# Patient Record
Sex: MALE | Race: WHITE | NOT HISPANIC OR LATINO | Employment: OTHER | URBAN - NONMETROPOLITAN AREA
[De-identification: names, ages, dates, MRNs, and addresses within clinical notes are randomized per-mention and may not be internally consistent; named-entity substitution may affect disease eponyms.]

---

## 2017-01-13 ENCOUNTER — HOSPITAL ENCOUNTER (EMERGENCY)
Facility: HOSPITAL | Age: 22
End: 2017-01-13
Attending: EMERGENCY MEDICINE | Admitting: EMERGENCY MEDICINE
Payer: MEDICARE

## 2017-01-13 VITALS
DIASTOLIC BLOOD PRESSURE: 84 MMHG | BODY MASS INDEX: 41.78 KG/M2 | HEART RATE: 120 BPM | OXYGEN SATURATION: 100 % | WEIGHT: 266.76 LBS | RESPIRATION RATE: 18 BRPM | TEMPERATURE: 99.1 F | SYSTOLIC BLOOD PRESSURE: 138 MMHG

## 2017-01-13 DIAGNOSIS — F29 PSYCHOSIS (HCC): Primary | ICD-10-CM

## 2017-01-13 LAB
AMPHETAMINES SERPL QL SCN: NEGATIVE
ANION GAP SERPL CALCULATED.3IONS-SCNC: 11 MMOL/L (ref 4–13)
BARBITURATES UR QL: NEGATIVE
BASOPHILS # BLD AUTO: 0.02 THOUSANDS/ΜL (ref 0–0.1)
BASOPHILS NFR BLD AUTO: 0 % (ref 0–1)
BENZODIAZ UR QL: NEGATIVE
BUN SERPL-MCNC: 12 MG/DL (ref 5–25)
CALCIUM SERPL-MCNC: 9 MG/DL (ref 8.3–10.1)
CHLORIDE SERPL-SCNC: 104 MMOL/L (ref 100–108)
CO2 SERPL-SCNC: 27 MMOL/L (ref 21–32)
COCAINE UR QL: NEGATIVE
CREAT SERPL-MCNC: 0.64 MG/DL (ref 0.6–1.3)
EOSINOPHIL # BLD AUTO: 0.11 THOUSAND/ΜL (ref 0–0.61)
EOSINOPHIL NFR BLD AUTO: 1 % (ref 0–6)
ERYTHROCYTE [DISTWIDTH] IN BLOOD BY AUTOMATED COUNT: 12.7 % (ref 11.6–15.1)
ETHANOL SERPL-MCNC: <3 MG/DL (ref 0–3)
GFR SERPL CREATININE-BSD FRML MDRD: >60 ML/MIN/1.73SQ M
GLUCOSE SERPL-MCNC: 80 MG/DL (ref 65–140)
HCT VFR BLD AUTO: 46.9 % (ref 36.5–49.3)
HGB BLD-MCNC: 15.9 G/DL (ref 12–17)
HOLD SPECIMEN: NORMAL
HOLD SPECIMEN: YES
LYMPHOCYTES # BLD AUTO: 2.04 THOUSANDS/ΜL (ref 0.6–4.47)
LYMPHOCYTES NFR BLD AUTO: 26 % (ref 14–44)
MCH RBC QN AUTO: 29.3 PG (ref 26.8–34.3)
MCHC RBC AUTO-ENTMCNC: 33.9 G/DL (ref 31.4–37.4)
MCV RBC AUTO: 87 FL (ref 82–98)
METHADONE UR QL: NEGATIVE
MONOCYTES # BLD AUTO: 0.78 THOUSAND/ΜL (ref 0.17–1.22)
MONOCYTES NFR BLD AUTO: 10 % (ref 4–12)
NEUTROPHILS # BLD AUTO: 4.96 THOUSANDS/ΜL (ref 1.85–7.62)
NEUTS SEG NFR BLD AUTO: 63 % (ref 43–75)
OPIATES UR QL SCN: NEGATIVE
PCP UR QL: NEGATIVE
PLATELET # BLD AUTO: 249 THOUSANDS/UL (ref 149–390)
PMV BLD AUTO: 9.7 FL (ref 8.9–12.7)
POTASSIUM SERPL-SCNC: 3.8 MMOL/L (ref 3.5–5.3)
RBC # BLD AUTO: 5.42 MILLION/UL (ref 3.88–5.62)
SODIUM SERPL-SCNC: 142 MMOL/L (ref 136–145)
THC UR QL: NEGATIVE
WBC # BLD AUTO: 7.91 THOUSAND/UL (ref 4.31–10.16)

## 2017-01-13 PROCEDURE — 85025 COMPLETE CBC W/AUTO DIFF WBC: CPT | Performed by: EMERGENCY MEDICINE

## 2017-01-13 PROCEDURE — 99285 EMERGENCY DEPT VISIT HI MDM: CPT

## 2017-01-13 PROCEDURE — 36415 COLL VENOUS BLD VENIPUNCTURE: CPT | Performed by: EMERGENCY MEDICINE

## 2017-01-13 PROCEDURE — 80320 DRUG SCREEN QUANTALCOHOLS: CPT | Performed by: EMERGENCY MEDICINE

## 2017-01-13 PROCEDURE — 80048 BASIC METABOLIC PNL TOTAL CA: CPT | Performed by: EMERGENCY MEDICINE

## 2017-01-13 PROCEDURE — 80307 DRUG TEST PRSMV CHEM ANLYZR: CPT | Performed by: EMERGENCY MEDICINE

## 2017-01-13 RX ORDER — LORAZEPAM 1 MG/1
1 TABLET ORAL EVERY 6 HOURS PRN
Status: CANCELLED | OUTPATIENT
Start: 2017-01-13

## 2017-01-13 RX ORDER — ZOLPIDEM TARTRATE 5 MG/1
5 TABLET ORAL
Status: CANCELLED | OUTPATIENT
Start: 2017-01-13

## 2017-01-13 RX ORDER — BENZTROPINE MESYLATE 1 MG/1
1 TABLET ORAL EVERY 6 HOURS PRN
Status: CANCELLED | OUTPATIENT
Start: 2017-01-13

## 2017-01-13 RX ORDER — BENZTROPINE MESYLATE 1 MG/ML
1 INJECTION INTRAMUSCULAR; INTRAVENOUS EVERY 6 HOURS PRN
Status: CANCELLED | OUTPATIENT
Start: 2017-01-13

## 2017-01-13 RX ORDER — MAGNESIUM HYDROXIDE/ALUMINUM HYDROXICE/SIMETHICONE 120; 1200; 1200 MG/30ML; MG/30ML; MG/30ML
30 SUSPENSION ORAL EVERY 4 HOURS PRN
Status: CANCELLED | OUTPATIENT
Start: 2017-01-13

## 2017-01-13 RX ORDER — LORAZEPAM 2 MG/ML
2 INJECTION INTRAMUSCULAR 4 TIMES DAILY PRN
Status: CANCELLED | OUTPATIENT
Start: 2017-01-13

## 2017-01-13 RX ORDER — RISPERIDONE 1 MG/1
1 TABLET, FILM COATED ORAL ONCE
Status: COMPLETED | OUTPATIENT
Start: 2017-01-13 | End: 2017-01-13

## 2017-01-13 RX ORDER — OLANZAPINE 10 MG/1
10 INJECTION, POWDER, LYOPHILIZED, FOR SOLUTION INTRAMUSCULAR
Status: CANCELLED | OUTPATIENT
Start: 2017-01-13

## 2017-01-13 RX ORDER — ACETAMINOPHEN 325 MG/1
650 TABLET ORAL EVERY 6 HOURS PRN
Status: CANCELLED | OUTPATIENT
Start: 2017-01-13

## 2017-01-13 RX ORDER — HALOPERIDOL 5 MG
5 TABLET ORAL EVERY 6 HOURS PRN
Status: CANCELLED | OUTPATIENT
Start: 2017-01-13

## 2017-01-13 RX ADMIN — RISPERIDONE 1 MG: 1 TABLET ORAL at 19:02

## 2017-01-14 ENCOUNTER — HOSPITAL ENCOUNTER (INPATIENT)
Facility: HOSPITAL | Age: 22
LOS: 6 days | Discharge: HOME | DRG: 885 | End: 2017-01-20
Attending: PSYCHIATRY & NEUROLOGY | Admitting: PSYCHIATRY & NEUROLOGY
Payer: MEDICARE

## 2017-01-14 DIAGNOSIS — I10 HYPERTENSION: Primary | ICD-10-CM

## 2017-01-14 DIAGNOSIS — F29 PSYCHOTIC DISORDER (HCC): Chronic | ICD-10-CM

## 2017-01-14 LAB
AMPHETAMINES SERPL QL SCN: NEGATIVE
BARBITURATES UR QL: NEGATIVE
BENZODIAZ UR QL: NEGATIVE
BILIRUB UR QL STRIP: NEGATIVE
CLARITY UR: NORMAL
COCAINE UR QL: NEGATIVE
COLOR UR: YELLOW
GLUCOSE UR STRIP-MCNC: NEGATIVE MG/DL
HGB UR QL STRIP.AUTO: NEGATIVE
KETONES UR STRIP-MCNC: NEGATIVE MG/DL
LEUKOCYTE ESTERASE UR QL STRIP: NEGATIVE
METHADONE UR QL: NEGATIVE
NITRITE UR QL STRIP: NEGATIVE
OPIATES UR QL SCN: NEGATIVE
PCP UR QL: NEGATIVE
PH UR STRIP.AUTO: 7 [PH] (ref 4.5–8)
PROT UR STRIP-MCNC: NEGATIVE MG/DL
SP GR UR STRIP.AUTO: 1.02 (ref 1–1.03)
THC UR QL: NEGATIVE
UROBILINOGEN UR QL STRIP.AUTO: 0.2 E.U./DL

## 2017-01-14 PROCEDURE — 81003 URINALYSIS AUTO W/O SCOPE: CPT | Performed by: PSYCHIATRY & NEUROLOGY

## 2017-01-14 PROCEDURE — 80307 DRUG TEST PRSMV CHEM ANLYZR: CPT | Performed by: PSYCHIATRY & NEUROLOGY

## 2017-01-14 RX ORDER — RISPERIDONE 2 MG/1
4 TABLET, FILM COATED ORAL
Status: DISCONTINUED | OUTPATIENT
Start: 2017-01-14 | End: 2017-01-20 | Stop reason: HOSPADM

## 2017-01-14 RX ORDER — HALOPERIDOL 5 MG
5 TABLET ORAL EVERY 6 HOURS PRN
Status: DISCONTINUED | OUTPATIENT
Start: 2017-01-14 | End: 2017-01-20 | Stop reason: HOSPADM

## 2017-01-14 RX ORDER — DIVALPROEX SODIUM 500 MG/1
500 TABLET, EXTENDED RELEASE ORAL 2 TIMES DAILY
Status: DISCONTINUED | OUTPATIENT
Start: 2017-01-14 | End: 2017-01-20 | Stop reason: HOSPADM

## 2017-01-14 RX ORDER — LORAZEPAM 2 MG/ML
2 INJECTION INTRAMUSCULAR 4 TIMES DAILY PRN
Status: DISCONTINUED | OUTPATIENT
Start: 2017-01-14 | End: 2017-01-20 | Stop reason: HOSPADM

## 2017-01-14 RX ORDER — BENZTROPINE MESYLATE 1 MG/1
1 TABLET ORAL EVERY 6 HOURS PRN
Status: DISCONTINUED | OUTPATIENT
Start: 2017-01-14 | End: 2017-01-20 | Stop reason: HOSPADM

## 2017-01-14 RX ORDER — AMLODIPINE BESYLATE 5 MG/1
10 TABLET ORAL DAILY
Status: DISCONTINUED | OUTPATIENT
Start: 2017-01-14 | End: 2017-01-20 | Stop reason: HOSPADM

## 2017-01-14 RX ORDER — BENZTROPINE MESYLATE 1 MG/ML
1 INJECTION INTRAMUSCULAR; INTRAVENOUS EVERY 6 HOURS PRN
Status: DISCONTINUED | OUTPATIENT
Start: 2017-01-14 | End: 2017-01-20 | Stop reason: HOSPADM

## 2017-01-14 RX ORDER — DIVALPROEX SODIUM 500 MG/1
500 TABLET, EXTENDED RELEASE ORAL SEE ADMIN INSTRUCTIONS
Status: DISCONTINUED | OUTPATIENT
Start: 2017-01-14 | End: 2017-01-14

## 2017-01-14 RX ORDER — ZOLPIDEM TARTRATE 5 MG/1
5 TABLET ORAL
Status: DISCONTINUED | OUTPATIENT
Start: 2017-01-14 | End: 2017-01-20 | Stop reason: HOSPADM

## 2017-01-14 RX ORDER — MAGNESIUM HYDROXIDE/ALUMINUM HYDROXICE/SIMETHICONE 120; 1200; 1200 MG/30ML; MG/30ML; MG/30ML
30 SUSPENSION ORAL EVERY 4 HOURS PRN
Status: DISCONTINUED | OUTPATIENT
Start: 2017-01-14 | End: 2017-01-20 | Stop reason: HOSPADM

## 2017-01-14 RX ORDER — OLANZAPINE 10 MG/1
10 INJECTION, POWDER, LYOPHILIZED, FOR SOLUTION INTRAMUSCULAR
Status: DISCONTINUED | OUTPATIENT
Start: 2017-01-14 | End: 2017-01-20 | Stop reason: HOSPADM

## 2017-01-14 RX ORDER — LORAZEPAM 1 MG/1
1 TABLET ORAL EVERY 6 HOURS PRN
Status: DISCONTINUED | OUTPATIENT
Start: 2017-01-14 | End: 2017-01-20 | Stop reason: HOSPADM

## 2017-01-14 RX ORDER — ACETAMINOPHEN 325 MG/1
650 TABLET ORAL EVERY 6 HOURS PRN
Status: DISCONTINUED | OUTPATIENT
Start: 2017-01-14 | End: 2017-01-20 | Stop reason: HOSPADM

## 2017-01-14 RX ADMIN — DIVALPROEX SODIUM 500 MG: 500 TABLET, FILM COATED, EXTENDED RELEASE ORAL at 17:37

## 2017-01-14 RX ADMIN — AMLODIPINE BESYLATE 10 MG: 5 TABLET ORAL at 10:34

## 2017-01-14 RX ADMIN — DIVALPROEX SODIUM 500 MG: 500 TABLET, FILM COATED, EXTENDED RELEASE ORAL at 12:52

## 2017-01-14 RX ADMIN — RISPERIDONE 4 MG: 2 TABLET ORAL at 22:30

## 2017-01-14 RX ADMIN — ACETAMINOPHEN 650 MG: 325 TABLET, FILM COATED ORAL at 19:58

## 2017-01-15 LAB
ALBUMIN SERPL BCP-MCNC: 3.3 G/DL (ref 3.5–5)
ALP SERPL-CCNC: 75 U/L (ref 46–116)
ALT SERPL W P-5'-P-CCNC: 31 U/L (ref 12–78)
ANION GAP SERPL CALCULATED.3IONS-SCNC: 8 MMOL/L (ref 4–13)
AST SERPL W P-5'-P-CCNC: 14 U/L (ref 5–45)
BILIRUB SERPL-MCNC: 0.3 MG/DL (ref 0.2–1)
BUN SERPL-MCNC: 12 MG/DL (ref 5–25)
CALCIUM SERPL-MCNC: 8.5 MG/DL (ref 8.3–10.1)
CHLORIDE SERPL-SCNC: 105 MMOL/L (ref 100–108)
CHOLEST SERPL-MCNC: 171 MG/DL (ref 50–200)
CO2 SERPL-SCNC: 28 MMOL/L (ref 21–32)
CREAT SERPL-MCNC: 0.69 MG/DL (ref 0.6–1.3)
GFR SERPL CREATININE-BSD FRML MDRD: >60 ML/MIN/1.73SQ M
GLUCOSE SERPL-MCNC: 89 MG/DL (ref 65–140)
HDLC SERPL-MCNC: 35 MG/DL (ref 40–60)
LDLC SERPL CALC-MCNC: 116 MG/DL (ref 0–100)
POTASSIUM SERPL-SCNC: 3.9 MMOL/L (ref 3.5–5.3)
PROT SERPL-MCNC: 6.6 G/DL (ref 6.4–8.2)
SODIUM SERPL-SCNC: 141 MMOL/L (ref 136–145)
TRIGL SERPL-MCNC: 100 MG/DL
TSH SERPL DL<=0.05 MIU/L-ACNC: 3.65 UIU/ML (ref 0.36–3.74)
VALPROATE SERPL-MCNC: 56 UG/ML (ref 50–100)

## 2017-01-15 PROCEDURE — 80164 ASSAY DIPROPYLACETIC ACD TOT: CPT | Performed by: PSYCHIATRY & NEUROLOGY

## 2017-01-15 PROCEDURE — 84443 ASSAY THYROID STIM HORMONE: CPT | Performed by: PSYCHIATRY & NEUROLOGY

## 2017-01-15 PROCEDURE — 86592 SYPHILIS TEST NON-TREP QUAL: CPT | Performed by: PSYCHIATRY & NEUROLOGY

## 2017-01-15 PROCEDURE — 80053 COMPREHEN METABOLIC PANEL: CPT | Performed by: PSYCHIATRY & NEUROLOGY

## 2017-01-15 PROCEDURE — 80061 LIPID PANEL: CPT | Performed by: PSYCHIATRY & NEUROLOGY

## 2017-01-15 RX ADMIN — AMLODIPINE BESYLATE 10 MG: 5 TABLET ORAL at 08:46

## 2017-01-15 RX ADMIN — ACETAMINOPHEN 650 MG: 325 TABLET, FILM COATED ORAL at 15:53

## 2017-01-15 RX ADMIN — DIVALPROEX SODIUM 500 MG: 500 TABLET, FILM COATED, EXTENDED RELEASE ORAL at 17:30

## 2017-01-15 RX ADMIN — RISPERIDONE 4 MG: 2 TABLET ORAL at 22:19

## 2017-01-15 RX ADMIN — DIVALPROEX SODIUM 500 MG: 500 TABLET, FILM COATED, EXTENDED RELEASE ORAL at 08:46

## 2017-01-16 LAB — RPR SER QL: NORMAL

## 2017-01-16 RX ADMIN — RISPERIDONE 4 MG: 2 TABLET ORAL at 22:00

## 2017-01-16 RX ADMIN — DIVALPROEX SODIUM 500 MG: 500 TABLET, FILM COATED, EXTENDED RELEASE ORAL at 17:36

## 2017-01-16 RX ADMIN — AMLODIPINE BESYLATE 10 MG: 5 TABLET ORAL at 09:07

## 2017-01-16 RX ADMIN — DIVALPROEX SODIUM 500 MG: 500 TABLET, FILM COATED, EXTENDED RELEASE ORAL at 09:08

## 2017-01-17 RX ORDER — DIVALPROEX SODIUM 250 MG/1
250 TABLET, EXTENDED RELEASE ORAL
Status: DISCONTINUED | OUTPATIENT
Start: 2017-01-17 | End: 2017-01-20 | Stop reason: HOSPADM

## 2017-01-17 RX ADMIN — DIVALPROEX SODIUM 500 MG: 500 TABLET, FILM COATED, EXTENDED RELEASE ORAL at 17:27

## 2017-01-17 RX ADMIN — RISPERIDONE 4 MG: 2 TABLET ORAL at 21:45

## 2017-01-17 RX ADMIN — DIVALPROEX SODIUM 250 MG: 250 TABLET, FILM COATED, EXTENDED RELEASE ORAL at 17:27

## 2017-01-17 RX ADMIN — AMLODIPINE BESYLATE 10 MG: 5 TABLET ORAL at 09:21

## 2017-01-17 RX ADMIN — DIVALPROEX SODIUM 500 MG: 500 TABLET, FILM COATED, EXTENDED RELEASE ORAL at 09:42

## 2017-01-18 RX ADMIN — DIVALPROEX SODIUM 250 MG: 250 TABLET, FILM COATED, EXTENDED RELEASE ORAL at 18:02

## 2017-01-18 RX ADMIN — AMLODIPINE BESYLATE 10 MG: 5 TABLET ORAL at 08:43

## 2017-01-18 RX ADMIN — DIVALPROEX SODIUM 500 MG: 500 TABLET, FILM COATED, EXTENDED RELEASE ORAL at 08:43

## 2017-01-18 RX ADMIN — DIVALPROEX SODIUM 500 MG: 500 TABLET, FILM COATED, EXTENDED RELEASE ORAL at 18:01

## 2017-01-18 RX ADMIN — RISPERIDONE 4 MG: 2 TABLET ORAL at 21:54

## 2017-01-19 RX ADMIN — AMLODIPINE BESYLATE 10 MG: 5 TABLET ORAL at 08:45

## 2017-01-19 RX ADMIN — DIVALPROEX SODIUM 250 MG: 250 TABLET, FILM COATED, EXTENDED RELEASE ORAL at 18:16

## 2017-01-19 RX ADMIN — RISPERIDONE 4 MG: 2 TABLET ORAL at 21:32

## 2017-01-19 RX ADMIN — DIVALPROEX SODIUM 500 MG: 500 TABLET, FILM COATED, EXTENDED RELEASE ORAL at 18:16

## 2017-01-19 RX ADMIN — DIVALPROEX SODIUM 500 MG: 500 TABLET, FILM COATED, EXTENDED RELEASE ORAL at 08:45

## 2017-01-20 VITALS
SYSTOLIC BLOOD PRESSURE: 128 MMHG | HEART RATE: 97 BPM | HEIGHT: 70 IN | DIASTOLIC BLOOD PRESSURE: 64 MMHG | RESPIRATION RATE: 18 BRPM | WEIGHT: 266.76 LBS | BODY MASS INDEX: 38.19 KG/M2 | TEMPERATURE: 98.3 F | OXYGEN SATURATION: 96 %

## 2017-01-20 LAB — VALPROATE SERPL-MCNC: 69 UG/ML (ref 50–100)

## 2017-01-20 PROCEDURE — 80164 ASSAY DIPROPYLACETIC ACD TOT: CPT | Performed by: PHYSICIAN ASSISTANT

## 2017-01-20 RX ORDER — RISPERIDONE 4 MG/1
4 TABLET, FILM COATED ORAL
Qty: 30 TABLET | Refills: 0 | Status: SHIPPED | OUTPATIENT
Start: 2017-01-20 | End: 2017-03-20

## 2017-01-20 RX ORDER — AMLODIPINE BESYLATE 10 MG/1
10 TABLET ORAL DAILY
Qty: 30 TABLET | Refills: 0 | Status: SHIPPED | OUTPATIENT
Start: 2017-01-20 | End: 2017-03-20

## 2017-01-20 RX ORDER — DIVALPROEX SODIUM 500 MG/1
500 TABLET, EXTENDED RELEASE ORAL 2 TIMES DAILY
Qty: 60 TABLET | Refills: 0 | Status: SHIPPED | OUTPATIENT
Start: 2017-01-20 | End: 2017-03-20

## 2017-01-20 RX ORDER — DIVALPROEX SODIUM 250 MG/1
250 TABLET, EXTENDED RELEASE ORAL
Qty: 30 TABLET | Refills: 0 | Status: SHIPPED | OUTPATIENT
Start: 2017-01-20 | End: 2017-03-20

## 2017-01-20 RX ADMIN — AMLODIPINE BESYLATE 10 MG: 5 TABLET ORAL at 09:10

## 2017-01-20 RX ADMIN — DIVALPROEX SODIUM 500 MG: 500 TABLET, FILM COATED, EXTENDED RELEASE ORAL at 09:10

## 2017-03-20 ENCOUNTER — HOSPITAL ENCOUNTER (EMERGENCY)
Facility: HOSPITAL | Age: 22
End: 2017-03-20
Attending: EMERGENCY MEDICINE | Admitting: EMERGENCY MEDICINE
Payer: MEDICARE

## 2017-03-20 VITALS
OXYGEN SATURATION: 98 % | HEART RATE: 93 BPM | SYSTOLIC BLOOD PRESSURE: 145 MMHG | TEMPERATURE: 98.2 F | DIASTOLIC BLOOD PRESSURE: 88 MMHG | RESPIRATION RATE: 18 BRPM

## 2017-03-20 DIAGNOSIS — R45.851 SUICIDAL IDEATION: Primary | ICD-10-CM

## 2017-03-20 DIAGNOSIS — R44.0 AUDITORY HALLUCINATION: ICD-10-CM

## 2017-03-20 LAB
AMPHETAMINES SERPL QL SCN: NEGATIVE
BARBITURATES UR QL: NEGATIVE
BENZODIAZ UR QL: NEGATIVE
COCAINE UR QL: NEGATIVE
ETHANOL EXG-MCNC: 0 MG/DL
METHADONE UR QL: NEGATIVE
OPIATES UR QL SCN: NEGATIVE
PCP UR QL: NEGATIVE
THC UR QL: NEGATIVE

## 2017-03-20 PROCEDURE — 80307 DRUG TEST PRSMV CHEM ANLYZR: CPT | Performed by: EMERGENCY MEDICINE

## 2017-03-20 PROCEDURE — 82075 ASSAY OF BREATH ETHANOL: CPT

## 2017-03-20 PROCEDURE — 99285 EMERGENCY DEPT VISIT HI MDM: CPT

## 2017-03-20 RX ORDER — MAGNESIUM HYDROXIDE/ALUMINUM HYDROXICE/SIMETHICONE 120; 1200; 1200 MG/30ML; MG/30ML; MG/30ML
30 SUSPENSION ORAL EVERY 4 HOURS PRN
Status: CANCELLED | OUTPATIENT
Start: 2017-03-20

## 2017-03-20 RX ORDER — HALOPERIDOL 5 MG/ML
5 INJECTION INTRAMUSCULAR EVERY 6 HOURS PRN
Status: CANCELLED | OUTPATIENT
Start: 2017-03-20

## 2017-03-20 RX ORDER — HALOPERIDOL 5 MG
5 TABLET ORAL EVERY 6 HOURS PRN
Status: CANCELLED | OUTPATIENT
Start: 2017-03-20

## 2017-03-20 RX ORDER — BENZTROPINE MESYLATE 1 MG/1
1 TABLET ORAL EVERY 6 HOURS PRN
Status: CANCELLED | OUTPATIENT
Start: 2017-03-20

## 2017-03-20 RX ORDER — IBUPROFEN 400 MG/1
800 TABLET ORAL EVERY 8 HOURS PRN
Status: CANCELLED | OUTPATIENT
Start: 2017-03-20

## 2017-03-20 RX ORDER — TRAZODONE HYDROCHLORIDE 50 MG/1
50 TABLET ORAL
Status: CANCELLED | OUTPATIENT
Start: 2017-03-20

## 2017-03-20 RX ORDER — HYDROXYZINE HYDROCHLORIDE 25 MG/1
25 TABLET, FILM COATED ORAL EVERY 6 HOURS PRN
Status: CANCELLED | OUTPATIENT
Start: 2017-03-20

## 2017-03-20 RX ORDER — ACETAMINOPHEN 325 MG/1
650 TABLET ORAL EVERY 6 HOURS PRN
Status: CANCELLED | OUTPATIENT
Start: 2017-03-20

## 2017-03-20 RX ORDER — DIVALPROEX SODIUM 250 MG/1
250 TABLET, EXTENDED RELEASE ORAL DAILY
COMMUNITY
End: 2017-03-27 | Stop reason: HOSPADM

## 2017-03-20 RX ORDER — IBUPROFEN 600 MG/1
600 TABLET ORAL EVERY 8 HOURS PRN
Status: CANCELLED | OUTPATIENT
Start: 2017-03-20

## 2017-03-20 RX ORDER — RISPERIDONE 1 MG/1
1 TABLET, ORALLY DISINTEGRATING ORAL EVERY 4 HOURS PRN
Status: CANCELLED | OUTPATIENT
Start: 2017-03-20

## 2017-03-21 ENCOUNTER — HOSPITAL ENCOUNTER (INPATIENT)
Facility: HOSPITAL | Age: 22
LOS: 6 days | Discharge: HOME | DRG: 885 | End: 2017-03-27
Attending: PSYCHIATRY & NEUROLOGY | Admitting: PSYCHIATRY & NEUROLOGY
Payer: MEDICARE

## 2017-03-21 DIAGNOSIS — F25.9 SCHIZOAFFECTIVE DISORDER (HCC): Primary | ICD-10-CM

## 2017-03-21 LAB
ALBUMIN SERPL BCP-MCNC: 3.6 G/DL (ref 3.5–5)
ALP SERPL-CCNC: 82 U/L (ref 46–116)
ALT SERPL W P-5'-P-CCNC: 75 U/L (ref 12–78)
ANION GAP SERPL CALCULATED.3IONS-SCNC: 11 MMOL/L (ref 4–13)
AST SERPL W P-5'-P-CCNC: 31 U/L (ref 5–45)
BACTERIA UR QL AUTO: ABNORMAL /HPF
BASOPHILS # BLD AUTO: 0.03 THOUSANDS/ΜL (ref 0–0.1)
BASOPHILS NFR BLD AUTO: 1 % (ref 0–1)
BILIRUB SERPL-MCNC: 0.6 MG/DL (ref 0.2–1)
BILIRUB UR QL STRIP: NEGATIVE
BUN SERPL-MCNC: 12 MG/DL (ref 5–25)
CALCIUM SERPL-MCNC: 8.6 MG/DL (ref 8.3–10.1)
CHLORIDE SERPL-SCNC: 106 MMOL/L (ref 100–108)
CHOLEST SERPL-MCNC: 187 MG/DL (ref 50–200)
CLARITY UR: CLEAR
CO2 SERPL-SCNC: 26 MMOL/L (ref 21–32)
COLOR UR: YELLOW
CREAT SERPL-MCNC: 0.71 MG/DL (ref 0.6–1.3)
EOSINOPHIL # BLD AUTO: 0.2 THOUSAND/ΜL (ref 0–0.61)
EOSINOPHIL NFR BLD AUTO: 3 % (ref 0–6)
ERYTHROCYTE [DISTWIDTH] IN BLOOD BY AUTOMATED COUNT: 13.1 % (ref 11.6–15.1)
GFR SERPL CREATININE-BSD FRML MDRD: >60 ML/MIN/1.73SQ M
GLUCOSE SERPL-MCNC: 88 MG/DL (ref 65–140)
GLUCOSE UR STRIP-MCNC: NEGATIVE MG/DL
HCT VFR BLD AUTO: 40.4 % (ref 36.5–49.3)
HDLC SERPL-MCNC: 38 MG/DL (ref 40–60)
HGB BLD-MCNC: 14.5 G/DL (ref 12–17)
HGB UR QL STRIP.AUTO: NEGATIVE
KETONES UR STRIP-MCNC: NEGATIVE MG/DL
LDLC SERPL CALC-MCNC: 126 MG/DL (ref 0–100)
LEUKOCYTE ESTERASE UR QL STRIP: NEGATIVE
LYMPHOCYTES # BLD AUTO: 2.31 THOUSANDS/ΜL (ref 0.6–4.47)
LYMPHOCYTES NFR BLD AUTO: 38 % (ref 14–44)
MCH RBC QN AUTO: 30.6 PG (ref 26.8–34.3)
MCHC RBC AUTO-ENTMCNC: 35.9 G/DL (ref 31.4–37.4)
MCV RBC AUTO: 85 FL (ref 82–98)
MONOCYTES # BLD AUTO: 0.82 THOUSAND/ΜL (ref 0.17–1.22)
MONOCYTES NFR BLD AUTO: 14 % (ref 4–12)
NEUTROPHILS # BLD AUTO: 2.66 THOUSANDS/ΜL (ref 1.85–7.62)
NEUTS SEG NFR BLD AUTO: 44 % (ref 43–75)
NITRITE UR QL STRIP: NEGATIVE
NON-SQ EPI CELLS URNS QL MICRO: ABNORMAL /HPF
PH UR STRIP.AUTO: 6 [PH] (ref 4.5–8)
PLATELET # BLD AUTO: 224 THOUSANDS/UL (ref 149–390)
PMV BLD AUTO: 8.9 FL (ref 8.9–12.7)
POTASSIUM SERPL-SCNC: 3.9 MMOL/L (ref 3.5–5.3)
PROT SERPL-MCNC: 7.2 G/DL (ref 6.4–8.2)
PROT UR STRIP-MCNC: NEGATIVE MG/DL
RBC # BLD AUTO: 4.74 MILLION/UL (ref 3.88–5.62)
RBC #/AREA URNS AUTO: ABNORMAL /HPF
SODIUM SERPL-SCNC: 143 MMOL/L (ref 136–145)
SP GR UR STRIP.AUTO: 1.02 (ref 1–1.03)
TRIGL SERPL-MCNC: 115 MG/DL
TSH SERPL DL<=0.05 MIU/L-ACNC: 4.62 UIU/ML (ref 0.36–3.74)
UROBILINOGEN UR QL STRIP.AUTO: 0.2 E.U./DL
VALPROATE SERPL-MCNC: 37 UG/ML (ref 50–100)
WBC # BLD AUTO: 6.02 THOUSAND/UL (ref 4.31–10.16)
WBC #/AREA URNS AUTO: ABNORMAL /HPF

## 2017-03-21 PROCEDURE — 80164 ASSAY DIPROPYLACETIC ACD TOT: CPT | Performed by: PSYCHIATRY & NEUROLOGY

## 2017-03-21 PROCEDURE — 84443 ASSAY THYROID STIM HORMONE: CPT | Performed by: PSYCHIATRY & NEUROLOGY

## 2017-03-21 PROCEDURE — 80061 LIPID PANEL: CPT | Performed by: PSYCHIATRY & NEUROLOGY

## 2017-03-21 PROCEDURE — 80053 COMPREHEN METABOLIC PANEL: CPT | Performed by: PSYCHIATRY & NEUROLOGY

## 2017-03-21 PROCEDURE — 86592 SYPHILIS TEST NON-TREP QUAL: CPT | Performed by: PSYCHIATRY & NEUROLOGY

## 2017-03-21 PROCEDURE — 85025 COMPLETE CBC W/AUTO DIFF WBC: CPT | Performed by: PSYCHIATRY & NEUROLOGY

## 2017-03-21 PROCEDURE — 81001 URINALYSIS AUTO W/SCOPE: CPT | Performed by: PSYCHIATRY & NEUROLOGY

## 2017-03-21 RX ORDER — TRAZODONE HYDROCHLORIDE 50 MG/1
50 TABLET ORAL
Status: DISCONTINUED | OUTPATIENT
Start: 2017-03-21 | End: 2017-03-27 | Stop reason: HOSPADM

## 2017-03-21 RX ORDER — HALOPERIDOL 5 MG/ML
5 INJECTION INTRAMUSCULAR EVERY 6 HOURS PRN
Status: DISCONTINUED | OUTPATIENT
Start: 2017-03-21 | End: 2017-03-27 | Stop reason: HOSPADM

## 2017-03-21 RX ORDER — HYDROXYZINE HYDROCHLORIDE 25 MG/1
25 TABLET, FILM COATED ORAL EVERY 6 HOURS PRN
Status: DISCONTINUED | OUTPATIENT
Start: 2017-03-21 | End: 2017-03-27 | Stop reason: HOSPADM

## 2017-03-21 RX ORDER — RISPERIDONE 1 MG/1
1 TABLET, ORALLY DISINTEGRATING ORAL EVERY 4 HOURS PRN
Status: DISCONTINUED | OUTPATIENT
Start: 2017-03-21 | End: 2017-03-27 | Stop reason: HOSPADM

## 2017-03-21 RX ORDER — MAGNESIUM HYDROXIDE/ALUMINUM HYDROXICE/SIMETHICONE 120; 1200; 1200 MG/30ML; MG/30ML; MG/30ML
30 SUSPENSION ORAL EVERY 4 HOURS PRN
Status: DISCONTINUED | OUTPATIENT
Start: 2017-03-21 | End: 2017-03-27 | Stop reason: HOSPADM

## 2017-03-21 RX ORDER — HALOPERIDOL 5 MG
5 TABLET ORAL EVERY 6 HOURS PRN
Status: DISCONTINUED | OUTPATIENT
Start: 2017-03-21 | End: 2017-03-27 | Stop reason: HOSPADM

## 2017-03-21 RX ORDER — ACETAMINOPHEN 325 MG/1
650 TABLET ORAL EVERY 6 HOURS PRN
Status: DISCONTINUED | OUTPATIENT
Start: 2017-03-21 | End: 2017-03-27 | Stop reason: HOSPADM

## 2017-03-21 RX ORDER — BENZTROPINE MESYLATE 1 MG/1
1 TABLET ORAL EVERY 6 HOURS PRN
Status: DISCONTINUED | OUTPATIENT
Start: 2017-03-21 | End: 2017-03-27 | Stop reason: HOSPADM

## 2017-03-21 RX ORDER — IBUPROFEN 600 MG/1
600 TABLET ORAL EVERY 8 HOURS PRN
Status: DISCONTINUED | OUTPATIENT
Start: 2017-03-21 | End: 2017-03-27 | Stop reason: HOSPADM

## 2017-03-21 RX ORDER — DIVALPROEX SODIUM 500 MG/1
500 TABLET, EXTENDED RELEASE ORAL DAILY
Status: DISCONTINUED | OUTPATIENT
Start: 2017-03-21 | End: 2017-03-27 | Stop reason: HOSPADM

## 2017-03-21 RX ORDER — IBUPROFEN 800 MG/1
800 TABLET ORAL EVERY 8 HOURS PRN
Status: DISCONTINUED | OUTPATIENT
Start: 2017-03-21 | End: 2017-03-23

## 2017-03-21 RX ADMIN — RISPERIDONE 3 MG: 2 TABLET ORAL at 21:11

## 2017-03-21 RX ADMIN — DIVALPROEX SODIUM 500 MG: 500 TABLET, EXTENDED RELEASE ORAL at 12:21

## 2017-03-21 RX ADMIN — DIVALPROEX SODIUM 750 MG: 250 TABLET, FILM COATED, EXTENDED RELEASE ORAL at 18:04

## 2017-03-22 LAB — RPR SER QL: NORMAL

## 2017-03-22 RX ADMIN — DIVALPROEX SODIUM 750 MG: 250 TABLET, FILM COATED, EXTENDED RELEASE ORAL at 17:25

## 2017-03-22 RX ADMIN — RISPERIDONE 3 MG: 2 TABLET ORAL at 21:31

## 2017-03-22 RX ADMIN — DIVALPROEX SODIUM 500 MG: 500 TABLET, EXTENDED RELEASE ORAL at 09:02

## 2017-03-23 RX ORDER — LORAZEPAM 2 MG/ML
2 INJECTION INTRAMUSCULAR EVERY 6 HOURS PRN
Status: DISCONTINUED | OUTPATIENT
Start: 2017-03-23 | End: 2017-03-27 | Stop reason: HOSPADM

## 2017-03-23 RX ORDER — OLANZAPINE 10 MG/1
10 INJECTION, POWDER, LYOPHILIZED, FOR SOLUTION INTRAMUSCULAR
Status: DISCONTINUED | OUTPATIENT
Start: 2017-03-23 | End: 2017-03-27 | Stop reason: HOSPADM

## 2017-03-23 RX ORDER — LORAZEPAM 1 MG/1
1 TABLET ORAL EVERY 6 HOURS PRN
Status: DISCONTINUED | OUTPATIENT
Start: 2017-03-23 | End: 2017-03-27 | Stop reason: HOSPADM

## 2017-03-23 RX ORDER — OLANZAPINE 10 MG/1
10 TABLET ORAL
Status: DISCONTINUED | OUTPATIENT
Start: 2017-03-23 | End: 2017-03-27 | Stop reason: HOSPADM

## 2017-03-23 RX ADMIN — DIVALPROEX SODIUM 500 MG: 500 TABLET, EXTENDED RELEASE ORAL at 09:58

## 2017-03-23 RX ADMIN — RISPERIDONE 3 MG: 2 TABLET ORAL at 21:04

## 2017-03-23 RX ADMIN — DIVALPROEX SODIUM 750 MG: 250 TABLET, FILM COATED, EXTENDED RELEASE ORAL at 17:16

## 2017-03-24 LAB
ALBUMIN SERPL BCP-MCNC: 3.6 G/DL (ref 3.5–5)
ALP SERPL-CCNC: 87 U/L (ref 46–116)
ALT SERPL W P-5'-P-CCNC: 60 U/L (ref 12–78)
ANION GAP SERPL CALCULATED.3IONS-SCNC: 10 MMOL/L (ref 4–13)
AST SERPL W P-5'-P-CCNC: 22 U/L (ref 5–45)
BILIRUB SERPL-MCNC: 0.4 MG/DL (ref 0.2–1)
BUN SERPL-MCNC: 11 MG/DL (ref 5–25)
CALCIUM SERPL-MCNC: 8.8 MG/DL (ref 8.3–10.1)
CHLORIDE SERPL-SCNC: 103 MMOL/L (ref 100–108)
CO2 SERPL-SCNC: 27 MMOL/L (ref 21–32)
CREAT SERPL-MCNC: 0.75 MG/DL (ref 0.6–1.3)
GFR SERPL CREATININE-BSD FRML MDRD: >60 ML/MIN/1.73SQ M
GLUCOSE P FAST SERPL-MCNC: 87 MG/DL (ref 65–99)
GLUCOSE SERPL-MCNC: 87 MG/DL (ref 65–140)
POTASSIUM SERPL-SCNC: 4 MMOL/L (ref 3.5–5.3)
PROT SERPL-MCNC: 7.2 G/DL (ref 6.4–8.2)
SODIUM SERPL-SCNC: 140 MMOL/L (ref 136–145)
T3FREE SERPL-MCNC: 3.03 PG/ML (ref 2.3–4.2)
T4 FREE SERPL-MCNC: 1.1 NG/DL (ref 0.76–1.46)
VALPROATE SERPL-MCNC: 66 UG/ML (ref 50–100)

## 2017-03-24 PROCEDURE — 84481 FREE ASSAY (FT-3): CPT | Performed by: PHYSICIAN ASSISTANT

## 2017-03-24 PROCEDURE — 80053 COMPREHEN METABOLIC PANEL: CPT | Performed by: PSYCHIATRY & NEUROLOGY

## 2017-03-24 PROCEDURE — 84439 ASSAY OF FREE THYROXINE: CPT | Performed by: PHYSICIAN ASSISTANT

## 2017-03-24 PROCEDURE — 80164 ASSAY DIPROPYLACETIC ACD TOT: CPT | Performed by: PSYCHIATRY & NEUROLOGY

## 2017-03-24 RX ADMIN — RISPERIDONE 3 MG: 2 TABLET ORAL at 21:09

## 2017-03-24 RX ADMIN — DIVALPROEX SODIUM 500 MG: 500 TABLET, EXTENDED RELEASE ORAL at 08:57

## 2017-03-24 RX ADMIN — DIVALPROEX SODIUM 750 MG: 250 TABLET, FILM COATED, EXTENDED RELEASE ORAL at 17:36

## 2017-03-25 RX ADMIN — RISPERIDONE 3 MG: 2 TABLET ORAL at 21:28

## 2017-03-25 RX ADMIN — DIVALPROEX SODIUM 500 MG: 500 TABLET, EXTENDED RELEASE ORAL at 09:38

## 2017-03-25 RX ADMIN — DIVALPROEX SODIUM 750 MG: 250 TABLET, FILM COATED, EXTENDED RELEASE ORAL at 18:42

## 2017-03-26 RX ADMIN — DIVALPROEX SODIUM 500 MG: 500 TABLET, EXTENDED RELEASE ORAL at 08:29

## 2017-03-26 RX ADMIN — RISPERIDONE 3 MG: 2 TABLET ORAL at 21:12

## 2017-03-26 RX ADMIN — DIVALPROEX SODIUM 750 MG: 250 TABLET, FILM COATED, EXTENDED RELEASE ORAL at 17:45

## 2017-03-27 VITALS
OXYGEN SATURATION: 98 % | TEMPERATURE: 97.7 F | HEART RATE: 92 BPM | HEIGHT: 68 IN | BODY MASS INDEX: 44.13 KG/M2 | SYSTOLIC BLOOD PRESSURE: 105 MMHG | RESPIRATION RATE: 17 BRPM | DIASTOLIC BLOOD PRESSURE: 59 MMHG | WEIGHT: 291.2 LBS

## 2017-03-27 RX ORDER — DIVALPROEX SODIUM 250 MG/1
750 TABLET, EXTENDED RELEASE ORAL
Qty: 42 TABLET | Refills: 0 | Status: SHIPPED | OUTPATIENT
Start: 2017-03-27 | End: 2017-04-19

## 2017-03-27 RX ORDER — RISPERIDONE 3 MG/1
3 TABLET, FILM COATED ORAL
Qty: 12 TABLET | Refills: 0 | Status: ON HOLD | OUTPATIENT
Start: 2017-03-27 | End: 2017-06-16 | Stop reason: CLARIF

## 2017-03-27 RX ORDER — DIVALPROEX SODIUM 500 MG/1
500 TABLET, EXTENDED RELEASE ORAL DAILY
Qty: 14 TABLET | Refills: 0 | Status: SHIPPED | OUTPATIENT
Start: 2017-03-27 | End: 2017-04-19

## 2017-03-27 RX ADMIN — DIVALPROEX SODIUM 500 MG: 500 TABLET, EXTENDED RELEASE ORAL at 08:08

## 2017-04-19 ENCOUNTER — HOSPITAL ENCOUNTER (EMERGENCY)
Facility: HOSPITAL | Age: 22
Discharge: HOME | End: 2017-04-19
Attending: EMERGENCY MEDICINE | Admitting: EMERGENCY MEDICINE
Payer: MEDICARE

## 2017-04-19 VITALS
HEART RATE: 93 BPM | DIASTOLIC BLOOD PRESSURE: 74 MMHG | SYSTOLIC BLOOD PRESSURE: 144 MMHG | OXYGEN SATURATION: 97 % | WEIGHT: 292.6 LBS | BODY MASS INDEX: 44.49 KG/M2 | TEMPERATURE: 97.4 F | RESPIRATION RATE: 16 BRPM

## 2017-04-19 DIAGNOSIS — F25.9 SCHIZOAFFECTIVE DISORDER (HCC): ICD-10-CM

## 2017-04-19 DIAGNOSIS — Z76.0 MEDICATION REFILL: Primary | ICD-10-CM

## 2017-04-19 PROCEDURE — 99281 EMR DPT VST MAYX REQ PHY/QHP: CPT

## 2017-04-19 RX ORDER — RISPERIDONE 1 MG/1
1 TABLET, FILM COATED ORAL
Qty: 14 TABLET | Refills: 0 | Status: ON HOLD | OUTPATIENT
Start: 2017-04-19 | End: 2017-06-16 | Stop reason: CLARIF

## 2017-04-19 RX ORDER — RISPERIDONE 2 MG/1
2 TABLET, FILM COATED ORAL EVERY EVENING
Qty: 14 TABLET | Refills: 0 | Status: ON HOLD | OUTPATIENT
Start: 2017-04-19 | End: 2017-06-16 | Stop reason: SDDI

## 2017-04-19 RX ORDER — DIVALPROEX SODIUM 250 MG/1
750 TABLET, EXTENDED RELEASE ORAL
Qty: 42 TABLET | Refills: 0 | Status: ON HOLD | OUTPATIENT
Start: 2017-04-19 | End: 2017-06-16 | Stop reason: SDUPTHER

## 2017-04-19 RX ORDER — DIVALPROEX SODIUM 500 MG/1
500 TABLET, EXTENDED RELEASE ORAL DAILY
Qty: 14 TABLET | Refills: 0 | Status: ON HOLD | OUTPATIENT
Start: 2017-04-19 | End: 2017-06-16 | Stop reason: CLARIF

## 2017-06-16 ENCOUNTER — HOSPITAL ENCOUNTER (INPATIENT)
Facility: HOSPITAL | Age: 22
LOS: 5 days | Discharge: HOME | DRG: 885 | End: 2017-06-21
Attending: PSYCHIATRY & NEUROLOGY | Admitting: PSYCHIATRY & NEUROLOGY
Payer: MEDICARE

## 2017-06-16 ENCOUNTER — HOSPITAL ENCOUNTER (EMERGENCY)
Facility: HOSPITAL | Age: 22
End: 2017-06-16
Attending: EMERGENCY MEDICINE | Admitting: EMERGENCY MEDICINE
Payer: MEDICARE

## 2017-06-16 VITALS
HEIGHT: 68 IN | SYSTOLIC BLOOD PRESSURE: 138 MMHG | TEMPERATURE: 98.2 F | HEART RATE: 78 BPM | DIASTOLIC BLOOD PRESSURE: 66 MMHG | RESPIRATION RATE: 18 BRPM | OXYGEN SATURATION: 98 %

## 2017-06-16 DIAGNOSIS — F63.9 IMPULSE CONTROL DISORDER: ICD-10-CM

## 2017-06-16 DIAGNOSIS — I10 HYPERTENSION: Primary | ICD-10-CM

## 2017-06-16 DIAGNOSIS — F29 PSYCHOTIC DISORDER (HCC): Chronic | ICD-10-CM

## 2017-06-16 DIAGNOSIS — F25.9 SCHIZOAFFECTIVE DISORDER (HCC): ICD-10-CM

## 2017-06-16 DIAGNOSIS — F25.9 SCHIZOAFFECTIVE DISORDER (HCC): Primary | ICD-10-CM

## 2017-06-16 DIAGNOSIS — R44.0 AUDITORY HALLUCINATIONS: ICD-10-CM

## 2017-06-16 DIAGNOSIS — F79 INTELLECTUAL DISABILITY: ICD-10-CM

## 2017-06-16 LAB
AMPHETAMINES SERPL QL SCN: NEGATIVE
BARBITURATES UR QL: NEGATIVE
BENZODIAZ UR QL: NEGATIVE
BILIRUB UR QL STRIP: NEGATIVE
CLARITY UR: CLEAR
COCAINE UR QL: NEGATIVE
COLOR UR: YELLOW
ETHANOL EXG-MCNC: 0 MG/DL
GLUCOSE UR STRIP-MCNC: NEGATIVE MG/DL
HGB UR QL STRIP.AUTO: NEGATIVE
KETONES UR STRIP-MCNC: NEGATIVE MG/DL
LEUKOCYTE ESTERASE UR QL STRIP: NEGATIVE
METHADONE UR QL: NEGATIVE
NITRITE UR QL STRIP: NEGATIVE
OPIATES UR QL SCN: NEGATIVE
PCP UR QL: NEGATIVE
PH UR STRIP.AUTO: 6 [PH] (ref 4.5–8)
PROT UR STRIP-MCNC: NEGATIVE MG/DL
SP GR UR STRIP.AUTO: >=1.03 (ref 1–1.03)
THC UR QL: NEGATIVE
UROBILINOGEN UR QL STRIP.AUTO: 0.2 E.U./DL

## 2017-06-16 PROCEDURE — 80307 DRUG TEST PRSMV CHEM ANLYZR: CPT | Performed by: EMERGENCY MEDICINE

## 2017-06-16 PROCEDURE — 82075 ASSAY OF BREATH ETHANOL: CPT | Performed by: EMERGENCY MEDICINE

## 2017-06-16 PROCEDURE — 81003 URINALYSIS AUTO W/O SCOPE: CPT | Performed by: PHYSICIAN ASSISTANT

## 2017-06-16 PROCEDURE — 99285 EMERGENCY DEPT VISIT HI MDM: CPT

## 2017-06-16 RX ORDER — DIVALPROEX SODIUM 500 MG/1
500 TABLET, EXTENDED RELEASE ORAL DAILY
Status: DISCONTINUED | OUTPATIENT
Start: 2017-06-17 | End: 2017-06-21 | Stop reason: HOSPADM

## 2017-06-16 RX ORDER — TRAZODONE HYDROCHLORIDE 50 MG/1
50 TABLET ORAL
Status: DISCONTINUED | OUTPATIENT
Start: 2017-06-16 | End: 2017-06-21 | Stop reason: HOSPADM

## 2017-06-16 RX ORDER — RISPERIDONE 1 MG/1
2 TABLET, FILM COATED ORAL
Status: CANCELLED | OUTPATIENT
Start: 2017-06-16

## 2017-06-16 RX ORDER — LORAZEPAM 2 MG/ML
2 INJECTION INTRAMUSCULAR EVERY 6 HOURS PRN
Status: CANCELLED | OUTPATIENT
Start: 2017-06-16

## 2017-06-16 RX ORDER — BENZTROPINE MESYLATE 1 MG/1
1 TABLET ORAL EVERY 6 HOURS PRN
Status: DISCONTINUED | OUTPATIENT
Start: 2017-06-16 | End: 2017-06-21 | Stop reason: HOSPADM

## 2017-06-16 RX ORDER — ACETAMINOPHEN 325 MG/1
650 TABLET ORAL EVERY 6 HOURS PRN
Status: CANCELLED | OUTPATIENT
Start: 2017-06-16

## 2017-06-16 RX ORDER — TRAZODONE HYDROCHLORIDE 50 MG/1
50 TABLET ORAL
Status: CANCELLED | OUTPATIENT
Start: 2017-06-16

## 2017-06-16 RX ORDER — RISPERIDONE 1 MG/1
1 TABLET, ORALLY DISINTEGRATING ORAL
Status: CANCELLED | OUTPATIENT
Start: 2017-06-16

## 2017-06-16 RX ORDER — MAGNESIUM HYDROXIDE/ALUMINUM HYDROXICE/SIMETHICONE 120; 1200; 1200 MG/30ML; MG/30ML; MG/30ML
30 SUSPENSION ORAL EVERY 4 HOURS PRN
Status: CANCELLED | OUTPATIENT
Start: 2017-06-16

## 2017-06-16 RX ORDER — OLANZAPINE 10 MG/1
10 INJECTION, POWDER, LYOPHILIZED, FOR SOLUTION INTRAMUSCULAR
Status: CANCELLED | OUTPATIENT
Start: 2017-06-16

## 2017-06-16 RX ORDER — OLANZAPINE 10 MG/1
10 TABLET ORAL
Status: CANCELLED | OUTPATIENT
Start: 2017-06-16

## 2017-06-16 RX ORDER — DIVALPROEX SODIUM 500 MG/1
500 TABLET, EXTENDED RELEASE ORAL DAILY
Status: CANCELLED | OUTPATIENT
Start: 2017-06-17

## 2017-06-16 RX ORDER — MAGNESIUM HYDROXIDE/ALUMINUM HYDROXICE/SIMETHICONE 120; 1200; 1200 MG/30ML; MG/30ML; MG/30ML
30 SUSPENSION ORAL EVERY 4 HOURS PRN
Status: DISCONTINUED | OUTPATIENT
Start: 2017-06-16 | End: 2017-06-21 | Stop reason: HOSPADM

## 2017-06-16 RX ORDER — LORAZEPAM 2 MG/ML
2 INJECTION INTRAMUSCULAR EVERY 6 HOURS PRN
Status: DISCONTINUED | OUTPATIENT
Start: 2017-06-16 | End: 2017-06-21 | Stop reason: HOSPADM

## 2017-06-16 RX ORDER — HALOPERIDOL 5 MG/ML
5 INJECTION INTRAMUSCULAR EVERY 6 HOURS PRN
Status: DISCONTINUED | OUTPATIENT
Start: 2017-06-16 | End: 2017-06-21 | Stop reason: HOSPADM

## 2017-06-16 RX ORDER — HALOPERIDOL 5 MG
5 TABLET ORAL EVERY 6 HOURS PRN
Status: DISCONTINUED | OUTPATIENT
Start: 2017-06-16 | End: 2017-06-21 | Stop reason: HOSPADM

## 2017-06-16 RX ORDER — RISPERIDONE 1 MG/1
1 TABLET, FILM COATED ORAL DAILY
Status: CANCELLED | OUTPATIENT
Start: 2017-06-17

## 2017-06-16 RX ORDER — LORAZEPAM 1 MG/1
1 TABLET ORAL EVERY 6 HOURS PRN
Status: DISCONTINUED | OUTPATIENT
Start: 2017-06-16 | End: 2017-06-21 | Stop reason: HOSPADM

## 2017-06-16 RX ORDER — BENZTROPINE MESYLATE 1 MG/ML
1 INJECTION INTRAMUSCULAR; INTRAVENOUS EVERY 6 HOURS PRN
Status: DISCONTINUED | OUTPATIENT
Start: 2017-06-16 | End: 2017-06-21 | Stop reason: HOSPADM

## 2017-06-16 RX ORDER — ACETAMINOPHEN 325 MG/1
650 TABLET ORAL EVERY 6 HOURS PRN
Status: DISCONTINUED | OUTPATIENT
Start: 2017-06-16 | End: 2017-06-21 | Stop reason: HOSPADM

## 2017-06-16 RX ORDER — BENZTROPINE MESYLATE 1 MG/1
1 TABLET ORAL EVERY 6 HOURS PRN
Status: CANCELLED | OUTPATIENT
Start: 2017-06-16

## 2017-06-16 RX ORDER — RISPERIDONE 1 MG/1
1 TABLET, FILM COATED ORAL DAILY
Status: DISCONTINUED | OUTPATIENT
Start: 2017-06-17 | End: 2017-06-21 | Stop reason: HOSPADM

## 2017-06-16 RX ORDER — OLANZAPINE 10 MG/1
10 INJECTION, POWDER, LYOPHILIZED, FOR SOLUTION INTRAMUSCULAR
Status: DISCONTINUED | OUTPATIENT
Start: 2017-06-16 | End: 2017-06-21 | Stop reason: HOSPADM

## 2017-06-16 RX ORDER — HALOPERIDOL 5 MG/ML
5 INJECTION INTRAMUSCULAR EVERY 6 HOURS PRN
Status: CANCELLED | OUTPATIENT
Start: 2017-06-16

## 2017-06-16 RX ORDER — DIVALPROEX SODIUM 500 MG/1
500 TABLET, EXTENDED RELEASE ORAL DAILY
COMMUNITY
End: 2017-06-21 | Stop reason: HOSPADM

## 2017-06-16 RX ORDER — RISPERIDONE 2 MG/1
2 TABLET, FILM COATED ORAL
Status: DISCONTINUED | OUTPATIENT
Start: 2017-06-16 | End: 2017-06-21 | Stop reason: HOSPADM

## 2017-06-16 RX ORDER — LORAZEPAM 0.5 MG/1
1 TABLET ORAL EVERY 6 HOURS PRN
Status: CANCELLED | OUTPATIENT
Start: 2017-06-16

## 2017-06-16 RX ORDER — OLANZAPINE 10 MG/1
10 TABLET ORAL
Status: DISCONTINUED | OUTPATIENT
Start: 2017-06-16 | End: 2017-06-21 | Stop reason: HOSPADM

## 2017-06-16 RX ORDER — BENZTROPINE MESYLATE 1 MG/ML
1 INJECTION INTRAMUSCULAR; INTRAVENOUS EVERY 6 HOURS PRN
Status: CANCELLED | OUTPATIENT
Start: 2017-06-16

## 2017-06-16 RX ORDER — RISPERIDONE 1 MG/1
1 TABLET, ORALLY DISINTEGRATING ORAL
Status: DISCONTINUED | OUTPATIENT
Start: 2017-06-16 | End: 2017-06-21 | Stop reason: HOSPADM

## 2017-06-16 RX ORDER — HALOPERIDOL 5 MG
5 TABLET ORAL EVERY 6 HOURS PRN
Status: CANCELLED | OUTPATIENT
Start: 2017-06-16

## 2017-06-16 RX ORDER — RISPERIDONE 2 MG/1
2 TABLET, FILM COATED ORAL
COMMUNITY
End: 2017-06-21 | Stop reason: HOSPADM

## 2017-06-16 RX ORDER — DIVALPROEX SODIUM 250 MG/1
750 TABLET, EXTENDED RELEASE ORAL
COMMUNITY
End: 2017-06-21 | Stop reason: HOSPADM

## 2017-06-16 RX ADMIN — DIVALPROEX SODIUM 750 MG: 250 TABLET, FILM COATED, EXTENDED RELEASE ORAL at 21:38

## 2017-06-16 RX ADMIN — RISPERIDONE 2 MG: 2 TABLET ORAL at 21:38

## 2017-06-17 LAB
ALBUMIN SERPL BCP-MCNC: 3.2 G/DL (ref 3.5–5)
ALP SERPL-CCNC: 82 U/L (ref 46–116)
ALT SERPL W P-5'-P-CCNC: 52 U/L (ref 12–78)
ANION GAP SERPL CALCULATED.3IONS-SCNC: 9 MMOL/L (ref 4–13)
AST SERPL W P-5'-P-CCNC: 28 U/L (ref 5–45)
BASOPHILS # BLD AUTO: 0.03 THOUSANDS/ΜL (ref 0–0.1)
BASOPHILS NFR BLD AUTO: 1 % (ref 0–1)
BILIRUB SERPL-MCNC: 0.4 MG/DL (ref 0.2–1)
BUN SERPL-MCNC: 9 MG/DL (ref 5–25)
CALCIUM SERPL-MCNC: 9 MG/DL (ref 8.3–10.1)
CHLORIDE SERPL-SCNC: 106 MMOL/L (ref 100–108)
CHOLEST SERPL-MCNC: 174 MG/DL (ref 50–200)
CO2 SERPL-SCNC: 27 MMOL/L (ref 21–32)
CREAT SERPL-MCNC: 0.72 MG/DL (ref 0.6–1.3)
EOSINOPHIL # BLD AUTO: 0.32 THOUSAND/ΜL (ref 0–0.61)
EOSINOPHIL NFR BLD AUTO: 5 % (ref 0–6)
ERYTHROCYTE [DISTWIDTH] IN BLOOD BY AUTOMATED COUNT: 12.9 % (ref 11.6–15.1)
EST. AVERAGE GLUCOSE BLD GHB EST-MCNC: 100 MG/DL
GFR SERPL CREATININE-BSD FRML MDRD: >60 ML/MIN/1.73SQ M
GLUCOSE P FAST SERPL-MCNC: 86 MG/DL (ref 65–99)
GLUCOSE SERPL-MCNC: 86 MG/DL (ref 65–140)
HBA1C MFR BLD: 5.1 % (ref 4.2–6.3)
HCT VFR BLD AUTO: 40.6 % (ref 36.5–49.3)
HDLC SERPL-MCNC: 25 MG/DL (ref 40–60)
HGB BLD-MCNC: 14.2 G/DL (ref 12–17)
LDLC SERPL CALC-MCNC: 114 MG/DL (ref 0–100)
LYMPHOCYTES # BLD AUTO: 2.57 THOUSANDS/ΜL (ref 0.6–4.47)
LYMPHOCYTES NFR BLD AUTO: 40 % (ref 14–44)
MCH RBC QN AUTO: 29.8 PG (ref 26.8–34.3)
MCHC RBC AUTO-ENTMCNC: 35 G/DL (ref 31.4–37.4)
MCV RBC AUTO: 85 FL (ref 82–98)
MONOCYTES # BLD AUTO: 0.76 THOUSAND/ΜL (ref 0.17–1.22)
MONOCYTES NFR BLD AUTO: 12 % (ref 4–12)
NEUTROPHILS # BLD AUTO: 2.8 THOUSANDS/ΜL (ref 1.85–7.62)
NEUTS SEG NFR BLD AUTO: 42 % (ref 43–75)
PLATELET # BLD AUTO: 187 THOUSANDS/UL (ref 149–390)
PMV BLD AUTO: 9 FL (ref 8.9–12.7)
POTASSIUM SERPL-SCNC: 3.9 MMOL/L (ref 3.5–5.3)
PROT SERPL-MCNC: 6.9 G/DL (ref 6.4–8.2)
RBC # BLD AUTO: 4.76 MILLION/UL (ref 3.88–5.62)
SODIUM SERPL-SCNC: 142 MMOL/L (ref 136–145)
TRIGL SERPL-MCNC: 177 MG/DL
TSH SERPL DL<=0.05 MIU/L-ACNC: 4.22 UIU/ML (ref 0.36–3.74)
VALPROATE SERPL-MCNC: 73 UG/ML (ref 50–100)
WBC # BLD AUTO: 6.48 THOUSAND/UL (ref 4.31–10.16)

## 2017-06-17 PROCEDURE — 83036 HEMOGLOBIN GLYCOSYLATED A1C: CPT | Performed by: PHYSICIAN ASSISTANT

## 2017-06-17 PROCEDURE — 80164 ASSAY DIPROPYLACETIC ACD TOT: CPT | Performed by: PHYSICIAN ASSISTANT

## 2017-06-17 PROCEDURE — 85025 COMPLETE CBC W/AUTO DIFF WBC: CPT | Performed by: PHYSICIAN ASSISTANT

## 2017-06-17 PROCEDURE — 84443 ASSAY THYROID STIM HORMONE: CPT | Performed by: PHYSICIAN ASSISTANT

## 2017-06-17 PROCEDURE — 80061 LIPID PANEL: CPT | Performed by: PHYSICIAN ASSISTANT

## 2017-06-17 PROCEDURE — 80053 COMPREHEN METABOLIC PANEL: CPT | Performed by: PHYSICIAN ASSISTANT

## 2017-06-17 RX ORDER — BACITRACIN, NEOMYCIN, POLYMYXIN B 400; 3.5; 5 [USP'U]/G; MG/G; [USP'U]/G
1 OINTMENT TOPICAL 2 TIMES DAILY
Status: DISCONTINUED | OUTPATIENT
Start: 2017-06-17 | End: 2017-06-21 | Stop reason: HOSPADM

## 2017-06-17 RX ADMIN — RISPERIDONE 1 MG: 1 TABLET ORAL at 08:44

## 2017-06-17 RX ADMIN — DIVALPROEX SODIUM 500 MG: 500 TABLET, EXTENDED RELEASE ORAL at 08:44

## 2017-06-17 RX ADMIN — DIVALPROEX SODIUM 750 MG: 250 TABLET, FILM COATED, EXTENDED RELEASE ORAL at 17:38

## 2017-06-17 RX ADMIN — RISPERIDONE 2 MG: 2 TABLET ORAL at 21:37

## 2017-06-18 RX ADMIN — DIVALPROEX SODIUM 500 MG: 500 TABLET, EXTENDED RELEASE ORAL at 09:20

## 2017-06-18 RX ADMIN — BACITRACIN, NEOMYCIN, POLYMYXIN B 1 SMALL APPLICATION: 400; 3.5; 5 OINTMENT TOPICAL at 17:00

## 2017-06-18 RX ADMIN — DIVALPROEX SODIUM 750 MG: 250 TABLET, FILM COATED, EXTENDED RELEASE ORAL at 17:00

## 2017-06-18 RX ADMIN — RISPERIDONE 2 MG: 2 TABLET ORAL at 22:03

## 2017-06-18 RX ADMIN — RISPERIDONE 1 MG: 1 TABLET ORAL at 09:21

## 2017-06-19 LAB — VALPROATE SERPL-MCNC: 86 UG/ML (ref 50–100)

## 2017-06-19 PROCEDURE — 80164 ASSAY DIPROPYLACETIC ACD TOT: CPT | Performed by: PSYCHIATRY & NEUROLOGY

## 2017-06-19 RX ORDER — AMLODIPINE BESYLATE 5 MG/1
5 TABLET ORAL DAILY
Status: DISCONTINUED | OUTPATIENT
Start: 2017-06-19 | End: 2017-06-21 | Stop reason: HOSPADM

## 2017-06-19 RX ADMIN — DIVALPROEX SODIUM 750 MG: 250 TABLET, FILM COATED, EXTENDED RELEASE ORAL at 17:53

## 2017-06-19 RX ADMIN — RISPERIDONE 2 MG: 2 TABLET ORAL at 21:11

## 2017-06-19 RX ADMIN — BACITRACIN, NEOMYCIN, POLYMYXIN B 1 SMALL APPLICATION: 400; 3.5; 5 OINTMENT TOPICAL at 08:49

## 2017-06-19 RX ADMIN — AMLODIPINE BESYLATE 5 MG: 5 TABLET ORAL at 12:21

## 2017-06-19 RX ADMIN — DIVALPROEX SODIUM 500 MG: 500 TABLET, EXTENDED RELEASE ORAL at 08:48

## 2017-06-19 RX ADMIN — RISPERIDONE 1 MG: 1 TABLET ORAL at 08:48

## 2017-06-20 RX ADMIN — RISPERIDONE 2 MG: 2 TABLET ORAL at 21:15

## 2017-06-20 RX ADMIN — AMLODIPINE BESYLATE 5 MG: 5 TABLET ORAL at 08:28

## 2017-06-20 RX ADMIN — DIVALPROEX SODIUM 500 MG: 500 TABLET, EXTENDED RELEASE ORAL at 08:28

## 2017-06-20 RX ADMIN — BACITRACIN, NEOMYCIN, POLYMYXIN B 1 SMALL APPLICATION: 400; 3.5; 5 OINTMENT TOPICAL at 17:36

## 2017-06-20 RX ADMIN — RISPERIDONE 1 MG: 1 TABLET ORAL at 08:28

## 2017-06-20 RX ADMIN — DIVALPROEX SODIUM 750 MG: 250 TABLET, FILM COATED, EXTENDED RELEASE ORAL at 17:35

## 2017-06-21 VITALS
TEMPERATURE: 96 F | OXYGEN SATURATION: 98 % | DIASTOLIC BLOOD PRESSURE: 86 MMHG | WEIGHT: 291.67 LBS | SYSTOLIC BLOOD PRESSURE: 139 MMHG | RESPIRATION RATE: 18 BRPM | BODY MASS INDEX: 46.88 KG/M2 | HEIGHT: 66 IN | HEART RATE: 130 BPM

## 2017-06-21 RX ORDER — DIVALPROEX SODIUM 250 MG/1
750 TABLET, EXTENDED RELEASE ORAL
Qty: 90 TABLET | Refills: 0 | Status: SHIPPED | OUTPATIENT
Start: 2017-06-21 | End: 2017-10-12 | Stop reason: HOSPADM

## 2017-06-21 RX ORDER — AMLODIPINE BESYLATE 5 MG/1
5 TABLET ORAL DAILY
Qty: 30 TABLET | Refills: 1 | Status: SHIPPED | OUTPATIENT
Start: 2017-06-21 | End: 2017-10-12 | Stop reason: HOSPADM

## 2017-06-21 RX ORDER — RISPERIDONE 1 MG/1
1 TABLET, FILM COATED ORAL DAILY
Qty: 30 TABLET | Refills: 0 | Status: SHIPPED | OUTPATIENT
Start: 2017-06-21 | End: 2017-10-12 | Stop reason: HOSPADM

## 2017-06-21 RX ORDER — DIVALPROEX SODIUM 500 MG/1
500 TABLET, EXTENDED RELEASE ORAL DAILY
Qty: 30 TABLET | Refills: 0 | Status: SHIPPED | OUTPATIENT
Start: 2017-06-21 | End: 2017-10-12 | Stop reason: HOSPADM

## 2017-06-21 RX ORDER — RISPERIDONE 2 MG/1
2 TABLET, FILM COATED ORAL
Qty: 30 TABLET | Refills: 0 | Status: SHIPPED | OUTPATIENT
Start: 2017-06-21 | End: 2017-10-12 | Stop reason: HOSPADM

## 2017-06-21 RX ADMIN — RISPERIDONE 1 MG: 1 TABLET ORAL at 08:16

## 2017-06-21 RX ADMIN — AMLODIPINE BESYLATE 5 MG: 5 TABLET ORAL at 08:16

## 2017-06-21 RX ADMIN — BACITRACIN, NEOMYCIN, POLYMYXIN B 1 SMALL APPLICATION: 400; 3.5; 5 OINTMENT TOPICAL at 08:17

## 2017-06-21 RX ADMIN — LORAZEPAM 1 MG: 1 TABLET ORAL at 09:54

## 2017-06-21 RX ADMIN — DIVALPROEX SODIUM 500 MG: 500 TABLET, EXTENDED RELEASE ORAL at 08:16

## 2017-06-23 ENCOUNTER — HOSPITAL ENCOUNTER (EMERGENCY)
Facility: HOSPITAL | Age: 22
Discharge: HOME | End: 2017-06-23
Payer: MEDICARE

## 2017-06-23 VITALS
DIASTOLIC BLOOD PRESSURE: 92 MMHG | RESPIRATION RATE: 16 BRPM | BODY MASS INDEX: 47.29 KG/M2 | OXYGEN SATURATION: 98 % | HEART RATE: 95 BPM | WEIGHT: 293 LBS | TEMPERATURE: 97.9 F | SYSTOLIC BLOOD PRESSURE: 142 MMHG

## 2017-06-23 DIAGNOSIS — S80.211A ABRASION, RIGHT KNEE, INITIAL ENCOUNTER: Primary | ICD-10-CM

## 2017-06-23 PROCEDURE — 99283 EMERGENCY DEPT VISIT LOW MDM: CPT

## 2017-06-23 RX ORDER — BACITRACIN, NEOMYCIN, POLYMYXIN B 400; 3.5; 5 [USP'U]/G; MG/G; [USP'U]/G
1 OINTMENT TOPICAL ONCE
Status: COMPLETED | OUTPATIENT
Start: 2017-06-23 | End: 2017-06-23

## 2017-06-23 RX ADMIN — BACITRACIN, NEOMYCIN, POLYMYXIN B 1 SMALL APPLICATION: 400; 3.5; 5 OINTMENT TOPICAL at 10:01

## 2017-07-20 ENCOUNTER — HOSPITAL ENCOUNTER (EMERGENCY)
Facility: HOSPITAL | Age: 22
Discharge: HOME | End: 2017-07-20
Attending: EMERGENCY MEDICINE | Admitting: EMERGENCY MEDICINE
Payer: MEDICARE

## 2017-07-20 VITALS
TEMPERATURE: 98.9 F | DIASTOLIC BLOOD PRESSURE: 65 MMHG | SYSTOLIC BLOOD PRESSURE: 142 MMHG | RESPIRATION RATE: 20 BRPM | HEART RATE: 98 BPM | OXYGEN SATURATION: 97 %

## 2017-07-20 DIAGNOSIS — S80.211A ABRASION OF RIGHT KNEE: ICD-10-CM

## 2017-07-20 DIAGNOSIS — R45.4 OUTBURSTS OF ANGER: Primary | ICD-10-CM

## 2017-07-20 LAB — ETHANOL EXG-MCNC: 0 MG/DL

## 2017-07-20 PROCEDURE — 82075 ASSAY OF BREATH ETHANOL: CPT | Performed by: EMERGENCY MEDICINE

## 2017-07-20 PROCEDURE — 99284 EMERGENCY DEPT VISIT MOD MDM: CPT

## 2017-08-19 ENCOUNTER — HOSPITAL ENCOUNTER (EMERGENCY)
Facility: HOSPITAL | Age: 22
Discharge: NURSING, REHAB OR LONG-TERM CARE | End: 2017-08-20
Attending: EMERGENCY MEDICINE | Admitting: EMERGENCY MEDICINE
Payer: MEDICARE

## 2017-08-19 DIAGNOSIS — R45.851 SUICIDAL IDEATION: Primary | ICD-10-CM

## 2017-08-19 PROCEDURE — 82075 ASSAY OF BREATH ETHANOL: CPT | Performed by: EMERGENCY MEDICINE

## 2017-08-19 PROCEDURE — 80307 DRUG TEST PRSMV CHEM ANLYZR: CPT | Performed by: EMERGENCY MEDICINE

## 2017-08-19 RX ORDER — BACITRACIN, NEOMYCIN, POLYMYXIN B 400; 3.5; 5 [USP'U]/G; MG/G; [USP'U]/G
1 OINTMENT TOPICAL ONCE
Status: COMPLETED | OUTPATIENT
Start: 2017-08-19 | End: 2017-08-19

## 2017-08-19 RX ADMIN — BACITRACIN, NEOMYCIN, POLYMYXIN B 1 SMALL APPLICATION: 400; 3.5; 5 OINTMENT TOPICAL at 14:45

## 2017-08-20 VITALS
RESPIRATION RATE: 18 BRPM | WEIGHT: 250 LBS | TEMPERATURE: 98.1 F | SYSTOLIC BLOOD PRESSURE: 125 MMHG | BODY MASS INDEX: 40.35 KG/M2 | DIASTOLIC BLOOD PRESSURE: 74 MMHG | HEART RATE: 92 BPM | OXYGEN SATURATION: 97 %

## 2017-08-20 PROCEDURE — 99285 EMERGENCY DEPT VISIT HI MDM: CPT

## 2017-10-02 ENCOUNTER — HOSPITAL ENCOUNTER (EMERGENCY)
Facility: HOSPITAL | Age: 22
Discharge: HOME | End: 2017-10-02
Attending: EMERGENCY MEDICINE | Admitting: EMERGENCY MEDICINE
Payer: MEDICARE

## 2017-10-02 VITALS
BODY MASS INDEX: 35.36 KG/M2 | TEMPERATURE: 98.3 F | HEART RATE: 106 BPM | SYSTOLIC BLOOD PRESSURE: 173 MMHG | RESPIRATION RATE: 20 BRPM | OXYGEN SATURATION: 97 % | WEIGHT: 220 LBS | HEIGHT: 66 IN | DIASTOLIC BLOOD PRESSURE: 93 MMHG

## 2017-10-02 DIAGNOSIS — B86 SCABIES: Primary | ICD-10-CM

## 2017-10-02 PROCEDURE — 99282 EMERGENCY DEPT VISIT SF MDM: CPT

## 2017-10-02 RX ORDER — PERMETHRIN 50 MG/G
CREAM TOPICAL
Qty: 60 G | Refills: 0 | Status: SHIPPED | OUTPATIENT
Start: 2017-10-02 | End: 2017-10-12 | Stop reason: HOSPADM

## 2017-10-02 NOTE — DISCHARGE INSTRUCTIONS
Please apply the cream all over the body and wash all clothing linens and the washer with warm to hot water  All family members should also take precaution  If any rash appears in the family member apply the cream as well  Scabies   WHAT YOU NEED TO KNOW:   Scabies is a skin condition that is caused by scabies mites  Scabies mites are tiny bugs that burrow, lay eggs, and live underneath the skin  Scabies is spread through close contact with a person who has scabies  This includes having sex, sleeping in the same bed, or sharing towels or clothing  Scabies can spread quickly and must be treated as soon as it is found  DISCHARGE INSTRUCTIONS:   Seek care immediately if:   · You develop a fever and red, swollen, painful areas on your skin  Contact your healthcare provider if:   · The bites become crusty or filled with pus  · You have worsening itching after scabies treatment  · You have new bite or burrow marks after treatment  · You have questions or concerns about your condition or care  Medicines:   · Prescription creams  are used to treat scabies  You will need to apply them over all of your body from the neck down  Do not swallow this medicine  An oral medication may be ordered if scabies is severe  · Take your medicine as directed  Contact your healthcare provider if you think your medicine is not helping or if you have side effects  Tell him or her if you are allergic to any medicine  Keep a list of the medicines, vitamins, and herbs you take  Include the amounts, and when and why you take them  Bring the list or the pill bottles to follow-up visits  Carry your medicine list with you in case of an emergency  Follow up with your healthcare provider as directed:  Write down your questions so you remember to ask them during your visits  Prevent the spread of scabies:   · Have all family members use scabies medicine   Tell all sex partners and anyone who has shared your clothing or bed for the past month about the scabies  Tell them to ask their healthcare provider for scabies medicine even if they have no itching, rash, or burrow marks  · Wash all items that you have used since 3 days before you learned about your scabies  Use hot water to wash all clothing, bedding, and towels  Dry them for at least 20 minutes on the hot cycle of a dryer  Take items to be dry cleaned that cannot be washed in a washing machine  Place any clothing or bedding that cannot be washed or dry cleaned in a closed plastic bag for 1 week  · Do not have close body contact with anyone until the scabies mites are gone  Talk to your healthcare provider about how long you need to wait  Also ask about public places to avoid, such as the gym  Help relieve itching: Your skin may continue to itch for 2 or 3 weeks, even after the scabies mites are gone  Over-the-counter antihistamines or cortisone cream may help relieve itching  Trim your fingernails so you do not spread any mites that are still alive after treatment  Do not scratch your skin  Scratches may cause a skin infection  A cool bath may also help relieve the itching  Return to school or work: You may return to school or work 24 hours after using scabies medicine  © 2017 [address] [employer] Information is for End User's use only and may not be sold, redistributed or otherwise used for commercial purposes  All illustrations and images included in CareNotes® are the copyrighted property of A [employer]  or [employer]i Norman  The above information is an  only  It is not intended as medical advice for individual conditions or treatments  Talk to your doctor, nurse or pharmacist before following any medical regimen to see if it is safe and effective for you  Scabies   WHAT YOU NEED TO KNOW:   Scabies is a skin condition that is caused by scabies mites   Scabies mites are tiny bugs that burrow, lay eggs, and live underneath the skin  Scabies is spread through close contact with a person who has scabies  This includes having sex, sleeping in the same bed, or sharing towels or clothing  Scabies can spread quickly and must be treated as soon as it is found  DISCHARGE INSTRUCTIONS:   Return to the emergency department if:   · You develop a fever and red, swollen, painful areas on your skin  Contact your healthcare provider if:   · The bites become crusty or filled with pus  · You have worsening itching after scabies treatment  · You have new bite or burrow marks after treatment  · You have questions or concerns about your condition or care  Medicines:   · Prescription creams  are used to treat scabies  You will need to apply them over all of your body from the neck down  Do not swallow this medicine  An oral medication may be ordered if scabies is severe  · Take your medicine as directed  Contact your healthcare provider if you think your medicine is not helping or if you have side effects  Tell him or her if you are allergic to any medicine  Keep a list of the medicines, vitamins, and herbs you take  Include the amounts, and when and why you take them  Bring the list or the pill bottles to follow-up visits  Carry your medicine list with you in case of an emergency  Follow up with your healthcare provider as directed:  Write down your questions so you remember to ask them during your visits  Prevent the spread of scabies:   · Have all family members use scabies medicine  Tell all sex partners and anyone who has shared your clothing or bed for the past month about the scabies  Tell them to ask their healthcare provider for scabies medicine even if they have no itching, rash, or burrow marks  · Wash all items that you have used since 3 days before you learned about your scabies  Use hot water to wash all clothing, bedding, and towels  Dry them for at least 20 minutes on the hot cycle of a dryer   Take items to be dry cleaned that cannot be washed in a washing machine  Place any clothing or bedding that cannot be washed or dry cleaned in a closed plastic bag for 1 week  · Do not have close body contact with anyone until the scabies mites are gone  Talk to your healthcare provider about how long you need to wait  Also ask about public places to avoid, such as the gym  Help relieve itching: Your skin may continue to itch for 2 or 3 weeks, even after the scabies mites are gone  Over-the-counter antihistamines or cortisone cream may help relieve itching  Trim your fingernails so you do not spread any mites that are still alive after treatment  Do not scratch your skin  Scratches may cause a skin infection  A cool bath may also help relieve the itching  Return to school or work: You may return to school or work 24 hours after using scabies medicine  © 2017 [address] [employer] Information is for End User's use only and may not be sold, redistributed or otherwise used for commercial purposes  All illustrations and images included in CareNotes® are the copyrighted property of [employer] Elicia  or Remi Norman  The above information is an  only  It is not intended as medical advice for individual conditions or treatments  Talk to your doctor, nurse or pharmacist before following any medical regimen to see if it is safe and effective for you

## 2017-10-03 NOTE — ED PROVIDER NOTES
History  Chief Complaint   Patient presents with    Rash     Pt c/o rash on the left ankle that has been present for one week and wont go away  HPI  This is a 50-year-old male that presents today with left ankle rash  Patient has history of MR  Patient is handed me a letter stating his mother told him to get his ankle evaluated  He states there has been bugs on his ankle  He states he has been scratching it for the past few days  States to spread up to his leg  Denies being outside in the woods  Denies any fevers or chills  No myalgias  No weakness numbness or tingling  No chest pain shortness of breath  He states he never had this kind of rash before  He is unsure if other family members have a similar rash  50-year-old male with a rash to his left ankle crease concern for scabies will give appropriate treatment and right on discharge papers from mother to follow up with hygiene along with treatment for the whole family  Prior to Admission Medications   Prescriptions Last Dose Informant Patient Reported? Taking?    amLODIPine (NORVASC) 5 mg tablet   No Yes   Sig: Take 1 tablet by mouth daily At 9AM   divalproex sodium (DEPAKOTE ER) 250 mg 24 hr tablet   No Yes   Sig: Take 3 tablets by mouth daily after dinner   divalproex sodium (DEPAKOTE ER) 500 mg 24 hr tablet   No Yes   Sig: Take 1 tablet by mouth daily At 9AM   risperiDONE (RisperDAL) 1 mg tablet   No Yes   Sig: Take 1 tablet by mouth daily At 9AM   risperiDONE (RisperDAL) 2 mg tablet   No Yes   Sig: Take 1 tablet by mouth daily at bedtime   risperiDONE microspheres (RisperDAL CONSTA) 37 5 MG injection   No Yes   Sig: Inject 2 mL into the shoulder, thigh, or buttocks every 14 (fourteen) days Next injection due 6/27/2017      Facility-Administered Medications: None       Past Medical History:   Diagnosis Date    Anxiety     Asthma     Bipolar 1 disorder     Depression     Hypertension     Mental retardation     MR (mental retardation)     Psychiatric disorder     Psychiatric illness     Psychosis     Psychotic disorder 5/25/2016    Schizoaffective disorder     Schizophrenia        Past Surgical History:   Procedure Laterality Date    HAND SURGERY      right hand       Family History   Problem Relation Age of Onset    No Known Problems Mother     No Known Problems Father     No Known Problems Sister     No Known Problems Brother     No Known Problems Maternal Aunt     No Known Problems Paternal Aunt     No Known Problems Maternal Uncle     No Known Problems Paternal Uncle     No Known Problems Maternal Grandfather     No Known Problems Maternal Grandmother     No Known Problems Paternal Grandfather     No Known Problems Paternal Grandmother     No Known Problems Cousin     ADD / ADHD Neg Hx     Alcohol abuse Neg Hx     Anxiety disorder Neg Hx     Bipolar disorder Neg Hx     Dementia Neg Hx     Depression Neg Hx     Drug abuse Neg Hx     OCD Neg Hx     Paranoid behavior Neg Hx     Schizophrenia Neg Hx     Seizures Neg Hx     Self-Injurious Behavior  Neg Hx     Suicide Attempts Neg Hx      I have reviewed and agree with the history as documented      Social History   Substance Use Topics    Smoking status: Never Smoker    Smokeless tobacco: Never Used      Comment: non-smoker    Alcohol use No        Review of Systems  REVIEW OF SYSTEMS  Constitutional:  Denies fever or chills   Eyes:  Denies change in visual acuity   HENT:  Denies nasal congestion or sore throat   Respiratory:  Denies cough or shortness of breath   Cardiovascular:  Denies chest pain or edema   GI:  Denies abdominal pain, nausea, vomiting, bloody stools or diarrhea   :  Denies dysuria   Musculoskeletal:  Denies back pain or joint pain   Integument:  +rash   Neurologic:  Denies headache, focal weakness or sensory changes   Endocrine:  Denies polyuria or polydipsia   Lymphatic:  Denies swollen glands   Psychiatric:  Denies depression or anxiety Physical Exam  ED Triage Vitals   Temperature Pulse Respirations Blood Pressure SpO2   10/02/17 1825 10/02/17 1825 10/02/17 1825 10/02/17 1827 10/02/17 1825   98 3 °F (36 8 °C) (!) 106 20 (!) 173/93 97 %      Temp Source Heart Rate Source Patient Position - Orthostatic VS BP Location FiO2 (%)   10/02/17 1825 10/02/17 1825 10/02/17 1825 10/02/17 1825 --   Oral Monitor Sitting Left arm       Pain Score       10/02/17 1825       Worst Possible Pain           Physical Exam   Constitutional: He is oriented to person, place, and time  He appears well-developed and well-nourished  No distress  HENT:   Head: Normocephalic  Nose: Nose normal    Mouth/Throat: Oropharynx is clear and moist    Eyes: Conjunctivae and EOM are normal  Pupils are equal, round, and reactive to light  Neck: Normal range of motion  Neck supple  Cardiovascular: Normal rate, regular rhythm and normal heart sounds  No murmur heard  Pulmonary/Chest: Effort normal and breath sounds normal  No respiratory distress  He has no wheezes  Abdominal: Soft  Bowel sounds are normal  He exhibits no distension  There is no tenderness  Musculoskeletal:        Legs:  Erythematous patch with some papules along with excoriations and tracking with some hemorrhagic cyst   Also appears to be on the tib-fib area  None appreciated and hand webs   Neurological: He is alert and oriented to person, place, and time  Skin: Skin is warm  Capillary refill takes less than 2 seconds  He is not diaphoretic  Psychiatric: He has a normal mood and affect  Vitals reviewed        ED Medications  Medications - No data to display    Diagnostic Studies  Labs Reviewed - No data to display    No orders to display       Procedures  Procedures      Phone Consults  ED Phone Contact    ED Course  ED Course                                MDM  CritCare Time    Disposition  Final diagnoses:   Scabies     ED Disposition     ED Disposition Condition Comment    Discharge Zayra Cerna discharge to home/self care  Condition at discharge: Good        Follow-up Information     Follow up With Specialties Details Why Contact Damien Mijares DO Dermatology Schedule an appointment as soon as possible for a visit As needed, If symptoms worsen [place] [id number]  [phone]          Discharge Medication List as of 10/2/2017  7:39 PM      START taking these medications    Details   permethrin (ELIMITE) 5 % cream Apply to affected area once, Print         CONTINUE these medications which have NOT CHANGED    Details   amLODIPine (NORVASC) 5 mg tablet Take 1 tablet by mouth daily At 9AM, Starting Wed 6/21/2017, Print      !! divalproex sodium (DEPAKOTE ER) 250 mg 24 hr tablet Take 3 tablets by mouth daily after dinner, Starting Wed 6/21/2017, Print      !! divalproex sodium (DEPAKOTE ER) 500 mg 24 hr tablet Take 1 tablet by mouth daily At 9AM, Starting Wed 6/21/2017, Print      !! risperiDONE (RisperDAL) 1 mg tablet Take 1 tablet by mouth daily At 9AM, Starting Wed 6/21/2017, Print      !! risperiDONE (RisperDAL) 2 mg tablet Take 1 tablet by mouth daily at bedtime, Starting Wed 6/21/2017, Print      risperiDONE microspheres (RisperDAL CONSTA) 37 5 MG injection Inject 2 mL into the shoulder, thigh, or buttocks every 14 (fourteen) days Next injection due 6/27/2017, Starting Wed 6/21/2017, No Print       !! - Potential duplicate medications found  Please discuss with provider  No discharge procedures on file  ED Provider  Attending physically available and evaluated Zayra Cerna I managed the patient along with the ED Attending      Electronically Signed by       Lupillo Gomez  Resident  10/02/17 [id number]

## 2017-10-03 NOTE — ED ATTENDING ATTESTATION
Virgin Rubinstein Nappe, DO, saw and evaluated the patient  I have discussed the patient with the resident/non-physician practitioner and agree with the resident's/non-physician practitioner's findings, Plan of Care, and MDM as documented in the resident's/non-physician practitioner's note, except where noted  All available labs and Radiology studies were reviewed  At this point I agree with the current assessment done in the Emergency Department  I have conducted an independent evaluation of this patient a history and physical is as follows:    81-year-old male with a history of MR presents for evaluation of a rash on his ankles  Rash is primarily present on his left ankle  He states he has been bitten by bugs  He denies any fevers or chills  The appearance of the rash is consistent with scabies as there is tracking and evidence exam bed in his skin  He has been itching excessively and there is excoriation  He is prescribed permethrin cream for presumed scabies      Diagnosis  Left ankle rash  Scabies    Discharged    Critical Care Time  CritCare Time

## 2017-10-06 ENCOUNTER — HOSPITAL ENCOUNTER (EMERGENCY)
Facility: HOSPITAL | Age: 22
End: 2017-10-06
Attending: EMERGENCY MEDICINE | Admitting: EMERGENCY MEDICINE
Payer: MEDICARE

## 2017-10-06 ENCOUNTER — HOSPITAL ENCOUNTER (INPATIENT)
Facility: HOSPITAL | Age: 22
LOS: 6 days | Discharge: HOME | DRG: 885 | End: 2017-10-12
Attending: PSYCHIATRY & NEUROLOGY | Admitting: PSYCHIATRY & NEUROLOGY
Payer: MEDICARE

## 2017-10-06 VITALS
OXYGEN SATURATION: 98 % | DIASTOLIC BLOOD PRESSURE: 81 MMHG | HEART RATE: 96 BPM | TEMPERATURE: 97.1 F | RESPIRATION RATE: 18 BRPM | HEIGHT: 66 IN | SYSTOLIC BLOOD PRESSURE: 148 MMHG

## 2017-10-06 DIAGNOSIS — R45.850 HOMICIDAL IDEATION: Primary | ICD-10-CM

## 2017-10-06 DIAGNOSIS — F25.0 SCHIZOAFFECTIVE DISORDER, BIPOLAR TYPE (HCC): Chronic | ICD-10-CM

## 2017-10-06 DIAGNOSIS — I10 HYPERTENSION: ICD-10-CM

## 2017-10-06 DIAGNOSIS — R45.851 SUICIDAL IDEATION: ICD-10-CM

## 2017-10-06 DIAGNOSIS — F25.9 SCHIZOAFFECTIVE DISORDER (HCC): Primary | ICD-10-CM

## 2017-10-06 DIAGNOSIS — R44.0 AUDITORY HALLUCINATION: ICD-10-CM

## 2017-10-06 LAB
ALBUMIN SERPL BCP-MCNC: 3.8 G/DL (ref 3.5–5)
ALP SERPL-CCNC: 100 U/L (ref 46–116)
ALT SERPL W P-5'-P-CCNC: 75 U/L (ref 12–78)
AMPHETAMINES SERPL QL SCN: NEGATIVE
ANION GAP SERPL CALCULATED.3IONS-SCNC: 8 MMOL/L (ref 4–13)
AST SERPL W P-5'-P-CCNC: 32 U/L (ref 5–45)
ATRIAL RATE: 99 BPM
BARBITURATES UR QL: NEGATIVE
BASOPHILS # BLD AUTO: 0.04 THOUSANDS/ΜL (ref 0–0.1)
BASOPHILS NFR BLD AUTO: 0 % (ref 0–1)
BENZODIAZ UR QL: NEGATIVE
BILIRUB SERPL-MCNC: 0.55 MG/DL (ref 0.2–1)
BILIRUB UR QL STRIP: NEGATIVE
BUN SERPL-MCNC: 7 MG/DL (ref 5–25)
CALCIUM SERPL-MCNC: 8.9 MG/DL (ref 8.3–10.1)
CHLORIDE SERPL-SCNC: 106 MMOL/L (ref 100–108)
CLARITY UR: CLEAR
CO2 SERPL-SCNC: 24 MMOL/L (ref 21–32)
COCAINE UR QL: NEGATIVE
COLOR UR: YELLOW
COLOR, POC: YELLOW
CREAT SERPL-MCNC: 0.69 MG/DL (ref 0.6–1.3)
EOSINOPHIL # BLD AUTO: 0.21 THOUSAND/ΜL (ref 0–0.61)
EOSINOPHIL NFR BLD AUTO: 2 % (ref 0–6)
ERYTHROCYTE [DISTWIDTH] IN BLOOD BY AUTOMATED COUNT: 12.4 % (ref 11.6–15.1)
ETHANOL EXG-MCNC: 0 MG/DL
GFR SERPL CREATININE-BSD FRML MDRD: 136 ML/MIN/1.73SQ M
GLUCOSE SERPL-MCNC: 116 MG/DL (ref 65–140)
GLUCOSE UR STRIP-MCNC: NEGATIVE MG/DL
HCT VFR BLD AUTO: 41.3 % (ref 36.5–49.3)
HGB BLD-MCNC: 14.9 G/DL (ref 12–17)
HGB UR QL STRIP.AUTO: NEGATIVE
KETONES UR STRIP-MCNC: NEGATIVE MG/DL
LEUKOCYTE ESTERASE UR QL STRIP: NEGATIVE
LYMPHOCYTES # BLD AUTO: 2.13 THOUSANDS/ΜL (ref 0.6–4.47)
LYMPHOCYTES NFR BLD AUTO: 22 % (ref 14–44)
MCH RBC QN AUTO: 31 PG (ref 26.8–34.3)
MCHC RBC AUTO-ENTMCNC: 36.1 G/DL (ref 31.4–37.4)
MCV RBC AUTO: 86 FL (ref 82–98)
METHADONE UR QL: NEGATIVE
MONOCYTES # BLD AUTO: 0.85 THOUSAND/ΜL (ref 0.17–1.22)
MONOCYTES NFR BLD AUTO: 9 % (ref 4–12)
NEUTROPHILS # BLD AUTO: 6.3 THOUSANDS/ΜL (ref 1.85–7.62)
NEUTS SEG NFR BLD AUTO: 67 % (ref 43–75)
NITRITE UR QL STRIP: NEGATIVE
NRBC BLD AUTO-RTO: 0 /100 WBCS
OPIATES UR QL SCN: NEGATIVE
P AXIS: 49 DEGREES
PCP UR QL: NEGATIVE
PH UR STRIP.AUTO: 6.5 [PH] (ref 4.5–8)
PLATELET # BLD AUTO: 210 THOUSANDS/UL (ref 149–390)
PMV BLD AUTO: 8.6 FL (ref 8.9–12.7)
POTASSIUM SERPL-SCNC: 3.7 MMOL/L (ref 3.5–5.3)
PR INTERVAL: 152 MS
PROT SERPL-MCNC: 8 G/DL (ref 6.4–8.2)
PROT UR STRIP-MCNC: NEGATIVE MG/DL
QRS AXIS: 45 DEGREES
QRSD INTERVAL: 86 MS
QT INTERVAL: 356 MS
QTC INTERVAL: 456 MS
RBC # BLD AUTO: 4.8 MILLION/UL (ref 3.88–5.62)
SODIUM SERPL-SCNC: 138 MMOL/L (ref 136–145)
SP GR UR STRIP.AUTO: 1.02 (ref 1–1.03)
T WAVE AXIS: 9 DEGREES
THC UR QL: NEGATIVE
TSH SERPL DL<=0.05 MIU/L-ACNC: 2.88 UIU/ML (ref 0.36–3.74)
UROBILINOGEN UR QL STRIP.AUTO: 0.2 E.U./DL
VENTRICULAR RATE: 99 BPM
WBC # BLD AUTO: 9.59 THOUSAND/UL (ref 4.31–10.16)

## 2017-10-06 PROCEDURE — 81002 URINALYSIS NONAUTO W/O SCOPE: CPT | Performed by: EMERGENCY MEDICINE

## 2017-10-06 PROCEDURE — 93005 ELECTROCARDIOGRAM TRACING: CPT | Performed by: EMERGENCY MEDICINE

## 2017-10-06 PROCEDURE — 99285 EMERGENCY DEPT VISIT HI MDM: CPT

## 2017-10-06 PROCEDURE — 80053 COMPREHEN METABOLIC PANEL: CPT | Performed by: EMERGENCY MEDICINE

## 2017-10-06 PROCEDURE — 82075 ASSAY OF BREATH ETHANOL: CPT | Performed by: EMERGENCY MEDICINE

## 2017-10-06 PROCEDURE — 84443 ASSAY THYROID STIM HORMONE: CPT | Performed by: EMERGENCY MEDICINE

## 2017-10-06 PROCEDURE — 85025 COMPLETE CBC W/AUTO DIFF WBC: CPT | Performed by: EMERGENCY MEDICINE

## 2017-10-06 PROCEDURE — 80307 DRUG TEST PRSMV CHEM ANLYZR: CPT | Performed by: EMERGENCY MEDICINE

## 2017-10-06 PROCEDURE — 36415 COLL VENOUS BLD VENIPUNCTURE: CPT | Performed by: EMERGENCY MEDICINE

## 2017-10-06 PROCEDURE — 81003 URINALYSIS AUTO W/O SCOPE: CPT

## 2017-10-06 RX ORDER — BENZTROPINE MESYLATE 1 MG/1
1 TABLET ORAL EVERY 6 HOURS PRN
Status: DISCONTINUED | OUTPATIENT
Start: 2017-10-06 | End: 2017-10-12 | Stop reason: HOSPADM

## 2017-10-06 RX ORDER — BENZTROPINE MESYLATE 1 MG/ML
1 INJECTION INTRAMUSCULAR; INTRAVENOUS EVERY 6 HOURS PRN
Status: CANCELLED | OUTPATIENT
Start: 2017-10-06

## 2017-10-06 RX ORDER — BENZTROPINE MESYLATE 1 MG/ML
1 INJECTION INTRAMUSCULAR; INTRAVENOUS EVERY 6 HOURS PRN
Status: DISCONTINUED | OUTPATIENT
Start: 2017-10-06 | End: 2017-10-06

## 2017-10-06 RX ORDER — ACETAMINOPHEN 325 MG/1
650 TABLET ORAL EVERY 4 HOURS PRN
Status: DISCONTINUED | OUTPATIENT
Start: 2017-10-06 | End: 2017-10-12 | Stop reason: HOSPADM

## 2017-10-06 RX ORDER — LORAZEPAM 2 MG/ML
2 INJECTION INTRAMUSCULAR EVERY 6 HOURS PRN
Status: DISCONTINUED | OUTPATIENT
Start: 2017-10-06 | End: 2017-10-12 | Stop reason: HOSPADM

## 2017-10-06 RX ORDER — HALOPERIDOL 5 MG
5 TABLET ORAL EVERY 6 HOURS PRN
Status: DISCONTINUED | OUTPATIENT
Start: 2017-10-06 | End: 2017-10-12 | Stop reason: HOSPADM

## 2017-10-06 RX ORDER — BENZTROPINE MESYLATE 1 MG/1
1 TABLET ORAL EVERY 6 HOURS PRN
Status: DISCONTINUED | OUTPATIENT
Start: 2017-10-06 | End: 2017-10-06

## 2017-10-06 RX ORDER — OLANZAPINE 10 MG/1
10 INJECTION, POWDER, LYOPHILIZED, FOR SOLUTION INTRAMUSCULAR
Status: DISCONTINUED | OUTPATIENT
Start: 2017-10-06 | End: 2017-10-12 | Stop reason: HOSPADM

## 2017-10-06 RX ORDER — DIVALPROEX SODIUM 500 MG/1
500 TABLET, EXTENDED RELEASE ORAL DAILY
Status: DISCONTINUED | OUTPATIENT
Start: 2017-10-07 | End: 2017-10-12 | Stop reason: HOSPADM

## 2017-10-06 RX ORDER — ACETAMINOPHEN 325 MG/1
650 TABLET ORAL EVERY 6 HOURS PRN
Status: CANCELLED | OUTPATIENT
Start: 2017-10-06

## 2017-10-06 RX ORDER — TRAZODONE HYDROCHLORIDE 50 MG/1
50 TABLET ORAL
Status: CANCELLED | OUTPATIENT
Start: 2017-10-06

## 2017-10-06 RX ORDER — RISPERIDONE 1 MG/1
1 TABLET, FILM COATED ORAL DAILY
Status: CANCELLED | OUTPATIENT
Start: 2017-10-07

## 2017-10-06 RX ORDER — TRAZODONE HYDROCHLORIDE 50 MG/1
50 TABLET ORAL
Status: DISCONTINUED | OUTPATIENT
Start: 2017-10-06 | End: 2017-10-12 | Stop reason: HOSPADM

## 2017-10-06 RX ORDER — ACETAMINOPHEN 325 MG/1
650 TABLET ORAL EVERY 6 HOURS PRN
Status: DISCONTINUED | OUTPATIENT
Start: 2017-10-06 | End: 2017-10-12 | Stop reason: HOSPADM

## 2017-10-06 RX ORDER — HYDROXYZINE 50 MG/1
50 TABLET, FILM COATED ORAL EVERY 6 HOURS PRN
Status: DISCONTINUED | OUTPATIENT
Start: 2017-10-06 | End: 2017-10-12 | Stop reason: HOSPADM

## 2017-10-06 RX ORDER — RISPERIDONE 2 MG/1
2 TABLET, FILM COATED ORAL
Status: DISCONTINUED | OUTPATIENT
Start: 2017-10-06 | End: 2017-10-12 | Stop reason: HOSPADM

## 2017-10-06 RX ORDER — BENZTROPINE MESYLATE 1 MG/1
1 TABLET ORAL EVERY 6 HOURS PRN
Status: CANCELLED | OUTPATIENT
Start: 2017-10-06

## 2017-10-06 RX ORDER — PERMETHRIN 50 MG/G
CREAM TOPICAL 2 TIMES DAILY
Status: DISCONTINUED | OUTPATIENT
Start: 2017-10-06 | End: 2017-10-12 | Stop reason: HOSPADM

## 2017-10-06 RX ORDER — LORAZEPAM 0.5 MG/1
1 TABLET ORAL EVERY 6 HOURS PRN
Status: CANCELLED | OUTPATIENT
Start: 2017-10-06

## 2017-10-06 RX ORDER — HALOPERIDOL 5 MG/ML
5 INJECTION INTRAMUSCULAR EVERY 6 HOURS PRN
Status: CANCELLED | OUTPATIENT
Start: 2017-10-06

## 2017-10-06 RX ORDER — LORAZEPAM 1 MG/1
1 TABLET ORAL EVERY 6 HOURS PRN
Status: DISCONTINUED | OUTPATIENT
Start: 2017-10-06 | End: 2017-10-12 | Stop reason: HOSPADM

## 2017-10-06 RX ORDER — MAGNESIUM HYDROXIDE/ALUMINUM HYDROXICE/SIMETHICONE 120; 1200; 1200 MG/30ML; MG/30ML; MG/30ML
30 SUSPENSION ORAL EVERY 4 HOURS PRN
Status: CANCELLED | OUTPATIENT
Start: 2017-10-06

## 2017-10-06 RX ORDER — RISPERIDONE 1 MG/1
1 TABLET, ORALLY DISINTEGRATING ORAL
Status: DISCONTINUED | OUTPATIENT
Start: 2017-10-06 | End: 2017-10-12 | Stop reason: HOSPADM

## 2017-10-06 RX ORDER — HALOPERIDOL 5 MG
5 TABLET ORAL EVERY 6 HOURS PRN
Status: DISCONTINUED | OUTPATIENT
Start: 2017-10-06 | End: 2017-10-06

## 2017-10-06 RX ORDER — DIVALPROEX SODIUM 500 MG/1
500 TABLET, EXTENDED RELEASE ORAL 2 TIMES DAILY
Status: CANCELLED | OUTPATIENT
Start: 2017-10-06

## 2017-10-06 RX ORDER — HALOPERIDOL 5 MG
5 TABLET ORAL EVERY 6 HOURS PRN
Status: CANCELLED | OUTPATIENT
Start: 2017-10-06

## 2017-10-06 RX ORDER — LORAZEPAM 2 MG/ML
2 INJECTION INTRAMUSCULAR EVERY 6 HOURS PRN
Status: CANCELLED | OUTPATIENT
Start: 2017-10-06

## 2017-10-06 RX ORDER — BENZTROPINE MESYLATE 1 MG/ML
1 INJECTION INTRAMUSCULAR; INTRAVENOUS EVERY 6 HOURS PRN
Status: DISCONTINUED | OUTPATIENT
Start: 2017-10-06 | End: 2017-10-12 | Stop reason: HOSPADM

## 2017-10-06 RX ORDER — OLANZAPINE 10 MG/1
10 TABLET ORAL
Status: DISCONTINUED | OUTPATIENT
Start: 2017-10-06 | End: 2017-10-12 | Stop reason: HOSPADM

## 2017-10-06 RX ORDER — MAGNESIUM HYDROXIDE/ALUMINUM HYDROXICE/SIMETHICONE 120; 1200; 1200 MG/30ML; MG/30ML; MG/30ML
30 SUSPENSION ORAL EVERY 4 HOURS PRN
Status: DISCONTINUED | OUTPATIENT
Start: 2017-10-06 | End: 2017-10-12 | Stop reason: HOSPADM

## 2017-10-06 RX ORDER — RISPERIDONE 1 MG/1
1 TABLET, FILM COATED ORAL DAILY
Status: DISCONTINUED | OUTPATIENT
Start: 2017-10-07 | End: 2017-10-07

## 2017-10-06 RX ORDER — OLANZAPINE 10 MG/1
10 INJECTION, POWDER, LYOPHILIZED, FOR SOLUTION INTRAMUSCULAR
Status: DISCONTINUED | OUTPATIENT
Start: 2017-10-06 | End: 2017-10-06

## 2017-10-06 RX ORDER — RISPERIDONE 1 MG/1
2 TABLET, FILM COATED ORAL
Status: CANCELLED | OUTPATIENT
Start: 2017-10-06

## 2017-10-06 RX ORDER — LORAZEPAM 2 MG/ML
2 INJECTION INTRAMUSCULAR EVERY 6 HOURS PRN
Status: DISCONTINUED | OUTPATIENT
Start: 2017-10-06 | End: 2017-10-06

## 2017-10-06 RX ORDER — HALOPERIDOL 5 MG/ML
5 INJECTION INTRAMUSCULAR EVERY 6 HOURS PRN
Status: DISCONTINUED | OUTPATIENT
Start: 2017-10-06 | End: 2017-10-06

## 2017-10-06 RX ORDER — HALOPERIDOL 5 MG/ML
5 INJECTION INTRAMUSCULAR EVERY 6 HOURS PRN
Status: DISCONTINUED | OUTPATIENT
Start: 2017-10-06 | End: 2017-10-12 | Stop reason: HOSPADM

## 2017-10-06 RX ORDER — LORAZEPAM 1 MG/1
1 TABLET ORAL EVERY 6 HOURS PRN
Status: DISCONTINUED | OUTPATIENT
Start: 2017-10-06 | End: 2017-10-06

## 2017-10-06 RX ORDER — RISPERIDONE 1 MG/1
1 TABLET, ORALLY DISINTEGRATING ORAL
Status: CANCELLED | OUTPATIENT
Start: 2017-10-06

## 2017-10-06 RX ORDER — IBUPROFEN 800 MG/1
800 TABLET ORAL EVERY 6 HOURS PRN
Status: DISCONTINUED | OUTPATIENT
Start: 2017-10-06 | End: 2017-10-12 | Stop reason: HOSPADM

## 2017-10-06 RX ORDER — DIVALPROEX SODIUM 500 MG/1
500 TABLET, EXTENDED RELEASE ORAL 2 TIMES DAILY
Status: DISCONTINUED | OUTPATIENT
Start: 2017-10-06 | End: 2017-10-06

## 2017-10-06 RX ORDER — OLANZAPINE 10 MG/1
10 TABLET ORAL
Status: CANCELLED | OUTPATIENT
Start: 2017-10-06

## 2017-10-06 RX ORDER — OLANZAPINE 10 MG/1
10 INJECTION, POWDER, LYOPHILIZED, FOR SOLUTION INTRAMUSCULAR
Status: CANCELLED | OUTPATIENT
Start: 2017-10-06

## 2017-10-06 RX ADMIN — RISPERIDONE 2 MG: 2 TABLET ORAL at 17:51

## 2017-10-06 RX ADMIN — DIVALPROEX SODIUM 500 MG: 500 TABLET, EXTENDED RELEASE ORAL at 17:51
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
## 2017-10-06 NOTE — EMTALA/ACUTE CARE TRANSFER
[facility]  Dept: Rol[place]    NAME Jyothi Yu                                         1995                              MRN [id number]    I have been informed of my rights regarding examination, treatment, and transfer   by Dr Christian Wilcox MD    Benefits: Specialized equipment and/or services available at the receiving facility (Include comment)________________________ (inpatient behavioral health bed not available at this facility)    Risks: Potential for delay in receiving treatment, Potential deterioration of medical condition, Loss of IV, Increased discomfort during transfer, Possible worsening of condition or death during transfer      Consent for Transfer:  I acknowledge that my medical condition has been evaluated and explained to me by the emergency department physician or other qualified medical person and/or my attending physician, who has recommended that I be transferred to the service of  Accepting Physician: Yajaira Aj at [address], [address] : [employer] [address]w  The above potential benefits of such transfer, the potential risks associated with such transfer, and the probable risks of not being transferred have been explained to me, and I fully understand them  The doctor has explained that, in my case, the benefits of transfer outweigh the risks  I agree to be transferred  I authorize the performance of emergency medical procedures and treatments upon me in both transit and upon arrival at the receiving facility  Additionally, I authorize the release of any and all medical records to the receiving facility and request they be transported with me, if possible  I understand that the safest mode of transportation during a medical emergency is an ambulance and that the Hospital advocates the use of this mode of transport   Risks of traveling to the receiving facility by car, including absence of medical control, life sustaining equipment, such as oxygen, and medical personnel has been explained to me and I fully understand them  (WIL CORRECT BOX BELOW)  [X]  I consent to the stated transfer and to be transported by ambulance/helicopter  [  ]  I consent to the stated transfer, but refuse transportation by ambulance and accept full responsibility for my transportation by car  I understand the risks of non-ambulance transfers and I exonerate the Hospital and its staff from any deterioration in my condition that results from this refusal     X___________________________________________    DATE  10/06/17  TIME________  Signature of patient or legally responsible individual signing on patient behalf           RELATIONSHIP TO PATIENT_________________________          Provider Certification    NAME Clarence Winston                                         1995                              MRN [id number]    A medical screening exam was performed on the above named patient  Based on the examination:    Condition Necessitating Transfer The primary encounter diagnosis was Homicidal ideation  Diagnoses of Suicidal ideation and Auditory hallucination were also pertinent to this visit      Patient Condition: The patient has been stabilized such that within reasonable medical probability, no material deterioration of the patient condition or the condition of the unborn child(gianna) is likely to result from the transfer    Reason for Transfer: Level of Care needed not available at this facility (inpatient behavioral health bed not available at this facility)    Transfer Requirements: [facility] [address] 2w   · Space available and qualified personnel available for treatment as acknowledged by    · Agreed to accept transfer and to provide appropriate medical treatment as acknowledged by       José Miguel Dickson  · Appropriate medical records of the examination and treatment of the patient are provided at the time of transfer   STAFF INITIAL WHEN COMPLETED _______  · Transfer will be performed by qualified personnel from [facility]  and appropriate transfer equipment as required, including the use of necessary and appropriate life support measures  Provider Certification: I have examined the patient and explained the following risks and benefits of being transferred/refusing transfer to the patient/family:  General risk, such as traffic hazards, adverse weather conditions, rough terrain or turbulence, possible failure of equipment (including vehicle or aircraft), or consequences of actions of persons outside the control of the transport personnel, Unanticipated needs of medical equipment and personnel during transport, Risk of worsening condition, The possibility of a transport vehicle being unavailable, The patient is stable for psychiatric transfer because they are medically stable, and is protected from haming him/herself or others during transport      Based on these reasonable risks and benefits to the patient and/or the unborn child(gianna), and based upon the information available at the time of the patients examination, I certify that the medical benefits reasonably to be expected from the provision of appropriate medical treatments at another medical facility outweigh the increasing risks, if any, to the individuals medical condition, and in the case of labor to the unborn child, from effecting the transfer      X____________________________________________ DATE 10/06/17        TIME_______      ORIGINAL - SEND TO MEDICAL RECORDS   COPY - SEND WITH PATIENT DURING TRANSFER
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
## 2017-10-06 NOTE — TREATMENT PLAN
RN will meet with patient at least twice per day to assess for any concerns, teach about prescribed medications and diagnosis  Patient will be encouraged and taught to utilize healthy coping skills

## 2017-10-06 NOTE — CONSULTS
Inpatient Medical Consultation - [facility] Internal Medicine    Patient Information: Foster Norman 25 y o  male MRN: [id number]  Unit/Bed#: Viviana Huang 511-61 Encounter: [id number]  PCP: No primary care provider on file  Date of Admission:  10/6/2017  Date of Consultation: 10/06/17  Requesting Physician: Louis Yu    Reason For Consultation:   Rash    Assessment/Plan:  Dry skin with small areas of scabbing on both feet:  -anterior aspect of left ankle with a 3 cm in diameter area of dry flaking skin and occasional 3 mm in diameter scabs  -right inferior anterior shin with two 3 mm scabs  -no sign of infectious etiology  -permethrin cream is acceptable and I have ordered this    VTE Prophylaxis: Reason for no pharmacologic prophylaxis Ambulatory on the psych unit  / reason for no mechanical VTE prophylaxis Ambulatory on the psych unit     Recommendations for Discharge:  · Continue permethrin cream and follow up with Dermatology if problem persists after discharge from the inpatient psychiatric unit    Counseling / Coordination of Care Time: 20 minutes  Greater than 50% of total time spent on patient counseling and coordination of care  Collaboration of Care: Were Recommendations Directly Discussed with Primary Treatment Team? - Yes     History of Present Illness:    Foster Norman is a 25 y o  male who is originally admitted to the psychiatry service on 10/6/2017 due to suicidal and homicidal ideation  We are consulted for rash on ankles    Patient has no other complaints at this time  Review of Systems:    Review of Systems   All other systems reviewed and are negative        Past Medical and Surgical History:     Past Medical History:   Diagnosis Date    Anxiety     Asthma     Bipolar 1 disorder ([facility] [address]     Depression     Hypertension     Mental retardation     MR (mental retardation)     Psychiatric disorder     Psychiatric illness     Psychosis     Psychotic disorder 5/25/2016    [facility]oa[facility] [address] [facility]     [facility]        Past Surgical History:   Procedure Laterality Date    HAND SURGERY      right hand       Meds/Allergies:    all medications and allergies reviewed    Allergies: Allergies   Allergen Reactions    Bee Venom Anaphylaxis    Penicillins        Social History:     Marital Status: Single    Substance Use History:   History   Alcohol Use No     History   Smoking Status    Never Smoker   Smokeless Tobacco    Never Used     Comment: non-smoker     History   Drug Use No       Family History:    non-contributory    Physical Exam:     Vitals:   Blood Pressure: 133/83 (10/06/17 1450)  Pulse: (!) 114 (10/06/17 1450)  Temperature: 98 8 °F (37 1 °C) (10/06/17 1450)  Temp Source: Tympanic (10/06/17 1450)  Respirations: 20 (10/06/17 1450)  Height: 5' 7 2" (170 7 cm) (10/06/17 1450)  Weight - Scale: 134 kg (295 lb 13 7 oz) (10/06/17 1450)  SpO2: 98 % (10/06/17 1450)    Physical Exam    Skin:  -anterior aspect of left ankle with a 3 cm in diameter area of dry flaking skin and occasional 3 mm in diameter scabs  -right inferior anterior shin with two 3 mm scabs    Additional Data:     Lab Results: I have personally reviewed pertinent reports  Results from last 7 days  Lab Units 10/06/17  1009   WBC Thousand/uL 9 59   HEMOGLOBIN g/dL 14 9   HEMATOCRIT % 41 3   PLATELETS Thousands/uL 210   NEUTROS PCT % 67   LYMPHS PCT % 22   MONOS PCT % 9   EOS PCT % 2       Results from last 7 days  Lab Units 10/06/17  1009   SODIUM mmol/L 138   POTASSIUM mmol/L 3 7   CHLORIDE mmol/L 106   CO2 mmol/L 24   BUN mg/dL 7   CREATININE mg/dL 0 69   CALCIUM mg/dL 8 9   TOTAL PROTEIN g/dL 8 0   BILIRUBIN TOTAL mg/dL 0 55   ALK PHOS U/L 100   ALT U/L 75   AST U/L 32   GLUCOSE RANDOM mg/dL 116           Imaging: I have personally reviewed pertinent reports  No results found  ** Please Note: This note has been constructed using a voice recognition system   **

## 2017-10-06 NOTE — H&P
Psychiatric Evaluation - Behavioral Health     Identification Data:Todd Rader [facility] 25 y o  male MRN: [id number]  Unit/Bed#: Che[facility] Primrose 420-70 Encounter: [id number]    Chief Complaint: suicide attempt, homicidal ideation and psychotic symptoms    History of Present Illness     Messi De is a 25 y o  male with a history of schizoaffective disorder, Intellectual disability and Impulse control disorder who was admitted to the inpatient psychiatric unit on a voluntary 61 51 81 commitment basis due to depression, auditory hallucinations, suicidal ideation with plan to stab self with knife and homicidal ideation towards mother and father  Symptoms prior to admission included worsening depression, suicidal ideation, homicidal ideation, hopelessness, helplessness, poor concentration, mood swings, auditory hallucinations with commands to kill self and with commands to harm mother, visual hallucinations and paranoid ideation  Onset of symptoms was gradual starting 1 week ago with progressively worsening course since that time  Stressors preceding admission included everyday stressors  Og Gaines was feeling more depressed recently and had increased auditory hallucinations with commands to harm self, his parents and family dog  He pulled a knife on the morning of admission and felt like killing his mother and then himself  He decided instead to come to ED to get help  He reported being compliant with his medications prior to admission  On initial evaluation after admission to the inpatient psychiatric unit Og Gaines seemed depressed and anxious  He still had auditory and visual hallucinations; also felt paranoid and said he was afraid of his mother  He had somewhat inappropriate smile; was rocking in his chair during evaluation and seeped distracted and preoccupied      Psychiatric Review Of Systems:    sleep changes: no  appetite changes: no  weight changes: no  energy/anergy: yes, decreased  interest/pleasure/anhedonia: yes, decreased  somatic symptoms: no  anxiety/panic: yes  eddie: yes, past manic episodes  guilty/hopeless: no  self injurious behavior/risky behavior: no  Suicidal ideation: yes, plan to stab self with knife  Homicidal ideation: yes, towards family  Auditory hallucinations: yes, auditory hallucinations with commands to harm self and with commands to harm others  Visual hallucinations: yes, vague visual hallucinations  Other hallucinations: no  Delusional thinking: yes, paranoid delusions, states he is afraid of his mother  Eating disorder history: no  Obsessive/compulsive symptoms: no    Historical Information     Past Psychiatric History:     Past Inpatient Psychiatric Treatment:   Multiple past inpatient psychiatric admissions at [facility]  Past Outpatient Psychiatric Treatment:    Was in outpatient psychiatric treatment in the past with a psychiatrist at [address]   Past Suicide Attempts: yes, history of self abusive behavior by cutting self  Past Violent Behavior: yes  Past Psychiatric Medication Trials: multiple psychiatric medication trials, Depakote, Risperdal, Abilify, Seroquel and Risperdal Consta     Substance Abuse History:    Social History     Tobacco History     Smoking Status  Never Smoker    Smokeless Tobacco Use  Never Used    Tobacco Comment  non-smoker          Alcohol History     Alcohol Use Status  No          Drug Use     Drug Use Status  No          Sexual Activity     Sexually Active  Not Asked          Activities of Daily Living    Not Asked               Additional Substance Use Detail     Questions Responses    Substance Use Assessment Denies substance use within the past 12 months    Alcohol Use Frequency Denies use in past 12 months    Cannabis frequency Denies use in past 12 months    Heroin Frequency Denies use in past 12 months    Cocaine frequency Denies past use in past 12 months    Crack Cocaine Frequency Denies use in past 12 months Methamphetamine Frequency Denies use in past 12 months    Narcotic Frequency Denies use in past 12 months    Benzodiazepine Frequency Denies use in past 12 months    Amphetamine frequency Denies use in past 12 months    Barbiturate Frequency Denies use in past 12 months    Inhalant frequency Denies use in past 12 months    Hallucinogen frequency Denies use in past 12 months    Ecstasy frequency Denies use past 12 months    Other drug frequency Denies use in past 12 months    Opiate frequency Denies use in past 12 months    Last reviewed by Ankit Valiente RN on 10/6/2017        I have assessed this patient for substance use within the past 12 months    Alcohol use: denies use  Recreational drug use:   Cocaine:  denies use  Heroin:  denies use  Marijuana:  denies use  Other drugs: denies use   Longest clean time: not applicable  History of Inpatient/Outpatient rehabilitation program: no  Smoking history: denies use  Use of caffeine: denies use    Family Psychiatric History:     Psychiatric Illness:  no family history of psychiatric illness  Substance Abuse:  no family history of substance abuse  Suicide Attempts:  no family history of suicide attempts    Social History:    Education: 12th grade  Learning Disabilities: special education and Intellectual Disability  Marital History: single  Children: none  Living Arrangement: The patient lives in home with father and mother  Occupational History: on permanent disability, never worked  Functioning Relationships: good support system  Legal History: none   History: None    Traumatic History:     Abuse: none  Other Traumatic Events: none     Past Medical History:    History of Seizures: no  History of Head injury with loss of consciousness: no    Past Medical History:   Diagnosis Date    Anxiety     Asthma     Hypertension     Mental retardation     Schizoaffective disorder ([facility] [address] )      Past Surgical History:   Procedure Laterality Date    HAND SURGERY right hand       Medical Review Of Systems:    A comprehensive review of systems was negative except for: Integument/breast: positive for rash  Behavioral/Psych: positive for agitation, auditory hallucinations and homicidal ideation    Allergies: Allergies   Allergen Reactions    Bee Venom Anaphylaxis    Penicillins      Medications: All current active medications have been reviewed  Medications prior to admission:    Prior to Admission Medications   Prescriptions Last Dose Informant Patient Reported? Taking?    amLODIPine (NORVASC) 5 mg tablet Unknown at Unknown time  No No   Sig: Take 1 tablet by mouth daily At 9AM   divalproex sodium (DEPAKOTE ER) 250 mg 24 hr tablet Unknown at Unknown time  No No   Sig: Take 3 tablets by mouth daily after dinner   divalproex sodium (DEPAKOTE ER) 500 mg 24 hr tablet Unknown at Unknown time  No No   Sig: Take 1 tablet by mouth daily At 9AM   permethrin (ELIMITE) 5 % cream Unknown at Unknown time  No No   Sig: Apply to affected area once   risperiDONE (RisperDAL) 1 mg tablet Unknown at Unknown time  No No   Sig: Take 1 tablet by mouth daily At 9AM   risperiDONE (RisperDAL) 2 mg tablet Unknown at Unknown time  No No   Sig: Take 1 tablet by mouth daily at bedtime   risperiDONE microspheres (RisperDAL CONSTA) 37 5 MG injection Unknown at Unknown time  No No   Sig: Inject 2 mL into the shoulder, thigh, or buttocks every 14 (fourteen) days Next injection due 6/27/2017      Facility-Administered Medications: None     Objective     Vital signs in last 24 hours:    Temp:  [98 6 °F (37 °C)-98 8 °F (37 1 °C)] 98 6 °F (37 °C)  HR:  [] 86  Resp:  [17-20] 17  BP: (105-148)/(59-83) 133/60    No intake or output data in the 24 hours ending 10/07/17 0948     Mental Status Evaluation:    Appearance:  disheveled, wearing hospital clothes   Behavior:  cooperative, rocking in a chair   Speech:  soft, tangential   Mood:  depressed, anxious   Affect:  blunted, inappropriate smile at times   Language: naming objects and repeating phrases   Thought Process:  tangential, concrete   Associations: concrete associations   Thought Content:  paranoid delusions   Perceptual Disturbances: auditory hallucinations with commands to kill self and with commands to harm others, vague visual hallucinations   Risk Potential: Suicidal ideation - Yes, with plan to stab self with knife  Homicidal ideation - Yes, towards family  Potential for aggression - Yes, due to poor impulse control   Sensorium:  oriented to person, place and time/date   Memory:  recent and remote memory grossly intact   Consciousness:  alert and awake   Attention: poor concentration and poor attention span   Intellect: below average   Fund of Knowledge: awareness of current events: yes  past history: yes  vocabulary: limited   Insight:  limited   Judgment: limited   Muscle Strength Muscle Tone: normal  normal   Gait/Station: normal gait/station and normal balance   Motor Activity: no abnormal movements       Laboratory Results: I have personally reviewed all pertinent laboratory/tests results      Most Recent Labs:   Lab Results   Component Value Date    WBC 9 59 10/06/2017    RBC 4 80 10/06/2017    HGB 14 9 10/06/2017    HCT 41 3 10/06/2017     10/06/2017    RDW 12 4 10/06/2017    NEUTROABS 6 30 10/06/2017     10/06/2017    K 3 7 10/06/2017     10/06/2017    CO2 24 10/06/2017    BUN 7 10/06/2017    CREATININE 0 69 10/06/2017    GLUCOSE 116 10/06/2017    CALCIUM 8 9 10/06/2017    AST 32 10/06/2017    ALT 75 10/06/2017    ALKPHOS 100 10/06/2017    PROT 8 0 10/06/2017    ALBUMIN 3 8 10/06/2017    BILITOT 0 55 10/06/2017    CHOL 166 10/07/2017    HDL 29 (L) 10/07/2017    TRIG 120 10/07/2017    LDLCALC 113 (H) 10/07/2017    VALPROICTOT 51 10/07/2017    BAK2WWHEPELR 2 880 10/06/2017   Drug Screen:   Lab Results   Component Value Date    BARBTUR Negative 10/06/2017    BDZUR Negative 10/06/2017    THCUR Negative 10/06/2017 COCAINEUR Negative 10/06/2017    METHADONEUR Negative 10/06/2017    OPIATEUR Negative 10/06/2017    PCPUR Negative 10/06/2017       Imaging Studies: No results found  Code Status: Level 1 - Full Code  Advance Directive and Living Will: <no information>    Assessment/Plan   Principal Problem:    Schizoaffective disorder, bipolar type ([facility] [address] )  Active Problems:    Intellectual disability    Impulse control disorder    Patient Strengths: compliant with medication, patient is on a voluntary commitment, supportive family/friends     Patient Barriers: difficulty adapting, immature, poor insight    Treatment Plan:     Planned Treatment and Medication Changes: All current active medications have been reviewed  Encourage group therapy, milieu therapy and occupational therapy  Behavioral Health checks every 15 minutes  Increase Risperdal to 2 mg bid  Increase Depakote to 500 mg daily and 1000 mg in the evening (Depakote level is only 51)  Recheck Depakote level in 2 days     Current medications:      amLODIPine 5 mg Oral Daily   divalproex sodium 1,000 mg Oral QPM   divalproex sodium 500 mg Oral Daily   permethrin  Topical BID   risperiDONE 2 mg Oral After Dinner   [START ON 10/8/2017] risperiDONE 2 mg Oral Daily       Risks / Benefits of Treatment:    Risks, benefits, and possible side effects of medications explained to patient  Patient has limited understanding of risks and benefits of treatment at this time, but agrees to take medications as prescribed  Counseling / Coordination of Care:    Patient's presentation on admission and proposed treatment plan discussed with staff  Events leading to admission reviewed with patient  Inpatient Psychiatric Certification:    Estimated length of stay: 7 midnights    Estimated length of stay: More than 2 midnights    I certify that inpatient services are medically necessary for this patient for a duration of greater that 2 midnights for the treatment of Schizoaffective disorder, bipolar type ([facility] )  See H&P and MD Progress Notes for additional information about the patient's course of treatment

## 2017-10-06 NOTE — PLAN OF CARE
Problem: Ineffective Coping  Goal: Participates in unit activities  Interventions:  - Provide therapeutic environment   - Provide required programming   - Redirect inappropriate behaviors    Outcome: Completed Date Met: 10/06/17

## 2017-10-06 NOTE — ED NOTES
Pt has Medicare primary-COB completed with Sara from [facility] [phone] follow Medicare days

## 2017-10-07 LAB
CHOLEST SERPL-MCNC: 166 MG/DL (ref 50–200)
HDLC SERPL-MCNC: 29 MG/DL (ref 40–60)
LDLC SERPL CALC-MCNC: 113 MG/DL (ref 0–100)
TRIGL SERPL-MCNC: 120 MG/DL
VALPROATE SERPL-MCNC: 51 UG/ML (ref 50–100)

## 2017-10-07 PROCEDURE — 80164 ASSAY DIPROPYLACETIC ACD TOT: CPT | Performed by: PHYSICIAN ASSISTANT

## 2017-10-07 PROCEDURE — 86592 SYPHILIS TEST NON-TREP QUAL: CPT | Performed by: PSYCHIATRY & NEUROLOGY

## 2017-10-07 PROCEDURE — 80061 LIPID PANEL: CPT | Performed by: PSYCHIATRY & NEUROLOGY

## 2017-10-07 RX ORDER — AMLODIPINE BESYLATE 5 MG/1
5 TABLET ORAL DAILY
Status: DISCONTINUED | OUTPATIENT
Start: 2017-10-07 | End: 2017-10-12 | Stop reason: HOSPADM

## 2017-10-07 RX ORDER — DIVALPROEX SODIUM 500 MG/1
1000 TABLET, EXTENDED RELEASE ORAL EVERY EVENING
Status: DISCONTINUED | OUTPATIENT
Start: 2017-10-07 | End: 2017-10-12 | Stop reason: HOSPADM

## 2017-10-07 RX ORDER — RISPERIDONE 1 MG/1
1 TABLET, FILM COATED ORAL ONCE
Status: COMPLETED | OUTPATIENT
Start: 2017-10-07 | End: 2017-10-07

## 2017-10-07 RX ORDER — RISPERIDONE 2 MG/1
2 TABLET, FILM COATED ORAL DAILY
Status: DISCONTINUED | OUTPATIENT
Start: 2017-10-08 | End: 2017-10-12 | Stop reason: HOSPADM

## 2017-10-07 RX ADMIN — PERMETHRIN: 50 CREAM TOPICAL at 08:28

## 2017-10-07 RX ADMIN — DIVALPROEX SODIUM 1000 MG: 500 TABLET, EXTENDED RELEASE ORAL at 17:14

## 2017-10-07 RX ADMIN — RISPERIDONE 1 MG: 1 TABLET ORAL at 08:29

## 2017-10-07 RX ADMIN — DIVALPROEX SODIUM 500 MG: 500 TABLET, EXTENDED RELEASE ORAL at 08:29

## 2017-10-07 RX ADMIN — RISPERIDONE 1 MG: 1 TABLET ORAL at 11:49

## 2017-10-07 RX ADMIN — RISPERIDONE 2 MG: 2 TABLET ORAL at 17:13

## 2017-10-07 RX ADMIN — TRAZODONE HYDROCHLORIDE 50 MG: 50 TABLET ORAL at 21:08

## 2017-10-07 RX ADMIN — AMLODIPINE BESYLATE 5 MG: 5 TABLET ORAL at 08:29

## 2017-10-07 NOTE — PROGRESS NOTES
Pt is pleasant and cooperative  Denies S/H/I or AVH  Rates anxiety as a "5," and depression as a "0 " Willing to contract for safety  Compliant with medications, attends groups  Will continue to monitor, provide support and encouragement

## 2017-10-07 NOTE — H&P
Chief Complaint:  As per psychiatry  Pt admitted for auditory hallucinations with voices telling him to kill his parents/family dog as well as commit suicide  No medical complaints at this time  Noted past medical history  History of Present Illness:  As per psychiatry      Past Medical History:   Past Medical History:   Diagnosis Date    Anxiety     Asthma     Hypertension     Mental retardation     Sch[facility]oaffective disorder ([facility] [address] )          Past Surgical History:    Past Surgical History:   Procedure Laterality Date    HAND SURGERY      right hand         Allergies:     Allergies   Allergen Reactions    Bee Venom Anaphylaxis    Penicillins          Medications:    Current Facility-Administered Medications:     acetaminophen (TYLENOL) tablet 650 mg, 650 mg, Oral, Q6H PRN, Viona Prom, PA-C    acetaminophen (TYLENOL) tablet 650 mg, 650 mg, Oral, Q4H PRN, Wyatt Guevara MD    aluminum-magnesium hydroxide-simethicone (MYLANTA) 200-200-20 mg/5 mL oral suspension 30 mL, 30 mL, Oral, Q4H PRN, Viona Prom, PA-C    amLODIPine (NORVASC) tablet 5 mg, 5 mg, Oral, Daily, Wyatt Guevara MD, 5 mg at 10/07/17 0829    benztropine (COGENTIN) injection 1 mg, 1 mg, Intramuscular, Q6H PRN, Wyatt Guevara MD    benztropine (COGENTIN) tablet 1 mg, 1 mg, Oral, Q6H PRN, Wyatt Guevara MD    divalproex sodium (DEPAKOTE ER) 24 hr tablet 1,000 mg, 1,000 mg, Oral, QPM, Wyatt Guevara MD    divalproex sodium (DEPAKOTE ER) 24 hr tablet 500 mg, 500 mg, Oral, Daily, Wyatt Guevara MD, 500 mg at 10/07/17 0829    haloperidol (HALDOL) tablet 5 mg, 5 mg, Oral, Q6H PRN, Wyatt Guevara MD    haloperidol lactate (HALDOL) injection 5 mg, 5 mg, Intramuscular, Q6H PRN, Wyatt Guevara MD    hydrOXYzine HCL (ATARAX) tablet 50 mg, 50 mg, Oral, Q6H PRN, Wyatt Guevara MD    ibuprofen (MOTRIN) tablet 800 mg, 800 mg, Oral, Q6H PRN, Wyatt Guevara MD    LORazepam (ATIVAN) 2 mg/mL injection 2 mg, 2 mg, Intramuscular, Q6H PRN, Danae Landers MD    LORazepam (ATIVAN) tablet 1 mg, 1 mg, Oral, Q6H PRN, Danae Landers MD    magnesium hydroxide (MILK OF MAGNESIA) 400 mg/5 mL oral suspension 30 mL, 30 mL, Oral, Daily PRN, Gelacio Ruby PA-C    OLANZapine (ZyPREXA) IM injection 10 mg, 10 mg, Intramuscular, Q3H PRN, Danae Landers MD    OLANZapine (ZyPREXA) tablet 10 mg, 10 mg, Oral, Q3H PRN, Gelacio Ruby PA-C    permethrin (ELIMITE) 5 % cream, , Topical, BID, Josue Elise MD    risperiDONE (RisperDAL M-TABS) dispersible tablet 1 mg, 1 mg, Oral, Q3H PRN, Gelacio Ruby PA-C    risperiDONE (RisperDAL) tablet 2 mg, 2 mg, Oral, After Dinner, Gelacio Ruby PA-C, 2 mg at 10/06/17 1751    [START ON 10/8/2017] risperiDONE (RisperDAL) tablet 2 mg, 2 mg, Oral, Daily, Danae Landers MD    traZODone (DESYREL) tablet 50 mg, 50 mg, Oral, HS PRN, Gelacio Ruby PA-C      Social History:  Social History     History   Alcohol Use No     History   Drug Use No     History   Smoking Status    Never Smoker   Smokeless Tobacco    Never Used     Comment: non-smoker         Family History:    Family History   Problem Relation Age of Onset    No Known Problems Mother     No Known Problems Father     No Known Problems Sister     No Known Problems Brother     No Known Problems Maternal Aunt     No Known Problems Paternal Aunt     No Known Problems Maternal Uncle     No Known Problems Paternal Uncle     No Known Problems Maternal Grandfather     No Known Problems Maternal Grandmother     No Known Problems Paternal Grandfather     No Known Problems Paternal Grandmother     No Known Problems Cousin     ADD / ADHD Neg Hx     Alcohol abuse Neg Hx     Anxiety disorder Neg Hx     Bipolar disorder Neg Hx     Dementia Neg Hx     Depression Neg Hx     Drug abuse Neg Hx     OCD Neg Hx     Paranoid behavior Neg Hx     Schizophrenia Neg Hx     Seizures Neg Hx     Self-Injurious Behavior  Neg Hx     Suicide Attempts Neg Hx          Review of Systems:    negative    Vitals:  Vitals:    10/07/17 1529   BP: 125/71   Pulse: 104   Resp: 18   Temp: (!) 97 4 °F (36 3 °C)   SpO2:        Physical Exam:  HEENT: Normocephalic, atraumatic, PER EOMI, nonicteric, TM normal B/L, oropharynx normal   CARDIAC: regular rate & rhythm, S1 & S2 normal   No heaves, thrills, gallops or murmurs  LUNGS: Clear to auscultation, no wheezes, ronchi, or rales  ABDOMEN: Soft, non-tender, non-distended, normal bowel sounds  EXTREMITIES: Upper and lower extremity strength intact, Full ROM, no pedal edema  SKIN:  Warm and dry, no rashes  BACK:  No CVA tenderness, no vertebral tenderness  NEURO:  CN 2-12 grossly intact, gait normal, romberg negative, sensation intact  Lab Results: I have personally reviewed pertinent reports  See below  Imaging: I have personally reviewed pertinent reports  EKG, Pathology, and Other Studies: I have personally reviewed pertinent reports  Admission on 10/06/2017   Component Date Value    Valproic Acid, Total 10/07/2017 51     Cholesterol 10/07/2017 166     Triglycerides 10/07/2017 120     HDL, Direct 10/07/2017 29*    LDL Calculated 10/07/2017 113*         Impression:  Asthma  HTN  Psychiatric disturbance    Plan:  As per psychiatry  Asthma & HTN - continue with daily medications           Abiodun Jose PA-C

## 2017-10-07 NOTE — PROGRESS NOTES
Pt is pleasant and cooperative  Denies S/H/I or AVH  Smiling inappropriately  Compliant with medications and attends groups  Will continue to monitor

## 2017-10-07 NOTE — PLAN OF CARE
Problem: SELF HARM/SUICIDALITY  Goal: Will have no self-injury during hospital stay  INTERVENTIONS:  - Q 15 MINUTES: Routine safety checks  - Q WAKING SHIFT & PRN: Assess risk to determine if routine checks are adequate to maintain patient safety  - Encourage patient to participate actively in care by formulating a plan to combat response to suicidal ideation, identify supports and resources   Outcome: Progressing      Problem: PSYCHOSIS  Goal: Will report no hallucinations or delusions  Interventions:  - Administer medication as  ordered  - Every waking shifts and PRN assess for the presence of hallucinations and or delusions  - Assist with reality testing to support increasing orientation  - Assess if patient's hallucinations or delusions are encouraging self-harm or harm to others and intervene as appropriate   Outcome: Progressing      Problem: SELF CARE DEFICIT  Goal: Return ADL status to a safe level of function  INTERVENTIONS:  - Administer medication as ordered  - Assess ADL deficits and provide assistive devices as needed  - Obtain PT/OT consults as needed  - Assist and instruct patient to increase activity and self care as tolerated   Outcome: Progressing

## 2017-10-08 RX ADMIN — RISPERIDONE 2 MG: 2 TABLET ORAL at 16:54

## 2017-10-08 RX ADMIN — DIVALPROEX SODIUM 1000 MG: 500 TABLET, EXTENDED RELEASE ORAL at 16:54

## 2017-10-08 RX ADMIN — RISPERIDONE 2 MG: 2 TABLET ORAL at 09:09

## 2017-10-08 RX ADMIN — DIVALPROEX SODIUM 500 MG: 500 TABLET, EXTENDED RELEASE ORAL at 09:10

## 2017-10-08 RX ADMIN — TRAZODONE HYDROCHLORIDE 50 MG: 50 TABLET ORAL at 21:13

## 2017-10-08 RX ADMIN — AMLODIPINE BESYLATE 5 MG: 5 TABLET ORAL at 09:09

## 2017-10-08 RX ADMIN — PERMETHRIN: 50 CREAM TOPICAL at 09:13

## 2017-10-08 NOTE — PROGRESS NOTES
Pt remains   cheer full and cooperative  Last risperdal consta  given 10/3/17   Next   Dose due  10/17/17

## 2017-10-08 NOTE — PLAN OF CARE
Problem: SELF HARM/SUICIDALITY  Goal: Will have no self-injury during hospital stay  INTERVENTIONS:  - Q 15 MINUTES: Routine safety checks  - Q WAKING SHIFT & PRN: Assess risk to determine if routine checks are adequate to maintain patient safety  - Encourage patient to participate actively in care by formulating a plan to combat response to suicidal ideation, identify supports and resources   Outcome: Progressing      Problem: PSYCHOSIS  Goal: Will report no hallucinations or delusions  Interventions:  - Administer medication as  ordered  - Every waking shifts and PRN assess for the presence of hallucinations and or delusions  - Assist with reality testing to support increasing orientation  - Assess if patient's hallucinations or delusions are encouraging self-harm or harm to others and intervene as appropriate   Outcome: Progressing  Denies hallucinations today    Problem: SELF CARE DEFICIT  Goal: Return ADL status to a safe level of function  INTERVENTIONS:  - Administer medication as ordered  - Assess ADL deficits and provide assistive devices as needed  - Obtain PT/OT consults as needed  - Assist and instruct patient to increase activity and self care as tolerated   Outcome: Progressing      Problem: Risk for Violence/Aggression Toward Others  Goal: Refrain from harming others  Outcome: Progressing    Goal: Refrain from destructive acts on the environment or property  Outcome: Progressing    Goal: Control angry outbursts  Interventions:  - Monitor patient closely, per order  - Ensure early verbal de-escalation  - Monitor prn medication needs  - Set reasonable/therapeutic limits, outline behavioral expectations, and consequences   - Provide a non-threatening milieu, utilizing the least restrictive interventions    Outcome: Progressing

## 2017-10-08 NOTE — TREATMENT PLAN
TREATMENT PLAN REVIEW - [address] 22 y o  1995 male MRN: [id number]    [facility] [address] Encounter: [id number]          Admit Date/Time:  10/6/2017  2:44 PM    Treatment Team: Attending Provider: Zeyad Anderson; Consulting Physician: Raman Negron MD; Patient Care Assistant: Audrey Galeana;  Patient Care Technician: Mitch Keenna; Patient Care Technician: Greg Mcnamara; Registered Nurse: Bertha Aj RN; Patient Care Assistant: Helder Ureña    Diagnosis: Principal Problem:    Schizoaffective disorder, bipolar type [facility]  Active Problems:    Intellectual disability    Impulse control disorder    Patient Strengths/Assets: compliant with medication, patient is on a voluntary commitment, supportive family/friends     Patient Barriers/Limitations: difficulty adapting, immature, poor insight    Short Term Goals: decrease in depressive symptoms, decrease in psychotic symptoms, decrease in suicidal thoughts, decrease in homicidal thoughts    Long Term Goals: resolution of depressive symptoms, stabilization of mood, free of suicidal thoughts, free of homicidal thoughts, improved impulse control    Progress Towards Goals: starting psychiatric medications as prescribed    Recommended Treatment: medication management, patient medication education, group therapy, milieu therapy, continued Behavioral Health psychiatric evaluation/assessment process     Treatment Frequency: daily medication monitoring, group and milieu therapy daily, monitoring through interdisciplinary rounds, monitoring through weekly patient care conferences    Expected Discharge Date:  7 days    Discharge Plan: referral for outpatient medication management with a psychiatrist, referral for outpatient psychotherapy, return to previous living arrangement    Treatment Plan Created/Updated By: Elma Kothari MD

## 2017-10-08 NOTE — PROGRESS NOTES
Patient is cheerful upon approach  Spends time in the day room, currently playing dominos with other patients  Reports he is "doing good" today  Denies hallucinations  Denies Si

## 2017-10-08 NOTE — PROGRESS NOTES
Progress Note - [address] [facility] 25 y o  male MRN: [id number]   Unit/Bed#: [facility] 673-51 Encounter: [id number]    Behavior over the last 24 hours: minimal improvement  Daniel Beach states he feels slightly less depressed today, but still reports auditory hallucinations with commands to "get a knife", homicidal thoughts towards family and family dog and suicidal thoughts with plan to stab self with knife  Limited participation in milieu  Compliant with medications  Sleep: normal  Appetite: normal  Medication side effects: No   ROS: no complaints, denies any headache, shortness of breath or chest pain    Mental Status Evaluation:    Appearance:  wearing hospital clothes   Behavior:  cooperative, rocking in his chair   Speech:  soft, tangential   Mood:  anxious, slightly less depressed   Affect:  blunted, inappropriate smile   Thought Process:  concrete   Associations: concrete associations   Thought Content:  paranoid ideation   Perceptual Disturbances: auditory hallucinations with commands to "get a knife", no visual hallucinations   Risk Potential: Suicidal ideation - Yes, with plan to stab self with knife  Homicidal ideation - Yes, towards mother, father and family dog  Potential for aggression - Yes, due to poor impulse control   Sensorium:  oriented to person, place and time/date   Memory:  recent and remote memory grossly intact   Consciousness:  alert and awake   Attention: poor concentration and poor attention span   Insight:  limited   Judgment: limited   Gait/Station: normal gait/station and normal balance   Motor Activity: no abnormal movements     Vital signs in last 24 hours:    Temp:  [97 4 °F (36 3 °C)-99 1 °F (37 3 °C)] 98 3 °F (36 8 °C)  HR:  [] 82  Resp:  [17-18] 17  BP: (110-130)/(59-73) 130/59    Laboratory results:  I have personally reviewed all pertinent laboratory/tests results      Progress Toward Goals: minimal improvement, slightly less depressed, still suicidal, still has psychotic symptoms    Assessment/Plan   Principal Problem:    Schizoaffective disorder, bipolar type ([facility] [address] )  Active Problems:    Intellectual disability    Impulse control disorder    Recommended Treatment:     Planned medication and treatment changes: All current active medications have been reviewed  Encourage group therapy, milieu therapy and occupational therapy  Behavioral Health checks every 15 minutes  Restart Risperdal Consta 37 5 mg every 2 weeks, next dose is due 10/17/2017 (last injection was on 10/3/2017 as per family)  Recheck Depakote level in the morning     Continue all other medications:      amLODIPine 5 mg Oral Daily   divalproex sodium 1,000 mg Oral QPM   divalproex sodium 500 mg Oral Daily   permethrin  Topical BID   risperiDONE 2 mg Oral After Dinner   risperiDONE 2 mg Oral Daily   [START ON 10/17/2017] risperiDONE microspheres 37 5 mg Intramuscular Q14 Days       Risks / Benefits of Treatment:    Risks, benefits, and possible side effects of medications explained to patient  Patient has limited understanding of risks and benefits of treatment at this time, but agrees to take medications as prescribed  Counseling / Coordination of Care: Total floor / unit time spent today 35 minutes  Greater than 50% of total time was spent with the patient and / or family counseling and / or coordination of care  A description of counseling / coordination of care:    Patient's progress reviewed with nursing staff  Medications, treatment progress and treatment plan reviewed with patient  Medication changes discussed with patient  Supportive therapy provided to patient  Reoriented to reality and reassured

## 2017-10-09 LAB
RPR SER QL: NORMAL
VALPROATE SERPL-MCNC: 62 UG/ML (ref 50–100)

## 2017-10-09 PROCEDURE — 80164 ASSAY DIPROPYLACETIC ACD TOT: CPT | Performed by: PSYCHIATRY & NEUROLOGY

## 2017-10-09 RX ADMIN — PERMETHRIN: 50 CREAM TOPICAL at 09:28

## 2017-10-09 RX ADMIN — DIVALPROEX SODIUM 500 MG: 500 TABLET, EXTENDED RELEASE ORAL at 08:43

## 2017-10-09 RX ADMIN — PERMETHRIN 1 APPLICATION: 50 CREAM TOPICAL at 18:35

## 2017-10-09 RX ADMIN — RISPERIDONE 2 MG: 2 TABLET ORAL at 17:16

## 2017-10-09 RX ADMIN — RISPERIDONE 2 MG: 2 TABLET ORAL at 08:43

## 2017-10-09 RX ADMIN — AMLODIPINE BESYLATE 5 MG: 5 TABLET ORAL at 08:43

## 2017-10-09 RX ADMIN — DIVALPROEX SODIUM 1000 MG: 500 TABLET, EXTENDED RELEASE ORAL at 17:15

## 2017-10-09 NOTE — PLAN OF CARE
Problem: SELF HARM/SUICIDALITY  Goal: Will have no self-injury during hospital stay  INTERVENTIONS:  - Q 15 MINUTES: Routine safety checks  - Q WAKING SHIFT & PRN: Assess risk to determine if routine checks are adequate to maintain patient safety  - Encourage patient to participate actively in care by formulating a plan to combat response to suicidal ideation, identify supports and resources   Outcome: Progressing      Problem: PSYCHOSIS  Goal: Will report no hallucinations or delusions  Interventions:  - Administer medication as  ordered  - Every waking shifts and PRN assess for the presence of hallucinations and or delusions  - Assist with reality testing to support increasing orientation  - Assess if patient's hallucinations or delusions are encouraging self-harm or harm to others and intervene as appropriate   Outcome: Progressing      Problem: SELF CARE DEFICIT  Goal: Return ADL status to a safe level of function  INTERVENTIONS:  - Administer medication as ordered  - Assess ADL deficits and provide assistive devices as needed  - Obtain PT/OT consults as needed  - Assist and instruct patient to increase activity and self care as tolerated   Outcome: Progressing      Problem: Risk for Violence/Aggression Toward Others  Goal: Refrain from harming others  Outcome: Progressing    Goal: Refrain from destructive acts on the environment or property  Outcome: Progressing    Goal: Control angry outbursts  Interventions:  - Monitor patient closely, per order  - Ensure early verbal de-escalation  - Monitor prn medication needs  - Set reasonable/therapeutic limits, outline behavioral expectations, and consequences   - Provide a non-threatening milieu, utilizing the least restrictive interventions    Outcome: Progressing

## 2017-10-09 NOTE — PROGRESS NOTES
Progress Note - Behavioral Health   Yessenia Jeter 25 y o  male MRN: [id number]  Unit/Bed#: [facility] 254-02 Encounter: [id number]    Assessment/Plan   Principal Problem:    Schizoaffective disorder, bipolar type [facility])  Active Problems:    Intellectual disability    Impulse control disorder      Subjective:  His reason for admission was discussed in detail  He is well known to me from multiple past admissions  Patient reports improvement in mood and reduction in command auditory hallucination  Reduction in passive death wishes noted  Consenting for safety on the unit  Current Medications:    amLODIPine 5 mg Oral Daily   divalproex sodium 1,000 mg Oral QPM   divalproex sodium 500 mg Oral Daily   permethrin  Topical BID   risperiDONE 2 mg Oral After Dinner   risperiDONE 2 mg Oral Daily   [START ON 10/17/2017] risperiDONE microspheres 37 5 mg Intramuscular Q14 Days       Behavioral Health Medications: all current active meds have been reviewed  Vitals:  Vitals:    10/09/17 0805   BP: 137/65   Pulse: 99   Resp: 17   Temp: 97 6 °F (36 4 °C)   SpO2:        Labs: reviewed; Depakote level 62  Psychiatric Review of Systems:  Behavior over the last 24 hours:  Slow improvement  Sleep: normal  Appetite: normal  Medication side effects: No  ROS: no complaints    Mental Status Evaluation:  Appearance:  casually dressed   Behavior:  guarded   Speech:  soft   Mood:  angry, anxious and depressed   Affect:  increased in range   Language naming objects and repeating phrases   Thought Process:  circumstantial   Thought Content:  delusions  persecutory   Perceptual Disturbances:  Auditory hallucinations without commands   Risk Potential: Suicidal Ideations without plan, Homicidal Ideations none and Potential for Aggression No   Sensorium:  person, place and time/date   Cognition:  grossly intact   Consciousness:  awake    Attention: attention span appeared shorter than expected for age   Insight:  limited   Judgment: limited Intellect limited   Gait/Station: normal gait/station and normal balance   Motor Activity: no abnormal movements     Progress Toward Goals: slow progress    Recommended Treatment:   Continue with group therapy, milieu therapy and occupational therapy  Continue following current medications:   amLODIPine 5 mg Oral Daily   divalproex sodium 1,000 mg Oral QPM   divalproex sodium 500 mg Oral Daily   permethrin  Topical BID   risperiDONE 2 mg Oral After Dinner   risperiDONE 2 mg Oral Daily   [START ON 10/17/2017] risperiDONE microspheres 37 5 mg Intramuscular Q14 Days       Risks, benefits and possible side effects of Medications:   Risks, benefits, and possible side effects of medications explained to patient and patient verbalizes understanding  Portions of the record may have been created with voice recognition software  Occasional wrong word or "sound a like" substitutions may have occurred due to the inherent limitations of voice recognition software  Read the chart carefully and recognize, using context, where substitutions have occurred  There may be translation, syntax,  or grammatical errors  If you have any questions, please contact the dictating provider

## 2017-10-09 NOTE — CASE MANAGEMENT
CM and Pt reviewed and signed Tx Plan  CM and Pt reviewed and signed ROIs for Drena Sessions Hoa(mom), [facility](outpatient), [facility](ICM), [facility], and PCP  CM is familiar with Pt, due to several previous admissions, with most recent being June 16-21, 2017  Pt reported he and his mom got into an argument and she told him to go to the hospital and he wanted to go to the hospital   Pt had a phone number written down on a ripped piece of paper, which Pt said his mom told him to give to CM  CM asked what Pt and his mom argued about, and Pt said, "I dunno"  Pt reported he is living with his mom, brother, and dad  CM asked who dad was, and he reported "Tawanda"  Pt reported when he had been hearing voices as well, but reported he wasn't hearing any today  CM asked when did the voices begin, and Pt said, "a year ago"  Pt said that he still works with Toño Sicard from [employer], and [employer]  CM asked Pt about interest/hobbies but he just reported he played video games  CM asked Pt what she would help him with while he was in the hospital, and he reported, "going home"  CM contacted [facility] @ [phone] and spoke with Sheila Richter who said that Pt was a no-call, no-show twice, so he must keep his next therapy appointment  Pt has a therapy appointment scheduled for tomorrow, which CM rescheduled for 10/16 at 1:45 PM   The next available psychiatry appointment is not until November 2, 2017 at 10:30 AM   CM confirmed fax number [phone]  CM contacted Pt's ICM, Secundino Sicard, through [facility] @ [phone], and left a message, informing him of Pt's hospitalization  CM contacted Pt's mom, Carlene Storey, @ [phone] who reported that Pt has been going to [facility], but has been trying to find ways to try to stay home, the same way he would when he was in school    She said that Pt is having problems with his hygiene and doesn't want to go to the clubhouse, and then said he was hearing voices in his head, and off he went to the hospital    CM reviewed that Pt had a therapy appointment tomorrow, which CM rescheduled  CM asked why Pt missed 2 appointments, and Junior Tripathi said that she doesn't have the money for him to get there  CM reviewed with Pt's Medicare card, he could ride the bus for $1   Junior Tripathi said that sometimes they don't even have a dollar  She said that she, her other son, & Pt all received Social Security, however, once bills are paid she doesn't have much money left  CM asked about Pt's ICM assisting with this, and she said that Pt now works with Tita Ferreira from [facility]  CM asked about Pt's injection, and Junior Tripathi said that because she doesn't have transportation, [facility] sends a nurse to their house every 2 weeks to give it  She said that they also mail Pt his medications, so CM would need to send his prescriptions there  CM reviewed discharge tentative for Thursday, and agreed to keep her updated  CM contacted [facility] @ [phone] and spoke with William Goodwin, who confirmed Pt was working with Tita Ferreira at ext  [phone]  CM was transferred to [facility] and left a message notifying him of Pt's admission  CM contacted [facility] @ [phone] and spoke with Meli, who said that they do mail medications, and it usually takes about a day for Pt to get them  She said that they can mail them the same day they receive the prescription, as long as it is before 3 PM   CM agreed to send prescriptions as soon as possible

## 2017-10-09 NOTE — PROGRESS NOTES
Pt states feeling well with no questions at this time  Pleasant and cooperative   Showered this am  States willing to return home

## 2017-10-09 NOTE — PLAN OF CARE
Problem: DISCHARGE PLANNING  Goal: Discharge to home or other facility with appropriate resources  INTERVENTIONS:  - Identify barriers to discharge w/patient and caregiver  - Arrange for needed discharge resources and transportation as appropriate  - Identify discharge learning needs (meds, wound care, etc )  - Arrange for interpretive services to assist at discharge as needed  - Refer to Case Management Department for coordinating discharge planning if the patient needs post-hospital services based on physician/advanced practitioner order or complex needs related to functional status, cognitive ability, or social support system   Outcome: Progressing  Pt met with CM to review and sign Tx Plan and ROIs

## 2017-10-09 NOTE — PROGRESS NOTES
Patient is pleasant during conversation  Smiles and waves to staff  Denies signs and symptoms, says he is having a good day  Calm and cooperative   Denies si, hi

## 2017-10-10 RX ORDER — AMLODIPINE BESYLATE 5 MG/1
5 TABLET ORAL DAILY
Qty: 30 TABLET | Refills: 0 | Status: ON HOLD | OUTPATIENT
Start: 2017-10-11 | End: 2018-01-17

## 2017-10-10 RX ORDER — RISPERIDONE 2 MG/1
2 TABLET, FILM COATED ORAL 2 TIMES DAILY
Qty: 60 TABLET | Refills: 0 | Status: SHIPPED | OUTPATIENT
Start: 2017-10-10 | End: 2018-01-17 | Stop reason: HOSPADM

## 2017-10-10 RX ORDER — DIVALPROEX SODIUM 500 MG/1
TABLET, EXTENDED RELEASE ORAL
Qty: 90 TABLET | Refills: 0 | Status: SHIPPED | OUTPATIENT
Start: 2017-10-10 | End: 2018-01-17 | Stop reason: HOSPADM

## 2017-10-10 RX ADMIN — RISPERIDONE 2 MG: 2 TABLET ORAL at 17:08

## 2017-10-10 RX ADMIN — PERMETHRIN: 50 CREAM TOPICAL at 08:15

## 2017-10-10 RX ADMIN — RISPERIDONE 2 MG: 2 TABLET ORAL at 08:15

## 2017-10-10 RX ADMIN — DIVALPROEX SODIUM 500 MG: 500 TABLET, EXTENDED RELEASE ORAL at 08:15

## 2017-10-10 RX ADMIN — AMLODIPINE BESYLATE 5 MG: 5 TABLET ORAL at 08:15

## 2017-10-10 RX ADMIN — PERMETHRIN 1 APPLICATION: 50 CREAM TOPICAL at 17:31

## 2017-10-10 RX ADMIN — DIVALPROEX SODIUM 1000 MG: 500 TABLET, EXTENDED RELEASE ORAL at 17:08

## 2017-10-10 NOTE — PROGRESS NOTES
Progress Note - Behavioral Health   Yessenia Jeter 25 y o  male MRN: [id number]  Unit/Bed#: [facility] 254-02 Encounter: [id number]    Assessment/Plan   Principal Problem:    Schizoaffective disorder, bipolar type [facility])  Active Problems:    Intellectual disability    Impulse control disorder      Subjective:  Patient today reports reduction of auditory hallucinations of voices without commands  Before were command in nature  Seen smiling and can be loud at times when excited  States he is no longer suicidal or homicidal   Reduced depression and is hopeful for discharge soon  Does not show manic behaviors  Appears mildly anxious in affect and has not required PRN medications  Sleep improved and did not require PRN Trazodone last night  Depakote level stable at 62 yesterday  Medication compliant  Tolerating medications well without serious side effects  No somatic complaints      Current Medications:  Current Facility-Administered Medications   Medication Dose Route Frequency    acetaminophen (TYLENOL) tablet 650 mg  650 mg Oral Q6H PRN    acetaminophen (TYLENOL) tablet 650 mg  650 mg Oral Q4H PRN    aluminum-magnesium hydroxide-simethicone (MYLANTA) 200-200-20 mg/5 mL oral suspension 30 mL  30 mL Oral Q4H PRN    amLODIPine (NORVASC) tablet 5 mg  5 mg Oral Daily    benztropine (COGENTIN) injection 1 mg  1 mg Intramuscular Q6H PRN    benztropine (COGENTIN) tablet 1 mg  1 mg Oral Q6H PRN    divalproex sodium (DEPAKOTE ER) 24 hr tablet 1,000 mg  1,000 mg Oral QPM    divalproex sodium (DEPAKOTE ER) 24 hr tablet 500 mg  500 mg Oral Daily    haloperidol (HALDOL) tablet 5 mg  5 mg Oral Q6H PRN    haloperidol lactate (HALDOL) injection 5 mg  5 mg Intramuscular Q6H PRN    hydrOXYzine HCL (ATARAX) tablet 50 mg  50 mg Oral Q6H PRN    ibuprofen (MOTRIN) tablet 800 mg  800 mg Oral Q6H PRN    LORazepam (ATIVAN) 2 mg/mL injection 2 mg  2 mg Intramuscular Q6H PRN    LORazepam (ATIVAN) tablet 1 mg  1 mg Oral Q6H PRN  magnesium hydroxide (MILK OF MAGNESIA) 400 mg/5 mL oral suspension 30 mL  30 mL Oral Daily PRN    OLANZapine (ZyPREXA) IM injection 10 mg  10 mg Intramuscular Q3H PRN    OLANZapine (ZyPREXA) tablet 10 mg  10 mg Oral Q3H PRN    permethrin (ELIMITE) 5 % cream   Topical BID    risperiDONE (RisperDAL M-TABS) dispersible tablet 1 mg  1 mg Oral Q3H PRN    risperiDONE (RisperDAL) tablet 2 mg  2 mg Oral After Dinner    risperiDONE (RisperDAL) tablet 2 mg  2 mg Oral Daily    [START ON 10/17/2017] risperiDONE microspheres (RisperDAL CONSTA) injection 37 5 mg  37 5 mg Intramuscular Q14 Days    traZODone (DESYREL) tablet 50 mg  50 mg Oral HS PRN       Behavioral Health Medications: all current active meds have been reviewed and continue current psychiatric medications  Vitals:  Vitals:    10/10/17 0812   BP: 124/60   Pulse: 87   Resp: 17   Temp: 98 9 °F (37 2 °C)   SpO2:        Labs: reviewed    Psychiatric Review of Systems:  Behavior over the last 24 hours:  Slight improvement  Sleep: normal  Appetite: normal  Medication side effects: No  ROS: no complaints    Mental Status Evaluation:  Appearance:  casually dressed   Behavior:  guarded   Speech:  varies   Mood:  less anxious and depressed   Affect:  blunted and increased in range   Language naming objects and repeating phrases   Thought Process:  concrete   Thought Content:  delusions  persecutory - less apparent    Perceptual Disturbances: Reduction of auditory hallucinations without commands  Denied visual   Does not appear to be internally responding   Risk Potential: Denied SI/HI    Potential for aggression: No   Sensorium:  person and place   Cognition:  grossly intact   Consciousness:  alert and awake    Attention: attention span appeared shorter than expected for age   Insight:  limited   Judgment: limited   Gait/Station: normal gait/station and normal balance   Motor Activity: no abnormal movements     Progress Toward Goals: progressing slowly    Recommended Treatment: Continue with group therapy, milieu therapy and occupational therapy  1   Continue current medications  2  Disposition planning     Risks, benefits and possible side effects of Medications:   Risks, benefits, and possible side effects of medications explained to patient and patient verbalizes understanding        Leandra Torres PA-C

## 2017-10-10 NOTE — CASE MANAGEMENT
CM faxed Pt's prescriptions to [facility] @ [phone], requesting they be filled & delivered to Pt's residence

## 2017-10-10 NOTE — PLAN OF CARE

## 2017-10-10 NOTE — CASE MANAGEMENT
CM met with Pt, who denied any AH  KAPIL asked if Pt had called his mom, and he said no, that he had lost her phone number  CM went with Pt to his room and helped him find the number, and he agreed to call her  CM contacted Pt's ICM through [address], Laurie, @ [phone], and he reported he saw Pt on Friday in the [facility] ED  Laurie said that he knows that Pt goes to [employer] throughout the day & his hygiene has been an issue, and someone spoke to him about it and that was a trigger for him  As far as triggers at home, Pt's younger brother recently moved in, and they have arguments, but it's typical sibling fights  KAPIL reviewed Pt's missed appointments & the need for assistance with transportation  Laurie said that he was scheduled to Pt to his next appointment, and CM reviewed the appointment had been scheduled for today  KAPIL reported she rescheduled it for Monday, and Laurie said that he is booked that entire day & cannot take Pt  Laurie said that he would call & reschedule the appointment to ensure he can take Pt  KAPIL said that it has to be within 7 days of Thursday, to satisfy insurance requirements  Laurie said that he is going to visit Pt tomorrow, around 2:00 PM   CM will meet with him then

## 2017-10-10 NOTE — PROGRESS NOTES
Pt happy on approach  Yelling out,"Hello nurse" from dining room  Superficial in conversation and stating he is well

## 2017-10-10 NOTE — PROGRESS NOTES
Pt remains pleasant and cooperative this evening  Denies SI/HI, AH/VH  States missing Mom and hopeful for discharge soon  Pt walking halls and social with select peers  Scant in conversation  Will continue to monitor

## 2017-10-10 NOTE — DISCHARGE INSTR - APPOINTMENTS
Wednesday, October 18, 2017 @ 1:30 PM: therapy appointment with Guerda Xiong at [facility]  You cannot miss this appointment, as you missed your last two appointments  Please call them at [phone], if for any reason you cannot keep this appointment  Thursday, November 2, 2017 @ 10:30 AM: medication management appointment with Dr Orin Bazan at [facility]

## 2017-10-10 NOTE — DISCHARGE INSTR - OTHER ORDERS
The PEER LINE is a toll-free telephone number for people in [facility] who are seeking a listening ear for additional support in their recovery from mental illness  The PEER LINE is peer-run and peer-friendly  You can call the Peer Line 24 hours a day  Phone: 4-684-TQ-PEERS /([phone])     Emergency [facility] Emergency Services: [phone]  [address]  [place] [address], [address] [place] [address]     Crisis Text Line is free, 24/7 support for those in crisis  Text HOME 639491 from anywhere in the [place] to text with a trained Crisis Counselor  Warm Line: [phone], [phone], [phone]  If it is not quite a crisis, but you want to talk to someone, 24 hours/day, 7 days/week:  Someone to listen; someone who cares

## 2017-10-11 RX ADMIN — TRAZODONE HYDROCHLORIDE 50 MG: 50 TABLET ORAL at 21:10

## 2017-10-11 RX ADMIN — DIVALPROEX SODIUM 1000 MG: 500 TABLET, EXTENDED RELEASE ORAL at 17:08

## 2017-10-11 RX ADMIN — AMLODIPINE BESYLATE 5 MG: 5 TABLET ORAL at 08:50

## 2017-10-11 RX ADMIN — RISPERIDONE 2 MG: 2 TABLET ORAL at 17:09

## 2017-10-11 RX ADMIN — PERMETHRIN: 50 CREAM TOPICAL at 08:50

## 2017-10-11 RX ADMIN — RISPERIDONE 2 MG: 2 TABLET ORAL at 08:50

## 2017-10-11 RX ADMIN — DIVALPROEX SODIUM 500 MG: 500 TABLET, EXTENDED RELEASE ORAL at 08:50

## 2017-10-11 NOTE — PLAN OF CARE
Problem: DISCHARGE PLANNING  Goal: Discharge to home or other facility with appropriate resources  INTERVENTIONS:  - Identify barriers to discharge w/patient and caregiver  - Arrange for needed discharge resources and transportation as appropriate  - Identify discharge learning needs (meds, wound care, etc )  - Arrange for interpretive services to assist at discharge as needed  - Refer to Case Management Department for coordinating discharge planning if the patient needs post-hospital services based on physician/advanced practitioner order or complex needs related to functional status, cognitive ability, or social support system   Outcome: Progressing  Pt is requesting discharge, and plans in place for tomorrow

## 2017-10-11 NOTE — CASE MANAGEMENT
CM met with Pt's ICM, Jahaira Masterson, who said that Pt seemed at baseline  He was able to change Pt's therapy appointment to 10/18 @ 1:30 PM, and he will transport Pt there; he confirmed he can transport Pt to psychiatrist appointment on 11/2 as well  CM inquired if any ICMs ever followed up with IDD services for Pt, after CM got the information from the school district, but Jahaira Masterson said he didn't think so  Jahaira Masterson said he was talking with one of the nurses that come to Pt's home to give his injection, and they talked about Pt needing a group home  CM reported she thought Pt had been in one before, however, kept walking back to his mom's house, and it appears there is some dependency upon Pt's income to support the house as well  CM reviewed Pt's discharge plans for tomorrow and Jahaira Masterson agreeable

## 2017-10-11 NOTE — PROGRESS NOTES
Pt wandering the halls this morning  Pleasant and smiling  Friendly with staff and peers  At RN station frequently but is redirectable  Pt denies SI/HI  Denies all s/s  No agitation/irritability  Compliant with medicines

## 2017-10-11 NOTE — PROGRESS NOTES
Progress Note - Behavioral Health   Jyothi Yu 25 y o  male MRN: [id number]  Unit/Bed#: RUST 254-02 Encounter: [id number]    Assessment/Plan   Principal Problem:    Schizoaffective disorder, bipolar type ([address] )  Active Problems:    Intellectual disability    Impulse control disorder      Subjective:  Patient states he is feeling better  Has reduction of auditory hallucinations without commands  Denied other forms of psychosis  Can get loud and has trouble controlling emotions at times  No signs of agitation or aggression  Mostly answers in yes or no  Does show mild anxiety over when he is going home  Denied depression and SI/HI  Does not endorse criteria for eddie  Prior Depakote level was taken at admission  Will follow up with repeat lab for tomorrow  Is medication compliant  Tolerating medications well without serious side effects  Manual pulse taken was 104 (reported previously as 124)  Denied adverse cardiac symptomatology        Current Medications:  Current Facility-Administered Medications   Medication Dose Route Frequency    acetaminophen (TYLENOL) tablet 650 mg  650 mg Oral Q6H PRN    acetaminophen (TYLENOL) tablet 650 mg  650 mg Oral Q4H PRN    aluminum-magnesium hydroxide-simethicone (MYLANTA) 200-200-20 mg/5 mL oral suspension 30 mL  30 mL Oral Q4H PRN    amLODIPine (NORVASC) tablet 5 mg  5 mg Oral Daily    benztropine (COGENTIN) injection 1 mg  1 mg Intramuscular Q6H PRN    benztropine (COGENTIN) tablet 1 mg  1 mg Oral Q6H PRN    divalproex sodium (DEPAKOTE ER) 24 hr tablet 1,000 mg  1,000 mg Oral QPM    divalproex sodium (DEPAKOTE ER) 24 hr tablet 500 mg  500 mg Oral Daily    haloperidol (HALDOL) tablet 5 mg  5 mg Oral Q6H PRN    haloperidol lactate (HALDOL) injection 5 mg  5 mg Intramuscular Q6H PRN    hydrOXYzine HCL (ATARAX) tablet 50 mg  50 mg Oral Q6H PRN    ibuprofen (MOTRIN) tablet 800 mg  800 mg Oral Q6H PRN    LORazepam (ATIVAN) 2 mg/mL injection 2 mg  2 mg Intramuscular Q6H PRN    LORazepam (ATIVAN) tablet 1 mg  1 mg Oral Q6H PRN    magnesium hydroxide (MILK OF MAGNESIA) 400 mg/5 mL oral suspension 30 mL  30 mL Oral Daily PRN    OLANZapine (ZyPREXA) IM injection 10 mg  10 mg Intramuscular Q3H PRN    OLANZapine (ZyPREXA) tablet 10 mg  10 mg Oral Q3H PRN    permethrin (ELIMITE) 5 % cream   Topical BID    risperiDONE (RisperDAL M-TABS) dispersible tablet 1 mg  1 mg Oral Q3H PRN    risperiDONE (RisperDAL) tablet 2 mg  2 mg Oral After Dinner    risperiDONE (RisperDAL) tablet 2 mg  2 mg Oral Daily    [START ON 10/17/2017] risperiDONE microspheres (RisperDAL CONSTA) injection 37 5 mg  37 5 mg Intramuscular Q14 Days    traZODone (DESYREL) tablet 50 mg  50 mg Oral HS PRN       Behavioral Health Medications: all current active meds have been reviewed and continue current psychiatric medications  Vitals:  Vitals:    10/11/17 0725   BP: 133/89   Pulse: (!) 124   Resp: 16   Temp: (!) 97 3 °F (36 3 °C)   SpO2:        Labs: reviewed    Psychiatric Review of Systems:  Behavior over the last 24 hours:  unchanged  Sleep: normal  Appetite: normal  Medication side effects: No  ROS: no complaints    Mental Status Evaluation:  Appearance:  casually dressed   Behavior:  guarded   Speech:  varies   Mood:  "good"   Affect:  blunted and increased in range   Language naming objects and repeating phrases   Thought Process:  concrete   Thought Content:  delusions  persecutory - diminished   Perceptual Disturbances: Reduced auditory hallucinations of voices without commands  Denied other forms of psychosis   Risk Potential: Denied SI/HI   Potential for aggression: No   Sensorium:  person, place and time/date   Cognition:  grossly intact   Consciousness:  alert and awake    Attention: attention span appeared shorter than expected for age   Insight:  limited   Judgment: limited   Gait/Station: normal gait/station and normal balance   Motor Activity: no abnormal movements     Progress Toward Goals: progressing    Recommended Treatment: Continue with group therapy, milieu therapy and occupational therapy  1   Continue current medications  2  Repeat Depakote level tomorrow  3  Disposition planning with tentative discharge tomorrow      Risks, benefits and possible side effects of Medications:   Risks, benefits, and possible side effects of medications explained to patient and patient verbalizes understanding        Pierre Ayon PA-C

## 2017-10-11 NOTE — CASE MANAGEMENT
CM met with Pt, who said that he "misses home"  CM reviewed that Pt's ICM, Anna Hughes, was going to visit him this afternoon, and Pt smiled  CM reported that discharge is planned for tomorrow & Pt said "yay" and clapped his hands  Pt asked how he would get home, and CM reported she would have to arrange for the [place] [facility], since his mom cannot pick him up  CM asked Pt about [facility], and Pt questioned how CM knew he went there? CM talked to Pt about the importance of getting a shower, putting on deodorant, and brushing his teeth each morning before he goes  Pt said that he does those things, and CM praised him and asked he continue to do so  CM contacted [place] [address] @ [phone] and spoke with Amee Correa, who confirmed prescriptions were received and mailed out  She said they would call Pt's mother, close to 10/17, to arrange to go to the home to administer consta injection  CM contacted Pt's mom, Janna Alexandra, @ [phone], to make her aware of planned discharge for tomorrow; she was agreeable  CM reviewed that Pt's prescriptions were faxed to [place] [address] and would be delivered via mail today or tomorrow  CM informed her that Pt's ICM, Anna Hughes, was to visit this afternoon, and CM would review with him Pt's follow-up appointments, to ensure he could support Pt with getting there  Janna Alexandra had no questions or concerns regarding Pt's discharge  CM faxed transportation request to [address] [place] [address]

## 2017-10-12 VITALS
RESPIRATION RATE: 18 BRPM | TEMPERATURE: 97.8 F | HEIGHT: 67 IN | SYSTOLIC BLOOD PRESSURE: 138 MMHG | HEART RATE: 90 BPM | DIASTOLIC BLOOD PRESSURE: 74 MMHG | OXYGEN SATURATION: 98 % | BODY MASS INDEX: 46.44 KG/M2 | WEIGHT: 295.86 LBS

## 2017-10-12 LAB — VALPROATE SERPL-MCNC: 87 UG/ML (ref 50–100)

## 2017-10-12 PROCEDURE — 80164 ASSAY DIPROPYLACETIC ACD TOT: CPT | Performed by: PHYSICIAN ASSISTANT

## 2017-10-12 RX ADMIN — DIVALPROEX SODIUM 500 MG: 500 TABLET, EXTENDED RELEASE ORAL at 08:09

## 2017-10-12 RX ADMIN — AMLODIPINE BESYLATE 5 MG: 5 TABLET ORAL at 08:09

## 2017-10-12 RX ADMIN — RISPERIDONE 2 MG: 2 TABLET ORAL at 08:09

## 2017-10-12 NOTE — DISCHARGE INSTRUCTIONS
Schizoaffective Disorder   WHAT YOU NEED TO KNOW:   Schizoaffective disorder is a long-term mental illness that may change how you think, feel, and act around others  You may not know what is real and what is not real    DISCHARGE INSTRUCTIONS:   Medicines:   · Antipsychotics: These medicines help decrease psychotic symptoms or severe agitation  You may need antiparkinson medicine to control muscle stiffness, twitches, and restlessness caused by antipsychotic medicines  · Antianxiety medicine: This medicine may be given to decrease anxiety and help you feel calm and relaxed  · Antidepressants: These medicines are given to decrease or stop the symptoms of depression, anxiety, and behavior problems  · Mood stabilizers: These medicines help control mood swings  · Anticonvulsants: This medicine is given to control seizures  It may also be used to decrease violent behavior and control your mood swings  · Blood pressure medicines: These may be used to help decrease motor tics (uncontrolled movements)  They may also help you feel calmer, more focused, and less irritable  · Anticholinergics: This medicine decreases the side effects of other medicines  · Take your medicine as directed  Contact your healthcare provider if you think your medicine is not helping or if you have side effects  Tell him or her if you are allergic to any medicine  Keep a list of the medicines, vitamins, and herbs you take  Include the amounts, and when and why you take them  Bring the list or the pill bottles to follow-up visits  Carry your medicine list with you in case of an emergency  Follow up with your healthcare provider or psychiatrist as directed: You may need to return to have your blood pressure and other symptoms checked  You may need blood tests to check the level of medicine in your blood  Write down your questions so you remember to ask them during your visits  Manage your symptoms:   The following may help you feel better or prevent symptoms of schizoaffective disorder from coming back:  · Find support for yourself and your family:  Talk with others to help you cope with your illness better  This may also help to improve how you relate to others  · Keep all medical appointments: This will help manage your disease and the side-effects from medicines you may be taking  · Use your medicines as directed:  Put your medicines in a pillbox placed in an area you can easily see  Use a watch with an alarm to help you remember when it is time to take your medicine  Tell your healthcare provider if you know or think you might be pregnant  Do not stop taking your medicines without your healthcare provider's okay  A sudden stop can cause serious medical problems  · Watch for early signs of a relapse and seek help immediately:      ¨ How you think, feel, and see things has changed  ¨ You behave differently than usual     ¨ You become more nervous and upset, but do not know why  ¨ You eat less and have trouble sleeping  ¨ You have little or no interest in friends or activities  For support and more information:   · American [employer] Association  [facility], [facility] [address] , [address]  Phone: [phone]  Phone: [phone]  Web Address: [employer] com br  org  · [address] [place] [address], Public Information & Communication Branch  [address] [place] [address], [address] [facility], [address]  [place]ela [place] [address]   Phone: [phone]  Phone: [phone]  Web Address: Savanah mancuso  Contact your healthcare provider or psychiatrist if:   · You think you are having a relapse  · You are having side effects from your medicine, or they are not helping  · You are not sleeping well or are sleeping more than usual     · You cannot eat or are eating more than usual     · You have muscle spasms, stiffness, or trouble walking      · Your sad feelings or thoughts change the way you function during the day  · You have questions or concerns about your condition or care  Seek care immediately or call 911 if:   · You feel like hurting or killing yourself or others  · You feel that your condition is getting worse  · You feel very upset, threaten someone, or you feel violent  · You suddenly have changes in your vision  · You suddenly have chest pain, trouble breathing, or a fever  © 2017 [address]  Information is for End User's use only and may not be sold, redistributed or otherwise used for commercial purposes  All illustrations and images included in CareNotes® are the copyrighted property of A D A M , Inc  or Remi Norman  The above information is an  only  It is not intended as medical advice for individual conditions or treatments  Talk to your doctor, nurse or pharmacist before following any medical regimen to see if it is safe and effective for you

## 2017-10-12 NOTE — CASE MANAGEMENT
CM met with Pt, who verbalized readiness for discharge; he was waiting for CM outside of the treatment team meeting room  Pt is excited to go home, and said that he misses his mom  CM reviewed with him that Ondina Ramirez would be coming to his home on Tuesday to give him his injection and on Wednesday, Ria Peng would take him to his therapy appointment; Pt verbalized agreement  Pt had no questions and was just anxious to get leaving  CM contacted Pt's ICM, Ria Peng, @ [phone] to confirm Pt's discharge for today  CM inquired if Ria Peng was aware of who Pt's PCP was, as he was the one that took Pt to his appointments? Ria Peng said that he didn't think Pt currently had one, and he would work with Pt next week to find a provider & schedule an appointment

## 2017-10-12 NOTE — DISCHARGE SUMMARY
seen in [facility] interacting appropriately with peers  He did not demonstrate dangerous behavior to self or others during his inpatient stay  On day of discharge patient denied depression, denied anxiety, denied psychosis, did not show signs of eddie, and denied suicidal/homicidal ideations  Mental Status at time of Discharge:     Appearance:  casually dressed   Behavior:  cooperative   Speech:  varies   Mood:  euthymic   Affect:  mood-congruent   Thought Process:  concrete   Thought Content:  normal  Denied delusions/obsessions   Perceptual Disturbances: None   Risk Potential: Denied SI/HI  Potential for aggression: NO   Sensorium:  person, place and time/date   Cognition:  grossly intact   Consciousness:  alert and awake    Attention: attention span appeared shorter than expected for age   Insight:  limited   Judgment: partial   Gait/Station: normal gait/station and normal balance   Motor Activity: no abnormal movements     Discharge Diagnosis:   Schizoaffective disorder, bipolar type  Impulse control disorder  Intellectual disability    Discharge Medications:  See after visit summary for reconciled discharge medications provided to patient and family  Discharge instructions/Information to patient and family:   See after visit summary for information provided to patient and family  Provisions for Follow-Up Care:  See after visit summary for information related to follow-up care and any pertinent home health orders  Discharge Statement   I spent 32 minutes discharging the patient  This time was spent on the day of discharge  I had direct contact with the patient on the day of discharge  On day of discharge patient had mental status exam performed, discharge instructions and medications reviewed, and outpatient planning discussed  Patient had 1 month of scripts faxed to pharmacy  He did not require tobacco cessation therapy      Leola Lopez PA-C

## 2017-10-12 NOTE — PROGRESS NOTES
Awake and frequently at nurses station stating he is leaving today  Excited for discharge, rapidly clapping his hands and smiling  States he will not fight with mom upon discharge  Discussed compliance with medications and OP appointments

## 2017-10-19 ENCOUNTER — HOSPITAL ENCOUNTER (EMERGENCY)
Facility: HOSPITAL | Age: 22
Discharge: HOME | End: 2017-10-19
Attending: EMERGENCY MEDICINE | Admitting: EMERGENCY MEDICINE
Payer: MEDICARE

## 2017-10-19 VITALS
DIASTOLIC BLOOD PRESSURE: 86 MMHG | TEMPERATURE: 97.9 F | HEIGHT: 67 IN | HEART RATE: 122 BPM | WEIGHT: 180 LBS | BODY MASS INDEX: 28.25 KG/M2 | RESPIRATION RATE: 18 BRPM | OXYGEN SATURATION: 98 % | SYSTOLIC BLOOD PRESSURE: 150 MMHG

## 2017-10-19 DIAGNOSIS — R45.850 HOMICIDAL IDEATIONS: ICD-10-CM

## 2017-10-19 DIAGNOSIS — R44.0 AUDITORY HALLUCINATIONS: Primary | ICD-10-CM

## 2017-10-19 PROCEDURE — 99284 EMERGENCY DEPT VISIT MOD MDM: CPT

## 2017-10-19 PROCEDURE — 82075 ASSAY OF BREATH ETHANOL: CPT

## 2017-10-19 PROCEDURE — 80307 DRUG TEST PRSMV CHEM ANLYZR: CPT

## 2017-10-19 NOTE — ED NOTES
As per Harman Betancourt at [facility], pt was accepted by Dr Lai Freeman   Pts [facility] Allegra Kay [phone] can transport pt at  but is trying to find a coworker to pick him up sooner

## 2017-10-19 NOTE — ED PROVIDER NOTES
History  Chief Complaint   Patient presents with    Psychiatric Evaluation     Pt states he is hearing voices to hurt his mother and himself       21-year-old male presents emergency department with a chief complaint of voices telling him that he should kill his mother  He sees has a history of psychiatric illness and has been recently discharged from an inpatient hospital stay  Than his auditory hallucinations  He denies any suicidal ideation  Prior to Admission Medications   Prescriptions Last Dose Informant Patient Reported? Taking?    amLODIPine (NORVASC) 5 mg tablet   No Yes   Sig: Take 1 tablet by mouth daily At 9AM   divalproex sodium (DEPAKOTE ER) 500 mg 24 hr tablet   No Yes   Sig: Take 1 tab (500mg) in morning (9AM), then take 2 tabs (1000mg) in evening (6PM) by mouth daily   risperiDONE (RisperDAL) 2 mg tablet   No Yes   Sig: Take 1 tablet by mouth 2 (two) times a day At 9AM and 6PM   risperiDONE microspheres (RisperDAL CONSTA) 37 5 MG injection   No Yes   Sig: Inject 2 mL into the shoulder, thigh, or buttocks every 14 (fourteen) days Next injection due 10/17/17      Facility-Administered Medications: None       Past Medical History:   Diagnosis Date    Anxiety     Asthma     Hypertension     Mental retardation     Schizoaffective disorder ([facility] [address] )        Past Surgical History:   Procedure Laterality Date    HAND SURGERY      right hand       Family History   Problem Relation Age of Onset    No Known Problems Mother     No Known Problems Father     No Known Problems Sister     No Known Problems Brother     No Known Problems Maternal Aunt     No Known Problems Paternal Aunt     No Known Problems Maternal Uncle     No Known Problems Paternal Uncle     No Known Problems Maternal Grandfather     No Known Problems Maternal Grandmother     No Known Problems Paternal Grandfather     No Known Problems Paternal Grandmother     No Known Problems Cousin     ADD / ADHD Neg Hx     Alcohol abuse Neg Hx     Anxiety disorder Neg Hx     Bipolar disorder Neg Hx     Dementia Neg Hx     Depression Neg Hx     Drug abuse Neg Hx     OCD Neg Hx     Paranoid behavior Neg Hx     Schizophrenia Neg Hx     Seizures Neg Hx     Self-Injurious Behavior  Neg Hx     Suicide Attempts Neg Hx      I have reviewed and agree with the history as documented  Social History   Substance Use Topics    Smoking status: Never Smoker    Smokeless tobacco: Never Used      Comment: non-smoker    Alcohol use No        Review of Systems   Constitutional: Negative  HENT: Negative  Eyes: Negative  Respiratory: Negative  Cardiovascular: Negative  Gastrointestinal: Negative  Genitourinary: Negative  Musculoskeletal: Negative  Skin: Negative  Neurological: Negative  Hematological: Negative  Psychiatric/Behavioral: Positive for hallucinations (Auditory hallucinations, hearing voices telling him to kill is mother)  All other systems reviewed and are negative  Physical Exam  ED Triage Vitals [10/19/17 0910]   Temperature Pulse Respirations Blood Pressure SpO2   97 9 °F (36 6 °C) (!) 122 18 150/86 98 %      Temp Source Heart Rate Source Patient Position - Orthostatic VS BP Location FiO2 (%)   Oral Monitor Sitting Left arm --      Pain Score       No Pain           Physical Exam   Constitutional: He is oriented to person, place, and time  He appears well-developed and well-nourished  HENT:   Head: Normocephalic and atraumatic  Nose: Nose normal    Eyes: Conjunctivae and EOM are normal  Pupils are equal, round, and reactive to light  Neck: Normal range of motion  Neck supple  Cardiovascular: Tachycardia present  Pulmonary/Chest: Effort normal    Abdominal: Soft  There is no tenderness  Musculoskeletal: Normal range of motion  Neurological: He is alert and oriented to person, place, and time  Skin: Skin is warm and dry  Nursing note and vitals reviewed        ED Medications  Medications - No data to display    Diagnostic Studies  Labs Reviewed   RAPID DRUG SCREEN, URINE - Normal       Result Value Ref Range Status    Amph/Meth UR Negative  Negative Final    Barbiturate Ur Negative  Negative Final    Benzodiazepine Urine Negative  Negative Final    Cocaine Urine Negative  Negative Final    Methadone Urine Negative  Negative Final    Opiate Urine Negative  Negative Final    PCP Ur Negative  Negative Final    THC Urine Negative  Negative Final    Narrative:     FOR MEDICAL PURPOSES ONLY  IF CONFIRMATION NEEDED PLEASE CONTACT THE LAB WITHIN 5 DAYS  Drug Screen Cutoff Levels:  AMPHETAMINE/METHAMPHETAMINES  1000 ng/mL  BARBITURATES     200 ng/mL  BENZODIAZEPINES     200 ng/mL  COCAINE      300 ng/mL  METHADONE      300 ng/mL  OPIATES      300 ng/mL  PHENCYCLIDINE     25 ng/mL  THC       50 ng/mL   POCT ALCOHOL BREATH TEST - Normal    EXTBreath Alcohol 0 00   Final       No orders to display       Procedures  Procedures      Phone Contacts  ED Phone Contact    ED Course  ED Course as of Oct 19 1551   u Oct 19, 2017   1101   Patient being assessed by crisis  Placement  pending          Patient remained stable throughout his course of stay  He was ultimately placed in a residential treatment facility  He had been in a inpatient psychiatric facility 8 times in the past several months and residential will possibly provide him with longer term treatment  MDM  Number of Diagnoses or Management Options  Auditory hallucinations:   Homicidal ideations:   Diagnosis management comments: Will discuss with crisis team regarding possibility for replacement back in to facility  1:32 PM  Patient accepted by Dr Tyson Dior into residential treatment facility [facility]       Amount and/or Complexity of Data Reviewed  Clinical lab tests: ordered and reviewed      CritCare Time    Disposition  Final diagnoses:    Auditory hallucinations   Homicidal ideations ED Disposition     ED Disposition Condition Comment    Discharge  Anthony Acevedo discharge to [facility] facility   Condition at discharge: Stable        MD Documentation    Lilliana Maikel Most Recent Value   Accepting Physician  Dr Mercedes Sierra NameTana   Sending MD Vazquez      RN Documentation    [address] Name, Tana Fitch      Follow-up Information    None       Patient's Medications   Discharge Prescriptions    No medications on file     No discharge procedures on file      ED Provider  Electronically Signed by       Yesenia Ayala, DO  10/19/17 [address],   10/19/17 1229

## 2017-10-19 NOTE — ED NOTES
Pt has medicare A&B as primary  [employer] [facility] is secondary  However, Nathanel Cost will be primary for for Crisis Res funding   Spoke with Delgado Garcia at [facility] who gave a 5 day auth for Crisis Residential  Accepting facility will need to call for auth number upon admission

## 2017-10-19 NOTE — DISCHARGE INSTRUCTIONS
Hallucinations   WHAT YOU NEED TO KNOW:   Hallucinations are things you see, hear, feel, taste, or smell that seem real but are not  Some hallucinations are temporary  Hallucinations that continue, interfere with daily activities, or worsen may be a sign of a serious medical or mental condition that needs treatment  DISCHARGE INSTRUCTIONS:   Call 911 if you or someone else notices any of the following:   · You want to harm yourself or someone else  · You hear voices telling you to harm yourself or someone else  · You have a seizure  · You are confused, do not know where you are, or are not making sense when you speak  Return to the emergency department if:   · Your hallucinations get worse  · You vomit several times in a row  · Your heartbeat or breathing is faster or slower than usual      · You have trouble breathing or shortness of breath  Contact your healthcare provider if:   · You have new hallucinations  · You have questions or concerns about your condition or care  Medicines:   · Medicines  may be given to stop the hallucinations, reduce anxiety, or relax your muscles  · Take your medicine as directed  Contact your healthcare provider if you think your medicine is not helping or if you have side effects  Tell him or her if you are allergic to any medicine  Keep a list of the medicines, vitamins, and herbs you take  Include the amounts, and when and why you take them  Bring the list or the pill bottles to follow-up visits  Carry your medicine list with you in case of an emergency  Follow up with your healthcare provider as directed:  Write down your questions so you remember to ask them during your visits  © 2017 [address] Information is for End User's use only and may not be sold, redistributed or otherwise used for commercial purposes   All illustrations and images included in CareNotes® are the copyrighted property of A D A M , Inc  or [employer]  The above information is an  only  It is not intended as medical advice for individual conditions or treatments  Talk to your doctor, nurse or pharmacist before following any medical regimen to see if it is safe and effective for you  Psychiatric Hallucinations   WHAT YOU NEED TO KNOW:   What are psychiatric hallucinations? Hallucinations are things a person sees, hears, feels, tastes, or smells that seem real but are not  Psychiatric hallucinations are caused by a mental condition such as schizophrenia  As hallucinations worsen, the person may have mood swings or depression  He may develop rapid speech or trouble speaking clearly  His thoughts may ramble, or he may be restless  He may not know where he is or people who should be familiar to him  What are the types of hallucinations? · Auditory  means hearing things, such as music, buzzing, or ringing  The person may hear voices even though no one else is in the room  The voices may say negative things about the person or tell him to harm himself or others  He may try to respond to the voices, or he may feel he has to follow their commands  · Visual  means seeing things, such as a person or object that is not real  The person may also see an object that is real but that looks different to him than it does to others  · Tactile  means feeling things, such as an object that is not real  The person may feel like something is touching him or is crawling on or in his skin  He may also feel that his body is being cut or torn  He may feel like something is in a body part, such as his stomach, even though tests show nothing is there  · Olfactory  means smelling something that is not real  The smell may make the person gag or choke if it is not pleasant  He may smell something good, such as food or flowers  · Gustatory  means tasting things that are not real  The person may taste something even when his mouth is empty   His food may taste rotten or sour to him even though others eating the same food think it tastes fine  How is the cause of psychiatric hallucinations diagnosed? The person's healthcare provider will look for signs of psychosis (false beliefs)  Psychosis means the person may not be able to know what is real  The person may need blood or urine tests, x-rays, or other tests to check for medical problems that can cause hallucinations  How are psychiatric hallucinations treated? · Medicines  may be given to stop the hallucinations, reduce anxiety, or relax the person's muscles  · Cognitive behavior therapy (CBT)  is used to help the person manage hallucinations  He may be taught to ignore voices  CBT will not make the hallucinations stop, but it can help the person manage the hallucinations  Call 911 for any of the following:   · The person says he wants to harm himself or someone else  · The person seems to hear or says he hears voices telling to harm himself or someone else  · The person has a seizure  When should I seek immediate care? · The person is confused, does not know where he is, or is not making sense when he speaks  · The person's hallucinations worsen or return after treatment  · The person vomits several times in a row  · The person's heartbeat or breathing is faster or slower than usual      · The person has trouble breathing or shortness of breath  When should I contact the person's healthcare provider? · The person has new hallucinations  · You have questions or concerns about the person's condition or care  CARE AGREEMENT:   You have the right to help plan your care  Learn about your health condition and how it may be treated  Discuss treatment options with your caregivers to decide what care you want to receive  You always have the right to refuse treatment  The above information is an  only   It is not intended as medical advice for individual conditions or treatments  Talk to your doctor, nurse or pharmacist before following any medical regimen to see if it is safe and effective for you  © 2017 [address] [employer] Information is for End User's use only and may not be sold, redistributed or otherwise used for commercial purposes  All illustrations and images included in CareNotes® are the copyrighted property of [employer]  or [address]

## 2017-10-19 NOTE — ED NOTES
Pt states he hears voices to kill his mother, which is his baseline for ed visits  Pt denies si or hi towards anyone else  Was recently d/c from [facility]  Pt has hx of schizoaffective d/o and mild MR  Cooperative and pleasant  Pt is willing to try crisis res  Spoke with Danni Fink and there is a potential bed available   Will fax clinicals

## 2017-11-30 ENCOUNTER — HOSPITAL ENCOUNTER (EMERGENCY)
Facility: HOSPITAL | Age: 22
Discharge: HOME | End: 2017-11-30
Attending: EMERGENCY MEDICINE | Admitting: EMERGENCY MEDICINE
Payer: MEDICARE

## 2017-11-30 VITALS
SYSTOLIC BLOOD PRESSURE: 138 MMHG | WEIGHT: 180 LBS | OXYGEN SATURATION: 97 % | DIASTOLIC BLOOD PRESSURE: 60 MMHG | RESPIRATION RATE: 18 BRPM | BODY MASS INDEX: 28.25 KG/M2 | TEMPERATURE: 97.8 F | HEART RATE: 94 BPM | HEIGHT: 67 IN

## 2017-11-30 DIAGNOSIS — R44.0 AUDITORY HALLUCINATION: Primary | ICD-10-CM

## 2017-11-30 LAB
AMPHETAMINES SERPL QL SCN: NEGATIVE
BARBITURATES UR QL: NEGATIVE
BENZODIAZ UR QL: NEGATIVE
COCAINE UR QL: NEGATIVE
ETHANOL EXG-MCNC: 0 MG/DL
ETHANOL EXG-MCNC: 0 MG/DL
METHADONE UR QL: NEGATIVE
OPIATES UR QL SCN: NEGATIVE
PCP UR QL: NEGATIVE
THC UR QL: NEGATIVE

## 2017-11-30 PROCEDURE — 80307 DRUG TEST PRSMV CHEM ANLYZR: CPT | Performed by: EMERGENCY MEDICINE

## 2017-11-30 PROCEDURE — 82075 ASSAY OF BREATH ETHANOL: CPT | Performed by: EMERGENCY MEDICINE

## 2017-11-30 PROCEDURE — 99284 EMERGENCY DEPT VISIT MOD MDM: CPT

## 2017-12-01 NOTE — ED NOTES
Called pts mother to arrange transport for pt to go home   Mother said that she doesn't drive and no one in the family can come get him because they dont drive     Jennifer Montoya RN  11/30/17 1348

## 2017-12-01 NOTE — ED NOTES
Pt stating that he is having command auditory hallucinations telling him to stab his mother  Pt requests to be put in a group home  Pt cooperative at this time   Pt denies [id number] [facility] [address] Gus Camacho RN  11/30/17 4048

## 2017-12-01 NOTE — ED ATTENDING ATTESTATION
Lane Paige DO, saw and evaluated the patient  I have discussed the patient with the resident/non-physician practitioner and agree with the resident's/non-physician practitioner's findings, Plan of Care, and MDM as documented in the resident's/non-physician practitioner's note, except where noted  All available labs and Radiology studies were reviewed  At this point I agree with the current assessment done in the Emergency Department  I have conducted an independent evaluation of this patient a history and physical is as follows:      Patient returns to the emergency department with a continued auditory hallucinations telling him to kill his mother  He has had multiple recent hospitalizations for similar auditory hallucinations  He has a h/o bipolar disorder and intellectual disability but is deemed competent to decide on his care  He denies SI       ROS: Denies f/c, HA, CP, SOB, abdominal pain, n/v/d  12 system ROS o/w negative  PE: NAD, appears comfortable, alert, cooperative; PERRL, EOMI; MMM, no posterior oropharyngeal exudate, edema or erythema; HRR, no murmur; lungs CTA w/o w/r/r, POx 97% on RA (nl); abdomen s/nt/nd, nl BS in all 4 quadrant; (-) LE edema or calf TTP, FROM extremities x4; skin p/w/d; CN II-XII GI/NF, oriented; Psych flat affect, good eye contact, poor insight  DDx: Command hallucinations with HI, no SI  A/P: Will check BAT, UDS, consult crisis for 201 admission      Critical Care Time  CritCare Time
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
## 2017-12-01 NOTE — DISCHARGE INSTRUCTIONS
You were seen in the emergency department for hallucinations  You were evaluated by our crisis worker, who agreed that you are safe to go home  Follow up with your psychiatrist and primary care doctor for continued management of your symptoms  Please return to the emergency department if he feels these symptoms worsen or if he developed thoughts of hurting herself

## 2017-12-02 ENCOUNTER — HOSPITAL ENCOUNTER (EMERGENCY)
Facility: HOSPITAL | Age: 22
Discharge: HOME | End: 2017-12-02
Attending: EMERGENCY MEDICINE
Payer: MEDICARE

## 2017-12-02 VITALS
WEIGHT: 200 LBS | OXYGEN SATURATION: 99 % | SYSTOLIC BLOOD PRESSURE: 146 MMHG | DIASTOLIC BLOOD PRESSURE: 90 MMHG | HEART RATE: 110 BPM | TEMPERATURE: 98.2 F | BODY MASS INDEX: 31.32 KG/M2 | RESPIRATION RATE: 18 BRPM

## 2017-12-02 DIAGNOSIS — F25.9 SCHIZOAFFECTIVE DISORDER (HCC): Primary | ICD-10-CM

## 2017-12-02 DIAGNOSIS — R44.0 AUDITORY HALLUCINATIONS: ICD-10-CM

## 2017-12-02 PROCEDURE — 99284 EMERGENCY DEPT VISIT MOD MDM: CPT

## 2017-12-02 RX ORDER — DIVALPROEX SODIUM 250 MG/1
250 TABLET, DELAYED RELEASE ORAL
COMMUNITY
End: 2018-01-17 | Stop reason: HOSPADM
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
## 2017-12-03 NOTE — ED NOTES
Urine sample provided  Belongings in [address] [facility] room   Shorts, shirt, socks, sneakers, and bag of meds     Lauren Payor  12/02/17 2086

## 2017-12-03 NOTE — DISCHARGE INSTRUCTIONS
You were seen in the ER for hallucinations and thoughts of killing yourself and your mother  We think it is safe for you to go home  Please see your psychiatrist as soon as possible  Please return to the ER if your symptoms worsen  Schizoaffective Disorder   WHAT YOU NEED TO KNOW:   Schizoaffective disorder is a long-term mental illness that may change how you think, feel, and act around others  You may not know what is real and what is not real    DISCHARGE INSTRUCTIONS:   Medicines:   · Antipsychotics: These medicines help decrease psychotic symptoms or severe agitation  You may need antiparkinson medicine to control muscle stiffness, twitches, and restlessness caused by antipsychotic medicines  · Antianxiety medicine: This medicine may be given to decrease anxiety and help you feel calm and relaxed  · Antidepressants: These medicines are given to decrease or stop the symptoms of depression, anxiety, and behavior problems  · Mood stabilizers: These medicines help control mood swings  · Anticonvulsants: This medicine is given to control seizures  It may also be used to decrease violent behavior and control your mood swings  · Blood pressure medicines: These may be used to help decrease motor tics (uncontrolled movements)  They may also help you feel calmer, more focused, and less irritable  · Anticholinergics: This medicine may be given to decrease the side effects of other needed medicines  · Take your medicine as directed  Contact your healthcare provider if you think your medicine is not helping or if you have side effects  Tell him of her if you are allergic to any medicine  Keep a list of the medicines, vitamins, and herbs you take  Include the amounts, and when and why you take them  Bring the list or the pill bottles to follow-up visits  Carry your medicine list with you in case of an emergency  Manage your symptoms:   The following may help you feel better or prevent symptoms of schizoaffective disorder from coming back:  · Find support for yourself and your family:  Talk with others to help you cope with your illness better  This may also help to improve how you relate to others  · Keep all medical appointments: This will help manage your disease and the side-effects from medicines you may be taking  · Use your medicines as directed:  Put your medicines in a pillbox placed in an area you can easily see  Use a watch with an alarm to help you remember when it is time to take your medicine  Tell your healthcare provider if you know or think you might be pregnant  Do not stop taking your medicines without your healthcare provider's okay  A sudden stop can cause serious medical problems  · Watch for early signs of a relapse and seek help immediately:      ¨ How you think, feel, and see things has changed  ¨ You behave differently than usual     ¨ You become more nervous and upset, but do not know why  ¨ You eat less and have trouble sleeping  ¨ You have little or no interest in friends or activities  Contact your healthcare provider or psychiatrist if:   · You think you are having a relapse  · You are having side effects from your medicine, or they are not helping  · You are not sleeping well or are sleeping more than usual     · You cannot eat or are eating more than usual     · You have muscle spasms, stiffness, or trouble walking  · Your sad feelings or thoughts change the way you function during the day  · You have questions or concerns about your condition or care  Return to the emergency department if:   · You feel like hurting or killing yourself or others  · You feel that your condition is getting worse  · You feel very upset, threaten someone, or you feel violent  · You suddenly have changes in your vision  · You suddenly have chest pain, trouble breathing, or a fever    © 2017 [address] Information is for End User's use only and may not be sold, redistributed or otherwise used for commercial purposes  All illustrations and images included in CareNotes® are the copyrighted property of A D [employer] M [employer] Inc  or [employer]  The above information is an  only  It is not intended as medical advice for individual conditions or treatments  Talk to your doctor, nurse or pharmacist before following any medical regimen to see if it is safe and effective for you

## 2017-12-03 NOTE — ED NOTES
Pt given paper scrubs and urine cup  Pt understood to change and use the cup without issues        Анна Sessions  12/02/17 5981
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
## 2018-01-12 ENCOUNTER — HOSPITAL ENCOUNTER (INPATIENT)
Facility: HOSPITAL | Age: 23
LOS: 5 days | Discharge: HOME | DRG: 885 | End: 2018-01-17
Attending: EMERGENCY MEDICINE | Admitting: PSYCHIATRY & NEUROLOGY
Payer: MEDICARE

## 2018-01-12 ENCOUNTER — APPOINTMENT (INPATIENT)
Dept: RADIOLOGY | Facility: HOSPITAL | Age: 23
DRG: 885 | End: 2018-01-12
Payer: MEDICARE

## 2018-01-12 DIAGNOSIS — J45.909 ASTHMA: ICD-10-CM

## 2018-01-12 DIAGNOSIS — R45.851 SUICIDAL IDEATION: ICD-10-CM

## 2018-01-12 DIAGNOSIS — J45.909 UNCOMPLICATED ASTHMA, UNSPECIFIED ASTHMA SEVERITY, UNSPECIFIED WHETHER PERSISTENT: ICD-10-CM

## 2018-01-12 DIAGNOSIS — I10 HTN (HYPERTENSION): ICD-10-CM

## 2018-01-12 DIAGNOSIS — T78.40XS ALLERGIC STATE, SEQUELA: ICD-10-CM

## 2018-01-12 DIAGNOSIS — F25.0 SCHIZOAFFECTIVE DISORDER, BIPOLAR TYPE (HCC): Primary | Chronic | ICD-10-CM

## 2018-01-12 DIAGNOSIS — R00.0 TACHYCARDIA WITH HEART RATE 121-140 BEATS PER MINUTE: ICD-10-CM

## 2018-01-12 DIAGNOSIS — R44.0 AUDITORY HALLUCINATIONS: ICD-10-CM

## 2018-01-12 DIAGNOSIS — I10 HYPERTENSION: ICD-10-CM

## 2018-01-12 PROBLEM — R50.9 FEVER: Status: ACTIVE | Noted: 2018-01-12

## 2018-01-12 PROBLEM — E87.6 HYPOKALEMIA: Status: ACTIVE | Noted: 2018-01-12

## 2018-01-12 LAB
ALBUMIN SERPL BCP-MCNC: 3.5 G/DL (ref 3.5–5)
ALP SERPL-CCNC: 67 U/L (ref 46–116)
ALT SERPL W P-5'-P-CCNC: 53 U/L (ref 12–78)
AMPHETAMINES SERPL QL SCN: NEGATIVE
ANION GAP SERPL CALCULATED.3IONS-SCNC: 9 MMOL/L (ref 4–13)
AST SERPL W P-5'-P-CCNC: 24 U/L (ref 5–45)
ATRIAL RATE: 109 BPM
BACTERIA UR QL AUTO: ABNORMAL /HPF
BARBITURATES UR QL: NEGATIVE
BASOPHILS # BLD AUTO: 0.01 THOUSANDS/ΜL (ref 0–0.1)
BASOPHILS NFR BLD AUTO: 0 % (ref 0–1)
BENZODIAZ UR QL: NEGATIVE
BILIRUB SERPL-MCNC: 0.58 MG/DL (ref 0.2–1)
BILIRUB UR QL STRIP: ABNORMAL
BUN SERPL-MCNC: 7 MG/DL (ref 5–25)
CALCIUM SERPL-MCNC: 8.6 MG/DL (ref 8.3–10.1)
CHLORIDE SERPL-SCNC: 105 MMOL/L (ref 100–108)
CLARITY UR: ABNORMAL
CO2 SERPL-SCNC: 24 MMOL/L (ref 21–32)
COCAINE UR QL: NEGATIVE
COLOR UR: ABNORMAL
CREAT SERPL-MCNC: 0.63 MG/DL (ref 0.6–1.3)
EOSINOPHIL # BLD AUTO: 0.12 THOUSAND/ΜL (ref 0–0.61)
EOSINOPHIL NFR BLD AUTO: 1 % (ref 0–6)
ERYTHROCYTE [DISTWIDTH] IN BLOOD BY AUTOMATED COUNT: 12.4 % (ref 11.6–15.1)
ETHANOL EXG-MCNC: 0 MG/DL
GFR SERPL CREATININE-BSD FRML MDRD: 140 ML/MIN/1.73SQ M
GLUCOSE SERPL-MCNC: 105 MG/DL (ref 65–140)
GLUCOSE UR STRIP-MCNC: NEGATIVE MG/DL
HCT VFR BLD AUTO: 38 % (ref 36.5–49.3)
HGB BLD-MCNC: 13.5 G/DL (ref 12–17)
HGB UR QL STRIP.AUTO: NEGATIVE
HYALINE CASTS #/AREA URNS LPF: ABNORMAL /LPF
KETONES UR STRIP-MCNC: ABNORMAL MG/DL
LEUKOCYTE ESTERASE UR QL STRIP: NEGATIVE
LYMPHOCYTES # BLD AUTO: 2.06 THOUSANDS/ΜL (ref 0.6–4.47)
LYMPHOCYTES NFR BLD AUTO: 21 % (ref 14–44)
MCH RBC QN AUTO: 30.4 PG (ref 26.8–34.3)
MCHC RBC AUTO-ENTMCNC: 35.5 G/DL (ref 31.4–37.4)
MCV RBC AUTO: 86 FL (ref 82–98)
METHADONE UR QL: NEGATIVE
MONOCYTES # BLD AUTO: 1.47 THOUSAND/ΜL (ref 0.17–1.22)
MONOCYTES NFR BLD AUTO: 15 % (ref 4–12)
NEUTROPHILS # BLD AUTO: 6.13 THOUSANDS/ΜL (ref 1.85–7.62)
NEUTS SEG NFR BLD AUTO: 63 % (ref 43–75)
NITRITE UR QL STRIP: NEGATIVE
NON-SQ EPI CELLS URNS QL MICRO: ABNORMAL /HPF
NRBC BLD AUTO-RTO: 0 /100 WBCS
OPIATES UR QL SCN: NEGATIVE
P AXIS: 13 DEGREES
PCP UR QL: NEGATIVE
PH UR STRIP.AUTO: 6 [PH] (ref 4.5–8)
PLATELET # BLD AUTO: 185 THOUSANDS/UL (ref 149–390)
PMV BLD AUTO: 8.6 FL (ref 8.9–12.7)
POTASSIUM SERPL-SCNC: 3.4 MMOL/L (ref 3.5–5.3)
PR INTERVAL: 148 MS
PROT SERPL-MCNC: 7.8 G/DL (ref 6.4–8.2)
PROT UR STRIP-MCNC: ABNORMAL MG/DL
QRS AXIS: 17 DEGREES
QRSD INTERVAL: 92 MS
QT INTERVAL: 350 MS
QTC INTERVAL: 471 MS
RBC # BLD AUTO: 4.44 MILLION/UL (ref 3.88–5.62)
RBC #/AREA URNS AUTO: ABNORMAL /HPF
SODIUM SERPL-SCNC: 138 MMOL/L (ref 136–145)
SP GR UR STRIP.AUTO: 1.02 (ref 1–1.03)
T WAVE AXIS: 4 DEGREES
THC UR QL: NEGATIVE
UROBILINOGEN UR QL STRIP.AUTO: 1 E.U./DL
VENTRICULAR RATE: 109 BPM
WBC # BLD AUTO: 9.85 THOUSAND/UL (ref 4.31–10.16)
WBC #/AREA URNS AUTO: ABNORMAL /HPF

## 2018-01-12 PROCEDURE — 93005 ELECTROCARDIOGRAM TRACING: CPT | Performed by: EMERGENCY MEDICINE

## 2018-01-12 PROCEDURE — 87798 DETECT AGENT NOS DNA AMP: CPT | Performed by: NURSE PRACTITIONER

## 2018-01-12 PROCEDURE — 84443 ASSAY THYROID STIM HORMONE: CPT | Performed by: PSYCHIATRY & NEUROLOGY

## 2018-01-12 PROCEDURE — 80307 DRUG TEST PRSMV CHEM ANLYZR: CPT | Performed by: EMERGENCY MEDICINE

## 2018-01-12 PROCEDURE — 96361 HYDRATE IV INFUSION ADD-ON: CPT

## 2018-01-12 PROCEDURE — 83735 ASSAY OF MAGNESIUM: CPT | Performed by: PSYCHIATRY & NEUROLOGY

## 2018-01-12 PROCEDURE — 82075 ASSAY OF BREATH ETHANOL: CPT | Performed by: EMERGENCY MEDICINE

## 2018-01-12 PROCEDURE — 93005 ELECTROCARDIOGRAM TRACING: CPT

## 2018-01-12 PROCEDURE — 96360 HYDRATION IV INFUSION INIT: CPT

## 2018-01-12 PROCEDURE — 99285 EMERGENCY DEPT VISIT HI MDM: CPT

## 2018-01-12 PROCEDURE — 71046 X-RAY EXAM CHEST 2 VIEWS: CPT

## 2018-01-12 PROCEDURE — 36415 COLL VENOUS BLD VENIPUNCTURE: CPT | Performed by: EMERGENCY MEDICINE

## 2018-01-12 PROCEDURE — 81001 URINALYSIS AUTO W/SCOPE: CPT | Performed by: PSYCHIATRY & NEUROLOGY

## 2018-01-12 PROCEDURE — 80053 COMPREHEN METABOLIC PANEL: CPT | Performed by: EMERGENCY MEDICINE

## 2018-01-12 PROCEDURE — 85025 COMPLETE CBC W/AUTO DIFF WBC: CPT | Performed by: EMERGENCY MEDICINE

## 2018-01-12 RX ORDER — DIVALPROEX SODIUM 500 MG/1
500 TABLET, DELAYED RELEASE ORAL DAILY
Status: DISCONTINUED | OUTPATIENT
Start: 2018-01-12 | End: 2018-01-14

## 2018-01-12 RX ORDER — IBUPROFEN 600 MG/1
600 TABLET ORAL EVERY 8 HOURS PRN
Status: DISCONTINUED | OUTPATIENT
Start: 2018-01-12 | End: 2018-01-17 | Stop reason: HOSPADM

## 2018-01-12 RX ORDER — BENZTROPINE MESYLATE 1 MG/1
1 TABLET ORAL EVERY 6 HOURS PRN
Status: DISCONTINUED | OUTPATIENT
Start: 2018-01-12 | End: 2018-01-17 | Stop reason: HOSPADM

## 2018-01-12 RX ORDER — OLANZAPINE 10 MG/1
10 INJECTION, POWDER, LYOPHILIZED, FOR SOLUTION INTRAMUSCULAR
Status: DISCONTINUED | OUTPATIENT
Start: 2018-01-12 | End: 2018-01-17 | Stop reason: HOSPADM

## 2018-01-12 RX ORDER — RISPERIDONE 3 MG/1
3 TABLET, FILM COATED ORAL 2 TIMES DAILY
Status: DISCONTINUED | OUTPATIENT
Start: 2018-01-13 | End: 2018-01-17 | Stop reason: HOSPADM

## 2018-01-12 RX ORDER — AMLODIPINE BESYLATE 5 MG/1
5 TABLET ORAL DAILY
Status: DISCONTINUED | OUTPATIENT
Start: 2018-01-12 | End: 2018-01-17 | Stop reason: HOSPADM

## 2018-01-12 RX ORDER — TRAZODONE HYDROCHLORIDE 50 MG/1
50 TABLET ORAL
Status: DISCONTINUED | OUTPATIENT
Start: 2018-01-12 | End: 2018-01-17 | Stop reason: HOSPADM

## 2018-01-12 RX ORDER — RISPERIDONE 1 MG/1
2 TABLET, FILM COATED ORAL 2 TIMES DAILY
Status: DISCONTINUED | OUTPATIENT
Start: 2018-01-12 | End: 2018-01-12

## 2018-01-12 RX ORDER — OLANZAPINE 5 MG/1
5 TABLET ORAL
Status: DISCONTINUED | OUTPATIENT
Start: 2018-01-12 | End: 2018-01-17 | Stop reason: HOSPADM

## 2018-01-12 RX ORDER — RISPERIDONE 1 MG/1
1 TABLET, ORALLY DISINTEGRATING ORAL
Status: DISCONTINUED | OUTPATIENT
Start: 2018-01-12 | End: 2018-01-17 | Stop reason: HOSPADM

## 2018-01-12 RX ORDER — LORAZEPAM 2 MG/ML
1 INJECTION INTRAMUSCULAR EVERY 6 HOURS PRN
Status: DISCONTINUED | OUTPATIENT
Start: 2018-01-12 | End: 2018-01-17 | Stop reason: HOSPADM

## 2018-01-12 RX ORDER — ALBUTEROL SULFATE 90 UG/1
2 AEROSOL, METERED RESPIRATORY (INHALATION) EVERY 4 HOURS PRN
Status: DISCONTINUED | OUTPATIENT
Start: 2018-01-12 | End: 2018-01-17 | Stop reason: HOSPADM

## 2018-01-12 RX ORDER — DIVALPROEX SODIUM 500 MG/1
500 TABLET, EXTENDED RELEASE ORAL DAILY
Status: DISCONTINUED | OUTPATIENT
Start: 2018-01-12 | End: 2018-01-12

## 2018-01-12 RX ORDER — BENZTROPINE MESYLATE 1 MG/ML
1 INJECTION INTRAMUSCULAR; INTRAVENOUS EVERY 6 HOURS PRN
Status: DISCONTINUED | OUTPATIENT
Start: 2018-01-12 | End: 2018-01-17 | Stop reason: HOSPADM

## 2018-01-12 RX ORDER — POTASSIUM CHLORIDE 20 MEQ/1
20 TABLET, EXTENDED RELEASE ORAL ONCE
Status: COMPLETED | OUTPATIENT
Start: 2018-01-12 | End: 2018-01-12

## 2018-01-12 RX ORDER — MAGNESIUM HYDROXIDE/ALUMINUM HYDROXICE/SIMETHICONE 120; 1200; 1200 MG/30ML; MG/30ML; MG/30ML
30 SUSPENSION ORAL EVERY 4 HOURS PRN
Status: DISCONTINUED | OUTPATIENT
Start: 2018-01-12 | End: 2018-01-17 | Stop reason: HOSPADM

## 2018-01-12 RX ORDER — HALOPERIDOL 5 MG
5 TABLET ORAL EVERY 8 HOURS PRN
Status: DISCONTINUED | OUTPATIENT
Start: 2018-01-12 | End: 2018-01-17 | Stop reason: HOSPADM

## 2018-01-12 RX ORDER — ACETAMINOPHEN 325 MG/1
650 TABLET ORAL EVERY 6 HOURS PRN
Status: DISCONTINUED | OUTPATIENT
Start: 2018-01-12 | End: 2018-01-17 | Stop reason: HOSPADM

## 2018-01-12 RX ORDER — LORAZEPAM 1 MG/1
1 TABLET ORAL EVERY 8 HOURS PRN
Status: DISCONTINUED | OUTPATIENT
Start: 2018-01-12 | End: 2018-01-17 | Stop reason: HOSPADM

## 2018-01-12 RX ORDER — HALOPERIDOL 5 MG/ML
5 INJECTION INTRAMUSCULAR EVERY 6 HOURS PRN
Status: DISCONTINUED | OUTPATIENT
Start: 2018-01-12 | End: 2018-01-17 | Stop reason: HOSPADM

## 2018-01-12 RX ORDER — HYDROXYZINE HYDROCHLORIDE 25 MG/1
25 TABLET, FILM COATED ORAL EVERY 6 HOURS PRN
Status: DISCONTINUED | OUTPATIENT
Start: 2018-01-12 | End: 2018-01-17 | Stop reason: HOSPADM

## 2018-01-12 RX ORDER — DIVALPROEX SODIUM 500 MG/1
1000 TABLET, DELAYED RELEASE ORAL
Status: DISCONTINUED | OUTPATIENT
Start: 2018-01-12 | End: 2018-01-13

## 2018-01-12 RX ORDER — ACETAMINOPHEN 325 MG/1
975 TABLET ORAL EVERY 6 HOURS PRN
Status: DISCONTINUED | OUTPATIENT
Start: 2018-01-12 | End: 2018-01-17 | Stop reason: HOSPADM

## 2018-01-12 RX ADMIN — RISPERIDONE 2 MG: 1 TABLET ORAL at 18:41

## 2018-01-12 RX ADMIN — DIVALPROEX SODIUM 500 MG: 500 TABLET, DELAYED RELEASE ORAL at 14:17

## 2018-01-12 RX ADMIN — POTASSIUM CHLORIDE 20 MEQ: 1500 TABLET, EXTENDED RELEASE ORAL at 14:17

## 2018-01-12 RX ADMIN — ACETAMINOPHEN 650 MG: 325 TABLET, FILM COATED ORAL at 16:11

## 2018-01-12 RX ADMIN — AMLODIPINE BESYLATE 5 MG: 5 TABLET ORAL at 14:17

## 2018-01-12 RX ADMIN — DIVALPROEX SODIUM 1000 MG: 500 TABLET, DELAYED RELEASE ORAL at 21:28

## 2018-01-12 RX ADMIN — POTASSIUM CHLORIDE 20 MEQ: 1500 TABLET, EXTENDED RELEASE ORAL at 18:00

## 2018-01-12 RX ADMIN — SODIUM CHLORIDE 1000 ML: 0.9 INJECTION, SOLUTION INTRAVENOUS at 06:08

## 2018-01-12 NOTE — ED ATTENDING ATTESTATION
I, DO Francisco, saw and evaluated the patient  I have discussed the patient with the resident/non-physician practitioner and agree with the resident's/non-physician practitioner's findings, Plan of Care, and MDM as documented in the resident's/non-physician practitioner's note, except where noted  All available labs and Radiology studies were reviewed  At this point I agree with the current assessment done in the Emergency Department  I have conducted an independent evaluation of this patient a history and physical is as follows:    25-year-old male presents with hallucinations including voices that are telling him to kill his mom, dad and self  Patient denies all physical complaints  Patient lives with his mom  On exam-no acute distress, heart tachycardic, lungs clear, abdomen soft    Plan-check basic labs due to tachycardia, EKG, crisis consult once medically clear    Critical Care Time  CritCare Time    Procedures

## 2018-01-12 NOTE — ED NOTES
Pt has Medicare primary  No precert required  Secondary is [employer]   Completed COB with Marie Andrade

## 2018-01-12 NOTE — CASE MANAGEMENT
CM met with patient  Patient was admitted due to increased depression, anxiety, and psychotic symptoms including but not limited to auditory hallucinations with plan to kill himself  Nurse noted that there were also AH with plan to kill parents, denied these to this CM  Denies drug and alcohol use/abuse and admits to previous mental health admissions for inpatient treatment and previous outpatient providers  Patient was able to maintain eye contact and stated he felt safer here in the hospital now  Poor historian, as he could not provide name of OP provider except for location in [facility]  Patient signed treatment plan and understands the goals of inpatient treatment  Signed ROIs for mother, PCP, OP mental health team and requested to take a break  Patient appeared drowsy and wanted to take a nap  CM will attempt to contact family member and continue to monitor

## 2018-01-12 NOTE — CASE MANAGEMENT
This CM spoke with mother, Dionisio Watkins  She reports that patient had been physically sick for the past week and she feels that has made him more depressed  She denies hearing him state any threats of harm to himself or others, feels safe for him to return to her home upon discharge  Provided the name of the outpatient provider [facility] and ICM is through [place]  CM will follow up

## 2018-01-12 NOTE — ASSESSMENT & PLAN NOTE
R/o flu, however per mothers report in notes pt has been "sick/not well" for the last week ?  Flu  Pt states he was running a fever when asked how he knew he said because he was sick  Place pt on droplet precautions, difficult in psych unit pt being moved to single room   Obtain flu pcr, discussed with nursing  Per records pt has been hallucinating ? noncomliance with meds vs viral illness such as flu  Sp 1 liter ivf in er  Pt is with low grade temps now  Encourage po oral intake Srini Cassette

## 2018-01-12 NOTE — ED NOTES
CW received call from patient's ICM, informed of admission  ICM reports patient missed his last appointment with his therapist and psychiatrist and reports he often declines when he has too much interaction with his family / ICM can be contacted at [phone]   Patient signed 12

## 2018-01-12 NOTE — DISCHARGE INSTR - OTHER ORDERS
[facility]  Eleanor Ferreira [address] [place] [address], [address] [place] [address]   [phone]    [facility]  Vonnie [address]   GerrydarlingDheeraj darnell 1460 [phone]    Please go to your follow up appointment on Wednesday, January 24, 2018 at 11am with your therapist  Your follow up appointment with your psychiatrist is the following day, 1/25/18  [facility]  [address] [place] [address]  Tel: [phone]    Please call Briseyda Handley, your Intensive  (ICM), to set up a time and date for him to come to your home for your next appointment

## 2018-01-12 NOTE — ED NOTES
Pt states he has a plan to kill himself w a knife  He states he has never tried to act on this plan but "thats just how id do it" he stated       Charmayne Earing, JAYDEN  01/12/18 9649

## 2018-01-12 NOTE — ED PROVIDER NOTES
History  Chief Complaint   Patient presents with    Hallucinations     hearing voices to kill his mom, walked to the hospital     HPI  25 yo male pmh schizoaffective disorder, intellectual disability presents w/ command halluicinations  pt  says the commands are telling him to kill himself, his mother, his father with a knife  pt  walked several miles to the ED for an evaluation  pt  has hx of multiple inpatient psych admissions  he denies any etoh, drug use  he is on psych meds  pt  found to be tachycardia and has mildly elevated temp on assessment  he denies fevers, chills, cough, n/v/d  12 systems reviewed otherwise negative except as stated in hpi       imp/plan: 25 yo male presents w/ suicidal/homicidial ideation and command hallucinations  he is tachycardia, but otherwise in nad  he did walk several miles to the ED for evaluation  will give IV fluid bolus, check ECG, Bat, UDS, get crisis eval  Prior to Admission Medications   Prescriptions Last Dose Informant Patient Reported? Taking?    amLODIPine (NORVASC) 5 mg tablet   No No   Sig: Take 1 tablet by mouth daily At 9AM   divalproex sodium (DEPAKOTE ER) 500 mg 24 hr tablet   No No   Sig: Take 1 tab (500mg) in morning (9AM), then take 2 tabs (1000mg) in evening (6PM) by mouth daily   divalproex sodium (DEPAKOTE) 250 mg EC tablet   Yes No   Sig: Take 250 mg by mouth daily at bedtime Take 3 tablets at bedtime   risperiDONE (RisperDAL) 2 mg tablet   No No   Sig: Take 1 tablet by mouth 2 (two) times a day At 9AM and 6PM   Patient taking differently: Take 1 mg by mouth daily At 9AM and 6PM    risperiDONE microspheres (RisperDAL CONSTA) 37 5 MG injection   No No   Sig: Inject 2 mL into the shoulder, thigh, or buttocks every 14 (fourteen) days Next injection due 10/17/17      Facility-Administered Medications: None       Past Medical History:   Diagnosis Date    Anxiety     Asthma     Hypertension     Mental retardation     Schizoaffective disorder ([facility] [address] ) Past Surgical History:   Procedure Laterality Date    HAND SURGERY      right hand       Family History   Problem Relation Age of Onset    No Known Problems Mother     No Known Problems Father     No Known Problems Sister     No Known Problems Brother     No Known Problems Maternal Aunt     No Known Problems Paternal Aunt     No Known Problems Maternal Uncle     No Known Problems Paternal Uncle     No Known Problems Maternal Grandfather     No Known Problems Maternal Grandmother     No Known Problems Paternal Grandfather     No Known Problems Paternal Grandmother     No Known Problems Cousin     ADD / ADHD Neg Hx     Alcohol abuse Neg Hx     Anxiety disorder Neg Hx     Bipolar disorder Neg Hx     Dementia Neg Hx     Depression Neg Hx     Drug abuse Neg Hx     OCD Neg Hx     Paranoid behavior Neg Hx     Schizophrenia Neg Hx     Seizures Neg Hx     Self-Injury Neg Hx     Suicide Attempts Neg Hx      I have reviewed and agree with the history as documented  Social History   Substance Use Topics    Smoking status: Never Smoker    Smokeless tobacco: Never Used      Comment: non-smoker    Alcohol use No        Review of Systems   Constitutional: Negative for activity change, appetite change, chills and fever  HENT: Negative for trouble swallowing and voice change  Eyes: Negative for photophobia  Respiratory: Negative for shortness of breath  Cardiovascular: Negative for chest pain, palpitations and leg swelling  Gastrointestinal: Negative for abdominal pain, diarrhea, nausea and vomiting  Endocrine: Negative for polyuria  Genitourinary: Negative for dysuria  Musculoskeletal: Negative for back pain  Skin: Negative for rash  Allergic/Immunologic: Negative  Neurological: Negative for dizziness, syncope, weakness, numbness and headaches  Psychiatric/Behavioral: Positive for behavioral problems, hallucinations and suicidal ideas   Negative for agitation, confusion, decreased concentration, dysphoric mood, self-injury and sleep disturbance  The patient is not nervous/anxious and is not hyperactive  Physical Exam  ED Triage Vitals   Temperature Pulse Respirations Blood Pressure SpO2   01/12/18 0449 01/12/18 0447 01/12/18 0830 01/12/18 0447 01/12/18 0447   100 3 °F (37 9 °C) (!) 130 18 159/78 99 %      Temp Source Heart Rate Source Patient Position - Orthostatic VS BP Location FiO2 (%)   01/12/18 0449 01/12/18 0544 01/12/18 1520 01/12/18 1324 --   Tympanic Monitor Standing Left arm       Pain Score       01/12/18 0447       No Pain           Orthostatic Vital Signs  Vitals:    01/15/18 1100 01/15/18 1537 01/16/18 0743 01/16/18 1120   BP: 144/80 136/75 (!) 174/81 147/88   Pulse: (!) 113 101 (!) 120 (!) 114   Patient Position - Orthostatic VS: Sitting Lying Standing Sitting       Physical Exam   Constitutional: He is oriented to person, place, and time  He appears well-developed and well-nourished  No distress  Unkempt  Obese  Poor hygeine   HENT:   Head: Normocephalic and atraumatic  Right Ear: No hemotympanum  Left Ear: No hemotympanum  Nose: No nasal septal hematoma  Mouth/Throat: Uvula is midline and oropharynx is clear and moist  No oropharyngeal exudate  Eyes: EOM are normal  Pupils are equal, round, and reactive to light  Right eye exhibits no discharge  Left eye exhibits no discharge  Neck: Normal range of motion  Neck supple  No JVD present  No tracheal deviation present  Cardiovascular: Normal rate, regular rhythm, normal heart sounds and intact distal pulses  Exam reveals no gallop and no friction rub  No murmur heard  Pulmonary/Chest: Effort normal and breath sounds normal  No stridor  No respiratory distress  He has no wheezes  He has no rales  He exhibits no tenderness  Abdominal: Soft  Bowel sounds are normal  He exhibits no distension and no mass  There is no tenderness  There is no rebound and no guarding  No hernia  Musculoskeletal: Normal range of motion  He exhibits no edema  No clonus or rigidity  2+ reflexes   Lymphadenopathy:     He has no cervical adenopathy  Neurological: He is alert and oriented to person, place, and time  He has normal strength and normal reflexes  He is not disoriented  No cranial nerve deficit or sensory deficit  GCS eye subscore is 4  GCS verbal subscore is 5  GCS motor subscore is 6  Reflex Scores:       Patellar reflexes are 2+ on the right side and 2+ on the left side  Achilles reflexes are 2+ on the right side and 2+ on the left side  Cn 2-12 grossly intact  No pronator drift  Normal gait  Normal strength/sensation   Skin: Skin is warm and dry  He is not diaphoretic  No erythema  Psychiatric: His speech is normal  Judgment normal  His affect is blunt  He is withdrawn  Thought content is paranoid and delusional  Cognition and memory are normal  He expresses homicidal and suicidal ideation  He expresses suicidal plans and homicidal plans  Nursing note and vitals reviewed        ED Medications  Medications   hydrOXYzine HCL (ATARAX) tablet 25 mg (not administered)   LORazepam (ATIVAN) tablet 1 mg (1 mg Oral Given 1/15/18 1601)   LORazepam (ATIVAN) 2 mg/mL injection 1 mg (not administered)   haloperidol (HALDOL) tablet 5 mg (not administered)   haloperidol lactate (HALDOL) injection 5 mg (not administered)   magnesium hydroxide (MILK OF MAGNESIA) 400 mg/5 mL oral suspension 30 mL (not administered)   aluminum-magnesium hydroxide-simethicone (MYLANTA) 200-200-20 mg/5 mL oral suspension 30 mL (not administered)   benztropine (COGENTIN) tablet 1 mg (not administered)   benztropine (COGENTIN) injection 1 mg (not administered)   acetaminophen (TYLENOL) tablet 650 mg (650 mg Oral Given 1/14/18 0840)   ibuprofen (MOTRIN) tablet 600 mg (not administered)   traZODone (DESYREL) tablet 50 mg (not administered)   risperiDONE (RisperDAL M-TABS) dispersible tablet 1 mg (1 mg Oral Not Given 1/12/18 1810)   OLANZapine (ZyPREXA) tablet 5 mg (not administered)   OLANZapine (ZyPREXA) IM injection 10 mg (not administered)   acetaminophen (TYLENOL) tablet 975 mg (975 mg Oral Given 1/13/18 0945)   amLODIPine (NORVASC) tablet 5 mg (5 mg Oral Given 1/16/18 0808)   albuterol (PROVENTIL HFA,VENTOLIN HFA) inhaler 2 puff (not administered)   risperiDONE (RisperDAL) tablet 3 mg (3 mg Oral Given 1/16/18 0808)   divalproex sodium (DEPAKOTE) EC tablet 750 mg (750 mg Oral Given 1/15/18 2100)   sodium chloride (OCEAN) 0 65 % nasal spray 1 spray (1 spray Each Nare Not Given 1/16/18 0831)   loratadine (CLARITIN) tablet 10 mg (10 mg Oral Given 1/16/18 0809)   divalproex sodium (DEPAKOTE) EC tablet 500 mg (500 mg Oral Given 1/16/18 0809)   sodium chloride 0 9 % bolus 1,000 mL (0 mL Intravenous Stopped 1/12/18 0746)   potassium chloride (K-DUR,KLOR-CON) CR tablet 20 mEq (20 mEq Oral Given 1/12/18 1417)   potassium chloride (K-DUR,KLOR-CON) CR tablet 20 mEq (20 mEq Oral Given 1/12/18 1800)   potassium chloride (K-DUR,KLOR-CON) CR tablet 20 mEq (20 mEq Oral Given 1/13/18 1752)       Diagnostic Studies  Results Reviewed     Procedure Component Value Units Date/Time    TSH, 3rd generation [[id number]]  (Normal) Collected:  01/12/18 0608    Lab Status:  Final result Specimen:  Blood from Arm, Right Updated:  01/13/18 0431     TSH 3RD GENERATON 2 330 uIU/mL     Narrative:         Patients undergoing fluorescein dye angiography may retain small amounts of fluorescein in the body for 48-72 hours post procedure  Samples containing fluorescein can produce falsely depressed TSH values  If the patient had this procedure,a specimen should be resubmitted post fluorescein clearance      Magnesium [[id number]]  (Normal) Collected:  01/12/18 0608    Lab Status:  Final result Specimen:  Blood from Arm, Right Updated:  01/13/18 0431     Magnesium 2 0 mg/dL     Rapid drug screen, urine [[id number]]  (Normal) Collected:  01/12/18 0629    Lab Status: Final result Specimen:  Urine from Urine, Clean Catch Updated:  01/12/18 0752     Amph/Meth UR Negative     Barbiturate Ur Negative     Benzodiazepine Urine Negative     Cocaine Urine Negative     Methadone Urine Negative     Opiate Urine Negative     PCP Ur Negative     THC Urine Negative    Narrative:         FOR MEDICAL PURPOSES ONLY  IF CONFIRMATION NEEDED PLEASE CONTACT THE LAB WITHIN 5 DAYS  Drug Screen Cutoff Levels:  AMPHETAMINE/METHAMPHETAMINES  1000 ng/mL  BARBITURATES     200 ng/mL  BENZODIAZEPINES     200 ng/mL  COCAINE      300 ng/mL  METHADONE      300 ng/mL  OPIATES      300 ng/mL  PHENCYCLIDINE     25 ng/mL  THC       50 ng/mL    Comprehensive metabolic panel [[id number]]  (Abnormal) Collected:  01/12/18 0608    Lab Status:  Final result Specimen:  Blood from Arm, Right Updated:  01/12/18 0636     Sodium 138 mmol/L      Potassium 3 4 (L) mmol/L      Chloride 105 mmol/L      CO2 24 mmol/L      Anion Gap 9 mmol/L      BUN 7 mg/dL      Creatinine 0 63 mg/dL      Glucose 105 mg/dL      Calcium 8 6 mg/dL      AST 24 U/L      ALT 53 U/L      Alkaline Phosphatase 67 U/L      Total Protein 7 8 g/dL      Albumin 3 5 g/dL      Total Bilirubin 0 58 mg/dL      eGFR 140 ml/min/1 73sq m     Narrative:         National Kidney Disease Education Program recommendations are as follows:  GFR calculation is accurate only with a steady state creatinine  Chronic Kidney disease less than 60 ml/min/1 73 sq  meters  Kidney failure less than 15 ml/min/1 73 sq  meters      CBC and differential [[id number]]  (Abnormal) Collected:  01/12/18 0608    Lab Status:  Final result Specimen:  Blood from Arm, Right Updated:  01/12/18 0621     WBC 9 85 Thousand/uL      RBC 4 44 Million/uL      Hemoglobin 13 5 g/dL      Hematocrit 38 0 %      MCV 86 fL      MCH 30 4 pg      MCHC 35 5 g/dL      RDW 12 4 %      MPV 8 6 (L) fL      Platelets 691 Thousands/uL      nRBC 0 /100 WBCs      Neutrophils Relative 63 %      Lymphocytes Relative 21 % Monocytes Relative 15 (H) %      Eosinophils Relative 1 %      Basophils Relative 0 %      Neutrophils Absolute 6 13 Thousands/µL      Lymphocytes Absolute 2 06 Thousands/µL      Monocytes Absolute 1 47 (H) Thousand/µL      Eosinophils Absolute 0 12 Thousand/µL      Basophils Absolute 0 01 Thousands/µL     POCT alcohol breath test [[id number]]  (Normal) Resulted:  01/12/18 0547    Lab Status:  Final result Updated:  01/12/18 0547     EXTBreath Alcohol 0 000                 XR chest pa & lateral   Final Result by Pallavi Bishop MD (01/13 3904)      No active pulmonary disease  Workstation performed: [id number]               Procedures  ECG 12 Lead Documentation  Date/Time: 1/12/2018 6:26 AM  Performed by: Dale Reed by: Izzy Boyle     ECG reviewed by me, the ED Provider: yes    Patient location:  ED  Previous ECG:     Previous ECG:  Unavailable  Interpretation:     Interpretation: normal    Rate:     ECG rate:  109    ECG rate assessment: tachycardic    Rhythm:     Rhythm: sinus tachycardia    Ectopy:     Ectopy: none    QRS:     QRS axis:  Normal  Conduction:     Conduction: normal    ST segments:     ST segments:  Normal  T waves:     T waves: normal              Phone Consults  ED Phone Contact    ED Course  ED Course as of Jan 16 1347 Fri Jan 12, 2018   0719 Pt signed [address] Time    Disposition  Final diagnoses:    Auditory hallucinations   Suicidal ideation     Time reflects when diagnosis was documented in both MDM as applicable and the Disposition within this note     Time User Action Codes Description Comment    1/12/2018 11:06 AM Colen Palin Add [F25 0] [facility], [facility]     1/12/2018 11:06 AM Colen Palin Modify [F25 0] [facility]ffective [facility], [facility]     1/12/2018 11:06 AM Colen Palin Modify [F25 0] [facility]ffective disorder, [facility] )     1/12/2018 11:06 AM Stefania Evens Modify [F25 0] Schizoaffective disorder, bipolar type ([facility] [address] [facility] )     1/12/2018 11:45 [facility] [R44 0] Auditory hallucinations     1/12/2018 11:45 AM [facility] Add [R45 851] Suicidal ideation     1/12/2018  4:09 PM Jane Sor Add [H30 976] Uncomplicated asthma, unspecified asthma severity, unspecified whether persistent     1/12/2018  4:09 PM Evens Aguiar Add [I10] HTN (hypertension)     1/12/2018  4:10 PM Elba Aguiar Add [R00 0] Tachycardia with heart rate 121-140 beats per minute       ED Disposition     ED Disposition Condition Comment    Transfer to Another [facility] should be transferred out to [facility]  MD Documentation    Adriannaie Carlos A Most Recent Value   Accepting Physician  Dr Maureen Monreal Name, Belen Caban nw7   Sending MD  [facility]       RN Documentation    [address] Landa Name, Belen Caban nw7      Follow-up Information     Follow up With Specialties Details Why Contact Mariah Watts MD Family Medicine Follow up Please call if any medical concerns or conditions arise  [facility] on 1/24/2018 Please go to your follow up appointment on Wednesday, January 24, 2018 at 11am with your therapist  Your follow up appointment with your psychiatrist is the following day, 1/25/18  Vonnie MARTINEZ  [address]  [address]  [phone]    [facility] [place]  Call Please call Carly Duron, your ICM, to set up a time and date for him to come to your home for your next appointment   [address] [place] [address] [address]  [phone]        Current Discharge Medication List      CONTINUE these medications which have NOT CHANGED    Details   amLODIPine (NORVASC) 5 mg tablet Take 1 tablet by mouth daily At 9AM  Qty: 30 tablet, Refills: 0    Associated Diagnoses: Hypertension      divalproex sodium (DEPAKOTE ER) 500 mg 24 hr tablet Take 1 tab (500mg) in morning (9AM), then take 2 tabs (1000mg) in evening (6PM) by mouth daily  Qty: 90 tablet, Refills: 0    Associated Diagnoses: Schizoaffective disorder, bipolar type (HCC)      divalproex sodium (DEPAKOTE) 250 mg EC tablet Take 250 mg by mouth daily at bedtime Take 3 tablets at bedtime      risperiDONE (RisperDAL) 2 mg tablet Take 1 tablet by mouth 2 (two) times a day At 9AM and 6PM  Qty: 60 tablet, Refills: 0    Associated Diagnoses: Schizoaffective disorder, bipolar type (HCC)      risperiDONE microspheres (RisperDAL CONSTA) 37 5 MG injection Inject 2 mL into the shoulder, thigh, or buttocks every 14 (fourteen) days Next injection due 10/17/17  Qty: 2 mL, Refills: 0    Associated Diagnoses: Schizoaffective disorder, bipolar type ([facility] )           No discharge procedures on file  ED Provider  Attending physically available and evaluated Juan F Burgess I managed the patient along with the ED Attending      Electronically Signed by         Amor Haile MD  01/16/18 8566

## 2018-01-12 NOTE — PROGRESS NOTES
Patient noted to have temperature of 100 2  He has a cough and at times has been tachycardic  Dr GRULLON made aware  Tylenol 650mgs po prn  Encouraged po fluids  Pulse ox = 98  Medical consult called in TO SLIM

## 2018-01-12 NOTE — PROGRESS NOTES
Patient is a 26year old male, admitted as a 13 from [facility] for auditory hallucinations telling him to kill himself and his parents  The patient stated to me that it was by "stabbing with a knife " Patient said that he has been hearing the voices for a week now  Patient was able to contract for safety on the unit at this time  Patient denies any cigarette use, drug use and denies any alcohol use, with urine tox screen negative  Patient lives with his mother and father and it was reported to me that the ER nurse Viktoria Mace tried calling his parents with no answer  I called the pharmacy for the medications and they said that the patient has not filled the medications there since May of 2017  I called our pharmacy and went over what was present in his medications from home  Patient could not tell me his medications or the last time he was hospitalized, but he does have previous admissions  Patient has a history of Mental retardation, hypertension and asthma  Patient was very limited with information and it was reported to me that patient came into ER malodorous  The ER had him shower and fresh paper scrubs  It was also reported to me by Viktoria Mace, JAYDEN that patient missed his last appointment with his therapist and that his [facility] is aware of this current hospitalization  I did note that his Potassium level was 3 4 and I see that potassium was ordered by physician  Both patient and RN from ER said they have not given patient any medications today  Patient also stated during admission, "Sometimes my legs give out and I fall " Patient often times needed to repeat what he said because mumbling his words  Patient has steady gait and no further explanation given about the above comment

## 2018-01-12 NOTE — ED NOTES
Return call from patient's mother and informed of admission to inpatient psychiatric unit  Mother would like to be contacted with disposition

## 2018-01-12 NOTE — ED NOTES
Patient presents from home with command auditory hallucinations to kill his mother and his father  He reports he has thoughts of killing himself by stabbing  Patient reports increased psychotic symptoms despite being medication compliant  He has had previous admissions with the same presntation  Patient does not report disturbance with sleep or appetite  He is not manic  He reports stressors being an argument with his mother regarding food and a fight with his father regarding television  Patient has decreased ADL's and is disheveled  Patient is a poor historian  Patient has had multiple inpatient psychiatric treatment and has outpatient psychiatric treatment  Patient's mother and ICM worker contacted but no response  Patient is in need of inpatient psychiatric treatment at this time

## 2018-01-12 NOTE — PLAN OF CARE
Problem: Alteration in Thoughts and Perception  Goal: Treatment Goal: Gain control of psychotic behaviors/thinking, reduce/eliminate presenting symptoms and demonstrate improved reality functioning upon discharge  Outcome: Progressing    Goal: Verbalize thoughts and feelings  Interventions:  - Promote a nonjudgmental and trusting relationship with the patient through active listening and therapeutic communication  - Assess patient's level of functioning, behavior and potential for risk  - Engage patient in 1 on 1 interactions for a minimum of 15 minutes each session  - Encourage patient to express fears, feelings, frustrations, and discuss symptoms    - [place] patient to reality, help patient recognize reality-based thinking   - Administer medications as ordered and assess for potential side effects  - Provide the patient education related to the signs and symptoms of the illness and desired effects of prescribed medications  Outcome: Progressing    Goal: Refrain from acting on delusional thinking/internal stimuli  Interventions:  - Monitor patient closely, per order   - Utilize least restrictive measures   - Set reasonable limits, give positive feedback for acceptable   - Administer medications as ordered and monitor of potential side effects  Outcome: Progressing    Goal: Agree to be compliant with medication regime, as prescribed and report medication side effects  Interventions:  - Offer appropriate PRN medication and supervise ingestion; conduct aims, as needed   Outcome: Progressing    Goal: Attend and participate in unit activities, including therapeutic, recreational, and educational groups  Interventions:  - Provide therapeutic and educational activities daily, encourage attendance and participation, and document same in the medical record   Outcome: Progressing    Goal: Recognize dysfunctional thoughts, communicate reality-based thoughts at the time of discharge  Interventions:  - Provide medication and psycho-education to assist patient in compliance and developing insight into his/her illness   Outcome: Progressing    Goal: Complete daily ADLs, including personal hygiene independently, as able  Interventions:  - Observe, teach, and assist patient with ADLS  - Monitor and promote a balance of rest/activity, with adequate nutrition and elimination   Outcome: Progressing      Problem: Depression  Goal: Treatment Goal: Demonstrate behavioral control of depressive symptoms, verbalize feelings of improved mood/affect, and adopt new coping skills prior to discharge  Outcome: Progressing    Goal: Verbalize thoughts and feelings  Interventions:  - Assess and re-assess patient's level of risk   - Engage patient in 1:1 interactions, daily, for a minimum of 15 minutes   - Encourage patient to express feelings, fears, frustrations, hopes   Outcome: Progressing    Goal: Refrain from harming self  Interventions:  - Monitor patient closely, per order   - Supervise medication ingestion, monitor effects and side effects   Outcome: Progressing    Goal: Refrain from isolation  Interventions:  - Develop a trusting relationship   - Encourage socialization   Outcome: Progressing    Goal: Refrain from self-neglect  Outcome: Progressing    Goal: Attend and participate in unit activities, including therapeutic, recreational, and educational groups  Interventions:  - Provide therapeutic and educational activities daily, encourage attendance and participation, and document same in the medical record   Outcome: Progressing    Goal: Complete daily ADLs, including personal hygiene independently, as able  Interventions:  - Observe, teach, and assist patient with ADLS  -  Monitor and promote a balance of rest/activity, with adequate nutrition and elimination   Outcome: Progressing      Problem: Anxiety  Goal: Anxiety is at manageable level  Interventions:  - Assess and monitor patient's anxiety level     - Monitor for signs and symptoms of anxiety both physical and emotional (heart palpitations, chest pain, shortness of breath, headaches, nausea, feeling jumpy, restlessness, irritable, apprehensive)  - Collaborate with interdisciplinary team and initiate plan and interventions as ordered    - [place] patient to unit/surroundings  - Explain treatment plan  - Encourage participation in care  - Encourage verbalization of concerns/fears  - Identify coping mechanisms  - Assist in developing anxiety-reducing skills  - Administer/offer alternative therapies  - Limit or eliminate stimulants  Outcome: Progressing      Problem: Risk for Violence/Aggression Toward Others  Goal: Treatment Goal: Refrain from acts of violence/aggression during length of stay, and demonstrate improved impulse control at the time of discharge  Outcome: Progressing    Goal: Verbalize thoughts and feelings  Interventions:  - Assess and re-assess patient's level of risk, every waking shift  - Engage patient in 1:1 interactions, daily, for a minimum of 15 minutes   - Allow patient to express feelings and frustrations in a safe and non-threatening manner   - Establish rapport/trust with patient   Outcome: Progressing    Goal: Refrain from harming others  Outcome: Progressing    Goal: Refrain from destructive acts on the environment or property  Outcome: Progressing    Goal: Control angry outbursts  Interventions:  - Monitor patient closely, per order  - Ensure early verbal de-escalation  - Monitor prn medication needs  - Set reasonable/therapeutic limits, outline behavioral expectations, and consequences   - Provide a non-threatening milieu, utilizing the least restrictive interventions   Outcome: Progressing    Goal: Attend and participate in unit activities, including therapeutic, recreational, and educational groups  Interventions:  - Provide therapeutic and educational activities daily, encourage attendance and participation, and document same in the medical record   Outcome: Progressing    Goal: Identify appropriate positive anger management techniques  Interventions:  - Offer anger management and coping skills groups   - Staff will provide positive feedback for appropriate anger control  Outcome: Progressing

## 2018-01-13 LAB
ALBUMIN SERPL BCP-MCNC: 3.3 G/DL (ref 3.5–5)
ALP SERPL-CCNC: 63 U/L (ref 46–116)
ALT SERPL W P-5'-P-CCNC: 39 U/L (ref 12–78)
AMMONIA PLAS-SCNC: 27 UMOL/L (ref 11–35)
ANION GAP SERPL CALCULATED.3IONS-SCNC: 8 MMOL/L (ref 4–13)
AST SERPL W P-5'-P-CCNC: 13 U/L (ref 5–45)
BASOPHILS # BLD AUTO: 0.02 THOUSANDS/ΜL (ref 0–0.1)
BASOPHILS NFR BLD AUTO: 0 % (ref 0–1)
BILIRUB SERPL-MCNC: 0.65 MG/DL (ref 0.2–1)
BUN SERPL-MCNC: 6 MG/DL (ref 5–25)
CALCIUM SERPL-MCNC: 8.7 MG/DL (ref 8.3–10.1)
CHLORIDE SERPL-SCNC: 105 MMOL/L (ref 100–108)
CHOLEST SERPL-MCNC: 142 MG/DL (ref 50–200)
CO2 SERPL-SCNC: 25 MMOL/L (ref 21–32)
CREAT SERPL-MCNC: 0.54 MG/DL (ref 0.6–1.3)
EOSINOPHIL # BLD AUTO: 0.11 THOUSAND/ΜL (ref 0–0.61)
EOSINOPHIL NFR BLD AUTO: 1 % (ref 0–6)
ERYTHROCYTE [DISTWIDTH] IN BLOOD BY AUTOMATED COUNT: 12.6 % (ref 11.6–15.1)
FLUAV AG SPEC QL: NORMAL
FLUBV AG SPEC QL: NORMAL
GFR SERPL CREATININE-BSD FRML MDRD: 149 ML/MIN/1.73SQ M
GLUCOSE P FAST SERPL-MCNC: 99 MG/DL (ref 65–99)
GLUCOSE SERPL-MCNC: 99 MG/DL (ref 65–140)
HCT VFR BLD AUTO: 39.4 % (ref 36.5–49.3)
HDLC SERPL-MCNC: 29 MG/DL (ref 40–60)
HGB BLD-MCNC: 13.8 G/DL (ref 12–17)
LDLC SERPL CALC-MCNC: 96 MG/DL (ref 0–100)
LYMPHOCYTES # BLD AUTO: 2.2 THOUSANDS/ΜL (ref 0.6–4.47)
LYMPHOCYTES NFR BLD AUTO: 19 % (ref 14–44)
MAGNESIUM SERPL-MCNC: 2 MG/DL (ref 1.6–2.6)
MCH RBC QN AUTO: 30.4 PG (ref 26.8–34.3)
MCHC RBC AUTO-ENTMCNC: 35 G/DL (ref 31.4–37.4)
MCV RBC AUTO: 87 FL (ref 82–98)
MONOCYTES # BLD AUTO: 1.52 THOUSAND/ΜL (ref 0.17–1.22)
MONOCYTES NFR BLD AUTO: 13 % (ref 4–12)
NEUTROPHILS # BLD AUTO: 7.71 THOUSANDS/ΜL (ref 1.85–7.62)
NEUTS SEG NFR BLD AUTO: 67 % (ref 43–75)
NRBC BLD AUTO-RTO: 0 /100 WBCS
PLATELET # BLD AUTO: 189 THOUSANDS/UL (ref 149–390)
PMV BLD AUTO: 8.9 FL (ref 8.9–12.7)
POTASSIUM SERPL-SCNC: 3.6 MMOL/L (ref 3.5–5.3)
PROT SERPL-MCNC: 8.2 G/DL (ref 6.4–8.2)
RBC # BLD AUTO: 4.54 MILLION/UL (ref 3.88–5.62)
RSV B RNA SPEC QL NAA+PROBE: NORMAL
SODIUM SERPL-SCNC: 138 MMOL/L (ref 136–145)
TRIGL SERPL-MCNC: 83 MG/DL
TSH SERPL DL<=0.05 MIU/L-ACNC: 2.33 UIU/ML (ref 0.36–3.74)
VALPROATE SERPL-MCNC: 110 UG/ML (ref 50–100)
WBC # BLD AUTO: 11.68 THOUSAND/UL (ref 4.31–10.16)

## 2018-01-13 PROCEDURE — 86592 SYPHILIS TEST NON-TREP QUAL: CPT | Performed by: PSYCHIATRY & NEUROLOGY

## 2018-01-13 PROCEDURE — 82140 ASSAY OF AMMONIA: CPT | Performed by: NURSE PRACTITIONER

## 2018-01-13 PROCEDURE — 80053 COMPREHEN METABOLIC PANEL: CPT | Performed by: PSYCHIATRY & NEUROLOGY

## 2018-01-13 PROCEDURE — 85025 COMPLETE CBC W/AUTO DIFF WBC: CPT | Performed by: PSYCHIATRY & NEUROLOGY

## 2018-01-13 PROCEDURE — 80164 ASSAY DIPROPYLACETIC ACD TOT: CPT | Performed by: PSYCHIATRY & NEUROLOGY

## 2018-01-13 PROCEDURE — 80061 LIPID PANEL: CPT | Performed by: PSYCHIATRY & NEUROLOGY

## 2018-01-13 RX ORDER — POTASSIUM CHLORIDE 20 MEQ/1
20 TABLET, EXTENDED RELEASE ORAL ONCE
Status: COMPLETED | OUTPATIENT
Start: 2018-01-13 | End: 2018-01-13

## 2018-01-13 RX ORDER — ECHINACEA PURPUREA EXTRACT 125 MG
1 TABLET ORAL 3 TIMES DAILY
Status: DISCONTINUED | OUTPATIENT
Start: 2018-01-13 | End: 2018-01-17 | Stop reason: HOSPADM

## 2018-01-13 RX ORDER — LORATADINE 10 MG/1
10 TABLET ORAL DAILY
Status: DISCONTINUED | OUTPATIENT
Start: 2018-01-14 | End: 2018-01-17 | Stop reason: HOSPADM

## 2018-01-13 RX ADMIN — ACETAMINOPHEN 975 MG: 325 TABLET, FILM COATED ORAL at 09:45

## 2018-01-13 RX ADMIN — RISPERIDONE 3 MG: 3 TABLET, FILM COATED ORAL at 17:52

## 2018-01-13 RX ADMIN — RISPERIDONE 3 MG: 3 TABLET, FILM COATED ORAL at 09:45

## 2018-01-13 RX ADMIN — POTASSIUM CHLORIDE 20 MEQ: 1500 TABLET, EXTENDED RELEASE ORAL at 17:52

## 2018-01-13 RX ADMIN — DIVALPROEX SODIUM 500 MG: 500 TABLET, DELAYED RELEASE ORAL at 09:46

## 2018-01-13 RX ADMIN — DIVALPROEX SODIUM 750 MG: 500 TABLET, DELAYED RELEASE ORAL at 21:50

## 2018-01-13 RX ADMIN — AMLODIPINE BESYLATE 5 MG: 5 TABLET ORAL at 09:45

## 2018-01-13 NOTE — PROGRESS NOTES
Pt records reviewed, now with no fever since yesterday occ tachycardic  I have spoken with the lab as flu pcr not yet resulted  Lab at this time report to me that  The result is indeterminate and they have to rerun will take about another two to three hours they will call me  bp stable  Chest xray with no active pulmonary disease  Pt on his regular regimen of meds will continue to monitor

## 2018-01-13 NOTE — H&P
H&P- Ladonna Seymour 1995, 26 y o  male MRN: [id number]    Unit/Bed#: FB6 769-01 Encounter: [id number]    Primary Care Provider: Arlene Montoya MD   Date and time admitted to hospital: 1/12/2018  4:42 AM        Tachycardia   Assessment & Plan    Pt has some anxiety but feel tachycardia secondary to elevated temp   Needs to take in oral fluid/water  Will monitor   chk chest xray and tsh   Labs outstanding         Fever   Assessment & Plan    R/o flu, however per mothers report in notes pt has been "sick/not well" for the last week ? Flu  Pt states he was running a fever when asked how he knew he said because he was sick  Place pt on droplet precautions, difficult in psych unit pt being moved to single room   Obtain flu pcr, discussed with nursing  Per records pt has been hallucinating ? noncomliance with meds vs viral illness such as flu  Sp 1 liter ivf in er  Pt is with low grade temps now  Encourage po oral intake /fluids        Hypertension   Assessment & Plan    sbp acceptable   No pain   However with fever likely contributing to tachycardia         Hypokalemia   Assessment & Plan    Pt received 20meq will add additional 20 meq now   Pt has good appetiti            VTE Prophylaxis: Reason for no pharmacologic prophylaxis ambulatory   / reason for no mechanical VTE prophylaxis ambulatory in psych unit      Recommendations for Discharge:  · Follow up with pcp in one week   · Medication compliance       Counseling / Coordination of Care Time: 45 minutes  Greater than 50% of total time spent on patient counseling and coordination of care  Collaboration of Care: Were Recommendations Directly Discussed with Primary Treatment Team? - No     History of Present Illness:    Ladonna Seymour is a 26 y o  male who is originally admitted to the [facility] on 1/12/2018 due to suicidal ideation   We are consulted for tachycardia  pt is very poor historian   He points to his temporal area states that this is the reason he is here  Reportedly pt states that he was having auditory hallucinations telling himself to kill himself and his parents  Pt reports hearing voices for the last week now  Pt denies any etoh, drug or smoking history  He reports to me that he no longer feels that he wants to harm his mother  Per nursing documentation patient had not filled his scripts since may of 2017  Pt does have history significant for mental retardation, htn and ashma  We are consulted for tachycardia  On review of records pt is noted to be tachycardic with some low grade fevers  Pt is very poor historian and reports that he has been "sick" for the last week where he says he had a fever  Pt appears to have not been compliant with his medications as well as sick this last week at home  Review of Systems:    Review of Systems   Constitutional: Positive for fever (pt reports having fever but unable to say why he feels he was running fever )  Negative for activity change, appetite change, chills, diaphoresis and unexpected weight change  HENT: Positive for postnasal drip and rhinorrhea  Negative for congestion, drooling, ear discharge, ear pain, facial swelling, sinus pain, sneezing and sore throat  Eyes: Negative for photophobia, pain, discharge, redness, itching and visual disturbance  Respiratory: Positive for cough (occ cough non productive) and shortness of breath  Negative for choking, chest tightness, wheezing and stridor  Cardiovascular: Negative for chest pain, palpitations and leg swelling  Gastrointestinal: Negative for abdominal distention, abdominal pain, constipation, diarrhea, nausea and vomiting  Genitourinary: Negative for decreased urine volume, difficulty urinating, dysuria, enuresis, flank pain, frequency and urgency  Skin: Negative for pallor, rash and wound  Neurological: Negative for dizziness, tremors, facial asymmetry, weakness, light-headedness, numbness and headaches     Psychiatric/Behavioral: Positive for behavioral problems  Negative for agitation, decreased concentration, dysphoric mood, hallucinations, self-injury, sleep disturbance and suicidal ideas (denies self harm but wanted to "kill his mother")  Past Medical and Surgical History:     Past Medical History:   Diagnosis Date    Anxiety     Asthma     Hypertension     Mental retardation     Schizoaffective disorder ([facility] [address] )        Past Surgical History:   Procedure Laterality Date    HAND SURGERY      right hand       Meds/Allergies:    PTA meds:   Prior to Admission Medications   Prescriptions Last Dose Informant Patient Reported? Taking? amLODIPine (NORVASC) 5 mg tablet   No No   Sig: Take 1 tablet by mouth daily At 9AM   divalproex sodium (DEPAKOTE ER) 500 mg 24 hr tablet   No No   Sig: Take 1 tab (500mg) in morning (9AM), then take 2 tabs (1000mg) in evening (6PM) by mouth daily   divalproex sodium (DEPAKOTE) 250 mg EC tablet   Yes No   Sig: Take 250 mg by mouth daily at bedtime Take 3 tablets at bedtime   risperiDONE (RisperDAL) 2 mg tablet   No No   Sig: Take 1 tablet by mouth 2 (two) times a day At 9AM and 6PM   Patient taking differently: Take 1 mg by mouth daily At 9AM and 6PM    risperiDONE microspheres (RisperDAL CONSTA) 37 5 MG injection   No No   Sig: Inject 2 mL into the shoulder, thigh, or buttocks every 14 (fourteen) days Next injection due 10/17/17      Facility-Administered Medications: None       Allergies:    Allergies   Allergen Reactions    Bee Venom Anaphylaxis    Penicillins        Social History:     Marital Status: Single    Substance Use History:   History   Alcohol Use No     History   Smoking Status    Never Smoker   Smokeless Tobacco    Never Used     Comment: non-smoker     History   Drug Use No       Family History:    Family History   Problem Relation Age of Onset    No Known Problems Mother     No Known Problems Father     No Known Problems Sister     No Known Problems Brother     No Known Problems Maternal Aunt     No Known Problems Paternal Aunt     No Known Problems Maternal Uncle     No Known Problems Paternal Uncle     No Known Problems Maternal Grandfather     No Known Problems Maternal Grandmother     No Known Problems Paternal Grandfather     No Known Problems Paternal Grandmother     No Known Problems Cousin     ADD / ADHD Neg Hx     Alcohol abuse Neg Hx     Anxiety disorder Neg Hx     Bipolar disorder Neg Hx     Dementia Neg Hx     Depression Neg Hx     Drug abuse Neg Hx     OCD Neg Hx     Paranoid behavior Neg Hx     Schizophrenia Neg Hx     Seizures Neg Hx     Self-Injury Neg Hx     Suicide Attempts Neg Hx        Physical Exam:     Vitals:   Blood Pressure: 142/90 (01/12/18 1549)  Pulse: 90 (01/12/18 1549)  Temperature: 100 1 °F (37 8 °C) (01/12/18 1549)  Temp Source: Oral (01/12/18 1549)  Respirations: 16 (01/12/18 1549)  Height: 5' 7" (170 2 cm) (01/12/18 1324)  Weight - Scale: 136 kg (299 lb) (01/12/18 1324)  SpO2: 98 % (01/12/18 1635)    Physical Exam   Constitutional: No distress  Morbidly obese    HENT:   Head: Normocephalic and atraumatic  Mouth/Throat: No oropharyngeal exudate  Eyes: Conjunctivae and EOM are normal  Pupils are equal, round, and reactive to light  Right eye exhibits no discharge  Left eye exhibits no discharge  No scleral icterus  Neck: No JVD present  No tracheal deviation present  No thyromegaly present  Cardiovascular: Regular rhythm  Exam reveals no gallop and no friction rub  No murmur heard  Tachycardia    Pulmonary/Chest: No stridor  No respiratory distress  He has no wheezes  He has rales (faint crackles in left lobe)  He exhibits no tenderness  Abdominal: Bowel sounds are normal  He exhibits no distension and no mass  There is no tenderness  There is no rebound and no guarding  Musculoskeletal: He exhibits no edema, tenderness or deformity  Lymphadenopathy:     He has no cervical adenopathy     Neurological: He is alert    Skin: No rash noted  He is not diaphoretic  No erythema  No pallor  Psychiatric:   Pt seems a little anxious            Additional Data:     Lab Results: I have personally reviewed pertinent reports  Results from last 7 days  Lab Units 01/12/18  0608   WBC Thousand/uL 9 85   HEMOGLOBIN g/dL 13 5   HEMATOCRIT % 38 0   PLATELETS Thousands/uL 185   NEUTROS PCT % 63   LYMPHS PCT % 21   MONOS PCT % 15*   EOS PCT % 1       Results from last 7 days  Lab Units 01/12/18  0608   SODIUM mmol/L 138   POTASSIUM mmol/L 3 4*   CHLORIDE mmol/L 105   CO2 mmol/L 24   BUN mg/dL 7   CREATININE mg/dL 0 63   CALCIUM mg/dL 8 6   TOTAL PROTEIN g/dL 7 8   BILIRUBIN TOTAL mg/dL 0 58   ALK PHOS U/L 67   ALT U/L 53   AST U/L 24   GLUCOSE RANDOM mg/dL 105             Lab Results   Component Value Date/Time    HGBA1C 5 1 06/17/2017 06:19 AM    HGBA1C 5 2 07/12/2016 06:49 AM       Imaging: I have personally reviewed pertinent reports  XR chest pa & lateral    (Results Pending)       EKG, Pathology, and Other Studies Reviewed on Admission:   · EKG: Sinus tachycardia  T wave abnormality,     ** Please Note: This note has been constructed using a voice recognition system   **

## 2018-01-13 NOTE — PROGRESS NOTES
Since Depakote level was not drawn last evening before HS Depakote dose given, it has been reordered for this a m

## 2018-01-13 NOTE — TREATMENT PLAN
TREATMENT PLAN REVIEW - [address] 26 y o  1995 male MRN: [id number]    [address] [place] [address] Room / Bed: [place] [address]/[facility] [id number] Encounter: [id number]          Admit Date/Time:  1/12/2018  4:42 AM    Treatment Team: Attending Provider: Luca Max MD; Consulting Physician: Stephanie Jon DO; : Yennifer Walker; Consulting Physician: Eugune Libman, MD; Nurse Practitioner: ZBINGIEW Gant; Patient Care Technician: Anibal Maldonado;  Patient Care Technician: Re Lopez; Patient Care Technician: David Lebron; Patient Care Technician: Eric Patino; Patient Care Technician: Ragini Dumont; Licensed Practical Nurse: Martha Collins    Diagnosis: Active Problems:    Intellectual disability    Schizoaffective disorder, bipolar type ([facility] [address] )    Fever    Tachycardia    Hypokalemia    Hypertension      Patient Strengths/Assets: communication skills    Patient Barriers/Limitations: difficulty adapting, lack of stable employment, limited motivation, low self esteem, poor insight    Short Term Goals: decrease in paranoid thoughts, decrease in psychotic symptoms, decrease in suicidal thoughts, decrease in homicidal thoughts, improvement in insight, improvement in reality testing, improvement in reasoning ability, mood stabilization    Long Term Goals: stabilization of mood, free of suicidal thoughts, free of homicidal thoughts, improved insight    Progress Towards Goals: continue psychiatric medications as prescribed, insight is improving, mood is stabilizing    Recommended Treatment: medication management, patient medication education, group therapy, milieu therapy, continued Behavioral Health psychiatric evaluation/assessment process, family therapy    Treatment Frequency: daily medication monitoring, group and milieu therapy daily, monitoring through interdisciplinary rounds, monitoring through weekly patient care conferences    Expected Discharge Date:  1/30/2018    Discharge Plan: discharge to a homeless shelter, discharge to family care, discharge to home, participate in family therapy, referral for outpatient medication management with a psychiatrist    Treatment Plan Created/Updated By: Augustin Hatch MD

## 2018-01-13 NOTE — PLAN OF CARE
Problem: Alteration in Thoughts and Perception  Goal: Treatment Goal: Gain control of psychotic behaviors/thinking, reduce/eliminate presenting symptoms and demonstrate improved reality functioning upon discharge  Outcome: Progressing    Goal: Verbalize thoughts and feelings  Interventions:  - Promote a nonjudgmental and trusting relationship with the patient through active listening and therapeutic communication  - Assess patient's level of functioning, behavior and potential for risk  - Engage patient in 1 on 1 interactions for a minimum of 15 minutes each session  - Encourage patient to express fears, feelings, frustrations, and discuss symptoms    - [place] patient to reality, help patient recognize reality-based thinking   - Administer medications as ordered and assess for potential side effects  - Provide the patient education related to the signs and symptoms of the illness and desired effects of prescribed medications   Outcome: Progressing    Goal: Refrain from acting on delusional thinking/internal stimuli  Interventions:  - Monitor patient closely, per order   - Utilize least restrictive measures   - Set reasonable limits, give positive feedback for acceptable   - Administer medications as ordered and monitor of potential side effects   Outcome: Progressing    Goal: Agree to be compliant with medication regime, as prescribed and report medication side effects  Interventions:  - Offer appropriate PRN medication and supervise ingestion; conduct aims, as needed    Outcome: Progressing    Goal: Attend and participate in unit activities, including therapeutic, recreational, and educational groups  Interventions:  - Provide therapeutic and educational activities daily, encourage attendance and participation, and document same in the medical record    Outcome: Progressing    Goal: Recognize dysfunctional thoughts, communicate reality-based thoughts at the time of discharge  Interventions:  - Provide medication and psycho-education to assist patient in compliance and developing insight into his/her illness    Outcome: Progressing    Goal: Complete daily ADLs, including personal hygiene independently, as able  Interventions:  - Observe, teach, and assist patient with ADLS  - Monitor and promote a balance of rest/activity, with adequate nutrition and elimination    Outcome: Progressing      Problem: Depression  Goal: Treatment Goal: Demonstrate behavioral control of depressive symptoms, verbalize feelings of improved mood/affect, and adopt new coping skills prior to discharge  Outcome: Progressing    Goal: Verbalize thoughts and feelings  Interventions:  - Assess and re-assess patient's level of risk   - Engage patient in 1:1 interactions, daily, for a minimum of 15 minutes   - Encourage patient to express feelings, fears, frustrations, hopes    Outcome: Progressing    Goal: Refrain from harming self  Interventions:  - Monitor patient closely, per order   - Supervise medication ingestion, monitor effects and side effects    Outcome: Progressing    Goal: Refrain from isolation  Interventions:  - Develop a trusting relationship   - Encourage socialization    Outcome: Progressing    Goal: Refrain from self-neglect  Outcome: Progressing    Goal: Attend and participate in unit activities, including therapeutic, recreational, and educational groups  Interventions:  - Provide therapeutic and educational activities daily, encourage attendance and participation, and document same in the medical record    Outcome: Progressing    Goal: Complete daily ADLs, including personal hygiene independently, as able  Interventions:  - Observe, teach, and assist patient with ADLS  -  Monitor and promote a balance of rest/activity, with adequate nutrition and elimination    Outcome: Progressing      Problem: Anxiety  Goal: Anxiety is at manageable level  Interventions:  - Assess and monitor patient's anxiety level     - Monitor for signs and symptoms of anxiety both physical and emotional (heart palpitations, chest pain, shortness of breath, headaches, nausea, feeling jumpy, restlessness, irritable, apprehensive)  - Collaborate with interdisciplinary team and initiate plan and interventions as ordered    - [place] patient to unit/surroundings  - Explain treatment plan  - Encourage participation in care  - Encourage verbalization of concerns/fears  - Identify coping mechanisms  - Assist in developing anxiety-reducing skills  - Administer/offer alternative therapies  - Limit or eliminate stimulants   Outcome: Progressing

## 2018-01-13 NOTE — H&P
Psychiatric Evaluation - Behavioral Health     Chief Complaint: "I had a fight with my mom and dad "    History of Present Illness this is a 59-year-old white man, admitted from the emergency room, he is on a voluntary admission this is his 9th psychiatric hospitalization, has a history of schizoaffective disorder mental retardation hypertension and asthma, he reports he has a fight with his mom and dad over the TV, he wanted to change his TV channel, father just a great, led to a big argument, he got upset, made suicidal statement to kill himself, kill his mother, kill his father, that resulted him to be brought to the emergency room, he also had a mild symptoms of flu with some chills and aches, for which he was checked at the emergency room, he has been on the risperidone 3 mg twice a day, and he also takes Depakote and his Depakote level was reported at 110 and the dose of Depakote was adjusted to 500 mg in the morning 750 at night with follow up Depakote level on Sunday morning, patient called to report that he has a long history of mood swings psychosis with 8 hospitalization, patient was not in the mood to talk too much she was not talkative however looking and reviewing the notes they also have a history of some mental retardation hypertension and asthma  Patient at that time denied any homicidal thoughts to his family or anyone else as well as suicidal thoughts  He did report that he is not sleeping well does not feel rested and does have aches and chills and feverish    He is on a droplet precaution        Psychiatric Review Of Systems:  History of schizoaffective disorder with psychosis and mood swings and mental retardation and multiple psychiatric hospitalization  Historical Information    Past Psychiatric History:  As noted in the present illness    Substance Abuse History:  Denied      Family Psychiatric History:  He resides with mom and dad and denied any psychiatric history in the family    Social History:  Education:  12 grade did not graduate from high school  Learning Disabilities:  Denied  Marital history:  Is single never  no children  Living arrangement, social support:  His family is supportive mom and dad any results with them  Occupational History:  States he has never worked  Functioning Relationships:  Has a difficulty to express himself clearly  Other Pertinent History:  Chronic anemia has never worked    Traumatic History:   Abuse:  Denied  Other Traumatic Events:  None reported      Medical Review Of Systems:   Moderate obesity hypertension asthma and is on medication for these  issues        all current active meds have been reviewed and current meds:   Current Facility-Administered Medications   Medication Dose Route Frequency    acetaminophen (TYLENOL) tablet 650 mg  650 mg Oral Q6H PRN    acetaminophen (TYLENOL) tablet 975 mg  975 mg Oral Q6H PRN    albuterol (PROVENTIL HFA,VENTOLIN HFA) inhaler 2 puff  2 puff Inhalation Q4H PRN    aluminum-magnesium hydroxide-simethicone (MYLANTA) 200-200-20 mg/5 mL oral suspension 30 mL  30 mL Oral Q4H PRN    amLODIPine (NORVASC) tablet 5 mg  5 mg Oral Daily    benztropine (COGENTIN) injection 1 mg  1 mg Intramuscular Q6H PRN    benztropine (COGENTIN) tablet 1 mg  1 mg Oral Q6H PRN    divalproex sodium (DEPAKOTE) EC tablet 500 mg  500 mg Oral Daily    divalproex sodium (DEPAKOTE) EC tablet 750 mg  750 mg Oral HS    haloperidol (HALDOL) tablet 5 mg  5 mg Oral Q8H PRN    haloperidol lactate (HALDOL) injection 5 mg  5 mg Intramuscular Q6H PRN    hydrOXYzine HCL (ATARAX) tablet 25 mg  25 mg Oral Q6H PRN    ibuprofen (MOTRIN) tablet 600 mg  600 mg Oral Q8H PRN    LORazepam (ATIVAN) 2 mg/mL injection 1 mg  1 mg Intramuscular Q6H PRN    LORazepam (ATIVAN) tablet 1 mg  1 mg Oral Q8H PRN    magnesium hydroxide (MILK OF MAGNESIA) 400 mg/5 mL oral suspension 30 mL  30 mL Oral Daily PRN    OLANZapine (ZyPREXA) IM injection 10 mg  10 mg Intramuscular Q3H PRN    OLANZapine (ZyPREXA) tablet 5 mg  5 mg Oral Q3H PRN    risperiDONE (RisperDAL M-TABS) dispersible tablet 1 mg  1 mg Oral Q3H PRN    risperiDONE (RisperDAL) tablet 3 mg  3 mg Oral BID    traZODone (DESYREL) tablet 50 mg  50 mg Oral HS PRN        Allergies   Allergen Reactions    Latex Swelling     facial    Onion Hives    Penicillins Rash    Sulfa Antibiotics Rash and Other (See Comments)     Abdominal pain       Objective  Vital signs in last 24 hours:  Pulse:  [108] 108  Respirations:  [16] 16  Blood Pressure: (115)/(65) 115/65    No intake or output data in the 24 hours ending 01/09/16 9597    Mental Status Evaluation:  Appearance: Moderately reduced, seem like he is physically sick, coughs at time   Behavior:  In different, not very talkative   Speech:  Is somewhat difficult to understand the way he speaks   Mood:  Anxious   Affect:  Blunted   Language: english   Thought Process:  Goal directed   Thought Content: Within normal range   Perceptual Disturbances: Denied auditory visual hallucination at this time   Risk Potential: Denied being suicidal or homicidal   Sensorium:  Intact   Cognition:  Fair   Consciousness:  Large oriented x3   Attention: Fair   Intellect: Limited   Fund of Knowledge: Limited   Insight:  poor   Judgment: Limited   Muscle Strength and Tone: Normal   Gait/Station: Within normal limits   Motor Activity: Within normal limit     Lab Results:  VPA level 110  Imaging Studies: wnl  EKG, Pathology, and Other Studies: wnl    Assessment and plan Schizoaffective bipolar disorder                                       MR                                       HTN, Asthma, Flu  Counseling / Coordination of Care  Total floor / unit time spent today 50  minutes  Greater than 50% of total time was spent with the patient and / or family counseling and / or coordination of care   A description of the counseling / coordination of care: Ct current meds, and follow up with depakote level on Sunday with adjusted dose of less 250 mg day of total dose at which VAP level was 110        Antonina Leahy MD

## 2018-01-13 NOTE — ASSESSMENT & PLAN NOTE
Pt has some anxiety but feel tachycardia secondary to elevated temp   Needs to take in oral fluid/water  Will monitor   chk chest xray and tsh   Labs outstanding

## 2018-01-13 NOTE — PROGRESS NOTES
Nasal swab for Flu has been obtained and sent to lab  It will be resulted within 24hours  In the meantime he is in isolation in room 769 and is on Droplet Precautions  CXR ordered and it has just been completed  Pending results

## 2018-01-13 NOTE — CONSULTS
Consultation - Ladonna Seymour 26 y o  male MRN: [id number]    Unit/Bed#: [facility] 769-01 Encounter: [id number]      Assessment/Plan     Assessment:  Schizoaffective disorder  Mental retardation  Hypertension  Asthma        Plan:  Admitted to the behavioral health unit for further psychiatric evaluation and treatment  Consult Internal Medicine for evaluation of tachycardia and fever to rule out influenza  Continue home medications as prescribed    History of Present Illness     HPI: Ladonna Seymour is a 26y o  year old male who presented to the emergency room with complaints of command auditory hallucinations  Patient admits to hearing voices telling him to kill himself as well as his mother and father  Patient has had prior psychiatric admissions  At the time of my interview patient was not very talkative  He only admitted to thoughts of killing his mother and denied thoughts of killing himself  He denied visual hallucinations  Patient also was found to be tachycardic with fevers  He states he has had a sore throat and a cough  He is admitted this time for further psychiatric evaluation as well as medical evaluation to rule out influenza  Consults    Review of Systems   Constitutional: Positive for chills and fever  Negative for appetite change, diaphoresis, fatigue and unexpected weight change  HENT: Positive for congestion and sore throat  Negative for ear pain, hearing loss, nosebleeds, trouble swallowing and voice change  Eyes: Negative for pain and visual disturbance  Respiratory: Positive for cough  Negative for chest tightness, shortness of breath and wheezing  Cardiovascular: Negative for chest pain, palpitations and leg swelling  Gastrointestinal: Negative for abdominal pain, blood in stool, constipation, diarrhea, nausea and vomiting  Endocrine: Negative for cold intolerance, heat intolerance and polydipsia     Genitourinary: Negative for difficulty urinating, dysuria, frequency, hematuria and urgency  Musculoskeletal: Negative for arthralgias, back pain, gait problem, joint swelling, myalgias, neck pain and neck stiffness  Skin: Negative for color change, pallor, rash and wound  Allergic/Immunologic: Negative for environmental allergies, food allergies and immunocompromised state  Neurological: Negative for dizziness, tremors, seizures, syncope, speech difficulty, weakness, light-headedness, numbness and headaches  Hematological: Negative for adenopathy  Does not bruise/bleed easily  Psychiatric/Behavioral: Positive for dysphoric mood, hallucinations, self-injury and suicidal ideas  Negative for confusion  The patient is not nervous/anxious and is not hyperactive  Historical Information   Past Medical History:   Diagnosis Date    Anxiety     Asthma     Hypertension     Mental retardation     Schizoaffective disorder ([facility] [address] )      Past Surgical History:   Procedure Laterality Date    HAND SURGERY      right hand     Social History   History   Alcohol Use No     History   Drug Use No     History   Smoking Status    Never Smoker   Smokeless Tobacco    Never Used     Comment: non-smoker     Family History: non-contributory    Meds/Allergies   all current active meds have been reviewed  Allergies   Allergen Reactions    Bee Venom Anaphylaxis    Penicillins        Objective   Vitals: Blood pressure 142/90, pulse 90, temperature 100 1 °F (37 8 °C), temperature source Oral, resp  rate 16, height 5' 7" (1 702 m), weight 136 kg (299 lb), SpO2 98 %  Intake/Output Summary (Last 24 hours) at 01/12/18 2036  Last data filed at 01/12/18 0746   Gross per 24 hour   Intake             1000 ml   Output                0 ml   Net             1000 ml     Invasive Devices          No matching active lines, drains, or airways          Physical Exam   Constitutional: He is oriented to person, place, and time  He appears well-developed and well-nourished  No distress     HENT:   Head: Normocephalic and atraumatic  Eyes: Conjunctivae and EOM are normal  Pupils are equal, round, and reactive to light  Right eye exhibits no discharge  Left eye exhibits no discharge  No scleral icterus  Neck: Normal range of motion  Neck supple  No tracheal deviation present  No thyromegaly present  Cardiovascular: Regular rhythm, S1 normal, S2 normal, normal heart sounds and intact distal pulses  Tachycardia present  No murmur heard  Pulmonary/Chest: Effort normal and breath sounds normal  No respiratory distress  He has no wheezes  He has no rales  He exhibits no tenderness  Abdominal: Soft  Bowel sounds are normal  He exhibits no distension  There is no tenderness  There is no rebound and no guarding  Obese   Musculoskeletal: Normal range of motion  He exhibits no edema, tenderness or deformity  Lymphadenopathy:     He has no cervical adenopathy  Neurological: He is alert and oriented to person, place, and time  No cranial nerve deficit  Coordination normal    Skin: Skin is warm  No rash noted  He is not diaphoretic  No erythema  No pallor  Psychiatric: His speech is delayed  He is slowed and withdrawn  He expresses impulsivity  He exhibits a depressed mood  He is communicative       Recent Results (from the past 36 hour(s))   POCT alcohol breath test    Collection Time: 01/12/18  5:47 AM   Result Value Ref Range    EXTBreath Alcohol 0 000    ECG 12 lead    Collection Time: 01/12/18  5:57 AM   Result Value Ref Range    Ventricular Rate 109 BPM    Atrial Rate 109 BPM    IN Interval 148 ms    QRSD Interval 92 ms    QT Interval 350 ms    QTC Interval 471 ms    P [place] 13 degrees    QRS Axis 17 degrees    T Wave Axis 4 degrees   CBC and differential    Collection Time: 01/12/18  6:08 AM   Result Value Ref Range    WBC 9 85 4 31 - 10 16 Thousand/uL    RBC 4 44 3 88 - 5 62 Million/uL    Hemoglobin 13 5 12 0 - 17 0 g/dL    Hematocrit 38 0 36 5 - 49 3 %    MCV 86 82 - 98 fL    MCH 30 4 26 8 - 34 3 pg MCHC 35 5 31 4 - 37 4 g/dL    RDW 12 4 11 6 - 15 1 %    MPV 8 6 (L) 8 9 - 12 7 fL    Platelets 382 445 - 014 Thousands/uL    nRBC 0 /100 WBCs    Neutrophils Relative 63 43 - 75 %    Lymphocytes Relative 21 14 - 44 %    Monocytes Relative 15 (H) 4 - 12 %    Eosinophils Relative 1 0 - 6 %    Basophils Relative 0 0 - 1 %    Neutrophils Absolute 6 13 1 85 - 7 62 Thousands/µL    Lymphocytes Absolute 2 06 0 60 - 4 47 Thousands/µL    Monocytes Absolute 1 47 (H) 0 17 - 1 22 Thousand/µL    Eosinophils Absolute 0 12 0 00 - 0 61 Thousand/µL    Basophils Absolute 0 01 0 00 - 0 10 Thousands/µL   Comprehensive metabolic panel    Collection Time: 01/12/18  6:08 AM   Result Value Ref Range    Sodium 138 136 - 145 mmol/L    Potassium 3 4 (L) 3 5 - 5 3 mmol/L    Chloride 105 100 - 108 mmol/L    CO2 24 21 - 32 mmol/L    Anion Gap 9 4 - 13 mmol/L    BUN 7 5 - 25 mg/dL    Creatinine 0 63 0 60 - 1 30 mg/dL    Glucose 105 65 - 140 mg/dL    Calcium 8 6 8 3 - 10 1 mg/dL    AST 24 5 - 45 U/L    ALT 53 12 - 78 U/L    Alkaline Phosphatase 67 46 - 116 U/L    Total Protein 7 8 6 4 - 8 2 g/dL    Albumin 3 5 3 5 - 5 0 g/dL    Total Bilirubin 0 58 0 20 - 1 00 mg/dL    eGFR 140 ml/min/1 73sq m   Rapid drug screen, urine    Collection Time: 01/12/18  6:29 AM   Result Value Ref Range    Amph/Meth UR Negative Negative    Barbiturate Ur Negative Negative    Benzodiazepine Urine Negative Negative    Cocaine Urine Negative Negative    Methadone Urine Negative Negative    Opiate Urine Negative Negative    PCP Ur Negative Negative    THC Urine Negative Negative   Urinalysis with reflex to microscopic    Collection Time: 01/12/18  8:20 PM   Result Value Ref Range    Color, UA Dk Yellow     Clarity, UA Turbid     Specific [place], UA 1 023 1 003 - 1 030    pH, UA 6 0 4 5 - 8 0    Leukocytes, UA Negative Negative    Nitrite, UA Negative Negative    Protein, UA 30 (1+) (A) Negative mg/dl    Glucose, UA Negative Negative mg/dl    Ketones, UA Trace (A) Negative mg/dl    Urobilinogen, UA 1 0 0 2, 1 0 E U /dl E U /dl    Bilirubin, UA Interference- unable to analyze (A) Negative    Blood, UA Negative Negative   Urine Microscopic    Collection Time: 01/12/18  8:22 PM   Result Value Ref Range    RBC, UA None Seen None Seen, 0-5 /hpf    WBC, UA 2-4 (A) None Seen, 0-5, 5-55, 5-65 /hpf    Epithelial Cells None Seen None Seen, Occasional /hpf    Bacteria, UA None Seen None Seen, Occasional /hpf    Hyaline Casts, UA 5-10 (A) None Seen /lpf     Lab Results: I have personally reviewed pertinent reports  Imaging Studies: I have personally reviewed pertinent reports  EKG, Pathology, and Other Studies: I have personally reviewed pertinent reports  Counseling / Coordination of Care  Total floor / unit time spent today 30 minutes  Greater than 50% of total time was spent with the patient and / or family counseling and / or coordination of care  This text is generated with voice recognition software  There may be translation, syntax,  or grammatical errors  If you have any questions, please contact the dictating provider      Fanny Steele PA-C

## 2018-01-13 NOTE — PROGRESS NOTES
Patient remains on Droplet Precautions in Room 69 a single room due to his respiratory flu like symptoms  He does have head congestion  Nasal swab done awaiting results  Was seen by medical , they are awaiting results also   Patient does have a history of asthma and HTN  He is currently denying any si or hi   Depakote level was done this am and it was 110 and Depakote was adjusted to 500 in the am and 750 at hs  Patient has been secluded to his room today and he is aware of that  He is cooperative and resting   Given extra fluids and observed

## 2018-01-14 RX ORDER — DIVALPROEX SODIUM 500 MG/1
500 TABLET, DELAYED RELEASE ORAL DAILY
Status: DISCONTINUED | OUTPATIENT
Start: 2018-01-15 | End: 2018-01-17 | Stop reason: HOSPADM

## 2018-01-14 RX ADMIN — RISPERIDONE 3 MG: 3 TABLET, FILM COATED ORAL at 17:26

## 2018-01-14 RX ADMIN — LORATADINE 10 MG: 10 TABLET ORAL at 08:40

## 2018-01-14 RX ADMIN — RISPERIDONE 3 MG: 3 TABLET, FILM COATED ORAL at 08:38

## 2018-01-14 RX ADMIN — Medication 1 SPRAY: at 17:30

## 2018-01-14 RX ADMIN — DIVALPROEX SODIUM 750 MG: 500 TABLET, DELAYED RELEASE ORAL at 21:32

## 2018-01-14 RX ADMIN — ACETAMINOPHEN 650 MG: 325 TABLET, FILM COATED ORAL at 08:40

## 2018-01-14 RX ADMIN — AMLODIPINE BESYLATE 5 MG: 5 TABLET ORAL at 08:38

## 2018-01-14 RX ADMIN — Medication 1 SPRAY: at 21:33

## 2018-01-14 RX ADMIN — Medication 1 SPRAY: at 08:44

## 2018-01-14 NOTE — PROGRESS NOTES
Patient is off of Droplet Precautions, his nasal swab came back negative for Flu  He remains congested but is out and about  Given a shower and gown to change into  Smiling and cooperative in dayroom   Taking all medications as ordered

## 2018-01-14 NOTE — ASSESSMENT & PLAN NOTE
R/o flu, however per mothers report in notes pt has been "sick/not well" for the last week ?  Flu  Pt states he was running a fever when asked how he knew he said because he was sick  Place pt on droplet precautions, difficult in psych unit pt was moved to single room   Per records pt has been hallucinating ? noncomliance with meds vs viral illness such as flu  Sp 1 liter ivf in er  Pt is with low grade temps now  Encourage po oral intake Cruz Roth

## 2018-01-14 NOTE — PLAN OF CARE
Problem: Alteration in Thoughts and Perception  Goal: Treatment Goal: Gain control of psychotic behaviors/thinking, reduce/eliminate presenting symptoms and demonstrate improved reality functioning upon discharge  Outcome: Progressing    Goal: Verbalize thoughts and feelings  Interventions:  - Promote a nonjudgmental and trusting relationship with the patient through active listening and therapeutic communication  - Assess patient's level of functioning, behavior and potential for risk  - Engage patient in 1 on 1 interactions for a minimum of 15 minutes each session  - Encourage patient to express fears, feelings, frustrations, and discuss symptoms    - [place] patient to reality, help patient recognize reality-based thinking   - Administer medications as ordered and assess for potential side effects  - Provide the patient education related to the signs and symptoms of the illness and desired effects of prescribed medications   Outcome: Progressing    Goal: Refrain from acting on delusional thinking/internal stimuli  Interventions:  - Monitor patient closely, per order   - Utilize least restrictive measures   - Set reasonable limits, give positive feedback for acceptable   - Administer medications as ordered and monitor of potential side effects   Outcome: Progressing    Goal: Agree to be compliant with medication regime, as prescribed and report medication side effects  Interventions:  - Offer appropriate PRN medication and supervise ingestion; conduct aims, as needed    Outcome: Progressing    Goal: Attend and participate in unit activities, including therapeutic, recreational, and educational groups  Interventions:  - Provide therapeutic and educational activities daily, encourage attendance and participation, and document same in the medical record    Outcome: Progressing    Goal: Recognize dysfunctional thoughts, communicate reality-based thoughts at the time of discharge  Interventions:  - Provide medication and psycho-education to assist patient in compliance and developing insight into his/her illness    Outcome: Progressing    Goal: Complete daily ADLs, including personal hygiene independently, as able  Interventions:  - Observe, teach, and assist patient with ADLS  - Monitor and promote a balance of rest/activity, with adequate nutrition and elimination    Outcome: Progressing      Problem: Depression  Goal: Treatment Goal: Demonstrate behavioral control of depressive symptoms, verbalize feelings of improved mood/affect, and adopt new coping skills prior to discharge  Outcome: Progressing    Goal: Verbalize thoughts and feelings  Interventions:  - Assess and re-assess patient's level of risk   - Engage patient in 1:1 interactions, daily, for a minimum of 15 minutes   - Encourage patient to express feelings, fears, frustrations, hopes    Outcome: Progressing    Goal: Refrain from harming self  Interventions:  - Monitor patient closely, per order   - Supervise medication ingestion, monitor effects and side effects    Outcome: Progressing    Goal: Refrain from isolation  Interventions:  - Develop a trusting relationship   - Encourage socialization    Outcome: Progressing    Goal: Refrain from self-neglect  Outcome: Progressing    Goal: Complete daily ADLs, including personal hygiene independently, as able  Interventions:  - Observe, teach, and assist patient with ADLS  -  Monitor and promote a balance of rest/activity, with adequate nutrition and elimination    Outcome: Progressing      Problem: Anxiety  Goal: Anxiety is at manageable level  Interventions:  - Assess and monitor patient's anxiety level  - Monitor for signs and symptoms of anxiety both physical and emotional (heart palpitations, chest pain, shortness of breath, headaches, nausea, feeling jumpy, restlessness, irritable, apprehensive)  - Collaborate with interdisciplinary team and initiate plan and interventions as ordered    - [place] patient to unit/surroundings  - Explain treatment plan  - Encourage participation in care  - Encourage verbalization of concerns/fears  - Identify coping mechanisms  - Assist in developing anxiety-reducing skills  - Administer/offer alternative therapies  - Limit or eliminate stimulants   Outcome: Progressing      Problem: Risk for Violence/Aggression Toward Others  Goal: Treatment Goal: Refrain from acts of violence/aggression during length of stay, and demonstrate improved impulse control at the time of discharge  Outcome: Progressing    Goal: Verbalize thoughts and feelings  Interventions:  - Assess and re-assess patient's level of risk, every waking shift  - Engage patient in 1:1 interactions, daily, for a minimum of 15 minutes   - Allow patient to express feelings and frustrations in a safe and non-threatening manner   - Establish rapport/trust with patient    Outcome: Progressing    Goal: Refrain from harming others  Outcome: Not Progressing    Goal: Refrain from destructive acts on the environment or property  Outcome: Progressing    Goal: Control angry outbursts  Interventions:  - Monitor patient closely, per order  - Ensure early verbal de-escalation  - Monitor prn medication needs  - Set reasonable/therapeutic limits, outline behavioral expectations, and consequences   - Provide a non-threatening milieu, utilizing the least restrictive interventions    Outcome: Progressing    Goal: Attend and participate in unit activities, including therapeutic, recreational, and educational groups  Interventions:  - Provide therapeutic and educational activities daily, encourage attendance and participation, and document same in the medical record    Outcome: Progressing    Goal: Identify appropriate positive anger management techniques  Interventions:  - Offer anger management and coping skills groups   - Staff will provide positive feedback for appropriate anger control   Outcome: Progressing      Problem: DISCHARGE PLANNING  Goal: Discharge to home or other facility with appropriate resources  INTERVENTIONS:  - Identify barriers to discharge w/patient and caregiver  - Arrange for needed discharge resources and transportation as appropriate  - Identify discharge learning needs (meds, wound care, etc )  - Arrange for interpretive services to assist at discharge as needed  - Refer to Case Management Department for coordinating discharge planning if the patient needs post-hospital services based on physician/advanced practitioner order or complex needs related to functional status, cognitive ability, or social support system   Outcome: Progressing

## 2018-01-14 NOTE — PROGRESS NOTES
Very social and out watching tv with others  Told me he was anxious to go home because he knows his mother misses him

## 2018-01-14 NOTE — PROGRESS NOTES
C/O" on feeling much better,"  Report from staff regarding this patient received and record reviewed  prior to seeing this patient   Behavior over the last 24 hours:    Sleep:Good  Appetite:ok  Medication side effects:none  ROS:  Patient is also offered the flu came back negative is no longer on a droplet precautions  Mental Status Evaluation:  Appearance:  Dressed appropraitely   Behavior:  cooperative   Speech:  normal   Mood:  euthymic   Affect:  appropriate     Thought Process:  Goal directed   Thought Content:  normal   Perceptual Disturbances: Denied AV hallucination   Risk Potential: NO NICOLE    Sensorium:  normal   Cognition:  intact   Consciousness:  Alert, OX3   Attention: Fair   Insight:  fair   Judgment: fair   Gait/Station: With in normal range   Motor Activity: With in normal range     Progress Toward Goals: working on current treatment goals, no changes  Made in treatment plan   Recommended Treatment: Continue with group therapy, milieu therapy and occupational therapy  Risks, benefits and possible side effects of Medications:   Risks, benefits, and possible side effects of medications explained to patient and patient verbalizes understanding        Medications:   current meds:   Current Facility-Administered Medications   Medication Dose Route Frequency    acetaminophen (TYLENOL) tablet 650 mg  650 mg Oral Q6H PRN    acetaminophen (TYLENOL) tablet 975 mg  975 mg Oral Q6H PRN    albuterol (PROVENTIL HFA,VENTOLIN HFA) inhaler 2 puff  2 puff Inhalation Q4H PRN    aluminum-magnesium hydroxide-simethicone (MYLANTA) 200-200-20 mg/5 mL oral suspension 30 mL  30 mL Oral Q4H PRN    amLODIPine (NORVASC) tablet 5 mg  5 mg Oral Daily    benztropine (COGENTIN) injection 1 mg  1 mg Intramuscular Q6H PRN    benztropine (COGENTIN) tablet 1 mg  1 mg Oral Q6H PRN    [START ON 1/15/2018] divalproex sodium (DEPAKOTE) EC tablet 500 mg  500 mg Oral Daily    divalproex sodium (DEPAKOTE) EC tablet 750 mg 750 mg Oral HS    haloperidol (HALDOL) tablet 5 mg  5 mg Oral Q8H PRN    haloperidol lactate (HALDOL) injection 5 mg  5 mg Intramuscular Q6H PRN    hydrOXYzine HCL (ATARAX) tablet 25 mg  25 mg Oral Q6H PRN    ibuprofen (MOTRIN) tablet 600 mg  600 mg Oral Q8H PRN    loratadine (CLARITIN) tablet 10 mg  10 mg Oral Daily    LORazepam (ATIVAN) 2 mg/mL injection 1 mg  1 mg Intramuscular Q6H PRN    LORazepam (ATIVAN) tablet 1 mg  1 mg Oral Q8H PRN    magnesium hydroxide (MILK OF MAGNESIA) 400 mg/5 mL oral suspension 30 mL  30 mL Oral Daily PRN    OLANZapine (ZyPREXA) IM injection 10 mg  10 mg Intramuscular Q3H PRN    OLANZapine (ZyPREXA) tablet 5 mg  5 mg Oral Q3H PRN    risperiDONE (RisperDAL M-TABS) dispersible tablet 1 mg  1 mg Oral Q3H PRN    risperiDONE (RisperDAL) tablet 3 mg  3 mg Oral BID    sodium chloride (OCEAN) 0 65 % nasal spray 1 spray  1 spray Each Nare TID    traZODone (DESYREL) tablet 50 mg  50 mg Oral HS PRN     Labs: NA    Assessment, Diagnosis  and Plan: continue with current meds and goals, F/U tomorrow    Counseling / Coordination of Care  Total floor / unit time spent today20 minutes  minutes  Greater than 50% of total time was spent with the patient and / or family counseling and / or coordination of care  A description of the counseling / coordination of care:      Popeye Hawk MD

## 2018-01-14 NOTE — PROGRESS NOTES
Depakote level was not drawn today  Dr Lucille Alonzo made aware  Dr Hiral Abrams stated to put back in depakote level  tomorrow and ok to give Depakote 750 mg HS

## 2018-01-14 NOTE — PROGRESS NOTES
PT denies all s/s  Pt is cooperative  No behavioral issues  PT c/o of nasal congestion but was given scheduled sodium chloride spray

## 2018-01-14 NOTE — PROGRESS NOTES
Called by lab who state pt is negative for flu they had rerun  Pt now taken of droplet precautions and still with URI congestion   Chest xray negative  Ordered Claritin and saline nasal spray for now most likely fevers viral in nature

## 2018-01-15 LAB
ANION GAP SERPL CALCULATED.3IONS-SCNC: 7 MMOL/L (ref 4–13)
ATRIAL RATE: 106 BPM
BASOPHILS # BLD MANUAL: 0 THOUSAND/UL (ref 0–0.1)
BASOPHILS NFR MAR MANUAL: 0 % (ref 0–1)
BUN SERPL-MCNC: 7 MG/DL (ref 5–25)
CALCIUM SERPL-MCNC: 9.3 MG/DL (ref 8.3–10.1)
CHLORIDE SERPL-SCNC: 103 MMOL/L (ref 100–108)
CO2 SERPL-SCNC: 26 MMOL/L (ref 21–32)
CREAT SERPL-MCNC: 0.62 MG/DL (ref 0.6–1.3)
EOSINOPHIL # BLD MANUAL: 0.21 THOUSAND/UL (ref 0–0.4)
EOSINOPHIL NFR BLD MANUAL: 2 % (ref 0–6)
ERYTHROCYTE [DISTWIDTH] IN BLOOD BY AUTOMATED COUNT: 12.2 % (ref 11.6–15.1)
GFR SERPL CREATININE-BSD FRML MDRD: 141 ML/MIN/1.73SQ M
GLUCOSE P FAST SERPL-MCNC: 95 MG/DL (ref 65–99)
GLUCOSE SERPL-MCNC: 95 MG/DL (ref 65–140)
HCT VFR BLD AUTO: 39.8 % (ref 36.5–49.3)
HGB BLD-MCNC: 14.2 G/DL (ref 12–17)
LYMPHOCYTES # BLD AUTO: 1.27 THOUSAND/UL (ref 0.6–4.47)
LYMPHOCYTES # BLD AUTO: 12 % (ref 14–44)
MCH RBC QN AUTO: 31 PG (ref 26.8–34.3)
MCHC RBC AUTO-ENTMCNC: 35.7 G/DL (ref 31.4–37.4)
MCV RBC AUTO: 87 FL (ref 82–98)
MONOCYTES # BLD AUTO: 0.53 THOUSAND/UL (ref 0–1.22)
MONOCYTES NFR BLD: 5 % (ref 4–12)
MYELOCYTES NFR BLD MANUAL: 1 % (ref 0–1)
NEUTROPHILS # BLD MANUAL: 8.16 THOUSAND/UL (ref 1.85–7.62)
NEUTS SEG NFR BLD AUTO: 77 % (ref 43–75)
NRBC BLD AUTO-RTO: 0 /100 WBCS
P AXIS: 45 DEGREES
PLATELET # BLD AUTO: 224 THOUSANDS/UL (ref 149–390)
PLATELET BLD QL SMEAR: ADEQUATE
PMV BLD AUTO: 8.6 FL (ref 8.9–12.7)
POTASSIUM SERPL-SCNC: 4 MMOL/L (ref 3.5–5.3)
PR INTERVAL: 156 MS
QRS AXIS: 19 DEGREES
QRSD INTERVAL: 90 MS
QT INTERVAL: 348 MS
QTC INTERVAL: 463 MS
RBC # BLD AUTO: 4.58 MILLION/UL (ref 3.88–5.62)
RBC MORPH BLD: NORMAL
RPR SER QL: NORMAL
SODIUM SERPL-SCNC: 136 MMOL/L (ref 136–145)
T WAVE AXIS: 25 DEGREES
VALPROATE SERPL-MCNC: 79 UG/ML (ref 50–100)
VARIANT LYMPHS # BLD AUTO: 3 %
VENTRICULAR RATE: 106 BPM
WBC # BLD AUTO: 10.6 THOUSAND/UL (ref 4.31–10.16)

## 2018-01-15 PROCEDURE — 93005 ELECTROCARDIOGRAM TRACING: CPT | Performed by: NURSE PRACTITIONER

## 2018-01-15 PROCEDURE — 80164 ASSAY DIPROPYLACETIC ACD TOT: CPT | Performed by: PSYCHIATRY & NEUROLOGY

## 2018-01-15 PROCEDURE — 85027 COMPLETE CBC AUTOMATED: CPT | Performed by: PSYCHIATRY & NEUROLOGY

## 2018-01-15 PROCEDURE — 85007 BL SMEAR W/DIFF WBC COUNT: CPT | Performed by: PSYCHIATRY & NEUROLOGY

## 2018-01-15 PROCEDURE — 80048 BASIC METABOLIC PNL TOTAL CA: CPT | Performed by: PSYCHIATRY & NEUROLOGY

## 2018-01-15 RX ADMIN — RISPERIDONE 3 MG: 3 TABLET, FILM COATED ORAL at 08:49

## 2018-01-15 RX ADMIN — Medication 1 SPRAY: at 15:48

## 2018-01-15 RX ADMIN — AMLODIPINE BESYLATE 5 MG: 5 TABLET ORAL at 08:49

## 2018-01-15 RX ADMIN — DIVALPROEX SODIUM 500 MG: 500 TABLET, DELAYED RELEASE ORAL at 09:31

## 2018-01-15 RX ADMIN — LORAZEPAM 1 MG: 1 TABLET ORAL at 16:01

## 2018-01-15 RX ADMIN — RISPERIDONE 3 MG: 3 TABLET, FILM COATED ORAL at 17:21

## 2018-01-15 RX ADMIN — LORATADINE 10 MG: 10 TABLET ORAL at 08:49

## 2018-01-15 RX ADMIN — DIVALPROEX SODIUM 750 MG: 500 TABLET, DELAYED RELEASE ORAL at 21:00

## 2018-01-15 NOTE — PLAN OF CARE
Problem: Alteration in Thoughts and Perception  Goal: Treatment Goal: Gain control of psychotic behaviors/thinking, reduce/eliminate presenting symptoms and demonstrate improved reality functioning upon discharge  Outcome: Progressing    Goal: Verbalize thoughts and feelings  Interventions:  - Promote a nonjudgmental and trusting relationship with the patient through active listening and therapeutic communication  - Assess patient's level of functioning, behavior and potential for risk  - Engage patient in 1 on 1 interactions for a minimum of 15 minutes each session  - Encourage patient to express fears, feelings, frustrations, and discuss symptoms    - [place] patient to reality, help patient recognize reality-based thinking   - Administer medications as ordered and assess for potential side effects  - Provide the patient education related to the signs and symptoms of the illness and desired effects of prescribed medications   Outcome: Progressing    Goal: Refrain from acting on delusional thinking/internal stimuli  Interventions:  - Monitor patient closely, per order   - Utilize least restrictive measures   - Set reasonable limits, give positive feedback for acceptable   - Administer medications as ordered and monitor of potential side effects   Outcome: Progressing    Goal: Agree to be compliant with medication regime, as prescribed and report medication side effects  Interventions:  - Offer appropriate PRN medication and supervise ingestion; conduct aims, as needed    Outcome: Progressing    Goal: Attend and participate in unit activities, including therapeutic, recreational, and educational groups  Interventions:  - Provide therapeutic and educational activities daily, encourage attendance and participation, and document same in the medical record    Outcome: Progressing    Goal: Recognize dysfunctional thoughts, communicate reality-based thoughts at the time of discharge  Interventions:  - Provide medication and psycho-education to assist patient in compliance and developing insight into his/her illness    Outcome: Progressing    Goal: Complete daily ADLs, including personal hygiene independently, as able  Interventions:  - Observe, teach, and assist patient with ADLS  - Monitor and promote a balance of rest/activity, with adequate nutrition and elimination    Outcome: Progressing      Problem: Depression  Goal: Treatment Goal: Demonstrate behavioral control of depressive symptoms, verbalize feelings of improved mood/affect, and adopt new coping skills prior to discharge  Outcome: Progressing    Goal: Verbalize thoughts and feelings  Interventions:  - Assess and re-assess patient's level of risk   - Engage patient in 1:1 interactions, daily, for a minimum of 15 minutes   - Encourage patient to express feelings, fears, frustrations, hopes    Outcome: Progressing    Goal: Refrain from harming self  Interventions:  - Monitor patient closely, per order   - Supervise medication ingestion, monitor effects and side effects    Outcome: Progressing    Goal: Refrain from isolation  Interventions:  - Develop a trusting relationship   - Encourage socialization    Outcome: Progressing    Goal: Refrain from self-neglect  Outcome: Progressing    Goal: Attend and participate in unit activities, including therapeutic, recreational, and educational groups  Interventions:  - Provide therapeutic and educational activities daily, encourage attendance and participation, and document same in the medical record    Outcome: Progressing    Goal: Complete daily ADLs, including personal hygiene independently, as able  Interventions:  - Observe, teach, and assist patient with ADLS  -  Monitor and promote a balance of rest/activity, with adequate nutrition and elimination    Outcome: Progressing      Problem: Anxiety  Goal: Anxiety is at manageable level  Interventions:  - Assess and monitor patient's anxiety level     - Monitor for signs and symptoms of anxiety both physical and emotional (heart palpitations, chest pain, shortness of breath, headaches, nausea, feeling jumpy, restlessness, irritable, apprehensive)  - Collaborate with interdisciplinary team and initiate plan and interventions as ordered    - [place] patient to unit/surroundings  - Explain treatment plan  - Encourage participation in care  - Encourage verbalization of concerns/fears  - Identify coping mechanisms  - Assist in developing anxiety-reducing skills  - Administer/offer alternative therapies  - Limit or eliminate stimulants   Outcome: Progressing      Problem: Risk for Violence/Aggression Toward Others  Goal: Treatment Goal: Refrain from acts of violence/aggression during length of stay, and demonstrate improved impulse control at the time of discharge  Outcome: Progressing    Goal: Verbalize thoughts and feelings  Interventions:  - Assess and re-assess patient's level of risk, every waking shift  - Engage patient in 1:1 interactions, daily, for a minimum of 15 minutes   - Allow patient to express feelings and frustrations in a safe and non-threatening manner   - Establish rapport/trust with patient    Outcome: Progressing    Goal: Refrain from harming others  Outcome: Progressing    Goal: Refrain from destructive acts on the environment or property  Outcome: Progressing    Goal: Control angry outbursts  Interventions:  - Monitor patient closely, per order  - Ensure early verbal de-escalation  - Monitor prn medication needs  - Set reasonable/therapeutic limits, outline behavioral expectations, and consequences   - Provide a non-threatening milieu, utilizing the least restrictive interventions    Outcome: Progressing    Goal: Attend and participate in unit activities, including therapeutic, recreational, and educational groups  Interventions:  - Provide therapeutic and educational activities daily, encourage attendance and participation, and document same in the medical record Outcome: Progressing    Goal: Identify appropriate positive anger management techniques  Interventions:  - Offer anger management and coping skills groups   - Staff will provide positive feedback for appropriate anger control   Outcome: Progressing      Problem: DISCHARGE PLANNING  Goal: Discharge to home or other facility with appropriate resources  INTERVENTIONS:  - Identify barriers to discharge w/patient and caregiver  - Arrange for needed discharge resources and transportation as appropriate  - Identify discharge learning needs (meds, wound care, etc )  - Arrange for interpretive services to assist at discharge as needed  - Refer to Case Management Department for coordinating discharge planning if the patient needs post-hospital services based on physician/advanced practitioner order or complex needs related to functional status, cognitive ability, or social support system   Outcome: Progressing      Problem: Ineffective Coping  Goal: Participates in unit activities  Interventions:  - Provide therapeutic environment   - Provide required programming   - Redirect inappropriate behaviors    Outcome: Progressing

## 2018-01-15 NOTE — PROGRESS NOTES
Pt was irritable at beginning of this shift and was demanding " to get the F out of this place "  Pt was told he was not getting d/c today and just walked away stomping  PRN Ativan adm which pt took without any issues  Pt denies SI, denies AVH, and denies HI presently  Will continue to monitor

## 2018-01-15 NOTE — CASE MANAGEMENT
CM met with patient  Patient remained calm during session, seems to be completing daily living skills, clean  Was able to maintain eye contact during session  Admits to high levels of depression and anxiety, denies SI/HI, believes medications are helping him feel more stable  Reviewed goals of inpatient treatment, patient signed [facility] for [facility] services through [facility]  This CM will receive follow up information and continue to monitor

## 2018-01-15 NOTE — PROGRESS NOTES
Pt denies SI or HI  He denies any auditory hallucinations  Pt has been cooperative and social in the milieu

## 2018-01-15 NOTE — PROGRESS NOTES
RN was made aware that patient was urinating in day room sink  RN spoke with patient who did not know this was wrong  Will monitor

## 2018-01-15 NOTE — PROGRESS NOTES
Progress Note - Behavioral Health   Scotty Tristan 26 y o  male MRN: @MRN   Unit/Bed#: UO0 457-28 Encounter: [id number]        The patient was seen for continuing care and reviewed with staff  Report from staff regarding this patient received and discussed, and records reviewed prior to seeing this patient   The patient was common polite communicated well despite all beers limitations in communication style and presenting here in his thought and a are linear manner  He denies suicidal homicidal thoughts and wanted to go home  He was common polite during the interview and the in the milieu  Tolerated risperidone quite well without any side effects  Patient remains anxious, better, distracted, distressed and doing better today  Medication side effects: No   ROS: no complaints    Mental Status Evaluation:    Appearance:  dressed appropriately, casually dressed   Behavior:  cooperative, calm   Mood:  depressed, anxious   Affect: less constricted, constricted    Speech:  decreased rate   Language: appropriate   Thought Process:  concrete   Associations: concrete associations   Thought Content:  negative thinking, poverty of thought   Perceptual Disturbances: no auditory hallucinations, no visual hallucinations   Risk Potential: Suicidal ideation - None  Homicidal ideation - None  Potential for aggression - No   Sensorium:  oriented to person, place and time   Memory:  recent and remote memory grossly intact   Consciousness:  alert and awake   Attention: decreased concentration   Intellect: not examined   Fund of Knowledge: awareness of current events appropriate   Insight:  limited   Judgment: limited   Muscle Tone: normal   Gait/Station: normal gait/station and normal balance   Motor Activity: no abnormal movements         Laboratory results:  I have personally reviewed all pertinent laboratory results      Recent Labs      01/13/18   0639  01/15/18   0847   NA  138  136   K  3 6  4 0   CL  105  103   CO2 25  26   GLUCOSE  99  95   CREATININE  0 54*  0 62   BUN  6  7     Recent Labs      01/13/18   0639  01/15/18   0847   WBC  11 68*  10 60*   RBC  4 54  4 58   HGB  13 8  14 2   HCT  39 4  39 8   MCV  87  87   MCH  30 4  31 0   RDW  12 6  12 2   PLT  189  224     Recent Labs      01/13/18   0639  01/15/18   0847   CREATININE  0 54*  0 62   BUN  6  7   NA  138  136   K  3 6  4 0   CL  105  103   CO2  25  26   GLUCOSE  99  95   PROT  8 2   --    ALT  39   --    AST  13   --      Recent Labs      01/13/18   0639   ALKPHOS  63   AST  13   ALT  39   BILITOT  0 65   ALBUMIN  3 3*     No results for input(s): TROPONINI, CKMB, CKTOTAL in the last 72 hours  Invalid input(s): PBNP  Recent Labs      01/13/18   0639   CHOL  142   HDL  29*   TRIG  83     No results for input(s): CRP, SEDRATE, BALWINDER, HAV, HEPAIGM, HEPBIGM, HEPBCAB, HEPCAB in the last 72 hours  Invalid input(s): HEAG    CBC:   Recent Labs      01/13/18   0639  01/15/18   0847   WBC  11 68*  10 60*   RBC  4 54  4 58   HGB  13 8  14 2   HCT  39 4  39 8   PLT  189  224     BMP:   Recent Labs      01/13/18   0639  01/15/18   0847   NA  138  136   K  3 6  4 0   CL  105  103   CO2  25  26   BUN  6  7           Progress Toward Goals: improving slowly    Assessment/Plan   Principal Problem:    Schizoaffective disorder, bipolar type (HCC)  Active Problems:    Intellectual disability    Fever    Tachycardia    Hypokalemia    Hypertension      Recommended Treatment:  Will continue the patient's medications since his condition is improving and he already received therapeutic dose of risperidone  Continue Depakote as mood stabilizer  Patient condition is slightly improving, his affect still constricted and insight is rather poor    His sleep was improving appetite is normal      Current Facility-Administered Medications:     acetaminophen (TYLENOL) tablet 650 mg, 650 mg, Oral, Q6H PRN, Binta Simon MD, 650 mg at 01/14/18 0840    acetaminophen (TYLENOL) tablet 975 mg, 975 mg, Oral, Q6H PRN, Galindo Majano MD, 975 mg at 01/13/18 0945    albuterol (PROVENTIL HFA,VENTOLIN HFA) inhaler 2 puff, 2 puff, Inhalation, Q4H PRN, Galindo Majano MD    aluminum-magnesium hydroxide-simethicone (MYLANTA) 200-200-20 mg/5 mL oral suspension 30 mL, 30 mL, Oral, Q4H PRN, Galindo Majano MD    amLODIPine (NORVASC) tablet 5 mg, 5 mg, Oral, Daily, Galindo Majano MD, 5 mg at 01/15/18 0849    benztropine (COGENTIN) injection 1 mg, 1 mg, Intramuscular, Q6H PRN, Galindo Majano MD    benztropine (COGENTIN) tablet 1 mg, 1 mg, Oral, Q6H PRN, Galindo Majano MD    divalproex sodium (DEPAKOTE) EC tablet 500 mg, 500 mg, Oral, Daily, ZBIGNIEW Corral, 500 mg at 01/15/18 0931    divalproex sodium (DEPAKOTE) EC tablet 750 mg, 750 mg, Oral, HS, Sharyle Denise, MD, 750 mg at 01/15/18 2100    haloperidol (HALDOL) tablet 5 mg, 5 mg, Oral, Q8H PRN, Galindo Majano MD    haloperidol lactate (HALDOL) injection 5 mg, 5 mg, Intramuscular, Q6H PRN, Galindo Majano MD    hydrOXYzine HCL (ATARAX) tablet 25 mg, 25 mg, Oral, Q6H PRN, Galindo Majano MD    ibuprofen (MOTRIN) tablet 600 mg, 600 mg, Oral, Q8H PRN, Galindo Majano MD    loratadine (CLARITIN) tablet 10 mg, 10 mg, Oral, Daily, ZBIGNIEW Corral, 10 mg at 01/15/18 0849    LORazepam (ATIVAN) 2 mg/mL injection 1 mg, 1 mg, Intramuscular, Q6H PRN, Galindo Majano MD    LORazepam (ATIVAN) tablet 1 mg, 1 mg, Oral, Q8H PRN, Galindo Majano MD, 1 mg at 01/15/18 1601    magnesium hydroxide (MILK OF MAGNESIA) 400 mg/5 mL oral suspension 30 mL, 30 mL, Oral, Daily PRN, Galindo Majano MD    OLANZapine (ZyPREXA) IM injection 10 mg, 10 mg, Intramuscular, Q3H PRN, Galindo Majano MD    OLANZapine (ZyPREXA) tablet 5 mg, 5 mg, Oral, Q3H PRN, Galindo Majano MD    risperiDONE (RisperDAL M-TABS) dispersible tablet 1 mg, 1 mg, Oral, Q3H PRN, Galindo Majano MD    risperiDONE (RisperDAL) tablet 3 mg, 3 mg, Oral, BID, Galindo Majano MD, 3 mg at 01/15/18 1721    sodium chloride (OCEAN) 0 65 % nasal spray 1 spray, 1 spray, Each Nare, TID, ZBIGNIEW Corral, 1 spray at 01/15/18 1548    traZODone (DESYREL) tablet 50 mg, 50 mg, Oral, HS PRN, Galindo Majano MD    Planned medication and treatment changes: All current active medications have been reviewed  Continue treatment with group therapy, milieu therapy, occupational therapy and medication management  Risks / Benefits of Treatment:    Risks, benefits, and possible side effects of medications explained to patient and patient verbalizes understanding and agreement for treatment  Counseling / Coordination of Care:    Patient's progress discussed with staff in treatment team meeting  Medication changes reviewed with staff in treatment team meeting  ** Please Note: This note has been constructed using a voice recognition system   **

## 2018-01-16 LAB
ALBUMIN SERPL BCP-MCNC: 3 G/DL (ref 3.5–5)
ALP SERPL-CCNC: 59 U/L (ref 46–116)
ALT SERPL W P-5'-P-CCNC: 32 U/L (ref 12–78)
ANION GAP SERPL CALCULATED.3IONS-SCNC: 8 MMOL/L (ref 4–13)
AST SERPL W P-5'-P-CCNC: 17 U/L (ref 5–45)
BASOPHILS # BLD MANUAL: 0 THOUSAND/UL (ref 0–0.1)
BASOPHILS NFR MAR MANUAL: 0 % (ref 0–1)
BILIRUB SERPL-MCNC: 0.42 MG/DL (ref 0.2–1)
BUN SERPL-MCNC: 5 MG/DL (ref 5–25)
CALCIUM SERPL-MCNC: 9 MG/DL (ref 8.3–10.1)
CHLORIDE SERPL-SCNC: 102 MMOL/L (ref 100–108)
CO2 SERPL-SCNC: 26 MMOL/L (ref 21–32)
CREAT SERPL-MCNC: 0.54 MG/DL (ref 0.6–1.3)
EOSINOPHIL # BLD MANUAL: 0.41 THOUSAND/UL (ref 0–0.4)
EOSINOPHIL NFR BLD MANUAL: 4 % (ref 0–6)
ERYTHROCYTE [DISTWIDTH] IN BLOOD BY AUTOMATED COUNT: 12.1 % (ref 11.6–15.1)
GFR SERPL CREATININE-BSD FRML MDRD: 149 ML/MIN/1.73SQ M
GLUCOSE P FAST SERPL-MCNC: 92 MG/DL (ref 65–99)
GLUCOSE SERPL-MCNC: 92 MG/DL (ref 65–140)
HCT VFR BLD AUTO: 37.6 % (ref 36.5–49.3)
HGB BLD-MCNC: 13.2 G/DL (ref 12–17)
LYMPHOCYTES # BLD AUTO: 1.43 THOUSAND/UL (ref 0.6–4.47)
LYMPHOCYTES # BLD AUTO: 14 % (ref 14–44)
MAGNESIUM SERPL-MCNC: 2.3 MG/DL (ref 1.6–2.6)
MCH RBC QN AUTO: 30.3 PG (ref 26.8–34.3)
MCHC RBC AUTO-ENTMCNC: 35.1 G/DL (ref 31.4–37.4)
MCV RBC AUTO: 86 FL (ref 82–98)
METAMYELOCYTES NFR BLD MANUAL: 1 % (ref 0–1)
MONOCYTES # BLD AUTO: 0.92 THOUSAND/UL (ref 0–1.22)
MONOCYTES NFR BLD: 9 % (ref 4–12)
MYELOCYTES NFR BLD MANUAL: 3 % (ref 0–1)
NEUTROPHILS # BLD MANUAL: 7.06 THOUSAND/UL (ref 1.85–7.62)
NEUTS BAND NFR BLD MANUAL: 1 % (ref 0–8)
NEUTS SEG NFR BLD AUTO: 68 % (ref 43–75)
NRBC BLD AUTO-RTO: 0 /100 WBCS
PLATELET # BLD AUTO: 264 THOUSANDS/UL (ref 149–390)
PLATELET BLD QL SMEAR: ADEQUATE
PMV BLD AUTO: 8.6 FL (ref 8.9–12.7)
POTASSIUM SERPL-SCNC: 3.6 MMOL/L (ref 3.5–5.3)
PROT SERPL-MCNC: 8.7 G/DL (ref 6.4–8.2)
RBC # BLD AUTO: 4.36 MILLION/UL (ref 3.88–5.62)
RBC MORPH BLD: NORMAL
SODIUM SERPL-SCNC: 136 MMOL/L (ref 136–145)
WBC # BLD AUTO: 10.23 THOUSAND/UL (ref 4.31–10.16)

## 2018-01-16 PROCEDURE — 85027 COMPLETE CBC AUTOMATED: CPT | Performed by: NURSE PRACTITIONER

## 2018-01-16 PROCEDURE — 83735 ASSAY OF MAGNESIUM: CPT | Performed by: NURSE PRACTITIONER

## 2018-01-16 PROCEDURE — 80053 COMPREHEN METABOLIC PANEL: CPT | Performed by: NURSE PRACTITIONER

## 2018-01-16 PROCEDURE — 85007 BL SMEAR W/DIFF WBC COUNT: CPT | Performed by: NURSE PRACTITIONER

## 2018-01-16 RX ADMIN — LORATADINE 10 MG: 10 TABLET ORAL at 08:09

## 2018-01-16 RX ADMIN — RISPERIDONE 3 MG: 3 TABLET, FILM COATED ORAL at 08:08

## 2018-01-16 RX ADMIN — RISPERIDONE 3 MG: 3 TABLET, FILM COATED ORAL at 17:28

## 2018-01-16 RX ADMIN — DIVALPROEX SODIUM 500 MG: 500 TABLET, DELAYED RELEASE ORAL at 08:09

## 2018-01-16 RX ADMIN — AMLODIPINE BESYLATE 5 MG: 5 TABLET ORAL at 08:08

## 2018-01-16 RX ADMIN — DIVALPROEX SODIUM 750 MG: 500 TABLET, DELAYED RELEASE ORAL at 21:46

## 2018-01-16 NOTE — PROGRESS NOTES
Pt now without low grade temp   Negative for flu  Subtle upper resp infection  Claritin and nasal spray added Sunday  Pt still with some mild ongoing tachycardia   ekg today with tachycardia improved t wave abn from prior  Pt on norvasc, is anxious when talking , fidgety and restless when talking to [address]     Will chk electrolytes   ?hydration as pt has been "sick" this last week

## 2018-01-16 NOTE — PROGRESS NOTES
Progress Note - Behavioral Health   Sergio Mariee 26 y o  male MRN: @MRN   Unit/Bed#: YL0 115-82 Encounter: [id number]        The patient was seen for continuing care and reviewed with staff  Report from staff regarding this patient received and discussed, and records reviewed prior to seeing this patient   Pt is improving, he started to deny any suicidal or homicidal ideations  Still has cognitive limitations which are stable  Patient wants to go home he wants to continue his daily routines, and he denied any thoughts of harming his parents or himself, not angry and agitated  Patient remains anxious today  Medication side effects: No   ROS: no complaints    Mental Status Evaluation:    Appearance:  dressed appropriately, casually dressed, overweight   Behavior:  cooperative, calm   Mood:  improved, anxious   Affect: less constricted    Speech:  coherent   Language: appropriate   Thought Process:  concrete   Associations: concrete associations   Thought Content:  poverty of thought   Perceptual Disturbances: no auditory hallucinations, no visual hallucinations   Risk Potential: Suicidal ideation - None  Homicidal ideation - None  Potential for aggression - No   Sensorium:  oriented to person, place and time   Memory:  recent and remote memory grossly intact   Consciousness:  alert and awake   Attention: attention span and concentration are normal   Intellect: below average   Fund of Knowledge: awareness of current events appropriate   Insight:  improving   Judgment: improved   Muscle Tone: normal   Gait/Station: normal gait/station and normal balance   Motor Activity: no abnormal movements         Laboratory results:  I have personally reviewed all pertinent laboratory results      Recent Labs      01/15/18   0847  01/16/18   0636   NA  136  136   K  4 0  3 6   CL  103  102   CO2  26  26   GLUCOSE  95  92   CREATININE  0 62  0 54*   BUN  7  5   MG   --   2 3     Recent Labs      01/15/18   0847 01/16/18   0636   WBC  10 60*  10 23*   RBC  4 58  4 36   HGB  14 2  13 2   HCT  39 8  37 6   MCV  87  86   MCH  31 0  30 3   RDW  12 2  12 1   PLT  224  264     Recent Labs      01/15/18   0847  01/16/18   0636   CREATININE  0 62  0 54*   BUN  7  5   NA  136  136   K  4 0  3 6   CL  103  102   CO2  26  26   GLUCOSE  95  92   PROT   --   8 7*   ALT   --   32   AST   --   17     Recent Labs      01/16/18   0636   ALKPHOS  59   AST  17   ALT  32   BILITOT  0 42   ALBUMIN  3 0*     No results for input(s): TROPONINI, CKMB, CKTOTAL in the last 72 hours  Invalid input(s): PBNP  No results for input(s): CHOL, LDLDIRECT, HDL, TRIG in the last 72 hours  No results for input(s): CRP, SEDRATE, BALWINDER, HAV, HEPAIGM, HEPBIGM, HEPBCAB, HEPCAB in the last 72 hours  Invalid input(s): HEAG    CBC:   Recent Labs      01/15/18   0847  01/16/18   0636   WBC  10 60*  10 23*   RBC  4 58  4 36   HGB  14 2  13 2   HCT  39 8  37 6   PLT  224  264     BMP:   Recent Labs      01/15/18   0847  01/16/18   0636   NA  136  136   K  4 0  3 6   CL  103  102   CO2  26  26   BUN  7  5           Progress Toward Goals: improving progressively    Assessment/Plan   Principal Problem:    Schizoaffective disorder, bipolar type (HCC)  Active Problems:    Intellectual disability    Fever    Tachycardia    Hypokalemia    Hypertension      Recommended Treatment:  Will continue the present medications because the patient condition started to improve  He is no longer agitated or angry more adherent to medication management  Communicate with selected peers  Wants to go home and denied any thoughts of harming anyone or himself  Tolerated increase of risperidone quite well without any side effects  Planned medication and treatment changes: All current active medications have been reviewed  Continue treatment with group therapy, milieu therapy, occupational therapy and medication management        Risks / Benefits of Treatment:    Risks, benefits, and possible side effects of medications explained to patient and patient verbalizes understanding and agreement for treatment  Counseling / Coordination of Care:    Patient's progress discussed with staff in treatment team meeting  Medication changes reviewed with staff in treatment team meeting  ** Please Note: This note has been constructed using a voice recognition system   **

## 2018-01-16 NOTE — PLAN OF CARE
Problem: Alteration in Thoughts and Perception  Goal: Treatment Goal: Gain control of psychotic behaviors/thinking, reduce/eliminate presenting symptoms and demonstrate improved reality functioning upon discharge  Outcome: Progressing    Goal: Verbalize thoughts and feelings  Interventions:  - Promote a nonjudgmental and trusting relationship with the patient through active listening and therapeutic communication  - Assess patient's level of functioning, behavior and potential for risk  - Engage patient in 1 on 1 interactions for a minimum of 15 minutes each session  - Encourage patient to express fears, feelings, frustrations, and discuss symptoms    - [place] patient to reality, help patient recognize reality-based thinking   - Administer medications as ordered and assess for potential side effects  - Provide the patient education related to the signs and symptoms of the illness and desired effects of prescribed medications   Outcome: Progressing    Goal: Refrain from acting on delusional thinking/internal stimuli  Interventions:  - Monitor patient closely, per order   - Utilize least restrictive measures   - Set reasonable limits, give positive feedback for acceptable   - Administer medications as ordered and monitor of potential side effects   Outcome: Progressing    Goal: Agree to be compliant with medication regime, as prescribed and report medication side effects  Interventions:  - Offer appropriate PRN medication and supervise ingestion; conduct aims, as needed    Outcome: Progressing    Goal: Attend and participate in unit activities, including therapeutic, recreational, and educational groups  Interventions:  - Provide therapeutic and educational activities daily, encourage attendance and participation, and document same in the medical record    Outcome: Progressing    Goal: Recognize dysfunctional thoughts, communicate reality-based thoughts at the time of discharge  Interventions:  - Provide medication and psycho-education to assist patient in compliance and developing insight into his/her illness    Outcome: Progressing    Goal: Complete daily ADLs, including personal hygiene independently, as able  Interventions:  - Observe, teach, and assist patient with ADLS  - Monitor and promote a balance of rest/activity, with adequate nutrition and elimination    Outcome: Progressing      Problem: Depression  Goal: Treatment Goal: Demonstrate behavioral control of depressive symptoms, verbalize feelings of improved mood/affect, and adopt new coping skills prior to discharge  Outcome: Progressing    Goal: Verbalize thoughts and feelings  Interventions:  - Assess and re-assess patient's level of risk   - Engage patient in 1:1 interactions, daily, for a minimum of 15 minutes   - Encourage patient to express feelings, fears, frustrations, hopes    Outcome: Progressing    Goal: Refrain from harming self  Interventions:  - Monitor patient closely, per order   - Supervise medication ingestion, monitor effects and side effects    Outcome: Progressing    Goal: Refrain from isolation  Interventions:  - Develop a trusting relationship   - Encourage socialization    Outcome: Progressing    Goal: Refrain from self-neglect  Outcome: Progressing    Goal: Attend and participate in unit activities, including therapeutic, recreational, and educational groups  Interventions:  - Provide therapeutic and educational activities daily, encourage attendance and participation, and document same in the medical record    Outcome: Progressing    Goal: Complete daily ADLs, including personal hygiene independently, as able  Interventions:  - Observe, teach, and assist patient with ADLS  -  Monitor and promote a balance of rest/activity, with adequate nutrition and elimination    Outcome: Progressing      Problem: Anxiety  Goal: Anxiety is at manageable level  Interventions:  - Assess and monitor patient's anxiety level     - Monitor for signs and symptoms of anxiety both physical and emotional (heart palpitations, chest pain, shortness of breath, headaches, nausea, feeling jumpy, restlessness, irritable, apprehensive)  - Collaborate with interdisciplinary team and initiate plan and interventions as ordered    - [place] patient to unit/surroundings  - Explain treatment plan  - Encourage participation in care  - Encourage verbalization of concerns/fears  - Identify coping mechanisms  - Assist in developing anxiety-reducing skills  - Administer/offer alternative therapies  - Limit or eliminate stimulants   Outcome: Progressing      Problem: Risk for Violence/Aggression Toward Others  Goal: Treatment Goal: Refrain from acts of violence/aggression during length of stay, and demonstrate improved impulse control at the time of discharge  Outcome: Progressing    Goal: Verbalize thoughts and feelings  Interventions:  - Assess and re-assess patient's level of risk, every waking shift  - Engage patient in 1:1 interactions, daily, for a minimum of 15 minutes   - Allow patient to express feelings and frustrations in a safe and non-threatening manner   - Establish rapport/trust with patient    Outcome: Progressing    Goal: Refrain from harming others  Outcome: Progressing    Goal: Refrain from destructive acts on the environment or property  Outcome: Progressing    Goal: Control angry outbursts  Interventions:  - Monitor patient closely, per order  - Ensure early verbal de-escalation  - Monitor prn medication needs  - Set reasonable/therapeutic limits, outline behavioral expectations, and consequences   - Provide a non-threatening milieu, utilizing the least restrictive interventions    Outcome: Progressing    Goal: Attend and participate in unit activities, including therapeutic, recreational, and educational groups  Interventions:  - Provide therapeutic and educational activities daily, encourage attendance and participation, and document same in the medical record Outcome: Progressing    Goal: Identify appropriate positive anger management techniques  Interventions:  - Offer anger management and coping skills groups   - Staff will provide positive feedback for appropriate anger control   Outcome: Progressing      Problem: DISCHARGE PLANNING  Goal: Discharge to home or other facility with appropriate resources  INTERVENTIONS:  - Identify barriers to discharge w/patient and caregiver  - Arrange for needed discharge resources and transportation as appropriate  - Identify discharge learning needs (meds, wound care, etc )  - Arrange for interpretive services to assist at discharge as needed  - Refer to Case Management Department for coordinating discharge planning if the patient needs post-hospital services based on physician/advanced practitioner order or complex needs related to functional status, cognitive ability, or social support system   Outcome: Progressing      Problem: Ineffective Coping  Goal: Participates in unit activities  Interventions:  - Provide therapeutic environment   - Provide required programming   - Redirect inappropriate behaviors    Outcome: Progressing

## 2018-01-16 NOTE — PROGRESS NOTES
Pt denies all symptoms  He has been cooperative with medication and has been in behavioral control  He is hopeful for d/c soon

## 2018-01-16 NOTE — CASE MANAGEMENT
CM met with patient  Patient was calm, cooperative, states he feels safe to return to home environment  Denies symptoms of suicidal ideations, homicidal ideations, and psychosis  Denies any problems with sleep or appetite  This CM spoke with mother, she is in agreement with DC to home environment tomorrow  Feels after speaking with patient, that he is safe to return  Is not able to pick patient up, will complete request for [facility] transport at this time  CM will continue to monitor and plan for DC tomorrow

## 2018-01-16 NOTE — PROGRESS NOTES
Pt not seen today  Chart reviewed  Initially consulted for fever and tachycardia  Pt has had negative flu PCR and no fevers  Very mild leukocytosis, overall improved  Appears he is still tachycardic but no other etiology identified other than his anxiety which likely is etiology based on review of psych notes  EKG yesterday reviewed  1  Sinus tachycardia: likely psych related  Asymptomatic  EKG is sinus  TSH WNL, Electrolytes stable  Would recommend encouraging PO intake and treating anxiety per psych    Will sign off for now  Please call us with questions      Follow up with PCP in 1 week for repeat BMP/CBC    Cathy Silva PA-C

## 2018-01-17 VITALS
OXYGEN SATURATION: 98 % | DIASTOLIC BLOOD PRESSURE: 64 MMHG | HEART RATE: 84 BPM | SYSTOLIC BLOOD PRESSURE: 115 MMHG | RESPIRATION RATE: 16 BRPM | WEIGHT: 299 LBS | BODY MASS INDEX: 46.93 KG/M2 | HEIGHT: 67 IN | TEMPERATURE: 98 F

## 2018-01-17 RX ORDER — ALBUTEROL SULFATE 90 UG/1
2 AEROSOL, METERED RESPIRATORY (INHALATION) EVERY 4 HOURS PRN
Qty: 1 INHALER | Refills: 0 | Status: SHIPPED | OUTPATIENT
Start: 2018-01-17 | End: 2018-02-16

## 2018-01-17 RX ORDER — DIVALPROEX SODIUM 500 MG/1
500 TABLET, DELAYED RELEASE ORAL DAILY
Qty: 30 TABLET | Refills: 0 | Status: SHIPPED | OUTPATIENT
Start: 2018-01-18 | End: 2018-04-24 | Stop reason: HOSPADM

## 2018-01-17 RX ORDER — AMLODIPINE BESYLATE 5 MG/1
5 TABLET ORAL DAILY
Qty: 30 TABLET | Refills: 0 | Status: ON HOLD | OUTPATIENT
Start: 2018-01-17 | End: 2018-06-20

## 2018-01-17 RX ORDER — LORATADINE 10 MG/1
10 TABLET ORAL DAILY
Qty: 30 TABLET | Refills: 0 | Status: SHIPPED | OUTPATIENT
Start: 2018-01-18 | End: 2018-05-18 | Stop reason: SDUPTHER

## 2018-01-17 RX ORDER — DIVALPROEX SODIUM 250 MG/1
750 TABLET, DELAYED RELEASE ORAL
Qty: 90 TABLET | Refills: 0 | Status: SHIPPED | OUTPATIENT
Start: 2018-01-17 | End: 2018-04-24 | Stop reason: HOSPADM

## 2018-01-17 RX ORDER — RISPERIDONE 3 MG/1
3 TABLET, FILM COATED ORAL 2 TIMES DAILY
Qty: 60 TABLET | Refills: 0 | Status: SHIPPED | OUTPATIENT
Start: 2018-01-17 | End: 2018-04-24 | Stop reason: HOSPADM

## 2018-01-17 RX ADMIN — RISPERIDONE 3 MG: 3 TABLET, FILM COATED ORAL at 08:05

## 2018-01-17 RX ADMIN — LORATADINE 10 MG: 10 TABLET ORAL at 08:05

## 2018-01-17 RX ADMIN — DIVALPROEX SODIUM 500 MG: 500 TABLET, DELAYED RELEASE ORAL at 08:06

## 2018-01-17 RX ADMIN — AMLODIPINE BESYLATE 5 MG: 5 TABLET ORAL at 08:06

## 2018-01-17 NOTE — PLAN OF CARE
Problem: Alteration in Thoughts and Perception  Goal: Treatment Goal: Gain control of psychotic behaviors/thinking, reduce/eliminate presenting symptoms and demonstrate improved reality functioning upon discharge  Outcome: Completed Date Met: 01/17/18    Goal: Verbalize thoughts and feelings  Interventions:  - Promote a nonjudgmental and trusting relationship with the patient through active listening and therapeutic communication  - Assess patient's level of functioning, behavior and potential for risk  - Engage patient in 1 on 1 interactions for a minimum of 15 minutes each session  - Encourage patient to express fears, feelings, frustrations, and discuss symptoms    - [place] patient to reality, help patient recognize reality-based thinking   - Administer medications as ordered and assess for potential side effects  - Provide the patient education related to the signs and symptoms of the illness and desired effects of prescribed medications   Outcome: Completed Date Met: 01/17/18    Goal: Refrain from acting on delusional thinking/internal stimuli  Interventions:  - Monitor patient closely, per order   - Utilize least restrictive measures   - Set reasonable limits, give positive feedback for acceptable   - Administer medications as ordered and monitor of potential side effects   Outcome: Completed Date Met: 01/17/18    Goal: Agree to be compliant with medication regime, as prescribed and report medication side effects  Interventions:  - Offer appropriate PRN medication and supervise ingestion; conduct aims, as needed    Outcome: Completed Date Met: 01/17/18    Goal: Attend and participate in unit activities, including therapeutic, recreational, and educational groups  Interventions:  - Provide therapeutic and educational activities daily, encourage attendance and participation, and document same in the medical record    Outcome: Completed Date Met: 01/17/18    Goal: Recognize dysfunctional thoughts, communicate reality-based thoughts at the time of discharge  Interventions:  - Provide medication and psycho-education to assist patient in compliance and developing insight into his/her illness    Outcome: Completed Date Met: 01/17/18    Goal: Complete daily ADLs, including personal hygiene independently, as able  Interventions:  - Observe, teach, and assist patient with ADLS  - Monitor and promote a balance of rest/activity, with adequate nutrition and elimination    Outcome: Completed Date Met: 01/17/18      Problem: Depression  Goal: Treatment Goal: Demonstrate behavioral control of depressive symptoms, verbalize feelings of improved mood/affect, and adopt new coping skills prior to discharge  Outcome: Completed Date Met: 01/17/18    Goal: Verbalize thoughts and feelings  Interventions:  - Assess and re-assess patient's level of risk   - Engage patient in 1:1 interactions, daily, for a minimum of 15 minutes   - Encourage patient to express feelings, fears, frustrations, hopes    Outcome: Completed Date Met: 01/17/18    Goal: Refrain from harming self  Interventions:  - Monitor patient closely, per order   - Supervise medication ingestion, monitor effects and side effects    Outcome: Completed Date Met: 01/17/18    Goal: Refrain from isolation  Interventions:  - Develop a trusting relationship   - Encourage socialization    Outcome: Completed Date Met: 01/17/18    Goal: Refrain from self-neglect  Outcome: Completed Date Met: 01/17/18    Goal: Attend and participate in unit activities, including therapeutic, recreational, and educational groups  Interventions:  - Provide therapeutic and educational activities daily, encourage attendance and participation, and document same in the medical record    Outcome: Completed Date Met: 01/17/18    Goal: Complete daily ADLs, including personal hygiene independently, as able  Interventions:  - Observe, teach, and assist patient with ADLS  -  Monitor and promote a balance of rest/activity, with adequate nutrition and elimination    Outcome: Completed Date Met: 01/17/18      Problem: Anxiety  Goal: Anxiety is at manageable level  Interventions:  - Assess and monitor patient's anxiety level  - Monitor for signs and symptoms of anxiety both physical and emotional (heart palpitations, chest pain, shortness of breath, headaches, nausea, feeling jumpy, restlessness, irritable, apprehensive)  - Collaborate with interdisciplinary team and initiate plan and interventions as ordered    - [place] patient to unit/surroundings  - Explain treatment plan  - Encourage participation in care  - Encourage verbalization of concerns/fears  - Identify coping mechanisms  - Assist in developing anxiety-reducing skills  - Administer/offer alternative therapies  - Limit or eliminate stimulants   Outcome: Completed Date Met: 01/17/18      Problem: Risk for Violence/Aggression Toward Others  Goal: Treatment Goal: Refrain from acts of violence/aggression during length of stay, and demonstrate improved impulse control at the time of discharge  Outcome: Completed Date Met: 01/17/18    Goal: Verbalize thoughts and feelings  Interventions:  - Assess and re-assess patient's level of risk, every waking shift  - Engage patient in 1:1 interactions, daily, for a minimum of 15 minutes   - Allow patient to express feelings and frustrations in a safe and non-threatening manner   - Establish rapport/trust with patient    Outcome: Completed Date Met: 01/17/18    Goal: Refrain from harming others  Outcome: Completed Date Met: 01/17/18    Goal: Refrain from destructive acts on the environment or property  Outcome: Completed Date Met: 01/17/18    Goal: Control angry outbursts  Interventions:  - Monitor patient closely, per order  - Ensure early verbal de-escalation  - Monitor prn medication needs  - Set reasonable/therapeutic limits, outline behavioral expectations, and consequences   - Provide a non-threatening milieu, utilizing the least restrictive interventions    Outcome: Completed Date Met: 01/17/18    Goal: Attend and participate in unit activities, including therapeutic, recreational, and educational groups  Interventions:  - Provide therapeutic and educational activities daily, encourage attendance and participation, and document same in the medical record    Outcome: Completed Date Met: 01/17/18    Goal: Identify appropriate positive anger management techniques  Interventions:  - Offer anger management and coping skills groups   - Staff will provide positive feedback for appropriate anger control   Outcome: Completed Date Met: 01/17/18      Problem: DISCHARGE PLANNING  Goal: Discharge to home or other facility with appropriate resources  INTERVENTIONS:  - Identify barriers to discharge w/patient and caregiver  - Arrange for needed discharge resources and transportation as appropriate  - Identify discharge learning needs (meds, wound care, etc )  - Arrange for interpretive services to assist at discharge as needed  - Refer to Case Management Department for coordinating discharge planning if the patient needs post-hospital services based on physician/advanced practitioner order or complex needs related to functional status, cognitive ability, or social support system   Outcome: Completed Date Met: 01/17/18      Problem: Ineffective Coping  Goal: Participates in unit activities  Interventions:  - Provide therapeutic environment   - Provide required programming   - Redirect inappropriate behaviors    Outcome: Completed Date Met: 01/17/18

## 2018-01-17 NOTE — DISCHARGE INSTR - LAB
Contact Information: If you have any questions, concerns, pended studies, tests and/or procedures, or emergencies regarding your inpatient behavioral health visit  Please contact [facility] [phone] and ask to speak to a , nurse or physician  A contact is available 24 hours/ 7 days a week at this number  Summary of Procedures Performed During your Stay:  Below is a list of major procedures performed during your hospital stay and a summary of results:  - No major procedures performed  Pending Studies     None        If studies are pending at discharge, follow up with your PCP and/or referring provider

## 2018-01-17 NOTE — NURSING NOTE
Patient was discharged home  He reviewed d/c instructions with RN and verbalized understanding  Pt had no questions

## 2018-01-24 ENCOUNTER — TRANSCRIBE ORDERS (OUTPATIENT)
Dept: LAB | Facility: HOSPITAL | Age: 23
End: 2018-01-24

## 2018-01-24 ENCOUNTER — APPOINTMENT (OUTPATIENT)
Dept: LAB | Facility: HOSPITAL | Age: 23
End: 2018-01-24
Payer: MEDICARE

## 2018-01-24 DIAGNOSIS — F31.9 DEPRESSED BIPOLAR I DISORDER (HCC): Primary | ICD-10-CM

## 2018-01-24 DIAGNOSIS — F31.9 DEPRESSED BIPOLAR I DISORDER (HCC): ICD-10-CM

## 2018-01-24 LAB
ALBUMIN SERPL BCP-MCNC: 3.6 G/DL (ref 3.5–5)
ALP SERPL-CCNC: 61 U/L (ref 46–116)
ALT SERPL W P-5'-P-CCNC: 85 U/L (ref 12–78)
ANION GAP SERPL CALCULATED.3IONS-SCNC: 9 MMOL/L (ref 4–13)
AST SERPL W P-5'-P-CCNC: 41 U/L (ref 5–45)
BASOPHILS # BLD AUTO: 0.03 THOUSANDS/ΜL (ref 0–0.1)
BASOPHILS NFR BLD AUTO: 0 % (ref 0–1)
BILIRUB SERPL-MCNC: 0.42 MG/DL (ref 0.2–1)
BUN SERPL-MCNC: 9 MG/DL (ref 5–25)
CALCIUM SERPL-MCNC: 9.3 MG/DL (ref 8.3–10.1)
CHLORIDE SERPL-SCNC: 105 MMOL/L (ref 100–108)
CHOLEST SERPL-MCNC: 144 MG/DL (ref 50–200)
CO2 SERPL-SCNC: 24 MMOL/L (ref 21–32)
CREAT SERPL-MCNC: 0.63 MG/DL (ref 0.6–1.3)
EOSINOPHIL # BLD AUTO: 0.26 THOUSAND/ΜL (ref 0–0.61)
EOSINOPHIL NFR BLD AUTO: 3 % (ref 0–6)
ERYTHROCYTE [DISTWIDTH] IN BLOOD BY AUTOMATED COUNT: 12.3 % (ref 11.6–15.1)
GFR SERPL CREATININE-BSD FRML MDRD: 140 ML/MIN/1.73SQ M
GLUCOSE P FAST SERPL-MCNC: 94 MG/DL (ref 65–99)
HCT VFR BLD AUTO: 39 % (ref 36.5–49.3)
HDLC SERPL-MCNC: 29 MG/DL (ref 40–60)
HGB BLD-MCNC: 13.6 G/DL (ref 12–17)
LDLC SERPL CALC-MCNC: 81 MG/DL (ref 0–100)
LYMPHOCYTES # BLD AUTO: 2.58 THOUSANDS/ΜL (ref 0.6–4.47)
LYMPHOCYTES NFR BLD AUTO: 29 % (ref 14–44)
MCH RBC QN AUTO: 30.4 PG (ref 26.8–34.3)
MCHC RBC AUTO-ENTMCNC: 34.9 G/DL (ref 31.4–37.4)
MCV RBC AUTO: 87 FL (ref 82–98)
MONOCYTES # BLD AUTO: 0.92 THOUSAND/ΜL (ref 0.17–1.22)
MONOCYTES NFR BLD AUTO: 10 % (ref 4–12)
NEUTROPHILS # BLD AUTO: 5.18 THOUSANDS/ΜL (ref 1.85–7.62)
NEUTS SEG NFR BLD AUTO: 58 % (ref 43–75)
NRBC BLD AUTO-RTO: 0 /100 WBCS
PLATELET # BLD AUTO: 324 THOUSANDS/UL (ref 149–390)
PMV BLD AUTO: 8.9 FL (ref 8.9–12.7)
POTASSIUM SERPL-SCNC: 3.8 MMOL/L (ref 3.5–5.3)
PROT SERPL-MCNC: 9.5 G/DL (ref 6.4–8.2)
RBC # BLD AUTO: 4.47 MILLION/UL (ref 3.88–5.62)
SODIUM SERPL-SCNC: 138 MMOL/L (ref 136–145)
TRIGL SERPL-MCNC: 168 MG/DL
TSH SERPL DL<=0.05 MIU/L-ACNC: 2.51 UIU/ML (ref 0.36–3.74)
VALPROATE SERPL-MCNC: 67 UG/ML (ref 50–100)
WBC # BLD AUTO: 9.05 THOUSAND/UL (ref 4.31–10.16)

## 2018-01-24 PROCEDURE — 80061 LIPID PANEL: CPT

## 2018-01-24 PROCEDURE — 36415 COLL VENOUS BLD VENIPUNCTURE: CPT

## 2018-01-24 PROCEDURE — 80053 COMPREHEN METABOLIC PANEL: CPT

## 2018-01-24 PROCEDURE — 85025 COMPLETE CBC W/AUTO DIFF WBC: CPT

## 2018-01-24 PROCEDURE — 84443 ASSAY THYROID STIM HORMONE: CPT

## 2018-01-24 PROCEDURE — 80164 ASSAY DIPROPYLACETIC ACD TOT: CPT

## 2018-01-30 NOTE — DISCHARGE SUMMARY
Discharge Summary - [facility] 26 y o  male MRN: [id number]  Unit/Bed#: WX1 OVR Encounter: [id number]     Admission Date: 1/12/2018         Discharge Date: 1/17/2018      Attending Psychiatrist: KATHRYN Portillo     Reason for Admission/HPI:     The patient was schizoaffective disorder, intellectual disability and multiple previous psychiatric admission to sent [facility] units because of expressing homicidal ideations was admitted was this time, angry and agitated because his father did not lead him to change his TV channel he wanted to watch  On admission he was tense, did not want to discuss his recent and current mental state but stated that he continued to take medications with his Depakote level was slightly supratherapeutic  Hospital Course:     Mr Agustina Doe was admitted and all appropriate precautions were started  Pt was oriented to the unit  His treatment, including medication management and therapy was discussed with the patient, and he agreed  During the hospitalization the patient was encouraged to attend individual therapy, group therapy, milieu therapy and occupational therapy  Patient condition significantly improved after his  medications were restarted, Depakote was decreased was her total final level within normal limit and therapeutic common risperidone was increased to 3 mg twice a day    Pt agreed to start his medication after discussion of potential benefits and side effects  Risks, benefits, and possible side effects of medications explained to patient  Patient has limited understanding of risks and benefits of treatment at this time, but agrees to take medications as prescribed    He also participated in therapy  Pt  sleep improved and appetite improved as well  During his  treatment at the Unit the patient did not have any episodes of self-harming or harming to others behaviors, was cooperative with the treatment plan   Tolerated medications without side effects  Vitals were stable  Denied any SI or HI toward his relatives or anyone else  I discussed the medication regimen and possible side effects of the medications with the patient prior to discharge  At the time of discharge Mr Richards  was tolerating psychiatric medications  Please see After Discharge Summary for a completed list of discharge medications  Safety plan was discussed and approved by the patient  He was not manic, depressed, angry, or confused or psychotic on a day of discharge and, although had some signs of intellectual disability, was accountable for his actions  I discussed outpatient follow up with the patient, which was arranged by the unit  upon discharge  Safety plan was discussed and approved by Mr Daisy Cisneros  Reviewed with the patient importance of compliance with medications and outpatient treatment after discharge  The patient was competent to understand of risks and benefits of his actions         Mental Status at time of Discharge:     Mental Status Evaluation:    Appearance:  dressed appropriately, casually dressed   Behavior:  cooperative, calm   Mood:  improved   Affect: appropriate, brighter, less constricted    Speech:  decreased rate   Language: appropriate   Thought Process:  concrete   Associations: concrete associations   Thought Content:  normal, poverty of thought   Perceptual Disturbances: no auditory hallucinations, no visual hallucinations, denies auditory hallucinations when asked, does not appear responding to internal stimuli   Risk Potential: Suicidal ideation - None  Homicidal ideation - None  Potential for aggression - No   Sensorium:  oriented to person, place and time   Memory:  recent and remote memory grossly intact   Consciousness:  alert and awake   Attention: decreased concentration   Intellect: below average   Fund of Knowledge: awareness of current events appropriate   Insight:  improved   Judgment: improved   Muscle Tone: normal Gait/Station: normal gait/station and normal balance   Motor Activity: no abnormal movements         Discharge Diagnosis:   Patient Active Problem List   Diagnosis    Intellectual disability    Impulse control disorder    Schizoaffective disorder, bipolar type ([facility] )    BMI 45 0-49 9, adult (HCC)    Fever    Tachycardia    Hypokalemia    Hypertension       Discharge Medications:  See after visit summary for reconciled discharge medications provided to patient and family  Discharge instructions/Information to patient and family:   See after visit summary for information provided to patient and family  Provisions for Follow-Up Care:  See after visit summary for information related to follow-up care and any pertinent home health orders  Discharge Statement   I spent 45 minutes discharging the patient  This time was spent on the day of discharge  I had direct contact with the patient on the day of discharge  Additional documentation is required if more than 30 minutes were spent on discharge  Portions of the record may have been created with voice recognition software

## 2018-04-19 ENCOUNTER — HOSPITAL ENCOUNTER (INPATIENT)
Facility: HOSPITAL | Age: 23
LOS: 5 days | Discharge: HOME | DRG: 885 | End: 2018-04-24
Attending: PSYCHIATRY & NEUROLOGY | Admitting: PSYCHIATRY & NEUROLOGY
Payer: MEDICARE

## 2018-04-19 ENCOUNTER — HOSPITAL ENCOUNTER (EMERGENCY)
Facility: HOSPITAL | Age: 23
End: 2018-04-19
Attending: EMERGENCY MEDICINE | Admitting: EMERGENCY MEDICINE
Payer: MEDICARE

## 2018-04-19 VITALS
SYSTOLIC BLOOD PRESSURE: 155 MMHG | TEMPERATURE: 98.4 F | OXYGEN SATURATION: 94 % | BODY MASS INDEX: 45.23 KG/M2 | DIASTOLIC BLOOD PRESSURE: 78 MMHG | WEIGHT: 288.8 LBS | HEART RATE: 101 BPM | RESPIRATION RATE: 18 BRPM

## 2018-04-19 DIAGNOSIS — L60.9 NAIL ABNORMALITIES: Primary | ICD-10-CM

## 2018-04-19 DIAGNOSIS — F25.0 SCHIZOAFFECTIVE DISORDER, BIPOLAR TYPE (HCC): Primary | ICD-10-CM

## 2018-04-19 DIAGNOSIS — R44.0 AUDITORY HALLUCINATIONS: Primary | ICD-10-CM

## 2018-04-19 DIAGNOSIS — F25.0 SCHIZOAFFECTIVE DISORDER, BIPOLAR TYPE (HCC): ICD-10-CM

## 2018-04-19 PROCEDURE — 93005 ELECTROCARDIOGRAM TRACING: CPT | Performed by: PSYCHIATRY & NEUROLOGY

## 2018-04-19 PROCEDURE — 82075 ASSAY OF BREATH ETHANOL: CPT | Performed by: EMERGENCY MEDICINE

## 2018-04-19 PROCEDURE — 99285 EMERGENCY DEPT VISIT HI MDM: CPT

## 2018-04-19 PROCEDURE — 80307 DRUG TEST PRSMV CHEM ANLYZR: CPT | Performed by: EMERGENCY MEDICINE

## 2018-04-19 RX ORDER — HYDROXYZINE 50 MG/1
50 TABLET, FILM COATED ORAL EVERY 4 HOURS PRN
Status: DISCONTINUED | OUTPATIENT
Start: 2018-04-19 | End: 2018-04-24 | Stop reason: HOSPADM

## 2018-04-19 RX ORDER — MAGNESIUM HYDROXIDE/ALUMINUM HYDROXICE/SIMETHICONE 120; 1200; 1200 MG/30ML; MG/30ML; MG/30ML
30 SUSPENSION ORAL EVERY 4 HOURS PRN
Status: DISCONTINUED | OUTPATIENT
Start: 2018-04-19 | End: 2018-04-24 | Stop reason: HOSPADM

## 2018-04-19 RX ORDER — ACETAMINOPHEN 325 MG/1
650 TABLET ORAL EVERY 6 HOURS PRN
Status: DISCONTINUED | OUTPATIENT
Start: 2018-04-19 | End: 2018-04-24 | Stop reason: HOSPADM

## 2018-04-19 RX ORDER — LORAZEPAM 1 MG/1
1 TABLET ORAL EVERY 4 HOURS PRN
Status: DISCONTINUED | OUTPATIENT
Start: 2018-04-19 | End: 2018-04-24 | Stop reason: HOSPADM

## 2018-04-19 RX ORDER — TRAZODONE HYDROCHLORIDE 50 MG/1
50 TABLET ORAL
Status: DISCONTINUED | OUTPATIENT
Start: 2018-04-19 | End: 2018-04-24 | Stop reason: HOSPADM

## 2018-04-19 RX ORDER — ACETAMINOPHEN 325 MG/1
650 TABLET ORAL EVERY 6 HOURS PRN
Status: CANCELLED | OUTPATIENT
Start: 2018-04-19

## 2018-04-19 RX ORDER — BENZTROPINE MESYLATE 1 MG/ML
1 INJECTION INTRAMUSCULAR; INTRAVENOUS
Status: DISCONTINUED | OUTPATIENT
Start: 2018-04-19 | End: 2018-04-24 | Stop reason: HOSPADM

## 2018-04-19 RX ORDER — LORAZEPAM 2 MG/ML
2 INJECTION INTRAMUSCULAR EVERY 4 HOURS PRN
Status: DISCONTINUED | OUTPATIENT
Start: 2018-04-19 | End: 2018-04-24 | Stop reason: HOSPADM

## 2018-04-19 RX ORDER — HYDROXYZINE 50 MG/1
50 TABLET, FILM COATED ORAL EVERY 4 HOURS PRN
Status: CANCELLED | OUTPATIENT
Start: 2018-04-19

## 2018-04-19 RX ORDER — TRAZODONE HYDROCHLORIDE 50 MG/1
50 TABLET ORAL
Status: CANCELLED | OUTPATIENT
Start: 2018-04-19

## 2018-04-19 RX ORDER — BENZTROPINE MESYLATE 1 MG/1
1 TABLET ORAL
Status: CANCELLED | OUTPATIENT
Start: 2018-04-19

## 2018-04-19 RX ORDER — MAGNESIUM HYDROXIDE/ALUMINUM HYDROXICE/SIMETHICONE 120; 1200; 1200 MG/30ML; MG/30ML; MG/30ML
30 SUSPENSION ORAL EVERY 4 HOURS PRN
Status: CANCELLED | OUTPATIENT
Start: 2018-04-19

## 2018-04-19 RX ORDER — BENZTROPINE MESYLATE 1 MG/ML
1 INJECTION INTRAMUSCULAR; INTRAVENOUS
Status: CANCELLED | OUTPATIENT
Start: 2018-04-19

## 2018-04-19 RX ORDER — LORAZEPAM 2 MG/ML
2 INJECTION INTRAMUSCULAR EVERY 4 HOURS PRN
Status: CANCELLED | OUTPATIENT
Start: 2018-04-19

## 2018-04-19 RX ORDER — BENZTROPINE MESYLATE 1 MG/1
1 TABLET ORAL
Status: DISCONTINUED | OUTPATIENT
Start: 2018-04-19 | End: 2018-04-24 | Stop reason: HOSPADM

## 2018-04-19 RX ORDER — LORAZEPAM 1 MG/1
1 TABLET ORAL EVERY 4 HOURS PRN
Status: CANCELLED | OUTPATIENT
Start: 2018-04-19

## 2018-04-19 NOTE — LETTER
Section I - General Information    Name of Patient: Montana Church                 : 1995    Medicare #:____________________  Transport Date: 18 (PCS is valid for round trips on this date and for all repetitive trips in the 60-day range as noted below )  Origin: [address]                                                         Destination:________________________________________________  Is the pt's stay covered under Medicare Part A (PPS/DRG)     (_) YES  (_) NO  Closest appropriate facility?  (_) YES  (_) NO  If no, why is transport to more distant facility required?________________________  If hosp-hosp transfer, describe services needed at 2nd facility not available at 1st facility? _________________________________  If hospice pt, is this transport related to pt's terminal illness? (_) YES (_) NO Describe____________________________________    Section II - Medical Necessity Questionnaire  Ambulance transportation is medically necessary only if other means of transport are contraindicated or would be potentially harmful to the patient  To meet this requirement, the patient must either be "bed confined" or suffer from a condition such that transport by means other than ambulance is contraindicated by the patient's condition   The following questions must be answered by the medical professional signing below for this form to be valid:    1)  Describe the MEDICAL CONDITION (physical and/or mental) of this patient AT [address] [place] [address] that requires the patient to be transported in an ambulance and why transport by other means is contraindicated by the patient's condition:__________________________________________________________________________________________________    2) Is the patient "bed confined" as defined below?     (_) YES  (_) NO  To be "be confined" the patient must satisfy all three of the following conditions: (1) unable to get up from bed without Assistance; AND (2) unable to ambulate; AND (3) unable to sit in a chair or wheelchair  3) Can this patient safely be transported by car or wheelchair Elizabeth President (i e , seated during transport without a medical attendant or monitoring)?   (_) YES  (_) NO    4) In addition to completing questions 1-3 above, please check any of the following conditions that apply*:  *Note: supporting documentation for any boxes checked must be maintained in the patient's medical records  (_)Contractures   (_)Non-Healed Fractures  (_)Patient is confused (_)Patient is comatose (_)Moderate/severe pain on movement (_)Danger to self/others  (_)IV meds/fluids required (_)Patient is combative(_)Need or possible need for restraints (_)DVT requires elevation of lower extremity  (_)Medical attendant required (_)Requires oxygen-unable to self administer (_)Special handling/isolation/infection control precautions required (_)Unable to tolerate seated position for time needed to transport (_)Hemodynamic monitoring required en route (_)Unable to sit in a chair or wheelchair due to decubitus ulcers or other wounds (_)Cardiac monitoring required en route (_)Morbid obesity requires additional personnel/equipment to safely handle patient (_)Orthopedic device (backboard, halo, pins, traction, brace, wedge, etc,) requiring special handling during transport (_)Other(specify)_______________________________________________    Section III - Signature of Physician or Healthcare Professional  I certify that the above information is true and correct based on my evaluation of this patient, and represent that the patient requires transport by ambulance and that other forms of transport are contraindicated   I understand that this information will be used by the Centers for Medicare and Medicaid Services (CMS) to support the determination of medical necessity for ambulance services, and I represent that I have personal knowledge of the patient's condition at time of transport  (_) If this box is checked, I also certify that the patient is physically or mentally incapable of signing the ambulance service's claim and that the institution with which I am affiliated has furnished care, services, or assistance to the patient  My signature below is made on behalf of the patient pursuant to 42 CFR §424 36(b)(4)  In accordance with 42 CFR §424 37, the specific reason(s) that the patient is physically or mentally incapable of signing the claim form is as follows: _________________________________________________________________________________________________________      Signature of Physician* or Healthcare Professional______________________________________________________________  Signature Date 04/19/18 (For scheduled repetitive transports, this form is not valid for transports performed more than 60 days after this date)    Printed Name & Credentials of Physician or Healthcare Professional (MD, DO, RN, etc )________________________________  *Form must be signed by patient's attending physician for scheduled, repetitive transports   For non-repetitive, unscheduled ambulance transports, if unable to obtain the signature of the attending physician, any of the following may sign (choose appropriate option below)  (_) Physician Assistant (_)  Clinical Nurse Specialist (_)  Registered Nurse  (_)  Nurse Practitioner  (_) Discharge Planner

## 2018-04-19 NOTE — ED PROVIDER NOTES
History  Chief Complaint   Patient presents with    Psychiatric Evaluation     pt brought by EMS from home, states he has auditory hallucinations to hurt his mom and SI to hurt himself with a knife       25-year-old male with history of intellectual disability, schizoaffective disorder on risperidone presents to the emergency department with 1 week of hearing voices telling him to kill his mom  Patient states he does not actually want to kill his mom but he has been hearing these voices for the past week he denies any suicidal ideations with suicidal attempts he denies any visual hallucinations  Patient has no complaints at this time except for the thoughts of wanting to harm his mother  Per EMS/triage note, patient lives at home with his mother and was brought to the emergency department by EMS  Patient's ROS negative        History provided by:  Patient   used: No    Psychiatric Evaluation   Presenting symptoms: hallucinations and homicidal ideas    Presenting symptoms: no agitation, no self-mutilation and no suicidal thoughts    Degree of incapacity (severity):  Severe  Onset quality:  Gradual  Timing:  Constant  Progression:  Unchanged  Chronicity:  New  Context: not noncompliant    Treatment compliance: All of the time  Relieved by: Antipsychotics  Worsened by:  Nothing  Ineffective treatments:  None tried  Associated symptoms: no abdominal pain, no chest pain and no headaches        Prior to Admission Medications   Prescriptions Last Dose Informant Patient Reported? Taking?    amLODIPine (NORVASC) 5 mg tablet   No No   Sig: Take 1 tablet by mouth daily for 30 days At 9AM   loratadine (CLARITIN) 10 mg tablet   No No   Sig: Take 1 tablet by mouth daily for 30 days      Facility-Administered Medications: None       Past Medical History:   Diagnosis Date    Anxiety     Asthma     Hypertension     Mental retardation     Schizoaffective disorder ([facility] [address] )        Past Surgical History: Procedure Laterality Date    HAND SURGERY      right hand       Family History   Problem Relation Age of Onset    No Known Problems Mother     No Known Problems Father     No Known Problems Sister     No Known Problems Brother     No Known Problems Maternal Aunt     No Known Problems Paternal Aunt     No Known Problems Maternal Uncle     No Known Problems Paternal Uncle     No Known Problems Maternal Grandfather     No Known Problems Maternal Grandmother     No Known Problems Paternal Grandfather     No Known Problems Paternal Grandmother     No Known Problems Cousin     ADD / ADHD Neg Hx     Alcohol abuse Neg Hx     Anxiety disorder Neg Hx     Bipolar disorder Neg Hx     Dementia Neg Hx     Depression Neg Hx     Drug abuse Neg Hx     OCD Neg Hx     Paranoid behavior Neg Hx     Schizophrenia Neg Hx     Seizures Neg Hx     Self-Injury Neg Hx     Suicide Attempts Neg Hx      I have reviewed and agree with the history as documented  Social History   Substance Use Topics    Smoking status: Never Smoker    Smokeless tobacco: Never Used      Comment: non-smoker    Alcohol use No        Review of Systems   Constitutional: Negative for chills and fever  HENT: Negative for sore throat  Eyes: Negative for visual disturbance  Respiratory: Negative for shortness of breath  Cardiovascular: Negative for chest pain  Gastrointestinal: Negative for abdominal pain, constipation, diarrhea, nausea and vomiting  Genitourinary: Negative for difficulty urinating, dysuria and hematuria  Musculoskeletal: Negative for arthralgias  Skin: Negative for rash  Neurological: Negative for syncope, weakness and headaches  Hematological: Negative for adenopathy  Psychiatric/Behavioral: Positive for hallucinations and homicidal ideas  Negative for agitation, behavioral problems, self-injury and suicidal ideas  All other systems reviewed and are negative        Physical Exam  ED Triage Vitals [04/19/18 1839]   Temperature Pulse Respirations Blood Pressure SpO2   98 4 °F (36 9 °C) 101 18 155/78 94 %      Temp Source Heart Rate Source Patient Position - Orthostatic VS BP Location FiO2 (%)   Oral -- -- -- --      Pain Score       No Pain           Orthostatic Vital Signs  Vitals:    04/19/18 1839   BP: 155/78   Pulse: 101       Physical Exam   Constitutional: He is oriented to person, place, and time  He appears well-developed and well-nourished  HENT:   Head: Normocephalic and atraumatic  Eyes: Conjunctivae and EOM are normal  No scleral icterus  Neck: Normal range of motion  Neck supple  Cardiovascular: Normal rate and regular rhythm  No murmur heard  Pulmonary/Chest: Effort normal and breath sounds normal    Abdominal: Soft  Bowel sounds are normal  There is no tenderness  Musculoskeletal: Normal range of motion  Neurological: He is alert and oriented to person, place, and time  Skin: Skin is warm and dry  Psychiatric: He has a normal mood and affect  His behavior is normal  He is actively hallucinating  Nursing note and vitals reviewed  ED Medications  Medications - No data to display    Diagnostic Studies  Results Reviewed     Procedure Component Value Units Date/Time    Rapid drug screen, urine [[id number]]  (Normal) Collected:  04/19/18 1850    Lab Status:  Final result Specimen:  Urine from Urine, Clean Catch Updated:  04/19/18 1916     Amph/Meth UR Negative     Barbiturate Ur Negative     Benzodiazepine Urine Negative     Cocaine Urine Negative     Methadone Urine Negative     Opiate Urine Negative     PCP Ur Negative     THC Urine Negative    Narrative:         FOR MEDICAL PURPOSES ONLY  IF CONFIRMATION NEEDED PLEASE CONTACT THE LAB WITHIN 5 DAYS      Drug Screen Cutoff Levels:  AMPHETAMINE/METHAMPHETAMINES  1000 ng/mL  BARBITURATES     200 ng/mL  BENZODIAZEPINES     200 ng/mL  COCAINE      300 ng/mL  METHADONE      300 ng/mL  OPIATES      300 ng/mL  PHENCYCLIDINE     25 ng/mL  THC       50 ng/mL    POCT alcohol breath test [[id number]]  (Normal) Resulted:  04/19/18 1853    Lab Status:  Final result Updated:  04/19/18 1854     EXTBreath Alcohol 0 000                 No orders to display         Procedures  Procedures      Phone Consults  ED Phone Contact    ED Course                               MDM  Number of Diagnoses or Management Options  Auditory hallucinations: new and requires workup  Diagnosis management comments: 78-year-old male presenting with auditory hallucinations telling him to kill his mother will obtain bat and UDS and have patient seen by crisis for inpatient evaluation  Patient has bed reserved in [facility], awaiting  after crisis eval        Amount and/or Complexity of Data Reviewed  Clinical lab tests: ordered and reviewed  Tests in the medicine section of CPT®: ordered and reviewed  Review and summarize past medical records: yes  Discuss the patient with other providers: yes      CritCare Time    Disposition  Final diagnoses: Auditory hallucinations     Time reflects when diagnosis was documented in both MDM as applicable and the Disposition within this note     Time User Action Codes Description Comment    4/19/2018  8:54 PM Alice Cheek Add [R44 0] Auditory hallucinations       ED Disposition     ED Disposition Condition Comment    Transfer to Another [facility] should be transferred out to [facility]        MD Documentation      Most Recent Value   Patient Condition  The patient has been stabilized such that within reasonable medical probability, no material deterioration of the patient condition or the condition of the unborn child(gianna) is likely to result from the transfer   Reason for Transfer  Level of Care needed not available at this facility   Benefits of Transfer  Specialized equipment and/or services available at the receiving facility (Include comment)________________________   Risks of Transfer  Increased discomfort during transfer, Potential deterioration of medical condition, Potential for delay in receiving treatment   Accepting Physician  Dr Danielle Johnson Name, Remy Bolivian    (Name & Tel number)  Yohannes Mercer by (Company and Unit #)  [facility]   Sending MD Christ Benavides    Provider Certification  The patient is stable for psychiatric transfer because they are medically stable, and is protected from harming him/herself or others during transport      RN Documentation      [facility] Name, Remy Bolivian    (Name & Tel number)  Yasmin   Transport Mode  Ambulance   Transported by [employer] and Unit #)  [facility]   Level of Care  Basic life support   Transfer Date  04/19/18   Transfer Time  2210      Follow-up Information    None       Discharge Medication List as of 4/19/2018 10:40 PM      CONTINUE these medications which have NOT CHANGED    Details   amLODIPine (NORVASC) 5 mg tablet Take 1 tablet by mouth daily for 30 days At 9AM, Starting Wed 1/17/2018, Until Fri 2/16/2018, Print      loratadine (CLARITIN) 10 mg tablet Take 1 tablet by mouth daily for 30 days, Starting Thu 1/18/2018, Until Sat 2/17/2018, Print      divalproex sodium (DEPAKOTE) 250 mg EC tablet Take 3 tablets by mouth daily at bedtime for 30 days, Starting Wed 1/17/2018, Until Thu 4/19/2018, Print      risperiDONE (RisperDAL) 3 mg tablet Take 1 tablet by mouth 2 (two) times a day for 30 days, Starting Wed 1/17/2018, Until Thu 4/19/2018, Print           No discharge procedures on file  ED Provider  Attending physically available and evaluated Zayra Cerna I managed the patient along with the ED Attending      Electronically Signed by         Ina Larson MD  04/29/18 2051       Ina Larson MD  04/29/18 2051

## 2018-04-19 NOTE — LETTER
[address]  [address] [facility]  Dept: Milrichi Mckinney                                         1995                              MRN [id number]    I have been informed of my rights regarding examination, treatment, and transfer   by Dr Tamy Frederick DO    Benefits: Specialized equipment and/or services available at the receiving facility (Include comment)________________________    Risks: Potential for delay in receiving treatment      Consent for Transfer:  I acknowledge that my medical condition has been evaluated and explained to me by the emergency department physician or other qualified medical person and/or my attending physician, who has recommended that I be transferred to the service of  Accepting Physician: Dr Ana Rosa Chong at [address] [place] [address] Name, [id number] : [id number]  The above potential benefits of such transfer, the potential risks associated with such transfer, and the probable risks of not being transferred have been explained to me, and I fully understand them  The doctor has explained that, in my case, the benefits of transfer outweigh the risks  I agree to be transferred  I authorize the performance of emergency medical procedures and treatments upon me in both transit and upon arrival at the receiving facility  Additionally, I authorize the release of any and all medical records to the receiving facility and request they be transported with me, if possible  I understand that the safest mode of transportation during a medical emergency is an ambulance and that the Hospital advocates the use of this mode of transport  Risks of traveling to the receiving facility by car, including absence of medical control, life sustaining equipment, such as oxygen, and medical personnel has been explained to me and I fully understand them      (WIL CORRECT BOX BELOW)  [  ]  I consent to the stated transfer and to be transported by ambulance/helicopter  [  ]  I consent to the stated transfer, but refuse transportation by ambulance and accept full responsibility for my transportation by car  I understand the risks of non-ambulance transfers and I exonerate the Hospital and its staff from any deterioration in my condition that results from this refusal     X___________________________________________    DATE  18  TIME________  Signature of patient or legally responsible individual signing on patient behalf           RELATIONSHIP TO PATIENT_________________________          Provider Certification    NAME Royce Chavez                                         1995                              MRN [id number]    A medical screening exam was performed on the above named patient  Based on the examination:    Condition Necessitating Transfer The encounter diagnosis was Auditory hallucinations  Patient Condition: The patient has been stabilized such that within reasonable medical probability, no material deterioration of the patient condition or the condition of the unborn child(gianna) is likely to result from the transfer    Reason for Transfer: Level of Care needed not available at this facility    Transfer Requirements: Facility [facility]   · Space available and qualified personnel available for treatment as acknowledged by Bina Ireland  · Agreed to accept transfer and to provide appropriate medical treatment as acknowledged by       Dr Clarence Lanza  · Appropriate medical records of the examination and treatment of the patient are provided at the time of transfer   [address], [address] _______  · Transfer will be performed by qualified personnel from [facility]  and appropriate transfer equipment as required, including the use of necessary and appropriate life support measures      Provider Certification: I have examined the patient and explained the following risks and benefits of being transferred/refusing transfer to the patient/family:  General risk, such as traffic hazards, adverse weather conditions, rough terrain or turbulence, possible failure of equipment (including vehicle or aircraft), or consequences of actions of persons outside the control of the transport personnel      Based on these reasonable risks and benefits to the patient and/or the unborn child(gianna), and based upon the information available at the time of the patients examination, I certify that the medical benefits reasonably to be expected from the provision of appropriate medical treatments at another medical facility outweigh the increasing risks, if any, to the individuals medical condition, and in the case of labor to the unborn child, from effecting the transfer      X____________________________________________ DATE 04/19/18        TIME_______      ORIGINAL - SEND TO MEDICAL RECORDS   COPY - SEND WITH PATIENT DURING TRANSFER

## 2018-04-19 NOTE — ED ATTENDING ATTESTATION
I, Violet Lancaster DO, saw and evaluated the patient  I have discussed the patient with the resident/non-physician practitioner and agree with the resident's/non-physician practitioner's findings, Plan of Care, and MDM as documented in the resident's/non-physician practitioner's note, except where noted  All available labs and Radiology studies were reviewed  At this point I agree with the current assessment done in the Emergency Department  I have conducted an independent evaluation of this patient a history and physical is as follows:      Critical Care Time  CritCare Time    Procedures     26 yr old male to the ED with HI towards mother for the last week  Hearing voices telling him to do it  Taking his meds  Exm: Slow thought  Lungs: cta  ENT: unrmk  No rash     Pln: UDS and crisis

## 2018-04-20 LAB
ATRIAL RATE: 74 BPM
P AXIS: 4 DEGREES
PR INTERVAL: 132 MS
QRS AXIS: 27 DEGREES
QRSD INTERVAL: 98 MS
QT INTERVAL: 382 MS
QTC INTERVAL: 424 MS
RPR SER QL: NORMAL
T WAVE AXIS: 13 DEGREES
VALPROATE SERPL-MCNC: 38 UG/ML (ref 50–100)
VENTRICULAR RATE: 74 BPM

## 2018-04-20 PROCEDURE — 99223 1ST HOSP IP/OBS HIGH 75: CPT | Performed by: PSYCHIATRY & NEUROLOGY

## 2018-04-20 PROCEDURE — 80164 ASSAY DIPROPYLACETIC ACD TOT: CPT | Performed by: PSYCHIATRY & NEUROLOGY

## 2018-04-20 PROCEDURE — 93010 ELECTROCARDIOGRAM REPORT: CPT | Performed by: INTERNAL MEDICINE

## 2018-04-20 PROCEDURE — 86592 SYPHILIS TEST NON-TREP QUAL: CPT | Performed by: PSYCHIATRY & NEUROLOGY

## 2018-04-20 PROCEDURE — 99252 IP/OBS CONSLTJ NEW/EST SF 35: CPT | Performed by: PHYSICIAN ASSISTANT

## 2018-04-20 RX ORDER — DIVALPROEX SODIUM 500 MG/1
1 TABLET, EXTENDED RELEASE ORAL
Status: ON HOLD | COMMUNITY
Start: 2018-04-12 | End: 2018-04-20 | Stop reason: CLARIF

## 2018-04-20 RX ORDER — LORATADINE 10 MG/1
10 TABLET ORAL DAILY
Status: DISCONTINUED | OUTPATIENT
Start: 2018-04-20 | End: 2018-04-24 | Stop reason: HOSPADM

## 2018-04-20 RX ORDER — DIVALPROEX SODIUM 500 MG/1
500 TABLET, DELAYED RELEASE ORAL DAILY
Status: DISCONTINUED | OUTPATIENT
Start: 2018-04-20 | End: 2018-04-24 | Stop reason: HOSPADM

## 2018-04-20 RX ORDER — AMLODIPINE BESYLATE 5 MG/1
5 TABLET ORAL DAILY
Status: DISCONTINUED | OUTPATIENT
Start: 2018-04-20 | End: 2018-04-24 | Stop reason: HOSPADM

## 2018-04-20 RX ORDER — RISPERIDONE 2 MG/1
1 TABLET, FILM COATED ORAL
Status: ON HOLD | COMMUNITY
Start: 2018-04-14 | End: 2018-04-20 | Stop reason: CLARIF

## 2018-04-20 RX ORDER — RISPERIDONE 1 MG/1
1 TABLET, FILM COATED ORAL EVERY MORNING
Status: ON HOLD | COMMUNITY
Start: 2018-04-14 | End: 2018-04-20 | Stop reason: CLARIF

## 2018-04-20 RX ADMIN — DIVALPROEX SODIUM 500 MG: 500 TABLET, DELAYED RELEASE ORAL at 12:49

## 2018-04-20 RX ADMIN — RISPERIDONE 3 MG: 2 TABLET ORAL at 12:49

## 2018-04-20 RX ADMIN — RISPERIDONE 1 MG: 1 TABLET, ORALLY DISINTEGRATING ORAL at 09:11

## 2018-04-20 RX ADMIN — DIVALPROEX SODIUM 750 MG: 500 TABLET, DELAYED RELEASE ORAL at 21:40

## 2018-04-20 RX ADMIN — AMLODIPINE BESYLATE 5 MG: 5 TABLET ORAL at 12:49

## 2018-04-20 RX ADMIN — RISPERIDONE 3 MG: 2 TABLET ORAL at 17:23

## 2018-04-20 RX ADMIN — LORATADINE 10 MG: 10 TABLET ORAL at 12:49

## 2018-04-20 NOTE — PLAN OF CARE
RN will meet with pt twice a day to assess for any concerns, teach about prescribed medications and diagnoses  Patient will be taught and encouraged to utilize healthy coping skills

## 2018-04-20 NOTE — ED NOTES
Pt primary insurance is medicare part A and B no pre-cert is required     Insurance Authorization: [employer]   Phone call placed to [facility] this was verify by  General Almanzar number:  [phone]  Spoke to Sravani Harman     Will follow primary insurance  The request a call once pt is admitted and fax once pt is DC

## 2018-04-20 NOTE — CMS CERTIFICATION NOTE
Certification: Estimated length of stay: More than 2 midnights  I certify that inpatient services are medically necessary for this patient for a duration of greater that 2 midnights  See H&P and MD Progress Notes for additional information about the patient's course of treatment

## 2018-04-20 NOTE — EMTALA/ACUTE CARE TRANSFER
[address]  [address] [place] [address]  Dept: Rolando    NAME Mathew Levy                                         1995                              MRN [id number]    I have been informed of my rights regarding examination, treatment, and transfer   by Dr Manohar Torres DO    Benefits: Specialized equipment and/or services available at the receiving facility (Include comment)________________________    Risks: Increased discomfort during transfer, Potential deterioration of medical condition, Potential for delay in receiving treatment      Transfer Request   I acknowledge that my medical condition has been evaluated and explained to me by the emergency department physician or other qualified medical person and/or my attending physician who has recommended and offered to me further medical examination and treatment  I understand the Hospital's obligation with respect to the treatment and stabilization of my emergency medical condition  I nevertheless request to be transferred  I release the Hospital, the doctor, and any other persons caring for me from all responsibility or liability for any injury or ill effects that may result from my transfer and agree to accept all responsibility for the consequences of my choice to transfer, rather than receive stabilizing treatment at the Hospital  I understand that because the transfer is my request, my insurance may not provide reimbursement for the services  The Hospital will assist and direct me and my family in how to make arrangements for transfer, but the hospital is not liable for any fees charged by the transport service  In spite of this understanding, I refuse to consent to further medical examination and treatment which has been offered to me, and request transfer to [address], [address] : [facility]   I authorize the performance of emergency medical procedures and treatments upon me in both transit and upon arrival at the receiving facility  Additionally, I authorize the release of any and all medical records to the receiving facility and request they be transported with me, if possible  I authorize the performance of emergency medical procedures and treatments upon me in both transit and upon arrival at the receiving facility  Additionally, I authorize the release of any and all medical records to the receiving facility and request they be transported with me, if possible  I understand that the safest mode of transportation during a medical emergency is an ambulance and that the Hospital advocates the use of this mode of transport  Risks of traveling to the receiving facility by car, including absence of medical control, life sustaining equipment, such as oxygen, and medical personnel has been explained to me and I fully understand them  (WIL CORRECT BOX BELOW)  [  ]  I consent to the stated transfer and to be transported by ambulance/helicopter  [  ]  I consent to the stated transfer, but refuse transportation by ambulance and accept full responsibility for my transportation by car  I understand the risks of non-ambulance transfers and I exonerate the Hospital and its staff from any deterioration in my condition that results from this refusal     X___________________________________________    DATE  18  TIME________  Signature of patient or legally responsible individual signing on patient behalf           RELATIONSHIP TO PATIENT_________________________          Provider Certification    NAME Ren Zepeda                                        [facility] 1995                              MRN [id number]    A medical screening exam was performed on the above named patient  Based on the examination:    Condition Necessitating Transfer The encounter diagnosis was Auditory hallucinations      Patient Condition: The patient has been stabilized such that within reasonable medical probability, no material deterioration of the patient condition or the condition of the unborn child(gianna) is likely to result from the transfer    Reason for Transfer: Level of Care needed not available at this facility    Transfer Requirements: [address]   · Space available and qualified personnel available for treatment as acknowledged by Yves  · Agreed to accept transfer and to provide appropriate medical treatment as acknowledged by       Dr Olga Lidia Francois  · Appropriate medical records of the examination and treatment of the patient are provided at the time of transfer   [address], [address] _______  · Transfer will be performed by qualified personnel from [facility]  and appropriate transfer equipment as required, including the use of necessary and appropriate life support measures  Provider Certification: I have examined the patient and explained the following risks and benefits of being transferred/refusing transfer to the patient/family:  The patient is stable for psychiatric transfer because they are medically stable, and is protected from harming him/herself or others during transport      Based on these reasonable risks and benefits to the patient and/or the unborn child(gianna), and based upon the information available at the time of the patients examination, I certify that the medical benefits reasonably to be expected from the provision of appropriate medical treatments at another medical facility outweigh the increasing risks, if any, to the individuals medical condition, and in the case of labor to the unborn child, from effecting the transfer      X____________________________________________ DATE 04/19/18        TIME_______      ORIGINAL - SEND TO MEDICAL RECORDS   COPY - SEND WITH PATIENT DURING TRANSFER
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
## 2018-04-20 NOTE — TREATMENT PLAN
TREATMENT PLAN REVIEW - [address] 26 y o  1995 male MRN: [id number]    [address] [place] [address] Room / Bed: [place] [address]/[facility] [address] Encounter: [id number]          Admit Date/Time:  4/19/2018 10:59 PM    Treatment Team: Attending Provider: Consuelo Scanlon; Charge Nurse: Angela Schilder, RN; : Dionisio Santana; Patient Care Technician: Luiz Prader; Registered Nurse: Mykel Prieto RN; Medications RN: Juan David Tenorio RN; Patient Care Technician: Renato Muñoz; Occupational Therapy Assistant: SERA Dominguez    Diagnosis: Principal Problem:    Schizoaffective disorder, bipolar type ([facility] )      Patient Strengths/Assets: cooperative, patient is on a voluntary commitment    Patient Barriers/Limitations: difficulty adapting, poor insight, poor interpersonal skills    Short Term Goals: decrease in depressive symptoms, decrease in psychotic symptoms, decrease in suicidal thoughts, decrease in homicidal thoughts, ability to stay safe on the unit, improvement in insight    Long Term Goals: improvement in depression, free of suicidal thoughts, free of homicidal thoughts, improved insight    Progress Towards Goals: starting psychiatric medications as prescribed    Recommended Treatment: medication management, patient medication education, group therapy, milieu therapy, continued Behavioral Health psychiatric evaluation/assessment process    Treatment Frequency: daily medication monitoring, group and milieu therapy daily, monitoring through interdisciplinary rounds, monitoring through weekly patient care conferences    Expected Discharge Date:   In 5 days    Discharge Plan: referral for outpatient medication management with a psychiatrist, referrals as indicated    Treatment Plan Created/Updated By: Michael Macias MD

## 2018-04-20 NOTE — ED NOTES
Patient is accepted at [facility]  Patient is accepted by Dr Juliano Cardenas per charge nurse Emily [phone]  Transportation is arranged with [employer] is scheduled for 6260          Nurse report is to be called to [facility] at [phone] prior to patient transfer
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
## 2018-04-20 NOTE — PROGRESS NOTES
Friendly and smiling throughout the morning  Cooperative during interaction  Reports AH to kill mother but does not appear to be responding to [facility] or appear to be in distress  Attending groups

## 2018-04-20 NOTE — PLAN OF CARE
Problem: Ineffective Coping  Goal: Participates in unit activities  Interventions:  - Provide therapeutic environment   - Provide required programming   - Redirect inappropriate behaviors    Outcome: Progressing  Pt attended all groups  He presented as pleasant and social with peers
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
## 2018-04-21 LAB
ALBUMIN SERPL BCP-MCNC: 3.4 G/DL (ref 3.5–5)
ALP SERPL-CCNC: 76 U/L (ref 46–116)
ALT SERPL W P-5'-P-CCNC: 68 U/L (ref 12–78)
ANION GAP SERPL CALCULATED.3IONS-SCNC: 10 MMOL/L (ref 4–13)
AST SERPL W P-5'-P-CCNC: 30 U/L (ref 5–45)
BASOPHILS # BLD AUTO: 0.04 THOUSANDS/ΜL (ref 0–0.1)
BASOPHILS NFR BLD AUTO: 1 % (ref 0–1)
BILIRUB SERPL-MCNC: 0.3 MG/DL (ref 0.2–1)
BUN SERPL-MCNC: 11 MG/DL (ref 5–25)
CALCIUM SERPL-MCNC: 8.9 MG/DL (ref 8.3–10.1)
CHLORIDE SERPL-SCNC: 104 MMOL/L (ref 100–108)
CHOLEST SERPL-MCNC: 176 MG/DL (ref 50–200)
CO2 SERPL-SCNC: 26 MMOL/L (ref 21–32)
CREAT SERPL-MCNC: 0.73 MG/DL (ref 0.6–1.3)
EOSINOPHIL # BLD AUTO: 0.32 THOUSAND/ΜL (ref 0–0.61)
EOSINOPHIL NFR BLD AUTO: 4 % (ref 0–6)
ERYTHROCYTE [DISTWIDTH] IN BLOOD BY AUTOMATED COUNT: 13 % (ref 11.6–15.1)
GFR SERPL CREATININE-BSD FRML MDRD: 132 ML/MIN/1.73SQ M
GLUCOSE P FAST SERPL-MCNC: 102 MG/DL (ref 65–99)
GLUCOSE SERPL-MCNC: 102 MG/DL (ref 65–140)
HCT VFR BLD AUTO: 42.8 % (ref 36.5–49.3)
HDLC SERPL-MCNC: 27 MG/DL (ref 40–60)
HGB BLD-MCNC: 14.5 G/DL (ref 12–17)
LDLC SERPL CALC-MCNC: 102 MG/DL (ref 0–100)
LYMPHOCYTES # BLD AUTO: 3.43 THOUSANDS/ΜL (ref 0.6–4.47)
LYMPHOCYTES NFR BLD AUTO: 43 % (ref 14–44)
MCH RBC QN AUTO: 30.2 PG (ref 26.8–34.3)
MCHC RBC AUTO-ENTMCNC: 33.9 G/DL (ref 31.4–37.4)
MCV RBC AUTO: 89 FL (ref 82–98)
MONOCYTES # BLD AUTO: 0.98 THOUSAND/ΜL (ref 0.17–1.22)
MONOCYTES NFR BLD AUTO: 12 % (ref 4–12)
NEUTROPHILS # BLD AUTO: 3.17 THOUSANDS/ΜL (ref 1.85–7.62)
NEUTS SEG NFR BLD AUTO: 40 % (ref 43–75)
NONHDLC SERPL-MCNC: 149 MG/DL
PLATELET # BLD AUTO: 209 THOUSANDS/UL (ref 149–390)
PMV BLD AUTO: 8.9 FL (ref 8.9–12.7)
POTASSIUM SERPL-SCNC: 4.1 MMOL/L (ref 3.5–5.3)
PROT SERPL-MCNC: 7.7 G/DL (ref 6.4–8.2)
RBC # BLD AUTO: 4.8 MILLION/UL (ref 3.88–5.62)
SODIUM SERPL-SCNC: 140 MMOL/L (ref 136–145)
T3 SERPL-MCNC: 1.1 NG/ML (ref 0.6–1.8)
T4 SERPL-MCNC: 7 UG/DL (ref 4.7–13.3)
TRIGL SERPL-MCNC: 237 MG/DL
TSH SERPL DL<=0.05 MIU/L-ACNC: 5.02 UIU/ML (ref 0.36–3.74)
WBC # BLD AUTO: 7.94 THOUSAND/UL (ref 4.31–10.16)

## 2018-04-21 PROCEDURE — 84480 ASSAY TRIIODOTHYRONINE (T3): CPT | Performed by: PSYCHIATRY & NEUROLOGY

## 2018-04-21 PROCEDURE — 86592 SYPHILIS TEST NON-TREP QUAL: CPT | Performed by: PSYCHIATRY & NEUROLOGY

## 2018-04-21 PROCEDURE — 80053 COMPREHEN METABOLIC PANEL: CPT | Performed by: PSYCHIATRY & NEUROLOGY

## 2018-04-21 PROCEDURE — 85025 COMPLETE CBC W/AUTO DIFF WBC: CPT | Performed by: PSYCHIATRY & NEUROLOGY

## 2018-04-21 PROCEDURE — 80061 LIPID PANEL: CPT | Performed by: PSYCHIATRY & NEUROLOGY

## 2018-04-21 PROCEDURE — 84436 ASSAY OF TOTAL THYROXINE: CPT | Performed by: PSYCHIATRY & NEUROLOGY

## 2018-04-21 PROCEDURE — 84443 ASSAY THYROID STIM HORMONE: CPT | Performed by: PSYCHIATRY & NEUROLOGY

## 2018-04-21 PROCEDURE — 99232 SBSQ HOSP IP/OBS MODERATE 35: CPT | Performed by: PSYCHIATRY & NEUROLOGY

## 2018-04-21 RX ADMIN — LORATADINE 10 MG: 10 TABLET ORAL at 08:33

## 2018-04-21 RX ADMIN — AMLODIPINE BESYLATE 5 MG: 5 TABLET ORAL at 08:34

## 2018-04-21 RX ADMIN — RISPERIDONE 3 MG: 2 TABLET ORAL at 08:33

## 2018-04-21 RX ADMIN — ACETAMINOPHEN 650 MG: 325 TABLET ORAL at 14:46

## 2018-04-21 RX ADMIN — ACETAMINOPHEN 650 MG: 325 TABLET, FILM COATED ORAL at 14:47

## 2018-04-21 RX ADMIN — DIVALPROEX SODIUM 750 MG: 500 TABLET, DELAYED RELEASE ORAL at 21:13

## 2018-04-21 RX ADMIN — DIVALPROEX SODIUM 500 MG: 500 TABLET, DELAYED RELEASE ORAL at 08:33

## 2018-04-21 RX ADMIN — RISPERIDONE 3 MG: 2 TABLET ORAL at 17:19
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
## 2018-04-21 NOTE — PROGRESS NOTES
Progress Note - Behavioral Health   Stephanie Chaidez 26 y o  male MRN: [id number]  Unit/Bed#: [facility] 263-01 Encounter: [id number]    Assessment/Plan   Principal Problem:    Schizoaffective disorder, bipolar type ([address] )      Behavior over the last 24 hours:  unchanged  Sleep: normal  Appetite: normal  Medication side effects: No  ROS: no complaints    Mental Status Evaluation:  Appearance:  age appropriate   Behavior:  normal   Speech:  articulation error   Mood:  normal   Affect:  normal   Thought Process:  concrete   Thought Content:  normal   Perceptual Disturbances: None   Risk Potential: contracts for safety in milieu   Sensorium:  person, place and time/date   Cognition:  grossly intact   Consciousness:  alert    Attention: attention span and concentration were age appropriate   Insight:  age appropriate   Judgment: age appropriate   Gait/Station: normal gait/station   Motor Activity: no abnormal movements     Progress Toward Goals: patient participating in milieu and compliant with meds    Recommended Treatment: Continue with group therapy, milieu therapy and occupational therapy  Risks, benefits and possible side effects of Medications:   Risks, benefits, and possible side effects of medications explained to patient and patient verbalizes understanding        Medications:   all current active meds have been reviewed and current meds:   Current Facility-Administered Medications   Medication Dose Route Frequency    acetaminophen (TYLENOL) tablet 650 mg  650 mg Oral Q6H PRN    acetaminophen (TYLENOL) tablet 650 mg  650 mg Oral Q6H PRN    aluminum-magnesium hydroxide-simethicone (MYLANTA) 200-200-20 mg/5 mL oral suspension 30 mL  30 mL Oral Q4H PRN    amLODIPine (NORVASC) tablet 5 mg  5 mg Oral Daily    benztropine (COGENTIN) injection 1 mg  1 mg Intramuscular Q1H PRN    benztropine (COGENTIN) tablet 1 mg  1 mg Oral Q1H PRN    divalproex sodium (DEPAKOTE) EC tablet 500 mg  500 mg Oral Daily    divalproex sodium (DEPAKOTE) EC tablet 750 mg  750 mg Oral HS    hydrOXYzine HCL (ATARAX) tablet 50 mg  50 mg Oral Q4H PRN    loratadine (CLARITIN) tablet 10 mg  10 mg Oral Daily    LORazepam (ATIVAN) 2 mg/mL injection 2 mg  2 mg Intramuscular Q4H PRN    LORazepam (ATIVAN) tablet 1 mg  1 mg Oral Q4H PRN    magnesium hydroxide (MILK OF MAGNESIA) 400 mg/5 mL oral suspension 30 mL  30 mL Oral Daily PRN    risperiDONE (RisperDAL M-TABS) dispersible tablet 3 mg  3 mg Oral Q3H PRN    risperiDONE (RisperDAL) tablet 3 mg  3 mg Oral BID    [START ON 4/24/2018] risperiDONE microspheres (RisperDAL CONSTA) IM injection 50 mg  50 mg Intramuscular Q30 Days    traZODone (DESYREL) tablet 50 mg  50 mg Oral HS PRN     Labs: Results for Loli Rushing (MRN [id number]) as of 4/21/2018 11:54   Ref   Range 4/21/2018 05:14   eGFR Latest Units: ml/min/1 73sq m 132   Sodium Latest Ref Range: 136 - 145 mmol/L 140   Potassium Latest Ref Range: 3 5 - 5 3 mmol/L 4 1   Chloride Latest Ref Range: 100 - 108 mmol/L 104   CO2 Latest Ref Range: 21 - 32 mmol/L 26   Anion Gap Latest Ref Range: 4 - 13 mmol/L 10   BUN Latest Ref Range: 5 - 25 mg/dL 11   Creatinine Latest Ref Range: 0 60 - 1 30 mg/dL 0 73   Glucose Latest Ref Range: 65 - 140 mg/dL 102   GLUCOSE FASTING Latest Ref Range: 65 - 99 mg/dL 102 (H)   Calcium Latest Ref Range: 8 3 - 10 1 mg/dL 8 9   AST Latest Ref Range: 5 - 45 U/L 30   ALT Latest Ref Range: 12 - 78 U/L 68   Alkaline Phosphatase Latest Ref Range: 46 - 116 U/L 76   Total Protein Latest Ref Range: 6 4 - 8 2 g/dL 7 7   Albumin Latest Ref Range: 3 5 - 5 0 g/dL 3 4 (L)   Total Bilirubin Latest Ref Range: 0 20 - 1 00 mg/dL 0 30   Cholesterol Latest Ref Range: 50 - 200 mg/dL 176   Triglycerides Latest Ref Range: <=150 mg/dL 237 (H)   HDL Latest Ref Range: 40 - 60 mg/dL 27 (L)   NON-HDL-CHOL (CHOL-HDL) Latest Units: mg/dl 149   LDL Calculated Latest Ref Range: 0 - 100 mg/dL 102 (H)   WBC Latest Ref Range: 4 31 - 10 16 Thousand/uL 7 94 RBC Latest Ref Range: 3 88 - 5 62 Million/uL 4 80   Hemoglobin Latest Ref Range: 12 0 - 17 0 g/dL 14 5   Hematocrit Latest Ref Range: 36 5 - 49 3 % 42 8   MCV Latest Ref Range: 82 - 98 fL 89   MCH Latest Ref Range: 26 8 - 34 3 pg 30 2   MCHC Latest Ref Range: 31 4 - 37 4 g/dL 33 9   RDW Latest Ref Range: 11 6 - 15 1 % 13 0   Platelets Latest Ref Range: 149 - 390 Thousands/uL 209   MPV Latest Ref Range: 8 9 - 12 7 fL 8 9   Neutrophils Relative Latest Ref Range: 43 - 75 % 40 (L)   Lymphocytes Relative Latest Ref Range: 14 - 44 % 43   Monocytes Relative Latest Ref Range: 4 - 12 % 12   Eosinophils Relative Latest Ref Range: 0 - 6 % 4   Basophils Relative Latest Ref Range: 0 - 1 % 1   Neutrophils Absolute Latest Ref Range: 1 85 - 7 62 Thousands/µL 3 17   Lymphocytes Absolute Latest Ref Range: 0 60 - 4 47 Thousands/µL 3 43   Monocytes Absolute Latest Ref Range: 0 17 - 1 22 Thousand/µL 0 98   Eosinophils Absolute Latest Ref Range: 0 00 - 0 61 Thousand/µL 0 32   Basophils Absolute Latest Ref Range: 0 00 - 0 10 Thousands/µL 0 04   TSH 3RD GENERATON Latest Ref Range: 0 358 - 3 740 uIU/mL 5 018 (H)   T3, Total Latest Ref Range: 0 60 - 1 80 ng/mL 1 10   T4 TOTAL Latest Ref Range: 4 7 - 13 3 ug/dL 7 0

## 2018-04-22 PROCEDURE — 99232 SBSQ HOSP IP/OBS MODERATE 35: CPT | Performed by: PSYCHIATRY & NEUROLOGY

## 2018-04-22 RX ADMIN — DIVALPROEX SODIUM 750 MG: 500 TABLET, DELAYED RELEASE ORAL at 21:16

## 2018-04-22 RX ADMIN — RISPERIDONE 3 MG: 2 TABLET ORAL at 17:23

## 2018-04-22 RX ADMIN — DIVALPROEX SODIUM 500 MG: 500 TABLET, DELAYED RELEASE ORAL at 08:59

## 2018-04-22 RX ADMIN — LORATADINE 10 MG: 10 TABLET ORAL at 08:58

## 2018-04-22 RX ADMIN — RISPERIDONE 3 MG: 2 TABLET ORAL at 08:57

## 2018-04-22 RX ADMIN — AMLODIPINE BESYLATE 5 MG: 5 TABLET ORAL at 08:58

## 2018-04-22 NOTE — PROGRESS NOTES
Pt has been pleasant this afternoon  Denies AH and HI towards mother  Compliant with medications  No inappropriate behavior noted this evening  No questions or concerns  Will continue to monitor

## 2018-04-22 NOTE — PROGRESS NOTES
Progress Note - Behavioral Health   Clarence Winston 26 y o  male MRN: [id number]  Unit/Bed#: [facility] 263-01 Encounter: [id number]    Assessment/Plan   Principal Problem:    Schizoaffective disorder, bipolar type ([address] )      Behavior over the last 24 hours:  improved  Sleep: normal  Appetite: normal  Medication side effects: No  ROS: no complaints    Mental Status Evaluation:  Appearance:  age appropriate   Behavior:  normal   Speech:  normal pitch and normal volume   Mood:  normal   Affect:  normal   Thought Process:  normal   Thought Content:  normal   Perceptual Disturbances: None   Risk Potential: contracts for safety in milieu   Sensorium:  person, place and time/date   Cognition:  grossly intact   Consciousness:  alert    Attention: attention span and concentration were age appropriate   Insight:  age appropriate   Judgment: age appropriate   Gait/Station: normal gait/station   Motor Activity: no abnormal movements     Progress Toward Goals: keeps to self but says he is feeling better  Recommended Treatment: Continue with group therapy, milieu therapy and occupational therapy  Risks, benefits and possible side effects of Medications:   Risks, benefits, and possible side effects of medications explained to patient and patient verbalizes understanding        Medications:   all current active meds have been reviewed and current meds:   Current Facility-Administered Medications   Medication Dose Route Frequency    acetaminophen (TYLENOL) tablet 650 mg  650 mg Oral Q6H PRN    acetaminophen (TYLENOL) tablet 650 mg  650 mg Oral Q6H PRN    aluminum-magnesium hydroxide-simethicone (MYLANTA) 200-200-20 mg/5 mL oral suspension 30 mL  30 mL Oral Q4H PRN    amLODIPine (NORVASC) tablet 5 mg  5 mg Oral Daily    benztropine (COGENTIN) injection 1 mg  1 mg Intramuscular Q1H PRN    benztropine (COGENTIN) tablet 1 mg  1 mg Oral Q1H PRN    divalproex sodium (DEPAKOTE) EC tablet 500 mg  500 mg Oral Daily    divalproex sodium (DEPAKOTE) EC tablet 750 mg  750 mg Oral HS    hydrOXYzine HCL (ATARAX) tablet 50 mg  50 mg Oral Q4H PRN    loratadine (CLARITIN) tablet 10 mg  10 mg Oral Daily    LORazepam (ATIVAN) 2 mg/mL injection 2 mg  2 mg Intramuscular Q4H PRN    LORazepam (ATIVAN) tablet 1 mg  1 mg Oral Q4H PRN    magnesium hydroxide (MILK OF MAGNESIA) 400 mg/5 mL oral suspension 30 mL  30 mL Oral Daily PRN    risperiDONE (RisperDAL M-TABS) dispersible tablet 3 mg  3 mg Oral Q3H PRN    risperiDONE (RisperDAL) tablet 3 mg  3 mg Oral BID    [START ON 4/24/2018] risperiDONE microspheres (RisperDAL CONSTA) IM injection 50 mg  50 mg Intramuscular Q30 Days    traZODone (DESYREL) tablet 50 mg  50 mg Oral HS PRN

## 2018-04-23 LAB — RPR SER QL: NORMAL

## 2018-04-23 PROCEDURE — 99232 SBSQ HOSP IP/OBS MODERATE 35: CPT | Performed by: PSYCHIATRY & NEUROLOGY

## 2018-04-23 PROCEDURE — 99231 SBSQ HOSP IP/OBS SF/LOW 25: CPT | Performed by: PSYCHIATRY & NEUROLOGY

## 2018-04-23 RX ORDER — DIVALPROEX SODIUM 250 MG/1
750 TABLET, DELAYED RELEASE ORAL
Qty: 90 TABLET | Refills: 0 | Status: ON HOLD | OUTPATIENT
Start: 2018-04-23 | End: 2018-06-20

## 2018-04-23 RX ORDER — RISPERIDONE 3 MG/1
3 TABLET, FILM COATED ORAL 2 TIMES DAILY
Qty: 30 TABLET | Refills: 0 | Status: SHIPPED | OUTPATIENT
Start: 2018-04-23 | End: 2018-04-24

## 2018-04-23 RX ORDER — DIVALPROEX SODIUM 500 MG/1
500 TABLET, DELAYED RELEASE ORAL DAILY
Qty: 30 TABLET | Refills: 0 | Status: SHIPPED | OUTPATIENT
Start: 2018-04-24 | End: 2018-04-24

## 2018-04-23 RX ADMIN — ACETAMINOPHEN 650 MG: 325 TABLET, FILM COATED ORAL at 15:03

## 2018-04-23 RX ADMIN — RISPERIDONE 3 MG: 2 TABLET ORAL at 17:13

## 2018-04-23 RX ADMIN — DIVALPROEX SODIUM 750 MG: 500 TABLET, DELAYED RELEASE ORAL at 21:00

## 2018-04-23 RX ADMIN — AMLODIPINE BESYLATE 5 MG: 5 TABLET ORAL at 08:52

## 2018-04-23 RX ADMIN — LORATADINE 10 MG: 10 TABLET ORAL at 08:52

## 2018-04-23 RX ADMIN — RISPERIDONE 3 MG: 2 TABLET ORAL at 08:52

## 2018-04-23 RX ADMIN — DIVALPROEX SODIUM 500 MG: 500 TABLET, DELAYED RELEASE ORAL at 08:52

## 2018-04-23 NOTE — CASE MANAGEMENT
[phone], however, reached voicemail there as well  CW received a returned call from Pt's PA [facility], Zia Camarena, who said that Pt is new to [facility], and his first appointment is 5/11 at 11:00 AM   He will see a primary doctor first, who will then make the referral for him to see the psychiatrist & therapist   Jonnie Amezcua reviewed d/c planned for tomorrow & Zia Camarena plans to visit with Pt at his home at 3:30 PM tomorrow  CW met with Pt to review and sign IMM

## 2018-04-23 NOTE — CASE MANAGEMENT
CW met with Pt, who reported he was having a good day, and was ready to go home  CW reviewed d/c is tentative for tomorrow & Pt agreeable  CW contacted Jordan Ward of the [facility] @ [phone] & spoke with the , who said that [address] would need to speak with the mental health department directly as they do their own schedules  CW was transferred & had to leave a message  CW requested a call back as soon as possible, as Pt is scheduled for d/c tomorrow  CW attempted to contact Pt's ICM through [address] Laurie Townsend, @ [phone], but reached voicemail  CW left a message regarding tentative d/c for tomorrow, & CW leaving 2 messages for Jordan Ward, and seeking an alternative contact for them  CW completed & faxed [employer] request for tomorrow  CW met with Frankey Mao from [address] [facility], who had come to the unit to visit another patient, but agreed to check-in with Pt  CW provided information regarding tentative d/c for tomorrow  CW contacted Ondina Ramirez @ [phone] & spoke with Jonn Sibley regarding Pt's Risperdal Consta injection & tentative d/c for tomorrow  Chin asked that a prescription be sent with refills, as Pt as no more refills  He will contact Pt's mom, regarding scheduling the injection for Weds  CW notified attending physician of need for prescription & refills

## 2018-04-23 NOTE — DISCHARGE INSTR - APPOINTMENTS
You received your last risperdal consta injection on 4/10  [address] will be contacting your mother to schedule your next injection       Amie Palma will meet with you today, at home at 3:30 PM

## 2018-04-23 NOTE — DISCHARGE INSTR - OTHER ORDERS
The PEER LINE is a toll-free telephone number for people in [facility] who are seeking a listening ear for additional support in their recovery from mental illness  The PEER LINE is peer-run and peer-friendly  You can call the Peer Line 24 hours a day  Phone: 1-392-OU-PEERS /(2-121.702.6407)     Emergency [address] [facility] [address] [facility] Services: [phone]  [address]  [facility], [address] [place] [address]     Crisis Text Line is free, 24/7 support for those in crisis  Text HOME 278990 from anywhere in the [place] to text with a trained Crisis Counselor  Warm Line: [phone] [phone], ([phone], [phone]  If it is not quite a crisis, but you want to talk to someone, 24 hours/day, 7 days/week:  Someone to listen; someone who cares  Dial 2-1-1 to get connected/get help  Free, confidential information & referral available 24/7: Aging Services, Child & Youth Services, Counseling, Education/Training, Food/Shelter/Clothing, Health Services, Parenting, Substance Abuse, Support Groups, Volunteer Opportunities, & much more    Phone: 2-1-1 or [phone], Web: TERRI [email or web address] EDK, Email: [email or web address]

## 2018-04-23 NOTE — PLAN OF CARE
Problem: Ineffective Coping  Goal: Participates in unit activities  Interventions:  - Provide therapeutic environment   - Provide required programming   - Redirect inappropriate behaviors    Outcome: Progressing  Pt attended all therapeutic groups today  Pt presented as bright and cooperative, contributing to group discussion with prompting

## 2018-04-23 NOTE — PROGRESS NOTES
[address] @ [phone]:  1 month supply with no refills called in for following medications:  - Amlodipine 5 mg daily  - Depakote  mg AM- 750 mg HS   - Loratadine 10 mg daily  - Risperidone 3 mg BID   - Risperidone consta 50 mg IM Q14 days- due on 4/25/18 at pharmacy

## 2018-04-23 NOTE — PROGRESS NOTES
Friendly, smiling, laughing  Excited for discharge  Compliant with medications, denies side effects  No questions or concerns

## 2018-04-23 NOTE — PLAN OF CARE
Problem: DISCHARGE PLANNING  Goal: Discharge to home or other facility with appropriate resources  INTERVENTIONS:  - Identify barriers to discharge w/patient and caregiver  - Arrange for needed discharge resources and transportation as appropriate  - Identify discharge learning needs (meds, wound care, etc )  - Arrange for interpretive services to assist at discharge as needed  - Refer to Case Management Department for coordinating discharge planning if the patient needs post-hospital services based on physician/advanced practitioner order or complex needs related to functional status, cognitive ability, or social support system   Outcome: Progressing  Pt is ready for d/c tomorrow  Pt will continue to work with [address] [place] [address] Street is awaiting a call back to schedule therapist & psychiatrist appointments

## 2018-04-23 NOTE — PROGRESS NOTES
Progress Note - [address] 26 y o  male MRN: [id number]  Unit/Bed#: [facility] 263-01 Encounter: [id number]    Pt seen and reviewed with staff  Pt has been visible in milieu and cooperative with medications and treatment plan per staff  No aggression or agitation noted  Pt states he is feeling "good"  Denies recent depression/ anxiety  Denies thoughts of harming himself or anyone else  He is sleeping well and good appetite  No side effects with meds        Behavior over the last 24 hours:  unchanged  Sleep: normal  Appetite: normal  Medication side effects: No  ROS: no complaints  /63 (BP Location: Right arm)   Pulse 85   Temp 97 7 °F (36 5 °C) (Tympanic)   Resp 20   Ht 5' 6" (1 676 m)   Wt 132 kg (292 lb 1 6 oz)   BMI 47 15 kg/m²     Medications:   Current Facility-Administered Medications   Medication Dose Route Frequency    acetaminophen (TYLENOL) tablet 650 mg  650 mg Oral Q6H PRN    acetaminophen (TYLENOL) tablet 650 mg  650 mg Oral Q6H PRN    aluminum-magnesium hydroxide-simethicone (MYLANTA) 200-200-20 mg/5 mL oral suspension 30 mL  30 mL Oral Q4H PRN    amLODIPine (NORVASC) tablet 5 mg  5 mg Oral Daily    benztropine (COGENTIN) injection 1 mg  1 mg Intramuscular Q1H PRN    benztropine (COGENTIN) tablet 1 mg  1 mg Oral Q1H PRN    divalproex sodium (DEPAKOTE) EC tablet 500 mg  500 mg Oral Daily    divalproex sodium (DEPAKOTE) EC tablet 750 mg  750 mg Oral HS    hydrOXYzine HCL (ATARAX) tablet 50 mg  50 mg Oral Q4H PRN    loratadine (CLARITIN) tablet 10 mg  10 mg Oral Daily    LORazepam (ATIVAN) 2 mg/mL injection 2 mg  2 mg Intramuscular Q4H PRN    LORazepam (ATIVAN) tablet 1 mg  1 mg Oral Q4H PRN    magnesium hydroxide (MILK OF MAGNESIA) 400 mg/5 mL oral suspension 30 mL  30 mL Oral Daily PRN    risperiDONE (RisperDAL M-TABS) dispersible tablet 3 mg  3 mg Oral Q3H PRN    risperiDONE (RisperDAL) tablet 3 mg  3 mg Oral BID    [START ON 4/24/2018] risperiDONE microspheres (RisperDAL CONSTA) IM injection 50 mg  50 mg Intramuscular Q30 Days    traZODone (DESYREL) tablet 50 mg  50 mg Oral HS PRN       Labs:   Admission on 04/19/2018   Component Date Value Ref Range Status    Ventricular Rate 04/20/2018 74  BPM Final    Atrial Rate 04/20/2018 74  BPM Final    AZ Interval 04/20/2018 132  ms Final    QRSD Interval 04/20/2018 98  ms Final    QT Interval 04/20/2018 382  ms Final    QTC Interval 04/20/2018 424  ms Final    P Axis 04/20/2018 4  degrees Final    QRS Axis 04/20/2018 27  degrees Final    T Wave Axis 04/20/2018 13  degrees Final    RPR 04/20/2018 Non-Reactive  Non-Reactive Final    Valproic Acid, Total 04/20/2018 38* 50 - 100 ug/mL Final    TSH 3RD GENERATON 04/21/2018 5 018* 0 358 - 3 740 uIU/mL Final    T3, Total 04/21/2018 1 10  0 60 - 1 80 ng/mL Final    T4 TOTAL 04/21/2018 7 0  4 7 - 13 3 ug/dL Final    RPR 04/21/2018 Non-Reactive  Non-Reactive Final    Sodium 04/21/2018 140  136 - 145 mmol/L Final    Potassium 04/21/2018 4 1  3 5 - 5 3 mmol/L Final    Chloride 04/21/2018 104  100 - 108 mmol/L Final    CO2 04/21/2018 26  21 - 32 mmol/L Final    Anion Gap 04/21/2018 10  4 - 13 mmol/L Final    BUN 04/21/2018 11  5 - 25 mg/dL Final    Creatinine 04/21/2018 0 73  0 60 - 1 30 mg/dL Final    Glucose 04/21/2018 102  65 - 140 mg/dL Final    Glucose, Fasting 04/21/2018 102* 65 - 99 mg/dL Final    Calcium 04/21/2018 8 9  8 3 - 10 1 mg/dL Final    AST 04/21/2018 30  5 - 45 U/L Final    ALT 04/21/2018 68  12 - 78 U/L Final    Alkaline Phosphatase 04/21/2018 76  46 - 116 U/L Final    Total Protein 04/21/2018 7 7  6 4 - 8 2 g/dL Final    Albumin 04/21/2018 3 4* 3 5 - 5 0 g/dL Final    Total Bilirubin 04/21/2018 0 30  0 20 - 1 00 mg/dL Final    eGFR 04/21/2018 132  ml/min/1 73sq m Final    WBC 04/21/2018 7 94  4 31 - 10 16 Thousand/uL Final    RBC 04/21/2018 4 80  3 88 - 5 62 Million/uL Final    Hemoglobin 04/21/2018 14 5  12 0 - 17 0 g/dL Final    Hematocrit 04/21/2018 42 8  36 5 - 49 3 % Final    MCV 04/21/2018 89  82 - 98 fL Final    MCH 04/21/2018 30 2  26 8 - 34 3 pg Final    MCHC 04/21/2018 33 9  31 4 - 37 4 g/dL Final    RDW 04/21/2018 13 0  11 6 - 15 1 % Final    MPV 04/21/2018 8 9  8 9 - 12 7 fL Final    Platelets 66/24/6190 209  149 - 390 Thousands/uL Final    Neutrophils Relative 04/21/2018 40* 43 - 75 % Final    Lymphocytes Relative 04/21/2018 43  14 - 44 % Final    Monocytes Relative 04/21/2018 12  4 - 12 % Final    Eosinophils Relative 04/21/2018 4  0 - 6 % Final    Basophils Relative 04/21/2018 1  0 - 1 % Final    Neutrophils Absolute 04/21/2018 3 17  1 85 - 7 62 Thousands/µL Final    Lymphocytes Absolute 04/21/2018 3 43  0 60 - 4 47 Thousands/µL Final    Monocytes Absolute 04/21/2018 0 98  0 17 - 1 22 Thousand/µL Final    Eosinophils Absolute 04/21/2018 0 32  0 00 - 0 61 Thousand/µL Final    Basophils Absolute 04/21/2018 0 04  0 00 - 0 10 Thousands/µL Final    Cholesterol 04/21/2018 176  50 - 200 mg/dL Final    Triglycerides 04/21/2018 237* <=150 mg/dL Final    HDL, Direct 04/21/2018 27* 40 - 60 mg/dL Final    LDL Calculated 04/21/2018 102* 0 - 100 mg/dL Final    Non-HDL-Chol (CHOL-HDL) 04/21/2018 149  mg/dl Final       Mental Status Evaluation:  Appearance:  age appropriate and disheveled   Behavior:  calm, cooperative   Speech:  scant   Mood:  euthymic   Affect:  mood-congruent   Thought Process:  concrete   Thought Content:  normal   Perceptual Disturbances: denies AH/ VH   Risk Potential: Suicidal Ideations none, Homicidal Ideations none and Potential for Aggression No   Sensorium:  person and place   Cognition:  impaired due to intellectual disability   Consciousness:  alert and awake    Attention: attention span appeared shorter than expected for age   Insight:  limited   Judgment: limited   Gait/Station: normal gait/station   Motor Activity: no abnormal movements     Progress Toward Goals: approaching baseline    Assessment/Plan   Principal Problem:    Schizoaffective disorder, bipolar type (HCC)      Recommended Treatment:  Continue Depakote 500 qam and 750 qhs  Risperdal consta 50 mg q 14 days and 3 mg po qd  Plan to d/c in am if stable and receive risperdal consta on 4/25     Continue with group therapy, milieu therapy and occupational therapy  Risks, benefits and possible side effects of Medications:   Risks, benefits, and possible side effects of medications explained to patient and patient verbalizes understanding  Counseling / Coordination of Care  Total floor / unit time spent today 25 minutes  Greater than 50% of total time was spent with the patient and / or family counseling and / or coordination of care   A description of the counseling / coordination of care: med management, pt interview, chart review

## 2018-04-24 VITALS
SYSTOLIC BLOOD PRESSURE: 118 MMHG | RESPIRATION RATE: 18 BRPM | BODY MASS INDEX: 46.94 KG/M2 | TEMPERATURE: 96.8 F | DIASTOLIC BLOOD PRESSURE: 79 MMHG | WEIGHT: 292.1 LBS | HEIGHT: 66 IN | HEART RATE: 112 BPM

## 2018-04-24 PROCEDURE — 99239 HOSP IP/OBS DSCHRG MGMT >30: CPT | Performed by: PSYCHIATRY & NEUROLOGY

## 2018-04-24 RX ORDER — DIVALPROEX SODIUM 500 MG/1
500 TABLET, DELAYED RELEASE ORAL DAILY
Qty: 30 TABLET | Refills: 0 | Status: ON HOLD
Start: 2018-04-24 | End: 2018-06-20

## 2018-04-24 RX ORDER — RISPERIDONE 3 MG/1
3 TABLET, FILM COATED ORAL 2 TIMES DAILY
Qty: 30 TABLET | Refills: 0
Start: 2018-04-24 | End: 2018-06-21 | Stop reason: HOSPADM

## 2018-04-24 RX ADMIN — AMLODIPINE BESYLATE 5 MG: 5 TABLET ORAL at 08:28

## 2018-04-24 RX ADMIN — RISPERIDONE 3 MG: 2 TABLET ORAL at 08:28

## 2018-04-24 RX ADMIN — LORATADINE 10 MG: 10 TABLET ORAL at 08:28

## 2018-04-24 RX ADMIN — DIVALPROEX SODIUM 500 MG: 500 TABLET, DELAYED RELEASE ORAL at 08:28

## 2018-04-24 NOTE — DISCHARGE INSTR - LAB
Contact Information: If you have any questions, concerns, pended studies, tests and/or procedures, or emergencies regarding your inpatient behavioral health visit  Please contact [address] [place] [address] behavioral health unit [phone] and ask to speak to a , nurse or physician  A contact is available 24 hours/ 7 days a week at this number  Summary of Procedures Performed During your Stay:  Below is a list of major procedures performed during your hospital stay and a summary of results:  - No major procedures performed  Pending Studies     None        If studies are pending at discharge, follow up with your PCP and/or referring provider
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
## 2018-04-24 NOTE — CASE MANAGEMENT
CM outreached to [address] located @ [facility], [address] [facility] [phone] (f) [phone] and spoke w/ Candi who confirmed PT's follow up appointment on Friday 05/11/18 @ 11:00am Phoebe Moffett PA-C   At this appointment PT will meet with your primary care doctor and then be referred to see a mental health therapist and psychiatrist

## 2018-04-24 NOTE — PROGRESS NOTES
Pt pleasant and smiling  Announced "I'm going home today" while receiving AM meds  Pt denies SI/HI  Reports feeling ready for discharge  Scant during interaction  Denies having any questions/concerns

## 2018-04-24 NOTE — DISCHARGE INSTRUCTIONS
Schizoaffective Disorder   WHAT YOU NEED TO KNOW:   Schizoaffective disorder is a long-term mental illness that may change how you think, feel, and act around others  You may not know what is real and what is not real    DISCHARGE INSTRUCTIONS:   Medicines:   · Antipsychotics: These medicines help decrease psychotic symptoms or severe agitation  You may need antiparkinson medicine to control muscle stiffness, twitches, and restlessness caused by antipsychotic medicines  · Antianxiety medicine: This medicine may be given to decrease anxiety and help you feel calm and relaxed  · Antidepressants: These medicines are given to decrease or stop the symptoms of depression, anxiety, and behavior problems  · Mood stabilizers: These medicines help control mood swings  · Anticonvulsants: This medicine is given to control seizures  It may also be used to decrease violent behavior and control your mood swings  · Blood pressure medicines: These may be used to help decrease motor tics (uncontrolled movements)  They may also help you feel calmer, more focused, and less irritable  · Anticholinergics: This medicine decreases the side effects of other medicines  · Take your medicine as directed  Contact your healthcare provider if you think your medicine is not helping or if you have side effects  Tell him or her if you are allergic to any medicine  Keep a list of the medicines, vitamins, and herbs you take  Include the amounts, and when and why you take them  Bring the list or the pill bottles to follow-up visits  Carry your medicine list with you in case of an emergency  Follow up with your healthcare provider or psychiatrist as directed: You may need to return to have your blood pressure and other symptoms checked  You may need blood tests to check the level of medicine in your blood  Write down your questions so you remember to ask them during your visits  Manage your symptoms:   The following may help you feel better or prevent symptoms of schizoaffective disorder from coming back:  · Find support for yourself and your family:  Talk with others to help you cope with your illness better  This may also help to improve how you relate to others  · Keep all medical appointments: This will help manage your disease and the side-effects from medicines you may be taking  · Use your medicines as directed:  Put your medicines in a pillbox placed in an area you can easily see  Use a watch with an alarm to help you remember when it is time to take your medicine  Tell your healthcare provider if you know or think you might be pregnant  Do not stop taking your medicines without your healthcare provider's okay  A sudden stop can cause serious medical problems  · Watch for early signs of a relapse and seek help immediately:      ¨ How you think, feel, and see things has changed  ¨ You behave differently than usual     ¨ You become more nervous and upset, but do not know why  ¨ You eat less and have trouble sleeping  ¨ You have little or no interest in friends or activities  For support and more information:   · American [employer] Association  [address] [place] [address], [facility] Keira Fowler [address] , Ysabel Carbajal [address]  Phone: [phone]  Phone: [phone]  Web Address: [employer] com br  org  · [address] [place] [address], Public Information & Communication Branch  [address] [place] [address], [address] [facility], [address]  Ingrid [place] [address]   Phone: [phone]  Phone: [phone]  Web Address: \[facility] [place] [facility]\"" tn  Contact your healthcare provider or psychiatrist if:   · You think you are having a relapse  · You are having side effects from your medicine, or they are not helping  · You are not sleeping well or are sleeping more than usual     · You cannot eat or are eating more than usual     · You have muscle spasms, stiffness, or trouble walking      · Your sad feelings or thoughts change the way you function during the day  · You have questions or concerns about your condition or care  Seek care immediately or call 911 if:   · You feel like hurting or killing yourself or others  · You feel that your condition is getting worse  · You feel very upset, threaten someone, or you feel violent  · You suddenly have changes in your vision  · You suddenly have chest pain, trouble breathing, or a fever  © 2017 [address]  Information is for End User's use only and may not be sold, redistributed or otherwise used for commercial purposes  All illustrations and images included in CareNotes® are the copyrighted property of A D A M , Inc  or Remi Norman  The above information is an  only  It is not intended as medical advice for individual conditions or treatments  Talk to your doctor, nurse or pharmacist before following any medical regimen to see if it is safe and effective for you

## 2018-05-18 DIAGNOSIS — T78.40XS ALLERGIC STATE, SEQUELA: ICD-10-CM

## 2018-05-21 RX ORDER — LORATADINE 10 MG/1
TABLET ORAL
Qty: 30 TABLET | Refills: 0 | Status: SHIPPED | OUTPATIENT
Start: 2018-05-21 | End: 2018-06-21 | Stop reason: HOSPADM

## 2018-06-01 DIAGNOSIS — F25.0 SCHIZOAFFECTIVE DISORDER, BIPOLAR TYPE (HCC): ICD-10-CM

## 2018-06-01 RX ORDER — DIVALPROEX SODIUM 500 MG/1
500 TABLET, EXTENDED RELEASE ORAL DAILY
Qty: 30 TABLET | Refills: 0 | OUTPATIENT
Start: 2018-06-01

## 2018-06-15 ENCOUNTER — HOSPITAL ENCOUNTER (EMERGENCY)
Facility: HOSPITAL | Age: 23
End: 2018-06-16
Attending: EMERGENCY MEDICINE
Payer: MEDICARE

## 2018-06-15 DIAGNOSIS — L03.032 ACUTE PARONYCHIA OF TOE OF LEFT FOOT: ICD-10-CM

## 2018-06-15 DIAGNOSIS — R45.850 HOMICIDAL IDEATION: ICD-10-CM

## 2018-06-15 DIAGNOSIS — R45.851 SUICIDAL IDEATION: ICD-10-CM

## 2018-06-15 DIAGNOSIS — R44.0 AUDITORY HALLUCINATIONS: Primary | ICD-10-CM

## 2018-06-15 NOTE — LETTER
Section I - General Information    Name of Patient: Dio Gibson                 : 1995    Medicare #:____________________  Transport Date: 18 (PCS is valid for round trips on this date and for all repetitive trips in the 60-day range as noted below )  Origin: [address]                                                         Destination:________________________________________________  Is the pt's stay covered under Medicare Part A (PPS/DRG)     (_) YES  (_) NO  Closest appropriate facility?  (_) YES  (_) NO  If no, why is transport to more distant facility required?________________________  If hosp-hosp transfer, describe services needed at 2nd facility not available at 1st facility? _________________________________  If hospice pt, is this transport related to pt's terminal illness? (_) YES (_) NO Describe____________________________________    Section II - Medical Necessity Questionnaire  Ambulance transportation is medically necessary only if other means of transport are contraindicated or would be potentially harmful to the patient  To meet this requirement, the patient must either be "bed confined" or suffer from a condition such that transport by means other than ambulance is contraindicated by the patient's condition   The following questions must be answered by the medical professional signing below for this form to be valid:    1)  Describe the MEDICAL CONDITION (physical and/or mental) of this patient AT [address] [place] [address] that requires the patient to be transported in an ambulance and why transport by other means is contraindicated by the patient's condition:__________________________________________________________________________________________________    2) Is the patient "bed confined" as defined below?     (_) YES  (_) NO  To be "be confined" the patient must satisfy all three of the following conditions: (1) unable to get up from bed without Assistance; AND (2) unable to ambulate; AND (3) unable to sit in a chair or wheelchair  3) Can this patient safely be transported by car or wheelchair [facility] (i e , seated during transport without a medical attendant or monitoring)?   (_) YES  (_) NO    4) In addition to completing questions 1-3 above, please check any of the following conditions that apply*:  *Note: supporting documentation for any boxes checked must be maintained in the patient's medical records  (_)Contractures   (_)Non-Healed Fractures  (_)Patient is confused (_)Patient is comatose (_)Moderate/severe pain on movement (_)Danger to self/others  (_)IV meds/fluids required (_)Patient is combative(_)Need or possible need for restraints (_)DVT requires elevation of lower extremity  (_)Medical attendant required (_)Requires oxygen-unable to self administer (_)Special handling/isolation/infection control precautions required (_)Unable to tolerate seated position for time needed to transport (_)Hemodynamic monitoring required en route (_)Unable to sit in a chair or wheelchair due to decubitus ulcers or other wounds (_)Cardiac monitoring required en route (_)Morbid obesity requires additional personnel/equipment to safely handle patient (_)Orthopedic device (backboard, halo, pins, traction, brace, wedge, etc,) requiring special handling during transport (_)Other(specify)_______________________________________________    Section III - Signature of Physician or Healthcare Professional  I certify that the above information is true and correct based on my evaluation of this patient, and represent that the patient requires transport by ambulance and that other forms of transport are contraindicated   I understand that this information will be used by the Centers for Medicare and Medicaid Services (CMS) to support the determination of medical necessity for ambulance services, and I represent that I have personal knowledge of the patient's condition at time of transport  (_) If this box is checked, I also certify that the patient is physically or mentally incapable of signing the ambulance service's claim and that the institution with which I am affiliated has furnished care, services, or assistance to the patient  My signature below is made on behalf of the patient pursuant to 42 CFR §424 36(b)(4)  In accordance with 42 CFR §424 37, the specific reason(s) that the patient is physically or mentally incapable of signing the claim form is as follows: _________________________________________________________________________________________________________      Signature of Physician* or Healthcare Professional______________________________________________________________  Signature Date 06/16/18 (For scheduled repetitive transports, this form is not valid for transports performed more than 60 days after this date)    Printed Name & Credentials of Physician or Healthcare Professional (MD, DO, RN, etc )________________________________  *Form must be signed by patient's attending physician for scheduled, repetitive transports   For non-repetitive, unscheduled ambulance transports, if unable to obtain the signature of the attending physician, any of the following may sign (choose appropriate option below)  (_) Physician Assistant (_)  Clinical Nurse Specialist (_)  Registered Nurse  (_)  Nurse Practitioner  (_) Discharge Planner

## 2018-06-16 ENCOUNTER — HOSPITAL ENCOUNTER (INPATIENT)
Facility: HOSPITAL | Age: 23
LOS: 5 days | Discharge: HOME | DRG: 885 | End: 2018-06-21
Attending: PSYCHIATRY & NEUROLOGY | Admitting: PSYCHIATRY & NEUROLOGY
Payer: MEDICARE

## 2018-06-16 VITALS
OXYGEN SATURATION: 97 % | SYSTOLIC BLOOD PRESSURE: 129 MMHG | RESPIRATION RATE: 20 BRPM | DIASTOLIC BLOOD PRESSURE: 71 MMHG | HEART RATE: 80 BPM | TEMPERATURE: 98.4 F

## 2018-06-16 DIAGNOSIS — F25.0 SCHIZOAFFECTIVE DISORDER, BIPOLAR TYPE (HCC): ICD-10-CM

## 2018-06-16 DIAGNOSIS — I10 HYPERTENSION: ICD-10-CM

## 2018-06-16 PROCEDURE — 99285 EMERGENCY DEPT VISIT HI MDM: CPT

## 2018-06-16 PROCEDURE — 99222 1ST HOSP IP/OBS MODERATE 55: CPT | Performed by: PSYCHIATRY & NEUROLOGY

## 2018-06-16 PROCEDURE — 82075 ASSAY OF BREATH ETHANOL: CPT | Performed by: EMERGENCY MEDICINE

## 2018-06-16 PROCEDURE — 80307 DRUG TEST PRSMV CHEM ANLYZR: CPT | Performed by: EMERGENCY MEDICINE

## 2018-06-16 RX ORDER — LEVOCETIRIZINE DIHYDROCHLORIDE 5 MG/1
5 TABLET, FILM COATED ORAL EVERY EVENING
Status: DISCONTINUED | OUTPATIENT
Start: 2018-06-16 | End: 2018-06-18

## 2018-06-16 RX ORDER — ZOLPIDEM TARTRATE 5 MG/1
5 TABLET ORAL
Status: CANCELLED | OUTPATIENT
Start: 2018-06-16

## 2018-06-16 RX ORDER — LEVOCETIRIZINE DIHYDROCHLORIDE 5 MG/1
5 TABLET, FILM COATED ORAL EVERY EVENING
COMMUNITY
End: 2018-10-12 | Stop reason: HOSPADM

## 2018-06-16 RX ORDER — IBUPROFEN 600 MG/1
600 TABLET ORAL EVERY 8 HOURS PRN
Status: CANCELLED | OUTPATIENT
Start: 2018-06-16

## 2018-06-16 RX ORDER — RISPERIDONE 1 MG/1
1 TABLET, ORALLY DISINTEGRATING ORAL
Status: DISCONTINUED | OUTPATIENT
Start: 2018-06-16 | End: 2018-06-21 | Stop reason: HOSPADM

## 2018-06-16 RX ORDER — DOXYCYCLINE HYCLATE 100 MG/1
100 CAPSULE ORAL 2 TIMES DAILY
Qty: 10 CAPSULE | Refills: 0 | Status: SHIPPED | OUTPATIENT
Start: 2018-06-16 | End: 2018-06-21 | Stop reason: HOSPADM

## 2018-06-16 RX ORDER — IBUPROFEN 400 MG/1
800 TABLET ORAL EVERY 8 HOURS PRN
Status: CANCELLED | OUTPATIENT
Start: 2018-06-16

## 2018-06-16 RX ORDER — AMLODIPINE BESYLATE 5 MG/1
5 TABLET ORAL DAILY
Status: DISCONTINUED | OUTPATIENT
Start: 2018-06-16 | End: 2018-06-21 | Stop reason: HOSPADM

## 2018-06-16 RX ORDER — HALOPERIDOL 5 MG
5 TABLET ORAL EVERY 6 HOURS PRN
Status: DISCONTINUED | OUTPATIENT
Start: 2018-06-16 | End: 2018-06-21 | Stop reason: HOSPADM

## 2018-06-16 RX ORDER — LORAZEPAM 0.5 MG/1
1 TABLET ORAL EVERY 4 HOURS PRN
Status: CANCELLED | OUTPATIENT
Start: 2018-06-16

## 2018-06-16 RX ORDER — LORAZEPAM 2 MG/ML
1 INJECTION INTRAMUSCULAR EVERY 4 HOURS PRN
Status: DISCONTINUED | OUTPATIENT
Start: 2018-06-16 | End: 2018-06-21 | Stop reason: HOSPADM

## 2018-06-16 RX ORDER — BENZTROPINE MESYLATE 1 MG/1
1 TABLET ORAL
Status: DISCONTINUED | OUTPATIENT
Start: 2018-06-16 | End: 2018-06-21 | Stop reason: HOSPADM

## 2018-06-16 RX ORDER — LORAZEPAM 2 MG/ML
1 INJECTION INTRAMUSCULAR EVERY 4 HOURS PRN
Status: CANCELLED | OUTPATIENT
Start: 2018-06-16

## 2018-06-16 RX ORDER — LORAZEPAM 1 MG/1
1 TABLET ORAL EVERY 4 HOURS PRN
Status: DISCONTINUED | OUTPATIENT
Start: 2018-06-16 | End: 2018-06-21 | Stop reason: HOSPADM

## 2018-06-16 RX ORDER — DOXYCYCLINE HYCLATE 100 MG/1
100 CAPSULE ORAL ONCE
Status: COMPLETED | OUTPATIENT
Start: 2018-06-16 | End: 2018-06-16

## 2018-06-16 RX ORDER — ACETAMINOPHEN 325 MG/1
650 TABLET ORAL EVERY 6 HOURS PRN
Status: DISCONTINUED | OUTPATIENT
Start: 2018-06-16 | End: 2018-06-21 | Stop reason: HOSPADM

## 2018-06-16 RX ORDER — IBUPROFEN 800 MG/1
800 TABLET ORAL EVERY 8 HOURS PRN
Status: DISCONTINUED | OUTPATIENT
Start: 2018-06-16 | End: 2018-06-21 | Stop reason: HOSPADM

## 2018-06-16 RX ORDER — IBUPROFEN 600 MG/1
600 TABLET ORAL EVERY 8 HOURS PRN
Status: DISCONTINUED | OUTPATIENT
Start: 2018-06-16 | End: 2018-06-21 | Stop reason: HOSPADM

## 2018-06-16 RX ORDER — ACETAMINOPHEN 325 MG/1
650 TABLET ORAL EVERY 6 HOURS PRN
Status: CANCELLED | OUTPATIENT
Start: 2018-06-16

## 2018-06-16 RX ORDER — DOXYCYCLINE HYCLATE 100 MG/1
100 CAPSULE ORAL 2 TIMES DAILY
Status: DISPENSED | OUTPATIENT
Start: 2018-06-16 | End: 2018-06-21

## 2018-06-16 RX ORDER — MAGNESIUM HYDROXIDE/ALUMINUM HYDROXICE/SIMETHICONE 120; 1200; 1200 MG/30ML; MG/30ML; MG/30ML
30 SUSPENSION ORAL EVERY 4 HOURS PRN
Status: CANCELLED | OUTPATIENT
Start: 2018-06-16

## 2018-06-16 RX ORDER — MAGNESIUM HYDROXIDE/ALUMINUM HYDROXICE/SIMETHICONE 120; 1200; 1200 MG/30ML; MG/30ML; MG/30ML
30 SUSPENSION ORAL EVERY 4 HOURS PRN
Status: DISCONTINUED | OUTPATIENT
Start: 2018-06-16 | End: 2018-06-21 | Stop reason: HOSPADM

## 2018-06-16 RX ORDER — HALOPERIDOL 5 MG
5 TABLET ORAL EVERY 6 HOURS PRN
Status: CANCELLED | OUTPATIENT
Start: 2018-06-16

## 2018-06-16 RX ORDER — ZOLPIDEM TARTRATE 5 MG/1
5 TABLET ORAL
Status: DISCONTINUED | OUTPATIENT
Start: 2018-06-16 | End: 2018-06-21 | Stop reason: HOSPADM

## 2018-06-16 RX ORDER — DIVALPROEX SODIUM 500 MG/1
500 TABLET, DELAYED RELEASE ORAL EVERY MORNING
Status: DISCONTINUED | OUTPATIENT
Start: 2018-06-16 | End: 2018-06-21 | Stop reason: HOSPADM

## 2018-06-16 RX ORDER — BENZTROPINE MESYLATE 1 MG/1
1 TABLET ORAL
Status: CANCELLED | OUTPATIENT
Start: 2018-06-16

## 2018-06-16 RX ORDER — OLANZAPINE 10 MG/1
5 INJECTION, POWDER, LYOPHILIZED, FOR SOLUTION INTRAMUSCULAR
Status: CANCELLED | OUTPATIENT
Start: 2018-06-16

## 2018-06-16 RX ORDER — LORATADINE 10 MG/1
10 TABLET ORAL DAILY
Status: DISCONTINUED | OUTPATIENT
Start: 2018-06-16 | End: 2018-06-21 | Stop reason: HOSPADM

## 2018-06-16 RX ORDER — RISPERIDONE 1 MG/1
1 TABLET, ORALLY DISINTEGRATING ORAL
Status: CANCELLED | OUTPATIENT
Start: 2018-06-16

## 2018-06-16 RX ORDER — OLANZAPINE 10 MG/1
5 INJECTION, POWDER, LYOPHILIZED, FOR SOLUTION INTRAMUSCULAR
Status: DISCONTINUED | OUTPATIENT
Start: 2018-06-16 | End: 2018-06-21 | Stop reason: HOSPADM

## 2018-06-16 RX ADMIN — RISPERIDONE 3 MG: 2 TABLET ORAL at 10:00

## 2018-06-16 RX ADMIN — LORATADINE 10 MG: 10 TABLET ORAL at 10:01

## 2018-06-16 RX ADMIN — DIVALPROEX SODIUM 500 MG: 500 TABLET, DELAYED RELEASE ORAL at 10:00

## 2018-06-16 RX ADMIN — ACETAMINOPHEN 650 MG: 325 TABLET, FILM COATED ORAL at 14:09

## 2018-06-16 RX ADMIN — DIVALPROEX SODIUM 750 MG: 500 TABLET, DELAYED RELEASE ORAL at 21:38

## 2018-06-16 RX ADMIN — DOXYCYCLINE HYCLATE 100 MG: 100 CAPSULE ORAL at 17:11

## 2018-06-16 RX ADMIN — DOXYCYCLINE 100 MG: 100 CAPSULE ORAL at 02:12

## 2018-06-16 RX ADMIN — AMLODIPINE BESYLATE 5 MG: 5 TABLET ORAL at 10:00

## 2018-06-16 RX ADMIN — RISPERIDONE 3 MG: 2 TABLET ORAL at 17:13

## 2018-06-16 RX ADMIN — DOXYCYCLINE HYCLATE 100 MG: 100 CAPSULE ORAL at 14:08

## 2018-06-16 NOTE — ED NOTES
Pt belongings in locker E in med room zone 5  Shoes, shirt, shorts, underwear        Lincoln Belt  06/15/18 4872

## 2018-06-16 NOTE — H&P
Psychiatric Evaluation - 801 [facility] 26 y o  male MRN: [id number]  Unit/Bed#: [facility] 251-01 Encounter: [id number]    Assessment/Plan   Principal Problem:    Schizoaffective disorder, bipolar type ([facility] [address] )    Plan:   Risks, benefits and possible side effects of Medications:   Risks, benefits, and possible side effects of medications explained to patient and patient verbalizes understanding  Continu with all medications as prescribed  Patient to be assessed in the hospital milieu  e    Chief Complaint: I got into an argument    History of Present Illness     Patient is a 26 y o  male presents for his 3rd hospitalization in calendar year  2018  [facility]  family argument signed voluntary hospitalization 06:00 16 June 2018  Unclear at this time how much issues are over and beyond the usual family issues that occur  Please refer to the two admissions fro this calendar year  For historical information  Psychiatric Review Of Systems:  sleep: yes  appetite changes: yes  weight changes: no  energy/anergy: baseline  interest/pleasure/anhedonia: no  somatic symptoms: no  anxiety/panic: no  eddie: no  guilty/hopeless: no  self injurious behavior/risky behavior: no    Historical Information     Past Psychiatric History:   He is currently receiving his outpatient medications at a treatment facility    He has had multiple hospitalizations please refer to previous records for more information      Substance Abuse History:  Social History     Tobacco History     Smoking Status  Never Smoker    Smokeless Tobacco Use  Never Used    Tobacco Comment  non-smoker          Alcohol History     Alcohol Use Status  No          Drug Use     Drug Use Status  No          Sexual Activity     Sexually Active  Not Asked          Activities of Daily Living    Not Asked               Additional Substance Use Detail     Questions Responses    Substance Use Assessment Denies substance use within the past 12 months    Alcohol Use Frequency Denies use in past 12 months    Cannabis frequency Denies use in past 12 months    Heroin Frequency Denies use in past 12 months    Cocaine frequency Denies past use in past 12 months    Crack Cocaine Frequency Denies use in past 12 months    Methamphetamine Frequency Denies use in past 12 months    Narcotic Frequency Denies use in past 12 months    Benzodiazepine Frequency Denies use in past 12 months    Amphetamine frequency Denies use in past 12 months    Barbituate Frequency Denies use use in past 12 months    Inhalant frequency Denies use in past 12 months    Hallucinogen frequency Denies use in past 12 months    Ecstasy frequency Denies use past 12 months    Other drug frequency Denies use in past 12 months    Opiate frequency Denies use in past 12 months    Not reviewed  I have assessed this patient for substance use within the past 12 months    Family Psychiatric History:   Please refer to previous    Social History:  Please refer to previous    Traumatic History:   Deferred at this juncture      Past Medical History:   Diagnosis Date    Anxiety     Asthma     Hypertension     Mental retardation     Schizoaffective disorder ([facility] [address] )        Medical Review Of Systems:  Pertinent items are noted in HPI      Meds/Allergies   all current active meds have been reviewed  Allergies   Allergen Reactions    Bee Venom Anaphylaxis    Penicillins        Objective   Vital signs in last 24 hours:  Temp:  [97 3 °F (36 3 °C)-98 4 °F (36 9 °C)] 97 3 °F (36 3 °C)  HR:  [80-99] 99  Resp:  [18-20] 20  BP: (129-138)/(63-94) 134/94    No intake or output data in the 24 hours ending 06/16/18 0933    Mental Status Evaluation:  Appearance:  overweight   Behavior:  restless and fidgety   Speech:  pressured   Mood:  normal   Affect:  labile and redirectable   Language: within normal limits for his abilities   Thought Process:  disorganized   Thought Content:  normal   Perceptual Disturbances: None   Risk Potential: Hua for safety   Sensorium:  person   Cognition:  impaired due to intellectual disability   Consciousness:  alert    Attention: attention span and concentration were age appropriate   Intellect: within normal limits for him   Fund of Knowledge: Within normal limits for him   Insight:  limited   Judgment: limited   Muscle Strength and Tone: no issues   Gait/Station: normal gait/station   Motor Activity: no abnormal movements     Lab Results: I have personally reviewed pertinent lab results        Code Status: Level 1 - Full Code      Patient Strengths/Assets: patient is on a voluntary commitment    Patient Barriers/Limitations: limited education    Current Facility-Administered Medications   Medication Dose Route Frequency Provider Last Rate Last Dose    acetaminophen (TYLENOL) tablet 650 mg  650 mg Oral Q6H PRN Julia Pick, DO        acetaminophen (TYLENOL) tablet 650 mg  650 mg Oral Q6H PRN [place] Pick, DO        aluminum-magnesium hydroxide-simethicone (MYLANTA) 200-200-20 mg/5 mL oral suspension 30 mL  30 mL Oral Q4H PRN Julia Pick, DO        amLODIPine (NORVASC) tablet 5 mg  5 mg Oral Daily [place] Pick, DO        benztropine (COGENTIN) tablet 1 mg  1 mg Oral Q1H PRN Julia Pick, DO        divalproex sodium (DEPAKOTE) EC tablet 500 mg  500 mg Oral Daily [place] Pick, DO        divalproex sodium (DEPAKOTE) EC tablet 750 mg  750 mg Oral HS [place] Pick, DO        doxycycline hyclate (VIBRAMYCIN) capsule 100 mg  100 mg Oral BID [place] Pick, DO        haloperidol (HALDOL) tablet 5 mg  5 mg Oral Q6H PRN Julia Pick, DO        ibuprofen (MOTRIN) tablet 600 mg  600 mg Oral Q8H PRN [place] Pick, DO        ibuprofen (MOTRIN) tablet 800 mg  800 mg Oral Q8H PRN Julia Pick, DO        levocetirizine (XYZAL) tablet 5 mg  5 mg Oral QPM Julia Pick, DO        loratadine (CLARITIN) tablet 10 mg  10 mg Oral Daily Julia Pick, DO        LORazepam (ATIVAN) 2 mg/mL injection 1 mg  1 mg Intramuscular Q4H PRN Darrol Sites, DO        LORazepam (ATIVAN) tablet 1 mg  1 mg Oral Q4H PRN Darrol Sites, DO        magnesium hydroxide (MILK OF MAGNESIA) 400 mg/5 mL oral suspension 30 mL  30 mL Oral Daily PRN Darrol Sites, DO        OLANZapine (ZyPREXA) IM injection 5 mg  5 mg Intramuscular Q3H PRN Darrol Sites, DO        risperiDONE (RisperDAL M-TABS) dispersible tablet 1 mg  1 mg Oral Q3H PRN Darrol Sites, DO        risperiDONE (RisperDAL) tablet 3 mg  3 mg Oral BID Darrol Sites, DO        zolpidem (AMBIEN) tablet 5 mg  5 mg Oral HS PRN Darrol Sites, DO
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
## 2018-06-16 NOTE — DISCHARGE INSTRUCTIONS
Paronychia   WHAT YOU NEED TO KNOW:   Paronychia is an infection of your nail fold caused by bacteria or a fungus  The nail fold is the skin around your nail  Paronychia may happen suddenly and last for 6 weeks or longer  You may have paronychia on more than 1 finger or toe  DISCHARGE INSTRUCTIONS:   Medicines:   · Td vaccine  is a booster shot used to help prevent tetanus and diphtheria  The Td booster may be given to adolescents and adults every 10 years or for certain wounds and injuries  · Antibiotics: This medicine will help fight or prevent an infection  It may be given as a pill, cream, or ointment  · Steroids: This medicine will help decrease inflammation  It may be given as a pill, cream, or ointment  · Antifungal medicine: This medicine helps kill fungus that may be causing your infection  It may be given as a cream or ointment  · NSAIDs:  These medicines decrease pain and swelling  NSAIDs are available without a doctor's order  Ask your healthcare provider which medicine is right for you  Ask how much to take and when to take it  Take as directed  NSAIDs can cause stomach bleeding and kidney problems if not taken correctly  · Take your medicine as directed  Contact your healthcare provider if you think your medicine is not helping or if you have side effects  Tell him of her if you are allergic to any medicine  Keep a list of the medicines, vitamins, and herbs you take  Include the amounts, and when and why you take them  Bring the list or the pill bottles to follow-up visits  Carry your medicine list with you in case of an emergency  Follow up with your healthcare provider as directed:  Write down your questions so you remember to ask them during your visits  Self-care:   · Soak your nail:  Soak your nail in a mixture of equal parts vinegar and water 3 or 4 times each day  This will help decrease inflammation      · Apply a warm compress:  Soak a washcloth in warm water and place it on your nail  This will help decrease inflammation  · Elevate:  Raise your nail above the level of your heart as often as you can  This will help decrease swelling and pain  Prop your nail on pillows or blankets to keep it elevated comfortably  · Use lotion:  Apply lotion after you wash your hands  This will prevent your skin from becoming too dry  Prevent paronychia:   · Avoid chemicals and allergens that may harm your skin and nails  This includes soaps, laundry detergents, and nail products  · Keep your nails clean and dry  Avoid soaking your nails in water  Use cotton-lined rubber gloves or wear 2 rubber gloves if you work with food or water  The gloves will help protect your nail folds  · Keep your nails short  Do not bite your nails, pick at your hangnails, suck your fingers, or wear fake nails  Bring your own nail tools when you go to the nail salon  Contact your healthcare provider if:   · Your nail becomes loose, deformed, or falls off  · You have a large abscess on your nail  · You have questions or concerns about your condition or care  Return to the emergency department if:   · You have severe nail pain  · The inflammation spreads to your hand or arm  © 2017 [facility] Information is for End User's use only and may not be sold, redistributed or otherwise used for commercial purposes  All illustrations and images included in CareNotes® are the copyrighted property of [employer]  or [address]  The above information is an  only  It is not intended as medical advice for individual conditions or treatments  Talk to your doctor, nurse or pharmacist before following any medical regimen to see if it is safe and effective for you

## 2018-06-16 NOTE — ED NOTES
Patient is accepted at [facility]  Patient is accepted by Dr Kike Fagan per [address] is arranged with [employer] is scheduled for 0730       Nurse report is to be called to [phone] prior to patient transfer

## 2018-06-16 NOTE — ED PROVIDER NOTES
History  Chief Complaint   Patient presents with    Psychiatric Evaluation     hearing voices, saying they want to kill people, infection on toe of left foot      Patient is a 26-year-old male with a past medical history significant for intellectual disability,  psychiatric disorder  Who presents with command hallucinations and suicidal / homicidal ideations  Patient reports that for the last 1 week he has had voices that are telling him to kill himself and his mother with a knife  Reports that he has been taking his medications as prescribed  Also complains of left great toe pain for 3 days and some drainage from the medial aspect of the great toe  Denies fevers, chills, nausea, vomiting, diarrhea, chest pain, shortness of breath, redness surrounding the toe  Assessment and Plan: #1 Command hallucinations  Suicidal and homicidal ideations  With plan to kill mother and himself with a knife  2  Left great toe paronychia  Already draining, will treat with doxycycline and will need future podiatry consult  Prior to Admission Medications   Prescriptions Last Dose Informant Patient Reported? Taking?    amLODIPine (NORVASC) 5 mg tablet   No No   Sig: Take 1 tablet by mouth daily for 30 days At 9AM   divalproex sodium (DEPAKOTE) 250 mg EC tablet Unknown at Unknown time  No No   Sig: Take 3 tablets (750 mg total) by mouth daily at bedtime   divalproex sodium (DEPAKOTE) 500 mg EC tablet Unknown at Unknown time  No No   Sig: Take 1 tablet (500 mg total) by mouth daily At 9AM   loratadine (CLARITIN) 10 mg tablet Unknown at Unknown time  No No   Sig: TAKE ONE TABLET BY MOUTH EVERY DAY   risperiDONE (RisperDAL) 3 mg tablet Unknown at Unknown time  No No   Sig: Take 1 tablet (3 mg total) by mouth 2 (two) times a day At 9AM and 6PM   risperiDONE microspheres (RISPERDAL CONSTA) 50 mg IM injection Unknown at Unknown time  No No   Sig: Inject 2 mL (50 mg total) into the shoulder, thigh, or buttocks every 14 (fourteen) days      Facility-Administered Medications: None       Past Medical History:   Diagnosis Date    Anxiety     Asthma     Hypertension     Mental retardation     Psychiatric illness     Schizoaffective disorder ([facility] [address] )        Past Surgical History:   Procedure Laterality Date    HAND SURGERY      right hand       Family History   Problem Relation Age of Onset    No Known Problems Mother     No Known Problems Father     No Known Problems Sister     No Known Problems Brother     No Known Problems Maternal Aunt     No Known Problems Paternal Aunt     No Known Problems Maternal Uncle     No Known Problems Paternal Uncle     No Known Problems Maternal Grandfather     No Known Problems Maternal Grandmother     No Known Problems Paternal Grandfather     No Known Problems Paternal Grandmother     No Known Problems Cousin     ADD / ADHD Neg Hx     Alcohol abuse Neg Hx     Anxiety disorder Neg Hx     Bipolar disorder Neg Hx     Dementia Neg Hx     Depression Neg Hx     Drug abuse Neg Hx     OCD Neg Hx     Paranoid behavior Neg Hx     Schizophrenia Neg Hx     Seizures Neg Hx     Self-Injury Neg Hx     Suicide Attempts Neg Hx      I have reviewed and agree with the history as documented  Social History   Substance Use Topics    Smoking status: Never Smoker    Smokeless tobacco: Never Used    Alcohol use No        Review of Systems   Constitutional: Negative for chills and fever  HENT: Negative for congestion and rhinorrhea  Eyes: Negative for photophobia and visual disturbance  Respiratory: Negative for chest tightness and shortness of breath  Cardiovascular: Negative for chest pain and palpitations  Gastrointestinal: Negative for abdominal pain, constipation, diarrhea, nausea and vomiting  Genitourinary: Negative for dysuria and hematuria  Musculoskeletal: Negative for back pain and neck pain  Skin: Positive for wound  Negative for pallor     Neurological: Negative for dizziness, light-headedness and headaches  Psychiatric/Behavioral: Positive for hallucinations and suicidal ideas  Physical Exam  ED Triage Vitals   Temperature Pulse Respirations Blood Pressure SpO2   06/15/18 2200 06/15/18 2200 06/15/18 2200 06/15/18 2200 06/16/18 0122   98 4 °F (36 9 °C) 90 18 138/68 96 %      Temp Source Heart Rate Source Patient Position - Orthostatic VS BP Location FiO2 (%)   06/15/18 2200 06/15/18 2200 06/15/18 2200 06/15/18 2200 --   Oral Monitor Lying Right arm       Pain Score       06/16/18 0340       No Pain           Orthostatic Vital Signs  Vitals:    06/15/18 2200 06/16/18 0122 06/16/18 0626   BP: 138/68 137/63 129/71   Pulse: 90 93 80   Patient Position - Orthostatic VS: Lying Lying Lying       Physical Exam   Constitutional: He is oriented to person, place, and time  He appears well-developed and well-nourished  No distress  HENT:   Head: Normocephalic and atraumatic  Right Ear: External ear normal    Left Ear: External ear normal    Nose: Nose normal    Mouth/Throat: Oropharynx is clear and moist  No oropharyngeal exudate  Eyes: Conjunctivae and EOM are normal  Pupils are equal, round, and reactive to light  Neck: Normal range of motion  Neck supple  Cardiovascular: Normal rate, regular rhythm, normal heart sounds and intact distal pulses  Exam reveals no gallop and no friction rub  No murmur heard  Pulmonary/Chest: Effort normal and breath sounds normal  No respiratory distress  He has no wheezes  He has no rales  He exhibits no tenderness  Abdominal: Soft  Bowel sounds are normal  He exhibits no distension  There is no tenderness  Musculoskeletal: Normal range of motion  He exhibits no edema or tenderness  Feet:    Neurological: He is alert and oriented to person, place, and time  Moves all 4 extremities    Skin: Skin is warm and dry  Capillary refill takes less than 2 seconds  He is not diaphoretic  No erythema     Psychiatric: His affect is blunt  His speech is delayed  He is withdrawn and actively hallucinating  Thought content is not paranoid and not delusional  He exhibits a depressed mood  He expresses homicidal and suicidal ideation  He expresses suicidal plans and homicidal plans  Nursing note and vitals reviewed  ED Medications  Medications   doxycycline hyclate (VIBRAMYCIN) capsule 100 mg (100 mg Oral Given 6/16/18 0212)       Diagnostic Studies  Results Reviewed     Procedure Component Value Units Date/Time    Rapid drug screen, urine [[id number]]  (Normal) Collected:  06/16/18 0025    Lab Status:  Final result Specimen:  Urine from Urine, Clean Catch Updated:  06/16/18 0059     Amph/Meth UR Negative     Barbiturate Ur Negative     Benzodiazepine Urine Negative     Cocaine Urine Negative     Methadone Urine Negative     Opiate Urine Negative     PCP Ur Negative     THC Urine Negative    Narrative:         FOR MEDICAL PURPOSES ONLY  IF CONFIRMATION NEEDED PLEASE CONTACT THE LAB WITHIN 5 DAYS  Drug Screen Cutoff Levels:  AMPHETAMINE/METHAMPHETAMINES  1000 ng/mL  BARBITURATES     200 ng/mL  BENZODIAZEPINES     200 ng/mL  COCAINE      300 ng/mL  METHADONE      300 ng/mL  OPIATES      300 ng/mL  PHENCYCLIDINE     25 ng/mL  THC       50 ng/mL    POCT alcohol breath test [[id number]]  (Normal) Resulted:  06/16/18 0017    Lab Status:  Final result Updated:  06/16/18 0018     EXTBreath Alcohol 0 000                 No orders to display         Procedures  Procedures      Phone Consults  ED Phone Contact    ED Course  ED Course as of Jun 16 2002   Sat Jun 16, 2018   0117 201 signed  [id number] Patient care signed out to Dr Graeme Pulido  [facility]  CritCare Time    Disposition  Final diagnoses:    Auditory hallucinations   Suicidal ideation   Homicidal ideation   Acute paronychia of toe of left foot     Time reflects when diagnosis was documented in both MDM as applicable and the Disposition within this note Time User Action Codes Description Comment    6/16/2018  1:09 AM Philippe Sacramento Add [Z86 59] History of command hallucinations     6/16/2018  1:09 AM Philippe Sacramento Remove [Z86 59] History of command hallucinations     6/16/2018  1:09 AM Philippe Sacramento Add [R44 0] Auditory hallucinations     6/16/2018  1:10 AM Philippe Sacramento Add [R45 851] Suicidal ideation     6/16/2018  1:10 AM Philippe Sacramento Add [R45 850] Homicidal ideation     6/16/2018  1:10 AM Philippe Sacramento Add [U43 190] Acute paronychia of toe of left foot     6/16/2018  8:41 AM Curlie Timmy Add [F25 0] Schizoaffective disorder, bipolar type ([facility] [address] )     6/16/2018  8:41 AM Curlie Timmy Modify [F25 0] Schizoaffective disorder, bipolar type ([facility] [address] )     6/16/2018  8:41 AM Curlie Timmy Modify [F25 0] Schizoaffective disorder, bipolar type ([facility] [address] )     6/16/2018  8:42 AM Curlie Timmy Remove [F25 0] Schizoaffective disorder, bipolar type ([facility] [address] )     6/16/2018  8:42 AM Curlie Timmy Add [Z68 42] BMI 45 0-49 9, adult ([facility] [address] )     6/16/2018  8:42 AM Curlie Timmy Modify [Z68 42] BMI 45 0-49 9, adult [facility])       ED Disposition     ED Disposition Condition Comment    Transfer to Another [place] [id number] should be transferred out to Deepak Gutiérrez MD Documentation      Most Recent Value   Patient Condition  The patient has been stabilized such that within reasonable medical probability, no material deterioration of the patient condition or the condition of the unborn child(gianna) is likely to result from the transfer   Reason for Transfer  Level of Care needed not available at this facility   Benefits of Transfer  Specialized equipment and/or services available at the receiving facility (Include comment)________________________   Risks of Transfer  Potential for delay in receiving treatment   Accepting Physician  Dr Britney Maza Name, [id number]   Provider Certification General risk, such as traffic hazards, adverse weather conditions, rough terrain or turbulence, possible failure of equipment (including vehicle or aircraft), or consequences of actions of persons outside the control of the transport personnel      RN Documentation      Most [address] Name, [phone]       Follow-up Information     Follow up With Specialties Details Why Contact Damien Veras DPM Podiatry Schedule an appointment as soon as possible for a visit in 3 days for re-evaluation [address] [facility]  [phone]            Discharge Medication List as of 6/16/2018  8:35 AM      START taking these medications    Details   doxycycline hyclate (VIBRAMYCIN) 100 mg capsule Take 1 capsule (100 mg total) by mouth 2 (two) times a day for 5 days, Starting Sat 6/16/2018, Until Thu 6/21/2018, Print         CONTINUE these medications which have NOT CHANGED    Details   amLODIPine (NORVASC) 5 mg tablet Take 1 tablet by mouth daily for 30 days At 9AM, Starting Wed 1/17/2018, Until Fri 4/20/2018, Print      !! divalproex sodium (DEPAKOTE) 250 mg EC tablet Take 3 tablets (750 mg total) by mouth daily at bedtime, Starting Mon 4/23/2018, Normal      !! divalproex sodium (DEPAKOTE) 500 mg EC tablet Take 1 tablet (500 mg total) by mouth daily At 9AM, Starting Tue 4/24/2018, No Print      loratadine (CLARITIN) 10 mg tablet TAKE ONE TABLET BY MOUTH EVERY DAY, Normal      risperiDONE (RisperDAL) 3 mg tablet Take 1 tablet (3 mg total) by mouth 2 (two) times a day At [facility] and 6PM, Starting Tue 4/24/2018, No Print      risperiDONE microspheres (RISPERDAL CONSTA) 50 mg IM injection Inject 2 mL (50 mg total) into the shoulder, thigh, or buttocks every 14 (fourteen) days, Starting Wed 4/25/2018, Normal       !! - Potential duplicate medications found  Please discuss with provider  No discharge procedures on file      ED Provider  Attending physically available and evaluated Yolanda Olivarez I managed the patient along with the ED Attending      Electronically Signed by         DO Jeny  06/16/18 2003

## 2018-06-16 NOTE — ED NOTES
Pt came to the ED with his brother tonight because he got into an arguement with his mother about the TV  Pt states most of the time they argue about the TV  He has CAH telling him to harm his mother with a knife and SI with a plan to harm himself with the knife as well  Pt denies VH and states he does not want to harm his mother but the voices tell him to  Pt also admits that he hears voices all the time  He is a poor historian and it is unclear if he is taking his medications  He currently has an ICM with PA mentor   Pt signed 12

## 2018-06-16 NOTE — CONSULTS
Consultation - Zayra Cerna 26 y o  male MRN: [id number]    Unit/Bed#: [facility] 251-01 Encounter: [id number]      Assessment/Plan     Assessment:  Medical clearance for [facility]  Plan:  Patient medically cleared for treatment in [facility]  History of Present Illness     HPI: Zayra Cerna is a 26y o  year old male who presents for treatment in the [facility]  Patient has no other complaints  Inpatient consult for Medical Clearance for [facility] patient  Consult performed by: Wolf Arauz ordered by: Dick Awan          Review of Systems   Constitutional: Negative for chills and fever  HENT: Negative  Eyes: Negative  Respiratory: Negative for cough and shortness of breath  Cardiovascular: Negative for chest pain and leg swelling  Gastrointestinal: Negative for abdominal pain, diarrhea, nausea and vomiting  Musculoskeletal: Negative for back pain and neck pain  Skin: Negative for color change and rash  Neurological: Negative for dizziness, weakness and numbness  Psychiatric/Behavioral: Negative for confusion  All other systems reviewed and are negative        Historical Information   Past Medical History:   Diagnosis Date    Anxiety     Asthma     Hypertension     Mental retardation     Psychiatric illness     Schizoaffective disorder ([facility] [address] )      Past Surgical History:   Procedure Laterality Date    HAND SURGERY      right hand     Social History   History   Alcohol Use No     History   Drug Use No     History   Smoking Status    Never Smoker   Smokeless Tobacco    Never Used     Family History:   Family History   Problem Relation Age of Onset    No Known Problems Mother     No Known Problems Father     No Known Problems Sister     No Known Problems Brother     No Known Problems Maternal Aunt     No Known Problems Paternal Aunt     No Known Problems Maternal Uncle     No Known Problems Paternal Uncle     No Known Problems Maternal Grandfather     No Known Problems Maternal Grandmother     No Known Problems Paternal Grandfather     No Known Problems Paternal Grandmother     No Known Problems Cousin     ADD / ADHD Neg Hx     Alcohol abuse Neg Hx     Anxiety disorder Neg Hx     Bipolar disorder Neg Hx     Dementia Neg Hx     Depression Neg Hx     Drug abuse Neg Hx     OCD Neg Hx     Paranoid behavior Neg Hx     Schizophrenia Neg Hx     Seizures Neg Hx     Self-Injury Neg Hx     Suicide Attempts Neg Hx        Meds/Allergies   current meds:   Current Facility-Administered Medications   Medication Dose Route Frequency    acetaminophen (TYLENOL) tablet 650 mg  650 mg Oral Q6H PRN    acetaminophen (TYLENOL) tablet 650 mg  650 mg Oral Q6H PRN    aluminum-magnesium hydroxide-simethicone (MYLANTA) 200-200-20 mg/5 mL oral suspension 30 mL  30 mL Oral Q4H PRN    amLODIPine (NORVASC) tablet 5 mg  5 mg Oral Daily    benztropine (COGENTIN) tablet 1 mg  1 mg Oral Q1H PRN    divalproex sodium (DEPAKOTE) EC tablet 500 mg  500 mg Oral QAM    divalproex sodium (DEPAKOTE) EC tablet 750 mg  750 mg Oral HS    doxycycline hyclate (VIBRAMYCIN) capsule 100 mg  100 mg Oral BID    haloperidol (HALDOL) tablet 5 mg  5 mg Oral Q6H PRN    ibuprofen (MOTRIN) tablet 600 mg  600 mg Oral Q8H PRN    ibuprofen (MOTRIN) tablet 800 mg  800 mg Oral Q8H PRN    levocetirizine (XYZAL) tablet 5 mg  5 mg Oral QPM    loratadine (CLARITIN) tablet 10 mg  10 mg Oral Daily    LORazepam (ATIVAN) 2 mg/mL injection 1 mg  1 mg Intramuscular Q4H PRN    LORazepam (ATIVAN) tablet 1 mg  1 mg Oral Q4H PRN    magnesium hydroxide (MILK OF MAGNESIA) 400 mg/5 mL oral suspension 30 mL  30 mL Oral Daily PRN    OLANZapine (ZyPREXA) IM injection 5 mg  5 mg Intramuscular Q3H PRN    risperiDONE (RisperDAL M-TABS) dispersible tablet 1 mg  1 mg Oral Q3H PRN    risperiDONE (RisperDAL) tablet 3 mg  3 mg Oral BID    zolpidem (AMBIEN) tablet 5 mg  5 mg Oral HS PRN     Allergies   Allergen Reactions    Bee Venom Anaphylaxis    Penicillins        Objective   Vitals: Blood pressure 134/94, pulse 99, temperature (!) 97 3 °F (36 3 °C), temperature source Tympanic, resp  rate 20, height 5' 4" (1 626 m), weight 135 kg (298 lb 11 2 oz)  No intake or output data in the 24 hours ending 06/16/18 1035  Invasive Devices          No matching active lines, drains, or airways          Physical Exam   Constitutional: He is oriented to person, place, and time  He appears well-developed and well-nourished  He is active and cooperative  He does not appear ill  No distress  HENT:   Head: Normocephalic and atraumatic  Right Ear: Hearing and tympanic membrane normal    Left Ear: Hearing and tympanic membrane normal    Nose: Nose normal    Mouth/Throat: Uvula is midline and oropharynx is clear and moist    Eyes: Conjunctivae and EOM are normal  Pupils are equal, round, and reactive to light  Neck: Normal range of motion  No spinous process tenderness and no muscular tenderness present  Cardiovascular: Normal rate, regular rhythm and normal heart sounds  No murmur heard  Pulmonary/Chest: Effort normal and breath sounds normal  He has no wheezes  He has no rhonchi  He has no rales  Abdominal: Normal appearance and bowel sounds are normal  There is no tenderness  Musculoskeletal: Normal range of motion  He exhibits no edema or deformity  Neurological: He is alert and oriented to person, place, and time  He has normal strength  No sensory deficit  Gait normal    Skin: Skin is warm and dry  No rash noted  He is not diaphoretic  No pallor  Psychiatric: His speech is normal  Cognition and memory are normal  He exhibits a depressed mood  Nursing note and vitals reviewed  Lab Results: I have personally reviewed pertinent reports  Code Status: Level 1 - Full Code  Advance Directive and Living Will:      Power of :    POLST:      Counseling / Coordination of Care  Total floor / unit time spent today 10 minutes

## 2018-06-16 NOTE — TREATMENT PLAN
RN will assess patient at least twice daily for symptoms of admission, educate patient on diagnosis and medications, and assist pt in developing and utilizing healthy coping skills

## 2018-06-16 NOTE — EMTALA/ACUTE CARE TRANSFER
[address]  [facility]  Dept: [place] AZEVEDO Chely Doss                                         1995                              MRN [id number]    I have been informed of my rights regarding examination, treatment, and transfer   by Dr Tiffanie Mcnair DO    Benefits: Specialized equipment and/or services available at the receiving facility (Include comment)________________________    Risks: Potential for delay in receiving treatment      Consent for Transfer:  I acknowledge that my medical condition has been evaluated and explained to me by the emergency department physician or other qualified medical person and/or my attending physician, who has recommended that I be transferred to the service of  Accepting Physician: Dr Jaz Turner  at [address] [place] [address] Name, [address] : Vic Carey   The above potential benefits of such transfer, the potential risks associated with such transfer, and the probable risks of not being transferred have been explained to me, and I fully understand them  The doctor has explained that, in my case, the benefits of transfer outweigh the risks  I agree to be transferred  I authorize the performance of emergency medical procedures and treatments upon me in both transit and upon arrival at the receiving facility  Additionally, I authorize the release of any and all medical records to the receiving facility and request they be transported with me, if possible  I understand that the safest mode of transportation during a medical emergency is an ambulance and that the Hospital advocates the use of this mode of transport  Risks of traveling to the receiving facility by car, including absence of medical control, life sustaining equipment, such as oxygen, and medical personnel has been explained to me and I fully understand them      ([address])  [  ]  I consent to the stated transfer and to be transported by ambulance/helicopter  [  ]  I consent to the stated transfer, but refuse transportation by ambulance and accept full responsibility for my transportation by car  I understand the risks of non-ambulance transfers and I exonerate the Hospital and its staff from any deterioration in my condition that results from this refusal     X___________________________________________    DATE  18  TIME________  Signature of patient or legally responsible individual signing on patient behalf           RELATIONSHIP TO PATIENT_________________________          Provider Certification    NAME Mathew Levy                                        [facility] 1995                              MRN [id number]    A medical screening exam was performed on the above named patient  Based on the examination:    Condition Necessitating Transfer The primary encounter diagnosis was Auditory hallucinations  Diagnoses of Suicidal ideation, Homicidal ideation, and Acute paronychia of toe of left foot were also pertinent to this visit      Patient Condition: The patient has been stabilized such that within reasonable medical probability, no material deterioration of the patient condition or the condition of the unborn child(gianna) is likely to result from the transfer    Reason for Transfer: Level of Care needed not available at this facility    Transfer Requirements: [address]    · Space available and qualified personnel available for treatment as acknowledged by    · Agreed to accept transfer and to provide appropriate medical treatment as acknowledged by       Dr Emmett Tran   · Appropriate medical records of the examination and treatment of the patient are provided at the time of transfer   [facility], [address] _______  · Transfer will be performed by qualified personnel from    and appropriate transfer equipment as required, including the use of necessary and appropriate life support measures  Provider Certification: I have examined the patient and explained the following risks and benefits of being transferred/refusing transfer to the patient/family:  General risk, such as traffic hazards, adverse weather conditions, rough terrain or turbulence, possible failure of equipment (including vehicle or aircraft), or consequences of actions of persons outside the control of the transport personnel      Based on these reasonable risks and benefits to the patient and/or the unborn child(gianna), and based upon the information available at the time of the patients examination, I certify that the medical benefits reasonably to be expected from the provision of appropriate medical treatments at another medical facility outweigh the increasing risks, if any, to the individuals medical condition, and in the case of labor to the unborn child, from effecting the transfer      X____________________________________________ DATE 06/16/18        TIME_______      ORIGINAL - SEND TO MEDICAL RECORDS   COPY - SEND WITH PATIENT DURING TRANSFER

## 2018-06-16 NOTE — ED NOTES
Pt states parents have a new number [phone]  Attempt made to contact family about pt however they did not answer   Message was left for them to call back

## 2018-06-16 NOTE — ED NOTES
Insurance Authorization:   Phone call placed to [facility] [place]  Phone number:    [phone]  Spoke to Alma Liao completed    Pt is medicare primary

## 2018-06-16 NOTE — ED NOTES
Report called to "Ngozi Moralez" FaridehW JAYDEN  Notified of transport p/u time       Karsten Capps RN  06/16/18 [id number]

## 2018-06-16 NOTE — H&P
Treatment Plan [address] 26 y o  male MRN: [id number]    [facility] 509-09 Encounter: [id number]          Admit Date/Time:  6/16/2018  8:36 AM    Treatment Team: Attending Provider: Guadalupe Gould; Registered Nurse: Ayan Arndt RN    Diagnosis: Principal Problem:    Schizoaffective disorder, bipolar type [facility])      Mental Status Evaluation:  COPED FROM [facility] JUST COMPLETED ON H7P  Appearance:  overweight   Behavior:  restless and fidgety   Speech:  pressured   Mood:  normal   Affect:  labile and redirectable   Language: within normal limits for his abilities   Thought Process:  disorganized   Thought Content:  normal   Perceptual Disturbances: None   Risk Potential: Hua for safety   Sensorium:  person   Cognition:  impaired due to intellectual disability   Consciousness:  alert    Attention: attention span and concentration were age appropriate   Intellect: within normal limits for him   Fund of Knowledge: Within normal limits for him   Insight:  limited   Judgment: limited   Muscle Strength and Tone: no issues   Gait/Station: normal gait/station   Motor Activity: no abnormal movements            Patient Strengths/Assets: patient is on a voluntary commitment     Patient Barriers/Limitations: limited education        Progress Toward Goals: to be determined    Recommended Treatment: to be determined     Treatment Frequency: to be determined     Expected Discharge Date: 6/16/2018    Discharge Plan: to be determined     Treatment Plan Created/Updated By: Juanjo Walker DO

## 2018-06-17 LAB
ALBUMIN SERPL BCP-MCNC: 3.2 G/DL (ref 3.5–5)
ALP SERPL-CCNC: 73 U/L (ref 46–116)
ALT SERPL W P-5'-P-CCNC: 110 U/L (ref 12–78)
ANION GAP SERPL CALCULATED.3IONS-SCNC: 8 MMOL/L (ref 4–13)
AST SERPL W P-5'-P-CCNC: 46 U/L (ref 5–45)
BILIRUB SERPL-MCNC: 0.4 MG/DL (ref 0.2–1)
BUN SERPL-MCNC: 10 MG/DL (ref 5–25)
CALCIUM SERPL-MCNC: 8.7 MG/DL (ref 8.3–10.1)
CHLORIDE SERPL-SCNC: 105 MMOL/L (ref 100–108)
CO2 SERPL-SCNC: 23 MMOL/L (ref 21–32)
CREAT SERPL-MCNC: 0.71 MG/DL (ref 0.6–1.3)
GFR SERPL CREATININE-BSD FRML MDRD: 133 ML/MIN/1.73SQ M
GLUCOSE SERPL-MCNC: 98 MG/DL (ref 65–140)
POTASSIUM SERPL-SCNC: 4.2 MMOL/L (ref 3.5–5.3)
PROT SERPL-MCNC: 7.3 G/DL (ref 6.4–8.2)
SODIUM SERPL-SCNC: 136 MMOL/L (ref 136–145)
TSH SERPL DL<=0.05 MIU/L-ACNC: 2.66 UIU/ML (ref 0.36–3.74)

## 2018-06-17 PROCEDURE — 80053 COMPREHEN METABOLIC PANEL: CPT | Performed by: PSYCHIATRY & NEUROLOGY

## 2018-06-17 PROCEDURE — 84443 ASSAY THYROID STIM HORMONE: CPT | Performed by: PSYCHIATRY & NEUROLOGY

## 2018-06-17 PROCEDURE — 99231 SBSQ HOSP IP/OBS SF/LOW 25: CPT | Performed by: PSYCHIATRY & NEUROLOGY

## 2018-06-17 RX ADMIN — RISPERIDONE 3 MG: 2 TABLET ORAL at 17:54

## 2018-06-17 RX ADMIN — AMLODIPINE BESYLATE 5 MG: 5 TABLET ORAL at 08:51

## 2018-06-17 RX ADMIN — DIVALPROEX SODIUM 750 MG: 500 TABLET, DELAYED RELEASE ORAL at 21:52

## 2018-06-17 RX ADMIN — DIVALPROEX SODIUM 500 MG: 500 TABLET, DELAYED RELEASE ORAL at 08:49

## 2018-06-17 RX ADMIN — DOXYCYCLINE HYCLATE 100 MG: 100 CAPSULE ORAL at 17:54

## 2018-06-17 RX ADMIN — RISPERIDONE 3 MG: 2 TABLET ORAL at 08:49

## 2018-06-17 RX ADMIN — DOXYCYCLINE HYCLATE 100 MG: 100 CAPSULE ORAL at 08:50

## 2018-06-17 RX ADMIN — LORATADINE 10 MG: 10 TABLET ORAL at 08:49

## 2018-06-17 NOTE — PROGRESS NOTES
Pt walking hallways and sitting in day room watching tv  Scant conversation, one word answers with incongruent laughing and smiling  Denies having any concerns, positive mood and sleep  Very limited insight, poor verbal skills

## 2018-06-17 NOTE — PROGRESS NOTES
Progress Note - Behavioral Health   Terrence Cross 26 y o  male MRN: [id number]  Unit/Bed#: [facility] 251-01 Encounter: [id number]    Assessment/Plan   Principal Problem:    Schizoaffective disorder, bipolar type ([address] )      Behavior over the last 24 hours:  improved  Sleep: normal  Appetite: normal  Medication side effects: No  ROS: no complaints    Mental Status Evaluation:  Appearance:  age appropriate   Behavior:  normal   Speech:  normal pitch and normal volume   Mood:  normal   Affect:  normal   Thought Process:  normal   Thought Content:  normal   Perceptual Disturbances: None   Risk Potential: divya for safety in milieu   Sensorium:  person, place and time/date   Cognition:  grossly intact   Consciousness:  alert    Attention: attention span and concentration were age appropriate   Insight:  age appropriate   Judgment: age appropriate   Gait/Station: normal gait/station   Motor Activity: no abnormal movements     Progress Toward Goals:  Patient is compliant with his medication and his treatment  He has been acclimated to the milieu  Hopefully this will be a brief admission for him      Recommended Treatment: Continue with group therapy, milieu therapy and occupational therapy  Risks, benefits and possible side effects of Medications:   Risks, benefits, and possible side effects of medications explained to patient and patient verbalizes understanding        Medications:   all current active meds have been reviewed and current meds:   Current Facility-Administered Medications   Medication Dose Route Frequency    acetaminophen (TYLENOL) tablet 650 mg  650 mg Oral Q6H PRN    acetaminophen (TYLENOL) tablet 650 mg  650 mg Oral Q6H PRN    aluminum-magnesium hydroxide-simethicone (MYLANTA) 200-200-20 mg/5 mL oral suspension 30 mL  30 mL Oral Q4H PRN    amLODIPine (NORVASC) tablet 5 mg  5 mg Oral Daily    benztropine (COGENTIN) tablet 1 mg  1 mg Oral Q1H PRN    divalproex sodium (DEPAKOTE) EC tablet 500 mg  500 mg Oral QAM    divalproex sodium (DEPAKOTE) EC tablet 750 mg  750 mg Oral HS    doxycycline hyclate (VIBRAMYCIN) capsule 100 mg  100 mg Oral BID    haloperidol (HALDOL) tablet 5 mg  5 mg Oral Q6H PRN    ibuprofen (MOTRIN) tablet 600 mg  600 mg Oral Q8H PRN    ibuprofen (MOTRIN) tablet 800 mg  800 mg Oral Q8H PRN    levocetirizine (XYZAL) tablet 5 mg  5 mg Oral QPM    loratadine (CLARITIN) tablet 10 mg  10 mg Oral Daily    LORazepam (ATIVAN) 2 mg/mL injection 1 mg  1 mg Intramuscular Q4H PRN    LORazepam (ATIVAN) tablet 1 mg  1 mg Oral Q4H PRN    magnesium hydroxide (MILK OF MAGNESIA) 400 mg/5 mL oral suspension 30 mL  30 mL Oral Daily PRN    OLANZapine (ZyPREXA) IM injection 5 mg  5 mg Intramuscular Q3H PRN    risperiDONE (RisperDAL M-TABS) dispersible tablet 1 mg  1 mg Oral Q3H PRN    risperiDONE (RisperDAL) tablet 3 mg  3 mg Oral BID    zolpidem (AMBIEN) tablet 5 mg  5 mg Oral HS PRN     Labs: Results for Mauro Grajeda (MRN [id number]) as of 6/17/2018 09:42   Ref   Range 6/17/2018 05:03 6/17/2018 08:00   eGFR Latest Units: ml/min/1 73sq m 133    Sodium Latest Ref Range: 136 - 145 mmol/L 136    Potassium Latest Ref Range: 3 5 - 5 3 mmol/L 4 2    Chloride Latest Ref Range: 100 - 108 mmol/L 105    CO2 Latest Ref Range: 21 - 32 mmol/L 23    Anion Gap Latest Ref Range: 4 - 13 mmol/L 8    BUN Latest Ref Range: 5 - 25 mg/dL 10    Creatinine Latest Ref Range: 0 60 - 1 30 mg/dL 0 71    Glucose Latest Ref Range: 65 - 140 mg/dL 98    Calcium Latest Ref Range: 8 3 - 10 1 mg/dL 8 7    AST Latest Ref Range: 5 - 45 U/L 46 (H)    ALT Latest Ref Range: 12 - 78 U/L 110 (H)    Alkaline Phosphatase Latest Ref Range: 46 - 116 U/L 73    Total Protein Latest Ref Range: 6 4 - 8 2 g/dL 7 3    Albumin Latest Ref Range: 3 5 - 5 0 g/dL 3 2 (L)    Total Bilirubin Latest Ref Range: 0 20 - 1 00 mg/dL 0 40    TSH 3RD GENERATON Latest Ref Range: 0 358 - 3 740 uIU/mL  2 664

## 2018-06-18 PROCEDURE — 99232 SBSQ HOSP IP/OBS MODERATE 35: CPT | Performed by: PHYSICIAN ASSISTANT

## 2018-06-18 RX ADMIN — AMLODIPINE BESYLATE 5 MG: 5 TABLET ORAL at 12:42

## 2018-06-18 RX ADMIN — DOXYCYCLINE HYCLATE 100 MG: 100 CAPSULE ORAL at 17:19

## 2018-06-18 RX ADMIN — DIVALPROEX SODIUM 500 MG: 500 TABLET, DELAYED RELEASE ORAL at 08:37

## 2018-06-18 RX ADMIN — RISPERIDONE 3 MG: 2 TABLET ORAL at 08:36

## 2018-06-18 RX ADMIN — LORATADINE 10 MG: 10 TABLET ORAL at 09:00

## 2018-06-18 RX ADMIN — DIVALPROEX SODIUM 750 MG: 500 TABLET, DELAYED RELEASE ORAL at 22:13

## 2018-06-18 RX ADMIN — DOXYCYCLINE HYCLATE 100 MG: 100 CAPSULE ORAL at 08:36

## 2018-06-18 RX ADMIN — RISPERIDONE 3 MG: 2 TABLET ORAL at 17:19

## 2018-06-18 NOTE — PROGRESS NOTES
Awake and wandering the halls  Friendly and pleasant  Denies SI/HI, AH/VH  When asked reason for admit "I forgot" stated with a smile, then stated "I heard voices"  Compliant with medications and meals  No questions or concerns

## 2018-06-18 NOTE — CASE MANAGEMENT
CM received a telephone call from PT's PA [facility] located @ [place] [address] [place] [address] (Y) [phone] (P) [phone]- [facility] [place] (C) [phone]  CM provided information regarding the events that led to PT's I/P [facility] admission and projected D/C for Thursday  Per Merlyn Angel plans to follow up w/ PT at his house on Friday 06/22/18 @ 1:00pm for ICM services in the community

## 2018-06-18 NOTE — CASE MANAGEMENT
CM outreached to [facility] located @ [address] [place] [address]  KIKA) [phone] and spoke w/ Sandra  CM provided admission notification and inquired about PT's risperdal injection  Per Sandra, PT's last injection was on Tuesday 06/11/18 and PT receives this every two weeks  PT is scheduled for his next Risperdal Consta 50mg IM injection on Tuesday 06/26/18  Sandra confirmed that [place] PT's injection and they go to PT's home to do this  KAPIL notified MD of this

## 2018-06-18 NOTE — PROGRESS NOTES
Pt sleeping in bed, follows prompts and wanders unit  Scant conversation, limited insight  States he "needs to behave" while he is here  Able to contract for safety  Distracted, watching tv  Comfortable on unit

## 2018-06-18 NOTE — CASE MANAGEMENT
CM outreached to [facility] for the [facility] located @ [address] [facility],  [address], [address] [facility] [address]) [phone] (x) [phone] and spoke w/ Candi  CM provided admission and projected D/C notification  PT is scheduled for the following aftercare services:     Thursday 07/12/18 @ 2:00pm for individual therapy with Ronn Garcia  Friday 07/06/18 @ 2:00pm with ZBIGNIEW Romo for a follow up medical appointment  Earliest appointment available Thursday 09/06/18 @ 11:30am with Dr Robyn Bearden for psychiatric medication management  It was confirmed that they can continue to write PT's scripts ordered from [address] until PT is able to be seen in September if CM faxes over PT's D/c paperwork

## 2018-06-18 NOTE — PROGRESS NOTES
Progress Note - Behavioral Health   Montana Church 26 y o  male MRN: [id number]  Unit/Bed#: [facility] 251-01 Encounter: [id number]    Assessment/Plan   Principal Problem:    Schizoaffective disorder, bipolar type ([address] )      Subjective:  Patient today reports decreased auditory hallucinations of voices  Reports he takes his medications as prescribed and could not identify reason why he had increase in frequency of them  Denied other forms of psychosis  Somewhat preoccupied to self with inappropriate smile and laughter  Through CM patient had Risperdal Consta 1 week ago and is due soon  Overall denied most psychiatric symptoms and reports he is doing well  Denied SI/HI  LFTs were mildly elevated and will repeat tomorrow along with Depakote level  Denied somatic complaints  No other side effects mentioned  Medication compliant on unit        Current Medications:  Current Facility-Administered Medications   Medication Dose Route Frequency    acetaminophen (TYLENOL) tablet 650 mg  650 mg Oral Q6H PRN    acetaminophen (TYLENOL) tablet 650 mg  650 mg Oral Q6H PRN    aluminum-magnesium hydroxide-simethicone (MYLANTA) 200-200-20 mg/5 mL oral suspension 30 mL  30 mL Oral Q4H PRN    amLODIPine (NORVASC) tablet 5 mg  5 mg Oral Daily    benztropine (COGENTIN) tablet 1 mg  1 mg Oral Q1H PRN    divalproex sodium (DEPAKOTE) EC tablet 500 mg  500 mg Oral QAM    divalproex sodium (DEPAKOTE) EC tablet 750 mg  750 mg Oral HS    doxycycline hyclate (VIBRAMYCIN) capsule 100 mg  100 mg Oral BID    haloperidol (HALDOL) tablet 5 mg  5 mg Oral Q6H PRN    ibuprofen (MOTRIN) tablet 600 mg  600 mg Oral Q8H PRN    ibuprofen (MOTRIN) tablet 800 mg  800 mg Oral Q8H PRN    loratadine (CLARITIN) tablet 10 mg  10 mg Oral Daily    LORazepam (ATIVAN) 2 mg/mL injection 1 mg  1 mg Intramuscular Q4H PRN    LORazepam (ATIVAN) tablet 1 mg  1 mg Oral Q4H PRN    magnesium hydroxide (MILK OF MAGNESIA) 400 mg/5 mL oral suspension 30 mL  30 mL Oral Daily PRN    OLANZapine (ZyPREXA) IM injection 5 mg  5 mg Intramuscular Q3H PRN    risperiDONE (RisperDAL M-TABS) dispersible tablet 1 mg  1 mg Oral Q3H PRN    risperiDONE (RisperDAL) tablet 3 mg  3 mg Oral BID    zolpidem (AMBIEN) tablet 5 mg  5 mg Oral HS PRN       Behavioral Health Medications: all current active meds have been reviewed and continue current psychiatric medications  Vitals:  Vitals:    06/18/18 1100   BP: 146/95   Pulse: (!) 107   Resp:    Temp:        Laboratory results:    I have personally reviewed all pertinent laboratory/tests results  Most Recent Labs:   Lab Results   Component Value Date    WBC 7 94 04/21/2018    RBC 4 80 04/21/2018    HGB 14 5 04/21/2018    HCT 42 8 04/21/2018     04/21/2018    RDW 13 0 04/21/2018    NEUTROABS 3 17 04/21/2018     06/17/2018    K 4 2 06/17/2018     06/17/2018    CO2 23 06/17/2018    BUN 10 06/17/2018    CREATININE 0 71 06/17/2018    GLUCOSE 98 06/17/2018    CALCIUM 8 7 06/17/2018    AST 46 (H) 06/17/2018     (H) 06/17/2018    ALKPHOS 73 06/17/2018    PROT 7 3 06/17/2018    BILITOT 0 40 06/17/2018    CHOL 176 04/21/2018    HDL 27 (L) 04/21/2018    TRIG 237 (H) 04/21/2018    LDLCALC 102 (H) 04/21/2018    VALPROICTOT 38 (L) 04/20/2018    AMMONIA 27 01/13/2018    DWK0FRORIJNR 2 664 06/17/2018    FREET4 1 10 03/24/2017    T3FREE 3 03 03/24/2017    RPR Non-Reactive 04/21/2018       Psychiatric Review of Systems:  Behavior over the last 24 hours:  unchanged  Sleep: normal  Appetite: normal  Medication side effects: No  ROS: no complaints    Mental Status Evaluation:  Appearance:  casually dressed   Behavior:  guarded   Speech:  loud   Mood:  euthymic   Affect:  Increased in range   Language naming objects and repeating phrases   Thought Process:  concrete   Thought Content:  obsessions   Perceptual Disturbances: Reduced auditory hallucinations without commands  Denied V/O/T hallucinations     Somewhat preoccupied to self Risk Potential: Denied SI/HI  Potential for aggression: No   Sensorium:  person and place   Cognition:  grossly intact   Consciousness:  awake    Recent and Remote Memory intact   Attention: attention span appeared shorter than expected for age   Insight:  limited   Judgment: limited   Gait/Station: normal gait/station and normal balance   Motor Activity: no abnormal movements     Progress Toward Goals: progressing slowly    Recommended Treatment: Continue with group therapy, milieu therapy and occupational therapy  1   Continue current medications  2  Next Risperdal Consta injection is 6/26/18  3  Repeat Depakote level and LFT tomorrow  If LFTs remain elevated may consider switch to Lithium  4  Disposition planning     Risks, benefits and possible side effects of Medications:   Risks, benefits, and possible side effects of medications explained to patient and patient verbalizes understanding        Anel Lacey PA-C

## 2018-06-18 NOTE — DISCHARGE INSTR - OTHER ORDERS
You were provided with the following information, services, and supports throughout [facility]: The PEER LINE is a toll-free telephone number for people in [facility] who are seeking a listening ear for additional support in their recovery from mental illness  The PEER LINE is peer-run and peer-friendly  You can call the Peer Line 24 hours a day  (T)  0-066-OS-PEERS (T) K0774416  Emergency Services: [facility] located @ [address] [place] [address], [address] [place] [address] (V) [phone]    Crisis Text Line is free, 24/7 support for those in crisis  Text HOME 337366 from anywhere in the [place] to text with a trained Crisis Counselor  Warm Line: (T) [phone], (T) 789- [phone], (T) [phone]  If it is not quite a crisis, but you want to talk to someone, 24 hours/day, 7 days/week: Someone to listen; someone who cares

## 2018-06-18 NOTE — PLAN OF CARE
Problem: Ineffective Coping  Goal: Participates in unit activities  Interventions:  - Provide therapeutic environment   - Provide required programming   - Redirect inappropriate behaviors    Outcome: Progressing  Pt attended all therapeutic groups today  He participated minimally if prompted  Pt presented as childlike making noises or tapping pencils against the table at times, however was easily redirected with no agitation observed

## 2018-06-18 NOTE — CASE MANAGEMENT
PT was referred to [address] on a 201 from [facility]ED after PT presented to the ED reporting CAH to kill himself and his mother w/ a knife or for PT to OD on his prescribed medications  PT reported SI w/ a plan to OD on medications and reports HI towards PT's mother  PT reported stressors related to arguing w/ his parents over the TV  CM met w/ PT today  PT reported that he is here, "Becuase of the voices " PT proceeded to explain that he has CAH to kill himself and his mother  PT denies that he would act upon this and reported that when he makes these statements, PT's mother takes him to the ED  PT denies contacting his O/P providers or ICM prior to coming to the hospital  While reviewing the events that led to PT's I/P [facility] admission, PT was inappropriately smiling and presented w/ a slow latency of response  CM reviewed PT's [place] plan  PT signed [id number] [id number] plan  Copy of [address] [address] plan placed to be filed in medical records  PT confirms that PT resides at [address] [place] [address] w/ PT's parents Angel Marie and Lucia Ramos  PT confirms that upon D/C from [facility]/P [facility], PT is able to return to live there  PT denies having an active telephone number at this time and reports that the best way to get in contact w/ PT is through PT's mother  Records indicate that PT's mother Angel Marie again got a new telephone number and can be reached at (c) [phone]  PT denies having an active 's licenses and reports that in the community PT utilizes public transportation as a means of transportation  PT reports that his parents do not have a car and they use public transportation as well  PT reports that upon D/C from I/P [facility], PT will need assistance w/ transportation services home  PT confirms that he still receives PCP, O/P therapy, and O/P psychiatric medication management through the [address] [facility] the [facility]   PT confirms having a [place] ICM named Barry Camara (C) [phone]  PT denies having any other forms of providers at this time  PT is currently insured through primary insurance of [facility] and secondary insurance of [facility]  PT confirms having active RX coverage and reports that he still utilizes [facility] as a local pharmacy  PT denies having any medical issues  PT denies having a HX of seizures  PT reports HX of multiple I/P  admissions including [facility], [facility], [place], and H[facility] of [facility]  PT denies having any past or current legal issues  PT denies currently being under the supervision of probation or parole  PT reports that he is currently unemployed and reports that he receives SSDI; however, PT reports being unsure of the amount  PT confirms that PT's mother Marlee Lira is his rep-payee  PT reports that he is legally single  PT denies having any children  PT denies having a family HX of mental illness or drug and alcohol addiction  PT denies any issues related to drugs and alcohol  UDS upon admission was (-)  BAT was 0 00  PT denies smoking cigarettes or using any other forms of tobacco      PT denies identifying w/ any particular Sabianist or spiritual beliefs  PT reports that his highest level of education is 12th grade  PT reports that he dropped out of high school his senior year  PT denies having any access to weapons or firearms  PT denies having a legal POA, legal guardian, and/ or mental health advanced directive  CM agreed to outreach to PT's natural supports and providers and follow up w/ PT accordingly regarding progression in stabilization and D/C planning       PT signed ANGELITA's for the following:     [address] for the [facility] located @ [address] [facility] [address], [address] [facility] (L) [phone] (Q) [phone]    [facility] located @ [facility] (V) [phone] (Z) [phone]- [facility] Bao T[facility] (C) [address] located @ [address] [facility], [facility] [place]  (C) [phone]    Mother- Derrek Marguerite located @ [address] [place] [address] S[place] Y) [phone]

## 2018-06-18 NOTE — CASE MANAGEMENT
CM outreached to [place] [facility] located @ [address] (E) [phone] (D) [phone]- [facility] Misa Goodman (C) [phone] and left a voice-mail providing admission notification and requested a call back regarding continuity of care

## 2018-06-19 LAB
ALBUMIN SERPL BCP-MCNC: 3.4 G/DL (ref 3.5–5)
ALP SERPL-CCNC: 75 U/L (ref 46–116)
ALT SERPL W P-5'-P-CCNC: 90 U/L (ref 12–78)
AST SERPL W P-5'-P-CCNC: 32 U/L (ref 5–45)
BILIRUB DIRECT SERPL-MCNC: 0.12 MG/DL (ref 0–0.2)
BILIRUB SERPL-MCNC: 0.6 MG/DL (ref 0.2–1)
PROT SERPL-MCNC: 7.3 G/DL (ref 6.4–8.2)
VALPROATE SERPL-MCNC: 76 UG/ML (ref 50–100)

## 2018-06-19 PROCEDURE — 80164 ASSAY DIPROPYLACETIC ACD TOT: CPT | Performed by: PHYSICIAN ASSISTANT

## 2018-06-19 PROCEDURE — 80076 HEPATIC FUNCTION PANEL: CPT | Performed by: PHYSICIAN ASSISTANT

## 2018-06-19 PROCEDURE — 99232 SBSQ HOSP IP/OBS MODERATE 35: CPT | Performed by: PHYSICIAN ASSISTANT

## 2018-06-19 RX ADMIN — DIVALPROEX SODIUM 500 MG: 500 TABLET, DELAYED RELEASE ORAL at 09:10

## 2018-06-19 RX ADMIN — AMLODIPINE BESYLATE 5 MG: 5 TABLET ORAL at 09:10

## 2018-06-19 RX ADMIN — LORATADINE 10 MG: 10 TABLET ORAL at 09:10

## 2018-06-19 RX ADMIN — RISPERIDONE 3 MG: 2 TABLET ORAL at 09:10

## 2018-06-19 RX ADMIN — RISPERIDONE 3 MG: 2 TABLET ORAL at 17:32

## 2018-06-19 RX ADMIN — DIVALPROEX SODIUM 750 MG: 500 TABLET, DELAYED RELEASE ORAL at 21:04

## 2018-06-19 RX ADMIN — DOXYCYCLINE HYCLATE 100 MG: 100 CAPSULE ORAL at 17:32

## 2018-06-19 NOTE — PROGRESS NOTES
Progress Note - [address] [facility] [address] [facility] [address] [facility] [address] 26 y o  male MRN: [id number]  Unit/Bed#: [facility] 251-01 Encounter: [id number]    Assessment/Plan   Principal Problem:    Schizoaffective disorder, bipolar type ([address] )      Subjective:  Patient's LFTs show improvement today  Additionally, Depakote level is 76 and deemed therapeutic  Patient is overall poor historian and said "No" for every psychiatric symptom asked  Does deny EPS and excess sedation with Risperdal 3mg BID + Risperdal Consta every 2 weeks  Next injection is due in 1 week from now  Report from nursing patient defecated in shower, which is a repeated behavior here on unit  Child-like and guarded in behavior  No signs of agitation  Patient is denying psychosis to self, but does appear preoccupied at times yelling out  Denied symptoms of eddie  Continue safety assessment  Is medication compliant  Tolerating medications well without serious side effects        Current Medications:  Current Facility-Administered Medications   Medication Dose Route Frequency    acetaminophen (TYLENOL) tablet 650 mg  650 mg Oral Q6H PRN    acetaminophen (TYLENOL) tablet 650 mg  650 mg Oral Q6H PRN    aluminum-magnesium hydroxide-simethicone (MYLANTA) 200-200-20 mg/5 mL oral suspension 30 mL  30 mL Oral Q4H PRN    amLODIPine (NORVASC) tablet 5 mg  5 mg Oral Daily    benztropine (COGENTIN) tablet 1 mg  1 mg Oral Q1H PRN    divalproex sodium (DEPAKOTE) EC tablet 500 mg  500 mg Oral QAM    divalproex sodium (DEPAKOTE) EC tablet 750 mg  750 mg Oral HS    doxycycline hyclate (VIBRAMYCIN) capsule 100 mg  100 mg Oral BID    haloperidol (HALDOL) tablet 5 mg  5 mg Oral Q6H PRN    ibuprofen (MOTRIN) tablet 600 mg  600 mg Oral Q8H PRN    ibuprofen (MOTRIN) tablet 800 mg  800 mg Oral Q8H PRN    loratadine (CLARITIN) tablet 10 mg  10 mg Oral Daily    LORazepam (ATIVAN) 2 mg/mL injection 1 mg  1 mg Intramuscular Q4H PRN    LORazepam (ATIVAN) tablet 1 mg  1 mg Oral Q4H PRN    magnesium hydroxide (MILK OF MAGNESIA) 400 mg/5 mL oral suspension 30 mL  30 mL Oral Daily PRN    OLANZapine (ZyPREXA) IM injection 5 mg  5 mg Intramuscular Q3H PRN    risperiDONE (RisperDAL M-TABS) dispersible tablet 1 mg  1 mg Oral Q3H PRN    risperiDONE (RisperDAL) tablet 3 mg  3 mg Oral BID    zolpidem (AMBIEN) tablet 5 mg  5 mg Oral HS PRN       Behavioral Health Medications: all current active meds have been reviewed and continue current psychiatric medications  Vitals:  Vitals:    06/19/18 0728   BP: 161/81   Pulse: 100   Resp: 20   Temp: (!) 96 9 °F (36 1 °C)       Laboratory results:    I have personally reviewed all pertinent laboratory/tests results    Most Recent Labs:   Lab Results   Component Value Date    WBC 7 94 04/21/2018    RBC 4 80 04/21/2018    HGB 14 5 04/21/2018    HCT 42 8 04/21/2018     04/21/2018    RDW 13 0 04/21/2018    NEUTROABS 3 17 04/21/2018     06/17/2018    K 4 2 06/17/2018     06/17/2018    CO2 23 06/17/2018    BUN 10 06/17/2018    CREATININE 0 71 06/17/2018    GLUCOSE 98 06/17/2018    CALCIUM 8 7 06/17/2018    AST 32 06/19/2018    ALT 90 (H) 06/19/2018    ALKPHOS 75 06/19/2018    PROT 7 3 06/19/2018    BILITOT 0 60 06/19/2018    CHOL 176 04/21/2018    HDL 27 (L) 04/21/2018    TRIG 237 (H) 04/21/2018    LDLCALC 102 (H) 04/21/2018    VALPROICTOT 76 06/19/2018    AMMONIA 27 01/13/2018    [id number] 2 664 06/17/2018    FREET4 1 10 03/24/2017    T3FREE 3 03 03/24/2017    RPR Non-Reactive 04/21/2018       Psychiatric Review of Systems:  Behavior over the last 24 hours:  Slight improvement  Sleep: normal  Appetite: normal  Medication side effects: No  ROS: no complaints    Mental Status Evaluation:  Appearance:  disheveled   Behavior:  guarded, child-like   Speech:  loud   Mood:  euthymic   Affect:  increased in range   Language naming objects and repeating phrases   Thought Process:  concrete   Thought Content:  obsessions   Perceptual Disturbances: Denied A/V/O/T hallucinations  Somewhat preoccupied to self   Risk Potential: Denied SI/HI  Potential for aggression: low   Sensorium:  person and place   Cognition:  grossly intact   Consciousness:  alert and awake    Recent and Remote Memory intact   Attention: attention span appeared shorter than expected for age   Insight:  limited   Judgment: limited   Gait/Station: normal gait/station and normal balance   Motor Activity: no abnormal movements     Progress Toward Goals: slight progression    Recommended Treatment: Continue with group therapy, milieu therapy and occupational therapy  1   Continue current medications  2  Disposition planning with tentative discharge Thursday    Risks, benefits and possible side effects of Medications:   Risks, benefits, and possible side effects of medications explained to patient and patient verbalizes understanding        Shira Rodriguez PA-C

## 2018-06-20 PROCEDURE — 99232 SBSQ HOSP IP/OBS MODERATE 35: CPT | Performed by: PHYSICIAN ASSISTANT

## 2018-06-20 RX ORDER — AMLODIPINE BESYLATE 5 MG/1
5 TABLET ORAL DAILY
Qty: 30 TABLET | Refills: 0 | Status: ON HOLD | OUTPATIENT
Start: 2018-06-20 | End: 2018-10-12

## 2018-06-20 RX ORDER — DIVALPROEX SODIUM 250 MG/1
750 TABLET, DELAYED RELEASE ORAL
Qty: 90 TABLET | Refills: 0 | Status: ON HOLD | OUTPATIENT
Start: 2018-06-20 | End: 2018-10-12

## 2018-06-20 RX ORDER — DIVALPROEX SODIUM 500 MG/1
500 TABLET, DELAYED RELEASE ORAL DAILY
Qty: 30 TABLET | Refills: 0 | Status: ON HOLD | OUTPATIENT
Start: 2018-06-20 | End: 2018-10-12

## 2018-06-20 RX ORDER — RISPERIDONE 3 MG/1
3 TABLET, FILM COATED ORAL 2 TIMES DAILY
Qty: 60 TABLET | Refills: 0 | Status: SHIPPED | OUTPATIENT
Start: 2018-06-20 | End: 2018-10-12 | Stop reason: HOSPADM

## 2018-06-20 RX ADMIN — AMLODIPINE BESYLATE 5 MG: 5 TABLET ORAL at 08:11

## 2018-06-20 RX ADMIN — DIVALPROEX SODIUM 500 MG: 500 TABLET, DELAYED RELEASE ORAL at 08:12

## 2018-06-20 RX ADMIN — DOXYCYCLINE HYCLATE 100 MG: 100 CAPSULE ORAL at 08:11

## 2018-06-20 RX ADMIN — RISPERIDONE 3 MG: 2 TABLET ORAL at 17:05

## 2018-06-20 RX ADMIN — DOXYCYCLINE HYCLATE 100 MG: 100 CAPSULE ORAL at 17:05

## 2018-06-20 RX ADMIN — DIVALPROEX SODIUM 750 MG: 500 TABLET, DELAYED RELEASE ORAL at 21:19

## 2018-06-20 RX ADMIN — RISPERIDONE 3 MG: 2 TABLET ORAL at 08:11

## 2018-06-20 RX ADMIN — LORATADINE 10 MG: 10 TABLET ORAL at 08:12

## 2018-06-20 NOTE — DISCHARGE INSTRUCTIONS
Schizoaffective Disorder   WHAT YOU NEED TO KNOW:   Schizoaffective disorder is a long-term mental illness that may change how you think, feel, and act around others  You may not know what is real and what is not real    DISCHARGE INSTRUCTIONS:   Medicines:   · Antipsychotics: These medicines help decrease psychotic symptoms or severe agitation  You may need antiparkinson medicine to control muscle stiffness, twitches, and restlessness caused by antipsychotic medicines  · Antianxiety medicine: This medicine may be given to decrease anxiety and help you feel calm and relaxed  · Antidepressants: These medicines are given to decrease or stop the symptoms of depression, anxiety, and behavior problems  · Mood stabilizers: These medicines help control mood swings  · Anticonvulsants: This medicine is given to control seizures  It may also be used to decrease violent behavior and control your mood swings  · Blood pressure medicines: These may be used to help decrease motor tics (uncontrolled movements)  They may also help you feel calmer, more focused, and less irritable  · Anticholinergics: This medicine decreases the side effects of other medicines  · Take your medicine as directed  Contact your healthcare provider if you think your medicine is not helping or if you have side effects  Tell him or her if you are allergic to any medicine  Keep a list of the medicines, vitamins, and herbs you take  Include the amounts, and when and why you take them  Bring the list or the pill bottles to follow-up visits  Carry your medicine list with you in case of an emergency  Follow up with your healthcare provider or psychiatrist as directed: You may need to return to have your blood pressure and other symptoms checked  You may need blood tests to check the level of medicine in your blood  Write down your questions so you remember to ask them during your visits  Manage your symptoms:   The following may help you feel better or prevent symptoms of schizoaffective disorder from coming back:  · Find support for yourself and your family:  Talk with others to help you cope with your illness better  This may also help to improve how you relate to others  · Keep all medical appointments: This will help manage your disease and the side-effects from medicines you may be taking  · Use your medicines as directed:  Put your medicines in a pillbox placed in an area you can easily see  Use a watch with an alarm to help you remember when it is time to take your medicine  Tell your healthcare provider if you know or think you might be pregnant  Do not stop taking your medicines without your healthcare provider's okay  A sudden stop can cause serious medical problems  · Watch for early signs of a relapse and seek help immediately:      ¨ How you think, feel, and see things has changed  ¨ You behave differently than usual     ¨ You become more nervous and upset, but do not know why  ¨ You eat less and have trouble sleeping  ¨ You have little or no interest in friends or activities  For support and more information:   · American [employer] Association  [facility], [facility] [address] , [address]  Phone: [phone]  Phone: [phone]  Web Address: [employer] com br  [employer]  · [address] [place] [address], Public Information & Communication Branch  [address] [place] [address], [address] [facility], [address]  [place] [address]   Phone: [phone]  Phone: [phone]  Web Address: Savanah mancuso  Contact your healthcare provider or psychiatrist if:   · You think you are having a relapse  · You are having side effects from your medicine, or they are not helping  · You are not sleeping well or are sleeping more than usual     · You cannot eat or are eating more than usual     · You have muscle spasms, stiffness, or trouble walking      · Your sad feelings or thoughts change the way you function during the day  · You have questions or concerns about your condition or care  Seek care immediately or call 911 if:   · You feel like hurting or killing yourself or others  · You feel that your condition is getting worse  · You feel very upset, threaten someone, or you feel violent  · You suddenly have changes in your vision  · You suddenly have chest pain, trouble breathing, or a fever  © 2017 [address]  Information is for End User's use only and may not be sold, redistributed or otherwise used for commercial purposes  All illustrations and images included in CareNotes® are the copyrighted property of A D A M , Inc  or Remi Norman  The above information is an  only  It is not intended as medical advice for individual conditions or treatments  Talk to your doctor, nurse or pharmacist before following any medical regimen to see if it is safe and effective for you

## 2018-06-20 NOTE — CASE MANAGEMENT
CM met w/ PT today  CM reviewed PT's D/C plan and aftercare services  PT expresses readiness for D/C Thursday  PT communicated that has not been in contact w/ his mother and communicated that he preferred to not let her know when he is coming home  PT stated, "I'll surprise her " CM communicated that CM plans to be in contact w/ PT's mother  PT verbalized being in agreement w/ this planning  Agreed to be in contact regarding progression in stabilization and D/C planning

## 2018-06-20 NOTE — CASE MANAGEMENT
CM faxed PT's Beth Lucan transportation request to Rui Lynn @ [facility] (S) [phone] (Q) [phone] for tomorrow Thursday 06/21/18 @ 11:00am

## 2018-06-20 NOTE — CASE MANAGEMENT
CM met w/ PT today  CM reviewed PT's medicare letter  PT signed medicare letter  Copy of medicare letter placed to be filed in medical records  CM reviewed PT's D/C plan for tomorrow, Cristofer Song transportation, communication w/ PT's mother, and aftercare services  PT is expressing readiness for D/C tomorrow and verbalized being in support of D/C plan and aftercare services  Agreed to be in contact regarding progression in stabilization and D/C planning

## 2018-06-20 NOTE — PROGRESS NOTES
Pt pleasant and smiling upon awakening  Pt denies SI/HI  Denies hallucinations  Calm and appropriate on unit  Showered when prompted to  Pt said he is staying in the hospital for another week  States he is getting along with his mother now  No questions/concerns at this time

## 2018-06-20 NOTE — CASE MANAGEMENT
CM outreached to PT's Mother- Mic Dominguez located @ [address] [facility] [address] (t) [phone] and provided an update on PT's diagnosis, medications, and aftercare services  CM reviewed PT's D/C for tomorrow  Candis confirmed being in support of this  CM agreed to arrange for [facility] transport home and confirmed the address on file w/ Candis

## 2018-06-20 NOTE — PROGRESS NOTES
Progress Note - Behavioral Health   Ernestine Nguyễn 26 y o  male MRN: [id number]  Unit/Bed#: [facility] 251-01 Encounter: [id number]    Assessment/Plan   Principal Problem:    Schizoaffective disorder, bipolar type ([address] )        Subjective:  Patient seen out on unit  At times can be heard making loud unprovoked noises  Possibly responding to internal stimuli  Has bright affect and denies most psychiatric symptoms  Will answer in yes or no  Will express any questions in regards to basic needs  Denied SI/HI, psychosis, and does not appear to endorse criteria for eddie  Does have euphoric affect at times and is loud, most likely secondary to intellectual disability  Is overall medication compliant and appears to be tolerating medications well without serious side effects  Tentative discharge tomorrow        Current Medications:  Current Facility-Administered Medications   Medication Dose Route Frequency    acetaminophen (TYLENOL) tablet 650 mg  650 mg Oral Q6H PRN    acetaminophen (TYLENOL) tablet 650 mg  650 mg Oral Q6H PRN    aluminum-magnesium hydroxide-simethicone (MYLANTA) 200-200-20 mg/5 mL oral suspension 30 mL  30 mL Oral Q4H PRN    amLODIPine (NORVASC) tablet 5 mg  5 mg Oral Daily    benztropine (COGENTIN) tablet 1 mg  1 mg Oral Q1H PRN    divalproex sodium (DEPAKOTE) EC tablet 500 mg  500 mg Oral QAM    divalproex sodium (DEPAKOTE) EC tablet 750 mg  750 mg Oral HS    doxycycline hyclate (VIBRAMYCIN) capsule 100 mg  100 mg Oral BID    haloperidol (HALDOL) tablet 5 mg  5 mg Oral Q6H PRN    ibuprofen (MOTRIN) tablet 600 mg  600 mg Oral Q8H PRN    ibuprofen (MOTRIN) tablet 800 mg  800 mg Oral Q8H PRN    loratadine (CLARITIN) tablet 10 mg  10 mg Oral Daily    LORazepam (ATIVAN) 2 mg/mL injection 1 mg  1 mg Intramuscular Q4H PRN    LORazepam (ATIVAN) tablet 1 mg  1 mg Oral Q4H PRN    magnesium hydroxide (MILK OF MAGNESIA) 400 mg/5 mL oral suspension 30 mL  30 mL Oral Daily PRN    OLANZapine (ZyPREXA) IM injection 5 mg  5 mg Intramuscular Q3H PRN    risperiDONE (RisperDAL M-TABS) dispersible tablet 1 mg  1 mg Oral Q3H PRN    risperiDONE (RisperDAL) tablet 3 mg  3 mg Oral BID    zolpidem (AMBIEN) tablet 5 mg  5 mg Oral HS PRN       Behavioral Health Medications: all current active meds have been reviewed and continue current psychiatric medications  Vitals:  Vitals:    06/20/18 0736   BP: 136/63   Pulse: (!) 112   Resp: 20   Temp: 98 5 °F (36 9 °C)       Laboratory results:    I have personally reviewed all pertinent laboratory/tests results  Most Recent Labs:   Lab Results   Component Value Date    WBC 7 94 04/21/2018    RBC 4 80 04/21/2018    HGB 14 5 04/21/2018    HCT 42 8 04/21/2018     04/21/2018    RDW 13 0 04/21/2018    NEUTROABS 3 17 04/21/2018     06/17/2018    K 4 2 06/17/2018     06/17/2018    CO2 23 06/17/2018    BUN 10 06/17/2018    CREATININE 0 71 06/17/2018    GLUCOSE 98 06/17/2018    CALCIUM 8 7 06/17/2018    AST 32 06/19/2018    ALT 90 (H) 06/19/2018    ALKPHOS 75 06/19/2018    PROT 7 3 06/19/2018    BILITOT 0 60 06/19/2018    CHOL 176 04/21/2018    HDL 27 (L) 04/21/2018    TRIG 237 (H) 04/21/2018    LDLCALC 102 (H) 04/21/2018    VALPROICTOT 76 06/19/2018    AMMONIA 27 01/13/2018    [id number] 2 664 06/17/2018    FREET4 1 10 03/24/2017    T3FREE 3 03 03/24/2017    RPR Non-Reactive 04/21/2018       Psychiatric Review of Systems:  Behavior over the last 24 hours:  improved  Sleep: normal  Appetite: normal  Medication side effects: No  ROS: no complaints    Mental Status Evaluation:  Appearance:  in hospital attire   Behavior:  restless and fidgety   Speech:  loud   Mood:  euthymic   Affect:  increased in range   Language naming objects and repeating phrases   Thought Process:  concrete   Thought Content:  obsessions   Perceptual Disturbances: None  Possibly internally responding at times   Risk Potential: Denied SI/HI   Potential for aggression: No   Sensorium: person and place   Cognition:  grossly intact   Consciousness:  awake    Recent and Remote Memory intact   Attention: attention span appeared shorter than expected for age   Insight:  limited   Judgment: limited   Gait/Station: normal gait/station and normal balance   Motor Activity: no abnormal movements     Progress Toward Goals: progressing slowly    Recommended Treatment: Continue with group therapy, milieu therapy and occupational therapy  1   Continue current medications   2  Disposition planning with tentative discharge tomorrow     Risks, benefits and possible side effects of Medications:   Risks, benefits, and possible side effects of medications explained to patient and patient verbalizes understanding        Sagrario Cantu PA-C

## 2018-06-20 NOTE — PROGRESS NOTES
Pt very friendly, saying hi to staff and peers  Gives one word answer  Attends groups and follows basic prompts  Denies SI, HI, or HA

## 2018-06-20 NOTE — PROGRESS NOTES
Pt very pleasant throughout afternoon  Laughing and make noises in hallway  Is redirectable  Pt telling staff frequently that he is being discharged tomorrow and is looking forward to this  Pt continues to deny all symptoms  No questions/concerns

## 2018-06-21 VITALS
HEART RATE: 120 BPM | WEIGHT: 298.7 LBS | TEMPERATURE: 98.2 F | RESPIRATION RATE: 20 BRPM | SYSTOLIC BLOOD PRESSURE: 140 MMHG | HEIGHT: 64 IN | BODY MASS INDEX: 51 KG/M2 | DIASTOLIC BLOOD PRESSURE: 95 MMHG

## 2018-06-21 PROCEDURE — 99239 HOSP IP/OBS DSCHRG MGMT >30: CPT | Performed by: PHYSICIAN ASSISTANT

## 2018-06-21 RX ADMIN — DIVALPROEX SODIUM 500 MG: 500 TABLET, DELAYED RELEASE ORAL at 08:18

## 2018-06-21 RX ADMIN — LORATADINE 10 MG: 10 TABLET ORAL at 08:18

## 2018-06-21 RX ADMIN — RISPERIDONE 3 MG: 2 TABLET ORAL at 08:18

## 2018-06-21 RX ADMIN — AMLODIPINE BESYLATE 5 MG: 5 TABLET ORAL at 08:18

## 2018-06-21 NOTE — DISCHARGE INSTR - LAB
Contact Information: If you have any questions, concerns, pended studies, tests and/or procedures, or emergencies regarding your inpatient behavioral health visit  Please contact [facility] behavioral health unit [phone] and ask to speak to a , nurse or physician  A contact is available 24 hours/ 7 days a week at this number  Summary of Procedures Performed During your Stay:  Below is a list of major procedures performed during your hospital stay and a summary of results:  - No major procedures performed  Pending Studies     None        If studies are pending at discharge, follow up with your PCP and/or referring provider

## 2018-06-21 NOTE — DISCHARGE SUMMARY
Discharge Summary - [facility] 26 y o  male MRN: [id number]  Unit/Bed#: U 251-01 Encounter: [id number]     Admission Date:  6/16/18        Discharge Date:  6/21/18    Attending Psychiatrist: Nick Catherine MD    Reason for Admission/HPI:   History of Present Illness  Patient is a 26year old intellectually disabled male who presented to [facility] ED due to suicidal and homicidal ideation with plan to kill self and mother with a knife  He reported over the last week an increase in auditory hallucinations of voices with commands  Precipitating event was argument with mother over what was on the TV  He claims to be medication compliant and does receive Risperdal Consta IM injection every 2 weeks  Patient denied V/O/T hallucinations  He denied delusional material   He was overall scant in conversation denying depression and anxiety  He was overall deemed a poor historian  Patient does have prior inpatient psychiatric hospitalizations (at [address] [place] [address]) and was in process of finding outpatient psychiatrist   Patient is currently being seen at [facility] for the [facility] and has an ICM  Psychosocial Stressors: family      Hospital Course:   Behavioral Health Medications:   current meds:   Current Facility-Administered Medications   Medication Dose Route Frequency    acetaminophen (TYLENOL) tablet 650 mg  650 mg Oral Q6H PRN    acetaminophen (TYLENOL) tablet 650 mg  650 mg Oral Q6H PRN    aluminum-magnesium hydroxide-simethicone (MYLANTA) 200-200-20 mg/5 mL oral suspension 30 mL  30 mL Oral Q4H PRN    amLODIPine (NORVASC) tablet 5 mg  5 mg Oral Daily    benztropine (COGENTIN) tablet 1 mg  1 mg Oral Q1H PRN    divalproex sodium (DEPAKOTE) EC tablet 500 mg  500 mg Oral QAM    divalproex sodium (DEPAKOTE) EC tablet 750 mg  750 mg Oral HS    doxycycline hyclate (VIBRAMYCIN) capsule 100 mg  100 mg Oral BID    haloperidol (HALDOL) tablet 5 mg  5 mg Oral Q6H PRN    ibuprofen (MOTRIN) tablet 600 mg  600 mg Oral Q8H PRN    ibuprofen (MOTRIN) tablet 800 mg  800 mg Oral Q8H PRN    loratadine (CLARITIN) tablet 10 mg  10 mg Oral Daily    LORazepam (ATIVAN) 2 mg/mL injection 1 mg  1 mg Intramuscular Q4H PRN    LORazepam (ATIVAN) tablet 1 mg  1 mg Oral Q4H PRN    magnesium hydroxide (MILK OF MAGNESIA) 400 mg/5 mL oral suspension 30 mL  30 mL Oral Daily PRN    OLANZapine (ZyPREXA) IM injection 5 mg  5 mg Intramuscular Q3H PRN    risperiDONE (RisperDAL M-TABS) dispersible tablet 1 mg  1 mg Oral Q3H PRN    risperiDONE (RisperDAL) tablet 3 mg  3 mg Oral BID    zolpidem (AMBIEN) tablet 5 mg  5 mg Oral HS PRN     Patient was admitted to [address] [place] [address] Inpatient Psychiatric Unit on voluntary 201 commitment for safety and stabilization  On admission patient was continued on Risperdal 3mg BID for mood stabilization / psychosis and Depakote 500mg QD AM / 750mg QD PM for mood stabilization  Patient also receives Risperdal Consta 50mg IM injection every 2 weeks  Next injection is due on 6/26/18, confirmed by CM with Ondina 34  He did not require titrations or PRN psychiatric medications  He tolerated medications with no acute side effects  Depakote level on 6/19/18 was 76 and deemed therapeutic  His mood brightened over the course of his treatment, and he was seen in [facility] interacting appropriately with peers  He did not demonstrate dangerous behavior to self or others during his inpatient stay  On day of discharge patient denied depression, denied anxiety, denied psychosis, did not show signs of eddie, and denied suicidal/homicidal ideations  Mental Status at time of Discharge:     Appearance:  casually dressed   Behavior:  cooperative   Speech:  loud   Mood:  euthymic   Affect:  mood-congruent   Thought Process:  concrete   Thought Content:  obsessions   Perceptual Disturbances: None   Risk Potential: Denied SI/HI   Potential for aggression: No   Sensorium:  person, place and time/date   Cognition:  grossly intact   Consciousness:  alert and awake    Attention: attention span appeared shorter than expected for age   Insight:  partial   Judgment: partial   Gait/Station: normal gait/station and normal balance   Motor Activity: no abnormal movements     Discharge Diagnosis:   Schizoaffective disorder, bipolar type   Impulse control disorder  Intellectual disability    Discharge Medications:  See after visit summary for reconciled discharge medications provided to patient and family  Discharge instructions/Information to patient and family:   See after visit summary for information provided to patient and family  Provisions for Follow-Up Care:  See after visit summary for information related to follow-up care and any pertinent home health orders  Discharge Statement   I spent 33 minutes discharging the patient  This time was spent on the day of discharge  I had direct contact with the patient on the day of discharge  On day of discharge patient had mental status exam performed, discharge instructions/medications reviewed, and outpatient planning discussed  He was given 1 month of scripts       Lex Wolfe PA-C

## 2018-06-21 NOTE — CASE MANAGEMENT
CM reviewed PT's D/C plan  PT verbalized being in support of D/C plan and expresses readiness for D/C

## 2018-07-07 DIAGNOSIS — T78.40XS ALLERGIC STATE, SEQUELA: ICD-10-CM

## 2018-07-07 RX ORDER — LORATADINE 10 MG/1
TABLET ORAL
Qty: 30 TABLET | Refills: 0 | Status: SHIPPED | OUTPATIENT
Start: 2018-07-07 | End: 2018-08-03 | Stop reason: SDUPTHER

## 2018-07-10 ENCOUNTER — HOSPITAL ENCOUNTER (EMERGENCY)
Facility: HOSPITAL | Age: 23
Discharge: HOME | End: 2018-07-10
Attending: EMERGENCY MEDICINE
Payer: MEDICARE

## 2018-07-10 VITALS
HEART RATE: 108 BPM | OXYGEN SATURATION: 95 % | SYSTOLIC BLOOD PRESSURE: 120 MMHG | RESPIRATION RATE: 16 BRPM | HEIGHT: 64 IN | BODY MASS INDEX: 50.81 KG/M2 | WEIGHT: 297.62 LBS | DIASTOLIC BLOOD PRESSURE: 58 MMHG | TEMPERATURE: 98.2 F

## 2018-07-10 DIAGNOSIS — T63.441A ALLERGIC REACTION TO BEE STING: Primary | ICD-10-CM

## 2018-07-10 PROCEDURE — 99283 EMERGENCY DEPT VISIT LOW MDM: CPT

## 2018-07-10 PROCEDURE — 96374 THER/PROPH/DIAG INJ IV PUSH: CPT

## 2018-07-10 RX ORDER — EPINEPHRINE 0.3 MG/.3ML
0.3 INJECTION SUBCUTANEOUS ONCE
Qty: 0.3 ML | Refills: 0 | Status: SHIPPED | OUTPATIENT
Start: 2018-07-10 | End: 2018-08-17

## 2018-07-10 RX ADMIN — PREDNISONE 50 MG: 20 TABLET ORAL at 13:25

## 2018-07-10 RX ADMIN — FAMOTIDINE 20 MG: 10 INJECTION, SOLUTION INTRAVENOUS at 13:26

## 2018-07-10 NOTE — ED NOTES
Of the 4 phone numbers listed under pt's emergency contact list 3 were disconnected and one was a wrong number  Pt states he does not know anyone's phone number        Martinez Cruz RN  07/10/18 [id number]

## 2018-07-10 NOTE — ED PROVIDER NOTES
History  Chief Complaint   Patient presents with   Lesley Bernal Sting     Per EMS pt was stung by a bee on his R index finger  Pt's parents gave him a dose of epi at home  Pt received 50 of benadryl prehospital  Pt presents with L facial swelling     Patient is a 60-year-old male with history of intellectual disability who presents after EpiPen administration for bee sting  Patient brought in by EMS, who report the patient was stung by a bee, after which he was given EpiPen by parents and Benadryl en route  Patient reports being stung on the right index finger by a bee  He complains of hives on his right arm and right face  He denies current shortness of breath, tongue or throat swelling, nausea/vomiting, diarrhea, abdominal pain  Prior to Admission Medications   Prescriptions Last Dose Informant Patient Reported? Taking?    ALLERGY 10 MG tablet   No Yes   Sig: TAKE ONE TABLET BY MOUTH EVERY DAY   amLODIPine (NORVASC) 5 mg tablet   No Yes   Sig: Take 1 tablet (5 mg total) by mouth daily At 9AM   divalproex sodium (DEPAKOTE) 250 mg EC tablet   No Yes   Sig: Take 3 tablets (750 mg total) by mouth daily at bedtime   divalproex sodium (DEPAKOTE) 500 mg EC tablet   No Yes   Sig: Take 1 tablet (500 mg total) by mouth daily At 9AM   levocetirizine (XYZAL) 5 MG tablet   Yes Yes   Sig: Take 5 mg by mouth every evening   risperiDONE (RisperDAL) 3 mg tablet   No Yes   Sig: Take 1 tablet (3 mg total) by mouth 2 (two) times a day At 9AM and 6PM   risperiDONE microspheres (RISPERDAL CONSTA) 50 mg IM injection   No Yes   Sig: Inject 2 mL (50 mg total) into the shoulder, thigh, or buttocks every 14 (fourteen) days Next injection follow up with Ondina Ramirez      Facility-Administered Medications: None       Past Medical History:   Diagnosis Date    Anxiety     Asthma     Hypertension     Mental retardation     Psychiatric illness     Schizoaffective disorder ([facility] [address] )        Past Surgical History:   Procedure Laterality Date    HAND SURGERY      right hand       Family History   Problem Relation Age of Onset    No Known Problems Mother     No Known Problems Father     No Known Problems Sister     No Known Problems Brother     No Known Problems Maternal Aunt     No Known Problems Paternal Aunt     No Known Problems Maternal Uncle     No Known Problems Paternal Uncle     No Known Problems Maternal Grandfather     No Known Problems Maternal Grandmother     No Known Problems Paternal Grandfather     No Known Problems Paternal Grandmother     No Known Problems Cousin     ADD / ADHD Neg Hx     Alcohol abuse Neg Hx     Anxiety disorder Neg Hx     Bipolar disorder Neg Hx     Dementia Neg Hx     Depression Neg Hx     Drug abuse Neg Hx     OCD Neg Hx     Paranoid behavior Neg Hx     Schizophrenia Neg Hx     Seizures Neg Hx     Self-Injury Neg Hx     Suicide Attempts Neg Hx      I have reviewed and agree with the history as documented  Social History   Substance Use Topics    Smoking status: Never Smoker    Smokeless tobacco: Never Used    Alcohol use No        Review of Systems   Constitutional: Negative for chills, fatigue and fever  HENT: Positive for facial swelling  Negative for congestion and sore throat  Eyes: Negative for visual disturbance  Respiratory: Negative for cough, shortness of breath and wheezing  Cardiovascular: Negative for chest pain  Gastrointestinal: Negative for abdominal pain, diarrhea, nausea and vomiting  Endocrine: Negative for polyuria  Genitourinary: Negative for difficulty urinating and dysuria  Musculoskeletal: Negative for arthralgias  Skin: Positive for rash  Neurological: Positive for tremors  Negative for dizziness, light-headedness and headaches  All other systems reviewed and are negative        Physical Exam  ED Triage Vitals [07/10/18 1306]   Temperature Pulse Respirations Blood Pressure SpO2   98 2 °F (36 8 °C) (!) 120 18 141/77 99 % Temp src Heart Rate Source Patient Position - Orthostatic VS BP Location FiO2 (%)   -- -- -- -- --      Pain Score       No Pain           Orthostatic Vital Signs  Vitals:    07/10/18 1415 07/10/18 1445 07/10/18 1515 07/10/18 1545   BP: 117/66 138/66 136/77 120/58   Pulse: 98 102 (!) 108 (!) 108       Physical Exam   Constitutional: He is oriented to person, place, and time  He appears well-developed and well-nourished  No distress  HENT:   Head: Normocephalic and atraumatic  Right Ear: External ear normal    Left Ear: External ear normal    Mouth/Throat: No oropharyngeal exudate  Eyes: EOM are normal  Pupils are equal, round, and reactive to light  No scleral icterus  Neck: Normal range of motion  Neck supple  Cardiovascular: Normal rate, regular rhythm and normal heart sounds  Pulmonary/Chest: Effort normal and breath sounds normal  No respiratory distress  Abdominal: Soft  Bowel sounds are normal  There is no tenderness  There is no rebound and no guarding  Musculoskeletal: Normal range of motion  Neurological: He is alert and oriented to person, place, and time  Skin: Skin is warm and dry  Rash noted  Rash is urticarial         Psychiatric: He has a normal mood and affect  Nursing note and vitals reviewed  ED Medications  Medications    EMS REPLENISHMENT MED ( Does not apply Given to EMS 7/10/18 1309)   famotidine (PEPCID) injection 20 mg (20 mg Intravenous Given 7/10/18 1326)   predniSONE tablet 50 mg (50 mg Oral Given 7/10/18 1325)       Diagnostic Studies  Results Reviewed     None                 No orders to display         Procedures  Procedures      Phone Consults  ED Phone Contact    ED Course                               MDM  Number of Diagnoses or Management Options  Allergic reaction to bee sting:   Diagnosis management comments: Patient is a 67-year-old male with history of intellectual disability who presents after EpiPen administration for bee sting    Patient has hives on the face and right arm but no evidence of tongue/lip swelling, no complaints of shortness of breath/nausea/abdominal pain, and no wheezing on exam   Will observe for 2 hours and give Pepcid  Patient reports continued improvement of symptoms and 2 hours  Therefore safe for discharge with refill of EpiPen, return precautions  CritCare Time    Disposition  Final diagnoses: Allergic reaction to bee sting     Time reflects when diagnosis was documented in both MDM as applicable and the Disposition within this note     Time User Action Codes Description Comment    7/10/2018  2:45 PM Demetrio Jay Add [H53 660B] Allergic reaction to bee sting       ED Disposition     ED Disposition Condition Comment    Discharge  Yin Garcia discharge to home/self care      Condition at discharge: Good        Follow-up Information     Follow up With Specialties Details Why Contact Info Additional Information    Corin Traore MD Family Medicine  As needed [address] [place] [address] [address] [facility]       [address] [place] [address] Emergency Department Emergency Medicine  As needed, If symptoms worsen [address]  [phone]  ED, [address] [place] [address], [id number]          Discharge Medication List as of 7/10/2018  2:48 PM      START taking these medications    Details   EPINEPHrine (EPIPEN) 0 3 mg/0 3 mL SOAJ Inject 0 3 mL (0 3 mg total) into a muscle once for 1 dose, Starting Tue 7/10/2018, Normal         CONTINUE these medications which have NOT CHANGED    Details   ALLERGY 10 MG tablet TAKE ONE TABLET BY MOUTH EVERY DAY, Normal      amLODIPine (NORVASC) 5 mg tablet Take 1 tablet (5 mg total) by mouth daily At 9AM, Starting Wed 6/20/2018, Print      !! divalproex sodium (DEPAKOTE) 250 mg EC tablet Take 3 tablets (750 mg total) by mouth daily at bedtime, Starting Wed 6/20/2018, Print      !! divalproex sodium (DEPAKOTE) 500 mg EC tablet Take 1 tablet (500 mg total) by mouth daily At 9AM, Starting Wed 6/20/2018, Print      levocetirizine (XYZAL) 5 MG tablet Take 5 mg by mouth every evening, Historical Med      risperiDONE (RisperDAL) 3 mg tablet Take 1 tablet (3 mg total) by mouth 2 (two) times a day At [facility] and 6PM, Starting Wed 6/20/2018, Print      risperiDONE microspheres (RISPERDAL CONSTA) 50 mg IM injection Inject 2 mL (50 mg total) into the shoulder, thigh, or buttocks every 14 (fourteen) days Next injection follow up with Ondina Ramirez, Starting Wed 6/20/2018, No Print       !! - Potential duplicate medications found  Please discuss with provider  No discharge procedures on file  ED Provider  Attending physically available and evaluated Lucas Baptiste I managed the patient along with the ED Attending      Electronically Signed by         Nikole Miller MD  07/13/18 5760

## 2018-07-10 NOTE — DISCHARGE INSTRUCTIONS
Epinephrine (By injection)   Epinephrine (td-i-UMW-rin)  Treats severe allergic reactions (including anaphylaxis) in an emergency situation  Brand Name(s): Adrenaclick, Adrenalin, Adyphren, Adyphren Amp II Kit, Adyphren Amp Kit, Adyphren II, Auvi-Q, EPINEPHrinesnap, EpiPen, EpiPen Auto-Injector, EpiPen Jr Auto-Injector, Epinephrine Novaplus, Epipen 2-Mack Auto-Injector, Epipen Jr 2-Mack Auto-Injector   There may be other brand names for this medicine  When This Medicine Should Not Be Used:   A severe allergic reaction can be life-threatening, so there is no reason this medicine should not be used  How to Use This Medicine:   Injectable  · Your doctor should teach you how and when to inject this medicine  Each injection kit contains a single-use dose of medicine prescribed for you  · Give yourself a shot right away if you start to have a severe allergic reaction  · Inject this medicine into the muscle on the outside of your thigh only  Never inject this medicine into a vein, into your hand or foot, or into the muscles of your buttocks  · Read and follow the patient instructions that come with this medicine  Talk to your doctor or pharmacist if you have any questions  · This medicine might come with an autoinjector  so you can practice giving the medicine before you have an actual allergic reaction  The autoinjector  is gray (for EpiPen® or EpiPen Jr®) or beige (for [place]) and does not contain any medicine or needle  · Do not remove the blue safety release (EpiPen® or EpiPen Jr®) or the gray end caps (Adrenaclick®) on the autoinjector until you are ready to use it  Do not put your thumb, fingers, or hand over the orange (EpiPen® or EpiPen Jr®) or red tip (Adrenaclick®)  · You may need to use more than one injection if your allergic reaction does not get better after the first shot  Your doctor will give you additional doses if you need more than 2 injections    · You may inject the medicine through your clothing, if you need to  · Some liquid will remain in the autoinjector after the medicine has been injected  This medicine cannot be reused  Give your used autoinjector to your healthcare provider when you seek medical care  · Carry this medicine with you at all times for emergency use in case you have a severe allergic reaction  · Make sure family members or other people you are with know how to inject the medicine in case you are not able to do it yourself  · Check your injection kits regularly to make sure the liquid has not changed color  You should not use the autoinjector if the liquid has changed color, or if there are solids in the liquid  You should not use the autoinjector if the expiration date has passed  · If you are using the epinephrine injection in a child, make sure to hold his leg firmly in place and limit movement before and during an injection  · Store the injection kit at room temperature, away from heat, moisture, and direct light  Do not store the medicine in the refrigerator or freezer, or inside a car  · Keep the autoinjector in its case or carrier tube to protect it from damage  This tube is not waterproof  If you accidentally drop it, check for damage or leaks  Drugs and Foods to Avoid:   Ask your doctor or pharmacist before using any other medicine, including over-the-counter medicines, vitamins, and herbal products  · Some foods and medicines can affect how epinephrine works   Tell your doctor if you are using any of the following:  ¨ Digoxin, levothyroxine, phentolamine  ¨ Blood pressure medicine  ¨ Certain allergy medicines (including chlorpheniramine, diphenhydramine, tripelennamine)  ¨ Diuretic (water pill)  ¨ Ergot medicines  ¨ Medicine for depression (including MAO inhibitor)  ¨ Medicine for heart rhythm problem  Warnings While Using This Medicine:   · Tell your doctor if you are pregnant or breastfeeding, or if you have asthma, diabetes, heart disease, heart rhythm problems, high blood pressure, an overactive thyroid, or Parkinson disease  · A severe allergic reaction is a medical emergency  Go to an emergency room as soon as possible, even if you feel better after you use this medicine  · Do not inject this medicine into your hands or feet  Go to the emergency room right away if you accidently inject epinephrine into any part of your body other than your thigh  Epinephrine reduces blood flow, and this could damage areas that have small blood vessels, such as hands and feet  · Keep all medicine out of the reach of children  Never share your medicine with anyone  Possible Side Effects While Using This Medicine:   Call your doctor right away if you notice any of these side effects:  · Chest pain  · Fast, pounding, or uneven heartbeat  · Heavy sweating, nausea, vomiting  · Pain, redness, or warmth at the injection site  · Tremors, shakiness  · Trouble breathing  If you notice these less serious side effects, talk with your doctor:   · Feeling anxious, nervous, scared, or weak  · Headache or dizziness  · Pale skin  If you notice other side effects that you think are caused by this medicine, tell your doctor  Call your doctor for medical advice about side effects  You may report side effects to FDA at 8-313-FDA-2318  © 2017 [employer]  Information is for End User's use only and may not be sold, redistributed or otherwise used for commercial purposes  The above information is an  only  It is not intended as medical advice for individual conditions or treatments  Talk to your doctor, nurse or pharmacist before following any medical regimen to see if it is safe and effective for you  Anaphylaxis   WHAT YOU NEED TO KNOW:   Anaphylaxis is a life-threatening allergic reaction that must be treated immediately  Your risk for anaphylaxis increases if you have asthma that is severe or not controlled   Medical conditions such as heart disease can also increase your risk  It is important to be prepared if you are at risk for anaphylaxis  Your symptoms can be worse each time you are exposed to the trigger  DISCHARGE INSTRUCTIONS:   Steps to take for signs or symptoms of anaphylaxis:   · Immediately  give 1 shot of epinephrine only into the outer thigh muscle  · Leave the shot in place  as directed  Your healthcare provider may recommend you leave it in place for up to 10 seconds before you remove it  This helps make sure all of the epinephrine is delivered  · Call 911 and go to the emergency department,  even if the shot improved symptoms  Do not drive yourself  Bring the used epinephrine shot with you  Call 911 for any of the following:   · You have a skin rash, hives, swelling, or itching  · You have trouble breathing, shortness of breath, wheezing, or coughing  · Your throat tightens or your lips or tongue swell  · You have difficulty swallowing or speaking  · You are dizzy, lightheaded, confused, or feel like you are going to faint  · You have nausea, diarrhea, or abdominal cramps, or you are vomiting  Return to the emergency department if:   · Signs or symptoms of anaphylaxis return  Contact your healthcare provider if:   · You have questions or concerns about your condition or care  Medicines:   · Epinephrine  is used to treat severe allergic reactions such as anaphylaxis  · Medicines  such as antihistamines, steroids, and bronchodilators decrease inflammation, open airways, and make breathing easier  · Take your medicine as directed  Contact your healthcare provider if you think your medicine is not helping or if you have side effects  Tell him or her if you are allergic to any medicine  Keep a list of the medicines, vitamins, and herbs you take  Include the amounts, and when and why you take them  Bring the list or the pill bottles to follow-up visits   Carry your medicine list with you in case of an emergency  Follow up with your healthcare provider as directed: Allergy testing may reveal allergies that can trigger anaphylaxis  Write down your questions so you remember to ask them during your visits  Safety precautions:   · Keep 2 shots of epinephrine with you at all times  You may need a second shot, because epinephrine only works for about 20 minutes and symptoms may return  Your healthcare provider can show you and family members how to give the shot  Check the expiration date every month and replace it before it expires  · Create an action plan  Your healthcare provider can help you create a written plan that explains the allergy and an emergency plan to treat a reaction  The plan explains when to give a second epinephrine shot if symptoms return or do not improve after the first  Give copies of the action plan and emergency instructions to family members, work and school staff, and  providers  Show them how to give a shot of epinephrine  · Be careful when you exercise  If you have had exercise-induced anaphylaxis, do not exercise right after you eat  Stop exercising right away if you start to develop any signs or symptoms of anaphylaxis  You may first feel tired, warm, or have itchy skin  Hives, swelling, and severe breathing problems may develop if you continue to exercise  · Carry medical alert identification  Wear medical alert jewelry or carry a card that explains the allergy  Ask your healthcare provider where to get these items  · Identify and avoid known triggers  Read food labels for ingredients  Look for triggers in your environment  · Ask about treatments to prevent anaphylaxis  You may need allergy shots or other medicines to treat allergies  © 2017 [employer] Information is for End User's use only and may not be sold, redistributed or otherwise used for commercial purposes   All illustrations and images included in CareNotes® are the copyrighted property of [employer]  or Remi Norman  The above information is an  only  It is not intended as medical advice for individual conditions or treatments  Talk to your doctor, nurse or pharmacist before following any medical regimen to see if it is safe and effective for you

## 2018-07-10 NOTE — ED NOTES
Pt's  arrived at bedside  States waiting for pt's mother and she is on her way        Tanisha Lopez RN  07/10/18 [id number]

## 2018-07-10 NOTE — ED NOTES
Pt's mother arrived at bedside  Discharge instructions explained at this time        Derick Hunter RN  07/10/18 3578

## 2018-07-12 NOTE — ED ATTENDING ATTESTATION
Christine Mueller MD, saw and evaluated the patient  I have discussed the patient with the resident/non-physician practitioner and agree with the resident's/non-physician practitioner's findings, Plan of Care, and MDM as documented in the resident's/non-physician practitioner's note, except where noted  All available labs and Radiology studies were reviewed  At this point I agree with the current assessment done in the Emergency Department  I have conducted an independent evaluation of this patient a history and physical is as follows:   the patient presents for evaluation of hives after being stung by a bee the patient was given a epi pen injection he was given Benadryl at the present time he has a few hives however he has no respiratory symptoms or abdominal symptoms the patient has developmental delay   throat is clear lungs are clear abdomen soft and nontender few scattered hives on his upper extremity   patient observed in the emergency room he was given steroids and H2 blocker as well no further symptoms patient will be discharged after observation      Critical Care Time  CritCare Time    Procedures

## 2018-07-23 ENCOUNTER — HOSPITAL ENCOUNTER (EMERGENCY)
Facility: HOSPITAL | Age: 23
Discharge: PSYCHIATRIC OR SUBSTANCE USE FACILITY | End: 2018-07-24
Attending: EMERGENCY MEDICINE | Admitting: EMERGENCY MEDICINE
Payer: MEDICARE

## 2018-07-23 DIAGNOSIS — Z86.59 HISTORY OF COMMAND HALLUCINATIONS: ICD-10-CM

## 2018-07-23 DIAGNOSIS — R45.851 SUICIDAL IDEATION: Primary | ICD-10-CM

## 2018-07-23 DIAGNOSIS — R45.850 HOMICIDAL IDEATION: ICD-10-CM

## 2018-07-23 PROCEDURE — 82075 ASSAY OF BREATH ETHANOL: CPT | Performed by: EMERGENCY MEDICINE

## 2018-07-23 PROCEDURE — 80307 DRUG TEST PRSMV CHEM ANLYZR: CPT | Performed by: EMERGENCY MEDICINE

## 2018-07-24 VITALS
BODY MASS INDEX: 51.09 KG/M2 | HEART RATE: 90 BPM | SYSTOLIC BLOOD PRESSURE: 142 MMHG | OXYGEN SATURATION: 96 % | TEMPERATURE: 98.2 F | WEIGHT: 297.62 LBS | RESPIRATION RATE: 18 BRPM | DIASTOLIC BLOOD PRESSURE: 87 MMHG

## 2018-07-24 PROCEDURE — 99285 EMERGENCY DEPT VISIT HI MDM: CPT

## 2018-07-24 NOTE — ED ATTENDING ATTESTATION
I, Larisa Lancaster DO, saw and evaluated the patient  I have discussed the patient with the resident/non-physician practitioner and agree with the resident's/non-physician practitioner's findings, Plan of Care, and MDM as documented in the resident's/non-physician practitioner's note, except where noted  All available labs and Radiology studies were reviewed  At this point I agree with the current assessment done in the Emergency Department  I have conducted an independent evaluation of this patient a history and physical is as follows:      Critical Care Time  CritCare Time    Procedures     26 yr old male to the ED with SI  Hearing voices telling him to stab his mother with a knife and kill himself  States he is taking his meds  Exm;  Very pleasant and coop  Heart: mild tachy  Lungs;CTA  Pressure elvated  Lungs; cta   Pln: UDS and crisis   Monitor pressure

## 2018-07-24 NOTE — ED PROVIDER NOTES
History  Chief Complaint   Patient presents with    Psychiatric Evaluation     pt reports "i am hearing voiced telling me to kill my mom", SI with plan to use a knife pt does not have anything with him at this time to harm self , no ETOH, no drug use      27 yo M presents to ED for psych evaluation  Pt states 1 week hx of command auditory hallucinations with homicidal ideation with plan to kill his mother with a knife and suicidal ideation with plan to cut his wrists  Pt denies any visual hallucinations, no medical complaints  Pt has PMH of Anxiety; Asthma; HTN; Mental retardation;and Schizoaffective disorder ([facility] [address] )  PSH of Hand surgery  Pt is a nonsmoker, denies any ETOH or recreational drug use  No recent changes in medication regimen, states he takes his medications as directed  No other complaints at this time  Prior to Admission Medications   Prescriptions Last Dose Informant Patient Reported? Taking?    ALLERGY 10 MG tablet   No No   Sig: TAKE ONE TABLET BY MOUTH EVERY DAY   EPINEPHrine (EPIPEN) 0 3 mg/0 3 mL SOAJ   No No   Sig: Inject 0 3 mL (0 3 mg total) into a muscle once for 1 dose   amLODIPine (NORVASC) 5 mg tablet   No No   Sig: Take 1 tablet (5 mg total) by mouth daily At 9AM   divalproex sodium (DEPAKOTE) 250 mg EC tablet   No No   Sig: Take 3 tablets (750 mg total) by mouth daily at bedtime   divalproex sodium (DEPAKOTE) 500 mg EC tablet   No No   Sig: Take 1 tablet (500 mg total) by mouth daily At 9AM   levocetirizine (XYZAL) 5 MG tablet   Yes No   Sig: Take 5 mg by mouth every evening   risperiDONE (RisperDAL) 3 mg tablet   No No   Sig: Take 1 tablet (3 mg total) by mouth 2 (two) times a day At 9AM and 6PM   risperiDONE microspheres (RISPERDAL CONSTA) 50 mg IM injection   No No   Sig: Inject 2 mL (50 mg total) into the shoulder, thigh, or buttocks every 14 (fourteen) days Next injection follow up with Ondina Ramirez      Facility-Administered Medications: None       Past Medical History:   Diagnosis Date    Anxiety     Asthma     Hypertension     Mental retardation     Psychiatric illness     Schizoaffective disorder ([facility] [address] )        Past Surgical History:   Procedure Laterality Date    HAND SURGERY      right hand       Family History   Problem Relation Age of Onset    No Known Problems Mother     No Known Problems Father     No Known Problems Sister     No Known Problems Brother     No Known Problems Maternal Aunt     No Known Problems Paternal Aunt     No Known Problems Maternal Uncle     No Known Problems Paternal Uncle     No Known Problems Maternal Grandfather     No Known Problems Maternal Grandmother     No Known Problems Paternal Grandfather     No Known Problems Paternal Grandmother     No Known Problems Cousin     ADD / ADHD Neg Hx     Alcohol abuse Neg Hx     Anxiety disorder Neg Hx     Bipolar disorder Neg Hx     Dementia Neg Hx     Depression Neg Hx     Drug abuse Neg Hx     OCD Neg Hx     Paranoid behavior Neg Hx     Schizophrenia Neg Hx     Seizures Neg Hx     Self-Injury Neg Hx     Suicide Attempts Neg Hx      I have reviewed and agree with the history as documented  Social History   Substance Use Topics    Smoking status: Never Smoker    Smokeless tobacco: Never Used    Alcohol use No        Review of Systems   Constitutional: Negative for chills, fatigue and fever  HENT: Negative for congestion, rhinorrhea and sore throat  Eyes: Negative for pain, redness and visual disturbance  Respiratory: Negative for cough, chest tightness, shortness of breath, wheezing and stridor  Cardiovascular: Negative for chest pain, palpitations and leg swelling  Gastrointestinal: Negative for abdominal pain, blood in stool, constipation, diarrhea, nausea and vomiting  Genitourinary: Negative for dysuria, frequency, hematuria and urgency  Musculoskeletal: Negative for back pain and neck pain  Skin: Negative for pallor and rash  Neurological: Negative for dizziness, seizures, syncope, weakness, light-headedness and headaches  Psychiatric/Behavioral: Positive for hallucinations (command auditory hallucinations) and suicidal ideas  Negative for agitation and confusion  The patient is not nervous/anxious  HI       Physical Exam  ED Triage Vitals   Temperature Pulse Respirations Blood Pressure SpO2   07/23/18 1903 07/23/18 1903 07/23/18 1903 07/23/18 1903 07/23/18 1903   98 2 °F (36 8 °C) 104 20 (!) 184/109 96 %      Temp Source Heart Rate Source Patient Position - Orthostatic VS BP Location FiO2 (%)   07/23/18 1903 07/23/18 1903 07/23/18 1903 07/23/18 1903 --   Tympanic Monitor Sitting Right arm       Pain Score       07/24/18 0633       No Pain           Orthostatic Vital Signs  Vitals:    07/23/18 1903 07/23/18 2238 07/24/18 0633 07/24/18 0853   BP: (!) 184/109 123/76 131/61 142/87   Pulse: 104  86 90   Patient Position - Orthostatic VS: Sitting Sitting Lying        Physical Exam   Constitutional: He is oriented to person, place, and time  He appears well-developed and well-nourished  No distress  HENT:   Head: Normocephalic and atraumatic  Nose: Nose normal    Mouth/Throat: Oropharynx is clear and moist  No oropharyngeal exudate  Eyes: Conjunctivae and EOM are normal  Pupils are equal, round, and reactive to light  Right eye exhibits no discharge  Left eye exhibits no discharge  Neck: Normal range of motion  Neck supple  No JVD present  No tracheal deviation present  Cardiovascular: Normal rate, regular rhythm, normal heart sounds and intact distal pulses  Exam reveals no gallop and no friction rub  No murmur heard  Pulmonary/Chest: Effort normal and breath sounds normal  No stridor  No respiratory distress  He has no wheezes  He has no rales  He exhibits no tenderness  Abdominal: Soft  Bowel sounds are normal  He exhibits no distension  There is no tenderness  There is no rebound and no guarding  Musculoskeletal: Normal range of motion  He exhibits no edema, tenderness or deformity  Neurological: He is alert and oriented to person, place, and time  No cranial nerve deficit  Skin: Skin is warm and dry  No rash noted  He is not diaphoretic  Psychiatric:   Pleasant, smiling inappropriately while stating he wants to kill himself and his mother, cooperative   Nursing note and vitals reviewed  ED Medications  Medications - No data to display    Diagnostic Studies  Results Reviewed     Procedure Component Value Units Date/Time    Rapid drug screen, urine [[id number]]  (Normal) Collected:  07/23/18 1932    Lab Status:  Final result Specimen:  Urine from Urine, Clean Catch Updated:  07/23/18 2040     Amph/Meth UR Negative     Barbiturate Ur Negative     Benzodiazepine Urine Negative     Cocaine Urine Negative     Methadone Urine Negative     Opiate Urine Negative     PCP Ur Negative     THC Urine Negative    Narrative:         FOR MEDICAL PURPOSES ONLY  IF CONFIRMATION NEEDED PLEASE CONTACT THE LAB WITHIN 5 DAYS  Drug Screen Cutoff Levels:  AMPHETAMINE/METHAMPHETAMINES  1000 ng/mL  BARBITURATES     200 ng/mL  BENZODIAZEPINES     200 ng/mL  COCAINE      300 ng/mL  METHADONE      300 ng/mL  OPIATES      300 ng/mL  PHENCYCLIDINE     25 ng/mL  THC       50 ng/mL    POCT alcohol breath test [[id number]]  (Normal) Resulted:  07/23/18 1930    Lab Status:  Final result Updated:  07/23/18 1930     EXTBreath Alcohol 0 000                 No orders to display         Procedures  Procedures      Phone Consults  ED Phone Contact    ED Course            201 signed  Patient reevaluated with improvement in condition noted  Patient updated on results of tests and plan of care including transfer to [facility] for further evaluation of presenting symptoms with understanding verbalized  Transportation services arranged and necessary EMTALA forms completed with risks and benefits of transfer understood    Report to Dr Bianca Hirsch with Psychiatry for continuation of patient care  MDM  CritCare Time    Disposition  Final diagnoses:   Suicidal ideation   Homicidal ideation   History of command hallucinations     Time reflects when diagnosis was documented in both MDM as applicable and the Disposition within this note     Time User Action Codes Description Comment    7/25/2018 10:33 AM Jas Leighton Hug Add [X16 601] Suicidal ideation     7/25/2018 10:33 AM JasLeighton Hug Add [R45 850] Homicidal ideation     7/25/2018 10:33 AM JasLeighton Hug Add [Z86 59] History of command hallucinations       ED Disposition     ED Disposition Condition Comment    Transfer to [address] [place] [address] should be transferred out to [facility] - LENNIE WEAVER Documentation      Most Recent Value   Accepting Physician  [address] Name, [address] [facility]    Sending MD Bette Rice RN Documentation      Most [facility] Name, [facility]       Follow-up Information    None         Discharge Medication List as of 7/24/2018  8:57 AM      CONTINUE these medications which have NOT CHANGED    Details   ALLERGY 10 MG tablet TAKE ONE TABLET BY MOUTH EVERY DAY, Normal      amLODIPine (NORVASC) 5 mg tablet Take 1 tablet (5 mg total) by mouth daily At 9AM, Starting Wed 6/20/2018, Print      !! divalproex sodium (DEPAKOTE) 250 mg EC tablet Take 3 tablets (750 mg total) by mouth daily at bedtime, Starting Wed 6/20/2018, Print      !! divalproex sodium (DEPAKOTE) 500 mg EC tablet Take 1 tablet (500 mg total) by mouth daily At 9AM, Starting Wed 6/20/2018, Print      EPINEPHrine (EPIPEN) 0 3 mg/0 3 mL SOAJ Inject 0 3 mL (0 3 mg total) into a muscle once for 1 dose, Starting Tue 7/10/2018, Normal      levocetirizine (XYZAL) 5 MG tablet Take 5 mg by mouth every evening, Historical Med      risperiDONE (RisperDAL) 3 mg tablet Take 1 tablet (3 mg total) by mouth 2 (two) times a day At [employer] and 6PM, Starting Wed 6/20/2018, Print      risperiDONE microspheres (RISPERDAL CONSTA) 50 mg IM injection Inject 2 mL (50 mg total) into the shoulder, thigh, or buttocks every 14 (fourteen) days Next injection follow up with Ondina 34, Starting Wed 6/20/2018, No Print       !! - Potential duplicate medications found  Please discuss with provider  No discharge procedures on file  ED Provider  Attending physically available and evaluated Chely Doss I managed the patient along with the ED Attending      Electronically Signed by         Lori Martinez, DO  07/25/18 247 Yusuf Lynn, DO  07/25/18 6015

## 2018-07-24 NOTE — ED NOTES
Patient is accepted at [facility]  Patient is accepted by Dr Emir Garcia  per Hollie [address] is arranged withSLETS   Transportation is scheduled [id number]

## 2018-07-24 NOTE — ED NOTES
Call step father Angy Mistryg (mother is on [address])     Brenda El, JAYDEN  07/23/18 3499 [facility], RN  07/23/18 4038

## 2018-07-24 NOTE — ED NOTES
Patient presents for command auditory hallucinations to kill his mother  Patient reports active thoughts of suciide by stabbing self  Patient denies VH  Patient reports complaince wth medications  Patient denies history of suicide attempts  Patient has had multiple hospitalizations  Patient hs [facility] services and has an appointment on 7/24

## 2018-07-24 NOTE — ED NOTES
All belongings sent with pt, 1 bag    Pt confirmed all belongings sent with him     Gypsy Cranker, JAYDEN  94/04/01 9157

## 2018-07-24 NOTE — ED NOTES
Pt's mother called in for update, per pt OK to give information    Mother updated on signing 12 and pt will be transferred to [facility]  71/77/06 5956

## 2018-08-03 DIAGNOSIS — T78.40XS ALLERGIC STATE, SEQUELA: ICD-10-CM

## 2018-08-03 RX ORDER — LORATADINE 10 MG/1
TABLET ORAL
Qty: 30 TABLET | Refills: 0 | Status: ON HOLD | OUTPATIENT
Start: 2018-08-03 | End: 2018-10-08 | Stop reason: ALTCHOICE

## 2018-08-17 ENCOUNTER — HOSPITAL ENCOUNTER (EMERGENCY)
Facility: HOSPITAL | Age: 23
Discharge: HOME | End: 2018-08-17
Attending: EMERGENCY MEDICINE
Payer: MEDICARE

## 2018-08-17 VITALS
SYSTOLIC BLOOD PRESSURE: 173 MMHG | OXYGEN SATURATION: 97 % | BODY MASS INDEX: 49.59 KG/M2 | TEMPERATURE: 96.7 F | DIASTOLIC BLOOD PRESSURE: 84 MMHG | WEIGHT: 297.62 LBS | HEIGHT: 65 IN | HEART RATE: 94 BPM | RESPIRATION RATE: 18 BRPM

## 2018-08-17 DIAGNOSIS — T63.441A ALLERGIC REACTION TO BEE STING: ICD-10-CM

## 2018-08-17 DIAGNOSIS — T63.441A BEE STING: Primary | ICD-10-CM

## 2018-08-17 PROCEDURE — 99283 EMERGENCY DEPT VISIT LOW MDM: CPT

## 2018-08-17 RX ORDER — EPINEPHRINE 0.3 MG/.3ML
0.3 INJECTION SUBCUTANEOUS ONCE
Qty: 0.3 ML | Refills: 0 | Status: SHIPPED | OUTPATIENT
Start: 2018-08-17 | End: 2019-03-19 | Stop reason: HOSPADM

## 2018-08-17 NOTE — ED ATTENDING ATTESTATION
Rosemary Mota MD, saw and evaluated the patient  I have discussed the patient with the resident/non-physician practitioner and agree with the resident's/non-physician practitioner's findings, Plan of Care, and MDM as documented in the resident's/non-physician practitioner's note, except where noted  All available labs and Radiology studies were reviewed  At this point I agree with the current assessment done in the Emergency Department  I have conducted an independent evaluation of this patient a history and physical is as follows:    Patient states that he was stung by a bee in his foot, and gave himself an EpiPen  The patient did not see the be  He denies chest pain, shortness of breath, oral swelling, nausea, vomiting, or diarrhea  The patient has underlying psychiatric and global delay  His review of systems otherwise negative in 12 systems reviewed  On exam Vital signs were reviewed  Patient is awake, alert, interactive  The patient's pupils are equally round reactive to light  Oropharynx is clear with moist mucous membranes  Neck is supple and nontender with no adenopathy or JVD  Heart is regular with no murmurs, rubs, or gallops  Lungs are clear and equal with no wheezes, rales, or rhonchi  Abdomen is soft and nontender with no masses, rebound, or guarding  There is no CVA tenderness  The patient was completely exposed  There is no skin breakdown  There are no rashes or skin changes  Extremities are warm and well perfused with good pulses  The patient has normal strength, sensation, and cranial nerves  Impression:  Hymenoptera envenomation, status post EpiPen use  Will plan to observe for 2 hours and then discharge    Critical Care Time  CritCare Time    Procedures

## 2018-08-17 NOTE — ED NOTES
Pt remains without symptoms, oxygen saturation 96-97% on room air, no breathing complaints, no CP    Will continue to monitor     Loli Eddy RN  08/17/18 9529

## 2018-08-17 NOTE — DISCHARGE INSTRUCTIONS
Insect Bite or Sting   WHAT YOU NEED TO KNOW:   Most insect bites and stings are not dangerous and go away without treatment  Your symptoms may be mild, or you may develop anaphylaxis  Anaphylaxis is a sudden, life-threatening reaction that needs immediate treatment  Common examples of insects that bite or sting are bees, ticks, mosquitoes, spiders, and ants  Insect bites or stings can lead to diseases such as malaria, West Nile virus, Lyme disease, or [place] Spotted Fever  DISCHARGE INSTRUCTIONS:   Call 911 for signs or symptoms of anaphylaxis,  such as trouble breathing, swelling in your mouth or throat, or wheezing  You may also have itching, a rash, hives, or feel like you are going to faint  Return to the emergency department if:   · You are stung on your tongue or in your throat  · A white area forms around the bite  · You are sweating badly or have body pain  · You think you were bitten or stung by a poisonous insect  Contact your healthcare provider if:   · You have a fever  · The area becomes red, warm, tender, and swollen beyond the area of the bite or sting  · You have questions or concerns about your condition or care  Medicines:   · Antihistamines  decrease itching and rash  · Epinephrine  is used to treat severe allergic reactions such as anaphylaxis  · Take your medicine as directed  Contact your healthcare provider if you think your medicine is not helping or if you have side effects  Tell him of her if you are allergic to any medicine  Keep a list of the medicines, vitamins, and herbs you take  Include the amounts, and when and why you take them  Bring the list or the pill bottles to follow-up visits  Carry your medicine list with you in case of an emergency  Steps to take for signs or symptoms of anaphylaxis:   · Immediately  give 1 shot of epinephrine only into the outer thigh muscle  · Leave the shot in place  as directed   Your healthcare provider may recommend you leave it in place for up to 10 seconds before you remove it  This helps make sure all of the epinephrine is delivered  · Call 911 and go to the emergency department,  even if the shot improved symptoms  Do not drive yourself  Bring the used epinephrine shot with you  Safety precautions to take if you are at risk for anaphylaxis:   · Keep 2 shots of epinephrine with you at all times  You may need a second shot, because epinephrine only works for about 20 minutes and symptoms may return  Your healthcare provider can show you and family members how to give the shot  Check the expiration date every month and replace it before it expires  · Create an action plan  Your healthcare provider can help you create a written plan that explains the allergy and an emergency plan to treat a reaction  The plan explains when to give a second epinephrine shot if symptoms return or do not improve after the first  Give copies of the action plan and emergency instructions to family members, work and school staff, and  providers  Show them how to give a shot of epinephrine  · Carry medical alert identification  Wear medical alert jewelry or carry a card that says you have an insect allergy  Ask your healthcare provider where to get these items  If an insect bites or stings you:   · Remove the stinger  Scrape the stinger out with your fingernail, edge of a credit card, or a knife blade  Do not squeeze the wound  Gently wash the area with soap and water  · Remove the tick  Ticks must be removed as soon as possible so you do not get diseases passed through tick bites  Ask your healthcare provider for more information on tick bites and how to remove ticks  Care for a bite or sting wound:   · Elevate the affected area  Prop the wound above the level of your heart, if possible  Elevate the area for 10 to 20 minutes each hour or as directed by your healthcare provider  · Use compresses    Soak a clean washcloth in cold water, wring it out, and put it on the bite or sting  Use the compress for 10 to 20 minutes each hour or as directed by your healthcare provider  After 24 to 48 hours, change to warm compresses  · Apply a paste  Add water to baking soda to make a thick paste  Put the paste on the area for 5 minutes  Rinse gently to remove the paste  Prevent another insect bite or sting:   · Do not wear bright-colored or flower-print clothing when you plan to spend time outdoors  Do not use hairspray, perfumes, or aftershave  · Do not leave food out  · Empty any standing water and wash container with soap and water every 2 days  · Put screens on all open windows and doors  · Put insect repellent that contains DEET on skin that is showing when you go outside  Put insect repellent at the top of your boots, bottom of pant legs, and sleeve cuffs  Wear long sleeves, pants, and shoes  · Use citronella candles outdoors to help keep mosquitoes away  Put a tick and flea collar on pets  Follow up with your healthcare provider as directed:  Write down your questions so you remember to ask them during your visits  © 2017 [facility] Information is for End User's use only and may not be sold, redistributed or otherwise used for commercial purposes  All illustrations and images included in CareNotes® are the copyrighted property of [employer]  or [employer]  The above information is an  only  It is not intended as medical advice for individual conditions or treatments  Talk to your doctor, nurse or pharmacist before following any medical regimen to see if it is safe and effective for you

## 2018-08-18 NOTE — ED PROVIDER NOTES
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 and is prescribed an EpiPen, also has a history of intellectual delay and psychiatric problems  Patient states that he was walking around with symptoms on when he experienced what he felt was a bee sting on his left foot  Patient did not have any symptoms, but he used his EpiPen anyway  In the department patient is asymptomatic and denies any pain in his foot, patient also that denies any swelling in his face or anywhere in the rest of his body, denies any shortness of breath, chest pain, palpitations, nausea, diarrhea or abdominal pain or cramping  Prior to Admission Medications   Prescriptions Last Dose Informant Patient Reported? Taking?    ALLERGY 10 MG tablet   No No   Sig: TAKE ONE TABLET BY MOUTH EVERY DAY   EPINEPHrine (EPIPEN) 0 3 mg/0 3 mL SOAJ   No No   Sig: Inject 0 3 mL (0 3 mg total) into a muscle once for 1 dose   amLODIPine (NORVASC) 5 mg tablet   No No   Sig: Take 1 tablet (5 mg total) by mouth daily At 9AM   divalproex sodium (DEPAKOTE) 250 mg EC tablet   No No   Sig: Take 3 tablets (750 mg total) by mouth daily at bedtime   divalproex sodium (DEPAKOTE) 500 mg EC tablet   No No   Sig: Take 1 tablet (500 mg total) by mouth daily At 9AM   levocetirizine (XYZAL) 5 MG tablet   Yes No   Sig: Take 5 mg by mouth every evening   risperiDONE (RisperDAL) 3 mg tablet   No No   Sig: Take 1 tablet (3 mg total) by mouth 2 (two) times a day At 9AM and 6PM   risperiDONE microspheres (RISPERDAL CONSTA) 50 mg IM injection   No No   Sig: Inject 2 mL (50 mg total) into the shoulder, thigh, or buttocks every 14 (fourteen) days Next injection follow up with Ondina Ramirez      Facility-Administered Medications: None       Past Medical History:   Diagnosis Date    Anxiety     Asthma     Hypertension     Mental retardation     Psychiatric illness     Schizoaffective disorder ([facility] [address] )        Past Surgical History:   Procedure Laterality Date    HAND SURGERY      right hand       Family History   Problem Relation Age of Onset    No Known Problems Mother     No Known Problems Father     No Known Problems Sister     No Known Problems Brother     No Known Problems Maternal Aunt     No Known Problems Paternal Aunt     No Known Problems Maternal Uncle     No Known Problems Paternal Uncle     No Known Problems Maternal Grandfather     No Known Problems Maternal Grandmother     No Known Problems Paternal Grandfather     No Known Problems Paternal Grandmother     No Known Problems Cousin     ADD / ADHD Neg Hx     Alcohol abuse Neg Hx     Anxiety disorder Neg Hx     Bipolar disorder Neg Hx     Dementia Neg Hx     Depression Neg Hx     Drug abuse Neg Hx     OCD Neg Hx     Paranoid behavior Neg Hx     Schizophrenia Neg Hx     Seizures Neg Hx     Self-Injury Neg Hx     Suicide Attempts Neg Hx      I have reviewed and agree with the history as documented  Social History   Substance Use Topics    Smoking status: Never Smoker    Smokeless tobacco: Never Used    Alcohol use No        Review of Systems   Constitutional: Negative for appetite change, chills and fever  HENT: Negative for congestion and sore throat  Eyes: Negative for photophobia and visual disturbance  Respiratory: Negative for cough, chest tightness and shortness of breath  Cardiovascular: Negative for chest pain, palpitations and leg swelling  Gastrointestinal: Negative for abdominal pain, blood in stool, constipation, diarrhea, nausea and vomiting  Endocrine: Negative for polydipsia, polyphagia and polyuria     Genitourinary: Negative for decreased urine volume, difficulty urinating, dysuria, flank pain, frequency, hematuria and urgency  Musculoskeletal: Negative for back pain, neck pain and neck stiffness  Skin: Negative for color change and wound  Allergic/Immunologic: Positive for environmental allergies  Negative for food allergies  Neurological: Negative for dizziness, syncope, weakness, numbness and headaches  Hematological: Negative for adenopathy  Does not bruise/bleed easily  Psychiatric/Behavioral: Negative for agitation, behavioral problems and confusion  Physical Exam  ED Triage Vitals [08/17/18 1655]   Temperature Pulse Respirations Blood Pressure SpO2   (!) 96 7 °F (35 9 °C) 101 18 155/94 97 %      Temp Source Heart Rate Source Patient Position - Orthostatic VS BP Location FiO2 (%)   Tympanic Monitor Sitting Right arm --      Pain Score       No Pain           Orthostatic Vital Signs  Vitals:    08/17/18 1655 08/17/18 1913   BP: 155/94 (!) 173/84   Pulse: 101 94   Patient Position - Orthostatic VS: Sitting Standing       Physical Exam  General: VSS, NAD, awake, alert  Well-nourished, well-developed  Appears stated age  Head: Normocephalic, atraumatic, nontender  Eyes: PERRL, EOM-I  No hyphema, No subconjunctival hemorrhages  Symmetrical lids  ENT: Atraumatic external nose and ears  Moist Mucous Membranes  No malocclusion  Neck: Symmetric, trachea midline  No JVD  No drooling  Normal swallowing  CV: RRR  +H4/N6, No murmurs, clicks, rubs, gallops  +2 peripheral pulses upper and lower bilaterally  No chest wall tenderness  No peripheral edema  Lungs: bilateral breath sounds, CTAB, no wheezing, rales, or stridor  No tachypnea  No retractions, Unlabored breathing,  Speaks in full sentences, normal phonation  Abd: +BS, soft, NT/ND, no rebound or guarding  MSK: FROM, no tenderness to palpation or swelling in left lower extremity  Back: No rashes  Skin: Dry, intact  Neuro: AAOx3, GCS 15, CN II-XII intact   5/5 motor and sensory in upper and lower extremities bilaterally  Psychiatric/Behavioral: Appropriate mood and affect   Exam: deferred        ED Medications  Medications - No data to display    Diagnostic Studies  Results Reviewed     None                 No orders to display         Procedures  Procedures      Phone Consults  ED Phone Contact    ED Course                               MDM  Number of Diagnoses or Management Options  Bee sting:   Diagnosis management comments: This is a 78-year-old male with a history of anaphylaxis and intellectual delay presenting following a bee sting  From the history it seems unlikely patient was stung by a bee given he never saw a bee, does not have any tenderness or swelling in his foot, and does not have any other signs or symptoms of anaphylaxis, and all vital signs are within normal limits  Given that he did use his EpiPen however, we observed him in the department for 3 hours for any signs of a delayed reaction the could be masked by the epinephrine  Following observation he continued to remain asymptomatic, and was discharged home  Refilled patient's prescription for an EpiPen  Counseled patient to return if he has any shortness of breath, nausea, abdominal pain, or swelling  CritCare Time    Disposition  Final diagnoses:   Bee sting     Time reflects when diagnosis was documented in both MDM as applicable and the Disposition within this note     Time User Action Codes Description Comment    8/17/2018  5:41 PM Santa Santos Add [H03 267G] Bee sting     8/17/2018  7:52 PM Santa Santos Add [P37 673P] Allergic reaction to bee sting       ED Disposition     ED Disposition Condition Comment    Discharge  Yolanda Olivarez discharge to home/self care      Condition at discharge: Good        Follow-up Information     Follow up With Specialties Details Why Contact Philip Brar MD Family Medicine  As needed for epi pen prescription renewal [address] [place] [address] [place]   [phone]            Discharge Medication List as of 8/17/2018  8:16 PM      CONTINUE these medications which have CHANGED    Details   EPINEPHrine (EPIPEN) 0 3 mg/0 3 mL SOAJ Inject 0 3 mL (0 3 mg total) into a muscle once for 1 dose, Starting Fri 8/17/2018, Normal         CONTINUE these medications which have NOT CHANGED    Details   ALLERGY 10 MG tablet TAKE ONE TABLET BY MOUTH EVERY DAY, Normal      amLODIPine (NORVASC) 5 mg tablet Take 1 tablet (5 mg total) by mouth daily At 9AM, Starting Wed 6/20/2018, Print      !! divalproex sodium (DEPAKOTE) 250 mg EC tablet Take 3 tablets (750 mg total) by mouth daily at bedtime, Starting Wed 6/20/2018, Print      !! divalproex sodium (DEPAKOTE) 500 mg EC tablet Take 1 tablet (500 mg total) by mouth daily At 9AM, Starting Wed 6/20/2018, Print      levocetirizine (XYZAL) 5 MG tablet Take 5 mg by mouth every evening, Historical Med      risperiDONE (RisperDAL) 3 mg tablet Take 1 tablet (3 mg total) by mouth 2 (two) times a day At [facility] and 6PM, Starting Wed 6/20/2018, Print      risperiDONE microspheres (RISPERDAL CONSTA) 50 mg IM injection Inject 2 mL (50 mg total) into the shoulder, thigh, or buttocks every 14 (fourteen) days Next injection follow up with Ondina Ramirez, Starting Wed 6/20/2018, No Print       !! - Potential duplicate medications found  Please discuss with provider  No discharge procedures on file  ED Provider  Attending physically available and evaluated Kevongrace Fe  I managed the patient along with the ED Attending      Electronically Signed by         Donna Marinelli MD  08/17/18 6806

## 2018-10-07 ENCOUNTER — HOSPITAL ENCOUNTER (EMERGENCY)
Facility: HOSPITAL | Age: 23
End: 2018-10-08
Attending: EMERGENCY MEDICINE | Admitting: EMERGENCY MEDICINE
Payer: MEDICARE

## 2018-10-07 DIAGNOSIS — R44.0 AUDITORY HALLUCINATION: ICD-10-CM

## 2018-10-07 DIAGNOSIS — R45.850 HOMICIDAL IDEATION: ICD-10-CM

## 2018-10-07 DIAGNOSIS — R45.851 SUICIDAL IDEATION: Primary | ICD-10-CM

## 2018-10-07 LAB
AMPHETAMINES SERPL QL SCN: NEGATIVE
BARBITURATES UR QL: NEGATIVE
BENZODIAZ UR QL: NEGATIVE
COCAINE UR QL: NEGATIVE
ETHANOL EXG-MCNC: 0 MG/DL
METHADONE UR QL: NEGATIVE
OPIATES UR QL SCN: NEGATIVE
PCP UR QL: NEGATIVE
THC UR QL: NEGATIVE
VALPROATE SERPL-MCNC: 69 UG/ML (ref 50–100)

## 2018-10-07 PROCEDURE — 99285 EMERGENCY DEPT VISIT HI MDM: CPT

## 2018-10-07 PROCEDURE — 80164 ASSAY DIPROPYLACETIC ACD TOT: CPT | Performed by: EMERGENCY MEDICINE

## 2018-10-07 PROCEDURE — 80307 DRUG TEST PRSMV CHEM ANLYZR: CPT | Performed by: EMERGENCY MEDICINE

## 2018-10-07 PROCEDURE — 82075 ASSAY OF BREATH ETHANOL: CPT | Performed by: EMERGENCY MEDICINE

## 2018-10-07 PROCEDURE — 36415 COLL VENOUS BLD VENIPUNCTURE: CPT | Performed by: EMERGENCY MEDICINE

## 2018-10-07 NOTE — ED NOTES
Pt calm and cooperative, offers no complaints, provided ginger ale and awaiting dinner tray     [address], RN  10/07/18 2898

## 2018-10-07 NOTE — ED NOTES
Pt ambulatory about department and to bathroom  Will order pt food approx  1600    Pt not in any distress at present time     Benjamin Huerta RN  10/07/18 8157

## 2018-10-07 NOTE — ED NOTES
Pt instructed to shower, pt ambulatory to bathroom   Pt calm and cooperative     Flako Godfrey RN  10/07/18 1435

## 2018-10-07 NOTE — ED NOTES
201 reviewed and signed by patient  Dr Argelia Worthington in agreement  Patient aware he is unable to be treated on [facility] due to mother being a current patient

## 2018-10-07 NOTE — ED NOTES
Patient is Medicare A & B primary    Insurance Authorization: [employer]  Phone call placed to [facility] number: [phone]     Spoke to Radha Barfield  Accepted facility to call upon discharge

## 2018-10-07 NOTE — ED PROVIDER NOTES
History  Chief Complaint   Patient presents with    Psychiatric Evaluation     pt reports "voices to kill his mom " hearing them all week  +SI     27-year-old male presents with SI/HI and auditory hallucinations  He states that he hears voices in his head that tells him to kill his mother, father, and himself by using a knife  He states that he does not currently have a knife but that he lives at home and could get 1 from his house  He also states that he wants to hurt himself and when asked how he indicates that he would cut himself laterally across his wrists  Patient has been seen here before for similar psychiatric issues  He states that his medications are not working for him and that he usually goes into a facility, they just his medications, and then he feels better adjusted  When asked about visual hallucinations he states that he often blacks out and has been occasionally over the past week  Patient denies fever/chills, shortness of breath, cough, chest pain, abdominal pain, nausea/vomiting, constipation/diarrhea, or change in urinary habits  Prior to Admission Medications   Prescriptions Last Dose Informant Patient Reported? Taking?    ALLERGY 10 MG tablet   No No   Sig: TAKE ONE TABLET BY MOUTH EVERY DAY   EPINEPHrine (EPIPEN) 0 3 mg/0 3 mL SOAJ   No No   Sig: Inject 0 3 mL (0 3 mg total) into a muscle once for 1 dose   amLODIPine (NORVASC) 5 mg tablet   No No   Sig: Take 1 tablet (5 mg total) by mouth daily At 9AM   divalproex sodium (DEPAKOTE) 250 mg EC tablet   No No   Sig: Take 3 tablets (750 mg total) by mouth daily at bedtime   divalproex sodium (DEPAKOTE) 500 mg EC tablet   No No   Sig: Take 1 tablet (500 mg total) by mouth daily At 9AM   levocetirizine (XYZAL) 5 MG tablet   Yes No   Sig: Take 5 mg by mouth every evening   risperiDONE (RisperDAL) 3 mg tablet   No No   Sig: Take 1 tablet (3 mg total) by mouth 2 (two) times a day At 9AM and 6PM   risperiDONE microspheres (RISPERDAL CONSTA) 50 mg IM injection   No No   Sig: Inject 2 mL (50 mg total) into the shoulder, thigh, or buttocks every 14 (fourteen) days Next injection follow up with Ondina Ramirez      Facility-Administered Medications: None       Past Medical History:   Diagnosis Date    Anxiety     Asthma     Hypertension     Mental retardation     Psychiatric disorder     Psychiatric illness     Schizoaffective disorder ([facility] [address] )        Past Surgical History:   Procedure Laterality Date    HAND SURGERY      right hand       Family History   Problem Relation Age of Onset    No Known Problems Mother     No Known Problems Father     No Known Problems Sister     No Known Problems Brother     No Known Problems Maternal Aunt     No Known Problems Paternal Aunt     No Known Problems Maternal Uncle     No Known Problems Paternal Uncle     No Known Problems Maternal Grandfather     No Known Problems Maternal Grandmother     No Known Problems Paternal Grandfather     No Known Problems Paternal Grandmother     No Known Problems Cousin     ADD / ADHD Neg Hx     Alcohol abuse Neg Hx     Anxiety disorder Neg Hx     Bipolar disorder Neg Hx     Dementia Neg Hx     Depression Neg Hx     Drug abuse Neg Hx     OCD Neg Hx     Paranoid behavior Neg Hx     Schizophrenia Neg Hx     Seizures Neg Hx     Self-Injury Neg Hx     Suicide Attempts Neg Hx      I have reviewed and agree with the history as documented  Social History   Substance Use Topics    Smoking status: Never Smoker    Smokeless tobacco: Never Used    Alcohol use No        Review of Systems   Constitutional: Negative for activity change, chills, fatigue and fever  Respiratory: Negative for cough and shortness of breath  Cardiovascular: Negative for chest pain and palpitations  Gastrointestinal: Negative for abdominal distention, abdominal pain, constipation, diarrhea, nausea and vomiting     Genitourinary: Negative for dysuria and hematuria  Musculoskeletal: Negative for arthralgias, back pain, gait problem, myalgias and neck pain  Neurological: Negative for dizziness, syncope, light-headedness and headaches  Psychiatric/Behavioral: Positive for hallucinations ( Auditory), self-injury and suicidal ideas  All other systems reviewed and are negative  Physical Exam  ED Triage Vitals [10/07/18 1106]   Temperature Pulse Respirations Blood Pressure SpO2   97 9 °F (36 6 °C) 105 20 158/93 98 %      Temp Source Heart Rate Source Patient Position - Orthostatic VS BP Location FiO2 (%)   Oral Monitor Sitting Right arm --      Pain Score       --           Orthostatic Vital Signs  Vitals:    10/07/18 1106 10/07/18 1518   BP: 158/93 148/86   Pulse: 105 100   Patient Position - Orthostatic VS: Sitting        Physical Exam   Constitutional: He is oriented to person, place, and time  He appears well-developed and well-nourished  No distress  HENT:   Head: Normocephalic and atraumatic  Right Ear: External ear normal    Left Ear: External ear normal    Nose: Nose normal    Mouth/Throat: Oropharynx is clear and moist    Eyes: Pupils are equal, round, and reactive to light  Conjunctivae and EOM are normal  Right eye exhibits no discharge  Left eye exhibits no discharge  Neck: Normal range of motion  Neck supple  No JVD present  No tracheal deviation present  Cardiovascular: Normal rate, regular rhythm, normal heart sounds and intact distal pulses  Exam reveals no gallop and no friction rub  No murmur heard  Pulmonary/Chest: Effort normal and breath sounds normal  No respiratory distress  He has no wheezes  He has no rales  Abdominal: Soft  Bowel sounds are normal  He exhibits no distension and no mass  There is no tenderness  There is no guarding  Musculoskeletal: Normal range of motion  He exhibits no edema, tenderness or deformity  Neurological: He is alert and oriented to person, place, and time   No cranial nerve deficit or sensory deficit  He exhibits normal muscle tone  Coordination normal    Skin: He is not diaphoretic  Psychiatric: He has a normal mood and affect  His behavior is normal  Judgment and thought content normal        ED Medications  Medications - No data to display    Diagnostic Studies  Results Reviewed     Procedure Component Value Units Date/Time    Rapid drug screen, urine [[id number]]  (Normal) Collected:  10/07/18 1146    Lab Status:  Final result Specimen:  Urine from Urine, Other Updated:  10/07/18 1251     Amph/Meth UR Negative     Barbiturate Ur Negative     Benzodiazepine Urine Negative     Cocaine Urine Negative     Methadone Urine Negative     Opiate Urine Negative     PCP Ur Negative     THC Urine Negative    Narrative:         FOR MEDICAL PURPOSES ONLY  IF CONFIRMATION NEEDED PLEASE CONTACT THE LAB WITHIN 5 DAYS  Drug Screen Cutoff Levels:  AMPHETAMINE/METHAMPHETAMINES  1000 ng/mL  BARBITURATES     200 ng/mL  BENZODIAZEPINES     200 ng/mL  COCAINE      300 ng/mL  METHADONE      300 ng/mL  OPIATES      300 ng/mL  PHENCYCLIDINE     25 ng/mL  THC       50 ng/mL    Valproic acid level, total [[id number]]  (Normal) Collected:  10/07/18 1201    Lab Status:  Final result Specimen:  Blood from Arm, Right Updated:  10/07/18 1232     Valproic Acid, Total 69 ug/mL     POCT alcohol breath test [[id number]]  (Normal) Resulted:  10/07/18 1140    Lab Status:  Final result Updated:  10/07/18 1140     EXTBreath Alcohol 0 000                 No orders to display         Procedures  Procedures      Phone Consults  ED Phone Contact    ED Course                               MDM  Number of Diagnoses or Management Options  Diagnosis management comments: Patient denies any drug or alcohol use and says that he is taking his medications currently  He will be evaluated by crisis in regards to further placement      Upon talking to crisis and being told that he may need to stay in the hospital today, possibly into [facility], while they find placement for him the patient states that now he would like to go home  When I talked to him again he denies hearing any voices currently, denies suicidal ideations, homicidal ideations, or hallucinations of any kind  Depakote level showed that he is currently taking medications and blood alcohol level and UDS were negative  Crisis was able to contact the patient's father who stated that he has been acting "on edge" for approximately the last week  His actions at home have been different than they normally are when his he is well controlled with medication and he does have access to night at home however no firearms  Based on this information a discussion was had with the patient about the importance of coming in for continued treatment and he signed a 201 form  At the time of this note the patient is currently waiting for placement  CritCare Time    Disposition  Final diagnoses:   None     ED Disposition     None      MD Documentation      Most Recent Value   Sending MD Dr Thurman Handler    None         Patient's Medications   Discharge Prescriptions    No medications on file     No discharge procedures on file  ED Provider  Attending physically available and evaluated Janes Jackson I managed the patient along with the ED Attending      Electronically Signed by         Dominga Junior MD  10/07/18 0989

## 2018-10-07 NOTE — ED ATTENDING ATTESTATION
Agnieszka Becerril DO, saw and evaluated the patient  I have discussed the patient with the resident/non-physician practitioner and agree with the resident's/non-physician practitioner's findings, Plan of Care, and MDM as documented in the resident's/non-physician practitioner's note, except where noted  All available labs and Radiology studies were reviewed  At this point I agree with the current assessment done in the Emergency Department  I have conducted an independent evaluation of this patient a history and physical is as follows:    25 yo male presents for evaluation of hearing voices telling him to kill his mother and father with a knife  Also telling him to cut himself, kill himself with a knife  He states his medications aren't working for him  Symptoms have been ongoing constantly for past few days  States he is compliant with his meds  This is a recurrent issue for pt  Generalized in nature, severe intensity  No a/e factors  Pt requesting 201    Imp: psychosis with SI/HI and plan for both plan: BAT, UDS, depakote level  D/w crisis re: inpatient eval and tx          Critical Care Time  CritCare Time    Procedures

## 2018-10-07 NOTE — ED NOTES
Pt resting in position of comfort    Pt seen by crisis, awaiting  further orders     Geena Hilton RN  10/07/18 7528 [facility] Gris Desai  10/07/18 9256

## 2018-10-07 NOTE — ED NOTES
Phoned [facility] on-call , spoke with Augusto Pabon [phone], who confirmed being new to the company and not knowing any current or past history of patient  Augusto Pabon confirmed Carly Duron is current ICM [phone], but he if off on weekend  She will notify Carly Duron that patient is in ED  They asked to be made aware of disposition

## 2018-10-07 NOTE — ED NOTES
Chief Complaint   Patient presents with    Psychiatric Evaluation     pt reports "voices to kill his mom " hearing them all week  +SI     Crisis worker completed behavioral health evaluation and risk assessment for 25 y/o male whom Patient presents to ED from home due to command auditory hallucination telling him to kill his mom and dad with a knife  Patient also reports suicidal ideations but states no plan  Patient reports these symptoms have worsened within the past week  Patient denies visual hallucinations  He reports taking medication as prescribed by a psychiatrist but does not feel they are working for him; however, he was unable to state current medications or current psychiatrist   He denies sleep or appetite disturbances, or any acute stressors  Patient is a poor historian with a history of schizoaffective disorder and intellectual disability  Patient reports having an ICM through [facility]; he did not know their name or contact information  Patient reports seeing ICM but not sure when  Patient denies drug of alcohol use  Patient requesting to sign a 201

## 2018-10-07 NOTE — ED NOTES
Phone call with Step-father, who reported patient has been "edgy" the past few days  This morning the patient reports having command auditory hallucinations with plan to kill mom, step father and brother  Patient's mother was admitted yesterday to [facility]  Patient sees Dr Mary Engle at [facility] for the [facility] located Martin Alvarez is [facility]CM  Step-father does not feel patient is safe to be discharged

## 2018-10-07 NOTE — ED NOTES
Crisis worker started bed search:     No beds at [facility], [facility], [address] [facility], [address], [facility], [address] [facility], [address] [facility], [address] [facility], [address] [place], [address] [facility],  [place], and [employer]  None of these hospitals had any pending discharges scheduled for today      201 and clinical faxed to [facility]

## 2018-10-07 NOTE — LETTER
[address]  [address] [address]  Dept: Rolando    NAME Sandee Cooks                                         1995                              MRN [id number]    I have been informed of my rights regarding examination, treatment, and transfer   by Dr Lyly Whiteside DO    Benefits: Specialized equipment and/or services available at the receiving facility (Include comment)________________________    Risks: Potential for delay in receiving treatment      Transfer Request   I acknowledge that my medical condition has been evaluated and explained to me by the emergency department physician or other qualified medical person and/or my attending physician who has recommended and offered to me further medical examination and treatment  I understand the Hospital's obligation with respect to the treatment and stabilization of my emergency medical condition  I nevertheless request to be transferred  I release the Hospital, the doctor, and any other persons caring for me from all responsibility or liability for any injury or ill effects that may result from my transfer and agree to accept all responsibility for the consequences of my choice to transfer, rather than receive stabilizing treatment at the Hospital  I understand that because the transfer is my request, my insurance may not provide reimbursement for the services  The Hospital will assist and direct me and my family in how to make arrangements for transfer, but the hospital is not liable for any fees charged by the transport service  In spite of this understanding, I refuse to consent to further medical examination and treatment which has been offered to me, and request transfer to  [facility] [address], [address] : [place]  I authorize the performance of emergency medical procedures and treatments upon me in both transit and upon arrival at the receiving facility  Additionally, I authorize the release of any and all medical records to the receiving facility and request they be transported with me, if possible  I authorize the performance of emergency medical procedures and treatments upon me in both transit and upon arrival at the receiving facility  Additionally, I authorize the release of any and all medical records to the receiving facility and request they be transported with me, if possible  I understand that the safest mode of transportation during a medical emergency is an ambulance and that the Hospital advocates the use of this mode of transport  Risks of traveling to the receiving facility by car, including absence of medical control, life sustaining equipment, such as oxygen, and medical personnel has been explained to me and I fully understand them  (WIL CORRECT BOX BELOW)  [  ]  I consent to the stated transfer and to be transported by ambulance/helicopter  [  ]  I consent to the stated transfer, but refuse transportation by ambulance and accept full responsibility for my transportation by car  I understand the risks of non-ambulance transfers and I exonerate the Hospital and its staff from any deterioration in my condition that results from this refusal     X___________________________________________    DATE  10/08/18  TIME________  Signature of patient or legally responsible individual signing on patient behalf           RELATIONSHIP TO PATIENT_________________________          Provider Certification    NAME Heydi Espana                                         1995                              MRN [id number]    A medical screening exam was performed on the above named patient  Based on the examination:    Condition Necessitating Transfer There were no encounter diagnoses      Patient Condition: The patient has been stabilized such that within reasonable medical probability, no material deterioration of the patient condition or the condition of the unborn child(gianna) is likely to result from the transfer    Reason for Transfer: Level of Care needed not available at this facility    Transfer Requirements: Facility [facility]   · Space available and qualified personnel available for treatment as acknowledged by Aminata Douglas MA  · Agreed to accept transfer and to provide appropriate medical treatment as acknowledged by       Claudia Molina  · Appropriate medical records of the examination and treatment of the patient are provided at the time of transfer   [address] [facility], [address] _______  · Transfer will be performed by qualified personnel from [facility]  and appropriate transfer equipment as required, including the use of necessary and appropriate life support measures  Provider Certification: I have examined the patient and explained the following risks and benefits of being transferred/refusing transfer to the patient/family:  General risk, such as traffic hazards, adverse weather conditions, rough terrain or turbulence, possible failure of equipment (including vehicle or aircraft), or consequences of actions of persons outside the control of the transport personnel      Based on these reasonable risks and benefits to the patient and/or the unborn child(gianna), and based upon the information available at the time of the patients examination, I certify that the medical benefits reasonably to be expected from the provision of appropriate medical treatments at another medical facility outweigh the increasing risks, if any, to the individuals medical condition, and in the case of labor to the unborn child, from effecting the transfer      X____________________________________________ DATE 10/08/18        TIME_______      ORIGINAL - SEND TO MEDICAL RECORDS   COPY - SEND WITH PATIENT DURING TRANSFER

## 2018-10-08 ENCOUNTER — HOSPITAL ENCOUNTER (INPATIENT)
Facility: HOSPITAL | Age: 23
LOS: 4 days | Discharge: HOME | DRG: 885 | End: 2018-10-12
Attending: PSYCHIATRY & NEUROLOGY | Admitting: PSYCHIATRY & NEUROLOGY
Payer: MEDICARE

## 2018-10-08 VITALS
SYSTOLIC BLOOD PRESSURE: 142 MMHG | TEMPERATURE: 97.9 F | HEART RATE: 92 BPM | OXYGEN SATURATION: 97 % | DIASTOLIC BLOOD PRESSURE: 82 MMHG | RESPIRATION RATE: 18 BRPM

## 2018-10-08 DIAGNOSIS — F25.0 SCHIZOAFFECTIVE DISORDER, BIPOLAR TYPE (HCC): Primary | Chronic | ICD-10-CM

## 2018-10-08 DIAGNOSIS — L03.039: ICD-10-CM

## 2018-10-08 DIAGNOSIS — L60.9 NAIL ABNORMALITY: ICD-10-CM

## 2018-10-08 DIAGNOSIS — I10 HYPERTENSION: ICD-10-CM

## 2018-10-08 RX ORDER — BENZTROPINE MESYLATE 1 MG/ML
1 INJECTION INTRAMUSCULAR; INTRAVENOUS EVERY 6 HOURS PRN
Status: DISCONTINUED | OUTPATIENT
Start: 2018-10-08 | End: 2018-10-12 | Stop reason: HOSPADM

## 2018-10-08 RX ORDER — IBUPROFEN 600 MG/1
TABLET ORAL EVERY 6 HOURS PRN
COMMUNITY
End: 2018-10-12 | Stop reason: HOSPADM

## 2018-10-08 RX ORDER — HALOPERIDOL 5 MG
5 TABLET ORAL EVERY 6 HOURS PRN
Status: DISCONTINUED | OUTPATIENT
Start: 2018-10-08 | End: 2018-10-12 | Stop reason: HOSPADM

## 2018-10-08 RX ORDER — HALOPERIDOL 5 MG/ML
5 INJECTION INTRAMUSCULAR EVERY 6 HOURS PRN
Status: DISCONTINUED | OUTPATIENT
Start: 2018-10-08 | End: 2018-10-12 | Stop reason: HOSPADM

## 2018-10-08 RX ORDER — TRAZODONE HYDROCHLORIDE 50 MG/1
50 TABLET ORAL
Status: DISCONTINUED | OUTPATIENT
Start: 2018-10-08 | End: 2018-10-12 | Stop reason: HOSPADM

## 2018-10-08 RX ORDER — ALBUTEROL SULFATE 90 UG/1
2 AEROSOL, METERED RESPIRATORY (INHALATION) EVERY 8 HOURS PRN
Status: ON HOLD | COMMUNITY
End: 2019-03-18 | Stop reason: SDUPTHER

## 2018-10-08 RX ORDER — OLANZAPINE 10 MG/1
10 TABLET ORAL EVERY 8 HOURS PRN
Status: DISCONTINUED | OUTPATIENT
Start: 2018-10-08 | End: 2018-10-12 | Stop reason: HOSPADM

## 2018-10-08 RX ORDER — ACETAMINOPHEN 325 MG/1
650 TABLET ORAL EVERY 6 HOURS PRN
Status: DISCONTINUED | OUTPATIENT
Start: 2018-10-08 | End: 2018-10-12 | Stop reason: HOSPADM

## 2018-10-08 RX ORDER — LORAZEPAM 1 MG/1
1 TABLET ORAL EVERY 6 HOURS PRN
Status: DISCONTINUED | OUTPATIENT
Start: 2018-10-08 | End: 2018-10-12 | Stop reason: HOSPADM

## 2018-10-08 RX ORDER — OLANZAPINE 10 MG/1
10 INJECTION, POWDER, LYOPHILIZED, FOR SOLUTION INTRAMUSCULAR EVERY 8 HOURS PRN
Status: DISCONTINUED | OUTPATIENT
Start: 2018-10-08 | End: 2018-10-12 | Stop reason: HOSPADM

## 2018-10-08 RX ORDER — RISPERIDONE 1 MG/1
1 TABLET, ORALLY DISINTEGRATING ORAL
Status: DISCONTINUED | OUTPATIENT
Start: 2018-10-08 | End: 2018-10-12 | Stop reason: HOSPADM

## 2018-10-08 RX ORDER — MAGNESIUM HYDROXIDE/ALUMINUM HYDROXICE/SIMETHICONE 120; 1200; 1200 MG/30ML; MG/30ML; MG/30ML
30 SUSPENSION ORAL EVERY 4 HOURS PRN
Status: DISCONTINUED | OUTPATIENT
Start: 2018-10-08 | End: 2018-10-12 | Stop reason: HOSPADM

## 2018-10-08 RX ORDER — LORAZEPAM 2 MG/ML
2 INJECTION INTRAMUSCULAR EVERY 6 HOURS PRN
Status: DISCONTINUED | OUTPATIENT
Start: 2018-10-08 | End: 2018-10-12 | Stop reason: HOSPADM

## 2018-10-08 RX ORDER — BENZTROPINE MESYLATE 1 MG/1
1 TABLET ORAL EVERY 6 HOURS PRN
Status: DISCONTINUED | OUTPATIENT
Start: 2018-10-08 | End: 2018-10-12 | Stop reason: HOSPADM

## 2018-10-08 RX ADMIN — TRAZODONE HYDROCHLORIDE 50 MG: 50 TABLET ORAL at 21:19

## 2018-10-08 NOTE — ED NOTES
Patient is accepted at [facility]  Patient is accepted by [facility] is arranged with [employer] is scheduled for 1130  Patient may go to the floor at [phone]        Nurse report is to be called to [phone] prior to patient transfer

## 2018-10-08 NOTE — TREATMENT PLAN
Nursing team will meet with you twice a day to assess for suicidal thoughts, homicidal thoughts, auditory hallucinations and improvement in ADLs  We will educate you on your illness, medications and help you develop alternative coping skills to deal with life stressors

## 2018-10-08 NOTE — PROGRESS NOTES
Presented to B and signed 201  Reported auditory hallucinations to cut self with a knife and kill his mother  Reports cause of his recent thoughts was a fight with mom over the television and said his medications were not working  Reports compliance with medications  Denies SI/HI, AH/VH upon admit, does not appear to be responding to internal stimuli  Denies medical/surgical history, prescribe medication for HTN  [facility] and reported last risperdal consta 9/19 which is scheduled for every 14 days  Lives with mother, father and one brother age 32 23year old brother moved out of he residence  Scheduled to meet with mentor on Wednesday and mentioned a nurse comes out to the house but uncertain where they are from  [place] upon admit  Showered upon admit  Discussed rules/regulations of unit

## 2018-10-09 LAB
ALBUMIN SERPL BCP-MCNC: 3.4 G/DL (ref 3.5–5)
ALP SERPL-CCNC: 77 U/L (ref 46–116)
ALT SERPL W P-5'-P-CCNC: 67 U/L (ref 12–78)
ANION GAP SERPL CALCULATED.3IONS-SCNC: 8 MMOL/L (ref 4–13)
AST SERPL W P-5'-P-CCNC: 32 U/L (ref 5–45)
BASOPHILS # BLD AUTO: 0.05 THOUSANDS/ΜL (ref 0–0.1)
BASOPHILS NFR BLD AUTO: 1 % (ref 0–1)
BILIRUB SERPL-MCNC: 0.5 MG/DL (ref 0.2–1)
BUN SERPL-MCNC: 13 MG/DL (ref 5–25)
CALCIUM SERPL-MCNC: 8.9 MG/DL (ref 8.3–10.1)
CHLORIDE SERPL-SCNC: 104 MMOL/L (ref 100–108)
CHOLEST SERPL-MCNC: 172 MG/DL (ref 50–200)
CO2 SERPL-SCNC: 27 MMOL/L (ref 21–32)
CREAT SERPL-MCNC: 0.75 MG/DL (ref 0.6–1.3)
EOSINOPHIL # BLD AUTO: 0.31 THOUSAND/ΜL (ref 0–0.61)
EOSINOPHIL NFR BLD AUTO: 5 % (ref 0–6)
ERYTHROCYTE [DISTWIDTH] IN BLOOD BY AUTOMATED COUNT: 12.7 % (ref 11.6–15.1)
EST. AVERAGE GLUCOSE BLD GHB EST-MCNC: 94 MG/DL
GFR SERPL CREATININE-BSD FRML MDRD: 130 ML/MIN/1.73SQ M
GLUCOSE P FAST SERPL-MCNC: 110 MG/DL (ref 65–99)
GLUCOSE SERPL-MCNC: 110 MG/DL (ref 65–140)
HBA1C MFR BLD: 4.9 % (ref 4.2–6.3)
HCT VFR BLD AUTO: 40.7 % (ref 36.5–49.3)
HDLC SERPL-MCNC: 28 MG/DL (ref 40–60)
HGB BLD-MCNC: 14 G/DL (ref 12–17)
IMM GRANULOCYTES # BLD AUTO: 0.04 THOUSAND/UL (ref 0–0.2)
IMM GRANULOCYTES NFR BLD AUTO: 1 % (ref 0–2)
LDLC SERPL CALC-MCNC: 112 MG/DL (ref 0–100)
LYMPHOCYTES # BLD AUTO: 2.31 THOUSANDS/ΜL (ref 0.6–4.47)
LYMPHOCYTES NFR BLD AUTO: 35 % (ref 14–44)
MCH RBC QN AUTO: 30.2 PG (ref 26.8–34.3)
MCHC RBC AUTO-ENTMCNC: 34.4 G/DL (ref 31.4–37.4)
MCV RBC AUTO: 88 FL (ref 82–98)
MONOCYTES # BLD AUTO: 0.82 THOUSAND/ΜL (ref 0.17–1.22)
MONOCYTES NFR BLD AUTO: 12 % (ref 4–12)
NEUTROPHILS # BLD AUTO: 3.13 THOUSANDS/ΜL (ref 1.85–7.62)
NEUTS SEG NFR BLD AUTO: 46 % (ref 43–75)
NONHDLC SERPL-MCNC: 144 MG/DL
NRBC BLD AUTO-RTO: 0 /100 WBCS
PLATELET # BLD AUTO: 217 THOUSANDS/UL (ref 149–390)
PMV BLD AUTO: 8.9 FL (ref 8.9–12.7)
POTASSIUM SERPL-SCNC: 3.9 MMOL/L (ref 3.5–5.3)
PROT SERPL-MCNC: 7.2 G/DL (ref 6.4–8.2)
RBC # BLD AUTO: 4.63 MILLION/UL (ref 3.88–5.62)
SODIUM SERPL-SCNC: 139 MMOL/L (ref 136–145)
T4 FREE SERPL-MCNC: 0.95 NG/DL (ref 0.76–1.46)
TRIGL SERPL-MCNC: 158 MG/DL
TSH SERPL DL<=0.05 MIU/L-ACNC: 5.16 UIU/ML (ref 0.36–3.74)
WBC # BLD AUTO: 6.66 THOUSAND/UL (ref 4.31–10.16)

## 2018-10-09 PROCEDURE — 83036 HEMOGLOBIN GLYCOSYLATED A1C: CPT | Performed by: PHYSICIAN ASSISTANT

## 2018-10-09 PROCEDURE — 80053 COMPREHEN METABOLIC PANEL: CPT | Performed by: PHYSICIAN ASSISTANT

## 2018-10-09 PROCEDURE — 99222 1ST HOSP IP/OBS MODERATE 55: CPT | Performed by: PSYCHIATRY & NEUROLOGY

## 2018-10-09 PROCEDURE — 80061 LIPID PANEL: CPT | Performed by: PHYSICIAN ASSISTANT

## 2018-10-09 PROCEDURE — 84439 ASSAY OF FREE THYROXINE: CPT | Performed by: PHYSICIAN ASSISTANT

## 2018-10-09 PROCEDURE — 84443 ASSAY THYROID STIM HORMONE: CPT | Performed by: PHYSICIAN ASSISTANT

## 2018-10-09 PROCEDURE — 85025 COMPLETE CBC W/AUTO DIFF WBC: CPT | Performed by: PHYSICIAN ASSISTANT

## 2018-10-09 RX ORDER — RISPERIDONE 1 MG/1
1 TABLET, FILM COATED ORAL 2 TIMES DAILY
Status: DISCONTINUED | OUTPATIENT
Start: 2018-10-09 | End: 2018-10-10

## 2018-10-09 RX ORDER — DIVALPROEX SODIUM 500 MG/1
500 TABLET, DELAYED RELEASE ORAL DAILY
Status: DISCONTINUED | OUTPATIENT
Start: 2018-10-09 | End: 2018-10-12 | Stop reason: HOSPADM

## 2018-10-09 RX ORDER — ALBUTEROL SULFATE 90 UG/1
2 AEROSOL, METERED RESPIRATORY (INHALATION) EVERY 8 HOURS PRN
Status: DISCONTINUED | OUTPATIENT
Start: 2018-10-09 | End: 2018-10-12 | Stop reason: HOSPADM

## 2018-10-09 RX ORDER — AMLODIPINE BESYLATE 5 MG/1
5 TABLET ORAL DAILY
Status: DISCONTINUED | OUTPATIENT
Start: 2018-10-09 | End: 2018-10-12 | Stop reason: HOSPADM

## 2018-10-09 RX ADMIN — DIVALPROEX SODIUM 500 MG: 500 TABLET, DELAYED RELEASE ORAL at 09:30

## 2018-10-09 RX ADMIN — TRAZODONE HYDROCHLORIDE 50 MG: 50 TABLET ORAL at 21:15

## 2018-10-09 RX ADMIN — LORAZEPAM 1 MG: 1 TABLET ORAL at 09:03

## 2018-10-09 RX ADMIN — DIVALPROEX SODIUM 750 MG: 500 TABLET, DELAYED RELEASE ORAL at 21:15

## 2018-10-09 RX ADMIN — RISPERIDONE 1 MG: 1 TABLET ORAL at 13:15

## 2018-10-09 RX ADMIN — RISPERIDONE 1 MG: 1 TABLET, ORALLY DISINTEGRATING ORAL at 09:03

## 2018-10-09 RX ADMIN — RISPERIDONE 1 MG: 1 TABLET ORAL at 17:35

## 2018-10-09 RX ADMIN — AMLODIPINE BESYLATE 5 MG: 5 TABLET ORAL at 09:30

## 2018-10-09 RX ADMIN — ACETAMINOPHEN 650 MG: 325 TABLET, FILM COATED ORAL at 11:07

## 2018-10-09 NOTE — PLAN OF CARE
Problem: SELF HARM/SUICIDALITY  Goal: Will have no self-injury during hospital stay  INTERVENTIONS:  - Q 15 MINUTES: Routine safety checks  - Q WAKING SHIFT & PRN: Assess risk to determine if routine checks are adequate to maintain patient safety  - Encourage patient to participate actively in care by formulating a plan to combat response to suicidal ideation, identify supports and resources   Outcome: Progressing      Problem: DEPRESSION  Goal: Will be euthymic at discharge  INTERVENTIONS:  - Administer medication as ordered  - Provide emotional support via 1:1 interaction with staff  - Encourage involvement in milieu/groups/activities  - Monitor for social isolation   Outcome: Progressing      Problem: PSYCHOSIS  Goal: Will report no hallucinations or delusions  Interventions:  - Administer medication as  ordered  - Every waking shifts and PRN assess for the presence of hallucinations and or delusions  - Assist with reality testing to support increasing orientation  - Assess if patient's hallucinations or delusions are encouraging self-harm or harm to others and intervene as appropriate   Outcome: Progressing      Problem: SELF CARE DEFICIT  Goal: Return ADL status to a safe level of function  INTERVENTIONS:  - Administer medication as ordered  - Assess ADL deficits and provide assistive devices as needed  - Obtain PT/OT consults as needed  - Assist and instruct patient to increase activity and self care as tolerated   Outcome: Progressing      Problem: Ineffective Coping  Goal: Participates in unit activities  Interventions:  - Provide therapeutic environment   - Provide required programming   - Redirect inappropriate behaviors    Outcome: Progressing      Problem: DISCHARGE PLANNING  Goal: Discharge to home or other facility with appropriate resources  INTERVENTIONS:  - Identify barriers to discharge w/patient and caregiver  - Arrange for needed discharge resources and transportation as appropriate  - Identify discharge learning needs (meds, wound care, etc )  - Arrange for interpretive services to assist at discharge as needed  - Refer to Case Management Department for coordinating discharge planning if the patient needs post-hospital services based on physician/advanced practitioner order or complex needs related to functional status, cognitive ability, or social support system   Outcome: Progressing

## 2018-10-09 NOTE — PROGRESS NOTES
Awake and ADLS completed with prompting  Loud, social, friendly, jumping up and down, smiling brightly  Denies SI/HI, AH/VH  Discussed appropriate behavior and patient responded "I'm always good"  Compliant with meals and medications

## 2018-10-09 NOTE — PROGRESS NOTES
Pt joking and wandering hallways, responds to redirections  Displays positive mood throughout shift  no

## 2018-10-09 NOTE — CONSULTS
Consultation - Heydi Espana 26 y o  male MRN: [id number]    Unit/Bed#: [facility] 258-01 Encounter: [id number]      Assessment/Plan     Assessment/Plan:  1  Schizoaffective disorder with command auditory hallucinations  -With command auditory hallucinations to kill mom and family  -medically cleared for inpatient psychiatric evaluation and treatment    2  HTN  -On home Norvasc    3  Asthma  -Intermittent controlled with rare albuterol use    History of Present Illness   HPI: Heydi Espana is a 26y o  year old male who presents with command auditory hallucinations to kill his mom after an argument with her  He denies chronic health history initially but does admit to history of HTN and Asthma when prompted  He denies cp, sob, n/v/d, cough, fevers, chills, open wounds or pain  Inpatient consult for Medical Clearance for [facility] patient  Consult performed by: Nela Jameson ordered by: Korey Barry          Review of Systems   Constitutional: Negative for activity change, appetite change, diaphoresis, fatigue and fever  HENT: Negative  Eyes: Negative  Respiratory: Negative  Cardiovascular: Negative  Gastrointestinal: Negative  Genitourinary: Negative  Musculoskeletal: Negative  Skin: Negative  Neurological: Negative  Psychiatric/Behavioral: Positive for decreased concentration, dysphoric mood and hallucinations (command auditory)         Historical Information   Past Medical History:   Diagnosis Date    Anxiety     Asthma     Hypertension     Mental retardation     Psychiatric disorder     Psychiatric illness     Schizoaffective disorder ([address] )      Past Surgical History:   Procedure Laterality Date    HAND SURGERY      right hand     Social History   History   Alcohol Use No     History   Drug Use No     History   Smoking Status    Never Smoker   Smokeless Tobacco    Never Used     Family History: non-contributory    Meds/Allergies   PTA meds:   Prior to Admission Medications   Prescriptions Last Dose Informant Patient Reported? Taking? EPINEPHrine (EPIPEN) 0 3 mg/0 3 mL SOAJ   No No   Sig: Inject 0 3 mL (0 3 mg total) into a muscle once for 1 dose   albuterol (PROVENTIL HFA,VENTOLIN HFA) 90 mcg/act inhaler   Yes Yes   Sig: Inhale 2 puffs every 8 (eight) hours as needed for wheezing   amLODIPine (NORVASC) 5 mg tablet   No No   Sig: Take 1 tablet (5 mg total) by mouth daily At 9AM   divalproex sodium (DEPAKOTE) 250 mg EC tablet   No No   Sig: Take 3 tablets (750 mg total) by mouth daily at bedtime   divalproex sodium (DEPAKOTE) 500 mg EC tablet   No No   Sig: Take 1 tablet (500 mg total) by mouth daily At 9AM   ibuprofen (MOTRIN) 600 mg tablet   Yes Yes   Sig: Take by mouth every 6 (six) hours as needed for mild pain   levocetirizine (XYZAL) 5 MG tablet   Yes No   Sig: Take 5 mg by mouth every evening   risperiDONE (RisperDAL) 3 mg tablet   No No   Sig: Take 1 tablet (3 mg total) by mouth 2 (two) times a day At 9AM and 6PM   risperiDONE microspheres (RISPERDAL CONSTA) 50 mg IM injection   No No   Sig: Inject 2 mL (50 mg total) into the shoulder, thigh, or buttocks every 14 (fourteen) days Next injection follow up with [facility]      Facility-Administered Medications: None     Allergies   Allergen Reactions    Bee Venom Anaphylaxis    Penicillins        Objective   Vitals: Blood pressure 138/73, pulse 79, temperature (!) 97 °F (36 1 °C), temperature source Tympanic, resp  rate 20, height 5' 6" (1 676 m), weight 135 kg (297 lb), SpO2 98 %  No intake or output data in the 24 hours ending 10/09/18 0835  Invasive Devices          No matching active lines, drains, or airways          Physical Exam   Constitutional: He is oriented to person, place, and time  He appears well-developed and well-nourished  No distress  HENT:   Head: Normocephalic and atraumatic  Eyes: Pupils are equal, round, and reactive to light  EOM are normal    Neck: Normal range of motion  Cardiovascular: Normal rate and regular rhythm  Exam reveals no gallop and no friction rub  No murmur heard  Pulmonary/Chest: He has no wheezes  He has no rales  Abdominal: Soft  He exhibits no distension  There is no tenderness  Musculoskeletal: Normal range of motion  Neurological: He is alert and oriented to person, place, and time  Skin: Skin is warm and dry  Psychiatric: His speech is normal and behavior is normal  His mood appears anxious  His affect is labile  Child-like behavior and responses       Lab Results: I have personally reviewed pertinent reports

## 2018-10-09 NOTE — TREATMENT PLAN
TREATMENT PLAN REVIEW - [address] 26 y o  1995 male MRN: [id number]    [address] [place] [address] Room / Bed: [facility] 258/[facility] 622- Encounter: [id number]          Admit Date/Time:  10/8/2018 12:42 PM    Treatment Team: Attending Provider: Dany Mehta; Registered Nurse: Cayden Ewing, RN; Registered Nurse: Júnior Karimi, RN; Patient Care Technician: Micky Telles; Medications RN: Tootie Tristan RN; Registered Nurse: Kenny Diaz, JAYDEN; Occupational Therapy Assistant: Genevieve Talamantes, [address]; Patient Care Technician: Donato Morales    Diagnosis: Principal Problem:    Schizoaffective disorder, bipolar type ([facility] )  Active Problems:    Intellectual disability    Impulse control disorder    Patient Strengths/Assets: cooperative, good past treatment response, motivation for treatment/growth, patient is on a voluntary commitment    Patient Barriers/Limitations: low self esteem    Short Term Goals: decrease in depressive symptoms, decrease in anxiety symptoms, decrease in paranoid thoughts, decrease in psychotic symptoms, decrease in suicidal thoughts    Long Term Goals: improvement in depression, improvement in anxiety, stabilization of mood, free of suicidal thoughts, resolution of psychotic symptoms, acceptance of need for psychiatric medications, acceptance of need for psychiatric treatment, acceptance of need for psychiatric follow up after discharge    Progress Towards Goals: starting psychiatric medications as prescribed    Recommended Treatment: medication management, patient medication education, group therapy, milieu therapy, continued Behavioral Health psychiatric evaluation/assessment process    Treatment Frequency: daily medication monitoring, group and milieu therapy daily, monitoring through interdisciplinary rounds, monitoring through weekly patient care conferences    Expected Discharge Date: 5-7 days    Discharge Plan: referral for outpatient medication management with a psychiatrist, referral for outpatient psychotherapy    Treatment Plan Created/Updated By: Lyndsey Walls

## 2018-10-09 NOTE — PROGRESS NOTES
Medication note: pt was loud, boisterous, and somewhat euphoric this morning  Given prn risperdal at that time with good effect, pt more in control throughout morning

## 2018-10-09 NOTE — MALNUTRITION/BMI
This medical record reflects one or more clinical indicators suggestive of malnutrition and/or morbid obesity  BMI Findings:  BMI Classifications: Morbid Obesity 45-49 9     Body mass index is 47 94 kg/m²  See Nutrition note dated 10/9/2018 for additional details  Completed nutrition assessment is viewable in the nutrition documentation

## 2018-10-09 NOTE — UTILIZATION REVIEW
CW & Pt reviewed & signed Tx Plan; Pt declined a copy  CW & Pt reviewed & signed ROIs for his "dad" Mitch Mathews, [facility](outpatient/PCP), [place]([facility]), & [address]  Pt is a 27 y/o male, whom is familiar to [address] from previous admissions  Pt reported that he was hearing voices to kill himself, his mom, his brother, & his dad  Pt said that his mom was sick & in the hospital in [facility]  Pt said that he did argue some with his dad, Susie Chandra, while his mom was away  Pt focused on CW calling Susie Chandra & he had written his phone number down  Pt confirmed he still works with Adyabhilash Jameson from [facility] [employer] [facility], but was unsure if he goes to the [employer] any more, but said it was near his house  CW contacted Pt's mom's boyfriend, Susie Chandra, whom Pt calls "dad" @ [phone]  who reported that Pt's mom was at [facility], which was a stress for Pt  He came in on Sunday morning, saying he wanted to go sign himself into the hospital   They asked him why & he said he was hearing voices  They asked him what they were saying & he said, "to kill mom, to kill you(talking to his brother), & kill dad"  He said his medications weren't working     [address] contacted Nhi [facility] [place] @ [phone] who reported that Pt was doing fine  Pt had medication changes which he said were a few weeks ago, but he had not noticed any changes  CW agreed to contact him with an update once discharge date is set  CW contacted [address] @ [phone] & spoke with Jose Cruz Means, who reported Pt has an appointment with his therapist, Jose Grajeda, on 10/16 at 2:30 PM, Psychiatrist Dr Lorelei Renteria on 11/1 at 12:30 PM, & his PCP is Hadley Silva [address] [place] [address]

## 2018-10-09 NOTE — H&P
Psychiatric Evaluation - [facility] 26 y o  male MRN: [id number]  Unit/Bed#: [facility] 258-01 Encounter: [id number]    Assessment/Plan   Principal Problem:    Schizoaffective disorder, bipolar type [facility])  Active Problems:    Intellectual disability    Impulse control disorder    Plan:   1  Check admission labs  2  Collaborate with family for baseline assessment and disposition planning  3  Restart Depakote  mg a m  And 750 mg at bedtime for mood stabilization  4  Patient is not sure of is risperidone Consta  Agreed with adding risperidone 1 mg b i d  5  Confirm the last date and dose of risperidone Consta and planning accordingly  Risks, benefits and possible side effects of Medications:   Risks, benefits, and possible side effects of medications explained to patient and patient verbalizes understanding  Chief Complaint: "I don't want to go on living like this"    History of Present Illness     Patient is a 26 y o  male presents on 12 with recent worsening of depression and anxiety with passive death wishes  Patient also reports recent worsening of command auditory hallucination telling him to harm himself  Patient is well known to me from multiple prior admission under my care with similar presentation  Patient have underlying intellectual disability and is living with mother  Based on the chart review mother was recently admitted to hospital and this might have resulted in deterioration and noncompliance from patient standpoint  Patient agreed with plan of restarting Depakote and risperidone today  Patient is not taking care of himself and was malodorous during evaluation  Patient was educated on importance of self-care and patient agreed to take daily shower during this admission  He reports feeling safe on the unit and is consenting for safety      Medical Review Of Systems:  none    Psychiatric Review Of Systems:  sleep: yes  appetite changes: yes  weight changes: no  energy/anergy: yes  interest/pleasure/anhedonia: no  somatic symptoms: no  anxiety/panic: yes  eddie: no  guilty/hopeless: yes  self injurious behavior/risky behavior: no    Historical Information     Past Psychiatric History:   Multiple prior inpatient psychiatric admissions    Past Suicide attempts: denies  Past Violent behavior: no  Past Psychiatric medication trial: depakote, risperidone    Substance Abuse History:  denies    Social History     Tobacco History     Smoking Status  Never Smoker    Smokeless Tobacco Use  Never Used          Alcohol History     Alcohol Use Status  No          Drug Use     Drug Use Status  No          Sexual Activity     Sexually Active  No          Activities of Daily Living    Not Asked               Additional Substance Use Detail     Questions Responses    Substance Use Assessment Denies substance use within the past 12 months    Alcohol Use Frequency Denies use in past 12 months    Cannabis frequency Denies use in past 12 months    Heroin Frequency Denies use in past 12 months    Cocaine frequency Denies past use in past 12 months    Crack Cocaine Frequency Denies use in past 12 months    Methamphetamine Frequency Denies use in past 12 months    Narcotic Frequency Denies use in past 12 months    Benzodiazepine Frequency Denies use in past 12 months    Amphetamine frequency Denies use in past 12 months    Barbituate Frequency Denies use use in past 12 months    Inhalant frequency Denies use in past 12 months    Hallucinogen frequency Denies use in past 12 months    Ecstasy frequency Denies use past 12 months    Other drug frequency Denies use in past 12 months    Opiate frequency Denies use in past 12 months    Last reviewed by Sandy Saldivar RN on 10/8/2018        I have assessed this patient for substance use within the past 12 months    Family Psychiatric History:   Not known to patient    Social History:  Lives with mother  Unemployed  Denies legal issues    Past Medical History:   Diagnosis Date    Anxiety     Asthma     Hypertension     Mental retardation     Psychiatric disorder     Psychiatric illness     [facility]oaffective disorder [address] )            Meds/Allergies   all current active meds have been reviewed  Allergies   Allergen Reactions    Bee Venom Anaphylaxis    Penicillins        Objective   Vital signs in last 24 hours:  Temp:  [97 °F (36 1 °C)-97 8 °F (36 6 °C)] 97 6 °F (36 4 °C)  HR:  [79-97] 86  Resp:  [15-20] 16  BP: (130-141)/(67-89) 130/67    No intake or output data in the 24 hours ending 10/09/18 1506    Mental Status Evaluation:  Appearance:  disheveled   Behavior:  guarded   Speech:  loud   Mood:  anxious and depressed   Affect:  increased in range   Language: naming objects   Thought Process:  circumstantial   Thought Content:  obsessions   Perceptual Disturbances: Auditory hallucinations without commands   Risk Potential: Suicidal Ideations without plan, Homicidal Ideations none and Potential for Aggression No   Sensorium:  person and place   Cognition:  grossly intact   Consciousness:  awake    Attention: attention span appeared shorter than expected for age   Intellect: normal   Fund of Knowledge: awareness of current events: fair   Insight:  limited   Judgment: limited   Muscle Strength and Tone: arm(s): bilateral   Gait/Station: normal gait/station   Motor Activity: no abnormal movements     Memory: Short and long term memory  fair       Laboratory results:    I have personally reviewed all pertinent laboratory/tests results    Labs in last 72 hours:   Recent Labs      10/07/18   1201  10/09/18   0535   WBC   --   6 66   RBC   --   4 63   HGB   --   14 0   HCT   --   40 7   PLT   --   217   RDW   --   12 7   NEUTROABS   --   3 13   NA   --   139   K   --   3 9   CL   --   104   CO2   --   27   BUN   --   13   CREATININE   --   0 75   GLUC   --   110   GLUF   --   110*   CALCIUM   --   8 9   AST   --   32   ALT   --   67   ALKPHOS   --   77 TP   --   7 2   ALB   --   3 4*   TBILI   --   0 50   CHOLESTEROL   --   172   HDL   --   28*   TRIG   --   158*   LDLCALC   --   112*   VALPROICTOT  69   --    [id number]   --   5 155*   FREET4   --   0 95     Admission Labs:   Admission on 10/08/2018   Component Date Value    Cholesterol 10/09/2018 172     Triglycerides 10/09/2018 158*    HDL, Direct 10/09/2018 28*    LDL Calculated 10/09/2018 112*    Non-HDL-Chol (CHOL-HDL) 10/09/2018 144     Sodium 10/09/2018 139     Potassium 10/09/2018 3 9     Chloride 10/09/2018 104     CO2 10/09/2018 27     ANION GAP 10/09/2018 8     BUN 10/09/2018 13     Creatinine 10/09/2018 0 75     Glucose 10/09/2018 110     Glucose, Fasting 10/09/2018 110*    Calcium 10/09/2018 8 9     AST 10/09/2018 32     ALT 10/09/2018 67     Alkaline Phosphatase 10/09/2018 77     Total Protein 10/09/2018 7 2     Albumin 10/09/2018 3 4*    Total Bilirubin 10/09/2018 0 50     eGFR 10/09/2018 130     WBC 10/09/2018 6 66     RBC 10/09/2018 4 63     Hemoglobin 10/09/2018 14 0     Hematocrit 10/09/2018 40 7     MCV 10/09/2018 88     MCH 10/09/2018 30 2     MCHC 10/09/2018 34 4     RDW 10/09/2018 12 7     MPV 10/09/2018 8 9     Platelets 90/94/1586 217     nRBC 10/09/2018 0     Neutrophils Relative 10/09/2018 46     Immat GRANS % 10/09/2018 1     Lymphocytes Relative 10/09/2018 35     Monocytes Relative 10/09/2018 12     Eosinophils Relative 10/09/2018 5     Basophils Relative 10/09/2018 1     Neutrophils Absolute 10/09/2018 3 13     Immature Grans Absolute 10/09/2018 0 04     Lymphocytes Absolute 10/09/2018 2 31     Monocytes Absolute 10/09/2018 0 82     Eosinophils Absolute 10/09/2018 0 31     Basophils Absolute 10/09/2018 0 05     TSH 3RD GENERATON 10/09/2018 5 155*    Hemoglobin A1C 10/09/2018 4 9     EAG 10/09/2018 94     Free T4 10/09/2018 0 95          Risks / Benefits of Treatment:     Risks, benefits, and possible side effects of medications explained to patient  The patient verbalizes understanding and agreement for treatment  Counseling / Coordination of Care:     Patient's presentation on admission and proposed treatment plan discussed with treatment team   Diagnosis, medication changes and treatment plan reviewed with patient  Recent stressors discussed with patient     Events leading to admission reviewed with patient  Importance of medication and treatment compliance reviewed with patient  Inpatient Psychiatric Certification:     Certification: Based upon physical, mental and social evaluations, I certify that inpatient psychiatric services are medically necessary for this patient for a duration of 5 midnights for the treatment of Schizoaffective disorder, bipolar type [facility])    Available alternative community resources do not meet the patient's mental health care needs  I further attest that an established written individualized plan of care has been implemented and is outlined in the patient's medical records  This note has been constructed using a voice recognition system  There may be translation, syntax,  or grammatical errors  If you have any questions, please contact the dictating provider

## 2018-10-10 PROCEDURE — 99232 SBSQ HOSP IP/OBS MODERATE 35: CPT | Performed by: PSYCHIATRY & NEUROLOGY

## 2018-10-10 RX ORDER — RISPERIDONE 1 MG/1
1 TABLET, ORALLY DISINTEGRATING ORAL ONCE
Status: COMPLETED | OUTPATIENT
Start: 2018-10-10 | End: 2018-10-10

## 2018-10-10 RX ORDER — RISPERIDONE 2 MG/1
2 TABLET, FILM COATED ORAL 2 TIMES DAILY
Status: DISCONTINUED | OUTPATIENT
Start: 2018-10-10 | End: 2018-10-12 | Stop reason: HOSPADM

## 2018-10-10 RX ADMIN — ACETAMINOPHEN 650 MG: 325 TABLET, FILM COATED ORAL at 09:33

## 2018-10-10 RX ADMIN — RISPERIDONE 1 MG: 1 TABLET ORAL at 08:45

## 2018-10-10 RX ADMIN — DIVALPROEX SODIUM 750 MG: 500 TABLET, DELAYED RELEASE ORAL at 21:00

## 2018-10-10 RX ADMIN — RISPERIDONE 2 MG: 2 TABLET ORAL at 17:02

## 2018-10-10 RX ADMIN — AMLODIPINE BESYLATE 5 MG: 5 TABLET ORAL at 08:45

## 2018-10-10 RX ADMIN — DIVALPROEX SODIUM 500 MG: 500 TABLET, DELAYED RELEASE ORAL at 08:45

## 2018-10-10 RX ADMIN — TRAZODONE HYDROCHLORIDE 50 MG: 50 TABLET ORAL at 21:30

## 2018-10-10 RX ADMIN — RISPERIDONE 1 MG: 1 TABLET, ORALLY DISINTEGRATING ORAL at 11:16

## 2018-10-10 NOTE — CASE MANAGEMENT
CW approached by Pt multiple times throughout the day  In the morning Pt reporting he was hopeful to go home soon, & d/c on Friday was discussed  Later in the day, Pt approached CW to tell her that he had heard voices earlier today  Pt reporting it was a single male's voice saying to kill himself  Pt identified this is the voice he often hears & said he took a medication which made it go away  CW spoke with Pt about other things he can do to make the voice go away & he said that sometimes coloring a car helps too  Pt said he had a car picture in his room he could color  CW contacted Maury Leavitt of [place] @ [phone], however, the call did not go through after multiple attempts; CW will try again later

## 2018-10-10 NOTE — PROGRESS NOTES
Progress Note - Behavioral Health   Arpan Hendrickson 26 y o  male MRN: [id number]  Unit/Bed#: [facility] 258-01 Encounter: [id number]    Assessment/Plan   Principal Problem:    Schizoaffective disorder, bipolar type [facility])  Active Problems:    Intellectual disability    Impulse control disorder      Subjective:  Patient today loud at times  Appears to have labile and euphoric  When seeing him in hallway he told me that he had auditory hallucinations of voices commanding him to kill himself  In office he reported that he had AH of voices with commands to kill his mother  He stated he was suicidal with smile on his face  Said he had HI towards his mother, but said he would not act on that  Mood was incongruent  Did contract for safety  Is overall a poor historian and answers concretely yes or no  Did have poor sleep last night and required PRN Trazodone  Will repeat Depakote level on Friday  Medication compliant  Denied somatic complaints or side effects  Will have Podiatry see patient  Nails are growing in displaced directions, are overgrown, has fungal infection, skin around nail is erythematous, and should be evaluated by podiatry due to poor ADLs        Current Medications:  Current Facility-Administered Medications   Medication Dose Route Frequency    acetaminophen (TYLENOL) tablet 650 mg  650 mg Oral Q6H PRN    albuterol (PROVENTIL HFA,VENTOLIN HFA) inhaler 2 puff  2 puff Inhalation Q8H PRN    aluminum-magnesium hydroxide-simethicone (MYLANTA) 200-200-20 mg/5 mL oral suspension 30 mL  30 mL Oral Q4H PRN    amLODIPine (NORVASC) tablet 5 mg  5 mg Oral Daily    benztropine (COGENTIN) injection 1 mg  1 mg Intramuscular Q6H PRN    benztropine (COGENTIN) tablet 1 mg  1 mg Oral Q6H PRN    divalproex sodium (DEPAKOTE) EC tablet 500 mg  500 mg Oral Daily    divalproex sodium (DEPAKOTE) EC tablet 750 mg  750 mg Oral HS    haloperidol (HALDOL) tablet 5 mg  5 mg Oral Q6H PRN    haloperidol lactate (HALDOL) injection 5 mg  5 mg Intramuscular Q6H PRN    LORazepam (ATIVAN) 2 mg/mL injection 2 mg  2 mg Intramuscular Q6H PRN    LORazepam (ATIVAN) tablet 1 mg  1 mg Oral Q6H PRN    magnesium hydroxide (MILK OF MAGNESIA) 400 mg/5 mL oral suspension 30 mL  30 mL Oral Daily PRN    OLANZapine (ZyPREXA) IM injection 10 mg  10 mg Intramuscular Q8H PRN    OLANZapine (ZyPREXA) tablet 10 mg  10 mg Oral Q8H PRN    risperiDONE (RisperDAL M-TABS) dispersible tablet 1 mg  1 mg Oral Q3H PRN    risperiDONE (RisperDAL) tablet 2 mg  2 mg Oral BID    traZODone (DESYREL) tablet 50 mg  50 mg Oral HS PRN       Behavioral Health Medications: all current active meds have been reviewed and continue current psychiatric medications  Vitals:  Vitals:    10/10/18 0737   BP: 133/76   Pulse: 94   Resp: 15   Temp: 98 °F (36 7 °C)   SpO2:        Laboratory results:    I have personally reviewed all pertinent laboratory/tests results    Most Recent Labs:   Lab Results   Component Value Date    WBC 6 66 10/09/2018    RBC 4 63 10/09/2018    HGB 14 0 10/09/2018    HCT 40 7 10/09/2018     10/09/2018    RDW 12 7 10/09/2018    NEUTROABS 3 13 10/09/2018     10/09/2018    K 3 9 10/09/2018     10/09/2018    CO2 27 10/09/2018    BUN 13 10/09/2018    CREATININE 0 75 10/09/2018    GLUC 110 10/09/2018    GLUF 110 (H) 10/09/2018    CALCIUM 8 9 10/09/2018    AST 32 10/09/2018    ALT 67 10/09/2018    ALKPHOS 77 10/09/2018    TP 7 2 10/09/2018    ALB 3 4 (L) 10/09/2018    TBILI 0 50 10/09/2018    BILIDIR 0 12 06/19/2018    CHOLESTEROL 172 10/09/2018    HDL 28 (L) 10/09/2018    TRIG 158 (H) 10/09/2018    LDLCALC 112 (H) 10/09/2018    [place] 144 10/09/2018    VALPROICTOT 69 10/07/2018    AMMONIA 27 01/13/2018    [id number] 5 155 (H) 10/09/2018    FREET4 0 95 10/09/2018    T3FREE 3 03 03/24/2017    RPR Non-Reactive 04/21/2018       Psychiatric Review of Systems:  Behavior over the last 24 hours:  unchanged  Sleep: poor  Appetite: normal  Medication side effects: No  ROS: no complaints    Mental Status Evaluation:  Appearance:  in hospital attire   Behavior:  guarded   Speech:  loud   Mood:  depressed, euphoric and labile   Affect:  increased in range and labile   Language naming objects and repeating phrases   Thought Process:  concrete   Thought Content:  obsessions   Perceptual Disturbances: Auditory hallucinations with commands to kill his mother and himself   Risk Potential: Suicidal Ideations without plan  HI towards mother  Potential for aggression: low   Sensorium:  person and place   Cognition:  grossly intact   Consciousness:  awake    Recent and Remote Memory poor   Attention: attention span appeared shorter than expected for age   Insight:  limited   Judgment: limited   Gait/Station: normal gait/station and normal balance   Motor Activity: no abnormal movements     Progress Toward Goals: unchanged    Recommended Treatment: Continue with group therapy, milieu therapy and occupational therapy  1   Increase Risperdal to 2mg BID for psychosis/mood stabilization  2  Continue other medications  3  Repeat Depakote level on Friday  4  Will order Podiatry consult due to overgrown/abnormalities of nails, erythematous skin, and possible paronychia   5  Disposition planning     Risks, benefits and possible side effects of Medications:   Risks, benefits, and possible side effects of medications explained to patient and patient verbalizes understanding        Joel Narvaez PA-C

## 2018-10-10 NOTE — PROGRESS NOTES
Patient reports [facility] telling him to kill himself  Contracts for safety  One time Risperdal given  Patient reports decreased in voices  Has episodes of excessive energy but remains pleasant  Attends group  Denies HI,VH, and anxiety  Says he misses his dad which makes him feel depressed

## 2018-10-10 NOTE — PLAN OF CARE
Problem: Ineffective Coping  Goal: Participates in unit activities  Interventions:  - Provide therapeutic environment   - Provide required programming   - Redirect inappropriate behaviors    Outcome: Progressing  Pt attended all therapeutic groups  He presented as bright and loud throughout the day often requiring redirection for loud noises or laughing  He responds well to redirection and is able to refocus on tasks

## 2018-10-10 NOTE — PLAN OF CARE

## 2018-10-11 PROCEDURE — 99232 SBSQ HOSP IP/OBS MODERATE 35: CPT | Performed by: PSYCHIATRY & NEUROLOGY

## 2018-10-11 RX ADMIN — TRAZODONE HYDROCHLORIDE 50 MG: 50 TABLET ORAL at 21:06

## 2018-10-11 RX ADMIN — DIVALPROEX SODIUM 500 MG: 500 TABLET, DELAYED RELEASE ORAL at 08:45

## 2018-10-11 RX ADMIN — RISPERIDONE 2 MG: 2 TABLET ORAL at 08:45

## 2018-10-11 RX ADMIN — ACETAMINOPHEN 650 MG: 325 TABLET, FILM COATED ORAL at 18:56

## 2018-10-11 RX ADMIN — AMLODIPINE BESYLATE 5 MG: 5 TABLET ORAL at 08:45

## 2018-10-11 RX ADMIN — DIVALPROEX SODIUM 750 MG: 500 TABLET, DELAYED RELEASE ORAL at 21:06

## 2018-10-11 RX ADMIN — RISPERIDONE 2 MG: 2 TABLET ORAL at 17:08

## 2018-10-11 NOTE — CONSULTS
Consult - [address] 26 y o  male MRN: [id number]  Unit/Bed#: [facility] 258-01 Encounter: [id number]    Assessment/Plan     Assessment/Plan:  Paronychia left 4th toe/hammertoe deformity left 4th toe   -dry sterile dressing with mupirocin applied   -recommend follow up with podiatrist upon discharge if nail continues to give him trouble    -small amount of mupirocin with gauze and tape daily left 4th toe until redness resolves  History of Present Illness     HPI:  Karen Hobson is a 26 y o  male who presents with painful left 4th toe  Inpatient consult to Podiatry  Consult performed by: Brooks Noel  Consult ordered by: Wanda Rivera        Review of Systems   Constitutional: Negative  HENT: Negative  Eyes: Negative  Respiratory: Negative  Cardiovascular:  Negative    Gastrointestinal: Negative      Musculoskeletal:  Negative   Skin:  Negative   Neurological:  Negative        Historical Information   Past Medical History:   Diagnosis Date    Anxiety     Asthma     Hypertension     Mental retardation     Psychiatric disorder     Psychiatric illness     Schizoaffective disorder ([address] )      Past Surgical History:   Procedure Laterality Date    HAND SURGERY      right hand     Social History   History   Alcohol Use No     History   Drug Use No     History   Smoking Status    Never Smoker   Smokeless Tobacco    Never Used     Family History:   Family History   Problem Relation Age of Onset    No Known Problems Mother     No Known Problems Father     No Known Problems Sister     No Known Problems Brother     No Known Problems Maternal Aunt     No Known Problems Paternal Aunt     No Known Problems Maternal Uncle     No Known Problems Paternal Uncle     No Known Problems Maternal Grandfather     No Known Problems Maternal Grandmother     No Known Problems Paternal Grandfather     No Known Problems Paternal Grandmother     No Known Problems Cousin     ADD / ADHD Neg Hx     Alcohol abuse Neg Hx     Anxiety disorder Neg Hx     Bipolar disorder Neg Hx     Dementia Neg Hx     Depression Neg Hx     Drug abuse Neg Hx     OCD Neg Hx     Paranoid behavior Neg Hx     Schizophrenia Neg Hx     Seizures Neg Hx     Self-Injury Neg Hx     Suicide Attempts Neg Hx        Meds/Allergies   Prescriptions Prior to Admission   Medication    albuterol (PROVENTIL HFA,VENTOLIN HFA) 90 mcg/act inhaler    ibuprofen (MOTRIN) 600 mg tablet    amLODIPine (NORVASC) 5 mg tablet    divalproex sodium (DEPAKOTE) 250 mg EC tablet    divalproex sodium (DEPAKOTE) 500 mg EC tablet    EPINEPHrine (EPIPEN) 0 3 mg/0 3 mL SOAJ    levocetirizine (XYZAL) 5 MG tablet    risperiDONE (RisperDAL) 3 mg tablet    risperiDONE microspheres (RISPERDAL CONSTA) 50 mg IM injection     Allergies   Allergen Reactions    Bee Venom Anaphylaxis    Penicillins        Objective   First Vitals:   Blood Pressure: 133/90 (10/08/18 1246)  Pulse: 90 (10/08/18 1246)  Temperature: (!) 96 3 °F (35 7 °C) (10/08/18 1246)  Temp Source: Tympanic (10/08/18 1246)  Respirations: 20 (10/08/18 1246)  Height: 5' 6" (167 6 cm) (10/08/18 1246)  Weight - Scale: 135 kg (297 lb) (10/08/18 1246)  SpO2: 98 % (10/08/18 1246)    Current Vitals:   Blood Pressure: 131/73 (10/11/18 1520)  Pulse: 104 (10/11/18 1520)  Temperature: (!) 97 3 °F (36 3 °C) (10/11/18 1520)  Temp Source: Tympanic (10/11/18 1520)  Respirations: 18 (10/11/18 1520)  Height: 5' 6" (167 6 cm) (10/08/18 1246)  Weight - Scale: 135 kg (297 lb) (10/08/18 1246)  SpO2: 98 % (10/08/18 1246)        /73 (BP Location: Right arm)   Pulse 104   Temp (!) 97 3 °F (36 3 °C) (Tympanic)   Resp 18   Ht 5' 6" (1 676 m)   Wt 135 kg (297 lb)   SpO2 98%   BMI 47 94 kg/m²     General Appearance:    Alert, cooperative, no distress   Head:    Normocephalic, without obvious abnormality, atraumatic   Eyes:    PERRL, conjunctiva/corneas clear, EOM's intact            Nose:   Moist mucous membranes, no drainage or sinus tenderness   Throat:   No tenderness, no exudates   Neck:   Supple, symmetrical, trachea midline, no JVD   Back:     Symmetric, no CVA tenderness   Lungs:     Respirations unlabored   Chest wall:    No tenderness or deformity   Heart:    Regular rate and rhythm noted on last vitals  Abdomen:     Soft, non-tender, bowel sounds active all four quadrants,     no masses, no organomegaly       Lower Ext/Ortho:   Adductovarus deformity left 4th toe semi reducible in nature, this causes the 3rd toe to overlap irritating 4th toe nail bed  This was aggravated by an extremely long nail   Pulses:  Right:  Palpable, Left:  Palpable,    Capillary Filling:  3 seconds, Edema:  None   Skin: Texture, Tone and Turgor:  WNL, Digital Hair:  Present  Atrophic Changes:  None   Lesions:  None  Ulcers/Wounds:  None  Nail Pathology: Moderately elongated nails times 10, left 4th toe erythematous secondary to nail bed irritation due to overlap 3rd toe  Old dried blood present  No purulence noted or active cellulitis  Neurologic:  CNII-XII intact   Normal strength  Sensation and reflexes:  Grossly intact                Lab Results:   CBC w/diff    Results from last 7 days  Lab Units 10/09/18  0535   WBC Thousand/uL 6 66   HEMOGLOBIN g/dL 14 0   HEMATOCRIT % 40 7   PLATELETS Thousands/uL 217   NEUTROS PCT % 46   LYMPHS PCT % 35   MONOS PCT % 12   EOS PCT % 5     BMP    Results from last 7 days  Lab Units 10/09/18  0535   SODIUM mmol/L 139   POTASSIUM mmol/L 3 9   CHLORIDE mmol/L 104   CO2 mmol/L 27   BUN mg/dL 13   CREATININE mg/dL 0 75   CALCIUM mg/dL 8 9     CMP    Results from last 7 days  Lab Units 10/09/18  0535   SODIUM mmol/L 139   POTASSIUM mmol/L 3 9   CHLORIDE mmol/L 104   CO2 mmol/L 27   BUN mg/dL 13   CREATININE mg/dL 0 75   CALCIUM mg/dL 8 9   ALK PHOS U/L 77   ALT U/L 67   AST U/L 32     @Culture@  No results found for: Zoe Gtz      Imaging: I have personally reviewed pertinent films in PACS      EKG, Pathology, and Other Studies: I have personally reviewed pertinent reports          Jade Abebe DPM

## 2018-10-11 NOTE — PROGRESS NOTES
Pt visible on unit, positive interactions with peers  No issues reported  Saw podiatry, reported positive results

## 2018-10-11 NOTE — CASE MANAGEMENT
CW met with Pt who reported he was ready to go home tomorrow & said that he talked to his dad who misses him  Pt then stating he doesn't know if he will go home tomorrow, but denied any AH  Pt is scheduled to see his therapist on Tuesday & said that maybe Al Cantu will take him  Pt is looking forward to podiatry seeing him today regarding his toe/toenails  CW sent [employer] request electronically; call received confirming transportation  CW contacted Mildred Felix @ [phone] to review scheduled discharge for tomorrow  Mildred Felix confirmed he will be home to let Pt in & that Al Cantu, from [facility], would take Pt to his appointment on Tues

## 2018-10-11 NOTE — PLAN OF CARE
Problem: DISCHARGE PLANNING  Goal: Discharge to home or other facility with appropriate resources  INTERVENTIONS:  - Identify barriers to discharge w/patient and caregiver  - Arrange for needed discharge resources and transportation as appropriate  - Identify discharge learning needs (meds, wound care, etc )  - Arrange for interpretive services to assist at discharge as needed  - Refer to Case Management Department for coordinating discharge planning if the patient needs post-hospital services based on physician/advanced practitioner order or complex needs related to functional status, cognitive ability, or social support system   Outcome: Progressing  Pt verbalizing he is ready for discharge & denied CAH

## 2018-10-11 NOTE — PROGRESS NOTES
Progress Note - Behavioral Health   Janes Jackson 26 y o  male MRN: [id number]  Unit/Bed#: [facility] 258-01 Encounter: [id number]    Assessment/Plan   Principal Problem:    Schizoaffective disorder, bipolar type ([address] )  Active Problems:    Intellectual disability    Impulse control disorder    Subjective:   Patient is compliant with medications with no acute side effects  Patient reports improvement in mood and anxiety and denies endorsing suicidal or homicidal ideation  No psychotic symptoms noted and patient is not internally preoccupied  Discussed the planning of discharge tomorrow if patient continues to show signs of improvement  Patient is seen out in milieu and denies any physical complaints today  Current Medications:    Current Facility-Administered Medications:  acetaminophen 650 mg Oral Q6H PRN Maurita Merl, PA-C   albuterol 2 puff Inhalation Q8H PRN [facility]   aluminum-magnesium hydroxide-simethicone 30 mL Oral Q4H PRN Maurita Merl, PA-C   amLODIPine 5 mg Oral Daily [facility]   benztropine 1 mg Intramuscular Q6H PRN Maurita Merl, PA-C   benztropine 1 mg Oral Q6H PRN Maurita Merl, PA-C   divalproex sodium 500 mg Oral Daily [facility]   divalproex sodium 750 mg Oral HS [facility]   haloperidol 5 mg Oral Q6H PRN Maurita Merl, PA-C   haloperidol lactate 5 mg Intramuscular Q6H PRN Maurita Merl, PA-C   LORazepam 2 mg Intramuscular Q6H PRN Maurita Merl, PA-C   LORazepam 1 mg Oral Q6H PRN Maurita Merl, PA-C   magnesium hydroxide 30 mL Oral Daily PRN Maurita Merl, PA-C   OLANZapine 10 mg Intramuscular Q8H PRN Maurita Merl, PA-C   OLANZapine 10 mg Oral Q8H PRN Maurita Merl, PA-C   risperiDONE 1 mg Oral Q3H PRN Maurita Merl, PA-C   risperiDONE 2 mg Oral BID Maurita Merl, PA-C   traZODone 50 mg Oral HS PRN Maurita Merl, PA-C       Behavioral Health Medications: all current active meds have been reviewed      Vital signs in last 24 hours:  Temp:  [97 °F (36 1 °C)] 97 °F (36 1 °C)  HR:  [107] 107  Resp:  [14-18] 18  BP: (126-148)/(63-76) 126/63    Laboratory results:    I have personally reviewed all pertinent laboratory/tests results    Labs in last 72 hours:   Recent Labs      10/09/18   0535   WBC  6 66   RBC  4 63   HGB  14 0   HCT  40 7   PLT  217   RDW  12 7   NEUTROABS  3 13   NA  139   K  3 9   CL  104   CO2  27   BUN  13   CREATININE  0 75   GLUC  110   GLUF  110*   CALCIUM  8 9   AST  32   ALT  67   ALKPHOS  77   TP  7 2   ALB  3 4*   TBILI  0 50   CHOLESTEROL  172   HDL  28*   TRIG  158*   LDLCALC  112*   [id number]  5 155*   FREET4  0 95     Admission Labs:   Admission on 10/08/2018   Component Date Value    Cholesterol 10/09/2018 172     Triglycerides 10/09/2018 158*    HDL, Direct 10/09/2018 28*    LDL Calculated 10/09/2018 112*    Non-HDL-Chol (CHOL-HDL) 10/09/2018 144     Sodium 10/09/2018 139     Potassium 10/09/2018 3 9     Chloride 10/09/2018 104     CO2 10/09/2018 27     ANION GAP 10/09/2018 8     BUN 10/09/2018 13     Creatinine 10/09/2018 0 75     Glucose 10/09/2018 110     Glucose, Fasting 10/09/2018 110*    Calcium 10/09/2018 8 9     AST 10/09/2018 32     ALT 10/09/2018 67     Alkaline Phosphatase 10/09/2018 77     Total Protein 10/09/2018 7 2     Albumin 10/09/2018 3 4*    Total Bilirubin 10/09/2018 0 50     eGFR 10/09/2018 130     WBC 10/09/2018 6 66     RBC 10/09/2018 4 63     Hemoglobin 10/09/2018 14 0     Hematocrit 10/09/2018 40 7     MCV 10/09/2018 88     MCH 10/09/2018 30 2     MCHC 10/09/2018 34 4     RDW 10/09/2018 12 7     MPV 10/09/2018 8 9     Platelets 76/18/3915 217     nRBC 10/09/2018 0     Neutrophils Relative 10/09/2018 46     Immat GRANS % 10/09/2018 1     Lymphocytes Relative 10/09/2018 35     Monocytes Relative 10/09/2018 12     Eosinophils Relative 10/09/2018 5     Basophils Relative 10/09/2018 1     Neutrophils Absolute 10/09/2018 3 13     Immature Grans Absolute 10/09/2018 0 04     Lymphocytes Absolute 10/09/2018 2 31     Monocytes Absolute 10/09/2018 0 82     Eosinophils Absolute 10/09/2018 0 31     Basophils Absolute 10/09/2018 0 05     TSH 3RD GENERATON 10/09/2018 5 155*    Hemoglobin A1C 10/09/2018 4 9     EAG 10/09/2018 94     Free T4 10/09/2018 0 95        Psychiatric Review of Systems:  Behavior over the last 24 hours:  improved  Sleep: normal  Appetite: normal  Medication side effects: No  ROS: no complaints    Mental Status Evaluation:  Appearance:  casually dressed   Behavior:  guarded   Speech:  soft   Mood:  anxious   Affect:  constricted   Language naming objects   Thought Process:  circumstantial   Thought Content:  obsessions   Perceptual Disturbances: None   Risk Potential: Suicidal Ideations none, Homicidal Ideations none and Potential for Aggression No   Sensorium:  person and place   Cognition:  grossly intact   Consciousness:  awake    Attention: attention span and concentration were age appropriate   Insight:  fair   Judgment: fair   Intellect fair   Gait/Station: normal gait/station   Motor Activity: no abnormal movements     Memory: Short and long term memory  fair     Progress Toward Goals: progressing    Recommended Treatment:   Depakote level tomorrow morning  Initiate disposition planning  Continue with group therapy, milieu therapy and occupational therapy      Continue following current medications:   Current Facility-Administered Medications:  acetaminophen 650 mg Oral Q6H PRN Miley Leroy PA-C   albuterol 2 puff Inhalation Q8H PRN Sherif Vides   aluminum-magnesium hydroxide-simethicone 30 mL Oral Q4H PRN Miley Leroy PA-C   amLODIPine 5 mg Oral Daily Sherif Vides   benztropine 1 mg Intramuscular Q6H PRN Miley Leroy PA-C   benztropine 1 mg Oral Q6H PRN Miley Leroy PA-C   divalproex sodium 500 mg Oral Daily Sherif Vides   divalproex sodium 750 mg Oral HS Sherif Vides   haloperidol 5 mg Oral Q6H PRN Malachy Murrain, PA-C   haloperidol lactate 5 mg Intramuscular Q6H PRN Malachy Murrain, PA-C   LORazepam 2 mg Intramuscular Q6H PRN Malachy Murrain, PA-C   LORazepam 1 mg Oral Q6H PRN Malachy Murrain, PA-C   magnesium hydroxide 30 mL Oral Daily PRN Malachy Murrain, PA-C   OLANZapine 10 mg Intramuscular Q8H PRN Malachy Murrain, PA-C   OLANZapine 10 mg Oral Q8H PRN Malachy Murrain, PA-C   risperiDONE 1 mg Oral Q3H PRN Malachy Murrain, PA-C   risperiDONE 2 mg Oral BID Malachy Murrain, PA-C   traZODone 50 mg Oral HS PRN Malachy Murrain, PA-C       Risks, benefits and possible side effects of Medications:   Risks, benefits, and possible side effects of medications explained to patient and patient verbalizes understanding  This note has been constructed using a voice recognition system  There may be translation, syntax,  or grammatical errors  If you have any questions, please contact the dictating provider

## 2018-10-12 VITALS
TEMPERATURE: 96.4 F | HEART RATE: 102 BPM | DIASTOLIC BLOOD PRESSURE: 82 MMHG | RESPIRATION RATE: 18 BRPM | SYSTOLIC BLOOD PRESSURE: 142 MMHG | OXYGEN SATURATION: 98 % | BODY MASS INDEX: 47.73 KG/M2 | HEIGHT: 66 IN | WEIGHT: 297 LBS

## 2018-10-12 LAB — VALPROATE SERPL-MCNC: 83 UG/ML (ref 50–100)

## 2018-10-12 PROCEDURE — 99239 HOSP IP/OBS DSCHRG MGMT >30: CPT | Performed by: PSYCHIATRY & NEUROLOGY

## 2018-10-12 PROCEDURE — 80164 ASSAY DIPROPYLACETIC ACD TOT: CPT | Performed by: PHYSICIAN ASSISTANT

## 2018-10-12 RX ORDER — AMLODIPINE BESYLATE 5 MG/1
5 TABLET ORAL DAILY
Qty: 30 TABLET | Refills: 0 | Status: SHIPPED | OUTPATIENT
Start: 2018-10-12 | End: 2019-03-19 | Stop reason: HOSPADM

## 2018-10-12 RX ORDER — RISPERIDONE 2 MG/1
2 TABLET, FILM COATED ORAL 2 TIMES DAILY
Qty: 60 TABLET | Refills: 0 | Status: SHIPPED | OUTPATIENT
Start: 2018-10-12 | End: 2019-03-19 | Stop reason: HOSPADM

## 2018-10-12 RX ORDER — DIVALPROEX SODIUM 500 MG/1
500 TABLET, DELAYED RELEASE ORAL DAILY
Qty: 30 TABLET | Refills: 0 | Status: ON HOLD | OUTPATIENT
Start: 2018-10-12 | End: 2019-03-18 | Stop reason: SDUPTHER

## 2018-10-12 RX ORDER — DIVALPROEX SODIUM 250 MG/1
750 TABLET, DELAYED RELEASE ORAL
Qty: 90 TABLET | Refills: 0 | Status: ON HOLD | OUTPATIENT
Start: 2018-10-12 | End: 2019-03-18 | Stop reason: SDUPTHER

## 2018-10-12 RX ADMIN — AMLODIPINE BESYLATE 5 MG: 5 TABLET ORAL at 08:48

## 2018-10-12 RX ADMIN — RISPERIDONE 2 MG: 2 TABLET ORAL at 08:48

## 2018-10-12 RX ADMIN — DIVALPROEX SODIUM 500 MG: 500 TABLET, DELAYED RELEASE ORAL at 08:48

## 2018-10-12 NOTE — DISCHARGE INSTRUCTIONS
Schizoaffective Disorder   WHAT YOU NEED TO KNOW:   Schizoaffective disorder is a long-term mental illness that may change how you think, feel, and act around others  You may not know what is real and what is not real    DISCHARGE INSTRUCTIONS:   Medicines:   · Antipsychotics: These medicines help decrease psychotic symptoms or severe agitation  You may need antiparkinson medicine to control muscle stiffness, twitches, and restlessness caused by antipsychotic medicines  · Antianxiety medicine: This medicine may be given to decrease anxiety and help you feel calm and relaxed  · Antidepressants: These medicines are given to decrease or stop the symptoms of depression, anxiety, and behavior problems  · Mood stabilizers: These medicines help control mood swings  · Anticonvulsants: This medicine is given to control seizures  It may also be used to decrease violent behavior and control your mood swings  · Blood pressure medicines: These may be used to help decrease motor tics (uncontrolled movements)  They may also help you feel calmer, more focused, and less irritable  · Anticholinergics: This medicine decreases the side effects of other medicines  · Take your medicine as directed  Contact your healthcare provider if you think your medicine is not helping or if you have side effects  Tell him or her if you are allergic to any medicine  Keep a list of the medicines, vitamins, and herbs you take  Include the amounts, and when and why you take them  Bring the list or the pill bottles to follow-up visits  Carry your medicine list with you in case of an emergency  Follow up with your healthcare provider or psychiatrist as directed: You may need to return to have your blood pressure and other symptoms checked  You may need blood tests to check the level of medicine in your blood  Write down your questions so you remember to ask them during your visits  Manage your symptoms:   The following may help you feel better or prevent symptoms of schizoaffective disorder from coming back:  · Find support for yourself and your family:  Talk with others to help you cope with your illness better  This may also help to improve how you relate to others  · Keep all medical appointments: This will help manage your disease and the side-effects from medicines you may be taking  · Use your medicines as directed:  Put your medicines in a pillbox placed in an area you can easily see  Use a watch with an alarm to help you remember when it is time to take your medicine  Tell your healthcare provider if you know or think you might be pregnant  Do not stop taking your medicines without your healthcare provider's okay  A sudden stop can cause serious medical problems  · Watch for early signs of a relapse and seek help immediately:      ¨ How you think, feel, and see things has changed  ¨ You behave differently than usual     ¨ You become more nervous and upset, but do not know why  ¨ You eat less and have trouble sleeping  ¨ You have little or no interest in friends or activities  For support and more information:   · American Psychiatric Association  [facility] [place], [facility] [address] , [address]  Phone: 1- [phone]  Phone: 6- [phone]  Web Address: [employer] com br  org  · [address] [place] [address], Public Information & Communication Branch  [address] [place] [address], [address] [facility], [address]  Terrence [place]acon [place] [address]   Phone: [phone]  Phone: 1- [phone]  Web Address: Savanah mancuso  Contact your healthcare provider or psychiatrist if:   · You think you are having a relapse  · You are having side effects from your medicine, or they are not helping  · You are not sleeping well or are sleeping more than usual     · You cannot eat or are eating more than usual     · You have muscle spasms, stiffness, or trouble walking      · Your sad feelings or thoughts change the way you function during the day  · You have questions or concerns about your condition or care  Seek care immediately or call 911 if:   · You feel like hurting or killing yourself or others  · You feel that your condition is getting worse  · You feel very upset, threaten someone, or you feel violent  · You suddenly have changes in your vision  · You suddenly have chest pain, trouble breathing, or a fever  © 2017 [address]  Information is for End User's use only and may not be sold, redistributed or otherwise used for commercial purposes  All illustrations and images included in CareNotes® are the copyrighted property of A D A M , Inc  or Remi Norman  The above information is an  only  It is not intended as medical advice for individual conditions or treatments  Talk to your doctor, nurse or pharmacist before following any medical regimen to see if it is safe and effective for you

## 2018-10-12 NOTE — DISCHARGE SUMMARY
Discharge Summary - [facility] 26 y o  male MRN: [id number]  Unit/Bed#: U 258-01 Encounter: [id number]     Admission Date: 10/8/18        Discharge Date: 10/12/18    Attending Psychiatrist: Malathi Awan MD    Reason for Admission/HPI:   History of Present Illness   Patient is a 26year old intellectually disabled male who presented to [facility] ED due to increased auditory hallucinations of voices with commands to kill himself, mother, and father with a knife  Patient did report medication compliance  Possible precipitating events were mother recently being admitted to hospital and noncompliance with medications  On initial interview patient was malodorous and appeared to have decreased ADLs  Patient denied most depressive symptoms, but did report suicidal thoughts  He appeared loud, restless, euphoric, and somewhat labile  He did not endorse criteria for eddie, but does have history of manic behavior and impulse control issues  Patient denied V/O/T hallucinations  Patient does have numerous inpatient psychiatric hospitalizations, denied prior suicide attempts, and does have outpatient psychiatrist     Psychosocial Stressors: mother in hospital, medication noncompliance? [facility]:   Behavioral Health Medications:   current meds:   Current Facility-Administered Medications   Medication Dose Route Frequency    acetaminophen (TYLENOL) tablet 650 mg  650 mg Oral Q6H PRN    albuterol (PROVENTIL HFA,VENTOLIN HFA) inhaler 2 puff  2 puff Inhalation Q8H PRN    aluminum-magnesium hydroxide-simethicone (MYLANTA) 200-200-20 mg/5 mL oral suspension 30 mL  30 mL Oral Q4H PRN    amLODIPine (NORVASC) tablet 5 mg  5 mg Oral Daily    benztropine (COGENTIN) injection 1 mg  1 mg Intramuscular Q6H PRN    benztropine (COGENTIN) tablet 1 mg  1 mg Oral Q6H PRN    divalproex sodium (DEPAKOTE) EC tablet 500 mg  500 mg Oral Daily    divalproex sodium (DEPAKOTE) EC tablet 750 mg  750 mg Oral HS    haloperidol (HALDOL) tablet 5 mg  5 mg Oral Q6H PRN    haloperidol lactate (HALDOL) injection 5 mg  5 mg Intramuscular Q6H PRN    LORazepam (ATIVAN) 2 mg/mL injection 2 mg  2 mg Intramuscular Q6H PRN    LORazepam (ATIVAN) tablet 1 mg  1 mg Oral Q6H PRN    magnesium hydroxide (MILK OF MAGNESIA) 400 mg/5 mL oral suspension 30 mL  30 mL Oral Daily PRN    mupirocin (BACTROBAN) 2 % ointment   Topical Daily    OLANZapine (ZyPREXA) IM injection 10 mg  10 mg Intramuscular Q8H PRN    OLANZapine (ZyPREXA) tablet 10 mg  10 mg Oral Q8H PRN    risperiDONE (RisperDAL M-TABS) dispersible tablet 1 mg  1 mg Oral Q3H PRN    risperiDONE (RisperDAL) tablet 2 mg  2 mg Oral BID    traZODone (DESYREL) tablet 50 mg  50 mg Oral HS PRN       Patient was admitted to [facility] on voluntary 201 commitment for safety and stabilization  On admission patient was continued on Depakote 500mg QD AM / 750mg QD PM for mood stabilization and Risperdal 1mg BID for psychosis/mood stabilization  It was determined he was no longer taking Risperdal Consta from his pharmacy  Risperdal was titrated to 2mg BID  He tolerated medications with no acute side effects  Depakote level on 10/12/18 was 83 and deemed therapeutic  His mood brightened over the course of his treatment, and he was seen in [facility] interacting appropriately with peers  He did not demonstrate dangerous behavior to self or others during his inpatient stay  On day of discharge patient denied depression, had brighter affect, controllable anxiety, denied psychosis, had diminished manic behavior, and denied suicidal/homicidal ideations  Podiatry was consulted for paronychia of left 4th toe and hammertoe deformity left 4th toe          Mental Status at time of Discharge:     Appearance:  casually dressed   Behavior:  cooperative   Speech:  loud   Mood:  euthymic   Affect:  mood-congruent   Thought Process:  concrete Thought Content:  obsessions   Perceptual Disturbances: None   Risk Potential: Denied SI/HI  Potential for aggression: No   Sensorium:  person and place   Cognition:  grossly intact   Consciousness:  alert and awake    Attention: attention span appeared shorter than expected for age   Insight:  limited   Judgment: improved   Gait/Station: normal gait/station and normal balance   Motor Activity: no abnormal movements       Discharge Diagnosis:   Schizoaffective disorder, bipolar type  Impulse control disorder  Intellectual disability      Discharge Medications:  See after visit summary for reconciled discharge medications provided to patient and family  Discharge instructions/Information to patient and family:   See after visit summary for information provided to patient and family  Provisions for Follow-Up Care:  See after visit summary for information related to follow-up care and any pertinent home health orders  Discharge Statement   I spent 33 minutes discharging the patient  This time was spent on the day of discharge  I had direct contact with the patient on the day of discharge  On day of discharge patient had mental status exam performed, discharge instructions/medications reviewed, and outpatient planning discussed  He was given 1 month of scripts        Lane Albert PA-C

## 2018-10-12 NOTE — DISCHARGE INSTR - LAB
Contact Information: If you have any questions, concerns, pended studies, tests and/or procedures, or emergencies regarding your inpatient behavioral health visit  [facility] [phone] and ask to speak to a , nurse or physician  A contact is available 24 hours/ 7 days a week at this number  Summary of Procedures Performed During your Stay:  Below is a list of major procedures performed during your hospital stay and a summary of results:  - No major procedures performed  Pending Studies     None        If studies are pending at discharge, follow up with your PCP and/or referring provider

## 2018-10-12 NOTE — CASE MANAGEMENT
CW met with Pt who verbalized readiness for discharge  Pt was very malodorous & it was reported it was due to Pt's sneakers/socks  CW notified unit manager to see what could be done  CW contacted Danny Yeh mom's boyfriend, @ [phone] to inquire if Pt would have access to any of his SSI check to purchase new sneakers? Maria R Farias said that he is aware that Pt needs new shoes & they are working on it  CW informed Maria R Farias that the Amrita Rocha would be leaving earlier than 10:30 AM, & he said he would stay home to let Pt in  He asked about Pt's medications, & CW reviewed they were sent to [place] pharmacy to be delivered  Maria R Farias had no other questions

## 2018-10-12 NOTE — DISCHARGE INSTR - OTHER ORDERS
The PEER LINE is a toll-free telephone number for people in [facility] who are seeking a listening ear for additional support in their recovery from mental illness  The PEER LINE is peer-run and peer-friendly  You can call the Peer Line 24 hours a day  Phone: 8-699-TX-PEERS /(1-370.197.6533)     Emergency [facility] Emergency Services: [phone]  [address]  [address] [facility], [address] [place] [address]     Text CONNECT to 449062 from anywhere in the [place], anytime, about any type of crisis  A live, trained Crisis Counselor receives the text and lets you know that they are here to listen  The volunteer Crisis Counselor will help you move from a hot moment to a cool moment  Warm Line: [phone], [phone], [phone]  If it is not quite a crisis, but you want to talk to someone, 24 hours/day, 7 days/week:  Someone to listen; someone who cares  Dial 2-1-1 to get connected/get help  Free, confidential information & referral available 24/7: Aging Services, Child & Youth Services, Counseling, Education/Training, Food/Shelter/Clothing, Health Services, Parenting, Substance Abuse, Support Groups, Volunteer Opportunities, & much more    Phone: 2-1-1 or [phone], Web: [email or web address] [facility], Email: [email or web address]

## 2018-10-18 ENCOUNTER — HOSPITAL ENCOUNTER (EMERGENCY)
Facility: HOSPITAL | Age: 23
Discharge: HOME | End: 2018-10-18
Attending: EMERGENCY MEDICINE | Admitting: EMERGENCY MEDICINE
Payer: MEDICARE

## 2018-10-18 VITALS
HEART RATE: 127 BPM | SYSTOLIC BLOOD PRESSURE: 146 MMHG | OXYGEN SATURATION: 96 % | TEMPERATURE: 98.7 F | DIASTOLIC BLOOD PRESSURE: 100 MMHG | WEIGHT: 298.38 LBS | HEIGHT: 68 IN | BODY MASS INDEX: 45.22 KG/M2 | RESPIRATION RATE: 18 BRPM

## 2018-10-18 DIAGNOSIS — R45.89 DYSPHORIC MOOD: Primary | ICD-10-CM

## 2018-10-18 PROCEDURE — 99284 EMERGENCY DEPT VISIT MOD MDM: CPT

## 2018-10-18 PROCEDURE — 82075 ASSAY OF BREATH ETHANOL: CPT | Performed by: STUDENT IN AN ORGANIZED HEALTH CARE EDUCATION/TRAINING PROGRAM

## 2018-10-18 PROCEDURE — 80307 DRUG TEST PRSMV CHEM ANLYZR: CPT | Performed by: STUDENT IN AN ORGANIZED HEALTH CARE EDUCATION/TRAINING PROGRAM

## 2018-10-18 NOTE — ED NOTES
Pt was recently d/c from [facility]  Pt brother stated pt was carrying a knife last night but admits he had no idea what the pt planned to use it for  Pt denies that he was going to hurt himself or anyone else with the knife  Pt denies si or hi today  He is requesting d/c home  Contacted PA [place] SERGEY Stout   Pt agreed to return to the ed if he feels suicidal or like hurting anyone else

## 2018-10-18 NOTE — ED NOTES
Pt reports he wants to stab hismother  Pt denies SI or wanting to harm self        Tessa Ortiz RN  10/18/18 6981

## 2018-10-18 NOTE — ED PROVIDER NOTES
History  Chief Complaint   Patient presents with    Psychiatric Evaluation     Pt arrives into triage calm and cooperative  Pt has been hearing voices all week that are telling him to kill his mother with a knife to her heart, pt walked to ER today and is alone  Mother is at home and knows pt is here  Pt reports having a plan to use a knife to cut his arms  Pt denies ever harming any humans or animal in the past, Pt denies ever harming himself in the past, reports today is the first time ever  This is a 60-year-old male with a past medical history of intellectual disability, schizoaffective disorder, and hypertension who presents to the emergency department this afternoon with reported homicidal ideation  Patient states that he has been hearing voices telling him to stab his mom and cut himself with a knife but he denies attempting either  Patient is here with his brother who reports that he had to wrestle knife out of the patient's hand last night but that currently all the knives in the house are hidden  Patient currently denies any suicidal or homicidal ideation  He denies hearing any voices currently, visual hallucination, or any physical complaints  Prior to Admission Medications   Prescriptions Last Dose Informant Patient Reported? Taking?    EPINEPHrine (EPIPEN) 0 3 mg/0 3 mL SOAJ   No No   Sig: Inject 0 3 mL (0 3 mg total) into a muscle once for 1 dose   albuterol (PROVENTIL HFA,VENTOLIN HFA) 90 mcg/act inhaler   Yes Yes   Sig: Inhale 2 puffs every 8 (eight) hours as needed for wheezing   amLODIPine (NORVASC) 5 mg tablet   No Yes   Sig: Take 1 tablet (5 mg total) by mouth daily At 9AM   divalproex sodium (DEPAKOTE) 250 mg EC tablet   No Yes   Sig: Take 3 tablets (750 mg total) by mouth daily at bedtime   divalproex sodium (DEPAKOTE) 500 mg EC tablet   No Yes   Sig: Take 1 tablet (500 mg total) by mouth daily At 9AM   mupirocin (BACTROBAN) 2 % ointment   No Yes   Sig: Apply topically daily To affected toe   risperiDONE (RisperDAL) 2 mg tablet   No Yes   Sig: Take 1 tablet (2 mg total) by mouth 2 (two) times a day At 9AM and 6PM      Facility-Administered Medications: None       Past Medical History:   Diagnosis Date    Anxiety     Asthma     Hypertension     Mental retardation     Psychiatric disorder     Psychiatric illness     Schizoaffective disorder ([facility] [address] )        Past Surgical History:   Procedure Laterality Date    HAND SURGERY      right hand       Family History   Problem Relation Age of Onset    No Known Problems Mother     No Known Problems Father     No Known Problems Sister     No Known Problems Brother     No Known Problems Maternal Aunt     No Known Problems Paternal Aunt     No Known Problems Maternal Uncle     No Known Problems Paternal Uncle     No Known Problems Maternal Grandfather     No Known Problems Maternal Grandmother     No Known Problems Paternal Grandfather     No Known Problems Paternal Grandmother     No Known Problems Cousin     ADD / ADHD Neg Hx     Alcohol abuse Neg Hx     Anxiety disorder Neg Hx     Bipolar disorder Neg Hx     Dementia Neg Hx     Depression Neg Hx     Drug abuse Neg Hx     OCD Neg Hx     Paranoid behavior Neg Hx     Schizophrenia Neg Hx     Seizures Neg Hx     Self-Injury Neg Hx     Suicide Attempts Neg Hx      I have reviewed and agree with the history as documented  Social History   Substance Use Topics    Smoking status: Never Smoker    Smokeless tobacco: Never Used    Alcohol use No        Review of Systems   Constitutional: Negative for chills, diaphoresis and fever  HENT: Negative for congestion, rhinorrhea, sinus pressure and sore throat  Eyes: Negative for visual disturbance  Respiratory: Negative for cough, chest tightness and shortness of breath  Cardiovascular: Negative for chest pain  Gastrointestinal: Negative for abdominal pain, constipation, diarrhea, nausea and vomiting  Genitourinary: Negative for dysuria, frequency, hematuria and urgency  Musculoskeletal: Negative for arthralgias and myalgias  Skin: Negative for color change and rash  Neurological: Negative for dizziness, numbness and headaches  Psychiatric/Behavioral: Positive for hallucinations (Auditory)  Negative for self-injury ( nothing currently ) and suicidal ideas ( none currently)  Physical Exam  ED Triage Vitals [10/18/18 1254]   Temperature Pulse Respirations Blood Pressure SpO2   98 7 °F (37 1 °C) (!) 127 18 146/100 96 %      Temp Source Heart Rate Source Patient Position - Orthostatic VS BP Location FiO2 (%)   Tympanic Monitor Sitting Left arm --      Pain Score       --           Orthostatic Vital Signs  Vitals:    10/18/18 1254   BP: 146/100   Pulse: (!) 127   Patient Position - Orthostatic VS: Sitting       Physical Exam   Constitutional: He is oriented to person, place, and time  He appears well-developed and well-nourished  No distress  HENT:   Head: Normocephalic and atraumatic  Eyes: Pupils are equal, round, and reactive to light  Conjunctivae are normal    Neck: Normal range of motion  Neck supple  No JVD present  Cardiovascular: Normal rate, regular rhythm and normal heart sounds  Exam reveals no gallop and no friction rub  No murmur heard  Pulmonary/Chest: Effort normal and breath sounds normal  No stridor  No respiratory distress  He has no wheezes  He has no rales  Abdominal: Soft  Bowel sounds are normal  He exhibits no distension  There is no tenderness  There is no guarding  Musculoskeletal: Normal range of motion  He exhibits no edema, tenderness or deformity  Neurological: He is alert and oriented to person, place, and time  No cranial nerve deficit or sensory deficit  He exhibits normal muscle tone  Skin: Skin is warm and dry  No rash noted  He is not diaphoretic  No erythema  No pallor  Psychiatric: He has a normal mood and affect   His behavior is normal  Nursing note and vitals reviewed  ED Medications  Medications - No data to display    Diagnostic Studies  Results Reviewed     Procedure Component Value Units Date/Time    Rapid drug screen, urine [[id number]] Collected:  10/18/18 1541    Lab Status: In process Specimen:  Urine from Urine, Other Updated:  10/18/18 1546    POCT alcohol breath test [[id number]]  (Normal) Resulted:  10/18/18 1541    Lab Status:  Final result Updated:  10/18/18 1541     EXTBreath Alcohol 0 000                 No orders to display         Procedures  Procedures      Phone Consults  ED Phone Contact    ED Course                               MDM  Number of Diagnoses or Management Options  Dysphoric mood:   Diagnosis management comments: Patient seen and evaluated by the crisis worker here in the emergency department who determined that the patient is safe for discharge as he has no acute psychiatric illness requiring inpatient stay  Patient states that he would like to be discharged home  Patient was discharged home in good condition with instructions to follow up with his primary care provider or return to the emergency department sooner should he develop any new or concerning symptoms  CritCare Time    Disposition  Final diagnoses:   Dysphoric mood     Time reflects when diagnosis was documented in both MDM as applicable and the Disposition within this note     Time User Action Codes Description Comment    10/18/2018  4:16 PM Ash Mercado Add [R43 89] Dysphoric mood       ED Disposition     ED Disposition Condition Comment    Discharge  Bhavya Macias discharge to home/self care      Condition at discharge: Good        MD Documentation      Most Recent Value   Sending MD Becerril      Follow-up Information     Follow up With Specialties Details Why Contact Info Additional Information    Clay Ramirez MD Family Medicine   [address] [place] [address] Drive [phone]       [address] [place] [address] Emergency Department Emergency Medicine  If symptoms worsen [address]  [phone]  ED, [address], [place], [place], [address]          Discharge Medication List as of 10/18/2018  4:20 PM      CONTINUE these medications which have NOT CHANGED    Details   albuterol (PROVENTIL HFA,VENTOLIN HFA) 90 mcg/act inhaler Inhale 2 puffs every 8 (eight) hours as needed for wheezing, Historical Med      amLODIPine (NORVASC) 5 mg tablet Take 1 tablet (5 mg total) by mouth daily At 9AM, Starting Fri 10/12/2018, Print      !! divalproex sodium (DEPAKOTE) 250 mg EC tablet Take 3 tablets (750 mg total) by mouth daily at bedtime, Starting Fri 10/12/2018, Print      !! divalproex sodium (DEPAKOTE) 500 mg EC tablet Take 1 tablet (500 mg total) by mouth daily At 9AM, Starting Fri 10/12/2018, Print      mupirocin (BACTROBAN) 2 % ointment Apply topically daily To affected toe, Starting Fri 10/12/2018, Print      risperiDONE (RisperDAL) 2 mg tablet Take 1 tablet (2 mg total) by mouth 2 (two) times a day At [facility] and 6PM, Starting Fri 10/12/2018, Print      EPINEPHrine (EPIPEN) 0 3 mg/0 3 mL SOAJ Inject 0 3 mL (0 3 mg total) into a muscle once for 1 dose, Starting Fri 8/17/2018, Normal       !! - Potential duplicate medications found  Please discuss with provider  No discharge procedures on file  ED Provider  Attending physically available and evaluated Ellie Dilma FRANCO managed the patient along with the ED Attending      Electronically Signed by         Rosario Hatfield MD  10/18/18 1579

## 2018-10-18 NOTE — ED ATTENDING ATTESTATION
Taqueria Becerril DO, saw and evaluated the patient  I have discussed the patient with the resident/non-physician practitioner and agree with the resident's/non-physician practitioner's findings, Plan of Care, and MDM as documented in the resident's/non-physician practitioner's note, except where noted  All available labs and Radiology studies were reviewed  At this point I agree with the current assessment done in the Emergency Department  I have conducted an independent evaluation of this patient a history and physical is as follows:    56-year-old male with multiple admissions for suicidal and homicidal ideation presents for recurrent suicidal as well as homicidal ideation states he wants to status parents he is hearing voices telling him to stab his parents and stabbed himself  Reportedly last night his brother had to wrestle a knife out of his hand  Symptoms are constant, generalized in nature, no alleviating or exacerbating factors  Denies visual hallucinations  Impression:  Acute on chronic psychosis with suicidal and homicidal ideation with plan to stab himself his parents    Plan:  Breathalyzer, UDS, crisis eval         Critical Care Time  CritCare Time    Procedures

## 2018-10-26 ENCOUNTER — APPOINTMENT (OUTPATIENT)
Dept: LAB | Facility: HOSPITAL | Age: 23
End: 2018-10-26
Attending: PSYCHIATRY & NEUROLOGY
Payer: MEDICARE

## 2018-10-26 ENCOUNTER — TRANSCRIBE ORDERS (OUTPATIENT)
Dept: LAB | Facility: HOSPITAL | Age: 23
End: 2018-10-26

## 2018-10-26 DIAGNOSIS — F25.9 SCHIZOAFFECTIVE DISORDER, SUBCHRONIC CONDITION (HCC): Primary | ICD-10-CM

## 2018-10-26 DIAGNOSIS — F25.9 SCHIZOAFFECTIVE DISORDER, SUBCHRONIC CONDITION (HCC): ICD-10-CM

## 2018-10-26 LAB
ALBUMIN SERPL BCP-MCNC: 3.7 G/DL (ref 3.5–5)
ALP SERPL-CCNC: 72 U/L (ref 46–116)
ALT SERPL W P-5'-P-CCNC: 67 U/L (ref 12–78)
ANION GAP SERPL CALCULATED.3IONS-SCNC: 7 MMOL/L (ref 4–13)
AST SERPL W P-5'-P-CCNC: 34 U/L (ref 5–45)
BASOPHILS # BLD AUTO: 0.05 THOUSANDS/ΜL (ref 0–0.1)
BASOPHILS NFR BLD AUTO: 1 % (ref 0–1)
BILIRUB SERPL-MCNC: 0.73 MG/DL (ref 0.2–1)
BUN SERPL-MCNC: 9 MG/DL (ref 5–25)
CALCIUM SERPL-MCNC: 9 MG/DL (ref 8.3–10.1)
CHLORIDE SERPL-SCNC: 104 MMOL/L (ref 100–108)
CHOLEST SERPL-MCNC: 141 MG/DL (ref 50–200)
CO2 SERPL-SCNC: 25 MMOL/L (ref 21–32)
CREAT SERPL-MCNC: 0.76 MG/DL (ref 0.6–1.3)
EOSINOPHIL # BLD AUTO: 0.19 THOUSAND/ΜL (ref 0–0.61)
EOSINOPHIL NFR BLD AUTO: 3 % (ref 0–6)
ERYTHROCYTE [DISTWIDTH] IN BLOOD BY AUTOMATED COUNT: 12.5 % (ref 11.6–15.1)
GFR SERPL CREATININE-BSD FRML MDRD: 130 ML/MIN/1.73SQ M
GLUCOSE P FAST SERPL-MCNC: 96 MG/DL (ref 65–99)
HCT VFR BLD AUTO: 44.1 % (ref 36.5–49.3)
HDLC SERPL-MCNC: 27 MG/DL (ref 40–60)
HGB BLD-MCNC: 15 G/DL (ref 12–17)
IMM GRANULOCYTES # BLD AUTO: 0.04 THOUSAND/UL (ref 0–0.2)
IMM GRANULOCYTES NFR BLD AUTO: 1 % (ref 0–2)
LDLC SERPL CALC-MCNC: 76 MG/DL (ref 0–100)
LYMPHOCYTES # BLD AUTO: 1.89 THOUSANDS/ΜL (ref 0.6–4.47)
LYMPHOCYTES NFR BLD AUTO: 29 % (ref 14–44)
MCH RBC QN AUTO: 30.2 PG (ref 26.8–34.3)
MCHC RBC AUTO-ENTMCNC: 34 G/DL (ref 31.4–37.4)
MCV RBC AUTO: 89 FL (ref 82–98)
MONOCYTES # BLD AUTO: 0.84 THOUSAND/ΜL (ref 0.17–1.22)
MONOCYTES NFR BLD AUTO: 13 % (ref 4–12)
NEUTROPHILS # BLD AUTO: 3.6 THOUSANDS/ΜL (ref 1.85–7.62)
NEUTS SEG NFR BLD AUTO: 53 % (ref 43–75)
NONHDLC SERPL-MCNC: 114 MG/DL
NRBC BLD AUTO-RTO: 0 /100 WBCS
PLATELET # BLD AUTO: 236 THOUSANDS/UL (ref 149–390)
PMV BLD AUTO: 9.2 FL (ref 8.9–12.7)
POTASSIUM SERPL-SCNC: 3.5 MMOL/L (ref 3.5–5.3)
PROT SERPL-MCNC: 7.7 G/DL (ref 6.4–8.2)
RBC # BLD AUTO: 4.97 MILLION/UL (ref 3.88–5.62)
SODIUM SERPL-SCNC: 136 MMOL/L (ref 136–145)
T4 FREE SERPL-MCNC: 1.05 NG/DL (ref 0.76–1.46)
TRIGL SERPL-MCNC: 192 MG/DL
TSH SERPL DL<=0.05 MIU/L-ACNC: 1.78 UIU/ML (ref 0.36–3.74)
WBC # BLD AUTO: 6.61 THOUSAND/UL (ref 4.31–10.16)

## 2018-10-26 PROCEDURE — 36415 COLL VENOUS BLD VENIPUNCTURE: CPT

## 2018-10-26 PROCEDURE — 84443 ASSAY THYROID STIM HORMONE: CPT

## 2018-10-26 PROCEDURE — 80061 LIPID PANEL: CPT

## 2018-10-26 PROCEDURE — 80165 DIPROPYLACETIC ACID FREE: CPT

## 2018-10-26 PROCEDURE — 85025 COMPLETE CBC W/AUTO DIFF WBC: CPT

## 2018-10-26 PROCEDURE — 80053 COMPREHEN METABOLIC PANEL: CPT

## 2018-10-26 PROCEDURE — 84439 ASSAY OF FREE THYROXINE: CPT

## 2018-10-29 LAB — VALPROATE FREE SERPL-MCNC: 4.6 UG/ML (ref 6–22)

## 2018-11-08 ENCOUNTER — HOSPITAL ENCOUNTER (EMERGENCY)
Facility: HOSPITAL | Age: 23
End: 2018-11-09
Attending: EMERGENCY MEDICINE | Admitting: EMERGENCY MEDICINE
Payer: MEDICARE

## 2018-11-08 DIAGNOSIS — R45.851 SUICIDAL IDEATION: Primary | ICD-10-CM

## 2018-11-08 PROCEDURE — 99285 EMERGENCY DEPT VISIT HI MDM: CPT

## 2018-11-08 PROCEDURE — 80307 DRUG TEST PRSMV CHEM ANLYZR: CPT | Performed by: EMERGENCY MEDICINE

## 2018-11-08 PROCEDURE — 82075 ASSAY OF BREATH ETHANOL: CPT | Performed by: EMERGENCY MEDICINE

## 2018-11-08 NOTE — ED NOTES
Pt has Medicare Part A/B as his primary insurance(No Pre-Cert needed)  CW EVS'd pt and per EVS pt has [facility] as his secondary insurance      [address] [place] [address]

## 2018-11-08 NOTE — ED NOTES
1:1 in place  Pt changed into psychiatric attire  Personal belongings secured   MD bedside     Son Lim RN  11/08/18 3913

## 2018-11-08 NOTE — LETTER
Section I - General Information    Name of Patient: Arpan Hendrickson                 : 1995    Medicare #:____________________  Transport Date: 18 (PCS is valid for round trips on this date and for all repetitive trips in the 60-day range as noted below )  Origin: [address] [place] [address], [place]________________________________________________  Is the pt's stay covered under Medicare Part A (PPS/DRG)     (_) YES  (X) NO  Closest appropriate facility? (X) YES  (_) NO  If no, why is transport to more distant facility required?________________________  If hosp-hosp transfer, describe services needed at 2nd facility not available at 1st facility? _________________________________  If hospice pt, is this transport related to pt's terminal illness? (_) YES (X) NO Describe____________________________________    Section II - Medical Necessity Questionnaire  Ambulance transportation is medically necessary only if other means of transport are contraindicated or would be potentially harmful to the patient  To meet this requirement, the patient must either be "bed confined" or suffer from a condition such that transport by means other than ambulance is contraindicated by the patient's condition   The following questions must be answered by the medical professional signing below for this form to be valid:    1)  Describe the MEDICAL CONDITION (physical and/or mental) of this patient AT [address] [place] [address] that requires the patient to be transported in an ambulance and why transport by other means is contraindicated by the patient's condition:__________________________________________________________________________________________________    2) Is the patient "bed confined" as defined below?     (_) YES  (X) NO  To be "be confined" the patient must satisfy all three of the following conditions: (1) unable to get up from bed without Assistance; AND (2) unable to ambulate; AND (3) unable to sit in a chair or wheelchair  3) Can this patient safely be transported by car or wheelchair Rene Sale (i e , seated during transport without a medical attendant or monitoring)?   (_) YES  (X) NO    4) In addition to completing questions 1-3 above, please check any of the following conditions that apply*:  *Note: supporting documentation for any boxes checked must be maintained in the patient's medical records  (_)Contractures   (_)Non-Healed Fractures  (_)Patient is confused (_)Patient is comatose (_)Moderate/severe pain on movement (X)Danger to self/others  (_)IV meds/fluids required (_)Patient is combative(_)Need or possible need for restraints (_)DVT requires elevation of lower extremity  (_)Medical attendant required (_)Requires oxygen-unable to self administer (_)Special handling/isolation/infection control precautions required (_)Unable to tolerate seated position for time needed to transport (_)Hemodynamic monitoring required en route (_)Unable to sit in a chair or wheelchair due to decubitus ulcers or other wounds (_)Cardiac monitoring required en route (_)Morbid obesity requires additional personnel/equipment to safely handle patient (_)Orthopedic device (backboard, halo, pins, traction, brace, wedge, etc,) requiring special handling during transport (_)Other(specify)_______________________________________________    Section III - Signature of Physician or Healthcare Professional  I certify that the above information is true and correct based on my evaluation of this patient, and represent that the patient requires transport by ambulance and that other forms of transport are contraindicated   I understand that this information will be used by the Centers for Medicare and Medicaid Services (CMS) to support the determination of medical necessity for ambulance services, and I represent that I have personal knowledge of the patient's condition at time of transport  (_) If this box is checked, I also certify that the patient is physically or mentally incapable of signing the ambulance service's claim and that the institution with which I am affiliated has furnished care, services, or assistance to the patient  My signature below is made on behalf of the patient pursuant to 42 CFR §424 36(b)(4)  In accordance with 42 CFR §424 37, the specific reason(s) that the patient is physically or mentally incapable of signing the claim form is as follows: _________________________________________________________________________________________________________      Signature of Physician* or Healthcare Professional______________________________________________________________  Signature Date 11/08/18 (For scheduled repetitive transports, this form is not valid for transports performed more than 60 days after this date)    Printed Name & Credentials of Physician or Healthcare Professional (MD, DO, RN, etc )________________________________  *Form must be signed by patient's attending physician for scheduled, repetitive transports   For non-repetitive, unscheduled ambulance transports, if unable to obtain the signature of the attending physician, any of the following may sign (choose appropriate option below)  (_) Physician Assistant (_)  Clinical Nurse Specialist (_)  Registered Nurse  (_)  Nurse Practitioner  (X) Discharge Planner

## 2018-11-08 NOTE — ED NOTES
Awaiting crisis eval  No needs at this time  1:1 in place   Pt is calm, cooperative and appropriate     Hodan Hanley, RN  11/08/18 7512

## 2018-11-08 NOTE — ED ATTENDING ATTESTATION
Sandra Pryor MD, saw and evaluated the patient  All available labs and X-rays were ordered by me or the resident and have been reviewed by myself  I discussed the patient with the resident / non-physician and agree with the resident's / non-physician practitioner's findings and plan as documented in the resident's / non-physician practicitioner's note, except where noted  At this point, I agree with the current assessment done in the ED  Chief Complaint   Patient presents with    Suicidal     pt with suicidal/homicidal ideation  pt states wants to kill himself and his parents with a knife  pt anxious because he has access to knives  pt currently calm, cooperative and appropriate     This is a 26year old male presenting for SI/HI  Hx of schizoaffective disorder + mild cognitive impairement  He has command hallucinations to stab his parents as well as himself  Has access to knives  Had similar in the past  PE:  Vitals:    11/08/18 1534 11/08/18 1652 11/08/18 1824 11/09/18 0143   BP: 144/78 134/79 137/80 (!) 181/113   BP Location: Right arm Right arm Right arm Right arm   Pulse: 77 80 77 80   Resp: 18 20 20 20   Temp: 98 °F (36 7 °C)      TempSrc: Oral      SpO2: 100% 99% 99%    Weight: 135 kg (297 lb 9 9 oz)      General: VS reviewed  Appears in NAD  awake, alert  Well-nourished, well-developed  Appears stated age  Speaking normally in full sentences  Head: Normocephalic, atraumatic, nontender  Eyes: EOM-I  No diplopia  No hyphema  No subconjunctival hemorrhages  Symmetrical lids  ENT: Atraumatic external nose and ears  MMM  No malocclusion  No stridor  Normal phonation  No drooling  Normal swallowing  Neck: No JVD  CV: No pallor noted  Peripheral pulses +2 throughout  No chest wall tenderness  Lungs:   No tachypnea  No respiratory distress  MSK:   FROM   Skin: Dry, intact  Neuro: Awake, alert, GCS15, CN II-XII grossly intact     Motor grossly intact  Psychiatric/Behavioral: Appropriate mood and affect   Exam: deferred  A:  - SI/HI  P:  - 201    - 13 point ROS was performed and all are normal unless stated in the history above  - Nursing note reviewed  Vitals reviewed  - Orders placed by myself and/or advanced practitioner / resident     - Previous chart was reviewed  - No language barrier    - History obtained from patient  - There are no limitations to the history obtained  - Critical care time: Not applicable for this patient  Final Diagnosis:  1  Suicidal ideation        ED Course as of Nov 13 2010   Thu Nov 08, 2018   2117 EMTALA      Medications - No data to display  No orders to display     Orders Placed This Encounter   Procedures    Rapid drug screen, urine    POCT alcohol breath test     Labs Reviewed   RAPID DRUG SCREEN, URINE - Normal       Result Value Ref Range Status    Amph/Meth UR Negative  Negative Final    Barbiturate Ur Negative  Negative Final    Benzodiazepine Urine Negative  Negative Final    Cocaine Urine Negative  Negative Final    Methadone Urine Negative  Negative Final    Opiate Urine Negative  Negative Final    PCP Ur Negative  Negative Final    THC Urine Negative  Negative Final    Narrative:     FOR MEDICAL PURPOSES ONLY  IF CONFIRMATION NEEDED PLEASE CONTACT THE LAB WITHIN 5 DAYS      Drug Screen Cutoff Levels:  AMPHETAMINE/METHAMPHETAMINES  1000 ng/mL  BARBITURATES     200 ng/mL  BENZODIAZEPINES     200 ng/mL  COCAINE      300 ng/mL  METHADONE      300 ng/mL  OPIATES      300 ng/mL  PHENCYCLIDINE     25 ng/mL  THC       50 ng/mL   POCT ALCOHOL BREATH TEST - Normal    EXTBreath Alcohol 0 000   Final     Time reflects when diagnosis was documented in both MDM as applicable and the Disposition within this note     Time User Action Codes Description Comment    11/9/2018  1:51 AM Stevo Coffey Add [J74 [id number]] Suicidal ideation       ED Disposition     ED Disposition Condition Comment    Transfer to Another Tika [facility] should be transferred out to [facility]       MD Documentation      Most Recent Value   Patient Condition  The patient has been stabilized such that within reasonable medical probability, no material deterioration of the patient condition or the condition of the unborn child(gianna) is likely to result from the transfer   Reason for Transfer  Level of Care needed not available at this facility   Benefits of Transfer  Specialized equipment and/or services available at the receiving facility (Include comment)________________________   Risks of Transfer  Potential for delay in receiving treatment   Accepting Physician  DR Xochitl Herman Name, [address] [place], [place]    (Name & Tel number)  Renetta Ewing ([address]) [phone]   Transported by (Company and Unit #)  [facility] [place]   Sending MD DR Jesus Patterson      RN Documentation      Most [facility] Name, [address] [place], [place]   Bed Assignment  [facility]    (Name & Tel number)  Renetta Ewing ([address] [place] [address] Drive) [phone]   Report Given to  Melina 83 (ADMISSIONS)    Medications Reviewed with Next Provider of Service  Yes   Transport Mode  Ambulance [SLETS]   Transported by [employer] and Unit #)  SLETS   Level of Care  Basic life support [SLETS]   Copies of Medical Records Sent  History and Physical, Orders, Progress note, Nursing note, Labs   Patient Belongings Disposition  Sent with patient      Follow-up Information    None       Discharge Medication List as of 11/9/2018  2:13 AM      CONTINUE these medications which have NOT CHANGED    Details   albuterol (PROVENTIL HFA,VENTOLIN HFA) 90 mcg/act inhaler Inhale 2 puffs every 8 (eight) hours as needed for wheezing, Historical Med      amLODIPine (NORVASC) 5 mg tablet Take 1 tablet (5 mg total) by mouth daily At 9AM, Starting Fri 10/12/2018, Print      !! divalproex sodium (DEPAKOTE) 250 mg EC tablet Take 3 tablets (750 mg total) by mouth daily at bedtime, Starting Fri 10/12/2018, Print      !! divalproex sodium (DEPAKOTE) 500 mg EC tablet Take 1 tablet (500 mg total) by mouth daily At 9AM, Starting Fri 10/12/2018, Print      EPINEPHrine (EPIPEN) 0 3 mg/0 3 mL SOAJ Inject 0 3 mL (0 3 mg total) into a muscle once for 1 dose, Starting Fri 8/17/2018, Normal      mupirocin (BACTROBAN) 2 % ointment Apply topically daily To affected toe, Starting Fri 10/12/2018, Print      risperiDONE (RisperDAL) 2 mg tablet Take 1 tablet (2 mg total) by mouth 2 (two) times a day At [facility] and 6PM, Starting Fri 10/12/2018, Print       !! - Potential duplicate medications found  Please discuss with provider  No discharge procedures on file  Prior to Admission Medications   Prescriptions Last Dose Informant Patient Reported? Taking? EPINEPHrine (EPIPEN) 0 3 mg/0 3 mL SOAJ   No No   Sig: Inject 0 3 mL (0 3 mg total) into a muscle once for 1 dose   albuterol (PROVENTIL HFA,VENTOLIN HFA) 90 mcg/act inhaler   Yes No   Sig: Inhale 2 puffs every 8 (eight) hours as needed for wheezing   amLODIPine (NORVASC) 5 mg tablet   No No   Sig: Take 1 tablet (5 mg total) by mouth daily At 9AM   divalproex sodium (DEPAKOTE) 250 mg EC tablet   No No   Sig: Take 3 tablets (750 mg total) by mouth daily at bedtime   divalproex sodium (DEPAKOTE) 500 mg EC tablet   No No   Sig: Take 1 tablet (500 mg total) by mouth daily At 9AM   mupirocin (BACTROBAN) 2 % ointment   No No   Sig: Apply topically daily To affected toe   risperiDONE (RisperDAL) 2 mg tablet   No No   Sig: Take 1 tablet (2 mg total) by mouth 2 (two) times a day At 9AM and 6PM      Facility-Administered Medications: None       Portions of the record may have been created with voice recognition software  Occasional wrong word or "sound a like" substitutions may have occurred due to the inherent limitations of voice recognition software   Read the chart carefully and recognize, using context, where substitutions have occurred      Electronically signed by:  Oanh Michaud

## 2018-11-08 NOTE — ED NOTES
Pt is a 26 y o  male who was brought to the ED with   Chief Complaint   Patient presents with    Suicidal     pt with suicidal/homicidal ideation  pt states wants to kill himself and his parents with a knife  pt anxious because he has access to knives  pt currently calm, cooperative and appropriate   Pt brought to the ED via EMS with complaints A/H command type "the voices are telling me to kill my mom" Pt also reports S/I with no stated plan, H/I towards his mother  Pt report that he is taking his meds but doesnt think they are working  Pt is a very poor historian   Pt admits to S/I,H/I,A/H  Pt denies V/H  Intake Assessment completed, Safety risk Assessment completed  CW met with pt and discussed the process of a BHU admission, pt has agreed and signed a 201, CW discussed this case and pt plan with ED Physician who is in agreement with this plan  CW will start bed search and complete Pre-Cert           [facility] Worker

## 2018-11-09 VITALS
SYSTOLIC BLOOD PRESSURE: 181 MMHG | TEMPERATURE: 98 F | WEIGHT: 297.62 LBS | DIASTOLIC BLOOD PRESSURE: 113 MMHG | RESPIRATION RATE: 20 BRPM | OXYGEN SATURATION: 99 % | HEART RATE: 80 BPM | BODY MASS INDEX: 45.25 KG/M2

## 2018-11-09 NOTE — ED NOTES
CW started bed search, CW called [facility] and spoke with Анна Blancas ([facility]) who informed me to fax clinical; Clinical has been faxed       [address] [place] [address]

## 2018-11-09 NOTE — ED NOTES
Crisis worker in [facility] for evaluation     Claritza Suarez RN  11/08/18 6382

## 2018-11-09 NOTE — ED PROVIDER NOTES
History  Chief Complaint   Patient presents with    Suicidal     pt with suicidal/homicidal ideation  pt states wants to kill himself and his parents with a knife  pt anxious because he has access to knives  pt currently calm, cooperative and appropriate     52-year-old male with history of schizoaffective disorder and mild cognitive impairment presenting to the emergency department for a psychiatric evaluation  He reports 1 week of progressively worse command hallucinations, auditory hallucinations it when she describes voices telling him to harm himself and his parents with a knife  He has access to acute denies at home and he is concerned he could have himself or his parents  He therefore is coming to the emergency department for an evaluation  He does have a history of suicidal behavior in the past never had a suicide attempt he has been admitted to the hospital in the past   He is currently denying drug use, denies any physical complaints at this time  Prior to Admission Medications   Prescriptions Last Dose Informant Patient Reported? Taking?    EPINEPHrine (EPIPEN) 0 3 mg/0 3 mL SOAJ   No No   Sig: Inject 0 3 mL (0 3 mg total) into a muscle once for 1 dose   albuterol (PROVENTIL HFA,VENTOLIN HFA) 90 mcg/act inhaler   Yes No   Sig: Inhale 2 puffs every 8 (eight) hours as needed for wheezing   amLODIPine (NORVASC) 5 mg tablet   No No   Sig: Take 1 tablet (5 mg total) by mouth daily At 9AM   divalproex sodium (DEPAKOTE) 250 mg EC tablet   No No   Sig: Take 3 tablets (750 mg total) by mouth daily at bedtime   divalproex sodium (DEPAKOTE) 500 mg EC tablet   No No   Sig: Take 1 tablet (500 mg total) by mouth daily At 9AM   mupirocin (BACTROBAN) 2 % ointment   No No   Sig: Apply topically daily To affected toe   risperiDONE (RisperDAL) 2 mg tablet   No No   Sig: Take 1 tablet (2 mg total) by mouth 2 (two) times a day At 9AM and 6PM      Facility-Administered Medications: None       Past Medical History:   Diagnosis Date    Anxiety     Asthma     Hypertension     Mental retardation     Psychiatric disorder     Psychiatric illness     Schizoaffective disorder ([facility] [address] )        Past Surgical History:   Procedure Laterality Date    HAND SURGERY      right hand       Family History   Problem Relation Age of Onset    No Known Problems Mother     No Known Problems Father     No Known Problems Sister     No Known Problems Brother     No Known Problems Maternal Aunt     No Known Problems Paternal Aunt     No Known Problems Maternal Uncle     No Known Problems Paternal Uncle     No Known Problems Maternal Grandfather     No Known Problems Maternal Grandmother     No Known Problems Paternal Grandfather     No Known Problems Paternal Grandmother     No Known Problems Cousin     ADD / ADHD Neg Hx     Alcohol abuse Neg Hx     Anxiety disorder Neg Hx     Bipolar disorder Neg Hx     Dementia Neg Hx     Depression Neg Hx     Drug abuse Neg Hx     OCD Neg Hx     Paranoid behavior Neg Hx     Schizophrenia Neg Hx     Seizures Neg Hx     Self-Injury Neg Hx     Suicide Attempts Neg Hx      I have reviewed and agree with the history as documented  Social History   Substance Use Topics    Smoking status: Never Smoker    Smokeless tobacco: Never Used    Alcohol use No        Review of Systems   Constitutional: Negative for appetite change, chills, fatigue and fever  HENT: Negative for sneezing and sore throat  Eyes: Negative for visual disturbance  Respiratory: Negative for cough, choking, chest tightness, shortness of breath and wheezing  Cardiovascular: Negative for chest pain and palpitations  Gastrointestinal: Negative for abdominal pain, constipation, diarrhea, nausea and vomiting  Genitourinary: Negative for difficulty urinating and dysuria  Neurological: Negative for dizziness, weakness, light-headedness, numbness and headaches     Psychiatric/Behavioral: Positive for behavioral problems, dysphoric mood, self-injury and suicidal ideas  The patient is nervous/anxious  All other systems reviewed and are negative  Physical Exam  ED Triage Vitals [11/08/18 1534]   Temperature Pulse Respirations Blood Pressure SpO2   98 °F (36 7 °C) 77 18 144/78 100 %      Temp Source Heart Rate Source Patient Position - Orthostatic VS BP Location FiO2 (%)   Oral Monitor Sitting Right arm --      Pain Score       No Pain           Orthostatic Vital Signs  Vitals:    11/08/18 1534 11/08/18 1652 11/08/18 1824 11/09/18 0143   BP: 144/78 134/79 137/80 (!) 181/113   Pulse: 77 80 77 80   Patient Position - Orthostatic VS: Sitting Sitting Lying Sitting       Physical Exam   Constitutional: He is oriented to person, place, and time  He appears well-developed and well-nourished  No distress  HENT:   Head: Normocephalic and atraumatic  Mouth/Throat: Oropharynx is clear and moist    Eyes: Pupils are equal, round, and reactive to light  EOM are normal    Neck: No JVD present  No tracheal deviation present  Cardiovascular: Normal rate, regular rhythm, normal heart sounds and intact distal pulses  Exam reveals no gallop and no friction rub  No murmur heard  Pulmonary/Chest: Effort normal and breath sounds normal  No respiratory distress  He has no wheezes  He has no rales  Abdominal: Soft  Bowel sounds are normal  He exhibits no distension  There is no tenderness  There is no rebound and no guarding  Neurological: He is alert and oriented to person, place, and time  No cranial nerve deficit  He exhibits normal muscle tone  Skin: Skin is warm and dry  He is not diaphoretic  No pallor  Psychiatric: He has a normal mood and affect  His behavior is normal    Nursing note and vitals reviewed        ED Medications  Medications - No data to display    Diagnostic Studies  Results Reviewed     Procedure Component Value Units Date/Time    Rapid drug screen, urine [[id number]]  (Normal) Collected: 11/08/18 1556    Lab Status:  Final result Specimen:  Urine from Urine, Other Updated:  11/08/18 1709     Amph/Meth UR Negative     Barbiturate Ur Negative     Benzodiazepine Urine Negative     Cocaine Urine Negative     Methadone Urine Negative     Opiate Urine Negative     PCP Ur Negative     THC Urine Negative    Narrative:         FOR MEDICAL PURPOSES ONLY  IF CONFIRMATION NEEDED PLEASE CONTACT THE LAB WITHIN 5 DAYS  Drug Screen Cutoff Levels:  AMPHETAMINE/METHAMPHETAMINES  1000 ng/mL  BARBITURATES     200 ng/mL  BENZODIAZEPINES     200 ng/mL  COCAINE      300 ng/mL  METHADONE      300 ng/mL  OPIATES      300 ng/mL  PHENCYCLIDINE     25 ng/mL  THC       50 ng/mL    POCT alcohol breath test [[id number]]  (Normal) Resulted:  11/08/18 1556    Lab Status:  Final result Updated:  11/08/18 1556     EXTBreath Alcohol 0 000                 No orders to display         Procedures  Procedures      Phone Consults  ED Phone Contact    ED Course                               MDM  Number of Diagnoses or Management Options  Suicidal ideation:   Diagnosis management comments: 52-year-old male with schizoaffective disorder and SI/HI which command hallucinations a cane to step a self and his parents  She will obtain Bactrim UDS, crisis consult, patient willing to 201, otherwise will 302 as needed      CritCare Time    Disposition  Final diagnoses:   Suicidal ideation     Time reflects when diagnosis was documented in both MDM as applicable and the Disposition within this note     Time User Action Codes Description Comment    11/9/2018  1:51 AM Jose Coffey Add [V89 191] Suicidal ideation       ED Disposition     ED Disposition Condition Comment    Transfer to [facility] [place] [facility] should be transferred out to [facility]       MD Documentation      Most Recent Value   Patient Condition  The patient has been stabilized such that within reasonable medical probability, no material deterioration of the patient condition or the condition of the unborn child(gianna) is likely to result from the transfer   Reason for Transfer  Level of Care needed not available at this facility   Benefits of Transfer  Specialized equipment and/or services available at the receiving facility (Include comment)________________________   Risks of Transfer  Potential for delay in receiving treatment   Accepting Physician  DR Xochitl Herman Name, [address] [place], [place]    (Name & Tel number)  Jarvis Mercer ([employer] [phone]   Transported by (Company and Unit #)  [address]   Sending MD  DR Tito Mac      RN Documentation      Most [facility] Name, [address] [place], [place]   Bed Assignment  [facility]    (Name & Tel number)  Jarvis Mercer ([employer] [phone]   [facility]    Medications Reviewed with Next Provider of Service  Yes   Transport Mode  Ambulance [[facility]]   Transported by [employer] and Unit #)  [facility]   Level of Care  Basic life support [[facility]]   Copies of Medical Records Sent  History and Physical, Orders, Progress note, Nursing note, Labs   Patient Belongings Disposition  Sent with patient      Follow-up Information    None         Discharge Medication List as of 11/9/2018  2:13 AM      CONTINUE these medications which have NOT CHANGED    Details   albuterol (PROVENTIL HFA,VENTOLIN HFA) 90 mcg/act inhaler Inhale 2 puffs every 8 (eight) hours as needed for wheezing, Historical Med      amLODIPine (NORVASC) 5 mg tablet Take 1 tablet (5 mg total) by mouth daily At [address] [place] [address], Starting Fri 10/12/2018, Print      !! divalproex sodium (DEPAKOTE) 250 mg EC tablet Take 3 tablets (750 mg total) by mouth daily at bedtime, Starting Fri 10/12/2018, Print      !! divalproex sodium (DEPAKOTE) 500 mg EC tablet Take 1 tablet (500 mg total) by mouth daily At [address] [place] [address], Starting Fri 10/12/2018, Print      EPINEPHrine (EPIPEN) 0 3 mg/0 3 mL SOAJ Inject 0 3 mL (0 3 mg total) into a muscle once for 1 dose, Starting Fri 8/17/2018, Normal      mupirocin (BACTROBAN) 2 % ointment Apply topically daily To affected toe, Starting Fri 10/12/2018, Print      risperiDONE (RisperDAL) 2 mg tablet Take 1 tablet (2 mg total) by mouth 2 (two) times a day At [facility] and 6PM, Starting Fri 10/12/2018, Print       !! - Potential duplicate medications found  Please discuss with provider  No discharge procedures on file  ED Provider  Attending physically available and evaluated Roxanna Session  I managed the patient along with the ED Attending      Electronically Signed by         Radha Roland MD  11/10/18 6557

## 2018-11-09 NOTE — ED NOTES
Patient is accepted at [facility]  Patient is accepted by Dr Michelle Lopez per Elias Bran (admissions)    Transportation is arranged with [facility]  Transportation is scheduled for 11/09/2018 @0130  Patient may go to the floor at [address] [facility] Worker

## 2018-11-09 NOTE — EMTALA/ACUTE CARE TRANSFER
[address]  [address] [facility]  Dept: Rolando AZEVEDO Ashli Arriaga                                         1995                              MRN [id number]    I have been informed of my rights regarding examination, treatment, and transfer   by Dr Tammi Lemon MD    Benefits: Specialized equipment and/or services available at the receiving facility (Include comment)________________________    Risks: Potential for delay in receiving treatment      Transfer Refusal:  I acknowledge that my medical condition has been evaluated and explained to me by the emergency department physician or other qualified medical person and/or my attending physician, who has recommended that I be transferred to the service of  Accepting Physician: DR Popeye Casas at [facility] Name, [address] : [place], [place]  The above potential benefits of such transfer, the potential risks associated with transfer, and the probable risks of not being transferred have been explained to me, and I fully understand them  I nevertheless refuse to be transferred  I release the Hospital, the doctor, and any other persons caring for me from all responsibility or liability for any injury or ill effects that may result from this refusal and agree to accept all responsibility for the consequences of my refusal     I authorize the performance of emergency medical procedures and treatments upon me in both transit and upon arrival at the receiving facility  Additionally, I authorize the release of any and all medical records to the receiving facility and request they be transported with me, if possible  I understand that the safest mode of transportation during a medical emergency is an ambulance and that the Hospital advocates the use of this mode of transport   Risks of traveling to the receiving facility by car, including absence of medical control, life sustaining equipment, such as oxygen, and medical personnel has been explained to me and I fully understand them  (WIL CORRECT BOX BELOW)  [  ]  I consent to the stated transfer and to be transported by ambulance/helicopter  [  ]  I consent to the stated transfer, but refuse transportation by ambulance and accept full responsibility for my transportation by car  I understand the risks of non-ambulance transfers and I exonerate the Hospital and its staff from any deterioration in my condition that results from this refusal     X___________________________________________    DATE  18  TIME________  Signature of patient or legally responsible individual signing on patient behalf           RELATIONSHIP TO PATIENT_________________________          Provider Certification    NAME Ellie Perera                                         1995                              MRN [id number]    A medical screening exam was performed on the above named patient  Based on the examination:    Condition Necessitating Transfer There were no encounter diagnoses      Patient Condition: The patient has been stabilized such that within reasonable medical probability, no material deterioration of the patient condition or the condition of the unborn child(gianna) is likely to result from the transfer    Reason for Transfer: Level of Care needed not available at this facility    Transfer Requirements: [place], [place]   · Space available and qualified personnel available for treatment as acknowledged by Juan Manuel Alan ([facility]) [phone]  · Agreed to accept transfer and to provide appropriate medical treatment as acknowledged by       DR Bre Melgar  · Appropriate medical records of the examination and treatment of the patient are provided at the time of transfer   [facility], [address] _______  · Transfer will be performed by qualified personnel from [employer]  and appropriate transfer equipment as required, including the use of necessary and appropriate life support measures  Provider Certification: I have examined the patient and explained the following risks and benefits of being transferred/refusing transfer to the patient/family:         Based on these reasonable risks and benefits to the patient and/or the unborn child(gianna), and based upon the information available at the time of the patients examination, I certify that the medical benefits reasonably to be expected from the provision of appropriate medical treatments at another medical facility outweigh the increasing risks, if any, to the individuals medical condition, and in the case of labor to the unborn child, from effecting the transfer      X____________________________________________ DATE 11/08/18        TIME_______      ORIGINAL - SEND TO MEDICAL RECORDS   COPY - SEND WITH PATIENT DURING TRANSFER

## 2018-11-09 NOTE — ED NOTES
Insurance Authorization:   Phone call placed to [facility]   Phone number: [phone]  Spoke to Carita Kawasaki (care manager)      COB completed   Level of care: IP  Review on TBD  Authorization # [facility] [place] Worker

## 2019-01-31 ENCOUNTER — HOSPITAL ENCOUNTER (EMERGENCY)
Facility: HOSPITAL | Age: 24
End: 2019-01-31
Attending: EMERGENCY MEDICINE | Admitting: EMERGENCY MEDICINE
Payer: MEDICARE

## 2019-01-31 ENCOUNTER — HOSPITAL ENCOUNTER (INPATIENT)
Facility: HOSPITAL | Age: 24
LOS: 47 days | Discharge: OTHER INSTITUTION | DRG: 885 | End: 2019-03-19
Attending: PSYCHIATRY & NEUROLOGY | Admitting: PSYCHIATRY & NEUROLOGY
Payer: MEDICARE

## 2019-01-31 VITALS
SYSTOLIC BLOOD PRESSURE: 141 MMHG | HEART RATE: 90 BPM | DIASTOLIC BLOOD PRESSURE: 78 MMHG | BODY MASS INDEX: 45.25 KG/M2 | OXYGEN SATURATION: 100 % | RESPIRATION RATE: 18 BRPM | TEMPERATURE: 97.3 F | WEIGHT: 297.62 LBS

## 2019-01-31 DIAGNOSIS — G25.9 EXTRAPYRAMIDAL AND MOVEMENT DISORDER: ICD-10-CM

## 2019-01-31 DIAGNOSIS — I10 HYPERTENSION, UNSPECIFIED TYPE: ICD-10-CM

## 2019-01-31 DIAGNOSIS — F41.9 ANXIETY: ICD-10-CM

## 2019-01-31 DIAGNOSIS — R45.851 SUICIDAL IDEATION: ICD-10-CM

## 2019-01-31 DIAGNOSIS — G47.00 INSOMNIA: ICD-10-CM

## 2019-01-31 DIAGNOSIS — I10 HYPERTENSION, UNSPECIFIED TYPE: Primary | ICD-10-CM

## 2019-01-31 DIAGNOSIS — J45.909 ASTHMA: ICD-10-CM

## 2019-01-31 DIAGNOSIS — L60.0 INGROWN LEFT BIG TOENAIL: ICD-10-CM

## 2019-01-31 DIAGNOSIS — F79 INTELLECTUAL DISABILITY: Chronic | ICD-10-CM

## 2019-01-31 DIAGNOSIS — R00.0 TACHYCARDIA: ICD-10-CM

## 2019-01-31 DIAGNOSIS — F25.0 SCHIZOAFFECTIVE DISORDER, BIPOLAR TYPE (HCC): Primary | Chronic | ICD-10-CM

## 2019-01-31 PROCEDURE — 82075 ASSAY OF BREATH ETHANOL: CPT | Performed by: EMERGENCY MEDICINE

## 2019-01-31 PROCEDURE — 80307 DRUG TEST PRSMV CHEM ANLYZR: CPT | Performed by: EMERGENCY MEDICINE

## 2019-01-31 PROCEDURE — 99285 EMERGENCY DEPT VISIT HI MDM: CPT

## 2019-01-31 RX ORDER — BENZTROPINE MESYLATE 1 MG/1
2 TABLET ORAL EVERY 6 HOURS PRN
Status: CANCELLED | OUTPATIENT
Start: 2019-01-31

## 2019-01-31 RX ORDER — MAGNESIUM HYDROXIDE/ALUMINUM HYDROXICE/SIMETHICONE 120; 1200; 1200 MG/30ML; MG/30ML; MG/30ML
15 SUSPENSION ORAL EVERY 4 HOURS PRN
Status: DISCONTINUED | OUTPATIENT
Start: 2019-01-31 | End: 2019-03-19 | Stop reason: HOSPADM

## 2019-01-31 RX ORDER — TRAZODONE HYDROCHLORIDE 50 MG/1
50 TABLET ORAL
Status: CANCELLED | OUTPATIENT
Start: 2019-01-31

## 2019-01-31 RX ORDER — RISPERIDONE 1 MG/1
1 TABLET, ORALLY DISINTEGRATING ORAL EVERY 6 HOURS PRN
Status: CANCELLED | OUTPATIENT
Start: 2019-01-31

## 2019-01-31 RX ORDER — TRAZODONE HYDROCHLORIDE 50 MG/1
50 TABLET ORAL
Status: DISCONTINUED | OUTPATIENT
Start: 2019-01-31 | End: 2019-03-19 | Stop reason: HOSPADM

## 2019-01-31 RX ORDER — ACETAMINOPHEN 325 MG/1
325 TABLET ORAL EVERY 6 HOURS PRN
Status: DISCONTINUED | OUTPATIENT
Start: 2019-01-31 | End: 2019-02-06

## 2019-01-31 RX ORDER — HALOPERIDOL 5 MG
5 TABLET ORAL EVERY 6 HOURS PRN
Status: CANCELLED | OUTPATIENT
Start: 2019-01-31

## 2019-01-31 RX ORDER — MAGNESIUM HYDROXIDE/ALUMINUM HYDROXICE/SIMETHICONE 120; 1200; 1200 MG/30ML; MG/30ML; MG/30ML
15 SUSPENSION ORAL EVERY 4 HOURS PRN
Status: CANCELLED | OUTPATIENT
Start: 2019-01-31

## 2019-01-31 RX ORDER — HYDROXYZINE HYDROCHLORIDE 25 MG/1
25 TABLET, FILM COATED ORAL EVERY 6 HOURS PRN
Status: DISCONTINUED | OUTPATIENT
Start: 2019-01-31 | End: 2019-02-19

## 2019-01-31 RX ORDER — BENZTROPINE MESYLATE 1 MG/1
2 TABLET ORAL EVERY 6 HOURS PRN
Status: DISCONTINUED | OUTPATIENT
Start: 2019-01-31 | End: 2019-02-19

## 2019-01-31 RX ORDER — BENZTROPINE MESYLATE 1 MG/ML
2 INJECTION INTRAMUSCULAR; INTRAVENOUS EVERY 6 HOURS PRN
Status: CANCELLED | OUTPATIENT
Start: 2019-01-31

## 2019-01-31 RX ORDER — HALOPERIDOL 5 MG
5 TABLET ORAL EVERY 6 HOURS PRN
Status: DISCONTINUED | OUTPATIENT
Start: 2019-01-31 | End: 2019-02-19

## 2019-01-31 RX ORDER — HALOPERIDOL 5 MG/ML
5 INJECTION INTRAMUSCULAR EVERY 6 HOURS PRN
Status: CANCELLED | OUTPATIENT
Start: 2019-01-31

## 2019-01-31 RX ORDER — RISPERIDONE 1 MG/1
1 TABLET, ORALLY DISINTEGRATING ORAL EVERY 6 HOURS PRN
Status: DISCONTINUED | OUTPATIENT
Start: 2019-01-31 | End: 2019-03-19 | Stop reason: HOSPADM

## 2019-01-31 RX ORDER — BENZTROPINE MESYLATE 1 MG/ML
2 INJECTION INTRAMUSCULAR; INTRAVENOUS EVERY 6 HOURS PRN
Status: DISCONTINUED | OUTPATIENT
Start: 2019-01-31 | End: 2019-02-19

## 2019-01-31 RX ORDER — HYDROXYZINE HYDROCHLORIDE 25 MG/1
25 TABLET, FILM COATED ORAL EVERY 6 HOURS PRN
Status: CANCELLED | OUTPATIENT
Start: 2019-01-31

## 2019-01-31 RX ORDER — HALOPERIDOL 5 MG/ML
5 INJECTION INTRAMUSCULAR EVERY 6 HOURS PRN
Status: DISCONTINUED | OUTPATIENT
Start: 2019-01-31 | End: 2019-02-19

## 2019-01-31 RX ORDER — ACETAMINOPHEN 325 MG/1
325 TABLET ORAL EVERY 6 HOURS PRN
Status: CANCELLED | OUTPATIENT
Start: 2019-01-31

## 2019-01-31 NOTE — ED NOTES
Pt belongings double bagged per pt history of bed bugs  No live bugs noted on pt        Hailey Santos  87/15/17 8842

## 2019-01-31 NOTE — ED PROVIDER NOTES
History  Chief Complaint   Patient presents with    Psychiatric Evaluation     pt states "I'm hearing voices, I wanna sign myself in" x 1 week  Pt states he voices tell him to "[place] my mom"  Pt states voices also tell him to harm himself  71-year-old male with underlying intellectual disability, bipolar disorder who presents for command hallucinations with suicidal homicidal ideations  For the past week he has been having increasingly frequent command hallucinations with a single voice telling him to hurt himself in nonspecific weighs but to attempt to kill his mother by stabbing her  He lives in the house with mother and father who were not here bedside  Patient states he walked here from home with the intention to sign himself in for further psychiatric care  He reports some medication noncompliance as he does not know what medicines he is on and what he takes them for  He denies any drug or alcohol use  He reports having these thoughts previously but this past week has been more frequent and severe  Per chart review it appears his last inpatient psychiatric care was in November as a voluntary psych admission  He denies any other symptoms at this time include chest pain shortness of breath abdominal pain nausea vomiting diarrhea constipation  Prior to Admission Medications   Prescriptions Last Dose Informant Patient Reported? Taking?    EPINEPHrine (EPIPEN) 0 3 mg/0 3 mL SOAJ   No No   Sig: Inject 0 3 mL (0 3 mg total) into a muscle once for 1 dose   albuterol (PROVENTIL HFA,VENTOLIN HFA) 90 mcg/act inhaler   Yes Yes   Sig: Inhale 2 puffs every 8 (eight) hours as needed for wheezing   amLODIPine (NORVASC) 5 mg tablet   No Yes   Sig: Take 1 tablet (5 mg total) by mouth daily At 9AM   divalproex sodium (DEPAKOTE) 250 mg EC tablet   No Yes   Sig: Take 3 tablets (750 mg total) by mouth daily at bedtime   divalproex sodium (DEPAKOTE) 500 mg EC tablet   No Yes   Sig: Take 1 tablet (500 mg total) by mouth daily At 9AM   mupirocin (BACTROBAN) 2 % ointment   No Yes   Sig: Apply topically daily To affected toe   risperiDONE (RisperDAL) 2 mg tablet   No Yes   Sig: Take 1 tablet (2 mg total) by mouth 2 (two) times a day At 9AM and 6PM      Facility-Administered Medications: None       Past Medical History:   Diagnosis Date    Anxiety     Asthma     Hypertension     Mental retardation     Psychiatric disorder     Psychiatric illness     Schizoaffective disorder ([facility] )        Past Surgical History:   Procedure Laterality Date    HAND SURGERY      right hand       Family History   Problem Relation Age of Onset    No Known Problems Mother     No Known Problems Father     No Known Problems Sister     No Known Problems Brother     No Known Problems Maternal Aunt     No Known Problems Paternal Aunt     No Known Problems Maternal Uncle     No Known Problems Paternal Uncle     No Known Problems Maternal Grandfather     No Known Problems Maternal Grandmother     No Known Problems Paternal Grandfather     No Known Problems Paternal Grandmother     No Known Problems Cousin     ADD / ADHD Neg Hx     Alcohol abuse Neg Hx     Anxiety disorder Neg Hx     Bipolar disorder Neg Hx     Dementia Neg Hx     Depression Neg Hx     Drug abuse Neg Hx     OCD Neg Hx     Paranoid behavior Neg Hx     Schizophrenia Neg Hx     Seizures Neg Hx     Self-Injury Neg Hx     Suicide Attempts Neg Hx      I have reviewed and agree with the history as documented  Social History   Substance Use Topics    Smoking status: Never Smoker    Smokeless tobacco: Never Used    Alcohol use No        Review of Systems   Constitutional: Negative for chills, fatigue and fever  HENT: Negative for congestion and sore throat  Eyes: Negative for visual disturbance  Respiratory: Negative for cough, chest tightness, shortness of breath and wheezing      Cardiovascular: Negative for chest pain, palpitations and leg swelling  Gastrointestinal: Negative for abdominal pain, blood in stool, diarrhea, nausea and vomiting  Genitourinary: Negative for difficulty urinating, dysuria and hematuria  Musculoskeletal: Negative for gait problem  Skin: Negative for rash  Allergic/Immunologic: Negative for immunocompromised state  Neurological: Negative for dizziness, syncope, weakness, light-headedness, numbness and headaches  Hematological: Negative for adenopathy  Psychiatric/Behavioral: Positive for suicidal ideas  Negative for agitation, self-injury and sleep disturbance  The patient is nervous/anxious  Physical Exam  ED Triage Vitals [01/31/19 1713]   Temperature Pulse Respirations Blood Pressure SpO2   (!) 97 3 °F (36 3 °C) (!) 120 20 (!) 195/116 98 %      Temp Source Heart Rate Source Patient Position - Orthostatic VS BP Location FiO2 (%)   Oral Monitor Sitting Left arm --      Pain Score       No Pain           Orthostatic Vital Signs  Vitals:    01/31/19 1713 01/31/19 1746 01/31/19 1854   BP: (!) 195/116  141/78   Pulse: (!) 120 99 90   Patient Position - Orthostatic VS: Sitting  Sitting       Physical Exam   Constitutional: He is oriented to person, place, and time  He appears well-developed and well-nourished  HENT:   Head: Normocephalic and atraumatic  Eyes: Pupils are equal, round, and reactive to light  Conjunctivae and EOM are normal    Neck: Normal range of motion  Neck supple  No JVD present  Cardiovascular: Normal rate and regular rhythm  No murmur heard  Pulmonary/Chest: Effort normal and breath sounds normal  He has no wheezes  He has no rales  Abdominal: Soft  Bowel sounds are normal  He exhibits no distension  There is no tenderness  Musculoskeletal: He exhibits no edema  Lymphadenopathy:     He has no cervical adenopathy  Neurological: He is alert and oriented to person, place, and time  Skin: Skin is warm  No rash noted  He is not diaphoretic     Psychiatric: His speech is normal and behavior is normal  His mood appears anxious  Cognition and memory are normal  He expresses homicidal and suicidal ideation  He expresses homicidal plans  He expresses no suicidal plans  Vitals reviewed  ED Medications  Medications - No data to display    Diagnostic Studies  Results Reviewed     Procedure Component Value Units Date/Time    Rapid drug screen, urine [[id number]]  (Normal) Collected:  01/31/19 1736    Lab Status:  Final result Specimen:  Urine from Urine, Clean Catch Updated:  01/31/19 1815     Amph/Meth UR Negative     Barbiturate Ur Negative     Benzodiazepine Urine Negative     Cocaine Urine Negative     Methadone Urine Negative     Opiate Urine Negative     PCP Ur Negative     THC Urine Negative    Narrative:         FOR MEDICAL PURPOSES ONLY  IF CONFIRMATION NEEDED PLEASE CONTACT THE LAB WITHIN 5 DAYS      Drug Screen Cutoff Levels:  AMPHETAMINE/METHAMPHETAMINES  1000 ng/mL  BARBITURATES     200 ng/mL  BENZODIAZEPINES     200 ng/mL  COCAINE      300 ng/mL  METHADONE      300 ng/mL  OPIATES      300 ng/mL  PHENCYCLIDINE     25 ng/mL  THC       50 ng/mL    POCT alcohol breath test [[id number]]  (Normal) Resulted:  01/31/19 1729    Lab Status:  Final result Updated:  01/31/19 1729     EXTBreath Alcohol 0 000                 No orders to display         Procedures  Procedures      Phone Consults  ED Phone Contact    ED Course  ED Course as of Feb 01 1647   Thu Jan 31, 2019   1846 Signed 201    2022 Keyonna Bliss 945p pickup                                MDM    Disposition  Final diagnoses:   Suicidal ideation     Time reflects when diagnosis was documented in both MDM as applicable and the Disposition within this note     Time User Action Codes Description Comment    1/31/2019  7:36 PM Arthur Mckeon [address] Hypertension, unspecified type     1/31/2019  7:36 PM Linda, [address] [facility] [address] [address] [facility] [address]  Hypertension, unspecified type     1/31/2019  7:36 PM Linda, [address] [facility] Hypertension, unspecified type     1/31/2019  8:23 PM Charlotte Bethea Add [[id number]] Suicidal ideation       ED Disposition     ED Disposition Condition Date/Time Comment    Transfer to Another Facility-In Network  Thu Jan 31, 2019  8:23 PM Adolfo Stanton should be transferred out to [facility] [address] Enedina Story MD Documentation      Most Recent Value   Patient Condition  The patient has been stabilized such that within reasonable medical probability, no material deterioration of the patient condition or the condition of the unborn child(gianna) is likely to result from the transfer   Reason for Transfer  -- [Psychiatric care]   Benefits of Transfer  Specialized equipment and/or services available at the receiving facility (Include comment)________________________ [Psychiatric care]   Risks of Transfer  Potential for delay in receiving treatment, Potential deterioration of medical condition, Increased discomfort during transfer, Possible worsening of condition or death during transfer   Accepting Physician  Dr Ruiz Caraballo Name, [address] [place], [place]    (Name & Tel number)  PACS   [facility] and Unit [facility])  [facility]   Sending MD Dr Marialuisa Coffey   Provider Certification  General risk, such as traffic hazards, adverse weather conditions, rough terrain or turbulence, possible failure of equipment (including vehicle or aircraft), or consequences of actions of persons outside the control of the transport personnel, The patient is stable for psychiatric transfer because they are medically stable, and is protected from harming him/herself or others during transport      RN Documentation      Most Recent Value   [address] Name, Behzad Foy [address] [place], [place]    (Name & Tel number)  PACS   Transported by (Company and Unit #)  [facility]      Follow-up Information    None         Discharge Medication List as of 1/31/2019 9:44 PM      CONTINUE these medications which have NOT CHANGED    Details   albuterol (PROVENTIL HFA,VENTOLIN HFA) 90 mcg/act inhaler Inhale 2 puffs every 8 (eight) hours as needed for wheezing, Historical Med      amLODIPine (NORVASC) 5 mg tablet Take 1 tablet (5 mg total) by mouth daily At 9AM, Starting Fri 10/12/2018, Print      !! divalproex sodium (DEPAKOTE) 250 mg EC tablet Take 3 tablets (750 mg total) by mouth daily at bedtime, Starting Fri 10/12/2018, Print      !! divalproex sodium (DEPAKOTE) 500 mg EC tablet Take 1 tablet (500 mg total) by mouth daily At 9AM, Starting Fri 10/12/2018, Print      EPINEPHrine (EPIPEN) 0 3 mg/0 3 mL SOAJ Inject 0 3 mL (0 3 mg total) into a muscle once for 1 dose, Starting Fri 8/17/2018, Normal      mupirocin (BACTROBAN) 2 % ointment Apply topically daily To affected toe, Starting Fri 10/12/2018, Print      risperiDONE (RisperDAL) 2 mg tablet Take 1 tablet (2 mg total) by mouth 2 (two) times a day At [facility] and 6PM, Starting Fri 10/12/2018, Print       !! - Potential duplicate medications found  Please discuss with provider  No discharge procedures on file  ED Provider  Attending physically available and evaluated Danii Maharaj  SALVADOR managed the patient along with the ED Attending      Electronically Signed by         Brittany Lam DO  02/01/19 6113

## 2019-01-31 NOTE — ED ATTENDING ATTESTATION
Marshall Bui DO, saw and evaluated the patient  I have discussed the patient with the resident/non-physician practitioner and agree with the resident's/non-physician practitioner's findings, Plan of Care, and MDM as documented in the resident's/non-physician practitioner's note, except where noted  All available labs and Radiology studies were reviewed  At this point I agree with the current assessment done in the Emergency Department  I have conducted an independent evaluation of this patient a history and physical is as follows:    Patient presents for psychiatric evaluation for command hallucinations telling him to harm himself and his mother, with whom he lives, including to kill her with a knife  He has had similar symptoms previously for which he has been admitted  He states he does not know what medications he takes and does not understand what they are for and therefore likely has some medication noncompliance  He is willing to sign himself in as he has done previously  ROS: Denies f/c, HA, CP, SOB, abdominal pain, n/v/d  12 system ROS o/w negative  PE: NAD, appears comfortable, alert, cooperative; PERRL, EOMI; MMM, no posterior oropharyngeal exudate, edema or erythema; HRR, no murmur; lungs CTA w/o w/r/r, POx 98% on RA (nl); abdomen s/nt/nd, nl BS in all 4 quadrant; (-) LE edema or calf TTP, FROM extremities x4; skin p/w/d; CN II-XII GI/NF, oriented; Psych flat affect, good eye contact, good insight  DDx: Depression, SI and HI w/credible plan  A/P: Will check BAT, UDS, consult crisis for 201 admission             Critical Care Time  Procedures

## 2019-01-31 NOTE — ED NOTES
Patient and doctor signed 12  201 faxed to [facility] for review       Uvaldo Marin, YAS  01/31/19   2706

## 2019-01-31 NOTE — ED NOTES
Pt is a 22 y o  male who was brought to the ED with increased auditory command hallucinations  Patient is unable to provide a decent fund of information, however states that for the past week his hallucinations have been worse  Patient states that the impulses are really bad and that he has been thinking about killing himself with a knife  Patient relates that the auditory command hallucinations tell him to hurt himself and to kill his mother with a knife  Patient believes his medicine is not working and relates that he is taking it as prescribed although he cannot recall what he takes  Patient also cannot recall the name of his providers but states that he has case management as well as outpaitent therapy and psychiatry  Patient appears visibly anxious although when asked he denies any other symptoms at this time  Patient would like to sign himself in for treatment  Chief Complaint   Patient presents with    Psychiatric Evaluation     pt states "I'm hearing voices, I wanna sign myself in" x 1 week  Pt states he voices tell him to "[place] my mom"  Pt states voices also tell him to harm himself         Intake Assessment completed, Safety risk Assessment completed    YAS Snider  01/31/19   [id number]

## 2019-02-01 PROCEDURE — 99232 SBSQ HOSP IP/OBS MODERATE 35: CPT | Performed by: PHYSICIAN ASSISTANT

## 2019-02-01 PROCEDURE — 99222 1ST HOSP IP/OBS MODERATE 55: CPT | Performed by: PSYCHIATRY & NEUROLOGY

## 2019-02-01 RX ORDER — AMLODIPINE BESYLATE 5 MG/1
5 TABLET ORAL DAILY
Status: DISCONTINUED | OUTPATIENT
Start: 2019-02-01 | End: 2019-02-15

## 2019-02-01 RX ORDER — ALBUTEROL SULFATE 90 UG/1
2 AEROSOL, METERED RESPIRATORY (INHALATION) EVERY 8 HOURS PRN
Status: DISCONTINUED | OUTPATIENT
Start: 2019-02-01 | End: 2019-03-19 | Stop reason: HOSPADM

## 2019-02-01 RX ORDER — RISPERIDONE 2 MG/1
2 TABLET, FILM COATED ORAL 2 TIMES DAILY
Status: DISCONTINUED | OUTPATIENT
Start: 2019-02-01 | End: 2019-02-08

## 2019-02-01 RX ORDER — DIVALPROEX SODIUM 500 MG/1
500 TABLET, DELAYED RELEASE ORAL EVERY MORNING
Status: DISCONTINUED | OUTPATIENT
Start: 2019-02-01 | End: 2019-03-19 | Stop reason: HOSPADM

## 2019-02-01 RX ADMIN — RISPERIDONE 2 MG: 2 TABLET ORAL at 09:33

## 2019-02-01 RX ADMIN — RISPERIDONE 2 MG: 2 TABLET ORAL at 17:36

## 2019-02-01 RX ADMIN — ACETAMINOPHEN 325 MG: 325 TABLET, FILM COATED ORAL at 09:04

## 2019-02-01 RX ADMIN — AMLODIPINE BESYLATE 5 MG: 5 TABLET ORAL at 09:33

## 2019-02-01 RX ADMIN — DIVALPROEX SODIUM 750 MG: 500 TABLET, DELAYED RELEASE ORAL at 21:22

## 2019-02-01 RX ADMIN — DIVALPROEX SODIUM 500 MG: 500 TABLET, DELAYED RELEASE ORAL at 09:34

## 2019-02-01 NOTE — H&P
Psychiatric Evaluation - [address] [facility] 22 y o  male MRN: [id number]  Unit/Bed#: U 263-01 Encounter: [id number]    Assessment/Plan   Principal Problem:    Schizoaffective disorder, bipolar type ([facility] )  Active Problems:    Intellectual disability    BMI 45 0-49 9, adult ([facility] )    Plan:   1  Check admission labs  2  Collaborate with family for baseline assessment and disposition planning  3  Restart Depakote  mg a m  And 750 mg at bedtime for mood stabilization  Patient has responded with good therapeutic Depakote level on this combination in past admissions  4  Restart risperidone 2 mg b i d  For psychosis and mood stabilization  Patient has responded positively to this dose of risperidone and past admission  Risks, benefits and possible side effects of Medications:   Risks, benefits, and possible side effects of medications explained to patient and patient verbalizes understanding  Chief Complaint: "I was hearing voices telling me to hurt myself" and "I don't want to go on living like this"    History of Present Illness     Patient is a 22 y o  male presents on 12 with recent worsening of depression, suicidal ideation and homicidal ideations to word mom with command auditory hallucination of voices commanding negatively on his behavior  Patient with diagnosis of schizoaffective disorder is well known to me from multiple prior admission under my care with similar presentation  Patient does report compliance with his medications including Depakote and risperidone  Patient has agreed with plan of restarting the medication and not considering increasing dose or adding another medication  Patient agreed with plan of assessment on the unit and titrating medication accordingly if indicated  Patient does report feeling safe on the unit at this point    Patient with intellectual disability and remained a poor historian and have difficulty elaborating on stressors resulting in worsening of depression and psychotic symptoms  Medical Review Of Systems:  none    Psychiatric Review Of Systems:  sleep: yes  appetite changes: yes  weight changes: no  energy/anergy: yes  interest/pleasure/anhedonia: yes  somatic symptoms: yes  anxiety/panic: yes  eddie: no  guilty/hopeless: no  self injurious behavior/risky behavior: no    Historical Information     Past Psychiatric History:   Multiple prior inpatient psychiatric admissions    Past Suicide attempts: denies  Past Violent behavior: no  Past Psychiatric medication trial: depakote, risperidone    Substance Abuse History:  denies    Social History     Tobacco History     Smoking Status  Never Smoker    Smokeless Tobacco Use  Never Used          Alcohol History     Alcohol Use Status  No          Drug Use     Drug Use Status  No          Sexual Activity     Sexually Active  No          Activities of Daily Living    Not Asked               Additional Substance Use Detail     Questions Responses    Substance Use Assessment Denies substance use within the past 12 months    Alcohol Use Frequency Denies use in past 12 months    Cannabis frequency Denies use in past 12 months    Heroin Frequency Denies use in past 12 months    Cocaine frequency Denies past use in past 12 months    Crack Cocaine Frequency Denies use in past 12 months    Methamphetamine Frequency Denies use in past 12 months    Narcotic Frequency Denies use in past 12 months    Benzodiazepine Frequency Denies use in past 12 months    Amphetamine frequency Denies use in past 12 months    Barbituate Frequency Denies use use in past 12 months    Inhalant frequency Denies use in past 12 months    Hallucinogen frequency Denies use in past 12 months    Ecstasy frequency Denies use past 12 months    Other drug frequency Denies use in past 12 months    Opiate frequency Denies use in past 12 months    Last reviewed by Robinson Singleton RN on 2/1/2019        I have assessed this patient for substance use within the past 12 months    Family Psychiatric History: Mother with psychiatric diagnosis  Social History:  Living with mother  Not employed  On disability  Denies legal history at this point  Past Medical History:   Diagnosis Date    Anxiety     Asthma     Hypertension     Mental retardation     Psychiatric disorder     Psychiatric illness     Schizoaffective disorder (HCC)            Meds/Allergies   all current active meds have been reviewed  Allergies   Allergen Reactions    Bee Venom Anaphylaxis    Penicillins        Objective   Vital signs in last 24 hours:  Temp:  [97 1 °F (36 2 °C)-97 5 °F (36 4 °C)] 97 5 °F (36 4 °C)  HR:  [] 103  Resp:  [18-20] 18  BP: (135-195)/() 135/35    No intake or output data in the 24 hours ending 02/01/19 1114    Mental Status Evaluation:  Appearance:  casually dressed   Behavior:  guarded   Speech:  soft   Mood:  anxious and depressed   Affect:  increased in range   Language: naming objects and repeating phrases   Thought Process:  circumstantial   Thought Content:  obsessions   Perceptual Disturbances: None   Risk Potential: Suicidal Ideations without plan, Homicidal Ideations none and Potential for Aggression No   Sensorium:  person and place   Cognition:  grossly intact   Consciousness:  awake    Attention: attention span appeared shorter than expected for age   Intellect: decreased   Fund of Knowledge: awareness of current events: fair   Insight:  limited   Judgment: limited   Muscle Strength and Tone: arm(s): bilateral   Gait/Station: normal gait/station   Motor Activity: no abnormal movements     Memory: Short and long term memory  fair       Laboratory results:    I have personally reviewed all pertinent laboratory/tests results    Labs in last 72 hours: No results for input(s): WBC, RBC, HGB, HCT, PLT, RDW, NEUTROABS, SODIUM, K, CL, CO2, BUN, CREATININE, GLUC, GLUF, CALCIUM, AST, ALT, ALKPHOS, TP, ALB, TBILI, CHOLESTEROL, HDL, TRIG, LDLCALC, VALPROICTOT, CARBAMAZEPIN, LITHIUM, AMMONIA, [id number], FREET4, T3FREE, PREGTESTUR, PREGSERUM, HCG, HCGQUANT, RPR in the last 72 hours  Invalid input(s):  RBC  Admission Labs:   Admission on 01/31/2019, Discharged on 01/31/2019   Component Date Value    Amph/Meth UR 01/31/2019 Negative     Barbiturate Ur 01/31/2019 Negative     Benzodiazepine Urine 01/31/2019 Negative     Cocaine Urine 01/31/2019 Negative     Methadone Urine 01/31/2019 Negative     Opiate Urine 01/31/2019 Negative     PCP Ur 01/31/2019 Negative     THC Urine 01/31/2019 Negative     EXTBreath Alcohol 01/31/2019 0 000      Risks / Benefits of Treatment:     Risks, benefits, and possible side effects of medications explained to patient  The patient verbalizes understanding and agreement for treatment  Counseling / Coordination of Care:     Patient's presentation on admission and proposed treatment plan discussed with treatment team   Diagnosis, medication changes and treatment plan reviewed with patient  Recent stressors discussed with patient     Events leading to admission reviewed with patient  Importance of medication and treatment compliance reviewed with patient  Inpatient Psychiatric Certification:     Certification: Based upon physical, mental and social evaluations, I certify that inpatient psychiatric services are medically necessary for this patient for a duration of 6 midnights for the treatment of Schizoaffective disorder, bipolar type [facility])    Available alternative community resources do not meet the patient's mental health care needs  I further attest that an established written individualized plan of care has been implemented and is outlined in the patient's medical records  This note has been constructed using a voice recognition system  There may be translation, syntax,  or grammatical errors  If you have any questions, please contact the dictating provider

## 2019-02-01 NOTE — CONSULTS
Consultation - Scotty Tristan 22 y o  male MRN: [id number]    Unit/Bed#: [facility] 263-01 Encounter: [id number]      Assessment/Plan     Schizophrenia  Medically cleared for inpatient psychiatric evaluation and treatment    HTN  On Norvasc, restarted    Asthma  PRN albuterol    History of Present Illness   HPI: Scotty Tristan is a 22y o  year old male who presents with CAH to hurt himself and his mother  He has a history of HTN, Asthma, MR, and Schizophrenia  He denies recent illness, open wounds, or pain  Inpatient consult for Medical Clearance for [facility] patient  Consult performed by: Robbie Condon ordered by: Carmela Hilton          Review of Systems   Constitutional: Negative for activity change, chills, diaphoresis and fatigue  HENT: Negative  Eyes: Negative  Respiratory: Negative  Cardiovascular: Negative  Gastrointestinal: Negative  Genitourinary: Negative  Musculoskeletal: Negative  Neurological: Negative  Psychiatric/Behavioral: Positive for agitation and hallucinations (CAH)  Historical Information   Past Medical History:   Diagnosis Date    Anxiety     Asthma     Hypertension     Mental retardation     Psychiatric disorder     Psychiatric illness     Schizoaffective disorder ([address] )      Past Surgical History:   Procedure Laterality Date    HAND SURGERY      right hand     Social History   History   Alcohol Use No     History   Drug Use No     History   Smoking Status    Never Smoker   Smokeless Tobacco    Never Used     Family History: non-contributory    Meds/Allergies   PTA meds:   Prior to Admission Medications   Prescriptions Last Dose Informant Patient Reported? Taking?    EPINEPHrine (EPIPEN) 0 3 mg/0 3 mL SOAJ   No No   Sig: Inject 0 3 mL (0 3 mg total) into a muscle once for 1 dose   albuterol (PROVENTIL HFA,VENTOLIN HFA) 90 mcg/act inhaler   Yes No   Sig: Inhale 2 puffs every 8 (eight) hours as needed for wheezing   amLODIPine (NORVASC) 5 mg tablet   No No   Sig: Take 1 tablet (5 mg total) by mouth daily At 9AM   divalproex sodium (DEPAKOTE) 250 mg EC tablet   No No   Sig: Take 3 tablets (750 mg total) by mouth daily at bedtime   divalproex sodium (DEPAKOTE) 500 mg EC tablet   No No   Sig: Take 1 tablet (500 mg total) by mouth daily At 9AM   mupirocin (BACTROBAN) 2 % ointment   No No   Sig: Apply topically daily To affected toe   risperiDONE (RisperDAL) 2 mg tablet   No No   Sig: Take 1 tablet (2 mg total) by mouth 2 (two) times a day At 9AM and 6PM      Facility-Administered Medications: None     Allergies   Allergen Reactions    Bee Venom Anaphylaxis    Penicillins        Objective   Vitals: Blood pressure (!) 135/35, pulse 103, temperature 97 5 °F (36 4 °C), temperature source Tympanic, resp  rate 18, height 5' 8" (1 727 m), weight (!) 139 kg (305 lb 6 oz)  No intake or output data in the 24 hours ending 02/01/19 1414  Invasive Devices          No matching active lines, drains, or airways          Physical Exam   Constitutional: He is oriented to person, place, and time  He appears well-developed and well-nourished  No distress  HENT:   Head: Normocephalic and atraumatic  Eyes: Pupils are equal, round, and reactive to light  EOM are normal    Neck: Normal range of motion  Cardiovascular: Normal rate and regular rhythm  Exam reveals no gallop and no friction rub  No murmur heard  Pulmonary/Chest: Effort normal  He has no wheezes  He has no rales  Abdominal: Soft  He exhibits no distension  There is no tenderness  There is no rebound  Musculoskeletal: Normal range of motion  Neurological: He is alert and oriented to person, place, and time  Skin: Skin is warm and dry  Psychiatric: Thought content normal  His mood appears anxious  His speech is rapid and/or pressured  He is agitated and hyperactive  Lab Results: I have personally reviewed pertinent reports  Imaging Studies: I have personally reviewed pertinent reports

## 2019-02-01 NOTE — ED NOTES
Insurance Authorization:   Phone call placed to [employer]  Phone number: [phone]  Spoke to YAS Soliman  01/31/19   7771

## 2019-02-01 NOTE — PROGRESS NOTES
Pt is a 62 51 82 from [facility] ED with CAH to hurt self and Mother  Per ED noted, pt states recently the [facility] have worsened and feels the medications are not working  Hx of Asthma, HTN  MR, depression  On arrival pt showered and belongings were bagged and given to security to be stored due to pt stating he was living in a house with bedbugs  Pt requested to go to sleep and was sleeping soundly when this writer came in to assess pt  Most information obtained was from chart  Pt however remained cooperative and calm  Will monitor

## 2019-02-01 NOTE — ED NOTES
Report called to Georgette Leonardo at [facility]        Myla Matthews RN  01/31/19 2028

## 2019-02-01 NOTE — PROGRESS NOTES
Pt friendly and smiling throughout morning  Pt malodorous and disheveled  Showered this AM when prompted to  Pt denies SI/HI  Reports continued AH  Unable to elaborate on what they are saying  Wandering the halls  No issues/concerns

## 2019-02-01 NOTE — EMTALA/ACUTE CARE TRANSFER
[address]  [address] [facility]  Dept: Rolando    NAME Ashli Arriaga                                         1995                              MRN [id number]    I have been informed of my rights regarding examination, treatment, and transfer   by Dr Cary De Oliveira DO    Benefits: Specialized equipment and/or services available at the receiving facility (Include comment)________________________ (Psychiatric care)    Risks: Potential for delay in receiving treatment, Potential deterioration of medical condition, Increased discomfort during transfer, Possible worsening of condition or death during transfer      Consent for Transfer:  I acknowledge that my medical condition has been evaluated and explained to me by the emergency department physician or other qualified medical person and/or my attending physician, who has recommended that I be transferred to the service of  Accepting Physician: Dr Alicia Mendoza at [address] [facility] Name, [address] : [place], [place]  The above potential benefits of such transfer, the potential risks associated with such transfer, and the probable risks of not being transferred have been explained to me, and I fully understand them  The doctor has explained that, in my case, the benefits of transfer outweigh the risks  I agree to be transferred  I authorize the performance of emergency medical procedures and treatments upon me in both transit and upon arrival at the receiving facility  Additionally, I authorize the release of any and all medical records to the receiving facility and request they be transported with me, if possible  I understand that the safest mode of transportation during a medical emergency is an ambulance and that the Hospital advocates the use of this mode of transport   Risks of traveling to the receiving facility by car, including absence of medical control, life sustaining equipment, such as oxygen, and medical personnel has been explained to me and I fully understand them  (WIL CORRECT BOX BELOW)  [  ]  I consent to the stated transfer and to be transported by ambulance/helicopter  [  ]  I consent to the stated transfer, but refuse transportation by ambulance and accept full responsibility for my transportation by car  I understand the risks of non-ambulance transfers and I exonerate the Hospital and its staff from any deterioration in my condition that results from this refusal     X___________________________________________    DATE  19  TIME________  Signature of patient or legally responsible individual signing on patient behalf           RELATIONSHIP TO PATIENT_________________________          Provider Certification    NAME Antoinette Chong                                         1995                              MRN [id number]    A medical screening exam was performed on the above named patient  Based on the examination:    Condition Necessitating Transfer The primary encounter diagnosis was Hypertension, unspecified type  A diagnosis of Suicidal ideation was also pertinent to this visit      Patient Condition: The patient has been stabilized such that within reasonable medical probability, no material deterioration of the patient condition or the condition of the unborn child(gianna) is likely to result from the transfer    Reason for Transfer:  (Psychiatric care)    Transfer Requirements: Facility [place], [place]   · Space available and qualified personnel available for treatment as acknowledged by PACS  · Agreed to accept transfer and to provide appropriate medical treatment as acknowledged by       Dr Mitchel Woody  · Appropriate medical records of the examination and treatment of the patient are provided at the time of transfer   [address] _______  · Transfer will be performed by qualified personnel from [facility]  and appropriate transfer equipment as required, including the use of necessary and appropriate life support measures  Provider Certification: I have examined the patient and explained the following risks and benefits of being transferred/refusing transfer to the patient/family:  General risk, such as traffic hazards, adverse weather conditions, rough terrain or turbulence, possible failure of equipment (including vehicle or aircraft), or consequences of actions of persons outside the control of the transport personnel, The patient is stable for psychiatric transfer because they are medically stable, and is protected from harming him/herself or others during transport      Based on these reasonable risks and benefits to the patient and/or the unborn child(gianna), and based upon the information available at the time of the patients examination, I certify that the medical benefits reasonably to be expected from the provision of appropriate medical treatments at another medical facility outweigh the increasing risks, if any, to the individuals medical condition, and in the case of labor to the unborn child, from effecting the transfer      X____________________________________________ DATE 01/31/19        TIME_______      ORIGINAL - SEND TO MEDICAL RECORDS   COPY - SEND WITH PATIENT DURING TRANSFER

## 2019-02-01 NOTE — EMTALA/ACUTE CARE TRANSFER
[address]  25 [facility]  Dept: Rolando AZEVEDO Ellie Perera                                         1995                              MRN [id number]    I have been informed of my rights regarding examination, treatment, and transfer   by Dr Rosalinda Herron DO    Benefits: Specialized equipment and/or services available at the receiving facility (Include comment)________________________ (Psychiatric care)    Risks: Potential for delay in receiving treatment, Potential deterioration of medical condition, Increased discomfort during transfer, Possible worsening of condition or death during transfer      Consent for Transfer:  I acknowledge that my medical condition has been evaluated and explained to me by the emergency department physician or other qualified medical person and/or my attending physician, who has recommended that I be transferred to the service of  Accepting Physician: Dr Jeanine Aguilera at [address] [facility] Name, [address] : [place], [place]  The above potential benefits of such transfer, the potential risks associated with such transfer, and the probable risks of not being transferred have been explained to me, and I fully understand them  The doctor has explained that, in my case, the benefits of transfer outweigh the risks  I agree to be transferred  I authorize the performance of emergency medical procedures and treatments upon me in both transit and upon arrival at the receiving facility  Additionally, I authorize the release of any and all medical records to the receiving facility and request they be transported with me, if possible  I understand that the safest mode of transportation during a medical emergency is an ambulance and that the Hospital advocates the use of this mode of transport   Risks of traveling to the receiving facility by car, including absence of medical control, life sustaining equipment, such as oxygen, and medical personnel has been explained to me and I fully understand them  (WIL CORRECT BOX BELOW)  [  ]  I consent to the stated transfer and to be transported by ambulance/helicopter  [  ]  I consent to the stated transfer, but refuse transportation by ambulance and accept full responsibility for my transportation by car  I understand the risks of non-ambulance transfers and I exonerate the Hospital and its staff from any deterioration in my condition that results from this refusal     X___________________________________________    DATE  19  TIME________  Signature of patient or legally responsible individual signing on patient behalf           RELATIONSHIP TO PATIENT_________________________          Provider Certification    NAME Julian Mcconnell                                         1995                              MRN [id number]    A medical screening exam was performed on the above named patient  Based on the examination:    Condition Necessitating Transfer The primary encounter diagnosis was Hypertension, unspecified type  A diagnosis of Suicidal ideation was also pertinent to this visit      Patient Condition: The patient has been stabilized such that within reasonable medical probability, no material deterioration of the patient condition or the condition of the unborn child(gianna) is likely to result from the transfer    Reason for Transfer:  (Psychiatric care)    Transfer Requirements: Facility [place], [place]   · Space available and qualified personnel available for treatment as acknowledged by    · Agreed to accept transfer and to provide appropriate medical treatment as acknowledged by       Dr Jaymie Yanes  · Appropriate medical records of the examination and treatment of the patient are provided at the time of transfer   [address] _______  · Transfer will be performed by qualified personnel from    and appropriate transfer equipment as required, including the use of necessary and appropriate life support measures  Provider Certification: I have examined the patient and explained the following risks and benefits of being transferred/refusing transfer to the patient/family:  General risk, such as traffic hazards, adverse weather conditions, rough terrain or turbulence, possible failure of equipment (including vehicle or aircraft), or consequences of actions of persons outside the control of the transport personnel, The patient is stable for psychiatric transfer because they are medically stable, and is protected from harming him/herself or others during transport      Based on these reasonable risks and benefits to the patient and/or the unborn child(gianna), and based upon the information available at the time of the patients examination, I certify that the medical benefits reasonably to be expected from the provision of appropriate medical treatments at another medical facility outweigh the increasing risks, if any, to the individuals medical condition, and in the case of labor to the unborn child, from effecting the transfer      X____________________________________________ DATE 01/31/19        TIME_______      ORIGINAL - SEND TO MEDICAL RECORDS   COPY - SEND WITH PATIENT DURING TRANSFER

## 2019-02-01 NOTE — ED NOTES
Patient is accepted at [facility]  Patient is accepted by Dr Didi Templeton per Aleksey Lynch  Transportation is arranged with Cheryl Faustin  Transportation is scheduled for 2145  Patient may go to the floor at anytime  *Nurse report is to be called to [phone] prior to patient transfer  YAS Garcia  01/31/19  1956

## 2019-02-01 NOTE — TREATMENT PLAN
TREATMENT PLAN REVIEW - [address] 24 y o  1995 male MRN: [id number]    [address] [place] [address] Room / Bed: Rylie De Los Santos/[facility] 245-92 Encounter: [id number]          Admit Date/Time:  1/31/2019 10:23 PM    Treatment Team: Attending Provider: Carloz Sung; Registered Nurse: [place], RN; Charge Nurse: Roslyn Cooper, RN; Patient Care Assistant: Clint Zuniga; Patient Care Technician: Sarah Bernal; Patient Care Technician: Niesha Mijares;  Registered Nurse: Shana Green, JAYDEN; Registered Nurse: Theodore Douglass RN; Occupational Therapy Assistant: SERA Carolina; : Carlene Myles; Patient Care Assistant: Grzegorz Woo    Diagnosis: Principal Problem:    Schizoaffective disorder, bipolar type ([facility] [address] )  Active Problems:    Intellectual disability    BMI 45 0-49 9, adult [facility])    Patient Strengths/Assets: cooperative, compliant with medication, good past treatment response, motivation for treatment/growth, patient is on a voluntary commitment    Patient Barriers/Limitations: low self esteem    Short Term Goals: decrease in depressive symptoms, decrease in anxiety symptoms, decrease in paranoid thoughts, decrease in psychotic symptoms, decrease in suicidal thoughts    Long Term Goals: improvement in depression, improvement in anxiety, stabilization of mood, free of suicidal thoughts, resolution of psychotic symptoms, acceptance of need for psychiatric medications, acceptance of need for psychiatric treatment, acceptance of need for psychiatric follow up after discharge    Progress Towards Goals: starting psychiatric medications as prescribed    Recommended Treatment: medication management, patient medication education, group therapy, milieu therapy, continued Behavioral Health psychiatric evaluation/assessment process    Treatment Frequency: daily medication monitoring, group and milieu therapy daily, monitoring through interdisciplinary rounds, monitoring through weekly patient care conferences    Expected Discharge Date: 5-7 days    Discharge Plan: referral for outpatient medication management with a psychiatrist, referral for outpatient psychotherapy    Treatment Plan Created/Updated By: Mosetta Gowers

## 2019-02-01 NOTE — PROGRESS NOTES
Pt arrived on unit, malodorous, per EMS pt home has bedbugs  Security secured pt belongings  Pt showered, changed into unit clothing  Pt reports mother's new phone# is [phone]  Pt requested mother be informed of admission, no answer, left voicemail

## 2019-02-01 NOTE — ED NOTES
[facility] here to  pt  Belongings given to EMS  Pt calm and cooperative       Hailey Santos  72/84/20 9041

## 2019-02-02 PROCEDURE — 99232 SBSQ HOSP IP/OBS MODERATE 35: CPT | Performed by: PSYCHIATRY & NEUROLOGY

## 2019-02-02 RX ADMIN — RISPERIDONE 2 MG: 2 TABLET ORAL at 17:09

## 2019-02-02 RX ADMIN — AMLODIPINE BESYLATE 5 MG: 5 TABLET ORAL at 09:06

## 2019-02-02 RX ADMIN — RISPERIDONE 2 MG: 2 TABLET ORAL at 09:06

## 2019-02-02 RX ADMIN — TRAZODONE HYDROCHLORIDE 50 MG: 50 TABLET ORAL at 21:20

## 2019-02-02 RX ADMIN — DIVALPROEX SODIUM 750 MG: 500 TABLET, DELAYED RELEASE ORAL at 21:20

## 2019-02-02 RX ADMIN — ACETAMINOPHEN 325 MG: 325 TABLET, FILM COATED ORAL at 17:31

## 2019-02-02 RX ADMIN — DIVALPROEX SODIUM 500 MG: 500 TABLET, DELAYED RELEASE ORAL at 09:06

## 2019-02-02 NOTE — PROGRESS NOTES
Progress Note - Behavioral Health   Latisha Shepherd 22 y o  male MRN: [id number]  Unit/Bed#: [facility] 263-01 Encounter: [id number]    Assessment/Plan   Principal Problem:    Schizoaffective disorder, bipolar type ([facility] )  Active Problems:    Intellectual disability    BMI 45 0-49 9, adult ([facility] )    Encounter for examination and observation for other specified reasons    Subjective:  Patient is compliant with medication with no acute side effects  Patient reports slow improvement in mood and anxiety and denies endorsing paranoid ideation  Patient reports improvement in auditory hallucination and reduction and passive death wishes  Patient is consenting for safety on the unit  Current Medications:    Current Facility-Administered Medications:  acetaminophen 325 mg Oral Q6H PRN Zula Grade, MD   albuterol 2 puff Inhalation Q8H PRN Sherif Vides   aluminum-magnesium hydroxide-simethicone 15 mL Oral Q4H PRN Zula Grade, MD   amLODIPine 5 mg Oral Daily Sherif Vides   benztropine 2 mg Intramuscular Q6H PRN Zula Grade, MD   benztropine 2 mg Oral Q6H PRN Zula Grade, MD   divalproex sodium 500 mg Oral QAM [facility] Vides   divalproex sodium 750 mg Oral HS Sheriflia Vides   haloperidol 5 mg Oral Q6H PRN Zula Grade, MD   haloperidol lactate 5 mg Intramuscular Q6H PRN Zula Grade, MD   hydrOXYzine HCL 25 mg Oral Q6H PRN Zula Grade, MD   influenza vaccine 0 5 mL Intramuscular Prior to discharge Jennell Lombard   magnesium hydroxide 20 mL Oral Daily PRN Zula Grade, MD   risperiDONE 1 mg Oral Q6H PRN Zula Grade, MD   risperiDONE 2 mg Oral BID Sheriflia Vides   traZODone 50 mg Oral HS PRN Zula Grade, MD       Behavioral Health Medications: all current active meds have been reviewed      Vital signs in last 24 hours:  Temp:  [97 4 °F (36 3 °C)-98 °F (36 7 °C)] 98 °F (36 7 °C)  HR:  [103-110] 110  Resp:  [15-20] 20  BP: (121-151)/(35-78) 151/76    Laboratory results:    I have personally reviewed all pertinent laboratory/tests results  Labs in last 72 hours: No results for input(s): WBC, RBC, HGB, HCT, PLT, RDW, NEUTROABS, SODIUM, K, CL, CO2, BUN, CREATININE, GLUC, GLUF, CALCIUM, AST, ALT, ALKPHOS, TP, ALB, TBILI, CHOLESTEROL, HDL, TRIG, LDLCALC, VALPROICTOT, CARBAMAZEPIN, LITHIUM, AMMONIA, [id number], FREET4, T3FREE, PREGTESTUR, PREGSERUM, HCG, HCGQUANT, RPR in the last 72 hours  Invalid input(s):  RBC  Admission Labs:   Admission on 01/31/2019, Discharged on 01/31/2019   Component Date Value    Amph/Meth UR 01/31/2019 Negative     Barbiturate Ur 01/31/2019 Negative     Benzodiazepine Urine 01/31/2019 Negative     Cocaine Urine 01/31/2019 Negative     Methadone Urine 01/31/2019 Negative     Opiate Urine 01/31/2019 Negative     PCP Ur 01/31/2019 Negative     THC Urine 01/31/2019 Negative     EXTBreath Alcohol 01/31/2019 0 000        Psychiatric Review of Systems:  Behavior over the last 24 hours:  improving  Sleep: normal  Appetite: normal  Medication side effects: No  ROS: no complaints    Mental Status Evaluation:  Appearance:  casually dressed   Behavior:  guarded   Speech:  soft   Mood:  anxious   Affect:  increased in range   Language naming objects and repeating phrases   Thought Process:  circumstantial   Thought Content:  obsessions   Perceptual Disturbances: Auditory hallucinations without commands   Risk Potential: Suicidal Ideations without plan, Homicidal Ideations none and Potential for Aggression No   Sensorium:  person and place   Cognition:  grossly intact   Consciousness:  awake    Attention: attention span appeared shorter than expected for age   Insight:  limited   Judgment: limited   Intellect fair   Gait/Station: normal gait/station   Motor Activity: no abnormal movements     Memory: Short and long term memory  fair     Progress Toward Goals: slow progress    Recommended Treatment:   Check Depakote level on 2/5/19    Continue with group therapy, milieu therapy and occupational therapy  Continue following current medications:   Current Facility-Administered Medications:  acetaminophen 325 mg Oral Q6H PRN James Degroot MD   albuterol 2 puff Inhalation Q8H PRN Sherif Vides   aluminum-magnesium hydroxide-simethicone 15 mL Oral Q4H PRN James Degroot MD   amLODIPine 5 mg Oral Daily Sheriflia Vides   benztropine 2 mg Intramuscular Q6H PRN James Degroot MD   benztropine 2 mg Oral Q6H PRN James Degroot MD   divalproex sodium 500 mg Oral QAM [facility]   divalproex sodium 750 mg Oral HS Sherif Vides   haloperidol 5 mg Oral Q6H PRN James Degroot MD   haloperidol lactate 5 mg Intramuscular Q6H PRN James Degroot MD   hydrOXYzine HCL 25 mg Oral Q6H PRN James Degroot MD   influenza vaccine 0 5 mL Intramuscular Prior to discharge Yasmin [place]   magnesium hydroxide 20 mL Oral Daily PRN James Degroot MD   risperiDONE 1 mg Oral Q6H PRN James Degroot MD   risperiDONE 2 mg Oral BID Sheriflia Vides   traZODone 50 mg Oral HS PRN James Degroot MD       Risks, benefits and possible side effects of Medications:   Risks, benefits, and possible side effects of medications explained to patient and patient verbalizes understanding  This note has been constructed using a voice recognition system  There may be translation, syntax,  or grammatical errors  If you have any questions, please contact the dictating provider

## 2019-02-02 NOTE — PROGRESS NOTES
Pt loud and childlike in behavior  Scant during interaction  Pt denies SI/HI  Denies hallucinations  Mood good and smiles during interaction  Pt malodorous  Showered when prompted to do so  Pt reports doing well on medicines and denies any concerns

## 2019-02-02 NOTE — PROGRESS NOTES
Pt is pleasant and cooperative  Denies current AH  Denies SI/HI  States he is doing better now that he is here  Denies any stress at home  Pt states he is taking his meds as prescribed  Denies any needs  Social with peers, visible on unit throughout evening  Will continue to monitor

## 2019-02-02 NOTE — PROGRESS NOTES
Pt calm, pleasant, and cooperative, denying symptoms  Pt reports he came to the hospital because of hearing voices but denies AH currently  Out in milieu throughout shift

## 2019-02-03 PROCEDURE — 99232 SBSQ HOSP IP/OBS MODERATE 35: CPT | Performed by: PSYCHIATRY & NEUROLOGY

## 2019-02-03 RX ADMIN — AMLODIPINE BESYLATE 5 MG: 5 TABLET ORAL at 09:09

## 2019-02-03 RX ADMIN — RISPERIDONE 2 MG: 2 TABLET ORAL at 17:45

## 2019-02-03 RX ADMIN — ACETAMINOPHEN 325 MG: 325 TABLET, FILM COATED ORAL at 19:11

## 2019-02-03 RX ADMIN — TRAZODONE HYDROCHLORIDE 50 MG: 50 TABLET ORAL at 21:24

## 2019-02-03 RX ADMIN — DIVALPROEX SODIUM 750 MG: 500 TABLET, DELAYED RELEASE ORAL at 21:20

## 2019-02-03 RX ADMIN — RISPERIDONE 2 MG: 2 TABLET ORAL at 09:10

## 2019-02-03 RX ADMIN — DIVALPROEX SODIUM 500 MG: 500 TABLET, DELAYED RELEASE ORAL at 09:09

## 2019-02-03 NOTE — PROGRESS NOTES
Pt smiling and happy upon approach this morning  Pt denies SI/HI  Denies hallucinations  Reports sleeping well  Poor ADLs  Was encouraged to shower and brush teeth this morning  No questions/concerns  Dr Art Reyna notified of elevated BP

## 2019-02-03 NOTE — PROGRESS NOTES
Pt denies symptoms or concerns on this shift, calm, pleasant, and cooperative  Out in milieu throughout shift

## 2019-02-03 NOTE — MALNUTRITION/BMI
This medical record reflects one or more clinical indicators suggestive of malnutrition and/or morbid obesity  BMI Findings:  BMI Classifications: Morbid Obesity 45-49 9     Body mass index is 46 43 kg/m²  See Nutrition note dated 02/03/2019 for additional details  Completed nutrition assessment is viewable in the nutrition documentation

## 2019-02-03 NOTE — PROGRESS NOTES
Progress Note - Behavioral Health   Darinsandy Romy 22 y o  male MRN: [id number]  Unit/Bed#: [facility] 263-01 Encounter: [id number]    Assessment/Plan   Principal Problem:    Schizoaffective disorder, bipolar type ([facility] )  Active Problems:    Intellectual disability    BMI 45 0-49 9, adult ([facility] )    Encounter for examination and observation for other specified reasons    Subjective:  Patient is compliant with medications with no acute side effects  Patient reports improvement in mood and anxiety and reduction in passive death wishes and auditory hallucination  Patient remained child-like  His blood pressure is elevated today  On examination patient denies any symptoms associated with high blood pressure including headache, nausea, abdominal pain or any other physical symptoms  Patient blood pressure and vitals were reach checked after morning dose of amlodipine  Vitals are in normal range at this time  Patient is consenting for safety on the unit and agreed with plan of initiating disposition planning based on his persistence of improvement on the unit      Current Medications:    Current Facility-Administered Medications:  acetaminophen 325 mg Oral Q6H PRN Pura Rubinstein, MD   albuterol 2 puff Inhalation Q8H PRN Sherif Vides   aluminum-magnesium hydroxide-simethicone 15 mL Oral Q4H PRN Pura Rubinstein, MD   amLODIPine 5 mg Oral Daily Sherif Vides   benztropine 2 mg Intramuscular Q6H PRN Pura Rubinstein, MD   benztropine 2 mg Oral Q6H PRN Pura Rubinstein, MD   divalproex sodium 500 mg Oral QAM Sherif Vides   divalproex sodium 750 mg Oral HS Sherif Vides   haloperidol 5 mg Oral Q6H PRN Pura Rubinstein, MD   haloperidol lactate 5 mg Intramuscular Q6H PRN Pura Rubinstein, MD   hydrOXYzine HCL 25 mg Oral Q6H PRN Pura Rubinstein, MD   influenza vaccine 0 5 mL Intramuscular Prior to discharge Macario Ramirez   magnesium hydroxide 20 mL Oral Daily PRN Pura Rubinstein, MD   risperiDONE 1 mg Oral Q6H PRN Dane Litten Jamee Macdonald MD   risperiDONE 2 mg Oral BID Sherif Vides   traZODone 50 mg Oral HS PRN Jennifer Shea MD       Behavioral Health Medications: all current active meds have been reviewed  Vital signs in last 24 hours:  Temp:  [97 2 °F (36 2 °C)-98 1 °F (36 7 °C)] 97 2 °F (36 2 °C)  HR:  [] 96  Resp:  [20] 20  BP: (130-166)/() 130/90    Laboratory results:    I have personally reviewed all pertinent laboratory/tests results  Labs in last 72 hours: No results for input(s): WBC, RBC, HGB, HCT, PLT, RDW, NEUTROABS, SODIUM, K, CL, CO2, BUN, CREATININE, GLUC, GLUF, CALCIUM, AST, ALT, ALKPHOS, TP, ALB, TBILI, CHOLESTEROL, HDL, TRIG, LDLCALC, VALPROICTOT, CARBAMAZEPIN, LITHIUM, AMMONIA, [id number], FREET4, T3FREE, PREGTESTUR, PREGSERUM, HCG, HCGQUANT, RPR in the last 72 hours  Invalid input(s):  RBC  Admission Labs:   Admission on 01/31/2019, Discharged on 01/31/2019   Component Date Value    Amph/Meth UR 01/31/2019 Negative     Barbiturate Ur 01/31/2019 Negative     Benzodiazepine Urine 01/31/2019 Negative     Cocaine Urine 01/31/2019 Negative     Methadone Urine 01/31/2019 Negative     Opiate Urine 01/31/2019 Negative     PCP Ur 01/31/2019 Negative     THC Urine 01/31/2019 Negative     EXTBreath Alcohol 01/31/2019 0 000        Psychiatric Review of Systems:  Behavior over the last 24 hours:  improving  Sleep: normal  Appetite: normal  Medication side effects: No  ROS: no complaints    Mental Status Evaluation:  Appearance:  casually dressed   Behavior:  guarded   Speech:  soft   Mood:  anxious   Affect:  increased in range   Language naming objects and repeating phrases   Thought Process:  circumstantial   Thought Content:  obsessions   Perceptual Disturbances:  Auditory hallucinations without commands   Risk Potential: Suicidal Ideations without plan, Homicidal Ideations none and Potential for Aggression No   Sensorium:  person, place and time/date   Cognition:  grossly intact   Consciousness: awake    Attention: attention span appeared shorter than expected for age   Insight:  limited   Judgment: limited   Intellect fair   Gait/Station: normal gait/station   Motor Activity: no abnormal movements     Memory: Short and long term memory  fair     Progress Toward Goals: progressing    Recommended Treatment:   Check Depakote level on 2/5/19  Continue with group therapy, milieu therapy and occupational therapy  Continue following current medications:   Current Facility-Administered Medications:  acetaminophen 325 mg Oral Q6H PRN Kadie Campbell MD   albuterol 2 puff Inhalation Q8H PRN Sherif Vides   aluminum-magnesium hydroxide-simethicone 15 mL Oral Q4H PRN Kadie Campbell MD   amLODIPine 5 mg Oral Daily Sherif Vides   benztropine 2 mg Intramuscular Q6H PRN Kadie Campbell MD   benztropine 2 mg Oral Q6H PRN Kadie Campbell MD   divalproex sodium 500 mg Oral QAM Sherif Vides   divalproex sodium 750 mg Oral HS Sherif Vides   haloperidol 5 mg Oral Q6H PRN Kadie Campbell MD   haloperidol lactate 5 mg Intramuscular Q6H PRN Kadie Campbell MD   hydrOXYzine HCL 25 mg Oral Q6H PRN Kadie Campbell MD   influenza vaccine 0 5 mL Intramuscular Prior to discharge Carloz Sung   magnesium hydroxide 20 mL Oral Daily PRN Kadie Campbell MD   risperiDONE 1 mg Oral Q6H PRN Kadie Campbell MD   risperiDONE 2 mg Oral BID Sherif Vides   traZODone 50 mg Oral HS PRN Kadie Campbell MD       Risks, benefits and possible side effects of Medications:   Risks, benefits, and possible side effects of medications explained to patient and patient verbalizes understanding  This note has been constructed using a voice recognition system  There may be translation, syntax,  or grammatical errors  If you have any questions, please contact the dictating provider

## 2019-02-04 PROCEDURE — 99232 SBSQ HOSP IP/OBS MODERATE 35: CPT | Performed by: PSYCHIATRY & NEUROLOGY

## 2019-02-04 PROCEDURE — 90686 IIV4 VACC NO PRSV 0.5 ML IM: CPT | Performed by: PSYCHIATRY & NEUROLOGY

## 2019-02-04 PROCEDURE — G0008 ADMIN INFLUENZA VIRUS VAC: HCPCS | Performed by: PSYCHIATRY & NEUROLOGY

## 2019-02-04 RX ADMIN — AMLODIPINE BESYLATE 5 MG: 5 TABLET ORAL at 08:17

## 2019-02-04 RX ADMIN — RISPERIDONE 2 MG: 2 TABLET ORAL at 08:17

## 2019-02-04 RX ADMIN — INFLUENZA VIRUS VACCINE 0.5 ML: 15; 15; 15; 15 SUSPENSION INTRAMUSCULAR at 12:25

## 2019-02-04 RX ADMIN — DIVALPROEX SODIUM 500 MG: 500 TABLET, DELAYED RELEASE ORAL at 08:17

## 2019-02-04 RX ADMIN — RISPERIDONE 2 MG: 2 TABLET ORAL at 17:00

## 2019-02-04 RX ADMIN — DIVALPROEX SODIUM 750 MG: 500 TABLET, DELAYED RELEASE ORAL at 21:19

## 2019-02-04 NOTE — PROGRESS NOTES
Pleasant with staff and peers  Smiling brightly during interaction  Denies SI/HI, AH/VH  Completed ADLs with prompting  Compliant with meals and medications

## 2019-02-04 NOTE — CASE MANAGEMENT
CM met with Pt to review & sign IMM & Tx Plan  CM & Pt reviewed & signed ROIs for [facility]worth(mom & her boyfriend), [address] [place] [address]'[facility](outpatient & PCP), [place](ICM), & [place] (housing)  Pt is a 21 y/o male who is familiar to CM from previous admissions, most recent being October 8-12, 2018  Since that time, Pt was also admitted to [facility] for behavioral health treatment  Pt is unable to say how long he was there  Pt said his medication wasn't working & he was hearing voices telling him to kill himself & kill his mom, so he came to the emergency room  CM asked Pt about housing & he said that he was to go to a group home, but he doesn't know what happened  CM asked if Pt would like other housing & he said yes, he wants a group home  CM [facility]  Rubi said that Pt cannot return home due to a serious incident  CM reported that Pt always returns home & she had not been notified of any such incident  CM asked about housing referrals, which it had been reported were done during Pt's admission at [facility] said that the  that had been working with Pt left the agency & they don't know what had been done  CM expressed concerns with this & plans to follow-up with the supervisor  After Rubi met with Pt, he informed CM that Pt's mom's Trevon jeffries, as the case CPS through [facility], & he had reported that Pt was watching child pornography on his mom's phone, & so the CPS was going to do a childline referral   CM expressed that she didn't think that Pt would have the ability to independently use a phone to search & find child pornography as he as an IQ score of 52  Rubi said that this was the first time he had ever met Pt & didn't know much about him  Rubi agreed he would try to find out what referral were done & call CM back this afternoon or Monday morning

## 2019-02-04 NOTE — PROGRESS NOTES
Progress Note - Behavioral Health   Julian Mcconnell 22 y o  male MRN: [id number]  Unit/Bed#: [facility] 263-01 Encounter: [id number]    The patient was seen for continuing care and reviewed with treatment team   Staff reports that the patient has pleasant with staff and peers and denying all symptoms  He has been completing ADLs with prompting  He has been medication compliant and has been eating  The patient is bright on approach smiling frequently throughout the interview  Responses were scant with no elaboration  He said his mood is good  He said his appetite and sleep have been good  He states he is no longer having auditory hallucinations and would not elaborate when he last had them  No SI HI  No complaints  Mental Status Evaluation:  Appearance:  disheveled   Behavior:  friendly and guarded   Mood:  euphoric/elevated   Affect: Broad   Speech: Sparse   Thought Process:  Poverty of thoughts   Thought Content:  Cannot be assessed due to patient factors   Perceptual Disturbances: Denies hallucinations and does not appear to be responding to internal stimuli   Risk Potential: No suicidal or homicidal ideation   Attention/Concentration [place] than expected   Orientation:   Oriented to person and place   Gait/Station: normal gait/station and normal balance   Motor Activity: No abnormal movement noted     Progress Toward Goals:  Slowly improving    Assessment/Plan    Principal Problem:    Schizoaffective disorder, bipolar type ([facility])  Active Problems:    Intellectual disability    BMI 45 0-49 9, adult ([facility] )    Encounter for examination and observation for other specified reasons      Recommended Treatment:      Check valproic acid level tomorrow morning  Continue with pharmacotherapy, group therapy, milieu therapy and occupational therapy    The patient will be maintained on the following medications:    Current Facility-Administered Medications:  acetaminophen 325 mg Oral Q6H PRN Cristian Shultz MD albuterol 2 puff Inhalation Q8H PRN Sherif Vides   aluminum-magnesium hydroxide-simethicone 15 mL Oral Q4H PRN Akshat Bowen MD   amLODIPine 5 mg Oral Daily Sheriflia Vides   benztropine 2 mg Intramuscular Q6H PRN Akshat Bowen MD   benztropine 2 mg Oral Q6H PRN Akshat Bowen MD   divalproex sodium 500 mg Oral QAM Sheriflia Vides   divalproex sodium 750 mg Oral HS Sherif Vides   haloperidol 5 mg Oral Q6H PRN Akshat Bowen MD   haloperidol lactate 5 mg Intramuscular Q6H PRN Akshat Bowen MD   hydrOXYzine HCL 25 mg Oral Q6H PRN Akshat Bowen MD   influenza vaccine 0 5 mL Intramuscular Prior to discharge Budd Adas   magnesium hydroxide 20 mL Oral Daily PRN Akshat Bowen MD   risperiDONE 1 mg Oral Q6H PRN Akshat Bowen MD   risperiDONE 2 mg Oral BID Sherif Vides   traZODone 50 mg Oral HS PRN Akshat Bowen MD       Risks, benefits and possible side effects of Medications:   Patient does not verbalize understanding at this time and will require further explanation

## 2019-02-04 NOTE — PLAN OF CARE
Problem: Ineffective Coping  Goal: Participates in unit activities  Interventions:  - Provide therapeutic environment   - Provide required programming   - Redirect inappropriate behaviors    Outcome: Progressing  Patient attended scheduled groups  Patient appeared bright in affect and interactions  Staff redirected him when he attempted to touch others during group activity; patient cooperated  Patient attended afternoon self-esteem group but left early  Patient walked in and out of relaxation group

## 2019-02-04 NOTE — CASE MANAGEMENT
CM contacted [place] @ [phone] & asked to speak with the supervisor for Pt's team   CM left a message for Queenie Velasquez to inquired about housing referrals which may have been done for Pt during his admission at [address] [facility]  , Luis Alfredo Serna @ [phone] who reported that they do not have a housing referral on file for Pt  CM reviewed that she may be submitting a CRR referral for Pt, however, he does have IDD, but not linked with them, to CM's knowledge  Samia reviewed that CRR are short term & questioned where Pt would be transitioning to? She provided CM with contact for Ela Ochoa who does the intake for IDD services in [address] [place] [address] contacted Christina Lee @ [phone] & left a message seeking a return call  CM contacted Pt's mom, Dwain Hauser, @ [phone] who reported that Pt said he was hearing voices to kill himself & her   CM inquired about the baby, & she said that she was fine, but was at the hospital right now  Dwain Houstonna said that they weren't holding her right when she was born, & so they have to take parenting classes  CM asked about Pt returning to the residence, & [place] said that they don't want him back there because he was watching child pornography  Dwain Hauser said that Pt went to the hospital, & the police came to the house the next day  CM inquired what had happened & Dwain Hauser said that Pt was watching child porn  CM inquired if it was on a computer & she said no, that Pt has a cellphone  He said he didn't tell them because he didn't want to get in trouble  CM asked if Dwain Hauser knew anything about the housing referrals that were suppose to have been done while Pt was at [facility], but she said no  CM met with Pt who was asking when he would go to a group home? CM said that it would be something she would be working on for him & he was agreeable  Nursing staff confirmed that Pt had showered & brushed his teeth    Pt cooperative & pleasant  DISPLAY PLAN FREE TEXT

## 2019-02-05 LAB
ALBUMIN SERPL BCP-MCNC: 3.5 G/DL (ref 3.5–5)
ALP SERPL-CCNC: 67 U/L (ref 46–116)
ALT SERPL W P-5'-P-CCNC: 42 U/L (ref 12–78)
ANION GAP SERPL CALCULATED.3IONS-SCNC: 11 MMOL/L (ref 4–13)
AST SERPL W P-5'-P-CCNC: 19 U/L (ref 5–45)
BILIRUB SERPL-MCNC: 0.2 MG/DL (ref 0.2–1)
BUN SERPL-MCNC: 13 MG/DL (ref 5–25)
CALCIUM SERPL-MCNC: 8.5 MG/DL (ref 8.3–10.1)
CHLORIDE SERPL-SCNC: 104 MMOL/L (ref 100–108)
CO2 SERPL-SCNC: 26 MMOL/L (ref 21–32)
CREAT SERPL-MCNC: 0.68 MG/DL (ref 0.6–1.3)
GFR SERPL CREATININE-BSD FRML MDRD: 135 ML/MIN/1.73SQ M
GLUCOSE P FAST SERPL-MCNC: 99 MG/DL (ref 65–99)
GLUCOSE SERPL-MCNC: 99 MG/DL (ref 65–140)
POTASSIUM SERPL-SCNC: 4 MMOL/L (ref 3.5–5.3)
PROT SERPL-MCNC: 7.4 G/DL (ref 6.4–8.2)
SODIUM SERPL-SCNC: 141 MMOL/L (ref 136–145)
VALPROATE SERPL-MCNC: 87 UG/ML (ref 50–100)

## 2019-02-05 PROCEDURE — 80164 ASSAY DIPROPYLACETIC ACD TOT: CPT | Performed by: PSYCHIATRY & NEUROLOGY

## 2019-02-05 PROCEDURE — 99232 SBSQ HOSP IP/OBS MODERATE 35: CPT | Performed by: PSYCHIATRY & NEUROLOGY

## 2019-02-05 PROCEDURE — 80053 COMPREHEN METABOLIC PANEL: CPT | Performed by: PSYCHIATRY & NEUROLOGY

## 2019-02-05 RX ADMIN — AMLODIPINE BESYLATE 5 MG: 5 TABLET ORAL at 08:51

## 2019-02-05 RX ADMIN — RISPERIDONE 2 MG: 2 TABLET ORAL at 08:51

## 2019-02-05 RX ADMIN — DIVALPROEX SODIUM 500 MG: 500 TABLET, DELAYED RELEASE ORAL at 08:51

## 2019-02-05 RX ADMIN — RISPERIDONE 2 MG: 2 TABLET ORAL at 17:00

## 2019-02-05 RX ADMIN — DIVALPROEX SODIUM 750 MG: 500 TABLET, DELAYED RELEASE ORAL at 21:31

## 2019-02-05 NOTE — CASE MANAGEMENT
KAPIL received a call from Marcy at [facility] who reported that the  at [facility] had made the referral & was able to get some of the RRs from school that had a lot of the information that they needed, but neither of them had signatures on them, from a school psychologist   She said that they have to have a signature for them to be considered valid  She said that they also need an assement for adaptive functioning, which could be done now  CM asked if the school doesn't have any signed records, what could be done, & she said that the school district could provide a letter stating that they can attest to the information being correct & are in agreement with it & have the school psychologist sign off on that  She said that they will also need Pt's birth certificate, social security card, & copy of insurance card  CM said that she would ask Pt's ICM to try to follow-up on this with Pt's mom in the community  CM contacted [facility] worker, Andrew Stinson @ [phone] ([email or web address])  & left a message seeking a return call to discuss whom she had been in contact with at the school district regarding the above  CM contacted Pt's ICM, Rubi @ [phone] & asked that he follow-up on obtaining birth certificate, social security card, & insurance card; he agreed

## 2019-02-05 NOTE — CASE MANAGEMENT
KAPIL received a call from [facility] [place] who reported he was not able to find out any information on referrals made  He said that he planned to do a CRR referral, as he had another participant at [facility] that had a roommate with IDD, & he felt that it would be a good temporary placement, until something more permanent could be arranged  KAPIL reviewed conversation with the county & that once she received the release for Pt to sign, she would be able to get more information regarding information needed for Pt to be opened for services  [facility] reported he planned to visit with Pt on Friday & would do the [facility] referral at that time  CM received the ANGELITA from [employer] with Pt to have him sign in  Pt reported he was feeling fine & had no questions  Pt continues to ask about going to a group home & CM reviewed she was working on this with his ICM  Pt had no other questions

## 2019-02-05 NOTE — CASE MANAGEMENT
KAPIL contacted Dax Bird,  for [facility] developmental services, @ [phone], after receiving an email that said, "[email or web address]"  KAPIL asked for a return call to discuss services available to Pt  KAPIL received a return call from Marcy, who reported she had sent an email prior to the one above, but it didn't go through  She re-forwarded it to KAPIL, as she needs ROIs signed before she can share information  KAPIL asked that she fax the ROIs to [phone]

## 2019-02-05 NOTE — PROGRESS NOTES
Progress Note - Behavioral Health   Za Castañeda 22 y o  male MRN: [id number]  Unit/Bed#: [facility] 263-01 Encounter: [id number]    The patient was seen for continuing care and reviewed with treatment team   Staff reports the patient has been cooperative and medication compliant  He has been denying auditory hallucination  We are waiting for placement  The patient is bright and friendly on approach however guarded during interview and responses to interview questions were scant  Denies depression and anxiety  States appetite is good  States slept  Denies auditory hallucinations  Denies SI HI  Denies adverse side effects  Mental Status Evaluation:  Appearance:  disheveled   Behavior:  Bright but guarded   Mood:  euthymic   Affect: broad   Speech: Sparse   Thought Process:  Poverty of thoughts   Thought Content:  Cannot be assessed due to patient factors   Perceptual Disturbances: Denies hallucinations and does not appear to be responding to internal stimuli   Risk Potential: No suicidal or homicidal ideation   Attention/Concentration [place] than expected   Orientation:   Oriented to person and place   Gait/Station: normal gait/station and normal balance   Motor Activity: No abnormal movement noted     Progress Toward Goals:  Approaching baseline    Assessment/Plan    Principal Problem:    Schizoaffective disorder, bipolar type ([facility])  Active Problems:    Intellectual disability    BMI 45 0-49 9, adult ([facility] )    Encounter for examination and observation for other specified reasons      Recommended Treatment:      Continue Depakote 500 mg q a m  and 750 mg q h s     Valproic acid level is currently therapeutic at 87  Continue risperidone 2 mg b i d     Continue with pharmacotherapy, group therapy, milieu therapy and occupational therapy    The patient will be maintained on the following medications:    Current Facility-Administered Medications:  acetaminophen 325 mg Oral Q6H JEISON Koenig MD albuterol 2 puff Inhalation Q8H PRN [facility]   aluminum-magnesium hydroxide-simethicone 15 mL Oral Q4H PRN Lisa Ching MD   amLODIPine 5 mg Oral Daily [facility]   benztropine 2 mg Intramuscular Q6H PRN Lisa Ching MD   benztropine 2 mg Oral Q6H PRN Lisa Ching MD   divalproex sodium 500 mg Oral QAM [facility]   divalproex sodium 750 mg Oral HS [facility]   haloperidol 5 mg Oral Q6H PRN Lisa Ching MD   haloperidol lactate 5 mg Intramuscular Q6H PRN Lisa Ching MD   hydrOXYzine HCL 25 mg Oral Q6H PRN Lisa Ching MD   magnesium hydroxide 20 mL Oral Daily PRN Lisa Ching MD   risperiDONE 1 mg Oral Q6H PRN Lisa Ching MD   risperiDONE 2 mg Oral BID [facility]   traZODone 50 mg Oral HS PRN Lisa Ching MD       Risks, benefits and possible side effects of Medications:   Patient does not verbalize understanding at this time and will require further explanation

## 2019-02-05 NOTE — PROGRESS NOTES
Clinical Pharmacy Note: Antipsychotic Monitoring Requirements     Dimitrios Yusuf is a 22 y o  male who presents with worsening of depression, suicidal ideation, homicidal ideations, and auditory hallucinations  and is currently taking  risperidone 2 mg twice daily  Performing metabolic monitoring on patients receiving any antipsychotics is a Centers for [employer] (CMS) requirement  Antipsychotic treatment increases the risk of developing type 2 diabetes mellitus, hypertension, and hyperlipidemia  According to the [facility]   (NICE) guidelines, baseline weight, waist circumference, pulse, blood pressure, fasting blood glucose, hemoglobin A1c, lipid profile, and prolactin level (if initiating risperidone or paliperidone) should be collected  These guidelines coincide with Centers and Medicare & Medicaid Services (CMS) requirements including baseline Body Mass Index, blood pressure, fasting glucose, hemoglobin A1c, and fasting lipid panel  CMS states laboratory values collected 12 months from discharge date are acceptable for evaluation  CMS Checklist:     BMI: yes  BP: yes and no, needs to be collected  Fasting glucose: yes  A1c within last 12 months: yes  Lipid panel within last 12 months: yes  Nicotine Replacement Therapy: no, patient does not smoke    The following values have been collected:  Value Status Result    BMI Body mass index is 46 43 kg/m²     Blood pressure /66 (BP Location: Left arm)   Pulse (!) 107   Temp 97 8 °F (36 6 °C) (Tympanic)   Resp 18   Ht 5' 8" (1 727 m)   Wt (!) 139 kg (305 lb 6 oz)   BMI 46 43 kg/m²    Fasting glucose   0  Lab Value Date/Time   GLUC 99 02/05/2019 0545      Hemoglobin A1c   0  Lab Value Date/Time   HGBA1C 4 9 10/09/2018 0535      Lipid panel   0  Lab Value Date/Time   CHOLESTEROL 141 10/26/2018 1134       0  Lab Value Date/Time   HDL 27 (L) 10/26/2018 1134       0  Lab Value Date/Time LDLCALC 76 10/26/2018 1134        0  Lab Value Date/Time   TRIG 192 (H) 10/26/2018 1134          Recommendations    Based on the results of hemoglobin A1c, lipid panel, and blood pressure monitoring, Dalila Sibley is an potential candidate for no pharmacological interventions and routine monitoring based on  HbA1c<6 5%, ASCVD<7 5% and BP within goal       Based on the above information, pharmacy recommends the following: Continue yearly metabolic monitoring         Pharmacy will continue to follow patient with team   Electronically signed by: Ashanti Fields, Pharmacist

## 2019-02-05 NOTE — CASE MANAGEMENT
CM contacted [facility] [address] of the  @ [phone] & spoke with Candi who reported that Pt is not scheduled with his psychiatrist, therapist, or PCP

## 2019-02-05 NOTE — PROGRESS NOTES
Patient denies SI/HI/AVH  Pleasant and cooperative  Smiles during conversation  Reports sleeping well  Pt brushed his teeth and said he will shower later today

## 2019-02-05 NOTE — PROGRESS NOTES
Pt denies any symptoms  Does state his is upset with his mother for not allowing him to return home  Staff providing encouragement and support as needed and pt appears receptive  Pt requires redirect from standing at nurses station and is easily redirected  Pt with bright affect and pleasant during interaction  Will continue to monitor and support

## 2019-02-05 NOTE — PLAN OF CARE
Problem: DISCHARGE PLANNING  Goal: Discharge to home or other facility with appropriate resources  INTERVENTIONS:  - Identify barriers to discharge w/patient and caregiver  - Arrange for needed discharge resources and transportation as appropriate  - Identify discharge learning needs (meds, wound care, etc )  - Arrange for interpretive services to assist at discharge as needed  - Refer to Case Management Department for coordinating discharge planning if the patient needs post-hospital services based on physician/advanced practitioner order or complex needs related to functional status, cognitive ability, or social support system   Outcome: Progressing  ANGELITA sent to Yinka Goins to get more information on referral/status

## 2019-02-05 NOTE — PROGRESS NOTES
Clinical Pharmacy Note: Valproic Acid    Luis Gonzales is a 22 y o  male who presents with worsening of depression, suicidal ideation, homicidal ideations, and auditory hallucinations    Assessment/Plan:    VPA indication: mood disorder     Deny Singer is currently taking 1250 mg delayed release tablet taken as 500 mg AM and 750 mg PM   Valproic Acid concentration is 87 ug/mL on 2/5 and was drawn at steady state  It is recommended to continue current-dose/current dosing  Pharmacist has reviewed liver function tests, pancreatic enzymes, and pregnancy test (if drawn) or made recommendations for such tests YES    Pregnancy test Does not apply to this patient  Pharmacy Recommendations:   Continue current daily dose  Consider consolidating dose to 1250 mg at bedtime for ease of administration  Monitoring:    Valproic Acid:    0  Lab Value Date/Time   VALPROICTOT 87 02/05/2019 0545       Liver Function:    0  Lab Value Date/Time   ALB 3 5 02/05/2019 0545   ALB 3 7 10/26/2018 1134   ALB 3 4 (L) 10/09/2018 0535   ALB 4 1 07/22/2015 2028       0  Lab Value Date/Time   TBILI 0 20 02/05/2019 0545   TBILI 0 73 10/26/2018 1134   TBILI 0 50 10/09/2018 0535       0  Lab Value Date/Time   AST 19 02/05/2019 0545   AST 34 10/26/2018 1134   AST 32 10/09/2018 0535   AST 36 07/22/2015 2028       0  Lab Value Date/Time   ALT 42 02/05/2019 0545   ALT 67 10/26/2018 1134   ALT 67 10/09/2018 0535   ALT 67 07/22/2015 2028       0  Lab Value Date/Time   INR 1 07 07/22/2015 2028       Platelets:    0  Lab Value Date/Time    10/26/2018 1134    07/22/2015 2028       Therapeutic valproic acid levels are  ug/mL, but target range varies by indication  Levels above 150 ug/mL indicate toxicity, although toxicity may be associated with levels within therapeutic ranges based on symptoms      If switching from delayed release to extended release formulation, increase dose by 8 to 20% and dose daily to maintain therapeutic serum levels  Monitor pancreatic enzymes if patient presents with abdominal pain consistent with pancreatitis which is a black box warning  Also, monitor liver function for black box warning of hepatotoxicity, check ammonia level if suspected  Suspect toxicity in stabilized patients if new-onset sedation, nausea, vomiting, diarrhea, and/or tremor  Reassess valproic acid level if liver function or mental status changes  May cause dose-related thrombocytopenia, inhibition of platelet aggregation, and bleeding  In some cases, platelet counts may be normalized with continued treatment; however, reduce dose or discontinue drug if patient develops evidence of hemorrhage, bruising, or a disorder of hemostasis/coagulation  Females of child bearing age should be on birth control due to very high teratogenic potential      Pharmacy will continue to follow patient with team  Thank you      Electronically signed by: Yaw Wills Pharmacist

## 2019-02-06 PROCEDURE — 99232 SBSQ HOSP IP/OBS MODERATE 35: CPT | Performed by: PSYCHIATRY & NEUROLOGY

## 2019-02-06 RX ORDER — ACETAMINOPHEN 325 MG/1
325 TABLET ORAL EVERY 6 HOURS PRN
Status: DISCONTINUED | OUTPATIENT
Start: 2019-02-06 | End: 2019-02-19

## 2019-02-06 RX ADMIN — DIVALPROEX SODIUM 750 MG: 500 TABLET, DELAYED RELEASE ORAL at 21:18

## 2019-02-06 RX ADMIN — AMLODIPINE BESYLATE 5 MG: 5 TABLET ORAL at 08:01

## 2019-02-06 RX ADMIN — DIVALPROEX SODIUM 500 MG: 500 TABLET, DELAYED RELEASE ORAL at 08:01

## 2019-02-06 RX ADMIN — ACETAMINOPHEN 325 MG: 325 TABLET, FILM COATED ORAL at 13:36

## 2019-02-06 RX ADMIN — RISPERIDONE 2 MG: 2 TABLET ORAL at 18:07

## 2019-02-06 RX ADMIN — RISPERIDONE 2 MG: 2 TABLET ORAL at 08:01

## 2019-02-06 NOTE — PROGRESS NOTES
Pt pleasant and smiling  Showered and brushed teeth when he was prompted to do so  Needs redirection from RN station at times  Pt denies SI/HI  Denies hallucinations  No questions/concerns

## 2019-02-06 NOTE — CASE MANAGEMENT
CM contacted the Special Education office of the [facility] @ [phone] opt 2 & left a message seeking a return call  CM received a message from SANDEEP NULL [facility] , Isis Alves, who said that she had collected school records & Pt's teacher had been able to give some history as well on Pt  She said that there was an issue in that the school records were compromised in a data transfer, & therefore, none of them had a signature on them  She said that [employer] had said that a letter could be written by the school district, & Pt's ICM through [place], Alize Wiggins I, was suppose to follow-up on this  The county was to have an accessor meet with Pt & his mom in the community  Pt also needed his Social Security card & birth certificate, which had been lost when he was staying with his stepfather  CM attempted to call Pt's stepfather, Mina Arroyo, @ [phone], however, the number was disconnected  KAPIL emailed Mariah DAVEY of [employer] to let her know the barriers with Pt obtaining a new social security card & birth certificate  CM inquired if she had any contacts at [facility], as Pt had medicaid & they would most likely have the needed documents on file  CM met with Pt who was happy to have received some clothing items; due to bedbugs in Pt's home his clothing was placed elsewhere until discharge  Pt asking about when he will go to a group home, & CM reviewed it was something they would have to work on together

## 2019-02-06 NOTE — CASE MANAGEMENT
CM met with Pt who said that he wanted to go home  CM said that Pt had been talking about a group home, but Pt said, "nah, I just want to go home"  Pt asking CM to call his mom & CM told Pt he could call her & assisted him with the using the office phone  Pt on the phone questioning why he cannot go home, & asking if he is suppose to want a group home? After the call, Pt reported he cannot go home, & that they won't let him in  Pt said he will need a group home, but he wants to go home  CM asked if his mom gave him a reason, & he said no, but then because of the baby

## 2019-02-06 NOTE — PROGRESS NOTES
Progress Note - Behavioral Health   Corin Carlisle 22 y o  male MRN: [id number]  Unit/Bed#: [facility] 251-01 Encounter: [id number]    The patient was seen for continuing care and reviewed with treatment team   Staff reports the patient has been intrusive at times but is redirectable  He has been denying all symptoms  We are waiting for neuro psychology testing and placement is pending  The patient is bright and smiles on approach  However he remains scant in speech and only answers questions with one word answers and no elaboration  States he slept "good" but is still tired  States appetite is okay  Denies depression and anxiety  Denies adverse affects of medications  Denies SI HI  Denies AH VH  Has been appropriate on the unit  Mental Status Evaluation:  Appearance:  Good eye contact and disheveled   Behavior:  guarded   Mood:  euthymic   Affect: appropriate   Speech: Sparse   Thought Process:  Goal directed and coherent   Thought Content:  Does not verbalize delusional material   Perceptual Disturbances: Denies hallucinations and does not appear to be responding to internal stimuli   Risk Potential: No suicidal or homicidal ideation   Attention/Concentration [place] than expected   Orientation:   Oriented to person and place   Gait/Station: normal gait/station and normal balance   Motor Activity: No abnormal movement noted     Progress Toward Goals:  Approaching baseline    Assessment/Plan    Principal Problem:    Schizoaffective disorder, bipolar type ([facility] )  Active Problems:    Intellectual disability    BMI 45 0-49 9, adult ([facility] )    Encounter for examination and observation for other specified reasons      Recommended Treatment: Continue with pharmacotherapy, group therapy, milieu therapy and occupational therapy    The patient will be maintained on the following medications:    Current Facility-Administered Medications:  acetaminophen 325 mg Oral Q6H PRN Kadie Campbell MD   albuterol 2 puff Inhalation Q8H PRN [facility] Vides   aluminum-magnesium hydroxide-simethicone 15 mL Oral Q4H PRN Pura Rubinstein, MD   amLODIPine 5 mg Oral Daily [facility] Peewee   benztropine 2 mg Intramuscular Q6H PRN Pura Rubinstein, MD   benztropine 2 mg Oral Q6H PRN Pura Rubinstein, MD   divalproex sodium 500 mg Oral QAM [facility]   divalproex sodium 750 mg Oral HS [facility] Vides   haloperidol 5 mg Oral Q6H PRN Pura Rubinstein, MD   haloperidol lactate 5 mg Intramuscular Q6H PRN Pura Rubinstein, MD   hydrOXYzine HCL 25 mg Oral Q6H PRN Pura Rubinstein, MD   magnesium hydroxide 20 mL Oral Daily PRN Pura Rubinstein, MD   risperiDONE 1 mg Oral Q6H PRN Pura Rubinstein, MD   risperiDONE 2 mg Oral BID Sheriflia Vides   traZODone 50 mg Oral HS PRN Pura Rubinstein, MD       Risks, benefits and possible side effects of Medications:   Patient does not verbalize understanding at this time and will require further explanation

## 2019-02-06 NOTE — PROGRESS NOTES
Pt loud and elated  At RN station often  Laughing and clapping hands because he colored a picture and is doing his laundry  HR elevated  Pt is hyperactive at this time  Pt happy that clothing was brought in for him and told writer several times that he got new clothing

## 2019-02-06 NOTE — PLAN OF CARE
Problem: Ineffective Coping  Goal: Participates in unit activities  Interventions:  - Provide therapeutic environment   - Provide required programming   - Redirect inappropriate behaviors    Outcome: Progressing  Patient attended morning group and interacted when prompted  Patient would leave and re-enter group until leaving early  Patient exhibited a bright mood throughout the day  Patient attended arts-crafts group in afternoon and appropriately completed a project  Patient spontaneously helped staff clean up art supplies

## 2019-02-06 NOTE — PLAN OF CARE
Problem: DISCHARGE PLANNING  Goal: Discharge to home or other facility with appropriate resources  INTERVENTIONS:  - Identify barriers to discharge w/patient and caregiver  - Arrange for needed discharge resources and transportation as appropriate  - Identify discharge learning needs (meds, wound care, etc )  - Arrange for interpretive services to assist at discharge as needed  - Refer to Case Management Department for coordinating discharge planning if the patient needs post-hospital services based on physician/advanced practitioner order or complex needs related to functional status, cognitive ability, or social support system   Outcome: Progressing  Msg left to [address] [place] [address] regarding the need for signed letter to move forward with referral for ID services through the [facility]

## 2019-02-07 PROCEDURE — 99232 SBSQ HOSP IP/OBS MODERATE 35: CPT | Performed by: PSYCHIATRY & NEUROLOGY

## 2019-02-07 RX ADMIN — ACETAMINOPHEN 325 MG: 325 TABLET, FILM COATED ORAL at 14:52

## 2019-02-07 RX ADMIN — AMLODIPINE BESYLATE 5 MG: 5 TABLET ORAL at 08:53

## 2019-02-07 RX ADMIN — ACETAMINOPHEN 325 MG: 325 TABLET, FILM COATED ORAL at 09:15

## 2019-02-07 RX ADMIN — RISPERIDONE 2 MG: 2 TABLET ORAL at 08:53

## 2019-02-07 RX ADMIN — DIVALPROEX SODIUM 750 MG: 500 TABLET, DELAYED RELEASE ORAL at 21:22

## 2019-02-07 RX ADMIN — RISPERIDONE 2 MG: 2 TABLET ORAL at 17:00

## 2019-02-07 RX ADMIN — DIVALPROEX SODIUM 500 MG: 500 TABLET, DELAYED RELEASE ORAL at 08:53

## 2019-02-07 NOTE — PLAN OF CARE
Problem: DISCHARGE PLANNING  Goal: Discharge to home or other facility with appropriate resources  INTERVENTIONS:  - Identify barriers to discharge w/patient and caregiver  - Arrange for needed discharge resources and transportation as appropriate  - Identify discharge learning needs (meds, wound care, etc )  - Arrange for interpretive services to assist at discharge as needed  - Refer to Case Management Department for coordinating discharge planning if the patient needs post-hospital services based on physician/advanced practitioner order or complex needs related to functional status, cognitive ability, or social support system   Outcome: Progressing  Pt's mom was able to find her social security card & gave it to [place] to bring to the hospital

## 2019-02-07 NOTE — CASE MANAGEMENT
CM contacted [place] [facility] @ [phone] opt 2 ext  3 for records request & spoke with Melissa Joel  She reported that they had another office with older records & she would go there this afternoon to see if they had any records with a signature on them  She said that if they didn't she would talk to Nieves Retana about what they could put in records to fulfil the county's request   KAPIL provided her contact information to follow-up

## 2019-02-07 NOTE — CASE MANAGEMENT
KAPIL received a call from Sariah at [employer] [facility] [employer], who said that she had found an IEP with a siganture, however, the most recent RR was actually done by [facility], & it didn't have a signature  She said that CM would need to contact them in regards to getting a signature, however, she would send CM what she was able to find

## 2019-02-07 NOTE — PROGRESS NOTES
Progress Note - Behavioral Health   Corin Carlisle 22 y o  male MRN: [id number]  Unit/Bed#: [facility] 251-01 Encounter: [id number]    The patient was seen for continuing care and reviewed with treatment team   Staff reports the patient performs ADLs with encouragement  Has been happy and smiling  Can be intrusive at times  Placement is pending  The patient is bright and friendly on approach  He remains scant in speech  Denies depression and anxiety  States sleep and appetite are okay  Denies medication side effects  Denies hallucinations  Denies SI HI  Says he wants to go home but later said he was happy to be going to a group home  Reports no problems  Tolerating medications well  Mental Status Evaluation:  Appearance:  Good eye contact   Behavior:  cooperative   Mood:  euthymic   Affect: appropriate   Speech: Sparse   Thought Process:  Goal directed and coherent   Thought Content:  Does not verbalize delusional material   Perceptual Disturbances: Denies hallucinations and does not appear to be responding to internal stimuli   Risk Potential: No suicidal or homicidal ideation   Attention/Concentration [place] than expected   Orientation:   Oriented to person and place   Gait/Station: normal gait/station and normal balance   Motor Activity: No abnormal movement noted     Progress Toward Goals:  Approaching baseline    Assessment/Plan    Principal Problem:    Schizoaffective disorder, bipolar type ([facility])  Active Problems:    Intellectual disability    BMI 45 0-49 9, adult ([facility] )    Encounter for examination and observation for other specified reasons      Recommended Treatment: Continue with pharmacotherapy, group therapy, milieu therapy and occupational therapy    The patient will be maintained on the following medications:    Current Facility-Administered Medications:  acetaminophen 325 mg Oral Q6H PRN Sherif Vides   albuterol 2 puff Inhalation Q8H PRN Sherif Vides   aluminum-magnesium hydroxide-simethicone 15 mL Oral Q4H PRN Cristian Shultz MD   amLODIPine 5 mg Oral Daily [facility]   benztropine 2 mg Intramuscular Q6H PRN Cristian Shultz MD   benztropine 2 mg Oral Q6H PRN Cristian Shultz MD   divalproex sodium 500 mg Oral QAM [facility]   divalproex sodium 750 mg Oral HS [facility]   haloperidol 5 mg Oral Q6H PRN Cristian Shultz MD   haloperidol lactate 5 mg Intramuscular Q6H PRN Cristian Shultz MD   hydrOXYzine HCL 25 mg Oral Q6H PRN Cristian Shultz MD   magnesium hydroxide 20 mL Oral Daily PRN Cristian Shultz MD   risperiDONE 1 mg Oral Q6H PRN Cristian Shultz MD   risperiDONE 2 mg Oral BID [facility]   traZODone 50 mg Oral HS PRN Cristian Shultz MD       Risks, benefits and possible side effects of Medications:   Patient does not verbalize understanding at this time and will require further explanation

## 2019-02-07 NOTE — CASE MANAGEMENT
CM met with Pt to call [employer] @ [phone] to inquire if they had a copy of his social security card and/or birth certificate  Pt was able to verify his social security number, date of birth, & address to Ms Ambrocio  Pt gave permission for Ms Maurilio Cordon to talk to CM, & CM asked if they a copy of Pt's social security card or birth certificate? Ms Maurilio Cordon said that they didn't have any scanned documents in their system, and then CM should contact Social Security, because if the patient already has social security, they don't need the documents, social security just verifies them  CM & Pt contacted Pt's mom, who said that she was able to find Pt's social security card & she gave it to his worker through [place]  Pt spoke to his mom for awhile & then ended the call

## 2019-02-07 NOTE — PROGRESS NOTES
Pt loud and energetic  Pt happy and smiling  At RN station often  Laughing and joking with staff  Pt showered when prompted to do so  Denies all symptoms when asked

## 2019-02-08 PROCEDURE — 99232 SBSQ HOSP IP/OBS MODERATE 35: CPT | Performed by: PSYCHIATRY & NEUROLOGY

## 2019-02-08 RX ORDER — RISPERIDONE 2 MG/1
2 TABLET, FILM COATED ORAL EVERY EVENING
Status: DISCONTINUED | OUTPATIENT
Start: 2019-02-08 | End: 2019-02-11

## 2019-02-08 RX ORDER — RISPERIDONE 1 MG/1
1 TABLET, FILM COATED ORAL EVERY MORNING
Status: DISCONTINUED | OUTPATIENT
Start: 2019-02-09 | End: 2019-02-11

## 2019-02-08 RX ADMIN — RISPERIDONE 2 MG: 2 TABLET ORAL at 17:45

## 2019-02-08 RX ADMIN — DIVALPROEX SODIUM 500 MG: 500 TABLET, DELAYED RELEASE ORAL at 08:39

## 2019-02-08 RX ADMIN — AMLODIPINE BESYLATE 5 MG: 5 TABLET ORAL at 08:38

## 2019-02-08 RX ADMIN — RISPERIDONE 2 MG: 2 TABLET ORAL at 08:38

## 2019-02-08 RX ADMIN — ACETAMINOPHEN 325 MG: 325 TABLET, FILM COATED ORAL at 19:26

## 2019-02-08 RX ADMIN — DIVALPROEX SODIUM 750 MG: 500 TABLET, DELAYED RELEASE ORAL at 21:00

## 2019-02-08 RX ADMIN — ACETAMINOPHEN 325 MG: 325 TABLET, FILM COATED ORAL at 12:04

## 2019-02-08 NOTE — PLAN OF CARE
Problem: DISCHARGE PLANNING  Goal: Discharge to home or other facility with appropriate resources  INTERVENTIONS:  - Identify barriers to discharge w/patient and caregiver  - Arrange for needed discharge resources and transportation as appropriate  - Identify discharge learning needs (meds, wound care, etc )  - Arrange for interpretive services to assist at discharge as needed  - Refer to Case Management Department for coordinating discharge planning if the patient needs post-hospital services based on physician/advanced practitioner order or complex needs related to functional status, cognitive ability, or social support system   Outcome: Progressing  Pt's ICM to meet with him today to complete CRR referral & bring social security card

## 2019-02-08 NOTE — CONSULTS
Consultation - Neuropsychology/Psychology Department  Antoinette Chong 22 y o  male MRN: [id number]  Unit/Bed#: [facility] 251-01 Encounter: [id number]        Reason for Consultation:  Antoinette Chong is a 22y o  year old male who was referred for cognitive evaluation and an assessment of adaptive functioning  History of Present Illness  Patient has been diagnosed with Schizoaffective disorder, Bipolar Type and Intellectual Disability    Physician Requesting Consult: Caroline Bains    PROBLEM LIST:  Patient Active Problem List   Diagnosis    Intellectual disability    Impulse control disorder    Schizoaffective disorder, bipolar type ([facility] )    BMI 45 0-49 9, adult ([facility] )    Fever    Tachycardia    Hypokalemia    Hypertension    Encounter for examination and observation for other specified reasons         Historical Information   Past Medical History:   Diagnosis Date    Anxiety     Asthma     Hypertension     Mental retardation     Psychiatric disorder     Psychiatric illness     Schizoaffective disorder ([facility] )      Past Surgical History:   Procedure Laterality Date    HAND SURGERY      right hand     Social History   History   Alcohol Use No     History   Drug Use No     History   Smoking Status    Never Smoker   Smokeless Tobacco    Never Used     Family History:   Family History   Problem Relation Age of Onset    No Known Problems Mother     No Known Problems Father     No Known Problems Sister     No Known Problems Brother     No Known Problems Maternal Aunt     No Known Problems Paternal Aunt     No Known Problems Maternal Uncle     No Known Problems Paternal Uncle     No Known Problems Maternal Grandfather     No Known Problems Maternal Grandmother     No Known Problems Paternal Grandfather     No Known Problems Paternal Grandmother     No Known Problems Cousin     ADD / ADHD Neg Hx     Alcohol abuse Neg Hx     Anxiety disorder Neg Hx     Bipolar disorder Neg Hx     Dementia Neg Hx  Depression Neg Hx     Drug abuse Neg Hx     OCD Neg Hx     Paranoid behavior Neg Hx     Schizophrenia Neg Hx     Seizures Neg Hx     Self-Injury Neg Hx     Suicide Attempts Neg Hx        Meds/Allergies   current meds:   Current Facility-Administered Medications   Medication Dose Route Frequency    acetaminophen (TYLENOL) tablet 325 mg  325 mg Oral Q6H PRN    albuterol (PROVENTIL HFA,VENTOLIN HFA) inhaler 2 puff  2 puff Inhalation Q8H PRN    aluminum-magnesium hydroxide-simethicone (MYLANTA) 200-200-20 mg/5 mL oral suspension 15 mL  15 mL Oral Q4H PRN    amLODIPine (NORVASC) tablet 5 mg  5 mg Oral Daily    benztropine (COGENTIN) injection 2 mg  2 mg Intramuscular Q6H PRN    benztropine (COGENTIN) tablet 2 mg  2 mg Oral Q6H PRN    divalproex sodium (DEPAKOTE) EC tablet 500 mg  500 mg Oral QAM    divalproex sodium (DEPAKOTE) EC tablet 750 mg  750 mg Oral HS    haloperidol (HALDOL) tablet 5 mg  5 mg Oral Q6H PRN    haloperidol lactate (HALDOL) injection 5 mg  5 mg Intramuscular Q6H PRN    hydrOXYzine HCL (ATARAX) tablet 25 mg  25 mg Oral Q6H PRN    magnesium hydroxide (MILK OF MAGNESIA) 400 mg/5 mL oral suspension 20 mL  20 mL Oral Daily PRN    risperiDONE (RisperDAL M-TABS) dispersible tablet 1 mg  1 mg Oral Q6H PRN    [START ON 2/9/2019] risperiDONE (RisperDAL) tablet 1 mg  1 mg Oral QAM    risperiDONE (RisperDAL) tablet 2 mg  2 mg Oral QPM    traZODone (DESYREL) tablet 50 mg  50 mg Oral HS PRN       Allergies   Allergen Reactions    Bee Venom Anaphylaxis    Penicillins          Family and Social Support:   No Data Recorded    Behavioral Observations: Alert, UNABLE to accurately state the [place], Season, Month, Day/Week, Day/Month, [place], and [place]  Affect appeared pleasant and he denied depressed mood and anxiety, and auditory and visual hallucinations  Patient was unable to tell this examiner his street address  He was easily distracted but able to be re-directed       Cognitive and Adaptive Examination    Patient was administered the Wechsler Adult Intelligence Scale and achieved the following scores:  · Verbal Comprehension  56  Moderate Impairment  · Perceptual Reasoning  54  Moderate Impairment  · Working Memory   55  Moderate Impairment  · Processing Speed  53  Moderate Impairment    · Full Scale IQ   47  Moderate Impairment    This patient's intellectual profile is consistent with an Intellectual Disability  Patient was also administered the [place] Adaptive Behavior Scales - 3 and achieved the following scores:    Patient's overall level of adaptive functioning is described by his score on the Adaptive Behavior Composite  His Composite score is 20, which is well below the normative mean of 100  The Percentile rank for this overall score is <1  The composite score is based on scores for three specific adaptive behavior domains: Communication, Daily Living Skills, and Socialization  The domain scores are also expressed as standard scores with a mean of 100 and standard deviation of 15  The Communication domain measures how well the patient listens and understands, expresses himself through speech, and reads and writes  His Communication standard score is 20  This corresponds to a percentile rank of <1  The Daily Living Skills domain assesses the patient's performance of the practical, everyday tasks of living that are appropriate for his age  His standard score for Daily Living Skills is 20, which corresponds to a percentile rank of <1  The patient's score for the Socialization domain reflects his functioning in social situations  His Socialization standard score is 20  The percentile rank is <1  Finally, patient received v-scale scores of 22 for Internalizing (I e  Emotional) and 23 for Externalizing (I e  Acting out)  Scores of 21-24 are considered "Clinically Significant"      Schizoaffective Disorder, Bipolar type

## 2019-02-08 NOTE — PROGRESS NOTES
Patient is pleasant and cheerful during interection  Denies all sx  He is looking forward to going to a group home  Eating and sleeping well  Naps during the day

## 2019-02-08 NOTE — CASE MANAGEMENT
CM received records from Debbie Carrillo at [facility] & they were sent to Marcy at [employer]    CM also reported that Pt's mom said she found his social security card & had given it to Pt's ICM to bring today to the hospital

## 2019-02-08 NOTE — PROGRESS NOTES
Progress Note - [address] 22 y o  male MRN: [id number]  Unit/Bed#: U 251-01 Encounter: [id number]    Latisha Shepherd is a Pt with intellectual disability  He was seen for continuing care and reviewed with treatment team   Pt was pleasant, reported feeling "Good" and admitted to a mild tremor of hands  He presently denies depression, SI, HI, anxiety, or psychotic Sxs  Floor team relays that Pt has been cooperative, medication compliant, can appear happy, but hyperactive at times  He needed prompting to follow ADL (showering)  Pt has been attending groups and participating well  He is more appropriate with peers and staff  Was touching his peers, but has apparently stopped doing so and he accepts redirection by staff  No violent outbursts toward self or others   is working on obtaining housing placement and services for Pt due to his significant intellectual deficit      Sleep:Good  Appetite: Good   Medication side effects: None per Pt    ROS: No complaints per Pt    Labs/Tests: Reviewed  VPA level 87 (therapeutic range) on 2/5/2019    Mental Status Evaluation:  Appearance:  Dressed, clean, partial eye contact   Behavior:  Calm, cooperative, pleasant   Speech:  Dysarthric at times, but normal rate and volume   Mood:  Euthymic per Pt, but appears to have an underlying anxiety    Affect:  [place]   Thought Process:  Organized, [place]   Associations: Intact associations   Thought Content:  No verbalized delusions   Perceptual Disturbances: Pt denies any hallucinations or paranoia and does not appear to be responding to internal stimuli   Risk Potential: Pt presently denies SI or HI    Sensorium:  Self, birthday, Place, Day of the week, Year   Memory:  short term memory impaired   Consciousness:  alert, awake   Attention: Attention span appeared shorter than expected for age   Insight:  Limited   Judgment: Limited   Gait/Station: Normal gait/station   Motor Activity: Mild tremors of hands ARELY  Rt slightly > Lt hand     Vitals:    02/08/19 0728   BP: 136/60   Pulse: (!) 118   Resp: 18   Temp: 98 5 °F (36 9 °C)       Admission on 01/31/2019   Component Date Value    Valproic Acid, Total 02/05/2019 87     Sodium 02/05/2019 141     Potassium 02/05/2019 4 0     Chloride 02/05/2019 104     CO2 02/05/2019 26     ANION GAP 02/05/2019 11     BUN 02/05/2019 13     Creatinine 02/05/2019 0 68     Glucose 02/05/2019 99     Glucose, Fasting 02/05/2019 99     Calcium 02/05/2019 8 5     AST 02/05/2019 19     ALT 02/05/2019 42     Alkaline Phosphatase 02/05/2019 67     Total Protein 02/05/2019 7 4     Albumin 02/05/2019 3 5     Total Bilirubin 02/05/2019 0 20     eGFR 02/05/2019 135        Progress Toward Goals: Based on today's interview and review of prior notes, Pt is making gradual progress  Discussed with psychiatrist to reduce Risperidone to 1mg qAM and 2mg q evening to hopefully resolve his EPS SE   Benztropine prn dose is already on board for EPS as well  Assessment/Plan   Principal Problem:    Schizoaffective disorder, bipolar type ([facility] [address] [address] )  Active Problems:    Intellectual disability    BMI 45 0-49 9, adult ([facility] [address] )    Encounter for examination and observation for other specified reasons      Recommended Treatment: Continue with pharmacotherapy, group therapy, milieu therapy and occupational therapy    The patient will be maintained on the following medications:    Current Facility-Administered Medications:  acetaminophen 325 mg Oral Q6H PRN Sherif Vides   albuterol 2 puff Inhalation Q8H PRN Sherif Vides   aluminum-magnesium hydroxide-simethicone 15 mL Oral Q4H PRN Judy Lopez MD   amLODIPine 5 mg Oral Daily Sherif Vides   benztropine 2 mg Intramuscular Q6H PRN Judy Lopez MD   benztropine 2 mg Oral Q6H PRN Judy Lopez MD   divalproex sodium 500 mg Oral QAM Sherif Vides   divalproex sodium 750 mg Oral HS Sherif Vides   haloperidol 5 mg Oral Q6H PRN Pura Rubinstein, MD   haloperidol lactate 5 mg Intramuscular Q6H PRN Pura Rubinstein, MD   hydrOXYzine HCL 25 mg Oral Q6H PRN Pura Rubinstein, MD   magnesium hydroxide 20 mL Oral Daily PRN Pura Rubinstein, MD   risperiDONE 1 mg Oral Q6H PRN Pura Rubinstein, MD   [START ON 2/9/2019] risperiDONE 1 mg Oral Daily Melisa Wang PA-C   risperiDONE 2 mg Oral QPM Melisa Wang PA-C   traZODone 50 mg Oral HS PRN Pura Rubinstein, MD       Risks, benefits and possible side effects of Medications:   Patient's understanding is limited, but Pt is calm and agreeable to the plan I have discussed

## 2019-02-09 PROCEDURE — 99231 SBSQ HOSP IP/OBS SF/LOW 25: CPT | Performed by: PSYCHIATRY & NEUROLOGY

## 2019-02-09 RX ADMIN — DIVALPROEX SODIUM 500 MG: 500 TABLET, DELAYED RELEASE ORAL at 08:08

## 2019-02-09 RX ADMIN — RISPERIDONE 2 MG: 2 TABLET ORAL at 17:17

## 2019-02-09 RX ADMIN — ACETAMINOPHEN 325 MG: 325 TABLET, FILM COATED ORAL at 09:09

## 2019-02-09 RX ADMIN — AMLODIPINE BESYLATE 5 MG: 5 TABLET ORAL at 08:08

## 2019-02-09 RX ADMIN — RISPERIDONE 1 MG: 1 TABLET ORAL at 08:08

## 2019-02-09 RX ADMIN — DIVALPROEX SODIUM 750 MG: 500 TABLET, DELAYED RELEASE ORAL at 21:21

## 2019-02-09 NOTE — PROGRESS NOTES
Pt is pleasant and cooperative, smiling  Denies S/H/I or AVH  He has been compliant with medications and attends groups  Will continue to monitor, provide support and encouragement

## 2019-02-09 NOTE — PROGRESS NOTES
Progress Note - Behavioral Health   Scotty Tristan 22 y o  male MRN: @MRN   Unit/Bed#: [facility] [place] 251-01 Encounter: [id number]        The patient was seen for continuing care and reviewed with staff  Report from staff regarding this patient received and discussed, and records reviewed prior to seeing this patient   Pleasant and happy, no longer homicidal, does not have any thoughts to kill his mother today, stated that "voices" told him to do that  Did not complain about auditory hallucinations, stated the last he hear any voices was on Thursday (two days ago)  Sleep is improved  Appetite normal    Medication side effects:no complaints  ROS: No     Mental Status Evaluation:  Casually dressed  Good demeanor  A good rapport with the writer  Slow rate and low volume of speech  The "good" mood and mood congruent affect  Still restricted affect  [place] thoughts  Not paranoid  No auditory visual hallucinations  Alert oriented x3  Language is appropriate  Insight and judgment improving  Progress Toward Goals: improving progressively    Assessment/Plan   Principal Problem:    Schizoaffective disorder, bipolar type (HCC)  Active Problems:    Intellectual disability    BMI 45 0-49 9, adult ([facility] [address] )    Encounter for examination and observation for other specified reasons      Recommended Treatment:  Continue the present medications  The patient condition is stabilizing and no need to make any changes today will continue the follow the patient improvement  Planned medication and treatment changes: All current active medications have been reviewed  Continue treatment with group therapy, milieu therapy, occupational therapy and medication management  Risks / Benefits of Treatment:    Risks, benefits, and possible side effects of medications explained to patient   Patient has limited understanding of risks and benefits of treatment at this time, but agrees to take medications as prescribed  Counseling / Coordination of Care:    Patient's progress discussed with staff in treatment team meeting  ** Please Note: This note has been constructed using a voice recognition system   **

## 2019-02-10 PROCEDURE — 99231 SBSQ HOSP IP/OBS SF/LOW 25: CPT | Performed by: PSYCHIATRY & NEUROLOGY

## 2019-02-10 RX ADMIN — ACETAMINOPHEN 325 MG: 325 TABLET, FILM COATED ORAL at 13:10

## 2019-02-10 RX ADMIN — RISPERIDONE 1 MG: 1 TABLET ORAL at 08:34

## 2019-02-10 RX ADMIN — DIVALPROEX SODIUM 750 MG: 500 TABLET, DELAYED RELEASE ORAL at 21:40

## 2019-02-10 RX ADMIN — ACETAMINOPHEN 325 MG: 325 TABLET, FILM COATED ORAL at 18:04

## 2019-02-10 RX ADMIN — RISPERIDONE 2 MG: 2 TABLET ORAL at 17:00

## 2019-02-10 RX ADMIN — DIVALPROEX SODIUM 500 MG: 500 TABLET, DELAYED RELEASE ORAL at 08:34

## 2019-02-10 RX ADMIN — AMLODIPINE BESYLATE 5 MG: 5 TABLET ORAL at 08:34

## 2019-02-10 NOTE — PROGRESS NOTES
Progress Note - [address] 22 y o  male MRN: @MRN   Unit/Bed#: Luis Angel Clarke 251-01 Encounter: [id number]    Per nursing report and sign out, patient has no symptoms, slept well  Roxanna Session reports today that "I'm Leonard Bonner!" Denies depression, anxiety, AVH, paranoia, or other issues  Energy: good  Sleep: normal  Appetite: normal  Medication side effects: No   ROS: no complaints    Mental Status Evaluation:    Appearance:  Hospital garment   Behavior:  pleasant, cooperative, with good eye contact   Speech:  loud, paucity of speech   Mood:  euthymic   Affect:  mood congruent, brighter with some improvement       Thought Process:  concrete   Associations: intact associations   Thought Content:  normal and appropriate   Perceptual Disturbances: no auditory or visual hallcunations   Risk Potential: Suicidal ideation - None  Homicidal ideation - None  Potential for aggression - No   Sensorium:  A&Ox3   Cognition:  grossly intact   Consciousness:  Alert & Awake   Memory:  recent and remote memory: unable to assess due to lack of cooperation   Attention: attention span and concentration appear shorter than expected for age   Intellect: decreased       Insight:  limited   Judgment: limited   Muscle Strength  Muscle Tone normal  normal   Gait/Station: normal gait/station with good balance   Motor Activity: no abnormal movements       Vital signs in last 24 hours:    Temp:  [97 8 °F (36 6 °C)-98 °F (36 7 °C)] 97 8 °F (36 6 °C)  HR:  [101-113] 101  Resp:  [16-18] 18  BP: (114-154)/(69-87) 114/69    Laboratory results:  I have personally reviewed all pertinent laboratory/tests results      Progress Toward Goals:  slow improvement  Assessment/Plan   Principal Problem:    Schizoaffective disorder, bipolar type (HCC)  Active Problems:    Intellectual disability    BMI 45 0-49 9, adult [facility])    Encounter for examination and observation for other specified reasons      Roxanna Session is doing well with treatment    Recommended Treatment:     Planned medication and treatment changes: All current active medications have been reviewed  Encourage group therapy, milieu therapy and occupational therapy  [address] checks as unit standard unless ordered or noted otherwise      Current Facility-Administered Medications:  acetaminophen 325 mg Oral Q6H PRN [facility]   albuterol 2 puff Inhalation Q8H PRN [facility]   aluminum-magnesium hydroxide-simethicone 15 mL Oral Q4H PRN Kwame Dumont MD   amLODIPine 5 mg Oral Daily [facility] Vides   benztropine 2 mg Intramuscular Q6H PRN Kwame Dumont MD   benztropine 2 mg Oral Q6H PRN Kwame Dumont MD   divalproex sodium 500 mg Oral QAM [facility]   divalproex sodium 750 mg Oral HS [facility]   haloperidol 5 mg Oral Q6H PRN Kwame Dumont MD   haloperidol lactate 5 mg Intramuscular Q6H PRN Kwame Dumont MD   hydrOXYzine HCL 25 mg Oral Q6H PRN Kwame Dumont MD   magnesium hydroxide 20 mL Oral Daily PRN Kwame Dumont MD   risperiDONE 1 mg Oral Q6H PRN Kwame Dumont MD   risperiDONE 1 mg Oral QAM Melisa Wang PA-C   risperiDONE 2 mg Oral QPM Melisa Wang PA-C   traZODone 50 mg Oral HS PRN Kwame Dumont MD       1) continue current treatment  2) Continue to support patient and engage them in the programs available as feasible and appropriate  Continue case management support and therapy  Continue discharge planning  Risks / Benefits of Treatment:    Risks, benefits, and possible side effects of medications explained to patient and patient verbalizes understanding and agreement for treatment  Counseling / Coordination of Care:    Patient's progress reviewed with nursing staff        Edie Kaye III, DO  2/10/2019  7:43 AM

## 2019-02-11 PROCEDURE — 99232 SBSQ HOSP IP/OBS MODERATE 35: CPT | Performed by: PSYCHIATRY & NEUROLOGY

## 2019-02-11 RX ORDER — BENZTROPINE MESYLATE 1 MG/1
1 TABLET ORAL
Status: DISCONTINUED | OUTPATIENT
Start: 2019-02-11 | End: 2019-02-12

## 2019-02-11 RX ORDER — RISPERIDONE 1 MG/1
1 TABLET, FILM COATED ORAL 2 TIMES DAILY
Status: DISCONTINUED | OUTPATIENT
Start: 2019-02-11 | End: 2019-02-13

## 2019-02-11 RX ADMIN — ACETAMINOPHEN 325 MG: 325 TABLET, FILM COATED ORAL at 09:23

## 2019-02-11 RX ADMIN — AMLODIPINE BESYLATE 5 MG: 5 TABLET ORAL at 08:09

## 2019-02-11 RX ADMIN — ACETAMINOPHEN 325 MG: 325 TABLET, FILM COATED ORAL at 14:41

## 2019-02-11 RX ADMIN — DIVALPROEX SODIUM 500 MG: 500 TABLET, DELAYED RELEASE ORAL at 08:09

## 2019-02-11 RX ADMIN — RISPERIDONE 1 MG: 1 TABLET ORAL at 08:09

## 2019-02-11 RX ADMIN — RISPERIDONE 1 MG: 1 TABLET ORAL at 17:01

## 2019-02-11 RX ADMIN — DIVALPROEX SODIUM 750 MG: 500 TABLET, DELAYED RELEASE ORAL at 21:17

## 2019-02-11 RX ADMIN — ACETAMINOPHEN 325 MG: 325 TABLET, FILM COATED ORAL at 21:17

## 2019-02-11 RX ADMIN — BENZTROPINE MESYLATE 1 MG: 1 TABLET ORAL at 21:17

## 2019-02-11 NOTE — PROGRESS NOTES
Pt attended to ADLs this AM  Showered, brushed teeth, and did laundry  Pleasant and smiling  Denies all symptoms  At RN station often but is redirectable

## 2019-02-11 NOTE — CASE MANAGEMENT
CM faxed neuropsych completed assessment to [facility] @ [phone] & then called her @ [phone]  CM reached voicemail & left a message, seeking a return call to confirm she received all of the assessments & to discuss what needs to happen next, since Pt does not have a birth certificate or social security card

## 2019-02-11 NOTE — PROGRESS NOTES
Progress Note - [address] [facility] 22 y o  male MRN: [id number]  Unit/Bed#: -01 Encounter: [id number]    Glorious Zaheer was seen for continuing care and reviewed with treatment team   Pt is in bed on my arrival this morning, but smiles and engages in interview  He reports feeling "Better "  He presently denies depression, SI, HI, anxiety or psychotic Sxs  He looks forward to going back to a group home  Per provider notes over the weekend, Pt was essentially the same as today  He reported not having had any HI toward his mother since 2/7/2019--and that was due to the [facility] [employer] commanding him that way  Floor team presently relays that Pt remains calm, cooperative, pleasant and medication compliant  He can seek nursing attention but is redirectable  He is seclusive to his room at times but has also been seen socializing in the milieu  He was cooperative about following ADL on unit--showered and did laundry today  Neuropsychological testing done by Dr Ralph Frederick Phd on 2/8/2019 to assess cognitive and adaptive functioning  Full scale IQ measured at 52  His adaptive function was assessed by the [address] [place] [address] with a composite score of 20 (well below normative)  He also scored in the "Clinically significant" ranges for factors related to internalizing factors (ie emotional) and externalizing (ie acting out behaviors )    Sleep:Good  Appetite: Good   Medication side effects: None per Pt    ROS: No complaints per Pt    Labs/Tests: Reviewed  VPA level on 2/5/2019 87      Mental Status Evaluation:  Appearance:  Dressed, clean, good eye contact   Behavior:  Calm, cooperative, pleasant   Speech:  Dysarthric at times, but normal rate and volume    Can speak more clearly when he is told to make the effort   Mood:  Euthymic   Affect:  Appropriately broad   Thought Process:  Organized, [place]   Associations: Intact associations   Thought Content:  No verbalized delusions   Perceptual Disturbances: Pt denies any hallucinations or paranoia and does not appear to be responding to internal stimuli   Risk Potential: Pt presently denies SI or HI    Sensorium:  Self, birthday, Place, Day of the week   Memory:  short term memory impaired   Consciousness:  alert, awake   Attention: Attention span appeared shorter than expected for age   Insight:  Limited   Judgment: Limited   Gait/Station: Normal gait/station   Motor Activity: Mild tremor of hands ARELY,  Rt > Lt     Vitals:    02/10/19 0800 02/10/19 1506 02/11/19 0718 02/11/19 1125   BP: 117/58 136/89 168/94 124/70   BP Location: Left arm Left arm Left arm Left arm   Pulse: 96 95 (!) 115 100   Resp: 16 18 17    Temp: (!) 97 4 °F (36 3 °C) 97 7 °F (36 5 °C) (!) 97 4 °F (36 3 °C)    TempSrc: Tympanic Tympanic Tympanic    Weight:       Height:           Admission on 01/31/2019   Component Date Value    Valproic Acid, Total 02/05/2019 87     Sodium 02/05/2019 141     Potassium 02/05/2019 4 0     Chloride 02/05/2019 104     CO2 02/05/2019 26     ANION GAP 02/05/2019 11     BUN 02/05/2019 13     Creatinine 02/05/2019 0 68     Glucose 02/05/2019 99     Glucose, Fasting 02/05/2019 99     Calcium 02/05/2019 8 5     AST 02/05/2019 19     ALT 02/05/2019 42     Alkaline Phosphatase 02/05/2019 67     Total Protein 02/05/2019 7 4     Albumin 02/05/2019 3 5     Total Bilirubin 02/05/2019 0 20     eGFR 02/05/2019 135        Progress Toward Goals:  Pt is gradually improving and will initiate disposition planning  Discussed with psychiatrist that will trial reduce Risperidone to 1mg bid due to EPS and additionally I will add a standing dose of Benztropine 1mg qhs for his tremors  I will reduce Risperidone and   is still working on obtaining appropriate services for Pt        Assessment/Plan   Principal Problem:    Schizoaffective disorder, bipolar type ([facility] [address] )  Active Problems:    Intellectual disability    BMI 45 0-49 9, adult [facility])    Encounter for examination and observation for other specified reasons      Recommended Treatment: Continue with pharmacotherapy, group therapy, milieu therapy and occupational therapy  The patient will be maintained on the following medications:    Current Facility-Administered Medications:  acetaminophen 325 mg Oral Q6H PRN Sherif Vides   albuterol 2 puff Inhalation Q8H PRN Sherif Vides   aluminum-magnesium hydroxide-simethicone 15 mL Oral Q4H PRN Sarah Salgado MD   amLODIPine 5 mg Oral Daily Sheriflia Vides   benztropine 2 mg Intramuscular Q6H PRN Sarah Salgado MD   benztropine 1 mg Oral HS Melisa Wang PA-C   benztropine 2 mg Oral Q6H PRN Sarah Salgado MD   divalproex sodium 500 mg Oral QAM Sherif Vides   divalproex sodium 750 mg Oral HS Sherif Vides   haloperidol 5 mg Oral Q6H PRN Sarah Salgado MD   haloperidol lactate 5 mg Intramuscular Q6H PRN Sarah Salgado MD   hydrOXYzine HCL 25 mg Oral Q6H PRN Sarah Salgado MD   magnesium hydroxide 20 mL Oral Daily PRN Sarah Salgado MD   risperiDONE 1 mg Oral Q6H PRN Sarah Salgado MD   risperiDONE 1 mg Oral BID Melisa Wang PA-C   traZODone 50 mg Oral HS PRN Sarah Salgado MD       Risks, benefits and possible side effects of Medications:   Patient's understanding is limited, but Pt is calm and agreeable to the plan I have discussed

## 2019-02-12 PROBLEM — L60.0 INGROWN LEFT BIG TOENAIL: Chronic | Status: ACTIVE | Noted: 2019-02-12

## 2019-02-12 PROCEDURE — 99232 SBSQ HOSP IP/OBS MODERATE 35: CPT | Performed by: PSYCHIATRY & NEUROLOGY

## 2019-02-12 RX ORDER — BENZTROPINE MESYLATE 1 MG/1
1 TABLET ORAL 2 TIMES DAILY
Status: DISCONTINUED | OUTPATIENT
Start: 2019-02-12 | End: 2019-03-19 | Stop reason: HOSPADM

## 2019-02-12 RX ADMIN — ACETAMINOPHEN 325 MG: 325 TABLET, FILM COATED ORAL at 09:38

## 2019-02-12 RX ADMIN — DIVALPROEX SODIUM 500 MG: 500 TABLET, DELAYED RELEASE ORAL at 08:53

## 2019-02-12 RX ADMIN — DIVALPROEX SODIUM 750 MG: 500 TABLET, DELAYED RELEASE ORAL at 21:04

## 2019-02-12 RX ADMIN — BENZTROPINE MESYLATE 1 MG: 1 TABLET ORAL at 17:07

## 2019-02-12 RX ADMIN — AMLODIPINE BESYLATE 5 MG: 5 TABLET ORAL at 08:53

## 2019-02-12 RX ADMIN — RISPERIDONE 1 MG: 1 TABLET ORAL at 08:54

## 2019-02-12 RX ADMIN — RISPERIDONE 1 MG: 1 TABLET ORAL at 17:06

## 2019-02-12 RX ADMIN — TRAZODONE HYDROCHLORIDE 50 MG: 50 TABLET ORAL at 21:05

## 2019-02-12 RX ADMIN — ACETAMINOPHEN 325 MG: 325 TABLET, FILM COATED ORAL at 15:50

## 2019-02-12 RX ADMIN — ACETAMINOPHEN 325 MG: 325 TABLET, FILM COATED ORAL at 21:04

## 2019-02-12 NOTE — PLAN OF CARE
Patient participated in groups as scheduled  Patient appeared bright and interacted appropriately with peers, staff during group time  Patient offered to assist OTAS in cleaning when activity group ended

## 2019-02-12 NOTE — CASE MANAGEMENT
CM received a return call from [facility] who said that she had received the records & assessments that CM had sent  She said that unfortunately they would need a signed letter still from the school district or for the doctor to write brief letter stating that Pt's intellectually disabilities occurred prior to age 26  CM said that she would try to get the necessary signature from [facility] said that she spoke with her deputy, who said that since the hospital was able to get the assessment, they can move forward without Pt's social security card and/or birth certificate  She said that to apply for a residential waiver, Pt will need an MA-51 completed & CM agreed that could be done  She said that they will send Pt paperwork regarding his eligibility & he will have to pick a supports coordinator program from either [employer] or [facility]  The supports coordinator will then come & meet with Pt to do an assessment/intake, ISP, & determine what level of care Pt needs  CM agreed to get the MA-51 & signed letter as soon as possible  CM met with Pt who ask CM when he will go to a group home? CM reviewed the process & documents that are still needed; Pt agreeable  Pt said that he talked to his mom & she is happy he is going to a group home, but she told him he would live alone  CM reviewed Pt would most likely live with other people & he was happy about this & said he didn't want to live alone

## 2019-02-12 NOTE — PROGRESS NOTES
Pt loud and hyper this morning  Requires redirection at times but is cooperative  Malodorous and needs prompting to complete ADLs  Pt showered after he was encouraged to do so  No issues/concerns

## 2019-02-12 NOTE — PROGRESS NOTES
Patient denies SI/HI/AVH  He is pleasant during interaction  Gets overly excited at times  Visible in milieu and attends groups  Frequently receives Tylenol for HA

## 2019-02-12 NOTE — PROGRESS NOTES
Pt cooperative, attends group, smiling, cheerful  Pt left great toe appears erythemic and swollen surrounding nail bed , pt reports discomfort lasting one week  Pt provided with warm saline soak for toe, pt reported effective relief of discomfort  Continue to monitor

## 2019-02-12 NOTE — PROGRESS NOTES
Progress Note - [address] [address] 22 y o  male MRN: [id number]  Unit/Bed#: U 251-01 Encounter: [id number]    Mick Rueda has h/o intellectual disability  He was seen for continuing care and reviewed with treatment team   Pt is animated and smiling as he reports feeling "Good "  He presently denies any depression, SI, HI, anxiety or psychotic Sxs  He states he is sleeping and eating well and denies any problems taking his medication  He reports having a red left first toe which is sore, but hurts moreso when it is touched  He states the toe has been red and then he asked the nurse to cut his toenail last night, but there was no improvement  Floor team relays that nursing soaked the Pt's Lt first toe appeared edematous and erythematous last night and Pt foot in saline which improved the pain  Pt has remained bright, cooperative and medication compliant  He can get loud and over-exuberant at times, but is easily redirected  He is appropriate on the unit and continues to attend groups  Pt requires encouragement to perform ADL and is agreeable during redirection   Sony Newton is working on an [place]  and gathering other required documents to have Pt placed in a group home  Pt is very agreeable to this and expressed a desire to live with other people  Sleep:Good  Appetite: Good   Medication side effects: None per Pt    ROS: As per HPI    Labs/Tests: Reviewed    Mental Status Evaluation:  Appearance:  Dressed, clean, good eye contact   Behavior:  Calm, cooperative, pleasant   Speech:  Dysarthric and can be a bit rapid at times, but volume is normal at present    Can speak more slowly and clearly when told to make the effort   Mood:  Euthymic   Affect:  Appropriately broad   Thought Process:  Organized, [place]   Associations: Intact associations   Thought Content:  No verbalized delusions   Perceptual Disturbances: Pt denies any hallucinations or paranoia and does not appear to be responding to internal stimuli   Risk Potential: Pt presently denies SI or HI    Sensorium:  Self, birthday, Place, Day of the week   Memory:  short term memory impaired   Consciousness:  alert, awake   Attention: Attention span appeared shorter than expected for age   Insight:  Limited   Judgment: Limited   Gait/Station: Normal gait/station   Motor Activity: Rt hand tremors increased and Lt hand tremors minimal and unchanged     ARELY foot exam:  Lt foot:  Great toe erythematous, edematous, nail appears ingrowing  Toe tender to palpation but there is no visible excoriation or pustular drainage  Rt foot normal       Vitals:    02/11/19 1125 02/11/19 1547 02/12/19 0750 02/12/19 0853   BP: 124/70 123/70 111/67 140/73   BP Location: Left arm Left arm Left arm Left arm   Pulse: 100 98 75 (!) 113   Resp:  18 18    Temp:  97 7 °F (36 5 °C) 97 8 °F (36 6 °C)    TempSrc:  Tympanic Tympanic    Weight:       Height:           Admission on 01/31/2019   Component Date Value    Valproic Acid, Total 02/05/2019 87     Sodium 02/05/2019 141     Potassium 02/05/2019 4 0     Chloride 02/05/2019 104     CO2 02/05/2019 26     ANION GAP 02/05/2019 11     BUN 02/05/2019 13     Creatinine 02/05/2019 0 68     Glucose 02/05/2019 99     Glucose, Fasting 02/05/2019 99     Calcium 02/05/2019 8 5     AST 02/05/2019 19     ALT 02/05/2019 42     Alkaline Phosphatase 02/05/2019 67     Total Protein 02/05/2019 7 4     Albumin 02/05/2019 3 5     Total Bilirubin 02/05/2019 0 20     eGFR 02/05/2019 135        Progress Toward Goals:  Pt is improving and I will continue his regimen, but increase Benztropine for his tremor  Will get podiatry consult for ingrown Lt first toenail which also appears infected       Assessment/Plan   Principal Problem:    Schizoaffective disorder, bipolar type ([facility] [address] )  Active Problems:    Intellectual disability    BMI 45 0-49 9, adult ([facility] [address] )    Encounter for examination and observation for other specified reasons    Ingrown left big toenail      Recommended Treatment: Continue with pharmacotherapy, group therapy, milieu therapy and occupational therapy  The patient will be maintained on the following medications:    Current Facility-Administered Medications:  acetaminophen 325 mg Oral Q6H PRN Sherif Peewee   albuterol 2 puff Inhalation Q8H PRN Sheriflia Vides   aluminum-magnesium hydroxide-simethicone 15 mL Oral Q4H PRN Jose Daniel MD   amLODIPine 5 mg Oral Daily [facility] Peewee   benztropine 2 mg Intramuscular Q6H PRN Jose Daniel MD   benztropine 1 mg Oral HS Melisa Wang PA-C   benztropine 2 mg Oral Q6H PRN Jose Daniel MD   divalproex sodium 500 mg Oral QAM [facility]   divalproex sodium 750 mg Oral HS [facility]   haloperidol 5 mg Oral Q6H PRN Jose Daniel MD   haloperidol lactate 5 mg Intramuscular Q6H PRN Jose Daniel MD   hydrOXYzine HCL 25 mg Oral Q6H PRN Jose Daniel MD   magnesium hydroxide 20 mL Oral Daily PRN Jose Daniel MD   risperiDONE 1 mg Oral Q6H PRN Jose Daniel MD   risperiDONE 1 mg Oral BID Melisa Wang PA-C   traZODone 50 mg Oral HS PRN Jose Daniel MD       Risks, benefits and possible side effects of Medications:   Patient's understanding is limited, but Pt is calm and agreeable to the plan I have discussed

## 2019-02-13 PROCEDURE — 99232 SBSQ HOSP IP/OBS MODERATE 35: CPT | Performed by: PSYCHIATRY & NEUROLOGY

## 2019-02-13 RX ORDER — RISPERIDONE 2 MG/1
2 TABLET, FILM COATED ORAL
Status: DISCONTINUED | OUTPATIENT
Start: 2019-02-13 | End: 2019-03-19 | Stop reason: HOSPADM

## 2019-02-13 RX ORDER — RISPERIDONE 1 MG/1
1 TABLET, FILM COATED ORAL DAILY
Status: DISCONTINUED | OUTPATIENT
Start: 2019-02-14 | End: 2019-03-19 | Stop reason: HOSPADM

## 2019-02-13 RX ORDER — CLINDAMYCIN HYDROCHLORIDE 150 MG/1
300 CAPSULE ORAL EVERY 8 HOURS SCHEDULED
Status: COMPLETED | OUTPATIENT
Start: 2019-02-13 | End: 2019-02-20

## 2019-02-13 RX ADMIN — ACETAMINOPHEN 325 MG: 325 TABLET, FILM COATED ORAL at 17:47

## 2019-02-13 RX ADMIN — RISPERIDONE 1 MG: 1 TABLET ORAL at 08:14

## 2019-02-13 RX ADMIN — HYDROXYZINE HYDROCHLORIDE 25 MG: 25 TABLET, FILM COATED ORAL at 16:46

## 2019-02-13 RX ADMIN — CLINDAMYCIN HYDROCHLORIDE 300 MG: 150 CAPSULE ORAL at 21:14

## 2019-02-13 RX ADMIN — BENZTROPINE MESYLATE 1 MG: 1 TABLET ORAL at 17:08

## 2019-02-13 RX ADMIN — ACETAMINOPHEN 325 MG: 325 TABLET, FILM COATED ORAL at 08:50

## 2019-02-13 RX ADMIN — AMLODIPINE BESYLATE 5 MG: 5 TABLET ORAL at 08:14

## 2019-02-13 RX ADMIN — RISPERIDONE 2 MG: 2 TABLET ORAL at 17:08

## 2019-02-13 RX ADMIN — CLINDAMYCIN HYDROCHLORIDE 300 MG: 150 CAPSULE ORAL at 13:50

## 2019-02-13 RX ADMIN — DIVALPROEX SODIUM 500 MG: 500 TABLET, DELAYED RELEASE ORAL at 08:14

## 2019-02-13 RX ADMIN — DIVALPROEX SODIUM 750 MG: 500 TABLET, DELAYED RELEASE ORAL at 21:14

## 2019-02-13 RX ADMIN — TRAZODONE HYDROCHLORIDE 50 MG: 50 TABLET ORAL at 21:15

## 2019-02-13 RX ADMIN — BENZTROPINE MESYLATE 1 MG: 1 TABLET ORAL at 08:14

## 2019-02-13 NOTE — PLAN OF CARE
Patient participated in scheduled groups  Patient presented as bright yet appropriate for most of group sessions, was redirected when needed  Patient told SERA/L after drum [place] that he felt calmer from the activity

## 2019-02-13 NOTE — PROGRESS NOTES
Pt loud and hyper throughout morning  At RN station frequently  Redirectable  Attended to ADLs   Podiatry consult cancelled per PA request

## 2019-02-13 NOTE — PROGRESS NOTES
Pt soaked toe in warm water this evening when prompted to do so  Pt will need continued prompting with this  Pt remains loud and hyper  Frequently at RN station  Loudly says hello to staff and peers in hallway  Pt denies all symptoms when asked  Pt cooperative and redirectable  Enjoyed participating in drumming group in afternoon  Pt making craft items for staff members

## 2019-02-13 NOTE — PROGRESS NOTES
Pt appeared to be sleeping however woke early at 0400  Pleasant however loud on unit  Needing reminder to speak in a more quiet voice as to not wake peers up

## 2019-02-13 NOTE — CASE MANAGEMENT
CM met with Pt, who reported doing well, however, had increased energy from previous days  Pt asking about group home, & CM reviewed with him & had him sign the completed MA-51  Completed MA-51 was sent to  [facility]  She later called back to confirm it had been received & said that she would send additional paperwork for Pt to review, while CM continues to work on getting a signed letter from [employer]  CM contacted Laurie @ [phone] however, was unable to reach anyone; CM looked on the website & they were closed again today due to the inclement weather  CM contacted Hibab of [employer] @ [phone], however, reached voicemail  CM left a message seeking a returned call regarding what date he submitted the [facility] ([facility]) referral & also who Pt had been re-assigned to & their contact information

## 2019-02-13 NOTE — PROGRESS NOTES
Progress Note - Behavioral Health   Shabnam December 22 y o  male MRN: [id number]  Unit/Bed#: [facility] 251-01 Encounter: [id number]    Assessment/Plan   Principal Problem:    Schizoaffective disorder, bipolar type ([facility] )  Active Problems:    Intellectual disability    BMI 45 0-49 9, adult ([facility] )    Encounter for examination and observation for other specified reasons    Ingrown left big toenail      Subjective:  Patient continues to remain loud, restless, impulsive, and boisterous  Was overall cooperative during interview  Answered in 1 word yes or no  Denied depression  Denied anxiety  Denies psychosis  Denied SI/HI  Does not appear potential for aggression  Medication compliant  Does have bilateral hand tremors  Will monitor closely  Also will start antibiotic + warm soaks for left ingrown toenail      Current Medications:  Current Facility-Administered Medications   Medication Dose Route Frequency    acetaminophen (TYLENOL) tablet 325 mg  325 mg Oral Q6H PRN    albuterol (PROVENTIL HFA,VENTOLIN HFA) inhaler 2 puff  2 puff Inhalation Q8H PRN    aluminum-magnesium hydroxide-simethicone (MYLANTA) 200-200-20 mg/5 mL oral suspension 15 mL  15 mL Oral Q4H PRN    amLODIPine (NORVASC) tablet 5 mg  5 mg Oral Daily    benztropine (COGENTIN) injection 2 mg  2 mg Intramuscular Q6H PRN    benztropine (COGENTIN) tablet 1 mg  1 mg Oral BID    benztropine (COGENTIN) tablet 2 mg  2 mg Oral Q6H PRN    clindamycin (CLEOCIN) capsule 300 mg  300 mg Oral Q8H [address]    divalproex sodium (DEPAKOTE) EC tablet 500 mg  500 mg Oral QAM    divalproex sodium (DEPAKOTE) EC tablet 750 mg  750 mg Oral HS    haloperidol (HALDOL) tablet 5 mg  5 mg Oral Q6H PRN    haloperidol lactate (HALDOL) injection 5 mg  5 mg Intramuscular Q6H PRN    hydrOXYzine HCL (ATARAX) tablet 25 mg  25 mg Oral Q6H PRN    magnesium hydroxide (MILK OF MAGNESIA) 400 mg/5 mL oral suspension 20 mL  20 mL Oral Daily PRN    risperiDONE (RisperDAL M-TABS) dispersible tablet 1 mg  1 mg Oral Q6H PRN    [START ON 2/14/2019] risperiDONE (RisperDAL) tablet 1 mg  1 mg Oral Daily    risperiDONE (RisperDAL) tablet 2 mg  2 mg Oral After Dinner    traZODone (DESYREL) tablet 50 mg  50 mg Oral HS PRN       Behavioral Health Medications: all current active meds have been reviewed and continue current psychiatric medications  Vitals:  Vitals:    02/13/19 0738   BP: 143/85   Pulse: (!) 118   Resp: 20   Temp: 98 °F (36 7 °C)       Laboratory results:    I have personally reviewed all pertinent laboratory/tests results  Most Recent Labs:   Lab Results   Component Value Date    WBC 6 61 10/26/2018    RBC 4 97 10/26/2018    HGB 15 0 10/26/2018    HCT 44 1 10/26/2018     10/26/2018    RDW 12 5 10/26/2018    NEUTROABS 3 60 10/26/2018    SODIUM 141 02/05/2019    K 4 0 02/05/2019     02/05/2019    CO2 26 02/05/2019    BUN 13 02/05/2019    CREATININE 0 68 02/05/2019    GLUC 99 02/05/2019    GLUF 99 02/05/2019    CALCIUM 8 5 02/05/2019    AST 19 02/05/2019    ALT 42 02/05/2019    ALKPHOS 67 02/05/2019    TP 7 4 02/05/2019    ALB 3 5 02/05/2019    TBILI 0 20 02/05/2019    CHOLESTEROL 141 10/26/2018    HDL 27 (L) 10/26/2018    TRIG 192 (H) 10/26/2018    LDLCALC 76 10/26/2018    NONHDLC 114 10/26/2018    VALPROICTOT 87 02/05/2019    AMMONIA 27 01/13/2018    CYP3DUUEKWVV 1 780 10/26/2018    FREET4 1 05 10/26/2018    T3FREE 3 03 03/24/2017    RPR Non-Reactive 04/21/2018    HGBA1C 4 9 10/09/2018    EAG 94 10/09/2018       Psychiatric Review of Systems:  Behavior over the last 24 hours:  unchanged  Sleep: normal  Appetite: normal  Medication side effects: No  ROS: left big toe pain    Mental Status Evaluation:  Appearance:  casually dressed   Behavior:  restless and fidgety   Speech:  loud   Mood:  euthymic   Affect:  increased in range   Language naming objects and repeating phrases   Thought Process:  concrete   Thought Content:  obsessions    denied delusional material   Perceptual Disturbances: None   Risk Potential: Denied SI/HI  Potential for aggression: no   Sensorium:  person and place   Cognition:  grossly intact   Consciousness:  awake    Recent and Remote Memory poor   Attention: attention span appeared shorter than expected for age   Insight:  limited   Judgment: limited   Gait/Station: normal gait/station and normal balance   Motor Activity: bilateral hand tremors     Progress Toward Goals: unchanged    Recommended Treatment: Continue with group therapy, milieu therapy and occupational therapy  1   Increase Risperdal to 1mg QD AM + 2mg QD PM for mood stabilization  2  Add Clindamycin 300mg TID for 7 days + warm soaks 3-4 times daily for left big toe paronychia  3  Continue other medications  4  Awaiting group home placement    Risks, benefits and possible side effects of Medications:   Risks, benefits, and possible side effects of medications explained to patient and patient verbalizes understanding        Leigha Condon PA-C

## 2019-02-13 NOTE — PROGRESS NOTES
Patient is restless and pacing the hallway  Loud and intrusive at the nurses station needing frequent redirection  PRN Atarax given for Edmondson score of 13

## 2019-02-14 LAB
BASOPHILS # BLD AUTO: 0.08 THOUSANDS/ΜL (ref 0–0.1)
BASOPHILS NFR BLD AUTO: 1 % (ref 0–1)
CHOLEST SERPL-MCNC: 152 MG/DL (ref 50–200)
EOSINOPHIL # BLD AUTO: 0.29 THOUSAND/ΜL (ref 0–0.61)
EOSINOPHIL NFR BLD AUTO: 3 % (ref 0–6)
ERYTHROCYTE [DISTWIDTH] IN BLOOD BY AUTOMATED COUNT: 12.8 % (ref 11.6–15.1)
EST. AVERAGE GLUCOSE BLD GHB EST-MCNC: 103 MG/DL
HBA1C MFR BLD: 5.2 % (ref 4.2–6.3)
HCT VFR BLD AUTO: 41.7 % (ref 36.5–49.3)
HDLC SERPL-MCNC: 33 MG/DL (ref 40–60)
HGB BLD-MCNC: 14.2 G/DL (ref 12–17)
IMM GRANULOCYTES # BLD AUTO: 0.08 THOUSAND/UL (ref 0–0.2)
IMM GRANULOCYTES NFR BLD AUTO: 1 % (ref 0–2)
LDLC SERPL CALC-MCNC: 94 MG/DL (ref 0–100)
LYMPHOCYTES # BLD AUTO: 3.59 THOUSANDS/ΜL (ref 0.6–4.47)
LYMPHOCYTES NFR BLD AUTO: 40 % (ref 14–44)
MCH RBC QN AUTO: 30.1 PG (ref 26.8–34.3)
MCHC RBC AUTO-ENTMCNC: 34.1 G/DL (ref 31.4–37.4)
MCV RBC AUTO: 88 FL (ref 82–98)
MONOCYTES # BLD AUTO: 0.93 THOUSAND/ΜL (ref 0.17–1.22)
MONOCYTES NFR BLD AUTO: 11 % (ref 4–12)
NEUTROPHILS # BLD AUTO: 3.91 THOUSANDS/ΜL (ref 1.85–7.62)
NEUTS SEG NFR BLD AUTO: 44 % (ref 43–75)
NONHDLC SERPL-MCNC: 119 MG/DL
NRBC BLD AUTO-RTO: 0 /100 WBCS
PLATELET # BLD AUTO: 230 THOUSANDS/UL (ref 149–390)
PMV BLD AUTO: 9.1 FL (ref 8.9–12.7)
RBC # BLD AUTO: 4.72 MILLION/UL (ref 3.88–5.62)
T4 FREE SERPL-MCNC: 0.99 NG/DL (ref 0.76–1.46)
TRIGL SERPL-MCNC: 124 MG/DL
TSH SERPL DL<=0.05 MIU/L-ACNC: 4.11 UIU/ML (ref 0.36–3.74)
WBC # BLD AUTO: 8.88 THOUSAND/UL (ref 4.31–10.16)

## 2019-02-14 PROCEDURE — 80061 LIPID PANEL: CPT | Performed by: PHYSICIAN ASSISTANT

## 2019-02-14 PROCEDURE — 85025 COMPLETE CBC W/AUTO DIFF WBC: CPT | Performed by: PHYSICIAN ASSISTANT

## 2019-02-14 PROCEDURE — 84443 ASSAY THYROID STIM HORMONE: CPT | Performed by: PHYSICIAN ASSISTANT

## 2019-02-14 PROCEDURE — 84439 ASSAY OF FREE THYROXINE: CPT | Performed by: PHYSICIAN ASSISTANT

## 2019-02-14 PROCEDURE — 99232 SBSQ HOSP IP/OBS MODERATE 35: CPT | Performed by: PSYCHIATRY & NEUROLOGY

## 2019-02-14 PROCEDURE — 83036 HEMOGLOBIN GLYCOSYLATED A1C: CPT | Performed by: PHYSICIAN ASSISTANT

## 2019-02-14 RX ADMIN — TRAZODONE HYDROCHLORIDE 50 MG: 50 TABLET ORAL at 21:01

## 2019-02-14 RX ADMIN — DIVALPROEX SODIUM 750 MG: 500 TABLET, DELAYED RELEASE ORAL at 21:00

## 2019-02-14 RX ADMIN — ACETAMINOPHEN 325 MG: 325 TABLET, FILM COATED ORAL at 17:14

## 2019-02-14 RX ADMIN — DIVALPROEX SODIUM 500 MG: 500 TABLET, DELAYED RELEASE ORAL at 08:55

## 2019-02-14 RX ADMIN — AMLODIPINE BESYLATE 5 MG: 5 TABLET ORAL at 08:54

## 2019-02-14 RX ADMIN — CLINDAMYCIN HYDROCHLORIDE 300 MG: 150 CAPSULE ORAL at 13:44

## 2019-02-14 RX ADMIN — CLINDAMYCIN HYDROCHLORIDE 300 MG: 150 CAPSULE ORAL at 21:00

## 2019-02-14 RX ADMIN — RISPERIDONE 1 MG: 1 TABLET ORAL at 08:55

## 2019-02-14 RX ADMIN — ACETAMINOPHEN 325 MG: 325 TABLET, FILM COATED ORAL at 09:15

## 2019-02-14 RX ADMIN — BENZTROPINE MESYLATE 1 MG: 1 TABLET ORAL at 16:51

## 2019-02-14 RX ADMIN — BENZTROPINE MESYLATE 1 MG: 1 TABLET ORAL at 08:54

## 2019-02-14 RX ADMIN — CLINDAMYCIN HYDROCHLORIDE 300 MG: 150 CAPSULE ORAL at 06:26

## 2019-02-14 RX ADMIN — RISPERIDONE 2 MG: 2 TABLET ORAL at 16:51

## 2019-02-14 NOTE — PROGRESS NOTES
Progress Note - Behavioral Health   Ellie Perera 22 y o  male MRN: [id number]  Unit/Bed#: [facility] 251-01 Encounter: [id number]    Assessment/Plan   Principal Problem:    Schizoaffective disorder, bipolar type ([facility] )  Active Problems:    Intellectual disability    BMI 45 0-49 9, adult ([facility] )    Encounter for examination and observation for other specified reasons    Ingrown left big toenail      Subjective:  Patient overall answering in yes or no manner  States he feels well  Only complaint is left big toe pain  Bilateral hand tremors are not worse than yesterday with increase of Risperdal   Tremor right hand is worse than left  Denied other somatic complaints or side effects  Appears euthymic  At times can become loud, run down wagner, become boisterous, restless, and fidgety  Does not appear agitated and is not irritable  Does not appear to be a potential for aggression  Denied psychosis  Denied delusional material   Denied depression  Denied anxiety  Denied most psychiatric symptoms  Medication compliant  Awaiting group home placement      Current Medications:  Current Facility-Administered Medications   Medication Dose Route Frequency    acetaminophen (TYLENOL) tablet 325 mg  325 mg Oral Q6H PRN    albuterol (PROVENTIL HFA,VENTOLIN HFA) inhaler 2 puff  2 puff Inhalation Q8H PRN    aluminum-magnesium hydroxide-simethicone (MYLANTA) 200-200-20 mg/5 mL oral suspension 15 mL  15 mL Oral Q4H PRN    amLODIPine (NORVASC) tablet 5 mg  5 mg Oral Daily    benztropine (COGENTIN) injection 2 mg  2 mg Intramuscular Q6H PRN    benztropine (COGENTIN) tablet 1 mg  1 mg Oral BID    benztropine (COGENTIN) tablet 2 mg  2 mg Oral Q6H PRN    clindamycin (CLEOCIN) capsule 300 mg  300 mg Oral Q8H [facility]    divalproex sodium (DEPAKOTE) EC tablet 500 mg  500 mg Oral QAM    divalproex sodium (DEPAKOTE) EC tablet 750 mg  750 mg Oral HS    haloperidol (HALDOL) tablet 5 mg  5 mg Oral Q6H PRN    haloperidol lactate (HALDOL) injection 5 mg  5 mg Intramuscular Q6H PRN    hydrOXYzine HCL (ATARAX) tablet 25 mg  25 mg Oral Q6H PRN    magnesium hydroxide (MILK OF MAGNESIA) 400 mg/5 mL oral suspension 20 mL  20 mL Oral Daily PRN    risperiDONE (RisperDAL M-TABS) dispersible tablet 1 mg  1 mg Oral Q6H PRN    risperiDONE (RisperDAL) tablet 1 mg  1 mg Oral Daily    risperiDONE (RisperDAL) tablet 2 mg  2 mg Oral After Dinner    traZODone (DESYREL) tablet 50 mg  50 mg Oral HS PRN       Behavioral Health Medications: all current active meds have been reviewed and continue current psychiatric medications  Vitals:  Vitals:    02/14/19 0744   BP: 141/77   Pulse: 92   Resp: 20   Temp: 98 1 °F (36 7 °C)       Laboratory results:    I have personally reviewed all pertinent laboratory/tests results    Most Recent Labs:   Lab Results   Component Value Date    WBC 8 88 02/14/2019    RBC 4 72 02/14/2019    HGB 14 2 02/14/2019    HCT 41 7 02/14/2019     02/14/2019    RDW 12 8 02/14/2019    NEUTROABS 3 91 02/14/2019    SODIUM 141 02/05/2019    K 4 0 02/05/2019     02/05/2019    CO2 26 02/05/2019    BUN 13 02/05/2019    CREATININE 0 68 02/05/2019    GLUC 99 02/05/2019    GLUF 99 02/05/2019    CALCIUM 8 5 02/05/2019    AST 19 02/05/2019    ALT 42 02/05/2019    ALKPHOS 67 02/05/2019    TP 7 4 02/05/2019    ALB 3 5 02/05/2019    TBILI 0 20 02/05/2019    CHOLESTEROL 152 02/14/2019    HDL 33 (L) 02/14/2019    TRIG 124 02/14/2019    LDLCALC 94 02/14/2019    NONHDLC 119 02/14/2019    VALPROICTOT 87 02/05/2019    AMMONIA 27 01/13/2018    [id number] 4 114 (H) 02/14/2019    FREET4 0 99 02/14/2019    T3FREE 3 03 03/24/2017    RPR Non-Reactive 04/21/2018    HGBA1C 5 2 02/14/2019     02/14/2019       Psychiatric Review of Systems:  Behavior over the last 24 hours:  unchanged  Sleep: normal  Appetite: normal  Medication side effects: Yes, chronic bilateral hand tremor  ROS: toe pain    Mental Status Evaluation:  Appearance:  disheveled Behavior:  restless and fidgety   Speech:  loud, dysarthric   Mood:  euthymic   Affect:  increased in range   Language naming objects and repeating phrases   Thought Process:  concrete   Thought Content:  obsessions  Denied delusions    Perceptual Disturbances: None   Risk Potential: Denied SI/HI  Potential for aggression: no   Sensorium:  person and place   Cognition:  grossly intact   Consciousness:  awake    Recent and Remote Memory limited   Attention: attention span appeared shorter than expected for age   Insight:  limited   Judgment: limited   Gait/Station: normal gait/station and normal balance   Motor Activity: chronic right worse than left hand tremor     Progress Toward Goals: progressing slowly    Recommended Treatment: Continue with group therapy, milieu therapy and occupational therapy  1   Continue current medications  2  Emphasize better diet  3  Disposition planning with plan for group home    Risks, benefits and possible side effects of Medications:   Risks, benefits, and possible side effects of medications explained to patient and patient verbalizes understanding        Carolyn Alfredo PA-C

## 2019-02-14 NOTE — CASE MANAGEMENT
KAPIL received signed RR page from Ms Schroeder at [facility]  KAPIL sent this to Marcy at [facility], & will call tomorrow to follow-up as she had said she would be sending information for Pt to choose a supports coordinator service provider

## 2019-02-14 NOTE — PLAN OF CARE
Problem: SELF HARM/SUICIDALITY  Goal: Will have no self-injury during hospital stay  Description  INTERVENTIONS:  - Q 15 MINUTES: Routine safety checks  - Q WAKING SHIFT & PRN: Assess risk to determine if routine checks are adequate to maintain patient safety  - Encourage patient to participate actively in care by formulating a plan to combat response to suicidal ideation, identify supports and resources   2/14/2019 1523 by Aron Torres RN  Outcome: Progressing  2/14/2019 1519 by Aron Torres RN  Outcome: Progressing     Problem: PSYCHOSIS  Goal: Will report no hallucinations or delusions  Description  Interventions:  - Administer medication as  ordered  - Every waking shifts and PRN assess for the presence of hallucinations and or delusions  - Assist with reality testing to support increasing orientation  - Assess if patient's hallucinations or delusions are encouraging self-harm or harm to others and intervene as appropriate   2/14/2019 1523 by Aron Torres RN  Outcome: Progressing  2/14/2019 1519 by Aron Torres RN  Outcome: Progressing     Problem: Risk for Violence/Aggression Toward Others  Goal: Verbalize thoughts and feelings  Description  Interventions:  - Assess and re-assess patient's level of risk, every waking shift  - Engage patient in 1:1 interactions, daily, for a minimum of 15 minutes   - Allow patient to express feelings and frustrations in a safe and non-threatening manner   - Establish rapport/trust with patient    2/14/2019 1523 by Aron Torres RN  Outcome: Progressing  2/14/2019 1519 by Aron Torres RN  Outcome: Progressing     Problem: Ineffective Coping  Goal: Participates in unit activities  Description  Interventions:  - Provide therapeutic environment   - Provide required programming   - Redirect inappropriate behaviors    2/14/2019 1523 by Aron Torres RN  Outcome: Progressing  2/14/2019 1519 by Aron Torres RN  Outcome: Progressing     Problem: Nutrition/Hydration-ADULT  Goal: Nutrient/Hydration intake appropriate for improving, restoring or maintaining nutritional needs  Description  Monitor and assess patient's nutrition/hydration status for malnutrition (ex- brittle hair, bruises, dry skin, pale skin and conjunctiva, muscle wasting, smooth red tongue, and disorientation)  Collaborate with interdisciplinary team and initiate plan and interventions as ordered  Monitor patient's weight and dietary intake as ordered or per policy  Utilize nutrition screening tool and intervene per policy  Determine patient's food preferences and provide high-protein, high-caloric foods as appropriate       INTERVENTIONS:  - Monitor oral intake, urinary output, labs, and treatment plans  - Assess nutrition and hydration status and recommend course of action  - Evaluate amount of meals eaten  - Assist patient with eating if necessary   - Allow adequate time for meals  - Recommend/ encourage appropriate diets, oral nutritional supplements, and vitamin/mineral supplements  - Order, calculate, and assess calorie counts as needed  - Recommend, monitor, and adjust tube feedings and TPN/PPN based on assessed needs  - Assess need for intravenous fluids  - Provide specific nutrition/hydration education as appropriate  - Include patient/family/caregiver in decisions related to nutrition   2/14/2019 1523 by Jody Bose RN  Outcome: Progressing  2/14/2019 1519 by Jody Bose RN  Outcome: Progressing

## 2019-02-14 NOTE — PROGRESS NOTES
Pt assessed at start of shift  Had been sleeping but awakened for VS  No c/o currently  Denies headache, does have some pain in ingrown toenail  Reminded to do warm soaks, states he will do later  Remains euphoric and bouyant

## 2019-02-14 NOTE — CASE MANAGEMENT
CM contacted the [facility] @ [phone] & spoke with Ms  Sangita Branch in special education  She asked that CM email her the request @ [email or web address]   CM sent request for signature page, as well as the first page of the RR, to reference what document was needed  CM received a return call from [address] [place] [address] at [facility] [employer]  [facility], who reported he submitted the [facility] ([facility]) referral to [facility], but then learned that Pt's benefits are through [facility]; CM reviewed that Pt has lived in [facility] for at least a year  Hibab said that he will re-do the referral & send it to [facility], but [facility] & [facility] both received the referral, so they can start reviewing it  CM inquired about who Pt had been re-assigned to & Hibab said Elsa Santiago, whom he thought would have outreached to CM by now  CM reported she had not been contacted by anyone from [facility], & that is why she had outreach to Hibab again  He agreed to talk to his supervisor, to try to help ensure a smooth transition to the new ICM  He said that CM could outreach to supervisor Samia as well

## 2019-02-14 NOTE — PROGRESS NOTES
Pt is pleasant, intrusive at times but redirectable  Enjoys light banter with staff, excitable at times and loud but not agitated  Waiting for word on placement  Cooperative with med's and ADL's  No c/o other than headache, given tylenol with good relief

## 2019-02-15 PROCEDURE — 99232 SBSQ HOSP IP/OBS MODERATE 35: CPT | Performed by: PSYCHIATRY & NEUROLOGY

## 2019-02-15 RX ORDER — PROPRANOLOL HYDROCHLORIDE 20 MG/1
20 TABLET ORAL EVERY 12 HOURS SCHEDULED
Status: DISCONTINUED | OUTPATIENT
Start: 2019-02-15 | End: 2019-03-19 | Stop reason: HOSPADM

## 2019-02-15 RX ADMIN — BENZTROPINE MESYLATE 1 MG: 1 TABLET ORAL at 08:34

## 2019-02-15 RX ADMIN — CLINDAMYCIN HYDROCHLORIDE 300 MG: 150 CAPSULE ORAL at 06:30

## 2019-02-15 RX ADMIN — PROPRANOLOL HYDROCHLORIDE 20 MG: 20 TABLET ORAL at 10:01

## 2019-02-15 RX ADMIN — CLINDAMYCIN HYDROCHLORIDE 300 MG: 150 CAPSULE ORAL at 13:44

## 2019-02-15 RX ADMIN — RISPERIDONE 1 MG: 1 TABLET ORAL at 08:34

## 2019-02-15 RX ADMIN — PROPRANOLOL HYDROCHLORIDE 20 MG: 20 TABLET ORAL at 21:17

## 2019-02-15 RX ADMIN — CLINDAMYCIN HYDROCHLORIDE 300 MG: 150 CAPSULE ORAL at 21:17

## 2019-02-15 RX ADMIN — BENZTROPINE MESYLATE 1 MG: 1 TABLET ORAL at 17:05

## 2019-02-15 RX ADMIN — AMLODIPINE BESYLATE 5 MG: 5 TABLET ORAL at 08:34

## 2019-02-15 RX ADMIN — RISPERIDONE 2 MG: 2 TABLET ORAL at 17:05

## 2019-02-15 RX ADMIN — DIVALPROEX SODIUM 750 MG: 500 TABLET, DELAYED RELEASE ORAL at 21:18

## 2019-02-15 RX ADMIN — DIVALPROEX SODIUM 500 MG: 500 TABLET, DELAYED RELEASE ORAL at 08:34

## 2019-02-15 RX ADMIN — ACETAMINOPHEN 325 MG: 325 TABLET, FILM COATED ORAL at 09:56

## 2019-02-15 NOTE — PROGRESS NOTES
Pt reports having a good day today  Denies any needs or complaints  Pt with bright affect  Requires redirect from nurses station at times and for loud speech  Compliant with redirect  Pt denies any pain to left big toe  /81 HR 76 at start of shift

## 2019-02-15 NOTE — PROGRESS NOTES
Pt loud and continues to approach RN station frequently  Cooperative with redirection  Appears less hyper  Pt denies SI/HI  Denies hallucinations  Pt showered and attended to ADLs when encouraged to do so  Pt refused to soak toe this morning  Pt said he does not need to anymore  L big toe appears less reddened and pt denies pain  Dr Noel Collins made aware of elevated pulse/BP

## 2019-02-15 NOTE — CASE MANAGEMENT
CM met with Pt, who had been observed sleeping throughout the afternoon  Pt said he wasn't really tired, but was just resting  CM reported she is waiting for [facility] to send paperwork for Pt to choose a supports coordinator provider  KAPIL contacted Colletta Adams of [facility] @ [phone] & left a message seeking to confirm she received the signature page yesterday & asking that she send the paperwork for supports coordination services, so that CM can keep this process moving forward for Pt, who has been in the hospital for 15 days

## 2019-02-15 NOTE — PROGRESS NOTES
Progress Note - Behavioral Health   Ole Silence 22 y o  male MRN: [id number]  Unit/Bed#: [facility] 251-01 Encounter: [id number]    Assessment/Plan   Principal Problem:    Schizoaffective disorder, bipolar type ([facility] )  Active Problems:    Intellectual disability    BMI 45 0-49 9, adult ([facility] )    Encounter for examination and observation for other specified reasons    Ingrown left big toenail      Subjective:  Patient pleasant conversation  Answering in yes or no manner  Appears less hyperactive, less intrusive, not as loud, less boisterous today  States he feels well  Was happy that his toe does not hurt as much  Today heart rate and blood pressure were elevated  Hand tremors are not increased  Right worse than left  Left is minimal   Denied hallucinations  Denied depressive symptoms  Denied anxiety symptoms  Denied SI and HI  No signs of irritability  No signs of agitation  Does not endorse full criteria for eddie  Medication compliant  Appears to be tolerating medications well without serious side effects  Monitor blood pressure and pulse closely      Current Medications:  Current Facility-Administered Medications   Medication Dose Route Frequency    acetaminophen (TYLENOL) tablet 325 mg  325 mg Oral Q6H PRN    albuterol (PROVENTIL HFA,VENTOLIN HFA) inhaler 2 puff  2 puff Inhalation Q8H PRN    aluminum-magnesium hydroxide-simethicone (MYLANTA) 200-200-20 mg/5 mL oral suspension 15 mL  15 mL Oral Q4H PRN    benztropine (COGENTIN) injection 2 mg  2 mg Intramuscular Q6H PRN    benztropine (COGENTIN) tablet 1 mg  1 mg Oral BID    benztropine (COGENTIN) tablet 2 mg  2 mg Oral Q6H PRN    clindamycin (CLEOCIN) capsule 300 mg  300 mg Oral Q8H [facility]    divalproex sodium (DEPAKOTE) EC tablet 500 mg  500 mg Oral QAM    divalproex sodium (DEPAKOTE) EC tablet 750 mg  750 mg Oral HS    haloperidol (HALDOL) tablet 5 mg  5 mg Oral Q6H PRN    haloperidol lactate (HALDOL) injection 5 mg  5 mg Intramuscular Q6H PRN    hydrOXYzine HCL (ATARAX) tablet 25 mg  25 mg Oral Q6H PRN    magnesium hydroxide (MILK OF MAGNESIA) 400 mg/5 mL oral suspension 20 mL  20 mL Oral Daily PRN    propranolol (INDERAL) tablet 20 mg  20 mg Oral Q12H [address]    risperiDONE (RisperDAL M-TABS) dispersible tablet 1 mg  1 mg Oral Q6H PRN    risperiDONE (RisperDAL) tablet 1 mg  1 mg Oral Daily    risperiDONE (RisperDAL) tablet 2 mg  2 mg Oral After Dinner    traZODone (DESYREL) tablet 50 mg  50 mg Oral HS PRN       Behavioral Health Medications: all current active meds have been reviewed and continue current psychiatric medications  Vitals:  Vitals:    02/15/19 0723   BP:    Pulse: (!) 108   Resp:    Temp:        Laboratory results:    I have personally reviewed all pertinent laboratory/tests results    Most Recent Labs:   Lab Results   Component Value Date    WBC 8 88 02/14/2019    RBC 4 72 02/14/2019    HGB 14 2 02/14/2019    HCT 41 7 02/14/2019     02/14/2019    RDW 12 8 02/14/2019    NEUTROABS 3 91 02/14/2019    SODIUM 141 02/05/2019    K 4 0 02/05/2019     02/05/2019    CO2 26 02/05/2019    BUN 13 02/05/2019    CREATININE 0 68 02/05/2019    GLUC 99 02/05/2019    GLUF 99 02/05/2019    CALCIUM 8 5 02/05/2019    AST 19 02/05/2019    ALT 42 02/05/2019    ALKPHOS 67 02/05/2019    TP 7 4 02/05/2019    ALB 3 5 02/05/2019    TBILI 0 20 02/05/2019    CHOLESTEROL 152 02/14/2019    HDL 33 (L) 02/14/2019    TRIG 124 02/14/2019    LDLCALC 94 02/14/2019    NONHDLC 119 02/14/2019    VALPROICTOT 87 02/05/2019    AMMONIA 27 01/13/2018    [id number] 4 114 (H) 02/14/2019    FREET4 0 99 02/14/2019    T3FREE 3 03 03/24/2017    RPR Non-Reactive 04/21/2018    HGBA1C 5 2 02/14/2019     02/14/2019       Psychiatric Review of Systems:  Behavior over the last 24 hours:  improved  Sleep: normal  Appetite: normal  Medication side effects: No  ROS: no complaints    Mental Status Evaluation:  Appearance:  casually dressed   Behavior:  less restless Speech:  loud   Mood:  euthymic   Affect:  increased in range   Language naming objects and repeating phrases   Thought Process:  concrete   Thought Content:  obsessions   Perceptual Disturbances: None   Risk Potential: Denied SI/HI  Potential for aggression: No   Sensorium:  person and place   Cognition:  grossly intact   Consciousness:  awake    Recent and Remote Memory limited   Attention: attention span appeared shorter than expected for age   Insight:  limited   Judgment: limited   Gait/Station: normal gait/station and normal balance   Motor Activity: bilateral right worse than left hand tremor     Progress Toward Goals: progressing slowly    Recommended Treatment: Continue with group therapy, milieu therapy and occupational therapy  1   Will D/C Amlodipine  Add Propranolol 20mg BID for tachycardia and HTN  2  Continue other medications  3  Awaiting group home placement    Risks, benefits and possible side effects of Medications:   Risks, benefits, and possible side effects of medications explained to patient and patient verbalizes understanding        Debbie Tucker PA-C

## 2019-02-16 PROCEDURE — 99231 SBSQ HOSP IP/OBS SF/LOW 25: CPT | Performed by: STUDENT IN AN ORGANIZED HEALTH CARE EDUCATION/TRAINING PROGRAM

## 2019-02-16 RX ADMIN — BENZTROPINE MESYLATE 1 MG: 1 TABLET ORAL at 08:54

## 2019-02-16 RX ADMIN — CLINDAMYCIN HYDROCHLORIDE 300 MG: 150 CAPSULE ORAL at 06:30

## 2019-02-16 RX ADMIN — DIVALPROEX SODIUM 750 MG: 500 TABLET, DELAYED RELEASE ORAL at 21:12

## 2019-02-16 RX ADMIN — PROPRANOLOL HYDROCHLORIDE 20 MG: 20 TABLET ORAL at 08:54

## 2019-02-16 RX ADMIN — DIVALPROEX SODIUM 500 MG: 500 TABLET, DELAYED RELEASE ORAL at 08:54

## 2019-02-16 RX ADMIN — PROPRANOLOL HYDROCHLORIDE 20 MG: 20 TABLET ORAL at 21:12

## 2019-02-16 RX ADMIN — BENZTROPINE MESYLATE 1 MG: 1 TABLET ORAL at 17:01

## 2019-02-16 RX ADMIN — ACETAMINOPHEN 325 MG: 325 TABLET, FILM COATED ORAL at 09:06

## 2019-02-16 RX ADMIN — CLINDAMYCIN HYDROCHLORIDE 300 MG: 150 CAPSULE ORAL at 21:12

## 2019-02-16 RX ADMIN — CLINDAMYCIN HYDROCHLORIDE 300 MG: 150 CAPSULE ORAL at 14:08

## 2019-02-16 RX ADMIN — TRAZODONE HYDROCHLORIDE 50 MG: 50 TABLET ORAL at 21:13

## 2019-02-16 RX ADMIN — RISPERIDONE 1 MG: 1 TABLET ORAL at 08:54

## 2019-02-16 RX ADMIN — ACETAMINOPHEN 325 MG: 325 TABLET, FILM COATED ORAL at 16:18

## 2019-02-16 RX ADMIN — RISPERIDONE 2 MG: 2 TABLET ORAL at 17:01

## 2019-02-16 NOTE — PROGRESS NOTES
Progress Note - [facility] 22 y o  male MRN: [id number]  Unit/Bed#: [facility] 251-01 Encounter: [id number]    Subjective:    Per nursing, patient was pleasant throughout the morning, calmer and less hyper, denying all symptoms  Per patient, patient reports things are going well, team is helping him to find a group home  He reports his mood has been "good," denying feelings sadness or depression, denying anger or irritability  He reports sleeping well, denying any trouble falling or staying asleep  He reports appetite has been good  Patient denies any passive or active suicidal or homicidal ideation, intent, or plan  Patient reports energy has been good  Tolerating medication well without reported side effects  Patient denies any tremor from the Risperdal        Behavior over the last 24 hours:  improved  Medication side effects: No  ROS: no complaints    Objective:    Temp:  [97 4 °F (36 3 °C)-97 5 °F (36 4 °C)] 97 4 °F (36 3 °C)  HR:  [76-95] 77  Resp:  [15-18] 17  BP: (121-129)/(69-81) 128/69    Mental Status Evaluation:  Appearance:  sitting comfortably in chair, dressed in casual clothing, cooperative with interview, fairly well related   Behavior:  No tics, tremors, or behaviors observed   Speech:  dysarthric   Mood:  "Good"   Affect:  Appears generally euthymic, stable, mood-congruent   Thought Process:  Linear and goal directed, concrete   Associations intact associations   Thought Content:  No passive or active suicidal or homicidal ideation, intent, or plan  Perceptual Disturbances: Denies any auditory or visual hallucinations   Sensorium:  Oriented to person, place, time, and situation   Memory:  recent and remote memory grossly intact   Consciousness:  alert and awake   Attention: attention span and concentration were age appropriate   Insight:  Limited   Judgment: fair   Gait/Station: normal gait/station   Motor Activity: no abnormal movements       Labs:    I have personally reviewed all pertinent laboratory/tests results  Labs in last 72 hours:   Recent Labs     02/14/19  0537   WBC 8 88   RBC 4 72   HGB 14 2   HCT 41 7      RDW 12 8   NEUTROABS 3 91   CHOLESTEROL 152   HDL 33*   TRIG 124   LDLCALC 94   [id number] 4 114*   FREET4 0 99       Progress Toward Goals: Progressing    Recommended Treatment: Continue with group therapy, milieu therapy and occupational therapy  Risks, benefits and possible side effects of Medications:   Risks, benefits, and possible side effects of medications explained to patient and patient verbalizes understanding  Medications: all current active meds have been reviewed      Current Facility-Administered Medications:  acetaminophen 325 mg Oral Q6H PRN Sherif Vides   albuterol 2 puff Inhalation Q8H PRN Sherif Vides   aluminum-magnesium hydroxide-simethicone 15 mL Oral Q4H PRN Clint Vergara MD   benztropine 2 mg Intramuscular Q6H PRN Clint Vergara MD   benztropine 1 mg Oral BID Melisa Wang PA-C   benztropine 2 mg Oral Q6H PRN Clint Vergara MD   clindamycin 300 mg Oral [facility] Jorden Zheng PA-C   divalproex sodium 500 mg Oral QAM Sherif Vides   divalproex sodium 750 mg Oral HS Sherif Vides   haloperidol 5 mg Oral Q6H PRN Clint Vergara MD   haloperidol lactate 5 mg Intramuscular Q6H PRN Clint Vergara MD   hydrOXYzine HCL 25 mg Oral Q6H PRN Clint Vergara MD   magnesium hydroxide 20 mL Oral Daily PRN Clint Vergara MD   propranolol 20 mg Oral Q12H [address] Jorden Zheng PA-C   risperiDONE 1 mg Oral Q6H PRN Clint Vergara MD   risperiDONE 1 mg Oral Daily Jorden Zheng PA-C   risperiDONE 2 mg Oral After [place] SoupBEKAH   traZODone 50 mg Oral HS PRN Clint Vergara MD           Assessment/Plan   Principal Problem:    Schizoaffective disorder, bipolar type [facility])  Active Problems:    Intellectual disability    BMI 45 0-49 9, adult [facility])    Encounter for examination and observation for other specified reasons    Ingrown left big toenail    21 y/o Male with schizoaffective disorder, ID- continues to improve, no overt psychotic symptoms, stable mood symptoms, no current SI    Plan:  -Continue current med regimen   -Awaiting group home placement

## 2019-02-16 NOTE — PROGRESS NOTES
Pt pleasant throughout morning  Calmer and less hyper  Pt denies all symptoms  Pt showed writer his toe and said "my toe is better " Pt said he does not need to soak toe anymore  Denies pain  No questions/concerns

## 2019-02-17 PROCEDURE — 99231 SBSQ HOSP IP/OBS SF/LOW 25: CPT | Performed by: STUDENT IN AN ORGANIZED HEALTH CARE EDUCATION/TRAINING PROGRAM

## 2019-02-17 RX ADMIN — PROPRANOLOL HYDROCHLORIDE 20 MG: 20 TABLET ORAL at 21:18

## 2019-02-17 RX ADMIN — BENZTROPINE MESYLATE 1 MG: 1 TABLET ORAL at 18:43

## 2019-02-17 RX ADMIN — DIVALPROEX SODIUM 750 MG: 500 TABLET, DELAYED RELEASE ORAL at 21:19

## 2019-02-17 RX ADMIN — DIVALPROEX SODIUM 500 MG: 500 TABLET, DELAYED RELEASE ORAL at 08:49

## 2019-02-17 RX ADMIN — TRAZODONE HYDROCHLORIDE 50 MG: 50 TABLET ORAL at 21:20

## 2019-02-17 RX ADMIN — CLINDAMYCIN HYDROCHLORIDE 300 MG: 150 CAPSULE ORAL at 21:19

## 2019-02-17 RX ADMIN — BENZTROPINE MESYLATE 1 MG: 1 TABLET ORAL at 08:48

## 2019-02-17 RX ADMIN — CLINDAMYCIN HYDROCHLORIDE 300 MG: 150 CAPSULE ORAL at 06:45

## 2019-02-17 RX ADMIN — RISPERIDONE 1 MG: 1 TABLET ORAL at 08:48

## 2019-02-17 RX ADMIN — RISPERIDONE 2 MG: 2 TABLET ORAL at 18:43

## 2019-02-17 RX ADMIN — ACETAMINOPHEN 325 MG: 325 TABLET, FILM COATED ORAL at 15:35

## 2019-02-17 RX ADMIN — PROPRANOLOL HYDROCHLORIDE 20 MG: 20 TABLET ORAL at 08:48

## 2019-02-17 RX ADMIN — CLINDAMYCIN HYDROCHLORIDE 300 MG: 150 CAPSULE ORAL at 14:06

## 2019-02-17 RX ADMIN — ACETAMINOPHEN 325 MG: 325 TABLET, FILM COATED ORAL at 09:11

## 2019-02-17 NOTE — PROGRESS NOTES
Progress Note - [address] [facility] 22 y o  male MRN: [id number]  Unit/Bed#: [facility] 251-01 Encounter: [id number]    Subjective:    Per nursing, patient has been visible in milieu, pleasant and smiling during interactions, attended to ADLs, slight b/l hand tremor noted  Per patient, patient reports things are going well, denies any concerns at this time  He reports mood has been "good," denying feelings of sadness or depression, denying anger or irritability  He reports sleeping well, denying trouble falling or staying asleep  He reports appetite has been good  Denies any passive or active suicidal or homicidal ideation, intent, or plan  Patient denies any auditory or visual hallucinations  Patient tolerating medications well without reported side effects  He reports watching television  He reports looking forward to going to a group home  Patient denies any tremors at this time  Behavior over the last 24 hours:  improved  Medication side effects: No  ROS: no complaints    Objective:    Temp:  [97 6 °F (36 4 °C)] 97 6 °F (36 4 °C)  HR:  [] 81  Resp:  [18] 18  BP: (132-133)/(69-94) 132/70    Mental Status Evaluation:  Appearance:  sitting comfortably in chair, dressed in casual clothing, cooperative with interview, fairly well related, immature for age   Behavior:  No tics, tremors, or behaviors observed   Speech:  Normal rate, rhythm, and volume   Mood:  "Good"   Affect:  Appears generally euthymic, stable, mood-congruent   Thought Process:  Linear and goal directed   Associations intact associations   Thought Content:  No passive or active suicidal or homicidal ideation, intent, or plan     Perceptual Disturbances: Denies any auditory or visual hallucinations   Sensorium:  Oriented to person, place, time, and situation   Memory:  recent and remote memory grossly intact   Consciousness:  alert and awake   Attention: attention span and concentration were age appropriate   Insight: Limited   Judgment: fair   Gait/Station: normal gait/station   Motor Activity: no abnormal movements       Progress Toward Goals: Progressing    Recommended Treatment: Continue with group therapy, milieu therapy and occupational therapy  Risks, benefits and possible side effects of Medications:   Risks, benefits, and possible side effects of medications explained to patient and patient verbalizes understanding  Medications: all current active meds have been reviewed      Current Facility-Administered Medications:  acetaminophen 325 mg Oral Q6H PRN Sherif Vides   albuterol 2 puff Inhalation Q8H PRN Sherif Vides   aluminum-magnesium hydroxide-simethicone 15 mL Oral Q4H PRN Luis Eduardo Vieira MD   benztropine 2 mg Intramuscular Q6H PRN Luis Eduardo Vieira MD   benztropine 1 mg Oral BID Melisa Wang PA-C   benztropine 2 mg Oral Q6H PRN Luis Eduardo Vieira MD   clindamycin 300 mg Oral [facility] Mark Mac PA-C   divalproex sodium 500 mg Oral QAM Sherif Vides   divalproex sodium 750 mg Oral HS Sehrif Vides   haloperidol 5 mg Oral Q6H PRN Luis Eduardo Vieira MD   haloperidol lactate 5 mg Intramuscular Q6H PRN Luis Eduardo Vieira MD   hydrOXYzine HCL 25 mg Oral Q6H PRN Luis Eduardo Vieira MD   magnesium hydroxide 20 mL Oral Daily PRN Luis Eduardo Vieira MD   propranolol 20 mg Oral Q12H [address] Mark Mac PA-C   risperiDONE 1 mg Oral Q6H PRN Luis Eduardo Vieira MD   risperiDONE 1 mg Oral Daily Mark Mac PA-C   risperiDONE 2 mg Oral After [place] SoupBEKAH   traZODone 50 mg Oral HS PRN Luis Eduardo Vieira MD       Assessment/Plan   Principal Problem:    Schizoaffective disorder, bipolar type [facility])  Active Problems:    Intellectual disability    BMI 45 0-49 9, adult [facility])    Encounter for examination and observation for other specified reasons    Ingrown left big toenail    21 y/o Male with schizoaffective disorder, ID- continues to be in fair behavioral control, stable mood symptoms, no SI/HI    Plan:  -Continue current med regimen

## 2019-02-17 NOTE — PROGRESS NOTES
Visible on unit  Remains hyper at times, but easily redirected  (example-impulsively turned light switch off in med room, while taking medication & laughed about same) Did join group, without encouragement needed  Denies all s/s's  Will continue to monitor

## 2019-02-17 NOTE — PROGRESS NOTES
Pt visible in milieu  Has not been hyper  Pleasant and smiling during interaction  Attended to ADLs when prompted to do so  Slight b/l hand tremor noted  Has improved over past few days  No questions/concerns

## 2019-02-17 NOTE — PROGRESS NOTES
Pt pleasant and smiling  Cooperative on unit  Has not been hyper throughout the weekend  Mild tremor noted  Has improved over past several days  Pt denies all symptoms when asked  Denies any complaints  Encouraged pt to select healthy options on menu after he said that he had a hamburger for dinner for several days in a row  Friendly towards peers  Visible in milieu this evening

## 2019-02-18 PROCEDURE — 99232 SBSQ HOSP IP/OBS MODERATE 35: CPT | Performed by: PSYCHIATRY & NEUROLOGY

## 2019-02-18 RX ADMIN — BENZTROPINE MESYLATE 1 MG: 1 TABLET ORAL at 17:12

## 2019-02-18 RX ADMIN — ACETAMINOPHEN 325 MG: 325 TABLET, FILM COATED ORAL at 17:43

## 2019-02-18 RX ADMIN — CLINDAMYCIN HYDROCHLORIDE 300 MG: 150 CAPSULE ORAL at 06:17

## 2019-02-18 RX ADMIN — DIVALPROEX SODIUM 750 MG: 500 TABLET, DELAYED RELEASE ORAL at 21:15

## 2019-02-18 RX ADMIN — BENZTROPINE MESYLATE 1 MG: 1 TABLET ORAL at 08:47

## 2019-02-18 RX ADMIN — DIVALPROEX SODIUM 500 MG: 500 TABLET, DELAYED RELEASE ORAL at 08:47

## 2019-02-18 RX ADMIN — RISPERIDONE 1 MG: 1 TABLET ORAL at 08:47

## 2019-02-18 RX ADMIN — CLINDAMYCIN HYDROCHLORIDE 300 MG: 150 CAPSULE ORAL at 14:27

## 2019-02-18 RX ADMIN — RISPERIDONE 2 MG: 2 TABLET ORAL at 17:12

## 2019-02-18 RX ADMIN — PROPRANOLOL HYDROCHLORIDE 20 MG: 20 TABLET ORAL at 08:47

## 2019-02-18 RX ADMIN — PROPRANOLOL HYDROCHLORIDE 20 MG: 20 TABLET ORAL at 21:15

## 2019-02-18 RX ADMIN — CLINDAMYCIN HYDROCHLORIDE 300 MG: 150 CAPSULE ORAL at 21:14

## 2019-02-18 NOTE — PROGRESS NOTES
Requested & received trazodone 50 mg po prn/sleep at 2120  Good effet obtained, as observed asleep in bed within the hr  Will continue to monitor

## 2019-02-18 NOTE — PROGRESS NOTES
Progress Note - Behavioral Health   Roldan Hampton 22 y o  male MRN: [id number]  Unit/Bed#: [facility] 251-01 Encounter: [id number]    Assessment/Plan   Principal Problem:    Schizoaffective disorder, bipolar type ([facility] 75 )  Active Problems:    Intellectual disability    BMI 45 0-49 9, adult ([facility] )    Encounter for examination and observation for other specified reasons    Ingrown left big toenail      Subjective:  Patient today denied most psychiatric symptoms  Does not endorse criteria for eddie  No signs of agitation  Denied SI and HI  Reports low depression and anxiety  Appeared mildly euphoric  Seen loud at times  Appears less restless  Toe pain is improving  Vital signs are stable after switch in hypertensive medications  Medication compliant  Appears to be tolerating medications well without serious side effects  Awaiting group home placement      Current Medications:  Current Facility-Administered Medications   Medication Dose Route Frequency    acetaminophen (TYLENOL) tablet 325 mg  325 mg Oral Q6H PRN    albuterol (PROVENTIL HFA,VENTOLIN HFA) inhaler 2 puff  2 puff Inhalation Q8H PRN    aluminum-magnesium hydroxide-simethicone (MYLANTA) 200-200-20 mg/5 mL oral suspension 15 mL  15 mL Oral Q4H PRN    benztropine (COGENTIN) injection 2 mg  2 mg Intramuscular Q6H PRN    benztropine (COGENTIN) tablet 1 mg  1 mg Oral BID    benztropine (COGENTIN) tablet 2 mg  2 mg Oral Q6H PRN    clindamycin (CLEOCIN) capsule 300 mg  300 mg Oral Q8H [address]    divalproex sodium (DEPAKOTE) EC tablet 500 mg  500 mg Oral QAM    divalproex sodium (DEPAKOTE) EC tablet 750 mg  750 mg Oral HS    haloperidol (HALDOL) tablet 5 mg  5 mg Oral Q6H PRN    haloperidol lactate (HALDOL) injection 5 mg  5 mg Intramuscular Q6H PRN    hydrOXYzine HCL (ATARAX) tablet 25 mg  25 mg Oral Q6H PRN    magnesium hydroxide (MILK OF MAGNESIA) 400 mg/5 mL oral suspension 20 mL  20 mL Oral Daily PRN    propranolol (INDERAL) tablet 20 mg  20 mg Oral Q12H [address]    risperiDONE (RisperDAL M-TABS) dispersible tablet 1 mg  1 mg Oral Q6H PRN    risperiDONE (RisperDAL) tablet 1 mg  1 mg Oral Daily    risperiDONE (RisperDAL) tablet 2 mg  2 mg Oral After Dinner    traZODone (DESYREL) tablet 50 mg  50 mg Oral HS PRN       Behavioral Health Medications: all current active meds have been reviewed and continue current psychiatric medications  Vitals:  Vitals:    02/18/19 0742   BP: 127/78   Pulse: 73   Resp: 18   Temp: 97 9 °F (36 6 °C)       Laboratory results:    I have personally reviewed all pertinent laboratory/tests results    Most Recent Labs:   Lab Results   Component Value Date    WBC 8 88 02/14/2019    RBC 4 72 02/14/2019    HGB 14 2 02/14/2019    HCT 41 7 02/14/2019     02/14/2019    RDW 12 8 02/14/2019    NEUTROABS 3 91 02/14/2019    SODIUM 141 02/05/2019    K 4 0 02/05/2019     02/05/2019    CO2 26 02/05/2019    BUN 13 02/05/2019    CREATININE 0 68 02/05/2019    GLUC 99 02/05/2019    GLUF 99 02/05/2019    CALCIUM 8 5 02/05/2019    AST 19 02/05/2019    ALT 42 02/05/2019    ALKPHOS 67 02/05/2019    TP 7 4 02/05/2019    ALB 3 5 02/05/2019    TBILI 0 20 02/05/2019    CHOLESTEROL 152 02/14/2019    HDL 33 (L) 02/14/2019    TRIG 124 02/14/2019    LDLCALC 94 02/14/2019    NONHDLC 119 02/14/2019    VALPROICTOT 87 02/05/2019    AMMONIA 27 01/13/2018    [id number] 4 114 (H) 02/14/2019    FREET4 0 99 02/14/2019    T3FREE 3 03 03/24/2017    RPR Non-Reactive 04/21/2018    HGBA1C 5 2 02/14/2019     02/14/2019       Psychiatric Review of Systems:  Behavior over the last 24 hours:  unchanged  Sleep: normal  Appetite: normal  Medication side effects: No  ROS: no complaints    Mental Status Evaluation:  Appearance:  casually dressed   Behavior:  restless and fidgety   Speech:  loud   Mood:  euthymic   Affect:  increased in intensity   Language naming objects and repeating phrases   Thought Process:  concrete   Thought Content:  obsessions   Perceptual Disturbances: None   Risk Potential: Denied SI/HI  Potential for aggression: No   Sensorium:  person and place   Cognition:  grossly intact   Consciousness:  alert and awake    Recent and Remote Memory limited   Attention: attention span appeared shorter than expected for age   Insight:  limited   Judgment: limited   Gait/Station: normal gait/station and normal balance   Motor Activity: Right worse than left hand tremor     Progress Toward Goals: progressing slowly    Recommended Treatment: Continue with group therapy, milieu therapy and occupational therapy  1   Continue current medications  2  Disposition planning with plan for group home    Risks, benefits and possible side effects of Medications:   Risks, benefits, and possible side effects of medications explained to patient and patient verbalizes understanding        Lane Albert PA-C

## 2019-02-18 NOTE — CASE MANAGEMENT
CM received information about supports coordination services through [facility], as well as a form for Pt to sign regarding which provider he would like to work with  CM reviewed this with Pt, who picked [facility]  Form signed & returned to [facility]  CM contacted Jared  @ [phone] & spoke with Ronel Juarez, regarding how far Pt's progress with developmental services needs to be, before they would consider his referral?  Ronel Juarez said that CM could send the referral at anytime, however, they are a short-term stay of 10 days & they like to see that a discharge plans is concrete/in place

## 2019-02-18 NOTE — PROGRESS NOTES
Patient is pleasant but loud on the unit  Needs frequent redirection to quiet down and to not linger near nurses station  Pt refuses to soak his foot  Patient denies all sx and is looking forward to going to a group home  Called his mom today  Naps during the day

## 2019-02-18 NOTE — PLAN OF CARE
Pt attended a m  Groups today  He presented as bright in affect, often laughing or making loud noises during group  Pt intrusive at times, frequently interrupting group discussion to ask for the time and was focused on coffee break  Pt is pleasant and cooperative with redirection  He was seclusive throughout the afternoon sleeping and did not attend further groups

## 2019-02-18 NOTE — PROGRESS NOTES
Wandering unit, cheerful, talkative, joking with staff and peers  Redirections required to avoid loitering at desk

## 2019-02-18 NOTE — CASE MANAGEMENT
KAPIL received the following from Usama Goins at [employer]: Thank you for the signed [facility] [address] Choice form  Barrington forwarded this document to our  and she will process it  Following processing, TATE DEVLIN s information will be forwarded to [facility] and they will be reaching out to schedule a time to meet with him and discuss his needs  CM spoke with Pt's mom, Binu Gill; Pt was on the patient phones & handed the phone to CM & asked her to talk to his mom  CM reviewed with Binu Gill what has been done in regards to trying to get appropriate services  Binu Gill said that she found Pt's birth certificate, & CM said that she would ask [facility] to bring it to the hospital     CM contacted [facility] @ [phone] opt [address] for SERGEY Hampton  CM left a message seeking a returned call regarding getting Pt's birth certificate to the hospital, & what has been done in regards to getting Pt appropriate services

## 2019-02-19 PROCEDURE — 99232 SBSQ HOSP IP/OBS MODERATE 35: CPT | Performed by: PSYCHIATRY & NEUROLOGY

## 2019-02-19 RX ORDER — BENZTROPINE MESYLATE 1 MG/1
1 TABLET ORAL EVERY 6 HOURS PRN
Status: DISCONTINUED | OUTPATIENT
Start: 2019-02-19 | End: 2019-03-19 | Stop reason: HOSPADM

## 2019-02-19 RX ORDER — HALOPERIDOL 5 MG/ML
5 INJECTION INTRAMUSCULAR EVERY 6 HOURS PRN
Status: DISCONTINUED | OUTPATIENT
Start: 2019-02-19 | End: 2019-03-19 | Stop reason: HOSPADM

## 2019-02-19 RX ORDER — HALOPERIDOL 5 MG
5 TABLET ORAL EVERY 6 HOURS PRN
Status: DISCONTINUED | OUTPATIENT
Start: 2019-02-19 | End: 2019-03-19 | Stop reason: HOSPADM

## 2019-02-19 RX ORDER — ACETAMINOPHEN 325 MG/1
975 TABLET ORAL EVERY 6 HOURS PRN
Status: DISCONTINUED | OUTPATIENT
Start: 2019-02-19 | End: 2019-03-19 | Stop reason: HOSPADM

## 2019-02-19 RX ORDER — LORAZEPAM 1 MG/1
1 TABLET ORAL EVERY 8 HOURS PRN
Status: DISCONTINUED | OUTPATIENT
Start: 2019-02-19 | End: 2019-03-19 | Stop reason: HOSPADM

## 2019-02-19 RX ORDER — ACETAMINOPHEN 325 MG/1
325 TABLET ORAL EVERY 6 HOURS PRN
Status: DISCONTINUED | OUTPATIENT
Start: 2019-02-19 | End: 2019-03-19 | Stop reason: HOSPADM

## 2019-02-19 RX ORDER — BENZTROPINE MESYLATE 1 MG/ML
2 INJECTION INTRAMUSCULAR; INTRAVENOUS EVERY 6 HOURS PRN
Status: DISCONTINUED | OUTPATIENT
Start: 2019-02-19 | End: 2019-03-19 | Stop reason: HOSPADM

## 2019-02-19 RX ORDER — LORAZEPAM 2 MG/ML
2 INJECTION INTRAMUSCULAR EVERY 6 HOURS PRN
Status: DISCONTINUED | OUTPATIENT
Start: 2019-02-19 | End: 2019-03-19 | Stop reason: HOSPADM

## 2019-02-19 RX ORDER — ACETAMINOPHEN 325 MG/1
650 TABLET ORAL EVERY 6 HOURS PRN
Status: DISCONTINUED | OUTPATIENT
Start: 2019-02-19 | End: 2019-03-19 | Stop reason: HOSPADM

## 2019-02-19 RX ADMIN — ACETAMINOPHEN 325 MG: 325 TABLET ORAL at 18:38

## 2019-02-19 RX ADMIN — DIVALPROEX SODIUM 750 MG: 500 TABLET, DELAYED RELEASE ORAL at 21:29

## 2019-02-19 RX ADMIN — CLINDAMYCIN HYDROCHLORIDE 300 MG: 150 CAPSULE ORAL at 14:14

## 2019-02-19 RX ADMIN — BENZTROPINE MESYLATE 1 MG: 1 TABLET ORAL at 08:32

## 2019-02-19 RX ADMIN — PROPRANOLOL HYDROCHLORIDE 20 MG: 20 TABLET ORAL at 08:32

## 2019-02-19 RX ADMIN — PROPRANOLOL HYDROCHLORIDE 20 MG: 20 TABLET ORAL at 21:26

## 2019-02-19 RX ADMIN — RISPERIDONE 2 MG: 2 TABLET ORAL at 17:32

## 2019-02-19 RX ADMIN — RISPERIDONE 1 MG: 1 TABLET ORAL at 08:32

## 2019-02-19 RX ADMIN — CLINDAMYCIN HYDROCHLORIDE 300 MG: 150 CAPSULE ORAL at 06:12

## 2019-02-19 RX ADMIN — CLINDAMYCIN HYDROCHLORIDE 300 MG: 150 CAPSULE ORAL at 21:30

## 2019-02-19 RX ADMIN — DIVALPROEX SODIUM 500 MG: 500 TABLET, DELAYED RELEASE ORAL at 08:32

## 2019-02-19 RX ADMIN — BENZTROPINE MESYLATE 1 MG: 1 TABLET ORAL at 17:32

## 2019-02-19 RX ADMIN — ACETAMINOPHEN 325 MG: 325 TABLET ORAL at 10:52

## 2019-02-19 NOTE — PROGRESS NOTES
Pt pleasant and friendly  At RN station often but is redirectable  Showered when prompted to do so  Denies all symptoms  Pt soaked toe this morning  Toe is less reddened and pt denies pain

## 2019-02-19 NOTE — CASE MANAGEMENT
KAPIL received a call from Usama Goins at [facility] who reported that [employer] wanted to know if any mental health housing had been started for Pt? CM reviewed [facility] [place] had completed a CRR referral, but sent it to [facility]   They were to be re-doing it & sending it to [facility], however, KAPIL has not heard back yet from the supervisor; she left a message yesterday  KAPIL met with Pt who was asking about his group home  Pt states he is excited to go to a group home, but is doing okay in the hospital   KAPIL encouraging Pt to walk laps, but Pt not interested after walking 1 lap with KAPIL

## 2019-02-19 NOTE — PLAN OF CARE
Patient attended and participated in groups  Patient appeared bright during group tasks  Patient received redirection when needed

## 2019-02-19 NOTE — PROGRESS NOTES
Progress Note - Behavioral Health   Ladonna Pulling 22 y o  male MRN: [id number]  Unit/Bed#: [facility] 251-01 Encounter: [id number]    Assessment/Plan   Principal Problem:    Schizoaffective disorder, bipolar type ([facility] )  Active Problems:    Intellectual disability    BMI 45 0-49 9, adult ([facility] )    Encounter for examination and observation for other specified reasons    Ingrown left big toenail      Subjective:  Patient today cooperative in interview  Seen soaking his foot  Toe appears less erythematous but does remain swollen  Is due to finish antibiotic soon  Denied pain in tow  Left hand tremor appears resolved and right hand tremor appears less intense  Patient denies most psychiatric symptoms  Appeared euthymic and was pleasant  Denied SI, HI, psychosis, delusional material, depression, and anxiety  Can be loud when overstimulated  Hyperactive at times  Does not endorse full criteria for eddie  No agitation  Does require PRN Trazodone at times, but did not need it last night  Seen attending groups  Vital signs remained stable      Current Medications:  Current Facility-Administered Medications   Medication Dose Route Frequency    acetaminophen (TYLENOL) tablet 325 mg  325 mg Oral Q6H PRN    acetaminophen (TYLENOL) tablet 650 mg  650 mg Oral Q6H PRN    acetaminophen (TYLENOL) tablet 975 mg  975 mg Oral Q6H PRN    albuterol (PROVENTIL HFA,VENTOLIN HFA) inhaler 2 puff  2 puff Inhalation Q8H PRN    aluminum-magnesium hydroxide-simethicone (MYLANTA) 200-200-20 mg/5 mL oral suspension 15 mL  15 mL Oral Q4H PRN    benztropine (COGENTIN) injection 2 mg  2 mg Intramuscular Q6H PRN    benztropine (COGENTIN) tablet 1 mg  1 mg Oral BID    benztropine (COGENTIN) tablet 1 mg  1 mg Oral Q6H PRN    clindamycin (CLEOCIN) capsule 300 mg  300 mg Oral Q8H [address]    divalproex sodium (DEPAKOTE) EC tablet 500 mg  500 mg Oral QAM    divalproex sodium (DEPAKOTE) EC tablet 750 mg  750 mg Oral HS    haloperidol (HALDOL) tablet 5 mg  5 mg Oral Q6H PRN    haloperidol lactate (HALDOL) injection 5 mg  5 mg Intramuscular Q6H PRN    LORazepam (ATIVAN) 2 mg/mL injection 2 mg  2 mg Intramuscular Q6H PRN    LORazepam (ATIVAN) tablet 1 mg  1 mg Oral Q8H PRN    magnesium hydroxide (MILK OF MAGNESIA) 400 mg/5 mL oral suspension 20 mL  20 mL Oral Daily PRN    propranolol (INDERAL) tablet 20 mg  20 mg Oral Q12H [address]    risperiDONE (RisperDAL M-TABS) dispersible tablet 1 mg  1 mg Oral Q6H PRN    risperiDONE (RisperDAL) tablet 1 mg  1 mg Oral Daily    risperiDONE (RisperDAL) tablet 2 mg  2 mg Oral After Dinner    traZODone (DESYREL) tablet 50 mg  50 mg Oral HS PRN       Behavioral Health Medications: all current active meds have been reviewed and continue current psychiatric medications  Vitals:  Vitals:    02/19/19 0742   BP: 130/68   Pulse: 82   Resp: 18   Temp: 98 °F (36 7 °C)       Laboratory results:    I have personally reviewed all pertinent laboratory/tests results    Most Recent Labs:   Lab Results   Component Value Date    WBC 8 88 02/14/2019    RBC 4 72 02/14/2019    HGB 14 2 02/14/2019    HCT 41 7 02/14/2019     02/14/2019    RDW 12 8 02/14/2019    NEUTROABS 3 91 02/14/2019    SODIUM 141 02/05/2019    K 4 0 02/05/2019     02/05/2019    CO2 26 02/05/2019    BUN 13 02/05/2019    CREATININE 0 68 02/05/2019    GLUC 99 02/05/2019    GLUF 99 02/05/2019    CALCIUM 8 5 02/05/2019    AST 19 02/05/2019    ALT 42 02/05/2019    ALKPHOS 67 02/05/2019    TP 7 4 02/05/2019    ALB 3 5 02/05/2019    TBILI 0 20 02/05/2019    CHOLESTEROL 152 02/14/2019    HDL 33 (L) 02/14/2019    TRIG 124 02/14/2019    LDLCALC 94 02/14/2019    NONHDLC 119 02/14/2019    VALPROICTOT 87 02/05/2019    AMMONIA 27 01/13/2018    [id number] 4 114 (H) 02/14/2019    FREET4 0 99 02/14/2019    T3FREE 3 03 03/24/2017    RPR Non-Reactive 04/21/2018    HGBA1C 5 2 02/14/2019     02/14/2019       Psychiatric Review of Systems:  Behavior over the last 24 hours:  improved  Sleep: normal  Appetite: normal  Medication side effects: No  ROS: headaches    Mental Status Evaluation:  Appearance:  casually dressed   Behavior:  cooperative   Speech:  loud   Mood:  euthymic   Affect:  mood-congruent   Language naming objects and repeating phrases   Thought Process:  concrete   Thought Content:  obsessions   Perceptual Disturbances: None   Risk Potential: Denied SI/HI  Potential for aggression: No   Sensorium:  person and place   Cognition:  grossly intact   Consciousness:  awake    Recent and Remote Memory limited   Attention: attention span appeared shorter than expected for age   Insight:  limited   Judgment: limited   Gait/Station: normal gait/station and normal balance   Motor Activity: Right hand tremor less     Progress Toward Goals: progressing slowly    Recommended Treatment: Continue with group therapy, milieu therapy and occupational therapy  1   Continue current medications  2  Disposition planning with plan for group home placement    Risks, benefits and possible side effects of Medications:   Risks, benefits, and possible side effects of medications explained to patient and patient verbalizes understanding        Ronny Phillips PA-C

## 2019-02-20 PROCEDURE — 99232 SBSQ HOSP IP/OBS MODERATE 35: CPT | Performed by: PSYCHIATRY & NEUROLOGY

## 2019-02-20 RX ADMIN — BENZTROPINE MESYLATE 1 MG: 1 TABLET ORAL at 08:18

## 2019-02-20 RX ADMIN — PROPRANOLOL HYDROCHLORIDE 20 MG: 20 TABLET ORAL at 08:18

## 2019-02-20 RX ADMIN — RISPERIDONE 2 MG: 2 TABLET ORAL at 17:33

## 2019-02-20 RX ADMIN — PROPRANOLOL HYDROCHLORIDE 20 MG: 20 TABLET ORAL at 21:33

## 2019-02-20 RX ADMIN — BENZTROPINE MESYLATE 1 MG: 1 TABLET ORAL at 17:33

## 2019-02-20 RX ADMIN — DIVALPROEX SODIUM 750 MG: 500 TABLET, DELAYED RELEASE ORAL at 21:33

## 2019-02-20 RX ADMIN — CLINDAMYCIN HYDROCHLORIDE 300 MG: 150 CAPSULE ORAL at 06:00

## 2019-02-20 RX ADMIN — ACETAMINOPHEN 650 MG: 325 TABLET ORAL at 10:47

## 2019-02-20 RX ADMIN — RISPERIDONE 1 MG: 1 TABLET ORAL at 08:18

## 2019-02-20 RX ADMIN — DIVALPROEX SODIUM 500 MG: 500 TABLET, DELAYED RELEASE ORAL at 08:18

## 2019-02-20 NOTE — PROGRESS NOTES
Progress Note - Behavioral Health   Melissa Weber 22 y o  male MRN: [id number]  Unit/Bed#: [facility] 251-01 Encounter: [id number]    Assessment/Plan   Principal Problem:    Schizoaffective disorder, bipolar type ([facility] 75 )  Active Problems:    Intellectual disability    BMI 45 0-49 9, adult ([facility] 75 )    Encounter for examination and observation for other specified reasons    Ingrown left big toenail      Subjective:  Patient reports feeling well  Denied toe pain  Does remain erythematous and will consider podiatry consult  Loud at times, but is not actively manic  Not agitated  Not irritable  Denied depression  Denied anxiety  Denied psychosis  Denied delusional material   Denied potential side effects  Overall cooperative and pleasant  Medication compliant  Tolerating medications well without side effects  Awaiting discharge she to group [facility]        Current Medications:  Current Facility-Administered Medications   Medication Dose Route Frequency    acetaminophen (TYLENOL) tablet 325 mg  325 mg Oral Q6H PRN    acetaminophen (TYLENOL) tablet 650 mg  650 mg Oral Q6H PRN    acetaminophen (TYLENOL) tablet 975 mg  975 mg Oral Q6H PRN    albuterol (PROVENTIL HFA,VENTOLIN HFA) inhaler 2 puff  2 puff Inhalation Q8H PRN    aluminum-magnesium hydroxide-simethicone (MYLANTA) 200-200-20 mg/5 mL oral suspension 15 mL  15 mL Oral Q4H PRN    benztropine (COGENTIN) injection 2 mg  2 mg Intramuscular Q6H PRN    benztropine (COGENTIN) tablet 1 mg  1 mg Oral BID    benztropine (COGENTIN) tablet 1 mg  1 mg Oral Q6H PRN    divalproex sodium (DEPAKOTE) EC tablet 500 mg  500 mg Oral QAM    divalproex sodium (DEPAKOTE) EC tablet 750 mg  750 mg Oral HS    haloperidol (HALDOL) tablet 5 mg  5 mg Oral Q6H PRN    haloperidol lactate (HALDOL) injection 5 mg  5 mg Intramuscular Q6H PRN    LORazepam (ATIVAN) 2 mg/mL injection 2 mg  2 mg Intramuscular Q6H PRN    LORazepam (ATIVAN) tablet 1 mg  1 mg Oral Q8H PRN    magnesium hydroxide (MILK OF MAGNESIA) 400 mg/5 mL oral suspension 20 mL  20 mL Oral Daily PRN    propranolol (INDERAL) tablet 20 mg  20 mg Oral Q12H [address]    risperiDONE (RisperDAL M-TABS) dispersible tablet 1 mg  1 mg Oral Q6H PRN    risperiDONE (RisperDAL) tablet 1 mg  1 mg Oral Daily    risperiDONE (RisperDAL) tablet 2 mg  2 mg Oral After Dinner    traZODone (DESYREL) tablet 50 mg  50 mg Oral HS PRN       Behavioral Health Medications: all current active meds have been reviewed and continue current psychiatric medications  Vitals:  Vitals:    02/20/19 0741   BP: 134/72   Pulse: (!) 114   Resp: 15   Temp: 98 °F (36 7 °C)       Laboratory results:    I have personally reviewed all pertinent laboratory/tests results    Most Recent Labs:   Lab Results   Component Value Date    WBC 8 88 02/14/2019    RBC 4 72 02/14/2019    HGB 14 2 02/14/2019    HCT 41 7 02/14/2019     02/14/2019    RDW 12 8 02/14/2019    NEUTROABS 3 91 02/14/2019    SODIUM 141 02/05/2019    K 4 0 02/05/2019     02/05/2019    CO2 26 02/05/2019    BUN 13 02/05/2019    CREATININE 0 68 02/05/2019    GLUC 99 02/05/2019    GLUF 99 02/05/2019    CALCIUM 8 5 02/05/2019    AST 19 02/05/2019    ALT 42 02/05/2019    ALKPHOS 67 02/05/2019    TP 7 4 02/05/2019    ALB 3 5 02/05/2019    TBILI 0 20 02/05/2019    CHOLESTEROL 152 02/14/2019    HDL 33 (L) 02/14/2019    TRIG 124 02/14/2019    LDLCALC 94 02/14/2019    NONHDLC 119 02/14/2019    VALPROICTOT 87 02/05/2019    AMMONIA 27 01/13/2018    [id number] 4 114 (H) 02/14/2019    FREET4 0 99 02/14/2019    T3FREE 3 03 03/24/2017    RPR Non-Reactive 04/21/2018    HGBA1C 5 2 02/14/2019     02/14/2019       Psychiatric Review of Systems:  Behavior over the last 24 hours:  unchanged  Sleep: normal  Appetite: normal  Medication side effects: No  ROS: no complaints    Mental Status Evaluation:  Appearance:  casually dressed   Behavior:  cooperative   Speech:  loud   Mood:  euthymic   Affect:  mood-congruent   Language naming objects and repeating phrases   Thought Process:  concrete   Thought Content:  obsessions   Perceptual Disturbances: None   Risk Potential: Denied SI/HI  Potential for aggression: No   Sensorium:  person and place   Cognition:  grossly intact   Consciousness:  alert and awake    Recent and Remote Memory limited   Attention: attention span appeared shorter than expected for age   Insight:  limited   Judgment: limited   Gait/Station: normal gait/station and normal balance   Motor Activity: no abnormal movements     Progress Toward Goals: progressing slowly    Recommended Treatment: Continue with group therapy, milieu therapy and occupational therapy  1   Continue current medications  2  Will consider podiatry consult if toe does not appear improved   3  Disposition planning with plan for group home    Risks, benefits and possible side effects of Medications:   Risks, benefits, and possible side effects of medications explained to patient and patient verbalizes understanding        Greta Baumann PA-C

## 2019-02-20 NOTE — PROGRESS NOTES
Pt pleasant  Approaches RN station frequently to talk with staff  Friendly with peers and attends groups  Pt denies all symptoms  Needs prompting to complete ADLs

## 2019-02-20 NOTE — CASE MANAGEMENT
KAPIL received a message from [address] at [facility] who reported he had been trying to call Pt for awhile now & wasn't able to get in touch with him  He reported he was glad CM had called the office to make them aware of the hospitalization  CM attempted to call him back at [phone] but reached voicemail  CM left a message seeking a returned call  CM contacted [employer] [facility] @ [phone] & left a message seeking to confirm that he had submitted the [facility] ([facility]) referral to [id number] [address] [facility] contacted [facility] housing @ [phone] & she reported she had not received a CRR referral yet  CM contacted [facility] @ [phone] ext  134 for ICM Supervisor Samia OLSON CM reached voicemail that Samia is not in the office on Weds, & CM left a message seeking a returned call

## 2019-02-21 PROCEDURE — 99232 SBSQ HOSP IP/OBS MODERATE 35: CPT | Performed by: PSYCHIATRY & NEUROLOGY

## 2019-02-21 RX ADMIN — PROPRANOLOL HYDROCHLORIDE 20 MG: 20 TABLET ORAL at 08:23

## 2019-02-21 RX ADMIN — LORAZEPAM 1 MG: 1 TABLET ORAL at 09:16

## 2019-02-21 RX ADMIN — DIVALPROEX SODIUM 500 MG: 500 TABLET, DELAYED RELEASE ORAL at 08:23

## 2019-02-21 RX ADMIN — PROPRANOLOL HYDROCHLORIDE 20 MG: 20 TABLET ORAL at 20:57

## 2019-02-21 RX ADMIN — RISPERIDONE 2 MG: 2 TABLET ORAL at 17:09

## 2019-02-21 RX ADMIN — RISPERIDONE 1 MG: 1 TABLET ORAL at 08:23

## 2019-02-21 RX ADMIN — ACETAMINOPHEN 650 MG: 325 TABLET ORAL at 09:03

## 2019-02-21 RX ADMIN — BENZTROPINE MESYLATE 1 MG: 1 TABLET ORAL at 08:23

## 2019-02-21 RX ADMIN — BENZTROPINE MESYLATE 1 MG: 1 TABLET ORAL at 17:09

## 2019-02-21 RX ADMIN — ACETAMINOPHEN 650 MG: 325 TABLET ORAL at 19:06

## 2019-02-21 RX ADMIN — DIVALPROEX SODIUM 750 MG: 500 TABLET, DELAYED RELEASE ORAL at 21:00

## 2019-02-21 NOTE — PROGRESS NOTES
Progress Note - Behavioral Health   Redge Romy 22 y o  male MRN: [id number]  Unit/Bed#: [email or web address] 251-01 Encounter: [id number]    Assessment/Plan   Principal Problem:    Schizoaffective disorder, bipolar type ([facility] 75 )  Active Problems:    Intellectual disability    BMI 45 0-49 9, adult ([facility] 75 )    Encounter for examination and observation for other specified reasons    Ingrown left big toenail      Subjective:  Patient is hyper, loud, and requires redirection at times  Appears more behavioral than manic signs  Intellectual disability plays a role in poor decision making and impulsivity  Overall patient states he feels well  Limited in answers  Denies depression, anxiety, psychosis, SI, and HI  Denies feelings of agitation  Remains with slight right hand tremor  Did not notice left hand tremor today  Toe looks less red today  Denies pain in toe  Does complain of headache, which appears chronic  Is overall poor historian  Was given PRN Ativan today, which was effective for anxiety  Reports he misses his mom and that he feels his mom kicked him out of the house  Awaiting placement in group home        Current Medications:  Current Facility-Administered Medications   Medication Dose Route Frequency    acetaminophen (TYLENOL) tablet 325 mg  325 mg Oral Q6H PRN    acetaminophen (TYLENOL) tablet 650 mg  650 mg Oral Q6H PRN    acetaminophen (TYLENOL) tablet 975 mg  975 mg Oral Q6H PRN    albuterol (PROVENTIL HFA,VENTOLIN HFA) inhaler 2 puff  2 puff Inhalation Q8H PRN    aluminum-magnesium hydroxide-simethicone (MYLANTA) 200-200-20 mg/5 mL oral suspension 15 mL  15 mL Oral Q4H PRN    benztropine (COGENTIN) injection 2 mg  2 mg Intramuscular Q6H PRN    benztropine (COGENTIN) tablet 1 mg  1 mg Oral BID    benztropine (COGENTIN) tablet 1 mg  1 mg Oral Q6H PRN    divalproex sodium (DEPAKOTE) EC tablet 500 mg  500 mg Oral QAM    divalproex sodium (DEPAKOTE) EC tablet 750 mg  750 mg Oral HS    haloperidol (HALDOL) tablet 5 mg  5 mg Oral Q6H PRN    haloperidol lactate (HALDOL) injection 5 mg  5 mg Intramuscular Q6H PRN    LORazepam (ATIVAN) 2 mg/mL injection 2 mg  2 mg Intramuscular Q6H PRN    LORazepam (ATIVAN) tablet 1 mg  1 mg Oral Q8H PRN    magnesium hydroxide (MILK OF MAGNESIA) 400 mg/5 mL oral suspension 20 mL  20 mL Oral Daily PRN    propranolol (INDERAL) tablet 20 mg  20 mg Oral Q12H [address]    risperiDONE (RisperDAL M-TABS) dispersible tablet 1 mg  1 mg Oral Q6H PRN    risperiDONE (RisperDAL) tablet 1 mg  1 mg Oral Daily    risperiDONE (RisperDAL) tablet 2 mg  2 mg Oral After Dinner    traZODone (DESYREL) tablet 50 mg  50 mg Oral HS PRN       Behavioral Health Medications: all current active meds have been reviewed and continue current psychiatric medications  Vitals:  Vitals:    02/21/19 0743   BP: 119/68   Pulse: 98   Resp: 18   Temp: (!) 97 2 °F (36 2 °C)       Laboratory results:    I have personally reviewed all pertinent laboratory/tests results    Most Recent Labs:   Lab Results   Component Value Date    WBC 8 88 02/14/2019    RBC 4 72 02/14/2019    HGB 14 2 02/14/2019    HCT 41 7 02/14/2019     02/14/2019    RDW 12 8 02/14/2019    NEUTROABS 3 91 02/14/2019    SODIUM 141 02/05/2019    K 4 0 02/05/2019     02/05/2019    CO2 26 02/05/2019    BUN 13 02/05/2019    CREATININE 0 68 02/05/2019    GLUC 99 02/05/2019    GLUF 99 02/05/2019    CALCIUM 8 5 02/05/2019    AST 19 02/05/2019    ALT 42 02/05/2019    ALKPHOS 67 02/05/2019    TP 7 4 02/05/2019    ALB 3 5 02/05/2019    TBILI 0 20 02/05/2019    CHOLESTEROL 152 02/14/2019    HDL 33 (L) 02/14/2019    TRIG 124 02/14/2019    LDLCALC 94 02/14/2019    NONHDLC 119 02/14/2019    VALPROICTOT 87 02/05/2019    AMMONIA 27 01/13/2018    [id number] 4 114 (H) 02/14/2019    FREET4 0 99 02/14/2019    T3FREE 3 03 03/24/2017    RPR Non-Reactive 04/21/2018    HGBA1C 5 2 02/14/2019     02/14/2019       Psychiatric Review of Systems:  Behavior over the last 24 hours:  unchanged  Sleep: normal  Appetite: normal  Medication side effects: No  ROS: no complaints    Mental Status Evaluation:  Appearance:  casually dressed   Behavior:  restless and fidgety   Speech:  loud   Mood:  euthymic   Affect:  somewhat euphoric at times   Language naming objects and repeating phrases   Thought Process:  concrete   Thought Content:  obsessions   Perceptual Disturbances: None   Risk Potential: Denied SI/HI  Potential for aggression: No   Sensorium:  person, place and time/date   Cognition:  grossly intact   Consciousness:  alert and awake    Recent and Remote Memory limited   Attention: attention span appeared shorter than expected for age   Insight:  limited   Judgment: limited   Gait/Station: normal gait/station and normal balance   Motor Activity: no abnormal movements     Progress Toward Goals: unchanged    Recommended Treatment: Continue with group therapy, milieu therapy and occupational therapy  1   Continue current medications  2  Consider scheduling anxiolytic  Will utilize PRN's for now  3  Disposition planning with plan for group home placement    Risks, benefits and possible side effects of Medications:   Risks, benefits, and possible side effects of medications explained to patient and patient verbalizes understanding        Jose Rodriguez PA-C

## 2019-02-21 NOTE — PROGRESS NOTES
Pt is pleasant during interaction  Continues to require redirect at times from nurses station or for loud speech in halls  Staff attempting to provide activities for pt such as coloring, word search, etc  With some effectiveness  Pt also likes to watch movies  Attending meals and groups  Showered today  Will continue to monitor

## 2019-02-21 NOTE — PLAN OF CARE
Patient was seen attending groups  Patient appeared bright, social with peers and staff  Patient was apologetic for being later for afternoon group, OTAS informed him that it was okay

## 2019-02-21 NOTE — PROGRESS NOTES
Med Note: Charge nurse stated that pt was experiencing anxiety an needed prn  Pt was given Ativan with good effect

## 2019-02-21 NOTE — CASE MANAGEMENT
Pt was talking on the phone with his mom & asked CM to talk to her  CM inquired if anyone had been by the house from [facility] to get Pt's birth certificate yet, & she said no  CM reviewed where they are at in the process of linking Pt with ID services & she asked about Pt's clothing & if someone would pick them up? CM reviewed that due to the bedbug infestation at the home, they would not be able to take Pt's clothing; Carlos Myles verbalized understanding  CM contacted Aleksander Whatley at [facility] [employer]  [facility] @ [phone] who reported that he will be working with Pt  CM asked if he can meet with Pt & CM to review referrals & follow-up needed & Aleksander Whatley said that he would come to the hospital on Weds  CM asked that before he come to the hospital, he go to Pt's home & get his birth certificate from Pt's mom, & he agreed

## 2019-02-21 NOTE — CASE MANAGEMENT
Message received from Samia Martinez, that she spoke with Rubi, who confirmed he would be submitting the [facility] referral to [facility]  CM met with Pt who has been repeatedly asking when he will go to a group home? CM reviewed she was still working on it, & would keep him updated  Pt at times loud, but redirectable  Pt said that he told the doctor today that he gets a headache everyday, & he was told he could take Tylenol  Pt said he was okay with this & didn't want medication changes

## 2019-02-21 NOTE — PROGRESS NOTES
Pt loud and intrusive at RN station but redirectable  Pleasant and smiling  Denies all symptoms  Pt showering at this time

## 2019-02-21 NOTE — PROGRESS NOTES
Pt increasingly loud, hyper, and intrusive  Offered and given ativan prn  Spoke with pt about behavior expectations  Pt began talking about how he constantly had a headache and wants to talk to the doctor about it  Pt then said that he is sad and misses his mom  Pt said "she kicked me out and I can't go there " Offered reassurance and told pt that he has been handling situation well  Discussed activities pt can do on the unit to occupy his time  Pt was given tylenol for c/o headache  Remains intrusive and loud but is redirectable

## 2019-02-21 NOTE — PROGRESS NOTES
Pt remains loud and hyper  Frequently at RN station and is intrusive  Needs frequent redirection but is cooperative  Pt making more comments about missing his mom and not being allowed to return home  Asks when he will go to group home  Pt makes loud noises in hallway  Needs prompting to attend to ADLs

## 2019-02-22 PROCEDURE — 99232 SBSQ HOSP IP/OBS MODERATE 35: CPT | Performed by: PSYCHIATRY & NEUROLOGY

## 2019-02-22 RX ADMIN — DIVALPROEX SODIUM 500 MG: 500 TABLET, DELAYED RELEASE ORAL at 08:23

## 2019-02-22 RX ADMIN — BENZTROPINE MESYLATE 1 MG: 1 TABLET ORAL at 08:22

## 2019-02-22 RX ADMIN — RISPERIDONE 2 MG: 2 TABLET ORAL at 17:01

## 2019-02-22 RX ADMIN — ACETAMINOPHEN 975 MG: 325 TABLET ORAL at 19:01

## 2019-02-22 RX ADMIN — PROPRANOLOL HYDROCHLORIDE 20 MG: 20 TABLET ORAL at 08:22

## 2019-02-22 RX ADMIN — ACETAMINOPHEN 975 MG: 325 TABLET ORAL at 09:23

## 2019-02-22 RX ADMIN — TRAZODONE HYDROCHLORIDE 50 MG: 50 TABLET ORAL at 21:18

## 2019-02-22 RX ADMIN — BENZTROPINE MESYLATE 1 MG: 1 TABLET ORAL at 17:01

## 2019-02-22 RX ADMIN — PROPRANOLOL HYDROCHLORIDE 20 MG: 20 TABLET ORAL at 21:17

## 2019-02-22 RX ADMIN — RISPERIDONE 1 MG: 1 TABLET ORAL at 08:23

## 2019-02-22 RX ADMIN — DIVALPROEX SODIUM 750 MG: 500 TABLET, DELAYED RELEASE ORAL at 21:17

## 2019-02-22 NOTE — PROGRESS NOTES
Progress Note - Behavioral Health   Ole Silence 22 y o  male MRN: [id number]  Unit/Bed#: [facility] 251-01 Encounter: [id number]    Assessment/Plan   Principal Problem:    Schizoaffective disorder, bipolar type ([facility] 75 )  Active Problems:    Intellectual disability    BMI 45 0-49 9, adult ([facility] 75 )    Encounter for examination and observation for other specified reasons    Ingrown left big toenail      Subjective:  Patient today cooperative in interview  Continue to monitor toe and if that does not show signs  with erythema will contact podiatry  Has done warm soaks and completed week of antibiotic therapy  Patient denied SI, HI, agitation, psychosis, depression, and anxiety  States he feels well  Does not endorse criteria for eddie  Does become loud and boisterous at times secondary to intellectual disability and hyperactivity  Medication compliant  No headache today  Left hand tremor minimal and right hand tremor less intense  Denied other side effects  Awaiting group home placement        Current Medications:  Current Facility-Administered Medications   Medication Dose Route Frequency    acetaminophen (TYLENOL) tablet 325 mg  325 mg Oral Q6H PRN    acetaminophen (TYLENOL) tablet 650 mg  650 mg Oral Q6H PRN    acetaminophen (TYLENOL) tablet 975 mg  975 mg Oral Q6H PRN    albuterol (PROVENTIL HFA,VENTOLIN HFA) inhaler 2 puff  2 puff Inhalation Q8H PRN    aluminum-magnesium hydroxide-simethicone (MYLANTA) 200-200-20 mg/5 mL oral suspension 15 mL  15 mL Oral Q4H PRN    benztropine (COGENTIN) injection 2 mg  2 mg Intramuscular Q6H PRN    benztropine (COGENTIN) tablet 1 mg  1 mg Oral BID    benztropine (COGENTIN) tablet 1 mg  1 mg Oral Q6H PRN    divalproex sodium (DEPAKOTE) EC tablet 500 mg  500 mg Oral QAM    divalproex sodium (DEPAKOTE) EC tablet 750 mg  750 mg Oral HS    haloperidol (HALDOL) tablet 5 mg  5 mg Oral Q6H PRN    haloperidol lactate (HALDOL) injection 5 mg  5 mg Intramuscular Q6H PRN    LORazepam (ATIVAN) 2 mg/mL injection 2 mg  2 mg Intramuscular Q6H PRN    LORazepam (ATIVAN) tablet 1 mg  1 mg Oral Q8H PRN    magnesium hydroxide (MILK OF MAGNESIA) 400 mg/5 mL oral suspension 20 mL  20 mL Oral Daily PRN    propranolol (INDERAL) tablet 20 mg  20 mg Oral Q12H [address]    risperiDONE (RisperDAL M-TABS) dispersible tablet 1 mg  1 mg Oral Q6H PRN    risperiDONE (RisperDAL) tablet 1 mg  1 mg Oral Daily    risperiDONE (RisperDAL) tablet 2 mg  2 mg Oral After Dinner    traZODone (DESYREL) tablet 50 mg  50 mg Oral HS PRN       Behavioral Health Medications: all current active meds have been reviewed and continue current psychiatric medications  Vitals:  Vitals:    02/22/19 0720   BP: 143/72   Pulse: 80   Resp: 18   Temp: (!) 97 4 °F (36 3 °C)       Laboratory results:    I have personally reviewed all pertinent laboratory/tests results    Most Recent Labs:   Lab Results   Component Value Date    WBC 8 88 02/14/2019    RBC 4 72 02/14/2019    HGB 14 2 02/14/2019    HCT 41 7 02/14/2019     02/14/2019    RDW 12 8 02/14/2019    NEUTROABS 3 91 02/14/2019    SODIUM 141 02/05/2019    K 4 0 02/05/2019     02/05/2019    CO2 26 02/05/2019    BUN 13 02/05/2019    CREATININE 0 68 02/05/2019    GLUC 99 02/05/2019    GLUF 99 02/05/2019    CALCIUM 8 5 02/05/2019    AST 19 02/05/2019    ALT 42 02/05/2019    ALKPHOS 67 02/05/2019    TP 7 4 02/05/2019    ALB 3 5 02/05/2019    TBILI 0 20 02/05/2019    CHOLESTEROL 152 02/14/2019    HDL 33 (L) 02/14/2019    TRIG 124 02/14/2019    LDLCALC 94 02/14/2019    NONHDLC 119 02/14/2019    VALPROICTOT 87 02/05/2019    AMMONIA 27 01/13/2018    [id number] 4 114 (H) 02/14/2019    FREET4 0 99 02/14/2019    T3FREE 3 03 03/24/2017    RPR Non-Reactive 04/21/2018    HGBA1C 5 2 02/14/2019     02/14/2019       Psychiatric Review of Systems:  Behavior over the last 24 hours:  unchanged  Sleep: normal  Appetite: normal  Medication side effects: No  ROS: no complaints    Mental Status Evaluation:  Appearance:  casually dressed   Behavior:  restless and fidgety   Speech:  loud, dysarthric   Mood:  euthymic   Affect:  mood-congruent   Language naming objects and repeating phrases   Thought Process:  concrete   Thought Content:  obsessions   Perceptual Disturbances: None   Risk Potential: Denied SI/HI  Potential for aggression: no   Sensorium:  person, place and time/date   Cognition:  grossly intact   Consciousness:  alert and awake    Recent and Remote Memory limited   Attention: attention span appeared shorter than expected for age   Insight:  limited   Judgment: limited   Gait/Station: normal gait/station and normal balance   Motor Activity: right hand tremor, left hand tremor minimal     Progress Toward Goals: unchanged    Recommended Treatment: Continue with group therapy, milieu therapy and occupational therapy  1   Continue to monitor toe  If no improvement in appearance will consult podiatry on Monday  No pain currently  2  Continue other medications  3  Disposition planning with plan for group home    Risks, benefits and possible side effects of Medications:   Risks, benefits, and possible side effects of medications explained to patient and patient verbalizes understanding        Sharri Medellin PA-C

## 2019-02-22 NOTE — PLAN OF CARE
Patient attended groups  Patient was redirected if needed  Patient listened, participated in groups  Patient helped OTAS clean up after afternoon group

## 2019-02-22 NOTE — PROGRESS NOTES
Pt pleasant and friendly with staff/peers  Pt hyper, loud, and requires frequent redirection  Dancing in hallways this morning  Pt childlike and says he is bored  Requires prompting to complete ADLs  Pt showered this morning  Compliant with medicines  Talks to mom on the phone frequently

## 2019-02-22 NOTE — CASE MANAGEMENT
CM received voicemail from [facility] [employer], who reported he planned to visit with Pt on Mon at 1:30 PM       CM met with Pt to make him aware of this  Pt happy & said that he talked to his mom again today  Pt asking about his group home, & then stating he is mad his mom won't let him come home  CM & Pt talked about things going on in the home, including bedbugs & Pt said he didn't want to ever live there again, but doesn't want to be in the hospital forever  CM reviewed the next step is for [facility] to meet with him; Pt agreeable      CM contacted [facility] @ [phone] & spoke with Gaylon Simmonds, who reported that they had not been given the assignment yet, & CM would need to follow back up with [facility]

## 2019-02-23 PROCEDURE — 99231 SBSQ HOSP IP/OBS SF/LOW 25: CPT | Performed by: PSYCHIATRY & NEUROLOGY

## 2019-02-23 RX ADMIN — RISPERIDONE 2 MG: 2 TABLET ORAL at 17:27

## 2019-02-23 RX ADMIN — PROPRANOLOL HYDROCHLORIDE 20 MG: 20 TABLET ORAL at 21:04

## 2019-02-23 RX ADMIN — ACETAMINOPHEN 325 MG: 325 TABLET ORAL at 15:32

## 2019-02-23 RX ADMIN — BENZTROPINE MESYLATE 1 MG: 1 TABLET ORAL at 08:11

## 2019-02-23 RX ADMIN — ACETAMINOPHEN 975 MG: 325 TABLET ORAL at 08:58

## 2019-02-23 RX ADMIN — DIVALPROEX SODIUM 750 MG: 500 TABLET, DELAYED RELEASE ORAL at 21:04

## 2019-02-23 RX ADMIN — BENZTROPINE MESYLATE 1 MG: 1 TABLET ORAL at 17:27

## 2019-02-23 RX ADMIN — DIVALPROEX SODIUM 500 MG: 500 TABLET, DELAYED RELEASE ORAL at 08:11

## 2019-02-23 RX ADMIN — PROPRANOLOL HYDROCHLORIDE 20 MG: 20 TABLET ORAL at 08:12

## 2019-02-23 RX ADMIN — RISPERIDONE 1 MG: 1 TABLET ORAL at 08:12

## 2019-02-23 NOTE — PROGRESS NOTES
Pt is pleasant this AM   Reports sleeping well with Trazodone last night  Denies any symptoms or needs at this time  Currently in shower  Will continue to monitor

## 2019-02-23 NOTE — PROGRESS NOTES
Pt pleasant with staff and peers  Showered for second time this afternoon  Pt is loud at times but is redirectable  Attending groups  Will continue to monitor

## 2019-02-23 NOTE — PROGRESS NOTES
Pt smiling and happy on the unit  Playing card games with peers  Teasing with staff and peers  Can be loud at times, light redirection  Pt denied all symptoms

## 2019-02-23 NOTE — PROGRESS NOTES
Progress Note - Behavioral Health     Corin Carlisle 22 y o  male MRN: @MRN   Unit/Bed#: [facility] 251-01 Encounter: [id number]    Per nursing report and sign out, patient has been denying symptoms, pleasant and is excitable  Slept well  Corin Carlisle reports today that he is dong well  Denies all symptoms, denies any issues  Denies depression, anxiety, SI, AVH, HI, or other concerns  scant    Energy: ok  Sleep: normal  Appetite: normal  Medication side effects: No   ROS: no complaints    Mental Status Evaluation:    Appearance:  scrubs   Behavior:  pleasant, cooperative, with good eye contact   Speech:  loud, paucity of speech   Mood:  euthymic   Affect:  constricted       Thought Process:  concrete   Associations: intact associations   Thought Content:  normal and appropriate   Perceptual Disturbances: no auditory or visual hallcunations   Risk Potential: Suicidal ideation - None  Homicidal ideation - None  Potential for aggression - No   Sensorium:  A&Ox3   Cognition:  grossly intact   Consciousness:  Alert & Awake   Memory:  limited   Attention: attention span and concentration appear shorter than expected for age   Intellect: not examined       Insight:  limited   Judgment: limited   Muscle Strength  Muscle Tone normal  normal   Gait/Station: normal gait/station with good balance   Motor Activity: no abnormal movements       Vital signs in last 24 hours:    Temp:  [97 8 °F (36 6 °C)] 97 8 °F (36 6 °C)  HR:  [] 110  Resp:  [18] 18  BP: (132)/(67-72) 132/72    Laboratory results:  I have personally reviewed all pertinent laboratory/tests results      Assessment/Plan   Principal Problem:    Schizoaffective disorder, bipolar type ([facility] 75 )  Active Problems:    Intellectual disability    BMI 45 0-49 9, adult ([facility] 75 )    Encounter for examination and observation for other specified reasons    Ingrown left big toenail      Corin Carlisle is doing fine, no issues    Recommended Treatment:     Planned medication and treatment changes: All current active medications have been reviewed  Encourage group therapy, milieu therapy and occupational therapy  [address] checks as unit standard unless ordered or noted otherwise      Current Facility-Administered Medications:  acetaminophen 325 mg Oral Q6H PRN [facility]   acetaminophen 650 mg Oral Q6H PRN Emanuel Parrot, PA-C   acetaminophen 975 mg Oral Q6H PRN Emanuel Parrot, PA-C   albuterol 2 puff Inhalation Q8H PRN [facility]   aluminum-magnesium hydroxide-simethicone 15 mL Oral Q4H PRN Magda Koenig MD   benztropine 2 mg Intramuscular Q6H PRN Emanuel Parrot, PA-C   benztropine 1 mg Oral BID Melisa Corrente, PA-C   benztropine 1 mg Oral Q6H PRN Emanuel Parrot, PA-C   divalproex sodium 500 mg Oral QAM [facility]   divalproex sodium 750 mg Oral HS [facility]   haloperidol 5 mg Oral Q6H PRN Emanuel Parrot, PA-C   haloperidol lactate 5 mg Intramuscular Q6H PRN Emanuel Parrot, PA-C   LORazepam 2 mg Intramuscular Q6H PRN Emanuel Parrot, PA-C   LORazepam 1 mg Oral Q8H PRN Emanuel Parrot, PA-C   magnesium hydroxide 20 mL Oral Daily PRN Magda Koenig MD   propranolol 20 mg Oral Q12H [address] Emanuel Parrot, PA-C   risperiDONE 1 mg Oral Q6H PRN Magda Koenig MD   risperiDONE 1 mg Oral Daily Emanuel Parrot, PA-C   risperiDONE 2 mg Oral After Valdez Soup, BEKAH   traZODone 50 mg Oral HS PRN Magda Koenig MD       1) continue current treatment  2) Continue to support patient and engage them in the programs available as feasible and appropriate  Continue case management support and therapy  Continue discharge planning  Risks / Benefits of Treatment:    Risks, benefits, and possible side effects of medications explained to patient and patient verbalizes understanding and agreement for treatment  Counseling / Coordination of Care:    Medication changes reviewed with nursing staff    Medications, treatment progress and treatment plan reviewed with patient        Cass Castro III, DO  2/23/2019  7:55 AM

## 2019-02-24 PROCEDURE — 99231 SBSQ HOSP IP/OBS SF/LOW 25: CPT | Performed by: PSYCHIATRY & NEUROLOGY

## 2019-02-24 RX ADMIN — RISPERIDONE 2 MG: 2 TABLET ORAL at 17:56

## 2019-02-24 RX ADMIN — DIVALPROEX SODIUM 500 MG: 500 TABLET, DELAYED RELEASE ORAL at 08:54

## 2019-02-24 RX ADMIN — PROPRANOLOL HYDROCHLORIDE 20 MG: 20 TABLET ORAL at 08:54

## 2019-02-24 RX ADMIN — BENZTROPINE MESYLATE 1 MG: 1 TABLET ORAL at 17:56

## 2019-02-24 RX ADMIN — BENZTROPINE MESYLATE 1 MG: 1 TABLET ORAL at 08:54

## 2019-02-24 RX ADMIN — DIVALPROEX SODIUM 750 MG: 500 TABLET, DELAYED RELEASE ORAL at 21:11

## 2019-02-24 RX ADMIN — RISPERIDONE 1 MG: 1 TABLET ORAL at 08:54

## 2019-02-24 RX ADMIN — ACETAMINOPHEN 650 MG: 325 TABLET ORAL at 09:49

## 2019-02-24 RX ADMIN — PROPRANOLOL HYDROCHLORIDE 20 MG: 20 TABLET ORAL at 21:11

## 2019-02-24 NOTE — PROGRESS NOTES
Progress Note - [address] [facility] 22 y o  male MRN: @MRN   Unit/Bed#: [facility] 251-01 Encounter: [id number]    Per nursing report and sign out, patient had no issues, pleasant and redirectible    [facility] reports today that he has no depression, anxiety, no AVH, no SI no HI, no concerns  Energy: good  Sleep: normal  Appetite: normal  Medication side effects: No   ROS: no complaints    Mental Status Evaluation:    Appearance: In hospital attire   Behavior:  Pleasant & cooperative   Speech:  loud, paucity of speech   Mood:  euthymic   Affect:  mood congruent       Thought Process:  concrete   Associations: intact associations   Thought Content:  normal and appropriate   Perceptual Disturbances: no auditory or visual hallcunations   Risk Potential: Suicidal ideation - None  Homicidal ideation - None  Potential for aggression - No   Sensorium:  Grossly oriented   Cognition:  grossly intact   Consciousness:  Alert & Awake   Memory:  recent memory grossly intact, remote memory grossly intact   Attention: attention span and concentration appear shorter than expected for age   Intellect: decreased       Insight:  limited   Judgment: fair   Muscle Strength  Muscle Tone normal  normal   Gait/Station: normal gait/station with good balance   Motor Activity: no abnormal movements       Vital signs in last 24 hours:    Temp:  [96 9 °F (36 1 °C)] 96 9 °F (36 1 °C)  HR:  [] 100  Resp:  [17-18] 17  BP: (129-146)/(67-97) 129/94    Laboratory results:  I have personally reviewed all pertinent laboratory/tests results  Assessment/Plan   Principal Problem:    Schizoaffective disorder, bipolar type ([facility] 75 )  Active Problems:    Intellectual disability    BMI 45 0-49 9, adult ([facility] 75 )    Encounter for examination and observation for other specified reasons    Ingrown left big toenail      [facility] is unchanged    Recommended Treatment:     Planned medication and treatment changes:     All current active medications have been reviewed  Encourage group therapy, milieu therapy and occupational therapy  [address] checks as unit standard unless ordered or noted otherwise      Current Facility-Administered Medications:  acetaminophen 325 mg Oral Q6H PRN Sherif Vides   acetaminophen 650 mg Oral Q6H PRN Charlotte Pimentel, BEKAH   acetaminophen 975 mg Oral Q6H PRN Charlottena Pimetnel, BEKAH   albuterol 2 puff Inhalation Q8H PRN Sherif Vides   aluminum-magnesium hydroxide-simethicone 15 mL Oral Q4H PRN Thea Sosa MD   benztropine 2 mg Intramuscular Q6H PRN Charlotte Pimentel, BEKAH   benztropine 1 mg Oral BID Melisa Kathleen, BEKAH   benztropine 1 mg Oral Q6H PRN Charlotte Pimentel PA-C   divalproex sodium 500 mg Oral QAM Sherif Vides   divalproex sodium 750 mg Oral HS Sherif Vides   haloperidol 5 mg Oral Q6H PRN Charlotte Pimentel, BEKAH   haloperidol lactate 5 mg Intramuscular Q6H PRN Charlotte Pimentel, BEKAH   LORazepam 2 mg Intramuscular Q6H PRN Charlotte Pimentel, BEKAH   LORazepam 1 mg Oral Q8H PRN Charlotte Pimentel, BEKAH   magnesium hydroxide 20 mL Oral Daily PRN Thea Sosa MD   propranolol 20 mg Oral Q12H [address] Charlotte Pimentel, BEKAH   risperiDONE 1 mg Oral Q6H PRN Thea Sosa MD   risperiDONE 1 mg Oral Daily Charlotte Pimentel PA-C   risperiDONE 2 mg Oral After [address] [employer], BEKAH   traZODone 50 mg Oral HS PRN Thea Sosa MD       1) continue current treatment  2) Continue to support patient and engage them in the programs available as feasible and appropriate  Continue case management support and therapy  Continue discharge planning  Risks / Benefits of Treatment:    Risks, benefits, and possible side effects of medications explained to patient and patient verbalizes understanding and agreement for treatment  Counseling / Coordination of Care:    Medication changes reviewed with nursing staff    Medications, treatment progress and treatment plan reviewed with patient        Claudette Harman III, DO  2/24/2019  8:01 AM

## 2019-02-24 NOTE — PROGRESS NOTES
Needs frequent redirections to refrain from lingering at nurse station  Provided with math/activity sheets  Superfically scratched right lower leg due to itching, provided with lotion  Pt slept in evening

## 2019-02-24 NOTE — PROGRESS NOTES
Patient denies all sx  He is intrusive at times, interrupted writer a few times while meeting with another patient to ask non urgent questions such as "who is the med nurse for the next shift?" Reminded patient to try not to interrupt conversations  Naps during the day

## 2019-02-25 PROCEDURE — 99232 SBSQ HOSP IP/OBS MODERATE 35: CPT | Performed by: PSYCHIATRY & NEUROLOGY

## 2019-02-25 RX ADMIN — BENZTROPINE MESYLATE 1 MG: 1 TABLET ORAL at 08:10

## 2019-02-25 RX ADMIN — RISPERIDONE 1 MG: 1 TABLET ORAL at 08:10

## 2019-02-25 RX ADMIN — BENZTROPINE MESYLATE 1 MG: 1 TABLET ORAL at 17:13

## 2019-02-25 RX ADMIN — RISPERIDONE 2 MG: 2 TABLET ORAL at 17:13

## 2019-02-25 RX ADMIN — DIVALPROEX SODIUM 500 MG: 500 TABLET, DELAYED RELEASE ORAL at 08:10

## 2019-02-25 RX ADMIN — PROPRANOLOL HYDROCHLORIDE 20 MG: 20 TABLET ORAL at 21:25

## 2019-02-25 RX ADMIN — DIVALPROEX SODIUM 750 MG: 500 TABLET, DELAYED RELEASE ORAL at 21:25

## 2019-02-25 RX ADMIN — PROPRANOLOL HYDROCHLORIDE 20 MG: 20 TABLET ORAL at 08:10

## 2019-02-25 RX ADMIN — ACETAMINOPHEN 975 MG: 325 TABLET ORAL at 08:45

## 2019-02-25 NOTE — PROGRESS NOTES
Pt friendly and talkative during 1:1  States he is feeling good and he has no depression  Admits that his anxiety is high, and usually is  When asked what coping mechanisms he uses pt states "I take medicine, a blue pill " When asked what kind of medicine it is, he said " I don't know " Encouraged pt to used other coping mechanisms and pt denies having any others, laughing  Eh Marcano and social on unit interacting with staff and peers  Attending groups without reluctance  Compliant with medication and states that he is feeling well on what he is taking  Denies SI  When asked what his plans for discharge are    Jacob Rafiq Conroy Pt states  "I wiill be going to a group home since I  got Kicked out of my  house  "

## 2019-02-25 NOTE — PROGRESS NOTES
Progress Note - Behavioral Health   Sandee Cooks 22 y o  male MRN: [id number]  Unit/Bed#: [facility] 251-01 Encounter: [id number]    Assessment/Plan   Principal Problem:    Schizoaffective disorder, bipolar type ([facility] 75 )  Active Problems:    Intellectual disability    BMI 45 0-49 9, adult ([facility] 75 )    Encounter for examination and observation for other specified reasons    Ingrown left big toenail      Subjective:  Patient awaiting group home placement at this time  Shows no signs of aggression or irritability this morning  Denies psychosis  Does not meet criteria for eddie  Loud and boisterous at times  Appears overstimulated and impulsive sometimes due to intellectual disability  Answers concretely and states he has no depression and that his anxiety is low  Denies suicidal and homicidal ideations  Right hand tremor appears to be decreasing  Left hand tremor minimal   Denies other somatic complaints and side effects  Is medication compliant        Current Medications:  Current Facility-Administered Medications   Medication Dose Route Frequency    acetaminophen (TYLENOL) tablet 325 mg  325 mg Oral Q6H PRN    acetaminophen (TYLENOL) tablet 650 mg  650 mg Oral Q6H PRN    acetaminophen (TYLENOL) tablet 975 mg  975 mg Oral Q6H PRN    albuterol (PROVENTIL HFA,VENTOLIN HFA) inhaler 2 puff  2 puff Inhalation Q8H PRN    aluminum-magnesium hydroxide-simethicone (MYLANTA) 200-200-20 mg/5 mL oral suspension 15 mL  15 mL Oral Q4H PRN    benztropine (COGENTIN) injection 2 mg  2 mg Intramuscular Q6H PRN    benztropine (COGENTIN) tablet 1 mg  1 mg Oral BID    benztropine (COGENTIN) tablet 1 mg  1 mg Oral Q6H PRN    divalproex sodium (DEPAKOTE) EC tablet 500 mg  500 mg Oral QAM    divalproex sodium (DEPAKOTE) EC tablet 750 mg  750 mg Oral HS    haloperidol (HALDOL) tablet 5 mg  5 mg Oral Q6H PRN    haloperidol lactate (HALDOL) injection 5 mg  5 mg Intramuscular Q6H PRN    LORazepam (ATIVAN) 2 mg/mL injection 2 mg  2 mg Intramuscular Q6H PRN    LORazepam (ATIVAN) tablet 1 mg  1 mg Oral Q8H PRN    magnesium hydroxide (MILK OF MAGNESIA) 400 mg/5 mL oral suspension 20 mL  20 mL Oral Daily PRN    propranolol (INDERAL) tablet 20 mg  20 mg Oral Q12H [address]    risperiDONE (RisperDAL M-TABS) dispersible tablet 1 mg  1 mg Oral Q6H PRN    risperiDONE (RisperDAL) tablet 1 mg  1 mg Oral Daily    risperiDONE (RisperDAL) tablet 2 mg  2 mg Oral After Dinner    traZODone (DESYREL) tablet 50 mg  50 mg Oral HS PRN       Behavioral Health Medications: all current active meds have been reviewed and continue current psychiatric medications  Vitals:  Vitals:    02/25/19 0735   BP: 128/76   Pulse: 77   Resp: 16   Temp: 98 2 °F (36 8 °C)       Laboratory results:    I have personally reviewed all pertinent laboratory/tests results    Most Recent Labs:   Lab Results   Component Value Date    WBC 8 88 02/14/2019    RBC 4 72 02/14/2019    HGB 14 2 02/14/2019    HCT 41 7 02/14/2019     02/14/2019    RDW 12 8 02/14/2019    NEUTROABS 3 91 02/14/2019    SODIUM 141 02/05/2019    K 4 0 02/05/2019     02/05/2019    CO2 26 02/05/2019    BUN 13 02/05/2019    CREATININE 0 68 02/05/2019    GLUC 99 02/05/2019    GLUF 99 02/05/2019    CALCIUM 8 5 02/05/2019    AST 19 02/05/2019    ALT 42 02/05/2019    ALKPHOS 67 02/05/2019    TP 7 4 02/05/2019    ALB 3 5 02/05/2019    TBILI 0 20 02/05/2019    CHOLESTEROL 152 02/14/2019    HDL 33 (L) 02/14/2019    TRIG 124 02/14/2019    LDLCALC 94 02/14/2019    NONHDLC 119 02/14/2019    VALPROICTOT 87 02/05/2019    AMMONIA 27 01/13/2018    [id number] 4 114 (H) 02/14/2019    FREET4 0 99 02/14/2019    T3FREE 3 03 03/24/2017    RPR Non-Reactive 04/21/2018    HGBA1C 5 2 02/14/2019     02/14/2019       Psychiatric Review of Systems:  Behavior over the last 24 hours:  unchanged  Sleep: normal  Appetite: normal  Medication side effects: Yes, right hand tremor  ROS: no complaints    Mental Status Evaluation:  Appearance:  casually dressed   Behavior:  cooperative, less restless   Speech:  dysarthric and loud   Mood:  euthymic   Affect:  almost euphoric at times   Language naming objects and repeating phrases   Thought Process:  concrete   Thought Content:  obsessions   Perceptual Disturbances: None   Risk Potential: Denied SI/HI  Potential for aggression: no   Sensorium:  person and place   Cognition:  grossly intact   Consciousness:  awake    Recent and Remote Memory limited   Attention: attention span appeared shorter than expected for age   Insight:  limited   Judgment: limited   Gait/Station: normal gait/station and normal balance   Motor Activity: right hand tremor     Progress Toward Goals: unchanged    Recommended Treatment: Continue with group therapy, milieu therapy and occupational therapy  1   Continue current medications  2  Disposition planning with plan for group home placement    Risks, benefits and possible side effects of Medications:   Risks, benefits, and possible side effects of medications explained to patient and patient verbalizes understanding        Vicki Mercado PA-C

## 2019-02-26 PROCEDURE — 99232 SBSQ HOSP IP/OBS MODERATE 35: CPT | Performed by: PSYCHIATRY & NEUROLOGY

## 2019-02-26 RX ADMIN — BENZTROPINE MESYLATE 1 MG: 1 TABLET ORAL at 17:54

## 2019-02-26 RX ADMIN — DIVALPROEX SODIUM 500 MG: 500 TABLET, DELAYED RELEASE ORAL at 09:26

## 2019-02-26 RX ADMIN — RISPERIDONE 1 MG: 1 TABLET ORAL at 09:26

## 2019-02-26 RX ADMIN — RISPERIDONE 2 MG: 2 TABLET ORAL at 17:54

## 2019-02-26 RX ADMIN — PROPRANOLOL HYDROCHLORIDE 20 MG: 20 TABLET ORAL at 21:08

## 2019-02-26 RX ADMIN — PROPRANOLOL HYDROCHLORIDE 20 MG: 20 TABLET ORAL at 09:26

## 2019-02-26 RX ADMIN — ALUMINUM HYDROXIDE, MAGNESIUM HYDROXIDE, AND SIMETHICONE 15 ML: 200; 200; 20 SUSPENSION ORAL at 13:26

## 2019-02-26 RX ADMIN — BENZTROPINE MESYLATE 1 MG: 1 TABLET ORAL at 09:26

## 2019-02-26 RX ADMIN — ACETAMINOPHEN 650 MG: 325 TABLET ORAL at 15:59

## 2019-02-26 RX ADMIN — DIVALPROEX SODIUM 750 MG: 500 TABLET, DELAYED RELEASE ORAL at 21:08

## 2019-02-26 NOTE — CASE MANAGEMENT
[facility] @ [phone] & left a message reviewed that as of Friday, [facility] hadn't received the referral yet from the [facility]  CM asked if Mariah could follow-up on this & provide CM an update  CM met with Pt who was cheerful & loud  Pt showering regularly with minimal prompting & addressing his ADLs

## 2019-02-26 NOTE — PROGRESS NOTES
Progress Note - Behavioral Health   Heydi Espana 22 y o  male MRN: [id number]  Unit/Bed#: [facility] 251-01 Encounter: [id number]    Assessment/Plan   Principal Problem:    Schizoaffective disorder, bipolar type ([facility] 75 )  Active Problems:    Intellectual disability    BMI 45 0-49 9, adult ([facility] 75 )    Encounter for examination and observation for other specified reasons    Ingrown left big toenail      Subjective:  Patient today cooperative and pleasant  Reports he feels well  Denies issues with his feet  Denied headaches  Denied other somatic complaints  Remains with right hand tremor that has decreased in intensity over course of hospitalization  Left hand tremor is minimal   No abnormal tongue movements today  No other abnormal movements  Gait is steady  Again is concrete in thought process and denies most psychiatric symptoms  Denies SI, HI, psychosis, anxiety, depression, and does not endorse criteria for eddie  Does appear euphoric and is loud at times due to intellectual disability  Impulsivity appears less and is less boisterous  Reports that patient is taking showers on his own now  ADLs appear improved  Medication compliant  Awaiting discharge to group home      Current Medications:  Current Facility-Administered Medications   Medication Dose Route Frequency    acetaminophen (TYLENOL) tablet 325 mg  325 mg Oral Q6H PRN    acetaminophen (TYLENOL) tablet 650 mg  650 mg Oral Q6H PRN    acetaminophen (TYLENOL) tablet 975 mg  975 mg Oral Q6H PRN    albuterol (PROVENTIL HFA,VENTOLIN HFA) inhaler 2 puff  2 puff Inhalation Q8H PRN    aluminum-magnesium hydroxide-simethicone (MYLANTA) 200-200-20 mg/5 mL oral suspension 15 mL  15 mL Oral Q4H PRN    benztropine (COGENTIN) injection 2 mg  2 mg Intramuscular Q6H PRN    benztropine (COGENTIN) tablet 1 mg  1 mg Oral BID    benztropine (COGENTIN) tablet 1 mg  1 mg Oral Q6H PRN    divalproex sodium (DEPAKOTE) EC tablet 500 mg  500 mg Oral QAM    divalproex sodium (DEPAKOTE) EC tablet 750 mg  750 mg Oral HS    haloperidol (HALDOL) tablet 5 mg  5 mg Oral Q6H PRN    haloperidol lactate (HALDOL) injection 5 mg  5 mg Intramuscular Q6H PRN    LORazepam (ATIVAN) 2 mg/mL injection 2 mg  2 mg Intramuscular Q6H PRN    LORazepam (ATIVAN) tablet 1 mg  1 mg Oral Q8H PRN    magnesium hydroxide (MILK OF MAGNESIA) 400 mg/5 mL oral suspension 20 mL  20 mL Oral Daily PRN    propranolol (INDERAL) tablet 20 mg  20 mg Oral Q12H [address]    risperiDONE (RisperDAL M-TABS) dispersible tablet 1 mg  1 mg Oral Q6H PRN    risperiDONE (RisperDAL) tablet 1 mg  1 mg Oral Daily    risperiDONE (RisperDAL) tablet 2 mg  2 mg Oral After Dinner    traZODone (DESYREL) tablet 50 mg  50 mg Oral HS PRN       Behavioral Health Medications: all current active meds have been reviewed and continue current psychiatric medications  Vitals:  Vitals:    02/26/19 0926   BP: 139/75   Pulse: (!) 112   Resp:    Temp:        Laboratory results:    I have personally reviewed all pertinent laboratory/tests results    Most Recent Labs:   Lab Results   Component Value Date    WBC 8 88 02/14/2019    RBC 4 72 02/14/2019    HGB 14 2 02/14/2019    HCT 41 7 02/14/2019     02/14/2019    RDW 12 8 02/14/2019    NEUTROABS 3 91 02/14/2019    SODIUM 141 02/05/2019    K 4 0 02/05/2019     02/05/2019    CO2 26 02/05/2019    BUN 13 02/05/2019    CREATININE 0 68 02/05/2019    GLUC 99 02/05/2019    GLUF 99 02/05/2019    CALCIUM 8 5 02/05/2019    AST 19 02/05/2019    ALT 42 02/05/2019    ALKPHOS 67 02/05/2019    TP 7 4 02/05/2019    ALB 3 5 02/05/2019    TBILI 0 20 02/05/2019    CHOLESTEROL 152 02/14/2019    HDL 33 (L) 02/14/2019    TRIG 124 02/14/2019    LDLCALC 94 02/14/2019    NONHDLC 119 02/14/2019    VALPROICTOT 87 02/05/2019    AMMONIA 27 01/13/2018    [id number] 4 114 (H) 02/14/2019    FREET4 0 99 02/14/2019    T3FREE 3 03 03/24/2017    RPR Non-Reactive 04/21/2018    HGBA1C 5 2 02/14/2019     02/14/2019 Psychiatric Review of Systems:  Behavior over the last 24 hours:  unchanged  Sleep: normal  Appetite: normal  Medication side effects: Yes, right hand tremor  ROS: no complaints    Mental Status Evaluation:  Appearance:  casually dressed   Behavior:  cooperative, restless at times   Speech:  dysarthric and loud   Mood:  euthymic   Affect:  Slightly euphoric   Language naming objects and repeating phrases   Thought Process:  concrete   Thought Content:  obsessions   Perceptual Disturbances: None   Risk Potential: Denied SI/HI  Potential for aggression: No   Sensorium:  person and place   Cognition:  grossly intact   Consciousness:  awake    Recent and Remote Memory limited   Attention: attention span appeared shorter than expected for age   Insight:  limited   Judgment: limited   Gait/Station: normal gait/station and normal balance   Motor Activity: Right hand tremor     Progress Toward Goals: unchanged    Recommended Treatment: Continue with group therapy, milieu therapy and occupational therapy  1   Continue current medications  2  Awaiting group home placement    Risks, benefits and possible side effects of Medications:   Risks, benefits, and possible side effects of medications explained to patient and patient verbalizes understanding        Maria Elena Wren PA-C

## 2019-02-26 NOTE — CASE MANAGEMENT
CM met with Pt & Horatio Osler from [facility]  CM reviewed the status of referrals done for Pt, & Horatio Osler agreed that tomorrow morning when he is in the office he will check with [employer] about the [facility] referral & if needed he will complete one himself & submit it  He said that he had attempted to contact Pt's mom, Poonam Woodruff, about picking up Pt's birth certificate, however, he wasn't able to get through on the phone  Horatio Osler spent some time talking to Pt about what he would like to be able to do & Pt verbalized he would like to get a job  After the meeting Pt expressed that he liked talking with Horatio Osler & was looking forward to him visiting again

## 2019-02-26 NOTE — PROGRESS NOTES
Clinical Pharmacy Note: Medication Education Group     Intervention:  Open Forum    Length of Group: 60 min     Methods/resources used: verbal discussion     Topics Discussed: Medication adherence, side effect management, nonpharmacologic strategies, medication effectiveness and indications      Time spent in group: Presented late to group      Patient Specific concerns: Oumar Most presented to group today dressed in street clothing and appeared somewhat disheveled and alert and oriented to person, place and time  Oumar Most seemed bored  Patient's affect was mainly positive and pleasant and he listened attentively in group but did not participate  Patient was respectful of others throughout and interacted appropriately with peers  Oumar Most did not have specific concerns      Patient understood counseling: yes     Electronically signed by: Vidhi Vaca, Pharmacist

## 2019-02-27 PROCEDURE — 99232 SBSQ HOSP IP/OBS MODERATE 35: CPT | Performed by: PSYCHIATRY & NEUROLOGY

## 2019-02-27 RX ADMIN — RISPERIDONE 1 MG: 1 TABLET ORAL at 08:50

## 2019-02-27 RX ADMIN — PROPRANOLOL HYDROCHLORIDE 20 MG: 20 TABLET ORAL at 21:30

## 2019-02-27 RX ADMIN — BENZTROPINE MESYLATE 1 MG: 1 TABLET ORAL at 18:24

## 2019-02-27 RX ADMIN — RISPERIDONE 2 MG: 2 TABLET ORAL at 18:24

## 2019-02-27 RX ADMIN — BENZTROPINE MESYLATE 1 MG: 1 TABLET ORAL at 08:50

## 2019-02-27 RX ADMIN — DIVALPROEX SODIUM 500 MG: 500 TABLET, DELAYED RELEASE ORAL at 08:50

## 2019-02-27 RX ADMIN — PROPRANOLOL HYDROCHLORIDE 20 MG: 20 TABLET ORAL at 08:50

## 2019-02-27 RX ADMIN — DIVALPROEX SODIUM 750 MG: 500 TABLET, DELAYED RELEASE ORAL at 21:30

## 2019-02-27 NOTE — PROGRESS NOTES
Patient out in the community  Pleasant and cooperative  Denies SI/HI  Patient is redirectable  Will continue to monitor

## 2019-02-27 NOTE — PROGRESS NOTES
Progress Note - Behavioral Health   Sergio Mariee 22 y o  male MRN: [id number]  Unit/Bed#: [facility] 251-01 Encounter: [id number]    Assessment/Plan   Principal Problem:    Schizoaffective disorder, bipolar type ([facility] 75 )  Active Problems:    Intellectual disability    BMI 45 0-49 9, adult ([facility] 75 )    Encounter for examination and observation for other specified reasons    Ingrown left big toenail      Subjective:  Patient today cooperative and pleasant  Does report mild anxiety in regards to mother who is admitted in hospital   Is loud and boisterous at times  Does not endorse criteria for eddie  Denied psychosis  Denied SI and HI  Denied depression  Denied anxiety  Medication compliant  Seen out attending groups and socializing with peers  Mild right worse than left hand tremors  Denied additional somatic complaints or side effects  Awaiting group home placement      Current Medications:  Current Facility-Administered Medications   Medication Dose Route Frequency    acetaminophen (TYLENOL) tablet 325 mg  325 mg Oral Q6H PRN    acetaminophen (TYLENOL) tablet 650 mg  650 mg Oral Q6H PRN    acetaminophen (TYLENOL) tablet 975 mg  975 mg Oral Q6H PRN    albuterol (PROVENTIL HFA,VENTOLIN HFA) inhaler 2 puff  2 puff Inhalation Q8H PRN    aluminum-magnesium hydroxide-simethicone (MYLANTA) 200-200-20 mg/5 mL oral suspension 15 mL  15 mL Oral Q4H PRN    benztropine (COGENTIN) injection 2 mg  2 mg Intramuscular Q6H PRN    benztropine (COGENTIN) tablet 1 mg  1 mg Oral BID    benztropine (COGENTIN) tablet 1 mg  1 mg Oral Q6H PRN    divalproex sodium (DEPAKOTE) EC tablet 500 mg  500 mg Oral QAM    divalproex sodium (DEPAKOTE) EC tablet 750 mg  750 mg Oral HS    haloperidol (HALDOL) tablet 5 mg  5 mg Oral Q6H PRN    haloperidol lactate (HALDOL) injection 5 mg  5 mg Intramuscular Q6H PRN    LORazepam (ATIVAN) 2 mg/mL injection 2 mg  2 mg Intramuscular Q6H PRN    LORazepam (ATIVAN) tablet 1 mg  1 mg Oral Q8H PRN    magnesium hydroxide (MILK OF MAGNESIA) 400 mg/5 mL oral suspension 20 mL  20 mL Oral Daily PRN    propranolol (INDERAL) tablet 20 mg  20 mg Oral Q12H [address]    risperiDONE (RisperDAL M-TABS) dispersible tablet 1 mg  1 mg Oral Q6H PRN    risperiDONE (RisperDAL) tablet 1 mg  1 mg Oral Daily    risperiDONE (RisperDAL) tablet 2 mg  2 mg Oral After Dinner    traZODone (DESYREL) tablet 50 mg  50 mg Oral HS PRN       Behavioral Health Medications: all current active meds have been reviewed and continue current psychiatric medications  Vitals:  Vitals:    02/27/19 0752   BP: 128/61   Pulse: 82   Resp: 18   Temp: 98 9 °F (37 2 °C)       Laboratory results:    I have personally reviewed all pertinent laboratory/tests results    Most Recent Labs:   Lab Results   Component Value Date    WBC 8 88 02/14/2019    RBC 4 72 02/14/2019    HGB 14 2 02/14/2019    HCT 41 7 02/14/2019     02/14/2019    RDW 12 8 02/14/2019    NEUTROABS 3 91 02/14/2019    SODIUM 141 02/05/2019    K 4 0 02/05/2019     02/05/2019    CO2 26 02/05/2019    BUN 13 02/05/2019    CREATININE 0 68 02/05/2019    GLUC 99 02/05/2019    GLUF 99 02/05/2019    CALCIUM 8 5 02/05/2019    AST 19 02/05/2019    ALT 42 02/05/2019    ALKPHOS 67 02/05/2019    TP 7 4 02/05/2019    ALB 3 5 02/05/2019    TBILI 0 20 02/05/2019    CHOLESTEROL 152 02/14/2019    HDL 33 (L) 02/14/2019    TRIG 124 02/14/2019    LDLCALC 94 02/14/2019    NONHDLC 119 02/14/2019    VALPROICTOT 87 02/05/2019    AMMONIA 27 01/13/2018    [id number] 4 114 (H) 02/14/2019    FREET4 0 99 02/14/2019    T3FREE 3 03 03/24/2017    RPR Non-Reactive 04/21/2018    HGBA1C 5 2 02/14/2019     02/14/2019       Psychiatric Review of Systems:  Behavior over the last 24 hours:  unchanged  Sleep: normal  Appetite: normal  Medication side effects: yes, bilateral hand tremors  ROS: no complaints    Mental Status Evaluation:  Appearance:  in hospital attire   Behavior:  cooperative, restless at times Speech:  loud   Mood:  euthymic   Affect:  increased in range   Language naming objects and repeating phrases   Thought Process:  concrete   Thought Content:  obsessions   Perceptual Disturbances: None   Risk Potential: Denied SI/HI  Potential for aggression: No   Sensorium:  person and place   Cognition:  grossly intact   Consciousness:  alert and awake    Recent and Remote Memory limited   Attention: attention span appeared shorter than expected for age   Insight:  limited   Judgment: limited   Gait/Station: normal gait/station and normal balance   Motor Activity: right worse than left hand tremor      Progress Toward Goals: unchanged    Recommended Treatment: Continue with group therapy, milieu therapy and occupational therapy  1   Continue current medications  2  Awaiting group home placement     Risks, benefits and possible side effects of Medications:   Risks, benefits, and possible side effects of medications explained to patient and patient verbalizes understanding        Hailey Alvarenga PA-C

## 2019-02-27 NOTE — PROGRESS NOTES
Pt showered this afternoon  Reports feeling anxious because mom is in the hospital  Offered and given radio to cope with anxiety  Continues with somatic complaints of back pain and headache  Offered pt heat pack and encouraged pt to utilize alternate measures before requesting prn when possible  Pt made crafts in afternoon group and was eager to show staff members  Loud and hyper at times but is redirectable

## 2019-02-27 NOTE — PROGRESS NOTES
Pt wandering unit, appeared anxious, stating his mother was in hospital  Pt spoke to mother on phone, reporting her phone was broken  Pt stated he felt better and that he cares about his mom  Redirections to avoid lingering at desk

## 2019-02-28 PROCEDURE — 99232 SBSQ HOSP IP/OBS MODERATE 35: CPT | Performed by: PSYCHIATRY & NEUROLOGY

## 2019-02-28 RX ADMIN — BENZTROPINE MESYLATE 1 MG: 1 TABLET ORAL at 08:41

## 2019-02-28 RX ADMIN — RISPERIDONE 1 MG: 1 TABLET ORAL at 08:41

## 2019-02-28 RX ADMIN — DIVALPROEX SODIUM 500 MG: 500 TABLET, DELAYED RELEASE ORAL at 08:41

## 2019-02-28 RX ADMIN — DIVALPROEX SODIUM 750 MG: 500 TABLET, DELAYED RELEASE ORAL at 21:17

## 2019-02-28 RX ADMIN — ACETAMINOPHEN 325 MG: 325 TABLET ORAL at 09:36

## 2019-02-28 RX ADMIN — PROPRANOLOL HYDROCHLORIDE 20 MG: 20 TABLET ORAL at 08:41

## 2019-02-28 RX ADMIN — RISPERIDONE 2 MG: 2 TABLET ORAL at 18:34

## 2019-02-28 RX ADMIN — BENZTROPINE MESYLATE 1 MG: 1 TABLET ORAL at 18:34

## 2019-02-28 RX ADMIN — PROPRANOLOL HYDROCHLORIDE 20 MG: 20 TABLET ORAL at 21:17

## 2019-02-28 NOTE — PROGRESS NOTES
Progress Note - Behavioral Health   Roxanna Session 22 y o  male MRN: [id number]  Unit/Bed#: [facility] 251-01 Encounter: [id number]    Assessment/Plan   Principal Problem:    Schizoaffective disorder, bipolar type ([facility] 75 )  Active Problems:    Intellectual disability    BMI 45 0-49 9, adult ([facility] )    Encounter for examination and observation for other specified reasons    Ingrown left big toenail      Subjective:  Patient cooperative and pleasant during conversation  Denies all psychiatric symptoms  Does say he feels sad at times that his mother is admitted in hospital   Does not endorse criteria for eddie  Loud and boisterous at times due to intellectual disability  Denies psychosis  Denies SI and HI  Medication compliant  Right worse than left hand tremor  Denied other side effects  Awaiting discharge to group home      Current Medications:  Current Facility-Administered Medications   Medication Dose Route Frequency    acetaminophen (TYLENOL) tablet 325 mg  325 mg Oral Q6H PRN    acetaminophen (TYLENOL) tablet 650 mg  650 mg Oral Q6H PRN    acetaminophen (TYLENOL) tablet 975 mg  975 mg Oral Q6H PRN    albuterol (PROVENTIL HFA,VENTOLIN HFA) inhaler 2 puff  2 puff Inhalation Q8H PRN    aluminum-magnesium hydroxide-simethicone (MYLANTA) 200-200-20 mg/5 mL oral suspension 15 mL  15 mL Oral Q4H PRN    benztropine (COGENTIN) injection 2 mg  2 mg Intramuscular Q6H PRN    benztropine (COGENTIN) tablet 1 mg  1 mg Oral BID    benztropine (COGENTIN) tablet 1 mg  1 mg Oral Q6H PRN    divalproex sodium (DEPAKOTE) EC tablet 500 mg  500 mg Oral QAM    divalproex sodium (DEPAKOTE) EC tablet 750 mg  750 mg Oral HS    haloperidol (HALDOL) tablet 5 mg  5 mg Oral Q6H PRN    haloperidol lactate (HALDOL) injection 5 mg  5 mg Intramuscular Q6H PRN    LORazepam (ATIVAN) 2 mg/mL injection 2 mg  2 mg Intramuscular Q6H PRN    LORazepam (ATIVAN) tablet 1 mg  1 mg Oral Q8H PRN    magnesium hydroxide (MILK OF MAGNESIA) 400 mg/5 mL oral suspension 20 mL  20 mL Oral Daily PRN    propranolol (INDERAL) tablet 20 mg  20 mg Oral Q12H [facility]    risperiDONE (RisperDAL M-TABS) dispersible tablet 1 mg  1 mg Oral Q6H PRN    risperiDONE (RisperDAL) tablet 1 mg  1 mg Oral Daily    risperiDONE (RisperDAL) tablet 2 mg  2 mg Oral After Dinner    traZODone (DESYREL) tablet 50 mg  50 mg Oral HS PRN       Behavioral Health Medications: all current active meds have been reviewed and continue current psychiatric medications  Vitals:  Vitals:    02/28/19 0718   BP: 128/86   Pulse: (!) 119   Resp: 20   Temp: (!) 97 1 °F (36 2 °C)       Laboratory results:    I have personally reviewed all pertinent laboratory/tests results    Most Recent Labs:   Lab Results   Component Value Date    WBC 8 88 02/14/2019    RBC 4 72 02/14/2019    HGB 14 2 02/14/2019    HCT 41 7 02/14/2019     02/14/2019    RDW 12 8 02/14/2019    NEUTROABS 3 91 02/14/2019    SODIUM 141 02/05/2019    K 4 0 02/05/2019     02/05/2019    CO2 26 02/05/2019    BUN 13 02/05/2019    CREATININE 0 68 02/05/2019    GLUC 99 02/05/2019    GLUF 99 02/05/2019    CALCIUM 8 5 02/05/2019    AST 19 02/05/2019    ALT 42 02/05/2019    ALKPHOS 67 02/05/2019    TP 7 4 02/05/2019    ALB 3 5 02/05/2019    TBILI 0 20 02/05/2019    CHOLESTEROL 152 02/14/2019    HDL 33 (L) 02/14/2019    TRIG 124 02/14/2019    LDLCALC 94 02/14/2019    NONHDLC 119 02/14/2019    VALPROICTOT 87 02/05/2019    AMMONIA 27 01/13/2018    [id number] 4 114 (H) 02/14/2019    FREET4 0 99 02/14/2019    T3FREE 3 03 03/24/2017    RPR Non-Reactive 04/21/2018    HGBA1C 5 2 02/14/2019     02/14/2019       Psychiatric Review of Systems:  Behavior over the last 24 hours:  unchanged  Sleep: normal  Appetite: normal  Medication side effects: No  ROS: no complaints    Mental Status Evaluation:  Appearance:  casually dressed   Behavior:  cooperative   Speech:  loud   Mood:  euthymic   Affect:  increased in range   Language naming objects and repeating phrases   Thought Process:  concrete   Thought Content:  obsessions   Perceptual Disturbances: None   Risk Potential: Denied SI/HI  Potential for aggression: No   Sensorium:  person and place   Cognition:  grossly intact   Consciousness:  awake    Recent and Remote Memory limited   Attention: attention span appeared shorter than expected for age   Insight:  limited   Judgment: limited   Gait/Station: normal gait/station and normal balance   Motor Activity: right worse than left hand tremor     Progress Toward Goals: unchanged    Recommended Treatment: Continue with group therapy, milieu therapy and occupational therapy  1   Continue current medications  2  Disposition planning    Risks, benefits and possible side effects of Medications:   Risks, benefits, and possible side effects of medications explained to patient and patient verbalizes understanding        Greta Baumann PA-C

## 2019-02-28 NOTE — CASE MANAGEMENT
KAPIL contacted Colletta Adams of [facility] @ [phone] & she reported she did get KAPIL's message, however, had been out sick for a few days  She reported that she will get copied on an email when enrollment is done to [employer] for Pt, and she has not received this email yet  Colletta Adams agreed she would outreach to the  to see if she did her part of the referral & sent it off to  yet  Colletta Adams agreed to send KAPIL a follow-up email today  KAPIL met with Pt to review & sign his updated Tx Plan; Pt declined a copy  Pt reported he is worried about his mom being in the hospital   Pt upset that his mother hadn't called him & CM reviewed that she is in a facility just like him & so her access to a phone is restricted  CM agreed to help Pt call her later today  KAPIL contacted Katty Armenta from [employer] @ [phone] to inquire if he had submitted the [facility] ([facility]) referral yet? KAPIL reached voicemail & left a message seeking a returned call

## 2019-03-01 PROCEDURE — 99232 SBSQ HOSP IP/OBS MODERATE 35: CPT | Performed by: PSYCHIATRY & NEUROLOGY

## 2019-03-01 RX ADMIN — ACETAMINOPHEN 975 MG: 325 TABLET ORAL at 18:04

## 2019-03-01 RX ADMIN — DIVALPROEX SODIUM 750 MG: 500 TABLET, DELAYED RELEASE ORAL at 21:13

## 2019-03-01 RX ADMIN — DIVALPROEX SODIUM 500 MG: 500 TABLET, DELAYED RELEASE ORAL at 08:11

## 2019-03-01 RX ADMIN — PROPRANOLOL HYDROCHLORIDE 20 MG: 20 TABLET ORAL at 21:12

## 2019-03-01 RX ADMIN — TRAZODONE HYDROCHLORIDE 50 MG: 50 TABLET ORAL at 21:14

## 2019-03-01 RX ADMIN — BENZTROPINE MESYLATE 1 MG: 1 TABLET ORAL at 17:54

## 2019-03-01 RX ADMIN — ACETAMINOPHEN 650 MG: 325 TABLET ORAL at 09:02

## 2019-03-01 RX ADMIN — RISPERIDONE 1 MG: 1 TABLET ORAL at 08:11

## 2019-03-01 RX ADMIN — RISPERIDONE 2 MG: 2 TABLET ORAL at 17:54

## 2019-03-01 RX ADMIN — PROPRANOLOL HYDROCHLORIDE 20 MG: 20 TABLET ORAL at 08:11

## 2019-03-01 RX ADMIN — BENZTROPINE MESYLATE 1 MG: 1 TABLET ORAL at 08:11

## 2019-03-01 NOTE — PROGRESS NOTES
Progress Note - Behavioral Health   Roldan Hampton 22 y o  male MRN: [id number]  Unit/Bed#: [facility] 251-01 Encounter: [id number]    Assessment/Plan   Principal Problem:    Schizoaffective disorder, bipolar type ([facility] 75 )  Active Problems:    Intellectual disability    BMI 45 0-49 9, adult ([facility] 75 )    Encounter for examination and observation for other specified reasons    Ingrown left big toenail      Subjective:  Patient overall cooperative in conversation  No changes in mood  Denied perceptual abnormalities  Less labile and responds well to redirection  Boisterous at times  Right worse than left hand tremor appears unchanged  Denied other somatic complaints  Denied other potential serious side effects  Is due for metabolic screening tomorrow  Vital signs appear fairly stable on Propranolol  Medication compliant  Awaiting discharge to group home        Current Medications:  Current Facility-Administered Medications   Medication Dose Route Frequency    acetaminophen (TYLENOL) tablet 325 mg  325 mg Oral Q6H PRN    acetaminophen (TYLENOL) tablet 650 mg  650 mg Oral Q6H PRN    acetaminophen (TYLENOL) tablet 975 mg  975 mg Oral Q6H PRN    albuterol (PROVENTIL HFA,VENTOLIN HFA) inhaler 2 puff  2 puff Inhalation Q8H PRN    aluminum-magnesium hydroxide-simethicone (MYLANTA) 200-200-20 mg/5 mL oral suspension 15 mL  15 mL Oral Q4H PRN    benztropine (COGENTIN) injection 2 mg  2 mg Intramuscular Q6H PRN    benztropine (COGENTIN) tablet 1 mg  1 mg Oral BID    benztropine (COGENTIN) tablet 1 mg  1 mg Oral Q6H PRN    divalproex sodium (DEPAKOTE) EC tablet 500 mg  500 mg Oral QAM    divalproex sodium (DEPAKOTE) EC tablet 750 mg  750 mg Oral HS    haloperidol (HALDOL) tablet 5 mg  5 mg Oral Q6H PRN    haloperidol lactate (HALDOL) injection 5 mg  5 mg Intramuscular Q6H PRN    LORazepam (ATIVAN) 2 mg/mL injection 2 mg  2 mg Intramuscular Q6H PRN    LORazepam (ATIVAN) tablet 1 mg  1 mg Oral Q8H PRN    magnesium hydroxide (MILK OF MAGNESIA) 400 mg/5 mL oral suspension 20 mL  20 mL Oral Daily PRN    propranolol (INDERAL) tablet 20 mg  20 mg Oral Q12H [address]    risperiDONE (RisperDAL M-TABS) dispersible tablet 1 mg  1 mg Oral Q6H PRN    risperiDONE (RisperDAL) tablet 1 mg  1 mg Oral Daily    risperiDONE (RisperDAL) tablet 2 mg  2 mg Oral After Dinner    traZODone (DESYREL) tablet 50 mg  50 mg Oral HS PRN       Behavioral Health Medications: all current active meds have been reviewed and continue current psychiatric medications  Vitals:  Vitals:    03/01/19 0736   BP: 143/90   Pulse: 99   Resp: 18   Temp: (!) 96 8 °F (36 °C)       Laboratory results:    I have personally reviewed all pertinent laboratory/tests results  Most Recent Labs:   Lab Results   Component Value Date    WBC 8 88 02/14/2019    RBC 4 72 02/14/2019    HGB 14 2 02/14/2019    HCT 41 7 02/14/2019     02/14/2019    RDW 12 8 02/14/2019    NEUTROABS 3 91 02/14/2019    SODIUM 141 02/05/2019    K 4 0 02/05/2019     02/05/2019    CO2 26 02/05/2019    BUN 13 02/05/2019    CREATININE 0 68 02/05/2019    GLUC 99 02/05/2019    GLUF 99 02/05/2019    CALCIUM 8 5 02/05/2019    AST 19 02/05/2019    ALT 42 02/05/2019    ALKPHOS 67 02/05/2019    TP 7 4 02/05/2019    ALB 3 5 02/05/2019    TBILI 0 20 02/05/2019    CHOLESTEROL 152 02/14/2019    HDL 33 (L) 02/14/2019    TRIG 124 02/14/2019    LDLCALC 94 02/14/2019    NONHDLC 119 02/14/2019    VALPROICTOT 87 02/05/2019    AMMONIA 27 01/13/2018    [id number] 4 114 (H) 02/14/2019    FREET4 0 99 02/14/2019    T3FREE 3 03 03/24/2017    RPR Non-Reactive 04/21/2018    HGBA1C 5 2 02/14/2019     02/14/2019       Psychiatric Review of Systems:  Behavior over the last 24 hours:  unchanged  Sleep: normal  Appetite: normal  Medication side effects: yes, right worse than left hand tremor  ROS: no complaints    Mental Status Evaluation:  Appearance:   In hospital attire   Behavior:  cooperative   Speech:  loud and dysarthric   Mood:  euthymic   Affect:  mood-congruent   Language naming objects and repeating phrases   Thought Process:  concrete   Thought Content:  obsessions   Perceptual Disturbances: None   Risk Potential: Denied SI/HI  Potential for aggression: no   Sensorium:  person and place   Cognition:  grossly intact   Consciousness:  awake    Recent and Remote Memory limited   Attention: attention span appeared shorter than expected for age   Insight:  limited   Judgment: limited   Gait/Station: normal gait/station and normal balance   Motor Activity: right worse than left hand tremor     Progress Toward Goals: progressing slowly    Recommended Treatment: Continue with group therapy, milieu therapy and occupational therapy  1   Continue current medications  2  Disposition planning with plan for CRR  3  Metabolic screen tomorrow (A1c, Lipid panel, weight)    Risks, benefits and possible side effects of Medications:   Risks, benefits, and possible side effects of medications explained to patient and patient verbalizes understanding        Debbie Tucker PA-C

## 2019-03-01 NOTE — PROGRESS NOTES
Pt pleasant with bright affect during conversation with writer  Showered and waiting patiently for breakfast   Reports a good night of sleep  Expresses concern for his mother, who is hospitalized at another facility  Denies wanting to harm himself or others  Social in halls and day room with peers/staff  Enjoys group and plans to attend

## 2019-03-02 LAB
CHOLEST SERPL-MCNC: 169 MG/DL (ref 50–200)
EST. AVERAGE GLUCOSE BLD GHB EST-MCNC: 103 MG/DL
HBA1C MFR BLD: 5.2 % (ref 4.2–6.3)
HDLC SERPL-MCNC: 29 MG/DL (ref 40–60)
LDLC SERPL CALC-MCNC: 102 MG/DL (ref 0–100)
NONHDLC SERPL-MCNC: 140 MG/DL
TRIGL SERPL-MCNC: 189 MG/DL

## 2019-03-02 PROCEDURE — 99232 SBSQ HOSP IP/OBS MODERATE 35: CPT | Performed by: PSYCHIATRY & NEUROLOGY

## 2019-03-02 PROCEDURE — 80061 LIPID PANEL: CPT | Performed by: PHYSICIAN ASSISTANT

## 2019-03-02 PROCEDURE — 83036 HEMOGLOBIN GLYCOSYLATED A1C: CPT | Performed by: PHYSICIAN ASSISTANT

## 2019-03-02 RX ADMIN — LORAZEPAM 1 MG: 1 TABLET ORAL at 18:38

## 2019-03-02 RX ADMIN — RISPERIDONE 2 MG: 2 TABLET ORAL at 17:43

## 2019-03-02 RX ADMIN — DIVALPROEX SODIUM 500 MG: 500 TABLET, DELAYED RELEASE ORAL at 08:55

## 2019-03-02 RX ADMIN — BENZTROPINE MESYLATE 1 MG: 1 TABLET ORAL at 08:55

## 2019-03-02 RX ADMIN — BENZTROPINE MESYLATE 1 MG: 1 TABLET ORAL at 17:43

## 2019-03-02 RX ADMIN — RISPERIDONE 1 MG: 1 TABLET ORAL at 08:55

## 2019-03-02 RX ADMIN — ALUMINUM HYDROXIDE, MAGNESIUM HYDROXIDE, AND SIMETHICONE 15 ML: 200; 200; 20 SUSPENSION ORAL at 10:52

## 2019-03-02 RX ADMIN — DIVALPROEX SODIUM 750 MG: 500 TABLET, DELAYED RELEASE ORAL at 21:35

## 2019-03-02 RX ADMIN — PROPRANOLOL HYDROCHLORIDE 20 MG: 20 TABLET ORAL at 21:35

## 2019-03-02 RX ADMIN — PROPRANOLOL HYDROCHLORIDE 20 MG: 20 TABLET ORAL at 08:55

## 2019-03-02 RX ADMIN — ACETAMINOPHEN 325 MG: 325 TABLET ORAL at 18:38

## 2019-03-02 RX ADMIN — ACETAMINOPHEN 325 MG: 325 TABLET ORAL at 09:07

## 2019-03-02 NOTE — PROGRESS NOTES
Pt pleasant throughout day  Approaches RN station frequently to speak with staff  Needs less prompting to complete ADLs  Encouraged healthy food options on meal trays due to weight gain since admission

## 2019-03-02 NOTE — PROGRESS NOTES
Progress Note - Behavioral Health   Jones Valerio 22 y o  male MRN: [id number]  Unit/Bed#: [email or web address] 251-01 Encounter: [id number]    Assessment/Plan   Principal Problem:    Schizoaffective disorder, bipolar type ([facility] )  Active Problems:    Intellectual disability    BMI 45 0-49 9, adult ([facility] )    Encounter for examination and observation for other specified reasons    Ingrown left big toenail      Subjective:  Patient is compliant with medications with no acute side effects  Patient reports improvement in mood and anxiety  Denies endorsing suicidal or homicidal ideations  Remained appropriate during entire evaluation and is seen attending groups      Current Medications:    Current Facility-Administered Medications:  acetaminophen 325 mg Oral Q6H PRN Sherif Vides   acetaminophen 650 mg Oral Q6H PRN Sanjuana Lopez PA-C   acetaminophen 975 mg Oral Q6H PRN AISHA Barber-BLAISE   albuterol 2 puff Inhalation Q8H PRN Sherif Vides   aluminum-magnesium hydroxide-simethicone 15 mL Oral Q4H PRN Aurelio Thompson MD   benztropine 2 mg Intramuscular Q6H PRN Sanjuana Lopez PA-C   benztropine 1 mg Oral BID Melisa Candacee, PA-C   benztropine 1 mg Oral Q6H PRN Sanjuana Lopez PA-C   divalproex sodium 500 mg Oral QAM Sheriflia Vides   divalproex sodium 750 mg Oral HS Sheriflia Vides   haloperidol 5 mg Oral Q6H PRN AISHA Barber-C   haloperidol lactate 5 mg Intramuscular Q6H PRN Sanjuana Lopez PA-C   LORazepam 2 mg Intramuscular Q6H PRN AISHA Barber-BLAISE   LORazepam 1 mg Oral Q8H PRN AISHA Barber-C   magnesium hydroxide 20 mL Oral Daily PRN Aurelio Thompson MD   propranolol 20 mg Oral Q12H [address] Sanjuana Lopez PA-C   risperiDONE 1 mg Oral Q6H PRN Aurelio Thompson MD   risperiDONE 1 mg Oral Daily Sanjuana Lopez PA-C   risperiDONE 2 mg Oral After Valdez SoupBEKAH   traZODone 50 mg Oral HS PRN Aurelio Thompson MD       Behavioral Health Medications: all current active meds have been reviewed  Vital signs in last 24 hours:  Temp:  [97 2 °F (36 2 °C)-98 7 °F (37 1 °C)] 97 2 °F (36 2 °C)  HR:  [] 106  Resp:  [16] 16  BP: (129-143)/(68-82) 129/68    Laboratory results:    I have personally reviewed all pertinent laboratory/tests results    Labs in last 72 hours:   Recent Labs     03/02/19  0525   CHOLESTEROL 169   HDL 29*   TRIG 189*   LDLCALC 102*     Admission Labs:   Admission on 01/31/2019   Component Date Value    Valproic Acid, Total 02/05/2019 87     Sodium 02/05/2019 141     Potassium 02/05/2019 4 0     Chloride 02/05/2019 104     CO2 02/05/2019 26     ANION GAP 02/05/2019 11     BUN 02/05/2019 13     Creatinine 02/05/2019 0 68     Glucose 02/05/2019 99     Glucose, Fasting 02/05/2019 99     Calcium 02/05/2019 8 5     AST 02/05/2019 19     ALT 02/05/2019 42     Alkaline Phosphatase 02/05/2019 67     Total Protein 02/05/2019 7 4     Albumin 02/05/2019 3 5     Total Bilirubin 02/05/2019 0 20     eGFR 02/05/2019 135     Hemoglobin A1C 02/14/2019 5 2     EAG 02/14/2019 103     Cholesterol 02/14/2019 152     Triglycerides 02/14/2019 124     HDL, Direct 02/14/2019 33*    LDL Calculated 02/14/2019 94     Non-HDL-Chol (CHOL-HDL) 02/14/2019 119     TSH 3RD GENERATON 02/14/2019 4 114*    WBC 02/14/2019 8 88     RBC 02/14/2019 4 72     Hemoglobin 02/14/2019 14 2     Hematocrit 02/14/2019 41 7     MCV 02/14/2019 88     MCH 02/14/2019 30 1     MCHC 02/14/2019 34 1     RDW 02/14/2019 12 8     MPV 02/14/2019 9 1     Platelets 56/62/6697 230     nRBC 02/14/2019 0     Neutrophils Relative 02/14/2019 44     Immat GRANS % 02/14/2019 1     Lymphocytes Relative 02/14/2019 40     Monocytes Relative 02/14/2019 11     Eosinophils Relative 02/14/2019 3     Basophils Relative 02/14/2019 1     Neutrophils Absolute 02/14/2019 3 91     Immature Grans Absolute 02/14/2019 0 08     Lymphocytes Absolute 02/14/2019 3 59     Monocytes Absolute 02/14/2019 0 93  Eosinophils Absolute 02/14/2019 0 29     Basophils Absolute 02/14/2019 0 08     Free T4 02/14/2019 0 99     Hemoglobin A1C 03/02/2019 5 2     EAG 03/02/2019 103     Cholesterol 03/02/2019 169     Triglycerides 03/02/2019 189*    HDL, Direct 03/02/2019 29*    LDL Calculated 03/02/2019 102*    Non-HDL-Chol (CHOL-HDL) 03/02/2019 140        Psychiatric Review of Systems:  Behavior over the last 24 hours:  improving  Sleep: normal  Appetite: normal  Medication side effects: No  ROS: no complaints    Mental Status Evaluation:  Appearance:  casually dressed   Behavior:  guarded   Speech:  normal volume   Mood:  anxious   Affect:  constricted   Language naming objects   Thought Process:  circumstantial   Thought Content:  obsessions   Perceptual Disturbances: None   Risk Potential: Suicidal Ideations none, Homicidal Ideations none and Potential for Aggression No   Sensorium:  person and place   Cognition:  grossly intact   Consciousness:  awake    Attention: attention span appeared shorter than expected for age   Insight:  limited   Judgment: limited   Intellect fair   Gait/Station: normal gait/station   Motor Activity: no abnormal movements     Memory: Short and long term memory  fair     Progress Toward Goals: slow progress    Recommended Treatment:   Continue with group therapy, milieu therapy and occupational therapy      Continue following current medications:   Current Facility-Administered Medications:  acetaminophen 325 mg Oral Q6H PRN Sherif Vides   acetaminophen 650 mg Oral Q6H PRN Romulo Patel PA-C   acetaminophen 975 mg Oral Q6H PRN Romulo Patel PA-C   albuterol 2 puff Inhalation Q8H PRN Sherif Vides   aluminum-magnesium hydroxide-simethicone 15 mL Oral Q4H PRN Debbi Hernandez MD   benztropine 2 mg Intramuscular Q6H PRN Romulo Patel PA-C   benztropine 1 mg Oral BID Melisa Wang PA-C   benztropine 1 mg Oral Q6H PRN Romulo Patel PA-C   divalproex sodium 500 mg Oral QAM Sherif Vides   divalproex sodium 750 mg Oral HS Sherif Vides   haloperidol 5 mg Oral Q6H PRN [place] Shirk, PA-C   haloperidol lactate 5 mg Intramuscular Q6H PRN [place] Shiver, PA-C   LORazepam 2 mg Intramuscular Q6H PRN [place] Shirk, PA-C   LORazepam 1 mg Oral Q8H PRN Vicki Mercado, PA-C   magnesium hydroxide 20 mL Oral Daily PRN Maggy Pruitt MD   propranolol 20 mg Oral Q12H [address] Vicki Mercado, BEKAH   risperiDONE 1 mg Oral Q6H PRN Maggy Pruitt MD   risperiDONE 1 mg Oral Daily Vicki Mercado, PA-BLAISE   risperiDONE 2 mg Oral After Valdez Soup, BEKAH   traZODone 50 mg Oral HS PRN Maggy Pruitt MD       Risks, benefits and possible side effects of Medications:   Risks, benefits, and possible side effects of medications explained to patient and patient verbalizes understanding  This note has been constructed using a voice recognition system  There may be translation, syntax,  or grammatical errors  If you have any questions, please contact the dictating provider

## 2019-03-02 NOTE — PROGRESS NOTES
Pt has been pleasant and conversive throughout the shift, intrusive at times but redirectable  Pt has a lot of somatic complaints and comes to med room or NS frequently for interaction/attention from staff  Pt did shower and wash hair this shift  Left big toe appears reddened with some swelling/irritation, pt denies pain in toe  Given socks and reminded not to walk barefoot on unit

## 2019-03-03 PROCEDURE — 99232 SBSQ HOSP IP/OBS MODERATE 35: CPT | Performed by: PSYCHIATRY & NEUROLOGY

## 2019-03-03 RX ADMIN — ACETAMINOPHEN 325 MG: 325 TABLET ORAL at 12:16

## 2019-03-03 RX ADMIN — PROPRANOLOL HYDROCHLORIDE 20 MG: 20 TABLET ORAL at 21:09

## 2019-03-03 RX ADMIN — RISPERIDONE 1 MG: 1 TABLET ORAL at 08:17

## 2019-03-03 RX ADMIN — DIVALPROEX SODIUM 750 MG: 500 TABLET, DELAYED RELEASE ORAL at 21:09

## 2019-03-03 RX ADMIN — DIVALPROEX SODIUM 500 MG: 500 TABLET, DELAYED RELEASE ORAL at 08:17

## 2019-03-03 RX ADMIN — BENZTROPINE MESYLATE 1 MG: 1 TABLET ORAL at 17:45

## 2019-03-03 RX ADMIN — PROPRANOLOL HYDROCHLORIDE 20 MG: 20 TABLET ORAL at 08:17

## 2019-03-03 RX ADMIN — RISPERIDONE 2 MG: 2 TABLET ORAL at 17:45

## 2019-03-03 RX ADMIN — BENZTROPINE MESYLATE 1 MG: 1 TABLET ORAL at 08:17

## 2019-03-03 NOTE — PROGRESS NOTES
Pt pleasant throughout the day  Napping for most of the morning  States that he is feeling good today  Encouraged healthy meal choices  Will continue to monitor

## 2019-03-03 NOTE — CONSULTS
Consult received for 12# weight gain  Spoke w/ pt regarding undesirable wt gain during admission and food choices he is making  Pt did not appear appropriate for detailed diet education, however was agreeable to receiving assistance when making meal selections in order to choose more nutritious foods and avoid further weight gain  Provided tip sheet and discussed w/ nursing staff suggestions for making balanced meal selections based on available menu  Encouraged at least 1 fruit/vegetable serving at each meal, choosing protein options at breakfast to balance with CHO foods, lunch/dinner sides to include at least 1 vegetable choice, limiting burgers/pizza/chicken tenders to no more than 1 meal daily, and choosing fruit option as dessert at least 1x/day   RD will follow up to monitor progress and address additional questions

## 2019-03-03 NOTE — PROGRESS NOTES
Progress Note - Behavioral Health   Redge Romy 22 y o  male MRN: [id number]  Unit/Bed#: [facility] 251-01 Encounter: [id number]    Assessment/Plan   Principal Problem:    Schizoaffective disorder, bipolar type ([facility] 75 )  Active Problems:    Intellectual disability    BMI 45 0-49 9, adult ([facility] 75 )    Encounter for examination and observation for other specified reasons    Ingrown left big toenail    Subjective:  Patient is compliant with medications with no acute side effects  Patient denies endorsing suicidal or homicidal ideation  Patient is noted to gained 12 lb weight in last 30 days since admission  This was discussed with patient and importance of physical activity and diet choices was emphasized  Patient in agreement with getting dietitian consult to discuss diet choices in detail  He is consenting for safety on the unit      Current Medications:    Current Facility-Administered Medications:  acetaminophen 325 mg Oral Q6H PRN [facility]   acetaminophen 650 mg Oral Q6H PRN Zondra Baars, PA-C   acetaminophen 975 mg Oral Q6H PRN Zondra Baandi, PA-C   albuterol 2 puff Inhalation Q8H PRN [facility] Vides   aluminum-magnesium hydroxide-simethicone 15 mL Oral Q4H PRN Pura Rubinstein, MD   benztropine 2 mg Intramuscular Q6H PRN Zondra Baandi, PA-C   benztropine 1 mg Oral BID Melisa Wang PA-C   benztropine 1 mg Oral Q6H PRN Zondra Baars, PA-C   divalproex sodium 500 mg Oral QAM [facility]   divalproex sodium 750 mg Oral HS [facility]   haloperidol 5 mg Oral Q6H PRN Zondra Baars, PA-C   haloperidol lactate 5 mg Intramuscular Q6H PRN Zondra Baars, PA-C   LORazepam 2 mg Intramuscular Q6H PRN Zondra Baandi, PA-C   LORazepam 1 mg Oral Q8H PRN Zondra Baandi, PA-C   magnesium hydroxide 20 mL Oral Daily PRN Pura Rubinstein, MD   propranolol 20 mg Oral Q12H [address] Zondra Jace, PA-C   risperiDONE 1 mg Oral Q6H PRN Pura Rubinstein, MD   risperiDONE 1 mg Oral Daily Loann Dry BEKAH Goodrich   risperiDONE 2 mg Oral After Emily Johnson PA-C   traZODone 50 mg Oral HS PRN Mitul Tripathi MD       Behavioral Health Medications: all current active meds have been reviewed  Vital signs in last 24 hours:  Temp:  [97 3 °F (36 3 °C)-98 5 °F (36 9 °C)] 97 3 °F (36 3 °C)  HR:  [] 99  Resp:  [18] 18  BP: (111-133)/(55-79) 130/79    Laboratory results:    I have personally reviewed all pertinent laboratory/tests results    Labs in last 72 hours:   Recent Labs     03/02/19  0525   CHOLESTEROL 169   HDL 29*   TRIG 189*   LDLCALC 102*     Admission Labs:   Admission on 01/31/2019   Component Date Value    Valproic Acid, Total 02/05/2019 87     Sodium 02/05/2019 141     Potassium 02/05/2019 4 0     Chloride 02/05/2019 104     CO2 02/05/2019 26     ANION GAP 02/05/2019 11     BUN 02/05/2019 13     Creatinine 02/05/2019 0 68     Glucose 02/05/2019 99     Glucose, Fasting 02/05/2019 99     Calcium 02/05/2019 8 5     AST 02/05/2019 19     ALT 02/05/2019 42     Alkaline Phosphatase 02/05/2019 67     Total Protein 02/05/2019 7 4     Albumin 02/05/2019 3 5     Total Bilirubin 02/05/2019 0 20     eGFR 02/05/2019 135     Hemoglobin A1C 02/14/2019 5 2     EAG 02/14/2019 103     Cholesterol 02/14/2019 152     Triglycerides 02/14/2019 124     HDL, Direct 02/14/2019 33*    LDL Calculated 02/14/2019 94     Non-HDL-Chol (CHOL-HDL) 02/14/2019 119     TSH 3RD GENERATON 02/14/2019 4 114*    WBC 02/14/2019 8 88     RBC 02/14/2019 4 72     Hemoglobin 02/14/2019 14 2     Hematocrit 02/14/2019 41 7     MCV 02/14/2019 88     MCH 02/14/2019 30 1     MCHC 02/14/2019 34 1     RDW 02/14/2019 12 8     MPV 02/14/2019 9 1     Platelets 03/55/7234 230     nRBC 02/14/2019 0     Neutrophils Relative 02/14/2019 44     Immat GRANS % 02/14/2019 1     Lymphocytes Relative 02/14/2019 40     Monocytes Relative 02/14/2019 11     Eosinophils Relative 02/14/2019 3     Basophils Relative 02/14/2019 1     Neutrophils Absolute 02/14/2019 3 91     Immature Grans Absolute 02/14/2019 0 08     Lymphocytes Absolute 02/14/2019 3 59     Monocytes Absolute 02/14/2019 0 93     Eosinophils Absolute 02/14/2019 0 29     Basophils Absolute 02/14/2019 0 08     Free T4 02/14/2019 0 99     Hemoglobin A1C 03/02/2019 5 2     EAG 03/02/2019 103     Cholesterol 03/02/2019 169     Triglycerides 03/02/2019 189*    HDL, Direct 03/02/2019 29*    LDL Calculated 03/02/2019 102*    Non-HDL-Chol (CHOL-HDL) 03/02/2019 140        Psychiatric Review of Systems:  Behavior over the last 24 hours:  improving  Sleep: normal  Appetite: normal  Medication side effects: No  ROS: no complaints    Mental Status Evaluation:  Appearance:  casually dressed   Behavior:  guarded   Speech:  normal volume   Mood:  anxious   Affect:  normal   Language naming objects   Thought Process:  circumstantial   Thought Content:  obsessions   Perceptual Disturbances: None   Risk Potential: Suicidal Ideations none, Homicidal Ideations none and Potential for Aggression No   Sensorium:  person, place and time/date   Cognition:  grossly intact   Consciousness:  alert    Attention: attention span appeared shorter than expected for age   Insight:  limited   Judgment: limited   Intellect fair   Gait/Station: normal gait/station   Motor Activity: no abnormal movements     Memory: Short and long term memory  fair     Progress Toward Goals: progressing    Recommended Treatment:   Continue with group therapy, milieu therapy and occupational therapy      Continue following current medications:   Current Facility-Administered Medications:  acetaminophen 325 mg Oral Q6H PRN Sherif Vides   acetaminophen 650 mg Oral Q6H PRN Fady Gilmore PA-C   acetaminophen 975 mg Oral Q6H PRN Fady Gilmore PA-C   albuterol 2 puff Inhalation Q8H PRN Sherif Vides   aluminum-magnesium hydroxide-simethicone 15 mL Oral Q4H PRN Akshat Bowen MD benztropine 2 mg Intramuscular Q6H PRN [facility], PA-C   benztropine 1 mg Oral BID Melisa Ortegantnohemi, PA-C   benztropine 1 mg Oral Q6H PRN [facility], PA-C   divalproex sodium 500 mg Oral QAM Sherif Vides   divalproex sodium 750 mg Oral HS Sherif Vides   haloperidol 5 mg Oral Q6H PRN [facility], PA-C   haloperidol lactate 5 mg Intramuscular Q6H PRN [facility], PA-C   LORazepam 2 mg Intramuscular Q6H PRN [facility], PA-C   LORazepam 1 mg Oral Q8H PRN [facility], PA-C   magnesium hydroxide 20 mL Oral Daily PRN Sarah Salgado MD   propranolol 20 mg Oral Q12H [address] [facility], PA-C   risperiDONE 1 mg Oral Q6H PRN Sarah Salgado MD   risperiDONE 1 mg Oral Daily [facility], PA-C   risperiDONE 2 mg Oral After [employer], PA-C   traZODone 50 mg Oral HS PRN Sarah Salgado MD       Risks, benefits and possible side effects of Medications:   Risks, benefits, and possible side effects of medications explained to patient and patient verbalizes understanding  This note has been constructed using a voice recognition system  There may be translation, syntax,  or grammatical errors  If you have any questions, please contact the dictating provider

## 2019-03-03 NOTE — PROGRESS NOTES
Pt loud and hyper in the afternoon  Pt approached nurses station stating that he wanted to punch someone in the face  Pt asked why he was feeling this way and pt responded that he misses his mom and that makes him angry   Pt redirectable

## 2019-03-03 NOTE — PROGRESS NOTES
Gave PRN Ativan for agitation  Pt very boisterous, loud and difficult to redirect  PRN effective for use  Pt able to calm

## 2019-03-03 NOTE — PROGRESS NOTES
Dietician met with patient  Suggested that staff assist patient with filling out daily menu to encouraged healthier food options  Patient agreeable to this plan

## 2019-03-04 PROCEDURE — 99232 SBSQ HOSP IP/OBS MODERATE 35: CPT | Performed by: PSYCHIATRY & NEUROLOGY

## 2019-03-04 RX ADMIN — PROPRANOLOL HYDROCHLORIDE 20 MG: 20 TABLET ORAL at 08:47

## 2019-03-04 RX ADMIN — ACETAMINOPHEN 325 MG: 325 TABLET ORAL at 09:18

## 2019-03-04 RX ADMIN — PROPRANOLOL HYDROCHLORIDE 20 MG: 20 TABLET ORAL at 21:30

## 2019-03-04 RX ADMIN — BENZTROPINE MESYLATE 1 MG: 1 TABLET ORAL at 18:14

## 2019-03-04 RX ADMIN — DIVALPROEX SODIUM 500 MG: 500 TABLET, DELAYED RELEASE ORAL at 08:47

## 2019-03-04 RX ADMIN — RISPERIDONE 1 MG: 1 TABLET ORAL at 08:47

## 2019-03-04 RX ADMIN — DIVALPROEX SODIUM 750 MG: 500 TABLET, DELAYED RELEASE ORAL at 21:30

## 2019-03-04 RX ADMIN — LORAZEPAM 1 MG: 1 TABLET ORAL at 11:51

## 2019-03-04 RX ADMIN — BENZTROPINE MESYLATE 1 MG: 1 TABLET ORAL at 08:47

## 2019-03-04 RX ADMIN — RISPERIDONE 2 MG: 2 TABLET ORAL at 18:13

## 2019-03-04 NOTE — PROGRESS NOTES
Pt calm and cooperative during the shift  Social with peers in the dayroom  Compliant with meds, meals and attending groups

## 2019-03-04 NOTE — PROGRESS NOTES
Pt was irritable beginning of staff, loudly stated, "I don't like it here!" Pt was redirectable  He stated anxiety and depression are 0, denies S/H/I, and has contracted for safety  He c/o daily of H/A and backache, requesting Tylenol, which is relieved with 325mg dose  He requested and received Ativan @ [phone] for increase in anxiety  Medication effective

## 2019-03-04 NOTE — PROGRESS NOTES
Progress Note - Behavioral Health   Elizabeth Moreno 22 y o  male MRN: [id number]  Unit/Bed#: [facility] 251-01 Encounter: [id number]    The patient was seen for continuing care and reviewed with treatment team  Patient was observed attending group this morning  Patient is pleasant and cooperative upon approach with bright affect and good eye contact noted  Discussed how he enjoys going to groups and watching TV during the day  Patient is brief in conversation and at times answering with only yes/no responses while eating a banana and smiling  He currently denies symptoms of depression or anxiety  Patient is reporting good sleep and appetite with adequate energy level  Denies any suicidal/homicial ideations or auditory/visual hallucinations  Denies pain  He is compliant and tolerating medications well with no side effects at this time  Mental Status Evaluation:  Appearance:  Adequate hygiene and grooming and Good eye contact   Behavior:  calm, cooperative and friendly   Mood:  euthymic   Affect: appropriate and mood-congruent   Speech: Normal rate and Normal volume   Thought Process:  Circumstantial   Thought Content:  Does not verbalize delusional material   Perceptual Disturbances: Denies hallucinations and does not appear to be responding to internal stimuli   Risk Potential: No suicidal or homicidal ideation   Attention/Concentration Decreased   Orientation:   Oriented to place and self   Gait/Station: normal gait/station and normal balance   Motor Activity: No abnormal movement noted     Progress Toward Goals: Impoving    Assessment/Plan    Principal Problem:    Schizoaffective disorder, bipolar type ([facility] 75 )  Active Problems:    Intellectual disability    BMI 45 0-49 9, adult ([facility] )    Encounter for examination and observation for other specified reasons    Ingrown left big toenail      Recommended Treatment: 1  Continue with current pharmacotherapy      2  Continue with group therapy, milieu therapy and occupational therapy  3  No medications changes at this time  The patient will be maintained on the following medications:    Current Facility-Administered Medications:  acetaminophen 325 mg Oral Q6H PRN [facility]   acetaminophen 650 mg Oral Q6H PRN [facility], PA-C   acetaminophen 975 mg Oral Q6H [facility]   albuterol 2 puff Inhalation Q8H PRN [facility]   aluminum-magnesium hydroxide-simethicone 15 mL Oral Q4H PRN Sarah Salgado MD   benztropine 2 mg Intramuscular Q6H [facility]N [facility], [facility]-C   benztropine 1 mg Oral BID Melisa Corrente, PA-C   benztropine 1 mg Oral Q6H [facility]N [facility], PA-C   divalproex sodium 500 mg Oral QAM [facility]   divalproex sodium 750 mg Oral HS [facility]   haloperidol 5 mg Oral Q6H PRN [facility], PA-C   haloperidol lactate 5 mg Intramuscular Q6H PRN [facility], PA-C   LORazepam 2 mg Intramuscular Q6H PRN [facility], PA-C   LORazepam 1 mg Oral Q8H PRN [facility], PA-C   magnesium hydroxide 20 mL Oral Daily PRN Sarah Salgado MD   propranolol 20 mg Oral Q12H [address], PA-C   risperiDONE 1 mg Oral Q6H PRN Sarah Salgado MD   risperiDONE 1 mg Oral Daily [facility], PA-C   risperiDONE 2 mg Oral After [employer] [employer], PA-C   traZODone 50 mg Oral HS PRN Sarah Salgado MD       Risks, benefits and possible side effects of Medications:   Risks, benefits, and possible side effects of medications explained to patient and patient verbalizes understanding         ZBIGNIEW Diaz

## 2019-03-05 PROCEDURE — 99232 SBSQ HOSP IP/OBS MODERATE 35: CPT | Performed by: PSYCHIATRY & NEUROLOGY

## 2019-03-05 RX ADMIN — PROPRANOLOL HYDROCHLORIDE 20 MG: 20 TABLET ORAL at 08:31

## 2019-03-05 RX ADMIN — RISPERIDONE 2 MG: 2 TABLET ORAL at 17:21

## 2019-03-05 RX ADMIN — RISPERIDONE 1 MG: 1 TABLET ORAL at 08:30

## 2019-03-05 RX ADMIN — PROPRANOLOL HYDROCHLORIDE 20 MG: 20 TABLET ORAL at 21:10

## 2019-03-05 RX ADMIN — ACETAMINOPHEN 325 MG: 325 TABLET ORAL at 18:27

## 2019-03-05 RX ADMIN — BENZTROPINE MESYLATE 1 MG: 1 TABLET ORAL at 17:21

## 2019-03-05 RX ADMIN — DIVALPROEX SODIUM 500 MG: 500 TABLET, DELAYED RELEASE ORAL at 08:31

## 2019-03-05 RX ADMIN — BENZTROPINE MESYLATE 1 MG: 1 TABLET ORAL at 08:31

## 2019-03-05 RX ADMIN — DIVALPROEX SODIUM 750 MG: 500 TABLET, DELAYED RELEASE ORAL at 21:10

## 2019-03-05 NOTE — PROGRESS NOTES
Progress Note - Behavioral Health   Ladonna Seymour 22 y o  male MRN: [id number]  Unit/Bed#: [facility] 251-01 Encounter: [id number]    Assessment/Plan   Principal Problem:    Schizoaffective disorder, bipolar type ([facility] 75 )  Active Problems:    Intellectual disability    BMI 45 0-49 9, adult ([facility] 75 )    Encounter for examination and observation for other specified reasons    Ingrown left big toenail      Subjective:  Patient cooperative during interview  States he feels better that his mom is out hospital   Is seen loud and boisterous at times on the unit requiring redirection  Does accept redirection well  Denied psychosis  Does not endorse criteria for eddie  Denied depression  Denied anxiety  Denied suicidal and homicidal ideations  Medication compliant  Appears to be tolerating medications well without serious side effects  Will consult podiatry due to ingrown toenail that failed antibiotics and conservative therapy  Awaiting group home placement      Current Medications:  Current Facility-Administered Medications   Medication Dose Route Frequency    acetaminophen (TYLENOL) tablet 325 mg  325 mg Oral Q6H PRN    acetaminophen (TYLENOL) tablet 650 mg  650 mg Oral Q6H PRN    acetaminophen (TYLENOL) tablet 975 mg  975 mg Oral Q6H PRN    albuterol (PROVENTIL HFA,VENTOLIN HFA) inhaler 2 puff  2 puff Inhalation Q8H PRN    aluminum-magnesium hydroxide-simethicone (MYLANTA) 200-200-20 mg/5 mL oral suspension 15 mL  15 mL Oral Q4H PRN    benztropine (COGENTIN) injection 2 mg  2 mg Intramuscular Q6H PRN    benztropine (COGENTIN) tablet 1 mg  1 mg Oral BID    benztropine (COGENTIN) tablet 1 mg  1 mg Oral Q6H PRN    divalproex sodium (DEPAKOTE) EC tablet 500 mg  500 mg Oral QAM    divalproex sodium (DEPAKOTE) EC tablet 750 mg  750 mg Oral HS    haloperidol (HALDOL) tablet 5 mg  5 mg Oral Q6H PRN    haloperidol lactate (HALDOL) injection 5 mg  5 mg Intramuscular Q6H PRN    LORazepam (ATIVAN) 2 mg/mL injection 2 mg  2 mg Intramuscular Q6H PRN    LORazepam (ATIVAN) tablet 1 mg  1 mg Oral Q8H PRN    magnesium hydroxide (MILK OF MAGNESIA) 400 mg/5 mL oral suspension 20 mL  20 mL Oral Daily PRN    propranolol (INDERAL) tablet 20 mg  20 mg Oral Q12H [facility]    risperiDONE (RisperDAL M-TABS) dispersible tablet 1 mg  1 mg Oral Q6H PRN    risperiDONE (RisperDAL) tablet 1 mg  1 mg Oral Daily    risperiDONE (RisperDAL) tablet 2 mg  2 mg Oral After Dinner    traZODone (DESYREL) tablet 50 mg  50 mg Oral HS PRN       Behavioral Health Medications: all current active meds have been reviewed and continue current psychiatric medications  Vitals:  Vitals:    03/05/19 0752   BP: 127/75   Pulse: 100   Resp: 18   Temp: (!) 97 2 °F (36 2 °C)       Laboratory results:    I have personally reviewed all pertinent laboratory/tests results    Most Recent Labs:   Lab Results   Component Value Date    WBC 8 88 02/14/2019    RBC 4 72 02/14/2019    HGB 14 2 02/14/2019    HCT 41 7 02/14/2019     02/14/2019    RDW 12 8 02/14/2019    NEUTROABS 3 91 02/14/2019    SODIUM 141 02/05/2019    K 4 0 02/05/2019     02/05/2019    CO2 26 02/05/2019    BUN 13 02/05/2019    CREATININE 0 68 02/05/2019    GLUC 99 02/05/2019    CALCIUM 8 5 02/05/2019    AST 19 02/05/2019    ALT 42 02/05/2019    ALKPHOS 67 02/05/2019    TP 7 4 02/05/2019    ALB 3 5 02/05/2019    TBILI 0 20 02/05/2019    CHOLESTEROL 169 03/02/2019    HDL 29 (L) 03/02/2019    TRIG 189 (H) 03/02/2019    LDLCALC 102 (H) 03/02/2019    NONHDLC 140 03/02/2019    VALPROICTOT 87 02/05/2019    AMMONIA 27 01/13/2018    [id number] 4 114 (H) 02/14/2019    FREET4 0 99 02/14/2019    T3FREE 3 03 03/24/2017    RPR Non-Reactive 04/21/2018    HGBA1C 5 2 03/02/2019     02/87/0533     Metabolic Monitoring:       Baseline:  4 week  8 week  12 week  Quarterly Annually   Weight (BMI) 305  317          Waist Circulference X X X         /78  127/75           HbA1C/Fasting plasma glucose 5 2/103 5 2/103           Lipid Profile Chol:152  T  HDL: 33  LDL: 94 Chol:  169  T  HDL: 29  LDL: 102  Chol:   TG:   HFL:   LDL:             Psychiatric Review of Systems:  Behavior over the last 24 hours:  unchanged  Sleep: normal  Appetite: normal  Medication side effects: No  ROS: no complaints    Mental Status Evaluation:  Appearance:  casually dressed   Behavior:  cooperative   Speech:  dysarthric and loud   Mood:  euthymic   Affect:  mood-congruent   Language naming objects and repeating phrases   Thought Process:  concrete   Thought Content:  obsessions   Perceptual Disturbances: None   Risk Potential: Denied SI/HI  Potential for aggression: no   Sensorium:  person and place   Cognition:  grossly intact   Consciousness:  awake    Recent and Remote Memory limited   Attention: attention span appeared shorter than expected for age   Insight:  limited   Judgment: limited   Gait/Station: normal gait/station and normal balance   Motor Activity: right worse then left hand tremors     Progress Toward Goals: progressing slowly    Recommended Treatment: Continue with group therapy, milieu therapy and occupational therapy  1   Continue current medications  2  Awaiting discharge to group home  3  Will consult podiatry for left ingrown toe that has failed conservative therapy  4  Continue nutrition guidance    Risks, benefits and possible side effects of Medications:   Risks, benefits, and possible side effects of medications explained to patient and patient verbalizes understanding        Baldemar Zafar PA-C

## 2019-03-05 NOTE — PROGRESS NOTES
Pt bright and reactive, loud at times, some impulsivity  Pt polite on approach and receptive to redirection for sporadic loud outbursts when overly excited (laughing, yelling etc)  Pt attending groups, nil management issues  RN will continue to monitor and support

## 2019-03-05 NOTE — PROGRESS NOTES
Pt loud and hyper at start of shift  Pt said that he got angry yesterday because he misses his mom  Reassured patient and discussed behavior expectations  Encouraged pt to shower

## 2019-03-06 PROCEDURE — 99232 SBSQ HOSP IP/OBS MODERATE 35: CPT | Performed by: PSYCHIATRY & NEUROLOGY

## 2019-03-06 RX ADMIN — BENZTROPINE MESYLATE 1 MG: 1 TABLET ORAL at 19:05

## 2019-03-06 RX ADMIN — RISPERIDONE 2 MG: 2 TABLET ORAL at 19:05

## 2019-03-06 RX ADMIN — PROPRANOLOL HYDROCHLORIDE 20 MG: 20 TABLET ORAL at 08:07

## 2019-03-06 RX ADMIN — DIVALPROEX SODIUM 750 MG: 500 TABLET, DELAYED RELEASE ORAL at 21:17

## 2019-03-06 RX ADMIN — LORAZEPAM 1 MG: 1 TABLET ORAL at 10:13

## 2019-03-06 RX ADMIN — DIVALPROEX SODIUM 500 MG: 500 TABLET, DELAYED RELEASE ORAL at 08:07

## 2019-03-06 RX ADMIN — PROPRANOLOL HYDROCHLORIDE 20 MG: 20 TABLET ORAL at 21:17

## 2019-03-06 RX ADMIN — RISPERIDONE 1 MG: 1 TABLET ORAL at 08:07

## 2019-03-06 RX ADMIN — BENZTROPINE MESYLATE 1 MG: 1 TABLET ORAL at 08:07

## 2019-03-06 RX ADMIN — ACETAMINOPHEN 650 MG: 325 TABLET ORAL at 15:51

## 2019-03-06 NOTE — PROGRESS NOTES
Progress Note - Behavioral Health   Roldan Hampton 22 y o  male MRN: [id number]  Unit/Bed#: [facility] 251-01 Encounter: [id number]    Assessment/Plan   Principal Problem:    Schizoaffective disorder, bipolar type ([facility] 75 )  Active Problems:    Intellectual disability    BMI 45 0-49 9, adult ([facility] 75 )    Encounter for examination and observation for other specified reasons    Ingrown left big toenail      Subjective:  Patient today making provocative statements with smile on his face  Reports from nursing that patient made statement that he wants to shoot himself in the head  When confronted about this he smiled and said no  Did report he is more frustrated due to long wait for group home placement and misses his mother  Is overall redirectable  [place] in thought process due to intellectual disability  Can be easily influenced by peers  Did say that he felt well  He denies psychosis  Does not endorse criteria for eddie  Denied depression  Denied anxiety  Denied suicidal and homicidal ideation  Loud and boisterous at times  Denied additional side effects  Has right worse than left hand tremor  Also left toe appears more erythematous, has scab around nail bed, and more swollen  Podiatry was consulted      Current Medications:  Current Facility-Administered Medications   Medication Dose Route Frequency    acetaminophen (TYLENOL) tablet 325 mg  325 mg Oral Q6H PRN    acetaminophen (TYLENOL) tablet 650 mg  650 mg Oral Q6H PRN    acetaminophen (TYLENOL) tablet 975 mg  975 mg Oral Q6H PRN    albuterol (PROVENTIL HFA,VENTOLIN HFA) inhaler 2 puff  2 puff Inhalation Q8H PRN    aluminum-magnesium hydroxide-simethicone (MYLANTA) 200-200-20 mg/5 mL oral suspension 15 mL  15 mL Oral Q4H PRN    benztropine (COGENTIN) injection 2 mg  2 mg Intramuscular Q6H PRN    benztropine (COGENTIN) tablet 1 mg  1 mg Oral BID    benztropine (COGENTIN) tablet 1 mg  1 mg Oral Q6H PRN    divalproex sodium (DEPAKOTE) EC tablet 500 mg 500 mg Oral QAM    divalproex sodium (DEPAKOTE) EC tablet 750 mg  750 mg Oral HS    haloperidol (HALDOL) tablet 5 mg  5 mg Oral Q6H PRN    haloperidol lactate (HALDOL) injection 5 mg  5 mg Intramuscular Q6H PRN    LORazepam (ATIVAN) 2 mg/mL injection 2 mg  2 mg Intramuscular Q6H PRN    LORazepam (ATIVAN) tablet 1 mg  1 mg Oral Q8H PRN    magnesium hydroxide (MILK OF MAGNESIA) 400 mg/5 mL oral suspension 20 mL  20 mL Oral Daily PRN    propranolol (INDERAL) tablet 20 mg  20 mg Oral Q12H [address]    risperiDONE (RisperDAL M-TABS) dispersible tablet 1 mg  1 mg Oral Q6H PRN    risperiDONE (RisperDAL) tablet 1 mg  1 mg Oral Daily    risperiDONE (RisperDAL) tablet 2 mg  2 mg Oral After Dinner    traZODone (DESYREL) tablet 50 mg  50 mg Oral HS PRN       Behavioral Health Medications: all current active meds have been reviewed and continue current psychiatric medications  Vitals:  Vitals:    03/06/19 0807   BP: 114/72   Pulse:    Resp:    Temp:        Laboratory results:    I have personally reviewed all pertinent laboratory/tests results    Most Recent Labs:   Lab Results   Component Value Date    WBC 8 88 02/14/2019    RBC 4 72 02/14/2019    HGB 14 2 02/14/2019    HCT 41 7 02/14/2019     02/14/2019    RDW 12 8 02/14/2019    NEUTROABS 3 91 02/14/2019    SODIUM 141 02/05/2019    K 4 0 02/05/2019     02/05/2019    CO2 26 02/05/2019    BUN 13 02/05/2019    CREATININE 0 68 02/05/2019    GLUC 99 02/05/2019    CALCIUM 8 5 02/05/2019    AST 19 02/05/2019    ALT 42 02/05/2019    ALKPHOS 67 02/05/2019    TP 7 4 02/05/2019    ALB 3 5 02/05/2019    TBILI 0 20 02/05/2019    CHOLESTEROL 169 03/02/2019    HDL 29 (L) 03/02/2019    TRIG 189 (H) 03/02/2019    LDLCALC 102 (H) 03/02/2019    NONHDLC 140 03/02/2019    VALPROICTOT 87 02/05/2019    AMMONIA 27 01/13/2018    [id number] 4 114 (H) 02/14/2019    FREET4 0 99 02/14/2019    T3FREE 3 03 03/24/2017    RPR Non-Reactive 04/21/2018    HGBA1C 5 2 03/02/2019     03/02/2019       Psychiatric Review of Systems:  Behavior over the last 24 hours:  unchanged  Sleep: normal  Appetite: normal  Medication side effects: yes, left worse than right hand tremor  ROS: no complaints    Mental Status Evaluation:  Appearance:  casually dressed   Behavior:  restless and fidgety   Speech:  loud   Mood:  euthymic   Affect:  increased in range   Language naming objects and repeating phrases   Thought Process:  concrete   Thought Content:  obsessions   Perceptual Disturbances: None   Risk Potential: Denied SI/HI  Potential for aggression: No   Sensorium:  person and place   Cognition:  grossly intact   Consciousness:  awake    Recent and Remote Memory limited   Attention: attention span appeared shorter than expected for age   Insight:  limited   Judgment: limited   Gait/Station: normal gait/station and normal balance   Motor Activity: right worse than left hand tremor     Progress Toward Goals: progressing slowly    Recommended Treatment: Continue with group therapy, milieu therapy and occupational therapy  1   Continue current medications  2  Podiatry consulted for left big toe paronychia that failed conservative treatment    Risks, benefits and possible side effects of Medications:   Risks, benefits, and possible side effects of medications explained to patient and patient verbalizes understanding        Donald Coles PA-C

## 2019-03-06 NOTE — PROGRESS NOTES
Pt increasingly irritable and anxious throughout morning  MHT reported that pt made comment about shooting self in head  Spoke with pt who said he was angry about waiting for group home  Discussed this with pt and offered prn for anxiety  Pt appears to be less irritable/anxious at this time

## 2019-03-06 NOTE — CASE MANAGEMENT
KAPIL contacted [employer] @ [phone] & spoke with Terrence Arroyo who reported that Pt was not showing up in the system yet, however, that didn't mean the referral wasn't received yet  Milana suggested CM call back tomorrow, as the supervisor was out of the office today  KAPIL emailed Yennifer Matthews at [employer], to make her aware the [facility] had denied the [facility] ([facility]) referral, as they didn't feel Pt was appropriate

## 2019-03-06 NOTE — CASE MANAGEMENT
Pt yelling down the hallway to inform CM that his mom called him & gave him her new phone number: [phone]  CM assisted Pt with call Neo Ocampo, his ICM from [facility], @ [phone]  Neo Ocampo said that he planned to visit Pt tomorrow & Pt was excited  Neo Ocampo also reported that he had received an email from [facility], that they have denied the [facility] ([facility]) referral, due to Pt not being appropriate

## 2019-03-06 NOTE — PROGRESS NOTES
Pt pleasant and bright during conversation with writer  Reports sleeping well  Denies SI/HI/AVH  Social with peers/staff  Minimally intrusive early, but redirectable  Spoke with mother and reports looking forward to meeting later with PA mentor    Plans to attend group stating "I always attend group "

## 2019-03-06 NOTE — PROGRESS NOTES
Pt were asked to stay in the dayroom while staff dealt with an issue on the floor  During this time writer observed pt being loud and disruptive  Peers were getting frustrated with his behavior  When he was redirected he yelled "I'm going to shoot myself in the head"

## 2019-03-06 NOTE — CASE MANAGEMENT
CM received a call from Pt's ICM, Jeremiah Hernández, who reported he would not be able to visit Pt today, however, planned to visit on Fri at 11 AM   CM made Pt aware & he was agreeable

## 2019-03-06 NOTE — PROGRESS NOTES
Pt c/o back pain  Pt encouraged to try a heat pack before receiving medication  Pt reports they do not work  Returned to med room c/o back pain, requested and received Tylenol 650mg  Pt sleeping on follow up  Will continue to monitor

## 2019-03-07 PROCEDURE — 99232 SBSQ HOSP IP/OBS MODERATE 35: CPT | Performed by: PSYCHIATRY & NEUROLOGY

## 2019-03-07 RX ADMIN — BENZTROPINE MESYLATE 1 MG: 1 TABLET ORAL at 08:18

## 2019-03-07 RX ADMIN — RISPERIDONE 1 MG: 1 TABLET ORAL at 08:18

## 2019-03-07 RX ADMIN — BENZTROPINE MESYLATE 1 MG: 1 TABLET ORAL at 17:11

## 2019-03-07 RX ADMIN — PROPRANOLOL HYDROCHLORIDE 20 MG: 20 TABLET ORAL at 21:24

## 2019-03-07 RX ADMIN — RISPERIDONE 2 MG: 2 TABLET ORAL at 17:11

## 2019-03-07 RX ADMIN — ACETAMINOPHEN 650 MG: 325 TABLET ORAL at 08:20

## 2019-03-07 RX ADMIN — DIVALPROEX SODIUM 500 MG: 500 TABLET, DELAYED RELEASE ORAL at 08:17

## 2019-03-07 RX ADMIN — PROPRANOLOL HYDROCHLORIDE 20 MG: 20 TABLET ORAL at 08:17

## 2019-03-07 RX ADMIN — DIVALPROEX SODIUM 750 MG: 500 TABLET, DELAYED RELEASE ORAL at 21:24

## 2019-03-07 NOTE — PROGRESS NOTES
Pt at RN station often, requiring redirection  Loud and makes loud random noises  Pt hyper but is less irritable today  Talked in group about how his mom makes him mad, however, pt gets focused on calling mom and always says he is happy after talking to her  Pt did speak to parents today  Spoke with pt about healthier food choices he had been eating and pt said he has been eating fruits and vegetables more often

## 2019-03-07 NOTE — PROGRESS NOTES
Pt irritable early, but responsive to support  Pt utilized Tylenol 650 mg for complaint of back pain with good effect  Pt brighter in evening, appropriate with peers and cooperative with staff  Nil management issues  RN will continue to monitor and support

## 2019-03-07 NOTE — CASE MANAGEMENT
CM met with Pt who asked about calling his "mentor"  CM reviewed that Morelia Biswas, his ICM from [facility], would be visiting tomorrow around 11 AM; Pt agreeable  Pt continually ask CM about his group home & CM reported they are still waiting for [employer] to open him  CM contacted [employer] @ [phone] & spoke with Jaci Luna, who said that CM needed to talk to the supervisor, Jeaneth [place] about the referral   CM was transferred to his voicemail & left a message seeking a returned call

## 2019-03-07 NOTE — PROGRESS NOTES
Progress Note - Behavioral Health   Mick Rueda 22 y o  male MRN: [id number]  Unit/Bed#: [facility] 251-01 Encounter: [id number]    Assessment/Plan   Principal Problem:    Schizoaffective disorder, bipolar type ([facility] 75 )  Active Problems:    Intellectual disability    BMI 45 0-49 9, adult ([facility] 75 )    Encounter for examination and observation for other specified reasons    Ingrown left big toenail      Subjective:  Patient confronted about statements he made yesterday  When asked he had a smile on his face and denied wanting to shoot himself in the head  Reports that some other patient told to say and that is why he did  He would not tell me what patient told him to say that  Patient is vulnerable due to intellectual disability  Is also impulsive and boisterous at times due to intellectual disability  Is not actively manic  Denied depression  Denied suicidal thoughts  Denied homicidal thoughts  Denied anxiety  Denies psychosis  Overall pleasant and cooperative during conversation  Medication compliant  Appears to be tolerating medications well without additional side effects  Remains with right worse than left hand tremor  Awaiting podiatry consult        Current Medications:  Current Facility-Administered Medications   Medication Dose Route Frequency    acetaminophen (TYLENOL) tablet 325 mg  325 mg Oral Q6H PRN    acetaminophen (TYLENOL) tablet 650 mg  650 mg Oral Q6H PRN    acetaminophen (TYLENOL) tablet 975 mg  975 mg Oral Q6H PRN    albuterol (PROVENTIL HFA,VENTOLIN HFA) inhaler 2 puff  2 puff Inhalation Q8H PRN    aluminum-magnesium hydroxide-simethicone (MYLANTA) 200-200-20 mg/5 mL oral suspension 15 mL  15 mL Oral Q4H PRN    benztropine (COGENTIN) injection 2 mg  2 mg Intramuscular Q6H PRN    benztropine (COGENTIN) tablet 1 mg  1 mg Oral BID    benztropine (COGENTIN) tablet 1 mg  1 mg Oral Q6H PRN    divalproex sodium (DEPAKOTE) EC tablet 500 mg  500 mg Oral QAM    divalproex sodium (DEPAKOTE) EC tablet 750 mg  750 mg Oral HS    haloperidol (HALDOL) tablet 5 mg  5 mg Oral Q6H PRN    haloperidol lactate (HALDOL) injection 5 mg  5 mg Intramuscular Q6H PRN    LORazepam (ATIVAN) 2 mg/mL injection 2 mg  2 mg Intramuscular Q6H PRN    LORazepam (ATIVAN) tablet 1 mg  1 mg Oral Q8H PRN    magnesium hydroxide (MILK OF MAGNESIA) 400 mg/5 mL oral suspension 20 mL  20 mL Oral Daily PRN    propranolol (INDERAL) tablet 20 mg  20 mg Oral Q12H [address]    risperiDONE (RisperDAL M-TABS) dispersible tablet 1 mg  1 mg Oral Q6H PRN    risperiDONE (RisperDAL) tablet 1 mg  1 mg Oral Daily    risperiDONE (RisperDAL) tablet 2 mg  2 mg Oral After Dinner    traZODone (DESYREL) tablet 50 mg  50 mg Oral HS PRN       Behavioral Health Medications: all current active meds have been reviewed and continue current psychiatric medications  Vitals:  Vitals:    03/07/19 0733   BP: 132/76   Pulse: 94   Resp: 18   Temp: 97 5 °F (36 4 °C)       Laboratory results:    I have personally reviewed all pertinent laboratory/tests results    Most Recent Labs:   Lab Results   Component Value Date    WBC 8 88 02/14/2019    RBC 4 72 02/14/2019    HGB 14 2 02/14/2019    HCT 41 7 02/14/2019     02/14/2019    RDW 12 8 02/14/2019    NEUTROABS 3 91 02/14/2019    SODIUM 141 02/05/2019    K 4 0 02/05/2019     02/05/2019    CO2 26 02/05/2019    BUN 13 02/05/2019    CREATININE 0 68 02/05/2019    GLUC 99 02/05/2019    CALCIUM 8 5 02/05/2019    AST 19 02/05/2019    ALT 42 02/05/2019    ALKPHOS 67 02/05/2019    TP 7 4 02/05/2019    ALB 3 5 02/05/2019    TBILI 0 20 02/05/2019    CHOLESTEROL 169 03/02/2019    HDL 29 (L) 03/02/2019    TRIG 189 (H) 03/02/2019    LDLCALC 102 (H) 03/02/2019    NONHDLC 140 03/02/2019    VALPROICTOT 87 02/05/2019    AMMONIA 27 01/13/2018    [id number] 4 114 (H) 02/14/2019    FREET4 0 99 02/14/2019    T3FREE 3 03 03/24/2017    RPR Non-Reactive 04/21/2018    HGBA1C 5 2 03/02/2019     03/02/2019       Psychiatric Review of Systems:  Behavior over the last 24 hours:  improved  Sleep: normal  Appetite: normal  Medication side effects: No  ROS: no complaints    Mental Status Evaluation:  Appearance:  casually dressed   Behavior:  cooperative   Speech:  loud   Mood:  euthymic   Affect:  mood-congruent   Language naming objects and repeating phrases   Thought Process:  concrete   Thought Content:  obsessions   Perceptual Disturbances: None   Risk Potential: Denied SI/HI  Potential for aggression: No   Sensorium:  person, place and time/date   Cognition:  grossly intact   Consciousness:  alert and awake    Recent and Remote Memory limited   Attention: attention span appeared shorter than expected for age   Insight:  limited   Judgment: limited   Gait/Station: normal gait/station and normal balance   Motor Activity: Right worse than left hand tremor     Progress Toward Goals: progressing slowly    Recommended Treatment: Continue with group therapy, milieu therapy and occupational therapy  1   Continue current medications  2  Awaiting Podiatry consult for ingrown toe nail causing infection  3  Awaiting group home placement    Risks, benefits and possible side effects of Medications:   Risks, benefits, and possible side effects of medications explained to patient and patient verbalizes understanding        Leigha Condon PA-C

## 2019-03-08 PROCEDURE — 99232 SBSQ HOSP IP/OBS MODERATE 35: CPT | Performed by: PSYCHIATRY & NEUROLOGY

## 2019-03-08 PROCEDURE — 0H9NXZZ DRAINAGE OF LEFT FOOT SKIN, EXTERNAL APPROACH: ICD-10-PCS | Performed by: PODIATRIST

## 2019-03-08 RX ORDER — AZITHROMYCIN 250 MG/1
500 TABLET, FILM COATED ORAL EVERY 24 HOURS
Status: COMPLETED | OUTPATIENT
Start: 2019-03-08 | End: 2019-03-12

## 2019-03-08 RX ADMIN — DIVALPROEX SODIUM 750 MG: 500 TABLET, DELAYED RELEASE ORAL at 21:19

## 2019-03-08 RX ADMIN — PROPRANOLOL HYDROCHLORIDE 20 MG: 20 TABLET ORAL at 21:19

## 2019-03-08 RX ADMIN — PROPRANOLOL HYDROCHLORIDE 20 MG: 20 TABLET ORAL at 08:41

## 2019-03-08 RX ADMIN — RISPERIDONE 2 MG: 2 TABLET ORAL at 17:05

## 2019-03-08 RX ADMIN — RISPERIDONE 1 MG: 1 TABLET ORAL at 08:41

## 2019-03-08 RX ADMIN — BENZTROPINE MESYLATE 1 MG: 1 TABLET ORAL at 08:41

## 2019-03-08 RX ADMIN — AZITHROMYCIN 500 MG: 250 TABLET, FILM COATED ORAL at 16:36

## 2019-03-08 RX ADMIN — ALUMINUM HYDROXIDE, MAGNESIUM HYDROXIDE, AND SIMETHICONE 15 ML: 200; 200; 20 SUSPENSION ORAL at 14:41

## 2019-03-08 RX ADMIN — ACETAMINOPHEN 325 MG: 325 TABLET ORAL at 09:04

## 2019-03-08 RX ADMIN — DIVALPROEX SODIUM 500 MG: 500 TABLET, DELAYED RELEASE ORAL at 08:41

## 2019-03-08 RX ADMIN — BENZTROPINE MESYLATE 1 MG: 1 TABLET ORAL at 17:05

## 2019-03-08 NOTE — PROGRESS NOTES
Progress Note - Behavioral Health   Diane Maria 22 y o  male MRN: [id number]  Unit/Bed#: [facility] 251-01 Encounter: [id number]    Assessment/Plan   Principal Problem:    Schizoaffective disorder, bipolar type ([facility] )  Active Problems:    Intellectual disability    BMI 45 0-49 9, adult ([facility] )    Encounter for examination and observation for other specified reasons    Ingrown left big toenail      Subjective:  Patient cooperative during interview  No inappropriate comments within the last 24 hours  Is overall redirectable  Becomes loud and boisterous at times due to intellectual disability  Appears impulsive at times but is not agitated or threatening  Denies psychosis  Does not endorse criteria for eddie  Is concrete in thought process and answers in yes and no  Denied depression, anxiety, suicidal thoughts, homicidal thoughts, and somatic complaints  Hand tremors decreased today and intensity  Medication compliant  Awaiting discharge to group home  Awaiting podiatry consult      Current Medications:  Current Facility-Administered Medications   Medication Dose Route Frequency    acetaminophen (TYLENOL) tablet 325 mg  325 mg Oral Q6H PRN    acetaminophen (TYLENOL) tablet 650 mg  650 mg Oral Q6H PRN    acetaminophen (TYLENOL) tablet 975 mg  975 mg Oral Q6H PRN    albuterol (PROVENTIL HFA,VENTOLIN HFA) inhaler 2 puff  2 puff Inhalation Q8H PRN    aluminum-magnesium hydroxide-simethicone (MYLANTA) 200-200-20 mg/5 mL oral suspension 15 mL  15 mL Oral Q4H PRN    benztropine (COGENTIN) injection 2 mg  2 mg Intramuscular Q6H PRN    benztropine (COGENTIN) tablet 1 mg  1 mg Oral BID    benztropine (COGENTIN) tablet 1 mg  1 mg Oral Q6H PRN    divalproex sodium (DEPAKOTE) EC tablet 500 mg  500 mg Oral QAM    divalproex sodium (DEPAKOTE) EC tablet 750 mg  750 mg Oral HS    haloperidol (HALDOL) tablet 5 mg  5 mg Oral Q6H PRN    haloperidol lactate (HALDOL) injection 5 mg  5 mg Intramuscular Q6H PRN    LORazepam (ATIVAN) 2 mg/mL injection 2 mg  2 mg Intramuscular Q6H PRN    LORazepam (ATIVAN) tablet 1 mg  1 mg Oral Q8H PRN    magnesium hydroxide (MILK OF MAGNESIA) 400 mg/5 mL oral suspension 20 mL  20 mL Oral Daily PRN    propranolol (INDERAL) tablet 20 mg  20 mg Oral Q12H [facility]    risperiDONE (RisperDAL M-TABS) dispersible tablet 1 mg  1 mg Oral Q6H PRN    risperiDONE (RisperDAL) tablet 1 mg  1 mg Oral Daily    risperiDONE (RisperDAL) tablet 2 mg  2 mg Oral After Dinner    traZODone (DESYREL) tablet 50 mg  50 mg Oral HS PRN       Behavioral Health Medications: all current active meds have been reviewed and continue current psychiatric medications  Vitals:  Vitals:    03/08/19 0733   BP: 128/52   Pulse: 101   Resp: 16   Temp: (!) 97 3 °F (36 3 °C)       Laboratory results:    I have personally reviewed all pertinent laboratory/tests results    Most Recent Labs:   Lab Results   Component Value Date    WBC 8 88 02/14/2019    RBC 4 72 02/14/2019    HGB 14 2 02/14/2019    HCT 41 7 02/14/2019     02/14/2019    RDW 12 8 02/14/2019    NEUTROABS 3 91 02/14/2019    SODIUM 141 02/05/2019    K 4 0 02/05/2019     02/05/2019    CO2 26 02/05/2019    BUN 13 02/05/2019    CREATININE 0 68 02/05/2019    GLUC 99 02/05/2019    CALCIUM 8 5 02/05/2019    AST 19 02/05/2019    ALT 42 02/05/2019    ALKPHOS 67 02/05/2019    TP 7 4 02/05/2019    ALB 3 5 02/05/2019    TBILI 0 20 02/05/2019    CHOLESTEROL 169 03/02/2019    HDL 29 (L) 03/02/2019    TRIG 189 (H) 03/02/2019    LDLCALC 102 (H) 03/02/2019    NONHDLC 140 03/02/2019    VALPROICTOT 87 02/05/2019    AMMONIA 27 01/13/2018    [id number] 4 114 (H) 02/14/2019    FREET4 0 99 02/14/2019    T3FREE 3 03 03/24/2017    RPR Non-Reactive 04/21/2018    HGBA1C 5 2 03/02/2019     03/02/2019       Psychiatric Review of Systems:  Behavior over the last 24 hours:  unchanged  Sleep: normal  Appetite: normal  Medication side effects: decreased right worse than left hand tremor  ROS: no complaints    Mental Status Evaluation:  Appearance:  casually dressed   Behavior:  more cooperative   Speech:  loud   Mood:  euthymic   Affect:  mood-congruent   Language naming objects and repeating phrases   Thought Process:  concrete   Thought Content:  obsessions   Perceptual Disturbances: None   Risk Potential: Denied SI/HI  Potential for aggression: no   Sensorium:  person and place   Cognition:  grossly intact   Consciousness:  awake    Recent and Remote Memory limited   Attention: attention span appeared shorter than expected for age   Insight:  limited   Judgment: limited   Gait/Station: normal gait/station and normal balance   Motor Activity: decreased right worse than left hand tremors     Progress Toward Goals: progressing slowly    Recommended Treatment: Continue with group therapy, milieu therapy and occupational therapy  1   Continue current medications  2  Podiatry consult ordered for infected toe nail  3  Disposition planning, awaiting group home placement    Risks, benefits and possible side effects of Medications:   Risks, benefits, and possible side effects of medications explained to patient and patient verbalizes understanding        Fady Gilmore PA-C

## 2019-03-08 NOTE — PROGRESS NOTES
Friendly, social and loud at times  ADLs completed  Visiting with PA mentor currently  Awaiting visit from podiatry

## 2019-03-08 NOTE — CASE MANAGEMENT
CM met with Pt & his ICM from [facility], Keya Pozo reported they had a survey & worked on [facility]  [employer]  Keya Pozo asked for an update & CM reviewed that she was waiting for [employer] to confirm they received the referral from [facility], & to come open him for services, then CM could step him down to crisis residence  Keya Pozo said that he would see if he has any contacts at [address] that he can outreach to  Pt was happy to meet with Jose & said that Keya Pzoo had given him his office phone number to call

## 2019-03-09 PROCEDURE — 99231 SBSQ HOSP IP/OBS SF/LOW 25: CPT | Performed by: STUDENT IN AN ORGANIZED HEALTH CARE EDUCATION/TRAINING PROGRAM

## 2019-03-09 RX ADMIN — DIVALPROEX SODIUM 750 MG: 500 TABLET, DELAYED RELEASE ORAL at 21:12

## 2019-03-09 RX ADMIN — DIVALPROEX SODIUM 500 MG: 500 TABLET, DELAYED RELEASE ORAL at 08:53

## 2019-03-09 RX ADMIN — ACETAMINOPHEN 325 MG: 325 TABLET ORAL at 09:00

## 2019-03-09 RX ADMIN — BENZTROPINE MESYLATE 1 MG: 1 TABLET ORAL at 08:53

## 2019-03-09 RX ADMIN — MUPIROCIN: 20 OINTMENT TOPICAL at 15:13

## 2019-03-09 RX ADMIN — PROPRANOLOL HYDROCHLORIDE 20 MG: 20 TABLET ORAL at 08:53

## 2019-03-09 RX ADMIN — AZITHROMYCIN 500 MG: 250 TABLET, FILM COATED ORAL at 14:08

## 2019-03-09 RX ADMIN — BENZTROPINE MESYLATE 1 MG: 1 TABLET ORAL at 17:25

## 2019-03-09 RX ADMIN — RISPERIDONE 2 MG: 2 TABLET ORAL at 17:25

## 2019-03-09 RX ADMIN — RISPERIDONE 1 MG: 1 TABLET ORAL at 08:53

## 2019-03-09 RX ADMIN — TRAZODONE HYDROCHLORIDE 50 MG: 50 TABLET ORAL at 21:13

## 2019-03-09 RX ADMIN — PROPRANOLOL HYDROCHLORIDE 20 MG: 20 TABLET ORAL at 21:12

## 2019-03-09 NOTE — PROGRESS NOTES
Pt remains intrusive at times but redirectable, responds to limit setting (ie running in wagner) easily excitable and enjoys attention from staff  Denies pain in left great toe

## 2019-03-09 NOTE — PROGRESS NOTES
Pt pleasant during interactions  Frequently at nurses station, compliant with redirection  Pt showered this afternoon and bactroban applied to left great toe  Pt denies pain, area appears red/swollen  No drainage present  Will continue to monitor

## 2019-03-09 NOTE — PROGRESS NOTES
Pt is loud at times and requiring frequent redirect from nurses station  Pleasant during interactions  RN covered left great toe with bandaid after shower  Area next to nail is red/swollen  Pt denies pain and reports it is feeling better  Denies SI/HI/AVH  Will continue to monitor and support

## 2019-03-09 NOTE — PROGRESS NOTES
Progress Note - [facility] 22 y o  male MRN: [id number]  Unit/Bed#: [facility] 251-01 Encounter: [id number]    Subjective:    Per nursing, patient remains instrusive at times, responds to limit sitting, easily excitable, enjoys attention from staff  Per patient, patient reports things are going well, denying any problems or concerns  He reports mood has been "good," denying feelings of sadness or depression, denying anger or irritability  He reports that he has been sleeping well, denying trouble falling or staying asleep  He reports appetite has been good  Denies any passive or active suicidal ideation, intent, or plan  Patient denies any auditory or visual hallucinatiosn  Behavior over the last 24 hours:  improved  Medication side effects: No  ROS: no complaints    Objective:    Temp:  [97 1 °F (36 2 °C)-97 7 °F (36 5 °C)] 97 1 °F (36 2 °C)  HR:  [] 79  Resp:  [16-18] 18  BP: (116-136)/(61-73) 116/68    Mental Status Evaluation:  Appearance:  sitting comfortably in chair, adequate hygiene and grooming, cooperative with interview, dressed in hospital gown   Behavior:  No tics, tremors, or behaviors observed   Speech:  Normal rate, rhythm, and volume   Mood:  "good"   Affect:  Appears generally euthymic, stable, mood-congruent   Thought Process:  Linear and goal directed, concrete   Associations intact associations   Thought Content:  No passive or active suicidal or homicidal ideation, intent, or plan     Perceptual Disturbances: Denies any auditory or visual hallucinations   Sensorium:  Oriented to person, place, time, and situation   Memory:  recent and remote memory grossly intact   Consciousness:  alert and awake   Attention: attention span and concentration were age appropriate   Insight:  Limited   Judgment: fair   Gait/Station: normal gait/station   Motor Activity: no abnormal movements       Progress Toward Goals: Progressing    Recommended Treatment: Continue with group therapy, milieu therapy and occupational therapy  Risks, benefits and possible side effects of Medications:   Risks, benefits, and possible side effects of medications explained to patient and patient verbalizes understanding  Medications: all current active meds have been reviewed      Current Facility-Administered Medications:  acetaminophen 325 mg Oral Q6H PRN [facility]   acetaminophen 650 mg Oral Q6H PRN Eather Dusky, PA-BLAISE   acetaminophen 975 mg Oral Q6H PRN Eat Dusky, PA-C   albuterol 2 puff Inhalation Q8H PRN [facility]   aluminum-magnesium hydroxide-simethicone 15 mL Oral Q4H PRN Ada Davidson MD   azithromycin 500 mg Oral Q24H Maxene Bane, DPM   benztropine 2 mg Intramuscular Q6H PRN Eather Dusky, BEKAH   benztropine 1 mg Oral BID Melisa Kathleen, BEKAH   benztropine 1 mg Oral Q6H PRN Eather Dusky, BEKAH   divalproex sodium 500 mg Oral QAM [facility]   divalproex sodium 750 mg Oral HS [facility]   haloperidol 5 mg Oral Q6H PRN Eather Dusky, BEKAH   haloperidol lactate 5 mg Intramuscular Q6H PRN Eather Dusky, BEKAH   LORazepam 2 mg Intramuscular Q6H PRN Eather Dusky, BEKAH   LORazepam 1 mg Oral Q8H PRN Eather Dusky, BEKAH   magnesium hydroxide 20 mL Oral Daily PRN Ada Davidson MD   mupirocin  Topical Daily Maxene Bane, DPM   propranolol 20 mg Oral Q12H [facility] Divya Barros, BEKAH   risperiDONE 1 mg Oral Q6H PRN Ada Davidson MD   risperiDONE 1 mg Oral Daily Eather Dusky, BEKAH   risperiDONE 2 mg Oral After Valdez Soup, BEKAH   traZODone 50 mg Oral HS PRN Ada Davidson MD           Assessment/Plan   Principal Problem:    Schizoaffective disorder, bipolar type [facility])  Active Problems:    Intellectual disability    BMI 45 0-49 9, adult [facility])    Encounter for examination and observation for other specified reasons    Ingrown left big toenail    23 y/o Male with schizoaffective disorder, intellectual disability- continues to have improvements in mood stability, in fair behavioral control on unit but intrusive at times, no current SI/HI, no overt psychotic symptoms  Plan:  -Continue current med rgeimen

## 2019-03-10 PROCEDURE — 99231 SBSQ HOSP IP/OBS SF/LOW 25: CPT | Performed by: STUDENT IN AN ORGANIZED HEALTH CARE EDUCATION/TRAINING PROGRAM

## 2019-03-10 RX ADMIN — PROPRANOLOL HYDROCHLORIDE 20 MG: 20 TABLET ORAL at 08:50

## 2019-03-10 RX ADMIN — ACETAMINOPHEN 325 MG: 325 TABLET ORAL at 12:09

## 2019-03-10 RX ADMIN — DIVALPROEX SODIUM 500 MG: 500 TABLET, DELAYED RELEASE ORAL at 08:51

## 2019-03-10 RX ADMIN — TRAZODONE HYDROCHLORIDE 50 MG: 50 TABLET ORAL at 21:26

## 2019-03-10 RX ADMIN — RISPERIDONE 2 MG: 2 TABLET ORAL at 16:43

## 2019-03-10 RX ADMIN — BENZTROPINE MESYLATE 1 MG: 1 TABLET ORAL at 16:43

## 2019-03-10 RX ADMIN — ALUMINUM HYDROXIDE, MAGNESIUM HYDROXIDE, AND SIMETHICONE 15 ML: 200; 200; 20 SUSPENSION ORAL at 15:53

## 2019-03-10 RX ADMIN — DIVALPROEX SODIUM 750 MG: 500 TABLET, DELAYED RELEASE ORAL at 21:25

## 2019-03-10 RX ADMIN — BENZTROPINE MESYLATE 1 MG: 1 TABLET ORAL at 08:50

## 2019-03-10 RX ADMIN — AZITHROMYCIN 500 MG: 250 TABLET, FILM COATED ORAL at 13:14

## 2019-03-10 RX ADMIN — PROPRANOLOL HYDROCHLORIDE 20 MG: 20 TABLET ORAL at 21:25

## 2019-03-10 RX ADMIN — RISPERIDONE 1 MG: 1 TABLET ORAL at 08:50

## 2019-03-10 NOTE — PROGRESS NOTES
Pt assessed at start of shift  C/O stomach upset at that time and given Maalox which pt stated was effective  Pt pinched his finger in the door of his room, c/o pain and was given ice pack and tylenol by previous shift while this writer in report  Pt in good spirits, needs reminders and limit setting at times to settle down or lower voice as he can become excitable and boisterous  He often seeks out staff for interaction and attention  No agitation or inappropriate behavior so far this shift

## 2019-03-10 NOTE — PROGRESS NOTES
Progress Note - [facility] 22 y o  male MRN: [id number]  Unit/Bed#: [facility] 251-01 Encounter: [id number]    Subjective:    Per nursing, patient was pleasant during interaction, frequently at nursing station but compliant with re-direction, dressing applied to toe  Per patient, patient reports feeling a bit tired today but denies any other complaints  He describes mood as "good," denying any feelings of sadness or irritability  He reports sleeping well, eating well  Denies any SI/HI  Denies any side effects from medication  Behavior over the last 24 hours:  unchanged  Medication side effects: No  ROS: no complaints    Objective:    Temp:  [97 4 °F (36 3 °C)-97 7 °F (36 5 °C)] 97 4 °F (36 3 °C)  HR:  [] 105  Resp:  [18] 18  BP: (133-141)/(59-75) 133/75    Mental Status Evaluation:  Appearance:  Sitting on bed, dressed in casual clothing, a little unkempt, cooperative, child-like   Behavior:  No tics, tremors, or behaviors observed   Speech:  Normal rate, rhythm, and volume   Mood:  "Good"   Affect:  Appears generally euthymic, stable, mood-congruent   Thought Process:  Linear and goal directed, concrete   Associations intact associations   Thought Content:  No passive or active suicidal or homicidal ideation, intent, or plan  Perceptual Disturbances: Denies any auditory or visual hallucinations   Sensorium:  Oriented to person, place, time, and situation   Memory:  recent and remote memory grossly intact   Consciousness:  alert and awake   Attention: attention span and concentration were age appropriate   Insight:  Limited   Judgment: fair   Gait/Station: normal gait/station   Motor Activity: no abnormal movements       Progress Toward Goals: Progressing    Recommended Treatment: Continue with group therapy, milieu therapy and occupational therapy        Risks, benefits and possible side effects of Medications:   Risks, benefits, and possible side effects of medications explained to patient and patient verbalizes understanding  Medications: all current active meds have been reviewed  Current Facility-Administered Medications:  acetaminophen 325 mg Oral Q6H PRN Sheriflia Vides   acetaminophen 650 mg Oral Q6H PRN [facility], PA-C   acetaminophen 975 mg Oral Q6H PRN [facility], PA-C   albuterol 2 puff Inhalation Q8H PRN Sherif Vides   aluminum-magnesium hydroxide-simethicone 15 mL Oral Q4H PRN Carlos Pepe MD   azithromycin 500 mg Oral Q24H Matthews Aw, DPM   benztropine 2 mg Intramuscular Q6H PRN [facility], PA-C   benztropine 1 mg Oral BID Melisa Corrente, PA-C   benztropine 1 mg Oral Q6H PRN [facility], PA-C   divalproex sodium 500 mg Oral QAM [facility] Vides   divalproex sodium 750 mg Oral HS [facility]   haloperidol 5 mg Oral Q6H PRN [facility], PA-C   haloperidol lactate 5 mg Intramuscular Q6H PRN [facility], PA-C   LORazepam 2 mg Intramuscular Q6H PRN [facility], PA-C   LORazepam 1 mg Oral Q8H PRN [facility], PA-C   magnesium hydroxide 20 mL Oral Daily PRN Carlos Pepe MD   mupirocin  Topical Daily Matthews Aw, DPM   propranolol 20 mg Oral Q12H [facility], PA-C   risperiDONE 1 mg Oral Q6H PRN Carlos Pepe MD   risperiDONE 1 mg Oral Daily [facility], PA-C   risperiDONE 2 mg Oral After Valdez Soup, PA-BLAISE   traZODone 50 mg Oral HS PRN Carlos Pepe MD           Assessment/Plan   Principal Problem:    Schizoaffective disorder, bipolar type [facility])  Active Problems:    Intellectual disability    BMI 45 0-49 9, adult [facility])    Encounter for examination and observation for other specified reasons    Ingrown left big toenail    21 y/o Male with SAD, Intellectual disability- continues to do well, evaluated by podiatry yesterday, fair behavioral control, no SI/HI  Plan:  -Continue current med regimen   -Wound management of toe

## 2019-03-10 NOTE — PROGRESS NOTES
Pt is pleasant and cooperative  States he has no anxiety or depression, denies S/H/I or AVH  He denies pain in toe, but c/o low back pain  Requested and received Tylenol for pain  Will continue to monitor, provide support and encouragement

## 2019-03-11 PROCEDURE — 99232 SBSQ HOSP IP/OBS MODERATE 35: CPT | Performed by: PSYCHIATRY & NEUROLOGY

## 2019-03-11 RX ADMIN — BENZTROPINE MESYLATE 1 MG: 1 TABLET ORAL at 08:50

## 2019-03-11 RX ADMIN — RISPERIDONE 1 MG: 1 TABLET ORAL at 08:50

## 2019-03-11 RX ADMIN — ACETAMINOPHEN 325 MG: 325 TABLET ORAL at 09:02

## 2019-03-11 RX ADMIN — DIVALPROEX SODIUM 750 MG: 500 TABLET, DELAYED RELEASE ORAL at 21:09

## 2019-03-11 RX ADMIN — ACETAMINOPHEN 325 MG: 325 TABLET ORAL at 15:57

## 2019-03-11 RX ADMIN — PROPRANOLOL HYDROCHLORIDE 20 MG: 20 TABLET ORAL at 21:09

## 2019-03-11 RX ADMIN — RISPERIDONE 2 MG: 2 TABLET ORAL at 16:50

## 2019-03-11 RX ADMIN — AZITHROMYCIN 500 MG: 250 TABLET, FILM COATED ORAL at 13:04

## 2019-03-11 RX ADMIN — DIVALPROEX SODIUM 500 MG: 500 TABLET, DELAYED RELEASE ORAL at 08:50

## 2019-03-11 RX ADMIN — BENZTROPINE MESYLATE 1 MG: 1 TABLET ORAL at 16:50

## 2019-03-11 RX ADMIN — PROPRANOLOL HYDROCHLORIDE 20 MG: 20 TABLET ORAL at 08:50

## 2019-03-11 NOTE — PROGRESS NOTES
Progress Note - Behavioral Health   Christopher Campbelli 22 y o  male MRN: [id number]  Unit/Bed#: [facility] 251-01 Encounter: [id number]    Assessment/Plan   Principal Problem:    Schizoaffective disorder, bipolar type ([facility] 75 )  Active Problems:    Intellectual disability    BMI 45 0-49 9, adult ([facility] )    Encounter for examination and observation for other specified reasons    Ingrown left big toenail      Subjective:  Patient loud, boisterous, and child-like at times requiring redirection  Secondary to intellectual disability  Is not actively manic  Denies psychosis  Denies depression  Denies anxiety  Hibbs thought process  Remains with mild right worse than left hand tremor  Toe looks improved  Denied other potential side effects or somatic complaints  Medication compliant  Awaiting group home placement      Current Medications:  Current Facility-Administered Medications   Medication Dose Route Frequency    acetaminophen (TYLENOL) tablet 325 mg  325 mg Oral Q6H PRN    acetaminophen (TYLENOL) tablet 650 mg  650 mg Oral Q6H PRN    acetaminophen (TYLENOL) tablet 975 mg  975 mg Oral Q6H PRN    albuterol (PROVENTIL HFA,VENTOLIN HFA) inhaler 2 puff  2 puff Inhalation Q8H PRN    aluminum-magnesium hydroxide-simethicone (MYLANTA) 200-200-20 mg/5 mL oral suspension 15 mL  15 mL Oral Q4H PRN    azithromycin (ZITHROMAX) tablet 500 mg  500 mg Oral Q24H    benztropine (COGENTIN) injection 2 mg  2 mg Intramuscular Q6H PRN    benztropine (COGENTIN) tablet 1 mg  1 mg Oral BID    benztropine (COGENTIN) tablet 1 mg  1 mg Oral Q6H PRN    divalproex sodium (DEPAKOTE) EC tablet 500 mg  500 mg Oral QAM    divalproex sodium (DEPAKOTE) EC tablet 750 mg  750 mg Oral HS    haloperidol (HALDOL) tablet 5 mg  5 mg Oral Q6H PRN    haloperidol lactate (HALDOL) injection 5 mg  5 mg Intramuscular Q6H PRN    LORazepam (ATIVAN) 2 mg/mL injection 2 mg  2 mg Intramuscular Q6H PRN    LORazepam (ATIVAN) tablet 1 mg  1 mg Oral Q8H PRN    magnesium hydroxide (MILK OF MAGNESIA) 400 mg/5 mL oral suspension 20 mL  20 mL Oral Daily PRN    mupirocin (BACTROBAN) 2 % ointment   Topical Daily    propranolol (INDERAL) tablet 20 mg  20 mg Oral Q12H [facility]    risperiDONE (RisperDAL M-TABS) dispersible tablet 1 mg  1 mg Oral Q6H PRN    risperiDONE (RisperDAL) tablet 1 mg  1 mg Oral Daily    risperiDONE (RisperDAL) tablet 2 mg  2 mg Oral After Dinner    traZODone (DESYREL) tablet 50 mg  50 mg Oral HS PRN       Behavioral Health Medications: all current active meds have been reviewed and continue current psychiatric medications  Vitals:  Vitals:    19 1045   BP: 114/73   Pulse:    Resp:    Temp:        Laboratory results:    I have personally reviewed all pertinent laboratory/tests results    Most Recent Labs:   Lab Results   Component Value Date    WBC 8 88 2019    RBC 4 72 2019    HGB 14 2 2019    HCT 41 7 2019     2019    RDW 12 8 2019    NEUTROABS 3 91 2019    SODIUM 141 2019    K 4 0 2019     2019    CO2 26 2019    BUN 13 2019    CREATININE 0 68 2019    GLUC 99 2019    CALCIUM 8 5 2019    AST 19 2019    ALT 42 2019    ALKPHOS 67 2019    TP 7 4 2019    ALB 3 5 2019    TBILI 0 20 2019    CHOLESTEROL 169 2019    HDL 29 (L) 2019    TRIG 189 (H) 2019    LDLCALC 102 (H) 2019    NONHDLC 140 2019    VALPROICTOT 87 2019    AMMONIA 27 2018    [id number] 4 114 (H) 2019    FREET4 0 99 2019    T3FREE 3 03 2017    RPR Non-Reactive 2018    HGBA1C 5 2 2019            Metabolic Monitoring:       Baseline:  4 week  8 week  12 week  Quarterly Annually   Weight (BMI) 305  317           Waist Circulference X X X         /78  127/75           HbA1C/Fasting plasma glucose 5 2/ 5 2/103           Lipid Profile Chol:152  T  HDL: 33  LDL: 94 Chol:  169  T  HDL: 29  LDL: 102  Chol:   TG:   HFL:   LDL:                Psychiatric Review of Systems:  Behavior over the last 24 hours:  improved  Sleep: normal  Appetite: normal  Medication side effects: Yes, decreased right worse than left hand tremors  ROS: no complaints    Mental Status Evaluation:  Appearance:  casually dressed   Behavior:  cooperative and child like   Speech:  loud   Mood:  euthymic   Affect:  mood-congruent   Language naming objects and repeating phrases   Thought Process:  concrete   Thought Content:  obsessions   Perceptual Disturbances: None   Risk Potential: Denied SI/HI  Potential for aggression: no   Sensorium:  person and place   Cognition:  grossly intact   Consciousness:  awake    Recent and Remote Memory limited   Attention: attention span appeared shorter than expected for age   Insight:  limited   Judgment: limited   Gait/Station: normal gait/station and normal balance   Motor Activity: mild right worse than left hand tremors     Progress Toward Goals: unchanged    Recommended Treatment: Continue with group therapy, milieu therapy and occupational therapy  1   Continue current medications  2  Awaiting group home placement    Risks, benefits and possible side effects of Medications:   Risks, benefits, and possible side effects of medications explained to patient and patient verbalizes understanding        Huerfano Guillaume GODFREY

## 2019-03-11 NOTE — PROGRESS NOTES
Pt pleasant during conversation  Calmly watching a movie alone, in hallway by window, with unit's portable DVD player  No napping observed or reported by pt throughout the day  Denies SI/HI/AVH, no thoughts to harm self or others  Pt less intrusive at RN station with less frequent need for redirection  Social with peers/staff as they pass by  States that he plans to attend later groups

## 2019-03-11 NOTE — PROGRESS NOTES
Was given trazodone 50 mg po prn/sleep at 2126  Good effect obtained, as observed asleep in bed within the hour

## 2019-03-11 NOTE — PROGRESS NOTES
Pt pleasant with bright affect during conversation  Reports sleeping well through the night  Denies SI/HI/AVH, no thoughts of self harm or harm to others  Loud at times, but redirectable  Social on unit with peers and staff  Will attend group throughout the day

## 2019-03-12 PROCEDURE — 93005 ELECTROCARDIOGRAM TRACING: CPT

## 2019-03-12 PROCEDURE — 99232 SBSQ HOSP IP/OBS MODERATE 35: CPT | Performed by: PHYSICIAN ASSISTANT

## 2019-03-12 RX ADMIN — BENZTROPINE MESYLATE 1 MG: 1 TABLET ORAL at 18:15

## 2019-03-12 RX ADMIN — DIVALPROEX SODIUM 500 MG: 500 TABLET, DELAYED RELEASE ORAL at 08:25

## 2019-03-12 RX ADMIN — AZITHROMYCIN 500 MG: 250 TABLET, FILM COATED ORAL at 13:58

## 2019-03-12 RX ADMIN — LORAZEPAM 1 MG: 1 TABLET ORAL at 11:26

## 2019-03-12 RX ADMIN — ACETAMINOPHEN 325 MG: 325 TABLET ORAL at 09:51

## 2019-03-12 RX ADMIN — PROPRANOLOL HYDROCHLORIDE 20 MG: 20 TABLET ORAL at 08:25

## 2019-03-12 RX ADMIN — PROPRANOLOL HYDROCHLORIDE 20 MG: 20 TABLET ORAL at 21:12

## 2019-03-12 RX ADMIN — RISPERIDONE 2 MG: 2 TABLET ORAL at 18:15

## 2019-03-12 RX ADMIN — DIVALPROEX SODIUM 750 MG: 500 TABLET, DELAYED RELEASE ORAL at 21:12

## 2019-03-12 RX ADMIN — ALUMINUM HYDROXIDE, MAGNESIUM HYDROXIDE, AND SIMETHICONE 15 ML: 200; 200; 20 SUSPENSION ORAL at 12:28

## 2019-03-12 RX ADMIN — BENZTROPINE MESYLATE 1 MG: 1 TABLET ORAL at 08:25

## 2019-03-12 RX ADMIN — ACETAMINOPHEN 975 MG: 325 TABLET ORAL at 20:04

## 2019-03-12 RX ADMIN — MUPIROCIN: 20 OINTMENT TOPICAL at 12:30

## 2019-03-12 RX ADMIN — RISPERIDONE 1 MG: 1 TABLET ORAL at 08:25

## 2019-03-12 NOTE — PROGRESS NOTES
Pt lying in bed visibly upset  When asked what was bothering him, he replied "that bitch who kicked me out of group, I can't go to group no more"  Pt did not understand what he did wrong only stating that he "wants to punch her in the face"  Pt remained calm throughout interaction and did take PRN Ativan - Edmondson score 14  Observed a few minutes later in dining room for lunch

## 2019-03-12 NOTE — PROGRESS NOTES
Pt pleasant, brightens on approach  Remains loud and intrusive, but redirectable  C/o lower back pain 9/10, heat pack provided - 0/10 after 30 minutes  C/o frontal HA 10/10,  PRN Tylenol provided - 0/10 after 45 minutes  Social in milieu with peers/staff  Attended community group today

## 2019-03-12 NOTE — PROGRESS NOTES
Progress Note - Behavioral Health   Scotty Tristan 22 y o  male MRN: [id number]  Unit/Bed#: [facility] 251-01 Encounter: [id number]    Assessment/Plan   Principal Problem:    Schizoaffective disorder, bipolar type ([facility] 75 )  Active Problems:    Intellectual disability    BMI 45 0-49 9, adult ([facility] 75 )    Encounter for examination and observation for other specified reasons    Ingrown left big toenail      Subjective:  Patient loud and boisterous at times  This is secondary to his intellectual disability  Was kicked out of group today and was making threatening comments while speaking to nursing staff  Was redirectable  Was given PRN Ativan, which was effective  Remains concrete thought process without changes in his mood or perceptual abnormalities  Denies most psychiatric symptoms  Denied SI/HI  Medication compliant  Appears to tolerating medications well without serious side effects  EKG ordered due to drug interactions of Azithromycin and Risperdal   Sinus rhythm with sinus arrhythmia  QTc 435        Current Medications:  Current Facility-Administered Medications   Medication Dose Route Frequency    acetaminophen (TYLENOL) tablet 325 mg  325 mg Oral Q6H PRN    acetaminophen (TYLENOL) tablet 650 mg  650 mg Oral Q6H PRN    acetaminophen (TYLENOL) tablet 975 mg  975 mg Oral Q6H PRN    albuterol (PROVENTIL HFA,VENTOLIN HFA) inhaler 2 puff  2 puff Inhalation Q8H PRN    aluminum-magnesium hydroxide-simethicone (MYLANTA) 200-200-20 mg/5 mL oral suspension 15 mL  15 mL Oral Q4H PRN    azithromycin (ZITHROMAX) tablet 500 mg  500 mg Oral Q24H    benztropine (COGENTIN) injection 2 mg  2 mg Intramuscular Q6H PRN    benztropine (COGENTIN) tablet 1 mg  1 mg Oral BID    benztropine (COGENTIN) tablet 1 mg  1 mg Oral Q6H PRN    divalproex sodium (DEPAKOTE) EC tablet 500 mg  500 mg Oral QAM    divalproex sodium (DEPAKOTE) EC tablet 750 mg  750 mg Oral HS    haloperidol (HALDOL) tablet 5 mg  5 mg Oral Q6H PRN    haloperidol lactate (HALDOL) injection 5 mg  5 mg Intramuscular Q6H PRN    LORazepam (ATIVAN) 2 mg/mL injection 2 mg  2 mg Intramuscular Q6H PRN    LORazepam (ATIVAN) tablet 1 mg  1 mg Oral Q8H PRN    magnesium hydroxide (MILK OF MAGNESIA) 400 mg/5 mL oral suspension 20 mL  20 mL Oral Daily PRN    mupirocin (BACTROBAN) 2 % ointment   Topical Daily    propranolol (INDERAL) tablet 20 mg  20 mg Oral Q12H [address]    risperiDONE (RisperDAL M-TABS) dispersible tablet 1 mg  1 mg Oral Q6H PRN    risperiDONE (RisperDAL) tablet 1 mg  1 mg Oral Daily    risperiDONE (RisperDAL) tablet 2 mg  2 mg Oral After Dinner    traZODone (DESYREL) tablet 50 mg  50 mg Oral HS PRN       Behavioral Health Medications: all current active meds have been reviewed and continue current psychiatric medications  Vitals:  Vitals:    03/12/19 0739   BP: 113/74   Pulse: 92   Resp: 18   Temp: (!) 97 4 °F (36 3 °C)       Laboratory results:    I have personally reviewed all pertinent laboratory/tests results    Most Recent Labs:   Lab Results   Component Value Date    WBC 8 88 02/14/2019    RBC 4 72 02/14/2019    HGB 14 2 02/14/2019    HCT 41 7 02/14/2019     02/14/2019    RDW 12 8 02/14/2019    NEUTROABS 3 91 02/14/2019    SODIUM 141 02/05/2019    K 4 0 02/05/2019     02/05/2019    CO2 26 02/05/2019    BUN 13 02/05/2019    CREATININE 0 68 02/05/2019    GLUC 99 02/05/2019    CALCIUM 8 5 02/05/2019    AST 19 02/05/2019    ALT 42 02/05/2019    ALKPHOS 67 02/05/2019    TP 7 4 02/05/2019    ALB 3 5 02/05/2019    TBILI 0 20 02/05/2019    CHOLESTEROL 169 03/02/2019    HDL 29 (L) 03/02/2019    TRIG 189 (H) 03/02/2019    LDLCALC 102 (H) 03/02/2019    NONHDLC 140 03/02/2019    VALPROICTOT 87 02/05/2019    AMMONIA 27 01/13/2018    [id number] 4 114 (H) 02/14/2019    FREET4 0 99 02/14/2019    T3FREE 3 03 03/24/2017    RPR Non-Reactive 04/21/2018    HGBA1C 5 2 03/02/2019     03/02/2019       Psychiatric Review of Systems:  Behavior over the last 24 hours:    Sleep: normal  Appetite: normal  Medication side effects: No  ROS: no complaints    Mental Status Evaluation:  Appearance:  casually dressed   Behavior:  cooperative   Speech:  loud   Mood:  euthymic   Affect:  increased in range   Language naming objects and repeating phrases   Thought Process:  concrete   Thought Content:  obsessions   Perceptual Disturbances: None   Risk Potential: Denied SI/HI  Potential for aggression: No   Sensorium:  person, place    Cognition:  grossly intact   Consciousness:  alert and awake    Recent and Remote Memory limited   Attention: attention span appeared shorter than expected for age   Insight:  limited   Judgment: limited   Gait/Station: normal gait/station and normal balance   Motor Activity: mild right worse then left hand tremor     Progress Toward Goals: unchanged    Recommended Treatment: Continue with group therapy, milieu therapy and occupational therapy  1   Continue current medications  2  EKG ordered to rule out prolonged QTc due to being on Azithromycin + Risperdal   3  Disposition planning with plan to discharge to crisis residence or directly to group home    Risks, benefits and possible side effects of Medications:   Risks, benefits, and possible side effects of medications explained to patient and patient verbalizes understanding        Mayco Hawk PA-C

## 2019-03-12 NOTE — CASE MANAGEMENT
CM met with Pt who said that his mom was back in the hospital, however, she would be coming home on Friday  Pt said that he also called his "mentor" Candelario Simons from [facility], & he will visit him on Thursday

## 2019-03-12 NOTE — CASE MANAGEMENT
KAPIL received a call from [address] [place] [address],[address] at [facility], who had some questions regarding the referral, particularly the allegations & Pt's housing  CM provided information & she said she would call KAPIL back once she reviews this with staff

## 2019-03-12 NOTE — CASE MANAGEMENT
KAPIL contacted [employer] @ [phone] & spoke with   [facility] who said that Pt was showing up in their system, & was assigned to [address]  CM was transferred to [address] & she reported that needed to do a plan creation for him, & need to know what is recommended & then make the request to the [facility] for funding  KAPIL & Ember discussed [facility] or [facility], or [facility]  Ember said that she would need to come to the hospital to meet with Pt & then meet with the unit manager at [address], & petition the [facility] for a waiver  KAPIL asked for her first available appointment & she agreed she would come to the hospital tomorrow at 10 AM   She reported that the meeting will take an hour & a half & she will need CM to be present  KAPIL emailed Mariah of [facility] ([facility]), to provide her an update  KAPIL completed & faxed a crisis residence referral to Gianna Magdaleno @ [phone]

## 2019-03-13 LAB
ATRIAL RATE: 79 BPM
ATRIAL RATE: 93 BPM
P AXIS: 33 DEGREES
P AXIS: 44 DEGREES
PR INTERVAL: 154 MS
PR INTERVAL: 154 MS
QRS AXIS: 28 DEGREES
QRS AXIS: 30 DEGREES
QRSD INTERVAL: 92 MS
QRSD INTERVAL: 94 MS
QT INTERVAL: 364 MS
QT INTERVAL: 380 MS
QTC INTERVAL: 435 MS
QTC INTERVAL: 452 MS
T WAVE AXIS: 27 DEGREES
T WAVE AXIS: 33 DEGREES
VENTRICULAR RATE: 79 BPM
VENTRICULAR RATE: 93 BPM

## 2019-03-13 PROCEDURE — 93005 ELECTROCARDIOGRAM TRACING: CPT

## 2019-03-13 PROCEDURE — 99232 SBSQ HOSP IP/OBS MODERATE 35: CPT | Performed by: PSYCHIATRY & NEUROLOGY

## 2019-03-13 PROCEDURE — 93010 ELECTROCARDIOGRAM REPORT: CPT | Performed by: INTERNAL MEDICINE

## 2019-03-13 RX ADMIN — BENZTROPINE MESYLATE 1 MG: 1 TABLET ORAL at 08:45

## 2019-03-13 RX ADMIN — ACETAMINOPHEN 325 MG: 325 TABLET ORAL at 17:45

## 2019-03-13 RX ADMIN — BENZTROPINE MESYLATE 1 MG: 1 TABLET ORAL at 17:22

## 2019-03-13 RX ADMIN — RISPERIDONE 2 MG: 2 TABLET ORAL at 17:22

## 2019-03-13 RX ADMIN — DIVALPROEX SODIUM 500 MG: 500 TABLET, DELAYED RELEASE ORAL at 08:45

## 2019-03-13 RX ADMIN — PROPRANOLOL HYDROCHLORIDE 20 MG: 20 TABLET ORAL at 21:16

## 2019-03-13 RX ADMIN — MUPIROCIN: 20 OINTMENT TOPICAL at 09:43

## 2019-03-13 RX ADMIN — DIVALPROEX SODIUM 750 MG: 500 TABLET, DELAYED RELEASE ORAL at 21:12

## 2019-03-13 RX ADMIN — ACETAMINOPHEN 325 MG: 325 TABLET ORAL at 09:11

## 2019-03-13 RX ADMIN — RISPERIDONE 1 MG: 1 TABLET ORAL at 08:45

## 2019-03-13 NOTE — PROGRESS NOTES
Progress Note - Behavioral Health   Melissa Weber 22 y o  male MRN: [id number]  Unit/Bed#: [facility] 251-01 Encounter: [id number]    Assessment/Plan   Principal Problem:    Schizoaffective disorder, bipolar type ([facility] 75 )  Active Problems:    Intellectual disability    BMI 45 0-49 9, adult ([facility] 75 )    Encounter for examination and observation for other specified reasons    Ingrown left big toenail      Subjective:  Patient appears to be heavily influenced by other patients and is vulnerable  Makes provocative statements with a smile on his face  Does not fully understand what he is saying and often repeats what other people say  Remains with concrete thought process  Was pleasant and cooperative during my conversation  Denied most psychiatric symptoms  Denied SI, HI, anger, and hallucinations  Does not endorse criteria for eddie  Is loud and boisterous at times secondary to intellectual disability and poor impulse control  Ativan was effective and anxiety and angry yesterday  Is medication compliant  Besides mild bilateral hand tremors is tolerating medications well without serious side effects  Patient is awaiting group home placement  Possibility to discharge to crisis residence at this time  Crisis residence meeting today      Current Medications:  Current Facility-Administered Medications   Medication Dose Route Frequency    acetaminophen (TYLENOL) tablet 325 mg  325 mg Oral Q6H PRN    acetaminophen (TYLENOL) tablet 650 mg  650 mg Oral Q6H PRN    acetaminophen (TYLENOL) tablet 975 mg  975 mg Oral Q6H PRN    albuterol (PROVENTIL HFA,VENTOLIN HFA) inhaler 2 puff  2 puff Inhalation Q8H PRN    aluminum-magnesium hydroxide-simethicone (MYLANTA) 200-200-20 mg/5 mL oral suspension 15 mL  15 mL Oral Q4H PRN    benztropine (COGENTIN) injection 2 mg  2 mg Intramuscular Q6H PRN    benztropine (COGENTIN) tablet 1 mg  1 mg Oral BID    benztropine (COGENTIN) tablet 1 mg  1 mg Oral Q6H PRN    divalproex sodium (DEPAKOTE) EC tablet 500 mg  500 mg Oral QAM    divalproex sodium (DEPAKOTE) EC tablet 750 mg  750 mg Oral HS    haloperidol (HALDOL) tablet 5 mg  5 mg Oral Q6H PRN    haloperidol lactate (HALDOL) injection 5 mg  5 mg Intramuscular Q6H PRN    LORazepam (ATIVAN) 2 mg/mL injection 2 mg  2 mg Intramuscular Q6H PRN    LORazepam (ATIVAN) tablet 1 mg  1 mg Oral Q8H PRN    magnesium hydroxide (MILK OF MAGNESIA) 400 mg/5 mL oral suspension 20 mL  20 mL Oral Daily PRN    mupirocin (BACTROBAN) 2 % ointment   Topical Daily    propranolol (INDERAL) tablet 20 mg  20 mg Oral Q12H [address]    risperiDONE (RisperDAL M-TABS) dispersible tablet 1 mg  1 mg Oral Q6H PRN    risperiDONE (RisperDAL) tablet 1 mg  1 mg Oral Daily    risperiDONE (RisperDAL) tablet 2 mg  2 mg Oral After Dinner    traZODone (DESYREL) tablet 50 mg  50 mg Oral HS PRN       Behavioral Health Medications: all current active meds have been reviewed and continue current psychiatric medications  Vitals:  Vitals:    03/13/19 0742   BP: 106/53   Pulse: 86   Resp: 16   Temp: 98 °F (36 7 °C)       Laboratory results:    I have personally reviewed all pertinent laboratory/tests results    Most Recent Labs:   Lab Results   Component Value Date    WBC 8 88 02/14/2019    RBC 4 72 02/14/2019    HGB 14 2 02/14/2019    HCT 41 7 02/14/2019     02/14/2019    RDW 12 8 02/14/2019    NEUTROABS 3 91 02/14/2019    SODIUM 141 02/05/2019    K 4 0 02/05/2019     02/05/2019    CO2 26 02/05/2019    BUN 13 02/05/2019    CREATININE 0 68 02/05/2019    GLUC 99 02/05/2019    CALCIUM 8 5 02/05/2019    AST 19 02/05/2019    ALT 42 02/05/2019    ALKPHOS 67 02/05/2019    TP 7 4 02/05/2019    ALB 3 5 02/05/2019    TBILI 0 20 02/05/2019    CHOLESTEROL 169 03/02/2019    HDL 29 (L) 03/02/2019    TRIG 189 (H) 03/02/2019    LDLCALC 102 (H) 03/02/2019    NONHDLC 140 03/02/2019    VALPROICTOT 87 02/05/2019    AMMONIA 27 01/13/2018    [id number] 4 114 (H) 02/14/2019    FREET4 0 99 02/14/2019    T3FREE 3 03 03/24/2017    RPR Non-Reactive 04/21/2018    HGBA1C 5 2 03/02/2019     19/24/6458     Metabolic Monitoring:       Baseline:  4 week  8 week  12 week  Quarterly Annually   Weight (BMI) 305  317           Waist Circulference X X X         /78  127/75           HbA1C/Fasting plasma glucose 5 2/103 5 2/103           Lipid Profile Chol:152  HS: 793  HDL: 33  LDL: 94 Chol:  505  AS: 182  HDL: 29  LDL: 102  Chol:   TG:   HFL:   LDL:                  Psychiatric Review of Systems:  Behavior over the last 24 hours:  unchanged  Sleep: normal  Appetite: normal  Medication side effects: No  ROS: no complaints    Mental Status Evaluation:  Appearance:  in hospital attire   Behavior:  cooperative   Speech:  loud   Mood:  euthymic   Affect:  increased in range   Language naming objects and repeating phrases   Thought Process:  concrete   Thought Content:  obsessions   Perceptual Disturbances: None   Risk Potential: Denied SI/HI  Potential for aggression: low   Sensorium:  person, place   Cognition:  grossly intact   Consciousness:  awake    Recent and Remote Memory limited   Attention: attention span appeared shorter than expected for age   Insight:  limited   Judgment: limited   Gait/Station: normal gait/station and normal balance   Motor Activity: right worse then left hand tremor  mild      Progress Toward Goals: unchanged    Recommended Treatment: Continue with group therapy, milieu therapy and occupational therapy  1   Continue current medications  2  Consider scheduling Ativan due to effectiveness in anxious / boisterous behavior  3  Disposition planning with plan to step down to crisis residence vs going to [facility] group home    Risks, benefits and possible side effects of Medications:   Risks, benefits, and possible side effects of medications explained to patient and patient verbalizes understanding        Albaro Pineda PA-C

## 2019-03-13 NOTE — PROGRESS NOTES
Med Note:  Pt given PRN Tylenol 975mg  For low back pain, 10/10  Pt up OOB for group and snack  Meds effective, decreased pain to 5/10

## 2019-03-13 NOTE — CASE MANAGEMENT
KAPIL met with Ember Y of [employer] & Pt  KAPIL provided Ember with Pt's most recent medication list & psych eval & copy of Medicare card, as well as a screen shot of Pt's encounters to demonstrate frequent hospitalizations/need for placement  Pt was able to answer the majority of the questions surrounding what he likes to do with free time, what type of work he may like to do, etc   Pt confirms that he is agreeable with going to live somewhere else  He is sad that he cannot go home, however, is open to living with other people  Pt was provided with Ember's business card with her contact information  Ember said that she would go back & talk to her supervisor, so they can petition the county for the waiver  KAPIL returned a call to Za Mcgrath at [facility] @ [phone] & she said that after reviewing everything with the director, they were going to deny the referral, due to the length of time it could take to get a waiver  She said that it could take a year, & Pt would not be able to remain in their program for that length of time  KAPIL asked if an update regarding when he would get the waiver was obtained, would they reconsider the referral, & she said yes  KAPIL contacted Pt's ICM, David Martínez, of [facility] [place] @ [phone] to provide him with an update

## 2019-03-13 NOTE — PROGRESS NOTES
Pt came to RN station agitated, stating "I'm pissed off and want to punch him in the face"  When asked who and what were upsetting him, pt stated "the one with the watch"  Pt took scheduled medications, but did not receive a PRN at this time for anxiety/agitation  Pt went to his room with sketch pad and radio

## 2019-03-13 NOTE — PROGRESS NOTES
Pt calm, a bit subdued during interaction with writer  Reports sleeping well through the night  Denies SI/HI/AVH  Observed in day room watching television  Pt has a brighter affect after showering  Social with peers/staff  States that he "would like to attend group today and will behave"

## 2019-03-14 PROCEDURE — 99232 SBSQ HOSP IP/OBS MODERATE 35: CPT | Performed by: PSYCHIATRY & NEUROLOGY

## 2019-03-14 RX ADMIN — DIVALPROEX SODIUM 750 MG: 500 TABLET, DELAYED RELEASE ORAL at 21:12

## 2019-03-14 RX ADMIN — PROPRANOLOL HYDROCHLORIDE 20 MG: 20 TABLET ORAL at 21:12

## 2019-03-14 RX ADMIN — MUPIROCIN: 20 OINTMENT TOPICAL at 08:34

## 2019-03-14 RX ADMIN — RISPERIDONE 1 MG: 1 TABLET ORAL at 08:33

## 2019-03-14 RX ADMIN — BENZTROPINE MESYLATE 1 MG: 1 TABLET ORAL at 17:30

## 2019-03-14 RX ADMIN — DIVALPROEX SODIUM 500 MG: 500 TABLET, DELAYED RELEASE ORAL at 08:33

## 2019-03-14 RX ADMIN — RISPERIDONE 2 MG: 2 TABLET ORAL at 17:30

## 2019-03-14 RX ADMIN — BENZTROPINE MESYLATE 1 MG: 1 TABLET ORAL at 08:33

## 2019-03-14 RX ADMIN — PROPRANOLOL HYDROCHLORIDE 20 MG: 20 TABLET ORAL at 08:33

## 2019-03-14 NOTE — PROGRESS NOTES
Observed dried blood on left big toe  Toe was cleaned with sterile saline, Bactroban ointment applied and covered with band-aid  New, clean non-skid socks provided

## 2019-03-14 NOTE — PROGRESS NOTES
Pt cam to RN station asking to do his laundry  Was told by staff that he is allowed one load per day, at which time pt became agitated and cursed at staff  Pt left RN station and continued cursing as he walked away  Staff notified this writer that pt did apologize for his behavior a few moments later  No PRNs needed at this time  Will monitor

## 2019-03-14 NOTE — PROGRESS NOTES
Pt calm with bright affect during conversation with writer  Reports sleeping well  Denies SI/HI/AVH  No complaints at this time  Social in milieu with peers/staff  Plans to attend group

## 2019-03-14 NOTE — PROGRESS NOTES
Pt pleasant during interaction, no further irritability noted this evening  Visible on unit, social with peers  Requires frequent redirect from nurses station but is cooperative

## 2019-03-14 NOTE — PROGRESS NOTES
Pt is pleasant during interaction  Requiring redirection away from nurses station at times  Observed watching a movie with peers  No irritability noted this evening

## 2019-03-14 NOTE — CASE MANAGEMENT
CM returned a phone call to [facility] @ [phone] & spoke with JIE regarding questions/concerns they had about the referral    JIE reported she talked to Pt's ICM, Zenaida Aaron, who made it very clear that Pt cannot go to a shelter  JIE said that there is great concern regarding Pt's inability to return home & lack of ability to go to a shelter, as they are only short term & if he no longer meets criteria they will have to discharge to no location  JIE also expressed that ID clients tend not to do as well there, as they are less structured  CM reviewed activities that Pt can do on his own, during down time on the unit  Lastly JIE said that currently there is a "conflict of interest" and once that is no longer there, they would be in contact with CM to discuss further

## 2019-03-14 NOTE — CASE MANAGEMENT
KAPIL contacted Pt's supports coordinator through [employer], Candelario Ambriz, @ [phone] [phone] & she said that she did email her supervisor yesterday, after receiving KAPIL's voicemail that [facility]honny Lockhart denied there referral   She said that her supervisor was meeting with the [facility], however, Ember hasn't gotten any type of response yet  KAPIL reviewed she would be submitting the application for [facility] today & wanted to include as much information as possible, in hopes they would accept Pt  Ember reported that Pt's status is emergent & he is on the [facility]'s radar  CM completed & faxed referral to [facility] @ [phone]

## 2019-03-14 NOTE — PROGRESS NOTES
Progress Note - Behavioral Health   Melissa Weber 22 y o  male MRN: [id number]  Unit/Bed#: [facility] 251-01 Encounter: [id number]    Assessment/Plan   Principal Problem:    Schizoaffective disorder, bipolar type ([facility] 75 )  Active Problems:    Intellectual disability    BMI 45 0-49 9, adult ([facility] 75 )    Encounter for examination and observation for other specified reasons    Ingrown left big toenail      Subjective:  Patient today pleasant and cooperative  At times can be heard being loud and boisterous on the unit  This is secondary to poor iimpulse control due to his intellectual disability  Overall concrete in thought process and scant in most psychiatric symptoms  Denies psychosis  Does not endorse criteria for eddie  Denies suicidal and homicidal ideation  Does get anxious at times and Ativan is effective for controlling anxiety  Continues with right worse than left mild hand tremor  Denied additional somatic complaints  Medication compliant  Awaiting discharge to group home  Monitor pulse closely with possible increase in Propranolol dosing in coming days      Current Medications:  Current Facility-Administered Medications   Medication Dose Route Frequency    acetaminophen (TYLENOL) tablet 325 mg  325 mg Oral Q6H PRN    acetaminophen (TYLENOL) tablet 650 mg  650 mg Oral Q6H PRN    acetaminophen (TYLENOL) tablet 975 mg  975 mg Oral Q6H PRN    albuterol (PROVENTIL HFA,VENTOLIN HFA) inhaler 2 puff  2 puff Inhalation Q8H PRN    aluminum-magnesium hydroxide-simethicone (MYLANTA) 200-200-20 mg/5 mL oral suspension 15 mL  15 mL Oral Q4H PRN    benztropine (COGENTIN) injection 2 mg  2 mg Intramuscular Q6H PRN    benztropine (COGENTIN) tablet 1 mg  1 mg Oral BID    benztropine (COGENTIN) tablet 1 mg  1 mg Oral Q6H PRN    divalproex sodium (DEPAKOTE) EC tablet 500 mg  500 mg Oral QAM    divalproex sodium (DEPAKOTE) EC tablet 750 mg  750 mg Oral HS    haloperidol (HALDOL) tablet 5 mg  5 mg Oral Q6H PRN    haloperidol lactate (HALDOL) injection 5 mg  5 mg Intramuscular Q6H PRN    LORazepam (ATIVAN) 2 mg/mL injection 2 mg  2 mg Intramuscular Q6H PRN    LORazepam (ATIVAN) tablet 1 mg  1 mg Oral Q8H PRN    magnesium hydroxide (MILK OF MAGNESIA) 400 mg/5 mL oral suspension 20 mL  20 mL Oral Daily PRN    mupirocin (BACTROBAN) 2 % ointment   Topical Daily    propranolol (INDERAL) tablet 20 mg  20 mg Oral Q12H [address]    risperiDONE (RisperDAL M-TABS) dispersible tablet 1 mg  1 mg Oral Q6H PRN    risperiDONE (RisperDAL) tablet 1 mg  1 mg Oral Daily    risperiDONE (RisperDAL) tablet 2 mg  2 mg Oral After Dinner    traZODone (DESYREL) tablet 50 mg  50 mg Oral HS PRN       Behavioral Health Medications: all current active meds have been reviewed and continue current psychiatric medications  Vitals:  Vitals:    03/14/19 0743   BP: 118/67   Pulse: (!) 109   Resp: 20   Temp: (!) 97 1 °F (36 2 °C)       Laboratory results:    I have personally reviewed all pertinent laboratory/tests results    Most Recent Labs:   Lab Results   Component Value Date    WBC 8 88 02/14/2019    RBC 4 72 02/14/2019    HGB 14 2 02/14/2019    HCT 41 7 02/14/2019     02/14/2019    RDW 12 8 02/14/2019    NEUTROABS 3 91 02/14/2019    SODIUM 141 02/05/2019    K 4 0 02/05/2019     02/05/2019    CO2 26 02/05/2019    BUN 13 02/05/2019    CREATININE 0 68 02/05/2019    GLUC 99 02/05/2019    CALCIUM 8 5 02/05/2019    AST 19 02/05/2019    ALT 42 02/05/2019    ALKPHOS 67 02/05/2019    TP 7 4 02/05/2019    ALB 3 5 02/05/2019    TBILI 0 20 02/05/2019    CHOLESTEROL 169 03/02/2019    HDL 29 (L) 03/02/2019    TRIG 189 (H) 03/02/2019    LDLCALC 102 (H) 03/02/2019    NONHDLC 140 03/02/2019    VALPROICTOT 87 02/05/2019    AMMONIA 27 01/13/2018    [id number] 4 114 (H) 02/14/2019    FREET4 0 99 02/14/2019    T3FREE 3 03 03/24/2017    RPR Non-Reactive 04/21/2018    HGBA1C 5 2 03/02/2019     37/24/7837     Metabolic Monitoring:       Baseline:  4 week  8 week  12 week  Quarterly Annually   Weight (BMI) 305  317           Waist Circulference X X X         /78  127/75           HbA1C/Fasting plasma glucose 5 2/103 5 2/103           Lipid Profile Chol:152  VF: 825  HDL: 33  LDL: 94 Chol:  369  HH: 397  HDL: 29  LDL: 102  Chol:   TG:   HFL:   LDL                Psychiatric Review of Systems:  Behavior over the last 24 hours:  unchanged  Sleep: normal  Appetite: normal  Medication side effects: Yes, mild right worse than left hand tremor  ROS: no complaints    Mental Status Evaluation:  Appearance:  casually dressed   Behavior:  cooperative, child-like   Speech:  loud   Mood:  euthymic   Affect:  mood-congruent   Language naming objects and repeating phrases   Thought Process:  concrete   Thought Content:  obsessions   Perceptual Disturbances: None   Risk Potential: Denied SI/HI  Potential for aggression: no   Sensorium:  person and place   Cognition:  grossly intact   Consciousness:  alert and awake    Recent and Remote Memory limited   Attention: attention span appeared shorter than expected for age   Insight:  limited   Judgment: limited   Gait/Station: normal gait/station and normal balance   Motor Activity: mild right worse than left hand tremor     Progress Toward Goals: unchanged    Recommended Treatment: Continue with group therapy, milieu therapy and occupational therapy  1   Continue current medications  2  Disposition planning  Currently awaiting group home placement  Risks, benefits and possible side effects of Medications:   Risks, benefits, and possible side effects of medications explained to patient and patient verbalizes understanding        lAbaro Pineda PA-C

## 2019-03-15 PROCEDURE — 99232 SBSQ HOSP IP/OBS MODERATE 35: CPT | Performed by: PSYCHIATRY & NEUROLOGY

## 2019-03-15 RX ADMIN — RISPERIDONE 1 MG: 1 TABLET ORAL at 08:27

## 2019-03-15 RX ADMIN — BENZTROPINE MESYLATE 1 MG: 1 TABLET ORAL at 17:01

## 2019-03-15 RX ADMIN — ALUMINUM HYDROXIDE, MAGNESIUM HYDROXIDE, AND SIMETHICONE 15 ML: 200; 200; 20 SUSPENSION ORAL at 09:23

## 2019-03-15 RX ADMIN — PROPRANOLOL HYDROCHLORIDE 20 MG: 20 TABLET ORAL at 08:27

## 2019-03-15 RX ADMIN — ALUMINUM HYDROXIDE, MAGNESIUM HYDROXIDE, AND SIMETHICONE 15 ML: 200; 200; 20 SUSPENSION ORAL at 17:37

## 2019-03-15 RX ADMIN — LORAZEPAM 1 MG: 1 TABLET ORAL at 17:59

## 2019-03-15 RX ADMIN — DIVALPROEX SODIUM 750 MG: 500 TABLET, DELAYED RELEASE ORAL at 22:40

## 2019-03-15 RX ADMIN — RISPERIDONE 2 MG: 2 TABLET ORAL at 17:01

## 2019-03-15 RX ADMIN — DIVALPROEX SODIUM 500 MG: 500 TABLET, DELAYED RELEASE ORAL at 08:27

## 2019-03-15 RX ADMIN — PROPRANOLOL HYDROCHLORIDE 20 MG: 20 TABLET ORAL at 22:40

## 2019-03-15 RX ADMIN — BENZTROPINE MESYLATE 1 MG: 1 TABLET ORAL at 08:27

## 2019-03-15 RX ADMIN — ACETAMINOPHEN 325 MG: 325 TABLET ORAL at 13:47

## 2019-03-15 RX ADMIN — LORAZEPAM 1 MG: 1 TABLET ORAL at 09:55

## 2019-03-15 NOTE — PROGRESS NOTES
Pt given prn ativan at 0955 for deleon score of 15  Pt was banging the phone and yelling, upset with his father, pt difficult to redirect at that time  Pt requested medication to help him calm down  Pt later played several hands of TC with this RN and nursing students, able to sit still and concentrate, reports feeling calmer as of this time  Prn was effective

## 2019-03-15 NOTE — PROGRESS NOTES
Pt received Ativan 1 mg PO at 1759 for anxiety, pt cursing at staff when directed away from the nurse's station, increasingly restless and irritable  Pt sleeping on reassessment  Pt also received PRN Mylanta 15 mL PO for stomach upset which was effective per pt

## 2019-03-15 NOTE — PROGRESS NOTES
Progress Note - Behavioral Health   Sarah Solomon 22 y o  male MRN: [id number]  Unit/Bed#: [facility] 251-01 Encounter: [id number]    Assessment/Plan   Principal Problem:    Schizoaffective disorder, bipolar type ([facility] 75 )  Active Problems:    Intellectual disability    BMI 45 0-49 9, adult ([facility] )    Encounter for examination and observation for other specified reasons    Ingrown left big toenail      Subjective: Greets this writer when MD arrived on unit and smiles "good bye!" Later walked into MD office, looked around, said he was doing fine and showed bracelet when asked his name  Remains concrete, impovrished, compliant with medications and disposition is barrier at this time for a safe discharge  PRN ativan given yesterday at 1126      Current Medications:    Current Facility-Administered Medications:  acetaminophen 325 mg Oral Q6H PRN Sherif Vides   acetaminophen 650 mg Oral Q6H PRN Luisa Budds, PA-C   acetaminophen 975 mg Oral Q6H PRN Liusa Budds, PA-C   albuterol 2 puff Inhalation Q8H PRN Sherif Vides   aluminum-magnesium hydroxide-simethicone 15 mL Oral Q4H PRN Kwame Dumont MD   benztropine 2 mg Intramuscular Q6H PRN Luisa Budds, PA-C   benztropine 1 mg Oral BID Melisa Wang, PA-C   benztropine 1 mg Oral Q6H PRN Luisa Budds, PA-C   divalproex sodium 500 mg Oral QAM Sheriflia Vides   divalproex sodium 750 mg Oral HS Sherif Vides   haloperidol 5 mg Oral Q6H PRN Luisa Budds, PA-C   haloperidol lactate 5 mg Intramuscular Q6H PRN Luisa Budds, PA-C   LORazepam 2 mg Intramuscular Q6H PRN Luisa Budds, PA-C   LORazepam 1 mg Oral Q8H PRN Luisa Budds, PA-C   magnesium hydroxide 20 mL Oral Daily PRN Kwame Dumont MD   mupirocin  Topical Daily Bebeto Cola, DPM   propranolol 20 mg Oral Q12H [facility] Luisa Dhaliwal, PA-C   risperiDONE 1 mg Oral Q6H PRN Kwame Dumont MD   risperiDONE 1 mg Oral Daily Luisa Budds, PA-C   risperiDONE 2 mg Oral After Tempie Come Mayco Hawk PA-C   traZODone 50 mg Oral HS PRN Cortez Pina MD       Behavioral Health Medications:   all current active meds have been reviewed and current meds:   Current Facility-Administered Medications   Medication Dose Route Frequency    acetaminophen (TYLENOL) tablet 325 mg  325 mg Oral Q6H PRN    acetaminophen (TYLENOL) tablet 650 mg  650 mg Oral Q6H PRN    acetaminophen (TYLENOL) tablet 975 mg  975 mg Oral Q6H PRN    albuterol (PROVENTIL HFA,VENTOLIN HFA) inhaler 2 puff  2 puff Inhalation Q8H PRN    aluminum-magnesium hydroxide-simethicone (MYLANTA) 200-200-20 mg/5 mL oral suspension 15 mL  15 mL Oral Q4H PRN    benztropine (COGENTIN) injection 2 mg  2 mg Intramuscular Q6H PRN    benztropine (COGENTIN) tablet 1 mg  1 mg Oral BID    benztropine (COGENTIN) tablet 1 mg  1 mg Oral Q6H PRN    divalproex sodium (DEPAKOTE) EC tablet 500 mg  500 mg Oral QAM    divalproex sodium (DEPAKOTE) EC tablet 750 mg  750 mg Oral HS    haloperidol (HALDOL) tablet 5 mg  5 mg Oral Q6H PRN    haloperidol lactate (HALDOL) injection 5 mg  5 mg Intramuscular Q6H PRN    LORazepam (ATIVAN) 2 mg/mL injection 2 mg  2 mg Intramuscular Q6H PRN    LORazepam (ATIVAN) tablet 1 mg  1 mg Oral Q8H PRN    magnesium hydroxide (MILK OF MAGNESIA) 400 mg/5 mL oral suspension 20 mL  20 mL Oral Daily PRN    mupirocin (BACTROBAN) 2 % ointment   Topical Daily    propranolol (INDERAL) tablet 20 mg  20 mg Oral Q12H ÁNGELA    risperiDONE (RisperDAL M-TABS) dispersible tablet 1 mg  1 mg Oral Q6H PRN    risperiDONE (RisperDAL) tablet 1 mg  1 mg Oral Daily    risperiDONE (RisperDAL) tablet 2 mg  2 mg Oral After Dinner    traZODone (DESYREL) tablet 50 mg  50 mg Oral HS PRN     Vital signs in last 24 hours:  Temp:  [97 4 °F (36 3 °C)] 97 4 °F (36 3 °C)  HR:  [90-94] 94  Resp:  [18] 18  BP: (124-142)/(66-82) 124/66    Laboratory results:    I have personally reviewed all pertinent laboratory/tests results    Most Recent Labs:   Lab Results   Component Value Date    WBC 8 88 02/14/2019    RBC 4 72 02/14/2019    HGB 14 2 02/14/2019    HCT 41 7 02/14/2019     02/14/2019    RDW 12 8 02/14/2019    NEUTROABS 3 91 02/14/2019    SODIUM 141 02/05/2019    K 4 0 02/05/2019     02/05/2019    CO2 26 02/05/2019    BUN 13 02/05/2019    CREATININE 0 68 02/05/2019    GLUC 99 02/05/2019    CALCIUM 8 5 02/05/2019    AST 19 02/05/2019    ALT 42 02/05/2019    ALKPHOS 67 02/05/2019    TP 7 4 02/05/2019    ALB 3 5 02/05/2019    TBILI 0 20 02/05/2019    CHOLESTEROL 169 03/02/2019    HDL 29 (L) 03/02/2019    TRIG 189 (H) 03/02/2019    LDLCALC 102 (H) 03/02/2019    NONHDLC 140 03/02/2019    VALPROICTOT 87 02/05/2019    AMMONIA 27 01/13/2018    UCH8ALRDEFJV 4 114 (H) 02/14/2019    FREET4 0 99 02/14/2019    T3FREE 3 03 03/24/2017    RPR Non-Reactive 04/21/2018    HGBA1C 5 2 03/02/2019     03/02/2019       Psychiatric Review of Systems:  Behavior over the last 24 hours:  unchanged  Sleep: normal  Appetite: normal  Medication side effects: Yes tremor  ROS: no complaints    Mental Status Evaluation:  Appearance:  casually dressed   Behavior:  childlike   Speech:  loud   Mood:  euthymic   Affect:  mood-congruent   Language naming objects   Thought Process:  concrete   Thought Content:  obsessions   Perceptual Disturbances: None   Risk Potential: Suicidal Ideations none and Homicidal Ideations none   Sensorium:  person, place and situation   Cognition:  grossly intact   Consciousness:  alert and awake    Attention: attention span appeared shorter than expected for age   Insight:  limited   Judgment: limited   Intellect    Gait/Station: normal gait/station   Motor Activity: tremor     Memory: Short and long term memory  poor     Progress Toward Goals: unchanged    Recommended Treatment:     Continue with group therapy, milieu therapy and occupational therapy      Continue following current medications:   Current Facility-Administered Medications:  acetaminophen 325 mg Oral Q6H PRN [facility]   acetaminophen 650 mg Oral Q6H PRN [place], PA-C   acetaminophen 975 mg Oral Q6H PRN [place], PA-C   albuterol 2 puff Inhalation Q8H PRN [facility]   aluminum-magnesium hydroxide-simethicone 15 mL Oral Q4H PRN Maggy Pruitt MD   benztropine 2 mg Intramuscular Q6H PRN [place], PA-C   benztropine 1 mg Oral BID Melisa Jordannte, PA-C   benztropine 1 mg Oral Q6H PRN [place], PA-C   divalproex sodium 500 mg Oral QAM [facility]   divalproex sodium 750 mg Oral HS [facility]   haloperidol 5 mg Oral Q6H PRN [place], PA-C   haloperidol lactate 5 mg Intramuscular Q6H PRN [place], PA-C   LORazepam 2 mg Intramuscular Q6H PRN [place], PA-C   LORazepam 1 mg Oral Q8H PRN Vicki Shirk, PA-C   magnesium hydroxide 20 mL Oral Daily PRN Maggy Pruitt MD   mupirocin  Topical Daily Wesly Nestle, DPM   propranolol 20 mg Oral Q12H [facility] Vicki Mercado, BEKAH   risperiDONE 1 mg Oral Q6H PRN Maggy Pruitt MD   risperiDONE 1 mg Oral Daily Vicki Mercado, BEKAH   risperiDONE 2 mg Oral After Valdez Soup, BEKAH   traZODone 50 mg Oral HS PRN Maggy Pruitt MD       Risks, benefits and possible side effects of Medications:   Risks, benefits, and possible side effects of medications explained to patient and patient verbalizes understanding  This note has been constructed using a voice recognition system  There may be translation, syntax,  or grammatical errors  If you have any questions, please contact the dictating provider

## 2019-03-15 NOTE — PROGRESS NOTES
Pt remains much the same, expressing some sadness over still being in the hospital and resulting frustration is evident in his demeanor  He brightens on approach and can be distracted by joking and interacting with staff  Continues to seek out staff for approval and positive feedback

## 2019-03-16 PROCEDURE — 99231 SBSQ HOSP IP/OBS SF/LOW 25: CPT | Performed by: PSYCHIATRY & NEUROLOGY

## 2019-03-16 RX ADMIN — ACETAMINOPHEN 975 MG: 325 TABLET ORAL at 08:52

## 2019-03-16 RX ADMIN — PROPRANOLOL HYDROCHLORIDE 20 MG: 20 TABLET ORAL at 21:01

## 2019-03-16 RX ADMIN — DIVALPROEX SODIUM 750 MG: 500 TABLET, DELAYED RELEASE ORAL at 21:00

## 2019-03-16 RX ADMIN — PROPRANOLOL HYDROCHLORIDE 20 MG: 20 TABLET ORAL at 08:38

## 2019-03-16 RX ADMIN — RISPERIDONE 2 MG: 2 TABLET ORAL at 17:29

## 2019-03-16 RX ADMIN — BENZTROPINE MESYLATE 1 MG: 1 TABLET ORAL at 08:38

## 2019-03-16 RX ADMIN — DIVALPROEX SODIUM 500 MG: 500 TABLET, DELAYED RELEASE ORAL at 08:38

## 2019-03-16 RX ADMIN — BENZTROPINE MESYLATE 1 MG: 1 TABLET ORAL at 17:28

## 2019-03-16 RX ADMIN — MUPIROCIN: 20 OINTMENT TOPICAL at 11:52

## 2019-03-16 RX ADMIN — RISPERIDONE 1 MG: 1 TABLET ORAL at 08:38

## 2019-03-16 RX ADMIN — ACETAMINOPHEN 975 MG: 325 TABLET ORAL at 16:09

## 2019-03-16 NOTE — PROGRESS NOTES
Progress Note - [address] 22 y o  male MRN: @MRN   Unit/Bed#: [facility] 251-01 Encounter: [id number]    Per nursing report and sign out, patient has been intrusive and irritable because he is ready to leave    Roxanna Session reports today that he is doing great  No depression or anxiety, no SI or HI, no AVH  Denies everything, no concerns  Looking forward to discharge  Energy: good  Sleep: normal  Appetite: normal  Medication side effects: No   ROS: no complaints    Mental Status Evaluation:    Appearance:  age appropriate   Behavior:  Childlike    Speech:  loud   Mood:  euthymic   Affect:  constricted       Thought Process:  concrete   Associations: intact associations   Thought Content:  normal and appropriate   Perceptual Disturbances: no auditory or visual hallcunations   Risk Potential: Suicidal ideation - None  Homicidal ideation - None  Potential for aggression - No   Sensorium:  A&Ox3   Cognition:  grossly intact   Consciousness:  Alert & Awake   Memory:  recent and remote memory grossly intact   Attention: attention span and concentration appear shorter than expected for age   Intellect: not examined       Insight:  limited   Judgment: limited   Muscle Strength  Muscle Tone normal  normal   Gait/Station: normal gait/station with good balance   Motor Activity: no abnormal movements       Vital signs in last 24 hours:    Temp:  [97 3 °F (36 3 °C)-97 9 °F (36 6 °C)] 97 3 °F (36 3 °C)  HR:  [] 112  Resp:  [18] 18  BP: (125-132)/(60-79) 132/79    Laboratory results:  I have personally reviewed all pertinent laboratory/tests results      Assessment/Plan   Principal Problem:    Schizoaffective disorder, bipolar type ([facility] )  Active Problems:    Intellectual disability    BMI 45 0-49 9, adult ([facility] )    Encounter for examination and observation for other specified reasons    Ingrown left big toenail      Roxanna Session is about the same    Recommended Treatment:     Planned medication and treatment changes: All current active medications have been reviewed  Encourage group therapy, milieu therapy and occupational therapy  [address] checks as unit standard unless ordered or noted otherwise      Current Facility-Administered Medications:  acetaminophen 325 mg Oral Q6H PRN Sherif Vides   acetaminophen 650 mg Oral Q6H PRN Melvena Jorge, PA-C   acetaminophen 975 mg Oral Q6H PRN Melvena Jorge, PA-C   albuterol 2 puff Inhalation Q8H PRN Sheriflia Vides   aluminum-magnesium hydroxide-simethicone 15 mL Oral Q4H PRN Debbi Hernandez MD   benztropine 2 mg Intramuscular Q6H PRN Melvena Jorge, PA-C   benztropine 1 mg Oral BID Melisa Wang, PA-BLAISE   benztropine 1 mg Oral Q6H PRN Melkatherinea Jorge, PA-C   divalproex sodium 500 mg Oral QAM Sherif Vides   divalproex sodium 750 mg Oral HS [facility]   haloperidol 5 mg Oral Q6H PRN Melvena Jorge, PA-C   haloperidol lactate 5 mg Intramuscular Q6H PRN Melvena Jorge, PA-C   LORazepam 2 mg Intramuscular Q6H PRN Melvena Jorge, PA-C   LORazepam 1 mg Oral Q8H PRN Melvena Jorge, PA-C   magnesium hydroxide 20 mL Oral Daily PRN Debbi Hernandez MD   mupirocin  Topical Daily Kevin Ped, DPM   propranolol 20 mg Oral Q12H [address] Melkatherinea Jorge, BEKAH   risperiDONE 1 mg Oral Q6H PRN Debbi Hernandez MD   risperiDONE 1 mg Oral Daily Melvena Jorge, BEKAH   risperiDONE 2 mg Oral After Valdez Soup, BEKAH   traZODone 50 mg Oral HS PRN Debbi Hernandez MD       1) continue current treatment  2) Continue to support patient and engage them in the programs available as feasible and appropriate  Continue case management support and therapy  Continue discharge planning  Risks / Benefits of Treatment:    Risks, benefits, and possible side effects of medications explained to patient and patient verbalizes understanding and agreement for treatment      Counseling / Coordination of Care:    Medication changes reviewed with nursing staff  Medications, treatment progress and treatment plan reviewed with patient        Daxa Mccall III, DO  3/16/2019  8:29 AM

## 2019-03-16 NOTE — PROGRESS NOTES
Med Note:  PRN Tylenol 975 mg given for low back pain, 10/10  On reassessment pt report pain 0/10 - no pain  Medication effective

## 2019-03-16 NOTE — PROGRESS NOTES
Pt calm, pleasant and cooperative on this shift, denying symptoms  Out in milieu throughout shift, frequently at nurse's station but able to follow redirection

## 2019-03-16 NOTE — PROGRESS NOTES
Wound Care completed bedside to pt's Left Great toe  Toe was cleaned with saline and Bacitracin was applied and covered with sterile guaze and paper tape  Pt denies pain  Area surrounding nail of great toe is reddened

## 2019-03-16 NOTE — PROGRESS NOTES
Pt intrusive at nurses station, requiring redirect  Became irritable with staff and difficult to redirect  Received Ativan po which has been effective  Pt is currently sleeping in bed

## 2019-03-16 NOTE — PROGRESS NOTES
Pt less irritable this morning  Pleasant during interaction but remains loud and intrusive  Discussed behavior expectations  Offered pt dvd player to use in afternoon if pt remains cooperative throughout shift

## 2019-03-17 PROCEDURE — 99231 SBSQ HOSP IP/OBS SF/LOW 25: CPT | Performed by: PSYCHIATRY & NEUROLOGY

## 2019-03-17 RX ADMIN — BENZTROPINE MESYLATE 1 MG: 1 TABLET ORAL at 18:42

## 2019-03-17 RX ADMIN — RISPERIDONE 1 MG: 1 TABLET ORAL at 08:04

## 2019-03-17 RX ADMIN — DIVALPROEX SODIUM 500 MG: 500 TABLET, DELAYED RELEASE ORAL at 08:04

## 2019-03-17 RX ADMIN — ACETAMINOPHEN 325 MG: 325 TABLET ORAL at 15:58

## 2019-03-17 RX ADMIN — PROPRANOLOL HYDROCHLORIDE 20 MG: 20 TABLET ORAL at 08:03

## 2019-03-17 RX ADMIN — BENZTROPINE MESYLATE 1 MG: 1 TABLET ORAL at 08:04

## 2019-03-17 RX ADMIN — DIVALPROEX SODIUM 750 MG: 500 TABLET, DELAYED RELEASE ORAL at 21:22

## 2019-03-17 RX ADMIN — ACETAMINOPHEN 975 MG: 325 TABLET ORAL at 08:29

## 2019-03-17 RX ADMIN — RISPERIDONE 2 MG: 2 TABLET ORAL at 18:42

## 2019-03-17 RX ADMIN — PROPRANOLOL HYDROCHLORIDE 20 MG: 20 TABLET ORAL at 21:22

## 2019-03-17 NOTE — PROGRESS NOTES
Pt at RN station often  Needs redirection but overall cooperative  Mood improved  Has not been irritable throughout day  Pt says he spoke to his mom today and she is back home now

## 2019-03-17 NOTE — PROGRESS NOTES
Med Note: 0829 PRN Tylenol 975 mg given for headache, 10/10  On reassessment pt reports pain 0/10 - no pain  Medication effective

## 2019-03-17 NOTE — PROGRESS NOTES
Pt awake at start of shift  Comes to RN station often and needs redirection  Pt less irritable than previously  Pt somewhat hyper but is redirectable  Tells writer he was good yesterday and will be today  Pt showered upon awakening

## 2019-03-17 NOTE — CASE MANAGEMENT
CM received a phone call from Doreen Gabriel at [facility], who said that she would like to complete the phone assessment with Pt  CM reviewed that Pt was currently in a group, & she said just to have him call afterwards  CM provided MHT with the phone number & she agreed to assist Pt with calling

## 2019-03-17 NOTE — PROGRESS NOTES
Progress Note - [address] 22 y o  male MRN: @MRN   Unit/Bed#: [facility] 251-01 Encounter: [id number]    Per nursing report and sign out, patient had a better day    Antoinette Chong reports today that he is "Good"  Smiles, denies everything when asked    Energy: good  Sleep: normal  Appetite: normal  Medication side effects: No   ROS: no complaints    Mental Status Evaluation:    Appearance:  age appropriate   Behavior:  childlike   Speech:  loud, paucity of speech   Mood:  euthymic   Affect:  mood congruent, constricted       Thought Process:  concrete   Associations: intact associations   Thought Content:  normal and appropriate   Perceptual Disturbances: no auditory or visual hallcunations   Risk Potential: Suicidal ideation - None  Homicidal ideation - None  Potential for aggression - No   Sensorium:  Grossly oriented   Cognition:  grossly intact   Consciousness:  Alert & Awake   Memory:  recent and remote memory grossly intact   Attention: attention span and concentration appear shorter than expected for age   Intellect: not examined       Insight:  limited   Judgment: limited   Muscle Strength  Muscle Tone normal  normal   Gait/Station: normal gait/station with good balance   Motor Activity: no abnormal movements       Vital signs in last 24 hours:    Temp:  [97 °F (36 1 °C)-97 8 °F (36 6 °C)] 97 °F (36 1 °C)  HR:  [] 105  Resp:  [16-17] 17  BP: (110-128)/(54-76) 128/76    Laboratory results:  I have personally reviewed all pertinent laboratory/tests results  Assessment/Plan   Principal Problem:    Schizoaffective disorder, bipolar type ([facility] )  Active Problems:    Intellectual disability    BMI 45 0-49 9, adult ([facility] )    Encounter for examination and observation for other specified reasons    Ingrown left big toenail      Antoinette Chong is about the same    Recommended Treatment:     Planned medication and treatment changes:     All current active medications have been reviewed  Encourage group therapy, milieu therapy and occupational therapy  [address] [facility] standard unless ordered or noted otherwise      Current Facility-Administered Medications:  acetaminophen 325 mg Oral Q6H PRN [facility]   acetaminophen 650 mg Oral Q6H PRN Deretha Cloud, PA-C   acetaminophen 975 mg Oral Q6H PRN Deretha Cloud, PA-C   albuterol 2 puff Inhalation Q8H PRN [facility]   aluminum-magnesium hydroxide-simethicone 15 mL Oral Q4H PRN Cayla Manzo MD   benztropine 2 mg Intramuscular Q6H PRN Deretha Cloud, PA-C   benztropine 1 mg Oral BID Melisa Corrente, PA-C   benztropine 1 mg Oral Q6H PRN Deretha Cloud, PA-C   divalproex sodium 500 mg Oral QAM [facility]   divalproex sodium 750 mg Oral HS [facility]   haloperidol 5 mg Oral Q6H PRN Deretha Cloud, PA-C   haloperidol lactate 5 mg Intramuscular Q6H PRN Deretha Cloud, PA-C   LORazepam 2 mg Intramuscular Q6H PRN Deretha Cloud, PA-C   LORazepam 1 mg Oral Q8H PRN Deretha De[place], PA-C   magnesium hydroxide 20 mL Oral Daily PRN Cayla Manzo MD   mupirocin  Topical Daily [facility]   propranolol 20 mg Oral Q12H [facility] Deretha [place], PA-C   risperiDONE 1 mg Oral Q6H PRN Cayla Manzo MD   risperiDONE 1 mg Oral Daily Deretha [place], PA-C   risperiDONE 2 mg Oral After Valdez Soup, BEKAH   traZODone 50 mg Oral HS PRN Cayla Manzo MD       1) continue current treatment  2) Continue to support patient and engage them in the programs available as feasible and appropriate  Continue case management support and therapy  Continue discharge planning  Risks / Benefits of Treatment:    Risks, benefits, and possible side effects of medications explained to patient and patient verbalizes understanding and agreement for treatment  Counseling / Coordination of Care:    Medication changes reviewed with nursing staff    Medications, treatment progress and treatment plan reviewed with patient        Sherrell Berhane CHATMAN, DO  3/17/2019  9:02 AM

## 2019-03-18 PROCEDURE — 99232 SBSQ HOSP IP/OBS MODERATE 35: CPT | Performed by: PSYCHIATRY & NEUROLOGY

## 2019-03-18 RX ORDER — DIVALPROEX SODIUM 500 MG/1
500 TABLET, DELAYED RELEASE ORAL 2 TIMES DAILY
Qty: 60 TABLET | Refills: 1 | Status: SHIPPED | OUTPATIENT
Start: 2019-03-18 | End: 2019-05-22 | Stop reason: HOSPADM

## 2019-03-18 RX ORDER — BENZTROPINE MESYLATE 1 MG/1
1 TABLET ORAL 2 TIMES DAILY
Qty: 60 TABLET | Refills: 1 | Status: SHIPPED | OUTPATIENT
Start: 2019-03-18 | End: 2021-06-29 | Stop reason: HOSPADM

## 2019-03-18 RX ORDER — LORAZEPAM 1 MG/1
1 TABLET ORAL EVERY 8 HOURS PRN
Qty: 30 TABLET | Refills: 0 | Status: SHIPPED | OUTPATIENT
Start: 2019-03-18 | End: 2019-11-21 | Stop reason: ALTCHOICE

## 2019-03-18 RX ORDER — RISPERIDONE 1 MG/1
1 TABLET, FILM COATED ORAL DAILY
Qty: 30 TABLET | Refills: 1 | Status: SHIPPED | OUTPATIENT
Start: 2019-03-19 | End: 2019-05-22 | Stop reason: HOSPADM

## 2019-03-18 RX ORDER — RISPERIDONE 2 MG/1
2 TABLET, FILM COATED ORAL
Qty: 30 TABLET | Refills: 1 | Status: SHIPPED | OUTPATIENT
Start: 2019-03-18 | End: 2019-05-22 | Stop reason: HOSPADM

## 2019-03-18 RX ORDER — PROPRANOLOL HYDROCHLORIDE 20 MG/1
20 TABLET ORAL EVERY 12 HOURS SCHEDULED
Qty: 60 TABLET | Refills: 1 | Status: SHIPPED | OUTPATIENT
Start: 2019-03-18 | End: 2019-05-22 | Stop reason: HOSPADM

## 2019-03-18 RX ORDER — ALBUTEROL SULFATE 90 UG/1
2 AEROSOL, METERED RESPIRATORY (INHALATION) EVERY 6 HOURS PRN
Qty: 1 INHALER | Refills: 0 | Status: SHIPPED | OUTPATIENT
Start: 2019-03-18

## 2019-03-18 RX ORDER — DIVALPROEX SODIUM 250 MG/1
TABLET, DELAYED RELEASE ORAL
Qty: 30 TABLET | Refills: 0 | Status: SHIPPED | OUTPATIENT
Start: 2019-03-18 | End: 2019-05-22 | Stop reason: HOSPADM

## 2019-03-18 RX ADMIN — ACETAMINOPHEN 325 MG: 325 TABLET ORAL at 12:13

## 2019-03-18 RX ADMIN — RISPERIDONE 2 MG: 2 TABLET ORAL at 18:36

## 2019-03-18 RX ADMIN — MUPIROCIN: 20 OINTMENT TOPICAL at 09:47

## 2019-03-18 RX ADMIN — BENZTROPINE MESYLATE 1 MG: 1 TABLET ORAL at 08:49

## 2019-03-18 RX ADMIN — DIVALPROEX SODIUM 750 MG: 500 TABLET, DELAYED RELEASE ORAL at 21:18

## 2019-03-18 RX ADMIN — BENZTROPINE MESYLATE 1 MG: 1 TABLET ORAL at 18:36

## 2019-03-18 RX ADMIN — PROPRANOLOL HYDROCHLORIDE 20 MG: 20 TABLET ORAL at 08:49

## 2019-03-18 RX ADMIN — RISPERIDONE 1 MG: 1 TABLET ORAL at 08:49

## 2019-03-18 RX ADMIN — PROPRANOLOL HYDROCHLORIDE 20 MG: 20 TABLET ORAL at 21:18

## 2019-03-18 RX ADMIN — DIVALPROEX SODIUM 500 MG: 500 TABLET, DELAYED RELEASE ORAL at 08:49

## 2019-03-18 NOTE — CASE MANAGEMENT
KAPIL received a [facility], who reported that they no longer had a bed today, however, were anticipating one for tomorrow  KAPIL reviewed that she is still waiting for [place] to approve the pre-cert  Melani Pineda said that was fine & to call tomorrow around 9 AM     CM sent [facility] request for tomorrow

## 2019-03-18 NOTE — PLAN OF CARE
Problem: SELF HARM/SUICIDALITY  Goal: Will have no self-injury during hospital stay  Description  INTERVENTIONS:  - Q 15 MINUTES: Routine safety checks  - Q WAKING SHIFT & PRN: Assess risk to determine if routine checks are adequate to maintain patient safety  - Encourage patient to participate actively in care by formulating a plan to combat response to suicidal ideation, identify supports and resources   Outcome: Progressing     Problem: PSYCHOSIS  Goal: Will report no hallucinations or delusions  Description  Interventions:  - Administer medication as  ordered  - Every waking shifts and PRN assess for the presence of hallucinations and or delusions  - Assist with reality testing to support increasing orientation  - Assess if patient's hallucinations or delusions are encouraging self-harm or harm to others and intervene as appropriate   Outcome: Progressing     Problem: Risk for Violence/Aggression Toward Others  Goal: Verbalize thoughts and feelings  Description  Interventions:  - Assess and re-assess patient's level of risk, every waking shift  - Engage patient in 1:1 interactions, daily, for a minimum of 15 minutes   - Allow patient to express feelings and frustrations in a safe and non-threatening manner   - Establish rapport/trust with patient    Outcome: Progressing     Problem: Ineffective Coping  Goal: Participates in unit activities  Description  Interventions:  - Provide therapeutic environment   - Provide required programming   - Redirect inappropriate behaviors    Outcome: Progressing     Problem: Nutrition/Hydration-ADULT  Goal: Nutrient/Hydration intake appropriate for improving, restoring or maintaining nutritional needs  Description  Monitor and assess patient's nutrition/hydration status for malnutrition (ex- brittle hair, bruises, dry skin, pale skin and conjunctiva, muscle wasting, smooth red tongue, and disorientation)   Collaborate with interdisciplinary team and initiate plan and interventions as ordered  Monitor patient's weight and dietary intake as ordered or per policy  Utilize nutrition screening tool and intervene per policy  Determine patient's food preferences and provide high-protein, high-caloric foods as appropriate       INTERVENTIONS:  - Monitor oral intake, urinary output, labs, and treatment plans  - Assess nutrition and hydration status and recommend course of action  - Evaluate amount of meals eaten  - Assist patient with eating if necessary   - Allow adequate time for meals  - Recommend/ encourage appropriate diets, oral nutritional supplements, and vitamin/mineral supplements  - Order, calculate, and assess calorie counts as needed  - Recommend, monitor, and adjust tube feedings and TPN/PPN based on assessed needs  - Assess need for intravenous fluids  - Provide specific nutrition/hydration education as appropriate  - Include patient/family/caregiver in decisions related to nutrition   Outcome: Progressing

## 2019-03-18 NOTE — PROGRESS NOTES
Progress Note - Behavioral Health   Arpan Hendrickson 22 y o  male MRN: [id number]  Unit/Bed#: [facility] 251-01 Encounter: [id number]    Assessment/Plan   Principal Problem:    Schizoaffective disorder, bipolar type ([facility] 75 )  Active Problems:    Intellectual disability    BMI 45 0-49 9, adult ([facility] )    Encounter for examination and observation for other specified reasons    Ingrown left big toenail      Subjective:  Patient awaiting discharge to crisis residence at this time  Plan is for crisis residence stay until placement in group home  Patient has been pleasant and appropriate on unit  Does get over stimulated at times with acuteness in the milieu  Is overall redirectable  Was less boisterous and loud today  States he feels well  Seen with bright affect  Denied most psychiatric symptoms  [place] in thought process and answers in yes or no  Medication compliant  Remains with mild right worse than left hand tremors  Denied additional somatic complaints  Awaiting discharge at this time      Current Medications:  Current Facility-Administered Medications   Medication Dose Route Frequency    acetaminophen (TYLENOL) tablet 325 mg  325 mg Oral Q6H PRN    acetaminophen (TYLENOL) tablet 650 mg  650 mg Oral Q6H PRN    acetaminophen (TYLENOL) tablet 975 mg  975 mg Oral Q6H PRN    albuterol (PROVENTIL HFA,VENTOLIN HFA) inhaler 2 puff  2 puff Inhalation Q8H PRN    aluminum-magnesium hydroxide-simethicone (MYLANTA) 200-200-20 mg/5 mL oral suspension 15 mL  15 mL Oral Q4H PRN    benztropine (COGENTIN) injection 2 mg  2 mg Intramuscular Q6H PRN    benztropine (COGENTIN) tablet 1 mg  1 mg Oral BID    benztropine (COGENTIN) tablet 1 mg  1 mg Oral Q6H PRN    divalproex sodium (DEPAKOTE) EC tablet 500 mg  500 mg Oral QAM    divalproex sodium (DEPAKOTE) EC tablet 750 mg  750 mg Oral HS    haloperidol (HALDOL) tablet 5 mg  5 mg Oral Q6H PRN    haloperidol lactate (HALDOL) injection 5 mg  5 mg Intramuscular Q6H PRN    LORazepam (ATIVAN) 2 mg/mL injection 2 mg  2 mg Intramuscular Q6H PRN    LORazepam (ATIVAN) tablet 1 mg  1 mg Oral Q8H PRN    magnesium hydroxide (MILK OF MAGNESIA) 400 mg/5 mL oral suspension 20 mL  20 mL Oral Daily PRN    mupirocin (BACTROBAN) 2 % ointment   Topical Daily    propranolol (INDERAL) tablet 20 mg  20 mg Oral Q12H [address]    risperiDONE (RisperDAL M-TABS) dispersible tablet 1 mg  1 mg Oral Q6H PRN    risperiDONE (RisperDAL) tablet 1 mg  1 mg Oral Daily    risperiDONE (RisperDAL) tablet 2 mg  2 mg Oral After Dinner    traZODone (DESYREL) tablet 50 mg  50 mg Oral HS PRN       Behavioral Health Medications: all current active meds have been reviewed and continue current psychiatric medications  Vitals:  Vitals:    03/18/19 0821   BP: 135/63   Pulse: 81   Resp: 20   Temp: (!) 97 4 °F (36 3 °C)       Laboratory results:    I have personally reviewed all pertinent laboratory/tests results    Most Recent Labs:   Lab Results   Component Value Date    WBC 8 88 02/14/2019    RBC 4 72 02/14/2019    HGB 14 2 02/14/2019    HCT 41 7 02/14/2019     02/14/2019    RDW 12 8 02/14/2019    NEUTROABS 3 91 02/14/2019    SODIUM 141 02/05/2019    K 4 0 02/05/2019     02/05/2019    CO2 26 02/05/2019    BUN 13 02/05/2019    CREATININE 0 68 02/05/2019    GLUC 99 02/05/2019    CALCIUM 8 5 02/05/2019    AST 19 02/05/2019    ALT 42 02/05/2019    ALKPHOS 67 02/05/2019    TP 7 4 02/05/2019    ALB 3 5 02/05/2019    TBILI 0 20 02/05/2019    CHOLESTEROL 169 03/02/2019    HDL 29 (L) 03/02/2019    TRIG 189 (H) 03/02/2019    LDLCALC 102 (H) 03/02/2019    NONHDLC 140 03/02/2019    VALPROICTOT 87 02/05/2019    AMMONIA 27 01/13/2018    JIO7OJPWHEKL 4 114 (H) 02/14/2019    FREET4 0 99 02/14/2019    T3FREE 3 03 03/24/2017    RPR Non-Reactive 04/21/2018    HGBA1C 5 2 03/02/2019     03/02/2019       Psychiatric Review of Systems:  Behavior over the last 24 hours:  unchanged  Sleep: normal  Appetite: normal  Medication side effects: Yes, right worse than left hand tremor  ROS: no complaints    Mental Status Evaluation:  Appearance:  casually dressed   Behavior:  cooperative   Speech:  loud   Mood:  euthymic   Affect:  mood-congruent   Language naming objects and repeating phrases   Thought Process:  concrete   Thought Content:  obsessions   Perceptual Disturbances: None   Risk Potential: Denied SI/HI  Potential for aggression: no   Sensorium:  person, place    Cognition:  grossly intact   Consciousness:  alert and awake    Recent and Remote Memory limited   Attention: attention span appeared shorter than expected for age   Insight:  limited   Judgment: limited   Gait/Station: normal gait/station and normal balance   Motor Activity: right > left mild hand tremors     Progress Toward Goals: unchanged    Recommended Treatment: Continue with group therapy, milieu therapy and occupational therapy  1   Continue current medications  2  Disposition planning     Risks, benefits and possible side effects of Medications:   Risks, benefits, and possible side effects of medications explained to patient and patient verbalizes understanding        Mayco Hawk PA-C

## 2019-03-18 NOTE — PROGRESS NOTES
Pt is pleasant and cooperative  Frequenting nurse's station and med station to say "hello " He states anxiety and depression are 0 and has not had any episodes of irritability  Pt is compliant with meds and attends groups  He denies pain in toe, ointment and bandaid applied  Will continue to monitor, provide support and encouragement

## 2019-03-18 NOTE — CASE MANAGEMENT
CM contacted [address]  @ [phone] & spoke with Ady Wick, who said that she was not aware of the status of Pt's admission  She said that someone would call CM  CM called back around 9:30 AM & spoke with Francisco Hall, who reported that they could accept Pt today, if CM had the pre-cert  CM reviewed she was waiting to hear back from [facility] to complete this  CM asked if Pt could come tomorrow, as transportation was a [address] said that they cannot hold the bed for Pt, however, they should have openings, CM would just need to call first thing in the morning  CM said that she would check with Pt's ICM to see if he is available & what other arrangements could be made      CM completed pre-cert with [facility], however, is waiting for supervisor approval

## 2019-03-18 NOTE — CASE MANAGEMENT
KAPIL contacted Pt's ICM through [facility] [place] @ [phone] to inquire if he would be available to assist with transporting Pt to [facility] today?   Fortino Hull said that unfortunately he was busy with other clients & would not be able to assist

## 2019-03-18 NOTE — DISCHARGE INSTRUCTIONS
Schizoaffective Disorder   WHAT YOU NEED TO KNOW:   Schizoaffective disorder is a long-term mental illness that may change how you think, feel, and act around others  You may not know what is real and what is not real    DISCHARGE INSTRUCTIONS:   Medicines:   · Antipsychotics: These medicines help decrease psychotic symptoms or severe agitation  You may need antiparkinson medicine to control muscle stiffness, twitches, and restlessness caused by antipsychotic medicines  · Antianxiety medicine: This medicine may be given to decrease anxiety and help you feel calm and relaxed  · Antidepressants: These medicines are given to decrease or stop the symptoms of depression, anxiety, and behavior problems  · Mood stabilizers: These medicines help control mood swings  · Anticonvulsants: This medicine is given to control seizures  It may also be used to decrease violent behavior and control your mood swings  · Blood pressure medicines: These may be used to help decrease motor tics (uncontrolled movements)  They may also help you feel calmer, more focused, and less irritable  · Anticholinergics: This medicine decreases the side effects of other medicines  · Take your medicine as directed  Contact your healthcare provider if you think your medicine is not helping or if you have side effects  Tell him or her if you are allergic to any medicine  Keep a list of the medicines, vitamins, and herbs you take  Include the amounts, and when and why you take them  Bring the list or the pill bottles to follow-up visits  Carry your medicine list with you in case of an emergency  Follow up with your healthcare provider or psychiatrist as directed: You may need to return to have your blood pressure and other symptoms checked  You may need blood tests to check the level of medicine in your blood  Write down your questions so you remember to ask them during your visits  Manage your symptoms:   The following may help you feel better or prevent symptoms of schizoaffective disorder from coming back:  · Find support for yourself and your family:  Talk with others to help you cope with your illness better  This may also help to improve how you relate to others  · Keep all medical appointments: This will help manage your disease and the side-effects from medicines you may be taking  · Use your medicines as directed:  Put your medicines in a pillbox placed in an area you can easily see  Use a watch with an alarm to help you remember when it is time to take your medicine  Tell your healthcare provider if you know or think you might be pregnant  Do not stop taking your medicines without your healthcare provider's okay  A sudden stop can cause serious medical problems  · Watch for early signs of a relapse and seek help immediately:      ¨ How you think, feel, and see things has changed  ¨ You behave differently than usual     ¨ You become more nervous and upset, but do not know why  ¨ You eat less and have trouble sleeping  ¨ You have little or no interest in friends or activities  For support and more information:   · American [employer] Association  [facility], [facility] [address] , [address]  Phone: [phone]  Phone: [phone]  Web Address: [employer] com br  [place]  · [address] [place] [address], Public Information & Communication Branch  [address] [place] [address], [address] [facility], [address]  Melva Rabago [place] [address]   Phone: [phone]  Phone: [phone]  Web Address: Savanah mancuso  Contact your healthcare provider or psychiatrist if:   · You think you are having a relapse  · You are having side effects from your medicine, or they are not helping  · You are not sleeping well or are sleeping more than usual     · You cannot eat or are eating more than usual     · You have muscle spasms, stiffness, or trouble walking      · Your sad feelings or thoughts change the way you function during the day  · You have questions or concerns about your condition or care  Seek care immediately or call 911 if:   · You feel like hurting or killing yourself or others  · You feel that your condition is getting worse  · You feel very upset, threaten someone, or you feel violent  · You suddenly have changes in your vision  · You suddenly have chest pain, trouble breathing, or a fever  © 2017 [address]  Information is for End User's use only and may not be sold, redistributed or otherwise used for commercial purposes  All illustrations and images included in CareNotes® are the copyrighted property of A D A M , Inc  or Remi Norman  The above information is an  only  It is not intended as medical advice for individual conditions or treatments  Talk to your doctor, nurse or pharmacist before following any medical regimen to see if it is safe and effective for you

## 2019-03-18 NOTE — PROGRESS NOTES
Patient denies all sx  Spoke to Rebelle Bridal about poss discharge this week which he is happy about  Ate dinner and has been napping since

## 2019-03-18 NOTE — CASE MANAGEMENT
CM & Pt reviewed & signed IMM  Pt questioning how much longer he will have to remain in the hospital, & CM reported he would hopefully get to [facility] in the next few days; Pt agreeable

## 2019-03-19 VITALS
HEIGHT: 68 IN | SYSTOLIC BLOOD PRESSURE: 144 MMHG | HEART RATE: 107 BPM | TEMPERATURE: 98.4 F | DIASTOLIC BLOOD PRESSURE: 81 MMHG | BODY MASS INDEX: 47.74 KG/M2 | WEIGHT: 315 LBS | RESPIRATION RATE: 16 BRPM

## 2019-03-19 PROCEDURE — 99239 HOSP IP/OBS DSCHRG MGMT >30: CPT | Performed by: PSYCHIATRY & NEUROLOGY

## 2019-03-19 RX ADMIN — PROPRANOLOL HYDROCHLORIDE 20 MG: 20 TABLET ORAL at 08:08

## 2019-03-19 RX ADMIN — RISPERIDONE 1 MG: 1 TABLET ORAL at 08:08

## 2019-03-19 RX ADMIN — ACETAMINOPHEN 650 MG: 325 TABLET ORAL at 15:35

## 2019-03-19 RX ADMIN — ACETAMINOPHEN 325 MG: 325 TABLET ORAL at 08:24

## 2019-03-19 RX ADMIN — MUPIROCIN: 20 OINTMENT TOPICAL at 08:13

## 2019-03-19 RX ADMIN — DIVALPROEX SODIUM 500 MG: 500 TABLET, DELAYED RELEASE ORAL at 08:09

## 2019-03-19 RX ADMIN — BENZTROPINE MESYLATE 1 MG: 1 TABLET ORAL at 08:09

## 2019-03-19 NOTE — DISCHARGE INSTR - LAB
Contact Information: If you have any questions, concerns, pended studies, tests and/or procedures, or emergencies regarding your inpatient behavioral health visit  Please contact [place] [facility] [phone] and ask to speak to a , nurse or physician  A contact is available 24 hours/ 7 days a week at this number  Summary of Procedures Performed During your Stay:  Below is a list of major procedures performed during your hospital stay and a summary of results:  - No major procedures performed  Pending Studies (From admission, onward)    None        If studies are pending at discharge, follow up with your PCP and/or referring provider

## 2019-03-19 NOTE — NURSING NOTE
Pt discharged to [facility]  PA mentor staff provided transportation to facility  Pt received personal belongings and discharge paperwork  Security informed that pt's secured/bagged belongings needed to be retrieved and delivered to pt in lobby  Pt displayed positive mood at time of discharge

## 2019-03-19 NOTE — CASE MANAGEMENT
CM contacted [facility] @ [phone] & spoke with Hayley Burciaga, regarding the [facility] service arranged for 10:30 to transport Pt  Hayley Burciaga said that at this time, they can't accept Pt, as their discharge is scheduled for 11:30 AM, however, their ride may not come until 2 PM   She said that they would contact CM once the individual has discharged  CM contacted [facility] to cancel the 10:30 transport  CM contacted Pt's ICM, Chapis Flanagan, @ [phone] to see if he would be available later today to help with transportation, but he said he couldn't  Chapis Flanagan agreed to outreach to his supervisor to see if anyone else on the team may be available, & he will update   CM contacted Pt's supports coordinator, Preet Coe, @ [phone] who reported that Pt's name is on the list, however, the [facility] is stating they have conflicting information as to whether Pt can return home or not  They also want to know if Pt's ICM has explored other housing options? CM said that she had asked Pt's ICM, Chapis Flanagan, to maybe look into [facility] in the community for Pt, & the [facility] ([facility]) referral he had submitted was denied by [employer] ; KAPIL expressed frustration that [facility] [address] do not communicate regarding patients with both services  Ember said that she would outreach to Chapis Flanagan, & then update the [facility]

## 2019-03-19 NOTE — PROGRESS NOTES
Pt pleasant during interaction  Not observed to be irritable today  Frequently at RN station needing redirection   Attended to ADLs and ate breakfast

## 2019-03-19 NOTE — CASE MANAGEMENT
CM contacted [facility] @ [phone] & spoke with Tamiko Boykin, who confirmed that their other client just left, & Pt is able to come  CM reviewed she had transportation available at 4:30 PM, & Luis Enrique agreed  CM contacted Adali Antunez from [facility] @ [phone] to confirm he could still provide transportation at 4:30 PM, & he said yes  CM notified staff on the unit  CM contacted Pt's mom, Dara Monroy, @ [phone] & left a message to make her aware that Pt was discharging to [facility] this afternoon, & placement options were being explored by Pt's ICM & Supports Coordinator  CM reviewed that Pt may need access to his February & March SSI check money, since family had reported since his admission on 1/31 that he couldn't return home, he should have have accrued any expenses & all of his monies should be available  CM contacted Pt' , Linda Priest, @ [phone] & made her aware of Pt's discharge

## 2019-03-19 NOTE — CASE MANAGEMENT
KAPIL received a message from Keya Pozo of [facility], who said that David Lemons from his team would be able to provide transportation if needed at 4:30 PM today  He provided phone number [phone] for David Lemons CM met with Pt to review tentative d/c for today  Pt focused on his shoes & getting them back before he leaves

## 2019-03-19 NOTE — CASE MANAGEMENT
KAPIL received a call from Pt's ICM through [facility] [place], Seth Pollock, who inquired if Pt had gone to [facility] yesterday? KAPIL said no, but that she was instructed to call this morning around 9 to confirm availability for today  KAPIL reviewed she already arranged transportation & Seth Pollock agreed he could meet with Pt there, as he has to see him within 24 hours of discharge  CM agreed to keep him updated

## 2019-03-19 NOTE — CASE MANAGEMENT
CM contacted [facility] @ [phone] & spoke with Scott Vance who said that their discharges haven't left yet, so they will call CM back later, as at this moment they don't have a bed, but do have some discharges planned

## 2019-03-29 ENCOUNTER — TELEPHONE (OUTPATIENT)
Dept: PSYCHIATRY | Facility: CLINIC | Age: 24
End: 2019-03-29

## 2019-03-29 NOTE — TELEPHONE ENCOUNTER
Notification sent to the office/Brady Flores by [facility]  Albuterol will need authorization for further refills  Letter faxed back to [facility] noting (at [phone]) Doreen Gracia is not a patient in this office

## 2019-05-13 ENCOUNTER — HOSPITAL ENCOUNTER (EMERGENCY)
Facility: HOSPITAL | Age: 24
End: 2019-05-13
Attending: EMERGENCY MEDICINE | Admitting: EMERGENCY MEDICINE
Payer: MEDICARE

## 2019-05-13 ENCOUNTER — HOSPITAL ENCOUNTER (INPATIENT)
Facility: HOSPITAL | Age: 24
LOS: 9 days | Discharge: HOME | DRG: 885 | End: 2019-05-22
Attending: PSYCHIATRY & NEUROLOGY | Admitting: PSYCHIATRY & NEUROLOGY
Payer: MEDICARE

## 2019-05-13 VITALS
SYSTOLIC BLOOD PRESSURE: 148 MMHG | WEIGHT: 315 LBS | BODY MASS INDEX: 48.27 KG/M2 | RESPIRATION RATE: 95 BRPM | OXYGEN SATURATION: 95 % | DIASTOLIC BLOOD PRESSURE: 77 MMHG | TEMPERATURE: 98.9 F | HEART RATE: 111 BPM

## 2019-05-13 DIAGNOSIS — F25.0 SCHIZOAFFECTIVE DISORDER, BIPOLAR TYPE (HCC): Chronic | ICD-10-CM

## 2019-05-13 DIAGNOSIS — F32.A DEPRESSION, UNSPECIFIED DEPRESSION TYPE: Primary | ICD-10-CM

## 2019-05-13 DIAGNOSIS — R44.0 AUDITORY HALLUCINATIONS: ICD-10-CM

## 2019-05-13 DIAGNOSIS — F63.9 IMPULSE CONTROL DISORDER: Chronic | ICD-10-CM

## 2019-05-13 DIAGNOSIS — F25.0 SCHIZOAFFECTIVE DISORDER, BIPOLAR TYPE (HCC): Primary | Chronic | ICD-10-CM

## 2019-05-13 PROCEDURE — 99285 EMERGENCY DEPT VISIT HI MDM: CPT

## 2019-05-13 PROCEDURE — 99284 EMERGENCY DEPT VISIT MOD MDM: CPT | Performed by: EMERGENCY MEDICINE

## 2019-05-13 PROCEDURE — 80307 DRUG TEST PRSMV CHEM ANLYZR: CPT

## 2019-05-13 PROCEDURE — 82075 ASSAY OF BREATH ETHANOL: CPT

## 2019-05-13 RX ORDER — TRAZODONE HYDROCHLORIDE 50 MG/1
50 TABLET ORAL
Status: DISCONTINUED | OUTPATIENT
Start: 2019-05-13 | End: 2019-05-22 | Stop reason: HOSPADM

## 2019-05-13 RX ORDER — OLANZAPINE 10 MG/1
10 INJECTION, POWDER, LYOPHILIZED, FOR SOLUTION INTRAMUSCULAR EVERY 8 HOURS PRN
Status: CANCELLED | OUTPATIENT
Start: 2019-05-13

## 2019-05-13 RX ORDER — ACETAMINOPHEN 325 MG/1
650 TABLET ORAL EVERY 6 HOURS PRN
Status: CANCELLED | OUTPATIENT
Start: 2019-05-13

## 2019-05-13 RX ORDER — LORAZEPAM 1 MG/1
1 TABLET ORAL EVERY 6 HOURS PRN
Status: CANCELLED | OUTPATIENT
Start: 2019-05-13

## 2019-05-13 RX ORDER — LORAZEPAM 2 MG/ML
2 INJECTION INTRAMUSCULAR EVERY 4 HOURS PRN
Status: DISCONTINUED | OUTPATIENT
Start: 2019-05-13 | End: 2019-05-22 | Stop reason: HOSPADM

## 2019-05-13 RX ORDER — ACETAMINOPHEN 325 MG/1
650 TABLET ORAL EVERY 6 HOURS PRN
Status: DISCONTINUED | OUTPATIENT
Start: 2019-05-13 | End: 2019-05-22 | Stop reason: HOSPADM

## 2019-05-13 RX ORDER — DIVALPROEX SODIUM 250 MG/1
750 TABLET, DELAYED RELEASE ORAL
Status: CANCELLED | OUTPATIENT
Start: 2019-05-13

## 2019-05-13 RX ORDER — HYDROXYZINE HYDROCHLORIDE 25 MG/1
25 TABLET, FILM COATED ORAL EVERY 6 HOURS PRN
Status: DISCONTINUED | OUTPATIENT
Start: 2019-05-13 | End: 2019-05-22 | Stop reason: HOSPADM

## 2019-05-13 RX ORDER — HALOPERIDOL 5 MG
5 TABLET ORAL EVERY 6 HOURS PRN
Status: DISCONTINUED | OUTPATIENT
Start: 2019-05-13 | End: 2019-05-22 | Stop reason: HOSPADM

## 2019-05-13 RX ORDER — RISPERIDONE 1 MG/1
1 TABLET, ORALLY DISINTEGRATING ORAL
Status: DISCONTINUED | OUTPATIENT
Start: 2019-05-13 | End: 2019-05-22 | Stop reason: HOSPADM

## 2019-05-13 RX ORDER — RISPERIDONE 1 MG/1
1 TABLET, ORALLY DISINTEGRATING ORAL
Status: CANCELLED | OUTPATIENT
Start: 2019-05-13

## 2019-05-13 RX ORDER — RISPERIDONE 1 MG/1
2 TABLET, FILM COATED ORAL
Status: CANCELLED | OUTPATIENT
Start: 2019-05-13

## 2019-05-13 RX ORDER — MAGNESIUM HYDROXIDE/ALUMINUM HYDROXICE/SIMETHICONE 120; 1200; 1200 MG/30ML; MG/30ML; MG/30ML
30 SUSPENSION ORAL EVERY 4 HOURS PRN
Status: CANCELLED | OUTPATIENT
Start: 2019-05-13

## 2019-05-13 RX ORDER — DIVALPROEX SODIUM 500 MG/1
500 TABLET, DELAYED RELEASE ORAL DAILY
Status: DISCONTINUED | OUTPATIENT
Start: 2019-05-14 | End: 2019-05-22 | Stop reason: HOSPADM

## 2019-05-13 RX ORDER — ALBUTEROL SULFATE 90 UG/1
2 AEROSOL, METERED RESPIRATORY (INHALATION) EVERY 4 HOURS PRN
Status: CANCELLED | OUTPATIENT
Start: 2019-05-13

## 2019-05-13 RX ORDER — IBUPROFEN 600 MG/1
600 TABLET ORAL EVERY 8 HOURS PRN
Status: CANCELLED | OUTPATIENT
Start: 2019-05-13

## 2019-05-13 RX ORDER — HALOPERIDOL 5 MG
5 TABLET ORAL EVERY 6 HOURS PRN
Status: CANCELLED | OUTPATIENT
Start: 2019-05-13

## 2019-05-13 RX ORDER — BENZTROPINE MESYLATE 1 MG/1
1 TABLET ORAL EVERY 6 HOURS PRN
Status: DISCONTINUED | OUTPATIENT
Start: 2019-05-13 | End: 2019-05-22 | Stop reason: HOSPADM

## 2019-05-13 RX ORDER — BENZTROPINE MESYLATE 1 MG/ML
1 INJECTION INTRAMUSCULAR; INTRAVENOUS EVERY 6 HOURS PRN
Status: DISCONTINUED | OUTPATIENT
Start: 2019-05-13 | End: 2019-05-22 | Stop reason: HOSPADM

## 2019-05-13 RX ORDER — OLANZAPINE 10 MG/1
10 INJECTION, POWDER, LYOPHILIZED, FOR SOLUTION INTRAMUSCULAR EVERY 8 HOURS PRN
Status: DISCONTINUED | OUTPATIENT
Start: 2019-05-13 | End: 2019-05-22 | Stop reason: HOSPADM

## 2019-05-13 RX ORDER — HALOPERIDOL 5 MG/ML
5 INJECTION INTRAMUSCULAR EVERY 6 HOURS PRN
Status: DISCONTINUED | OUTPATIENT
Start: 2019-05-13 | End: 2019-05-22 | Stop reason: HOSPADM

## 2019-05-13 RX ORDER — HALOPERIDOL 5 MG/ML
5 INJECTION INTRAMUSCULAR EVERY 6 HOURS PRN
Status: CANCELLED | OUTPATIENT
Start: 2019-05-13

## 2019-05-13 RX ORDER — ACETAMINOPHEN 325 MG/1
325 TABLET ORAL EVERY 6 HOURS PRN
Status: CANCELLED | OUTPATIENT
Start: 2019-05-13

## 2019-05-13 RX ORDER — BENZTROPINE MESYLATE 1 MG/ML
1 INJECTION INTRAMUSCULAR; INTRAVENOUS EVERY 6 HOURS PRN
Status: CANCELLED | OUTPATIENT
Start: 2019-05-13

## 2019-05-13 RX ORDER — IBUPROFEN 600 MG/1
600 TABLET ORAL EVERY 8 HOURS PRN
Status: DISCONTINUED | OUTPATIENT
Start: 2019-05-13 | End: 2019-05-22 | Stop reason: HOSPADM

## 2019-05-13 RX ORDER — RISPERIDONE 2 MG/1
2 TABLET, FILM COATED ORAL
Status: DISCONTINUED | OUTPATIENT
Start: 2019-05-13 | End: 2019-05-22 | Stop reason: HOSPADM

## 2019-05-13 RX ORDER — ALBUTEROL SULFATE 90 UG/1
2 AEROSOL, METERED RESPIRATORY (INHALATION) EVERY 4 HOURS PRN
Status: DISCONTINUED | OUTPATIENT
Start: 2019-05-13 | End: 2019-05-14 | Stop reason: SDUPTHER

## 2019-05-13 RX ORDER — ACETAMINOPHEN 325 MG/1
325 TABLET ORAL EVERY 6 HOURS PRN
Status: DISCONTINUED | OUTPATIENT
Start: 2019-05-13 | End: 2019-05-22 | Stop reason: HOSPADM

## 2019-05-13 RX ORDER — DIVALPROEX SODIUM 250 MG/1
500 TABLET, DELAYED RELEASE ORAL DAILY
Status: CANCELLED | OUTPATIENT
Start: 2019-05-14

## 2019-05-13 RX ORDER — OLANZAPINE 10 MG/1
10 TABLET ORAL EVERY 8 HOURS PRN
Status: DISCONTINUED | OUTPATIENT
Start: 2019-05-13 | End: 2019-05-22 | Stop reason: HOSPADM

## 2019-05-13 RX ORDER — LORAZEPAM 1 MG/1
1 TABLET ORAL EVERY 6 HOURS PRN
Status: DISCONTINUED | OUTPATIENT
Start: 2019-05-13 | End: 2019-05-22 | Stop reason: HOSPADM

## 2019-05-13 RX ORDER — RISPERIDONE 1 MG/1
1 TABLET, FILM COATED ORAL DAILY
Status: DISCONTINUED | OUTPATIENT
Start: 2019-05-14 | End: 2019-05-22 | Stop reason: HOSPADM

## 2019-05-13 RX ORDER — HYDROXYZINE 50 MG/1
50 TABLET, FILM COATED ORAL EVERY 6 HOURS PRN
Status: DISCONTINUED | OUTPATIENT
Start: 2019-05-13 | End: 2019-05-22 | Stop reason: HOSPADM

## 2019-05-13 RX ORDER — RISPERIDONE 1 MG/1
1 TABLET, FILM COATED ORAL DAILY
Status: CANCELLED | OUTPATIENT
Start: 2019-05-14

## 2019-05-13 RX ORDER — PROPRANOLOL HYDROCHLORIDE 20 MG/1
20 TABLET ORAL EVERY 12 HOURS SCHEDULED
Status: CANCELLED | OUTPATIENT
Start: 2019-05-13

## 2019-05-13 RX ORDER — BENZTROPINE MESYLATE 0.5 MG/1
1 TABLET ORAL EVERY 6 HOURS PRN
Status: CANCELLED | OUTPATIENT
Start: 2019-05-13

## 2019-05-13 RX ORDER — MAGNESIUM HYDROXIDE/ALUMINUM HYDROXICE/SIMETHICONE 120; 1200; 1200 MG/30ML; MG/30ML; MG/30ML
30 SUSPENSION ORAL EVERY 4 HOURS PRN
Status: DISCONTINUED | OUTPATIENT
Start: 2019-05-13 | End: 2019-05-22 | Stop reason: HOSPADM

## 2019-05-13 RX ORDER — HYDROXYZINE HYDROCHLORIDE 25 MG/1
25 TABLET, FILM COATED ORAL EVERY 6 HOURS PRN
Status: CANCELLED | OUTPATIENT
Start: 2019-05-13

## 2019-05-13 RX ORDER — TRAZODONE HYDROCHLORIDE 50 MG/1
50 TABLET ORAL
Status: CANCELLED | OUTPATIENT
Start: 2019-05-13

## 2019-05-13 RX ORDER — OLANZAPINE 10 MG/1
10 TABLET ORAL EVERY 8 HOURS PRN
Status: CANCELLED | OUTPATIENT
Start: 2019-05-13

## 2019-05-13 RX ORDER — HYDROXYZINE HYDROCHLORIDE 25 MG/1
50 TABLET, FILM COATED ORAL EVERY 6 HOURS PRN
Status: CANCELLED | OUTPATIENT
Start: 2019-05-13

## 2019-05-13 RX ORDER — PROPRANOLOL HYDROCHLORIDE 20 MG/1
20 TABLET ORAL EVERY 12 HOURS SCHEDULED
Status: DISCONTINUED | OUTPATIENT
Start: 2019-05-13 | End: 2019-05-22 | Stop reason: HOSPADM

## 2019-05-13 RX ORDER — LORAZEPAM 2 MG/ML
2 INJECTION INTRAMUSCULAR EVERY 4 HOURS PRN
Status: CANCELLED | OUTPATIENT
Start: 2019-05-13

## 2019-05-13 RX ADMIN — DIVALPROEX SODIUM 750 MG: 500 TABLET, DELAYED RELEASE ORAL at 21:22

## 2019-05-13 RX ADMIN — PROPRANOLOL HYDROCHLORIDE 20 MG: 20 TABLET ORAL at 21:22

## 2019-05-13 RX ADMIN — RISPERIDONE 2 MG: 2 TABLET ORAL at 21:22

## 2019-05-14 LAB
ALBUMIN SERPL BCP-MCNC: 3.3 G/DL (ref 3.5–5)
ALP SERPL-CCNC: 73 U/L (ref 46–116)
ALT SERPL W P-5'-P-CCNC: 145 U/L (ref 12–78)
ANION GAP SERPL CALCULATED.3IONS-SCNC: 10 MMOL/L (ref 4–13)
AST SERPL W P-5'-P-CCNC: 80 U/L (ref 5–45)
BASOPHILS # BLD AUTO: 0.07 THOUSANDS/ΜL (ref 0–0.1)
BASOPHILS NFR BLD AUTO: 1 % (ref 0–1)
BILIRUB SERPL-MCNC: 0.3 MG/DL (ref 0.2–1)
BILIRUB UR QL STRIP: NEGATIVE
BUN SERPL-MCNC: 11 MG/DL (ref 5–25)
CALCIUM SERPL-MCNC: 8.7 MG/DL (ref 8.3–10.1)
CHLORIDE SERPL-SCNC: 106 MMOL/L (ref 100–108)
CLARITY UR: CLEAR
CO2 SERPL-SCNC: 28 MMOL/L (ref 21–32)
COLOR UR: YELLOW
CREAT SERPL-MCNC: 0.75 MG/DL (ref 0.6–1.3)
EOSINOPHIL # BLD AUTO: 0.29 THOUSAND/ΜL (ref 0–0.61)
EOSINOPHIL NFR BLD AUTO: 3 % (ref 0–6)
ERYTHROCYTE [DISTWIDTH] IN BLOOD BY AUTOMATED COUNT: 12.5 % (ref 11.6–15.1)
GFR SERPL CREATININE-BSD FRML MDRD: 129 ML/MIN/1.73SQ M
GLUCOSE P FAST SERPL-MCNC: 93 MG/DL (ref 65–99)
GLUCOSE SERPL-MCNC: 93 MG/DL (ref 65–140)
GLUCOSE UR STRIP-MCNC: NEGATIVE MG/DL
HCT VFR BLD AUTO: 43.3 % (ref 36.5–49.3)
HGB BLD-MCNC: 14.2 G/DL (ref 12–17)
HGB UR QL STRIP.AUTO: NEGATIVE
IMM GRANULOCYTES # BLD AUTO: 0.11 THOUSAND/UL (ref 0–0.2)
IMM GRANULOCYTES NFR BLD AUTO: 1 % (ref 0–2)
KETONES UR STRIP-MCNC: ABNORMAL MG/DL
LEUKOCYTE ESTERASE UR QL STRIP: NEGATIVE
LYMPHOCYTES # BLD AUTO: 3.41 THOUSANDS/ΜL (ref 0.6–4.47)
LYMPHOCYTES NFR BLD AUTO: 39 % (ref 14–44)
MCH RBC QN AUTO: 29.7 PG (ref 26.8–34.3)
MCHC RBC AUTO-ENTMCNC: 32.8 G/DL (ref 31.4–37.4)
MCV RBC AUTO: 91 FL (ref 82–98)
MONOCYTES # BLD AUTO: 0.9 THOUSAND/ΜL (ref 0.17–1.22)
MONOCYTES NFR BLD AUTO: 10 % (ref 4–12)
NEUTROPHILS # BLD AUTO: 3.87 THOUSANDS/ΜL (ref 1.85–7.62)
NEUTS SEG NFR BLD AUTO: 46 % (ref 43–75)
NITRITE UR QL STRIP: NEGATIVE
NRBC BLD AUTO-RTO: 0 /100 WBCS
PH UR STRIP.AUTO: 6.5 [PH]
PLATELET # BLD AUTO: 215 THOUSANDS/UL (ref 149–390)
PMV BLD AUTO: 9 FL (ref 8.9–12.7)
POTASSIUM SERPL-SCNC: 4 MMOL/L (ref 3.5–5.3)
PROT SERPL-MCNC: 7.1 G/DL (ref 6.4–8.2)
PROT UR STRIP-MCNC: NEGATIVE MG/DL
RBC # BLD AUTO: 4.78 MILLION/UL (ref 3.88–5.62)
SODIUM SERPL-SCNC: 144 MMOL/L (ref 136–145)
SP GR UR STRIP.AUTO: 1.02 (ref 1–1.03)
TSH SERPL DL<=0.05 MIU/L-ACNC: 3.03 UIU/ML (ref 0.36–3.74)
UROBILINOGEN UR QL STRIP.AUTO: 0.2 E.U./DL
VALPROATE SERPL-MCNC: 81 UG/ML (ref 50–100)
WBC # BLD AUTO: 8.65 THOUSAND/UL (ref 4.31–10.16)

## 2019-05-14 PROCEDURE — 80053 COMPREHEN METABOLIC PANEL: CPT | Performed by: PHYSICIAN ASSISTANT

## 2019-05-14 PROCEDURE — 99222 1ST HOSP IP/OBS MODERATE 55: CPT | Performed by: PSYCHIATRY & NEUROLOGY

## 2019-05-14 PROCEDURE — 81003 URINALYSIS AUTO W/O SCOPE: CPT | Performed by: PHYSICIAN ASSISTANT

## 2019-05-14 PROCEDURE — 85025 COMPLETE CBC W/AUTO DIFF WBC: CPT | Performed by: PHYSICIAN ASSISTANT

## 2019-05-14 PROCEDURE — 99222 1ST HOSP IP/OBS MODERATE 55: CPT | Performed by: PHYSICIAN ASSISTANT

## 2019-05-14 PROCEDURE — 80164 ASSAY DIPROPYLACETIC ACD TOT: CPT | Performed by: PHYSICIAN ASSISTANT

## 2019-05-14 PROCEDURE — 84443 ASSAY THYROID STIM HORMONE: CPT | Performed by: PHYSICIAN ASSISTANT

## 2019-05-14 RX ORDER — ALBUTEROL SULFATE 90 UG/1
2 AEROSOL, METERED RESPIRATORY (INHALATION) EVERY 6 HOURS PRN
Status: DISCONTINUED | OUTPATIENT
Start: 2019-05-14 | End: 2019-05-22 | Stop reason: HOSPADM

## 2019-05-14 RX ADMIN — RISPERIDONE 1 MG: 1 TABLET ORAL at 08:15

## 2019-05-14 RX ADMIN — ACETAMINOPHEN 650 MG: 325 TABLET, FILM COATED ORAL at 18:19

## 2019-05-14 RX ADMIN — PROPRANOLOL HYDROCHLORIDE 20 MG: 20 TABLET ORAL at 21:02

## 2019-05-14 RX ADMIN — DIVALPROEX SODIUM 750 MG: 500 TABLET, DELAYED RELEASE ORAL at 17:32

## 2019-05-14 RX ADMIN — RISPERIDONE 2 MG: 2 TABLET ORAL at 17:32

## 2019-05-14 RX ADMIN — DIVALPROEX SODIUM 500 MG: 500 TABLET, DELAYED RELEASE ORAL at 08:15

## 2019-05-14 RX ADMIN — PROPRANOLOL HYDROCHLORIDE 20 MG: 20 TABLET ORAL at 08:16

## 2019-05-15 PROCEDURE — 99232 SBSQ HOSP IP/OBS MODERATE 35: CPT | Performed by: PSYCHIATRY & NEUROLOGY

## 2019-05-15 RX ADMIN — PROPRANOLOL HYDROCHLORIDE 20 MG: 20 TABLET ORAL at 21:03

## 2019-05-15 RX ADMIN — RISPERIDONE 2 MG: 2 TABLET ORAL at 18:09

## 2019-05-15 RX ADMIN — ACETAMINOPHEN 650 MG: 325 TABLET, FILM COATED ORAL at 08:32

## 2019-05-15 RX ADMIN — DIVALPROEX SODIUM 750 MG: 500 TABLET, DELAYED RELEASE ORAL at 18:09

## 2019-05-15 RX ADMIN — DIVALPROEX SODIUM 500 MG: 500 TABLET, DELAYED RELEASE ORAL at 08:06

## 2019-05-15 RX ADMIN — RISPERIDONE 1 MG: 1 TABLET ORAL at 08:06

## 2019-05-15 RX ADMIN — PROPRANOLOL HYDROCHLORIDE 20 MG: 20 TABLET ORAL at 08:05

## 2019-05-16 PROCEDURE — 99231 SBSQ HOSP IP/OBS SF/LOW 25: CPT | Performed by: PSYCHIATRY & NEUROLOGY

## 2019-05-16 RX ADMIN — DIVALPROEX SODIUM 750 MG: 500 TABLET, DELAYED RELEASE ORAL at 17:02

## 2019-05-16 RX ADMIN — DIVALPROEX SODIUM 500 MG: 500 TABLET, DELAYED RELEASE ORAL at 08:33

## 2019-05-16 RX ADMIN — RISPERIDONE 1 MG: 1 TABLET ORAL at 08:33

## 2019-05-16 RX ADMIN — LORAZEPAM 1 MG: 1 TABLET ORAL at 14:56

## 2019-05-16 RX ADMIN — PROPRANOLOL HYDROCHLORIDE 20 MG: 20 TABLET ORAL at 21:19

## 2019-05-16 RX ADMIN — RISPERIDONE 2 MG: 2 TABLET ORAL at 17:03

## 2019-05-16 RX ADMIN — PROPRANOLOL HYDROCHLORIDE 20 MG: 20 TABLET ORAL at 08:33

## 2019-05-17 PROCEDURE — 99231 SBSQ HOSP IP/OBS SF/LOW 25: CPT | Performed by: PSYCHIATRY & NEUROLOGY

## 2019-05-17 RX ADMIN — DIVALPROEX SODIUM 750 MG: 500 TABLET, DELAYED RELEASE ORAL at 17:52

## 2019-05-17 RX ADMIN — RISPERIDONE 2 MG: 2 TABLET ORAL at 17:52

## 2019-05-17 RX ADMIN — PROPRANOLOL HYDROCHLORIDE 20 MG: 20 TABLET ORAL at 21:05

## 2019-05-17 RX ADMIN — PROPRANOLOL HYDROCHLORIDE 20 MG: 20 TABLET ORAL at 08:23

## 2019-05-17 RX ADMIN — RISPERIDONE 1 MG: 1 TABLET ORAL at 08:23

## 2019-05-17 RX ADMIN — DIVALPROEX SODIUM 500 MG: 500 TABLET, DELAYED RELEASE ORAL at 08:23

## 2019-05-18 PROCEDURE — 99231 SBSQ HOSP IP/OBS SF/LOW 25: CPT | Performed by: STUDENT IN AN ORGANIZED HEALTH CARE EDUCATION/TRAINING PROGRAM

## 2019-05-18 RX ADMIN — DIVALPROEX SODIUM 500 MG: 500 TABLET, DELAYED RELEASE ORAL at 08:34

## 2019-05-18 RX ADMIN — PROPRANOLOL HYDROCHLORIDE 20 MG: 20 TABLET ORAL at 08:34

## 2019-05-18 RX ADMIN — PROPRANOLOL HYDROCHLORIDE 20 MG: 20 TABLET ORAL at 21:18

## 2019-05-18 RX ADMIN — RISPERIDONE 2 MG: 2 TABLET ORAL at 17:18

## 2019-05-18 RX ADMIN — HYDROXYZINE HYDROCHLORIDE 50 MG: 50 TABLET ORAL at 19:50

## 2019-05-18 RX ADMIN — ALUMINUM HYDROXIDE, MAGNESIUM HYDROXIDE, AND SIMETHICONE 30 ML: 200; 200; 20 SUSPENSION ORAL at 12:30

## 2019-05-18 RX ADMIN — DIVALPROEX SODIUM 750 MG: 500 TABLET, DELAYED RELEASE ORAL at 17:18

## 2019-05-18 RX ADMIN — RISPERIDONE 1 MG: 1 TABLET ORAL at 08:34

## 2019-05-18 RX ADMIN — ACETAMINOPHEN 650 MG: 325 TABLET, FILM COATED ORAL at 15:10

## 2019-05-19 PROCEDURE — 99231 SBSQ HOSP IP/OBS SF/LOW 25: CPT | Performed by: STUDENT IN AN ORGANIZED HEALTH CARE EDUCATION/TRAINING PROGRAM

## 2019-05-19 RX ADMIN — DIVALPROEX SODIUM 500 MG: 500 TABLET, DELAYED RELEASE ORAL at 10:20

## 2019-05-19 RX ADMIN — RISPERIDONE 2 MG: 2 TABLET ORAL at 17:41

## 2019-05-19 RX ADMIN — PROPRANOLOL HYDROCHLORIDE 20 MG: 20 TABLET ORAL at 21:09

## 2019-05-19 RX ADMIN — PROPRANOLOL HYDROCHLORIDE 20 MG: 20 TABLET ORAL at 10:20

## 2019-05-19 RX ADMIN — DIVALPROEX SODIUM 750 MG: 500 TABLET, DELAYED RELEASE ORAL at 17:40

## 2019-05-19 RX ADMIN — RISPERIDONE 1 MG: 1 TABLET ORAL at 10:20

## 2019-05-20 PROCEDURE — 99231 SBSQ HOSP IP/OBS SF/LOW 25: CPT | Performed by: PSYCHIATRY & NEUROLOGY

## 2019-05-20 RX ADMIN — PROPRANOLOL HYDROCHLORIDE 20 MG: 20 TABLET ORAL at 21:22

## 2019-05-20 RX ADMIN — DIVALPROEX SODIUM 750 MG: 500 TABLET, DELAYED RELEASE ORAL at 17:16

## 2019-05-20 RX ADMIN — PROPRANOLOL HYDROCHLORIDE 20 MG: 20 TABLET ORAL at 08:41

## 2019-05-20 RX ADMIN — DIVALPROEX SODIUM 500 MG: 500 TABLET, DELAYED RELEASE ORAL at 08:41

## 2019-05-20 RX ADMIN — HYDROXYZINE HYDROCHLORIDE 25 MG: 25 TABLET ORAL at 12:50

## 2019-05-20 RX ADMIN — RISPERIDONE 1 MG: 1 TABLET ORAL at 08:41

## 2019-05-20 RX ADMIN — RISPERIDONE 2 MG: 2 TABLET ORAL at 17:16

## 2019-05-20 RX ADMIN — ALUMINUM HYDROXIDE, MAGNESIUM HYDROXIDE, AND SIMETHICONE 30 ML: 200; 200; 20 SUSPENSION ORAL at 14:29

## 2019-05-21 PROCEDURE — 99231 SBSQ HOSP IP/OBS SF/LOW 25: CPT | Performed by: PSYCHIATRY & NEUROLOGY

## 2019-05-21 RX ADMIN — PROPRANOLOL HYDROCHLORIDE 20 MG: 20 TABLET ORAL at 21:01

## 2019-05-21 RX ADMIN — PROPRANOLOL HYDROCHLORIDE 20 MG: 20 TABLET ORAL at 08:44

## 2019-05-21 RX ADMIN — TRAZODONE HYDROCHLORIDE 50 MG: 50 TABLET ORAL at 21:52

## 2019-05-21 RX ADMIN — DIVALPROEX SODIUM 500 MG: 500 TABLET, DELAYED RELEASE ORAL at 08:44

## 2019-05-21 RX ADMIN — RISPERIDONE 2 MG: 2 TABLET ORAL at 17:46

## 2019-05-21 RX ADMIN — PALIPERIDONE PALMITATE 234 MG: 234 INJECTION INTRAMUSCULAR at 14:09

## 2019-05-21 RX ADMIN — RISPERIDONE 1 MG: 1 TABLET ORAL at 08:44

## 2019-05-21 RX ADMIN — DIVALPROEX SODIUM 750 MG: 500 TABLET, DELAYED RELEASE ORAL at 17:47

## 2019-05-21 RX ADMIN — HYDROXYZINE HYDROCHLORIDE 25 MG: 25 TABLET ORAL at 12:45

## 2019-05-22 VITALS
RESPIRATION RATE: 18 BRPM | OXYGEN SATURATION: 94 % | DIASTOLIC BLOOD PRESSURE: 75 MMHG | BODY MASS INDEX: 49.44 KG/M2 | HEIGHT: 67 IN | SYSTOLIC BLOOD PRESSURE: 150 MMHG | HEART RATE: 110 BPM | TEMPERATURE: 97.7 F | WEIGHT: 315 LBS

## 2019-05-22 PROCEDURE — 99238 HOSP IP/OBS DSCHRG MGMT 30/<: CPT | Performed by: PSYCHIATRY & NEUROLOGY

## 2019-05-22 RX ORDER — DIVALPROEX SODIUM 250 MG/1
750 TABLET, DELAYED RELEASE ORAL
Qty: 84 TABLET | Refills: 0 | Status: SHIPPED | OUTPATIENT
Start: 2019-05-22 | End: 2019-06-19

## 2019-05-22 RX ORDER — RISPERIDONE 2 MG/1
2 TABLET, FILM COATED ORAL
Qty: 28 TABLET | Refills: 0 | Status: SHIPPED | OUTPATIENT
Start: 2019-05-22 | End: 2019-06-19

## 2019-05-22 RX ORDER — DIVALPROEX SODIUM 500 MG/1
500 TABLET, DELAYED RELEASE ORAL DAILY
Qty: 28 TABLET | Refills: 0 | Status: SHIPPED | OUTPATIENT
Start: 2019-05-23 | End: 2021-06-29 | Stop reason: HOSPADM

## 2019-05-22 RX ORDER — RISPERIDONE 1 MG/1
1 TABLET, FILM COATED ORAL DAILY
Qty: 28 TABLET | Refills: 0 | Status: SHIPPED | OUTPATIENT
Start: 2019-05-23 | End: 2021-06-29 | Stop reason: HOSPADM

## 2019-05-22 RX ORDER — PROPRANOLOL HYDROCHLORIDE 20 MG/1
20 TABLET ORAL EVERY 12 HOURS SCHEDULED
Qty: 56 TABLET | Refills: 0 | Status: SHIPPED | OUTPATIENT
Start: 2019-05-22 | End: 2021-06-29 | Stop reason: HOSPADM

## 2019-05-22 RX ADMIN — DIVALPROEX SODIUM 500 MG: 500 TABLET, DELAYED RELEASE ORAL at 08:55

## 2019-05-22 RX ADMIN — RISPERIDONE 1 MG: 1 TABLET ORAL at 08:56

## 2019-05-22 RX ADMIN — PROPRANOLOL HYDROCHLORIDE 20 MG: 20 TABLET ORAL at 08:55

## 2019-06-06 ENCOUNTER — TRANSCRIBE ORDERS (OUTPATIENT)
Dept: ADMINISTRATIVE | Facility: HOSPITAL | Age: 24
End: 2019-06-06

## 2019-06-06 DIAGNOSIS — J45.20 MILD INTERMITTENT ASTHMA WITHOUT COMPLICATION: Primary | ICD-10-CM

## 2019-06-12 ENCOUNTER — TELEPHONE (OUTPATIENT)
Dept: SLEEP CENTER | Facility: CLINIC | Age: 24
End: 2019-06-12

## 2019-06-12 NOTE — TELEPHONE ENCOUNTER
----- Message from Leonid Sanchez DO sent at 6/11/2019  4:09 PM EDT -----  Chart reviewed   Study approved   ----- Message -----  From: Gypsy Wooten  Sent: 6/7/2019  12:43 PM EDT  To: Sleep Medicine [place] Provider    H&P for approval

## 2019-08-20 ENCOUNTER — HOSPITAL ENCOUNTER (EMERGENCY)
Facility: HOSPITAL | Age: 24
Discharge: HOME | End: 2019-08-21
Attending: EMERGENCY MEDICINE | Admitting: EMERGENCY MEDICINE
Payer: MEDICARE

## 2019-08-20 VITALS
BODY MASS INDEX: 51.62 KG/M2 | RESPIRATION RATE: 18 BRPM | WEIGHT: 315 LBS | SYSTOLIC BLOOD PRESSURE: 137 MMHG | TEMPERATURE: 98 F | DIASTOLIC BLOOD PRESSURE: 81 MMHG | HEART RATE: 82 BPM | OXYGEN SATURATION: 97 %

## 2019-08-20 DIAGNOSIS — M79.641 RIGHT HAND PAIN: Primary | ICD-10-CM

## 2019-08-20 DIAGNOSIS — T23.109A: ICD-10-CM

## 2019-08-20 PROCEDURE — 99282 EMERGENCY DEPT VISIT SF MDM: CPT | Performed by: EMERGENCY MEDICINE

## 2019-08-20 PROCEDURE — 99283 EMERGENCY DEPT VISIT LOW MDM: CPT

## 2019-08-21 RX ORDER — ACETAMINOPHEN 325 MG/1
650 TABLET ORAL ONCE
Status: COMPLETED | OUTPATIENT
Start: 2019-08-21 | End: 2019-08-21

## 2019-08-21 RX ADMIN — ACETAMINOPHEN 650 MG: 325 TABLET ORAL at 00:04

## 2019-08-21 NOTE — ED PROVIDER NOTES
History  Chief Complaint   Patient presents with    Hand Injury     Pt states "phone got hot  tried to take battery out and it began to melt  I think some got on left hand"      30-year-old male presenting for evaluation of right hand pain  Patient states that his phone overheated, therefore he was taking out the battery when the battery began mL and he got some of the melted portion on to the palmar aspect of his right hand near his 2nd MCP joint  Patient reports pain to the area  There is no open wound  Denies any other areas of injury or pain  Patient lives at a group home and is brought in by group home staff  Tetanus is up-to-date  A/P:  30-year-old male with superficial burn to palmar aspect of right hand, no open wound, discussed treatment with Tylenol/ibuprofen and to return if any signs of infection          Prior to Admission Medications   Prescriptions Last Dose Informant Patient Reported? Taking?    LORazepam (ATIVAN) 1 mg tablet   No No   Sig: Take 1 tablet (1 mg total) by mouth every 8 (eight) hours as needed for anxiety   albuterol (PROVENTIL HFA,VENTOLIN HFA) 90 mcg/act inhaler   No No   Sig: Inhale 2 puffs every 6 (six) hours as needed for wheezing   benztropine (COGENTIN) 1 mg tablet   No No   Sig: Take 1 tablet (1 mg total) by mouth 2 (two) times a day At 9am and 6pm   divalproex sodium (DEPAKOTE) 250 mg EC tablet   No No   Sig: Take 3 tablets (750 mg total) by mouth daily after dinner for 28 days   divalproex sodium (DEPAKOTE) 500 mg EC tablet   No No   Sig: Take 1 tablet (500 mg total) by mouth daily for 28 days   propranolol (INDERAL) 20 mg tablet   No No   Sig: Take 1 tablet (20 mg total) by mouth every 12 (twelve) hours for 28 days   risperiDONE (RisperDAL) 1 mg tablet   No No   Sig: Take 1 tablet (1 mg total) by mouth daily for 28 days   risperiDONE (RisperDAL) 2 mg tablet   No No   Sig: Take 1 tablet (2 mg total) by mouth daily after dinner for 28 days      Facility-Administered Medications: None       Past Medical History:   Diagnosis Date    Anxiety     Asthma     Hypertension     Impulse control disorder     Mental retardation     Psychiatric disorder     Psychiatric illness     Schizoaffective disorder (HCC)        Past Surgical History:   Procedure Laterality Date    HAND SURGERY      right hand       Family History   Problem Relation Age of Onset    No Known Problems Mother     No Known Problems Father     No Known Problems Sister     No Known Problems Brother     No Known Problems Maternal Aunt     No Known Problems Paternal Aunt     No Known Problems Maternal Uncle     No Known Problems Paternal Uncle     No Known Problems Maternal Grandfather     No Known Problems Maternal Grandmother     No Known Problems Paternal Grandfather     No Known Problems Paternal Grandmother     No Known Problems Cousin     ADD / ADHD Neg Hx     Alcohol abuse Neg Hx     Anxiety disorder Neg Hx     Bipolar disorder Neg Hx     Dementia Neg Hx     Depression Neg Hx     Drug abuse Neg Hx     OCD Neg Hx     Paranoid behavior Neg Hx     Schizophrenia Neg Hx     Seizures Neg Hx     Self-Injury Neg Hx     Suicide Attempts Neg Hx      I have reviewed and agree with the history as documented  Social History     Tobacco Use    Smoking status: Never Smoker    Smokeless tobacco: Never Used   Substance Use Topics    Alcohol use: No    Drug use: No        Review of Systems   Constitutional: Negative for chills and fever  HENT: Negative for rhinorrhea and sore throat  Respiratory: Negative for cough and shortness of breath  Cardiovascular: Negative for chest pain and leg swelling  Gastrointestinal: Negative for abdominal pain, constipation, diarrhea, nausea and vomiting  Genitourinary: Negative for dysuria and urgency  Musculoskeletal: Negative for back pain and neck pain  Skin: Negative for color change and rash     Allergic/Immunologic: Negative for immunocompromised state  Neurological: Negative for weakness, light-headedness and numbness  All other systems reviewed and are negative  Physical Exam  Physical Exam   Constitutional: He is oriented to person, place, and time  He appears well-developed and well-nourished  HENT:   Head: Normocephalic and atraumatic  Cardiovascular: Normal rate and regular rhythm  Pulmonary/Chest: Effort normal  No respiratory distress  Abdominal: Soft  He exhibits no distension  Musculoskeletal: Normal range of motion  Neurological: He is alert and oriented to person, place, and time  Skin: Skin is warm and dry  Capillary refill takes less than 2 seconds  Palmar aspect of right hand near 2nd MCP joint there is an area of mild erythema, no open wound, no blister, full range of motion of all joints, distally neurovascularly intact with normal cap refill, sensation grossly intact   Psychiatric: He has a normal mood and affect  His behavior is normal    Nursing note and vitals reviewed        Vital Signs  ED Triage Vitals [08/20/19 2326]   Temperature Pulse Respirations Blood Pressure SpO2   98 °F (36 7 °C) 82 18 137/81 97 %      Temp src Heart Rate Source Patient Position - Orthostatic VS BP Location FiO2 (%)   -- -- -- -- --      Pain Score       2           Vitals:    08/20/19 2326   BP: 137/81   Pulse: 82         Visual Acuity      ED Medications  Medications   acetaminophen (TYLENOL) tablet 650 mg (650 mg Oral Given 8/21/19 0004)       Diagnostic Studies  Results Reviewed     None                 No orders to display              Procedures  Procedures       ED Course                               MDM  Number of Diagnoses or Management Options  Right hand pain:   Superficial burn of hand:   Diagnosis management comments: 23 yo M with very minor superficial burn to hand from melted phone battery- tylenol/ibuprofen, return precautions      Disposition  Final diagnoses:   Right hand pain   Superficial burn of hand     Time reflects when diagnosis was documented in both MDM as applicable and the Disposition within this note     Time User Action Codes Description Comment    8/21/2019 12:02 AM Jaqui Wu Add [W39 094] Right hand pain     8/21/2019 12:02 AM Mayelin YUSUF Add [U13 086V] Superficial burn of hand       ED Disposition     ED Disposition Condition Date/Time Comment    Discharge Stable Wed Aug 21, 2019 12:02 AM Eligha Cheadle discharge to home/self care  Follow-up Information     Follow up With Specialties Details Why Contact Info    Roseline Lakhani [address]   [phone]            Patient's Medications   Discharge Prescriptions    No medications on file     No discharge procedures on file      ED Provider  Electronically Signed by           Izaiah Junior DO  08/21/19 9926

## 2019-09-08 ENCOUNTER — HOSPITAL ENCOUNTER (OUTPATIENT)
Dept: SLEEP CENTER | Facility: CLINIC | Age: 24
Discharge: HOME | End: 2019-09-08
Payer: MEDICARE

## 2019-09-08 DIAGNOSIS — J45.20 MILD INTERMITTENT ASTHMA WITHOUT COMPLICATION: ICD-10-CM

## 2019-09-08 PROCEDURE — 95810 POLYSOM 6/> YRS 4/> PARAM: CPT | Performed by: PSYCHIATRY & NEUROLOGY

## 2019-09-08 PROCEDURE — 95810 POLYSOM 6/> YRS 4/> PARAM: CPT

## 2019-09-09 NOTE — PROGRESS NOTES
Sleep Study Documentation    Pre-Sleep Study       Sleep testing procedure explained to patient:YES    Patient napped prior to study:NO    [address] [place] worker after 12PM   Caffeine use:NO    Alcohol:Dayshift workers after 5PM: Alcohol use:NO    Typical day for patient:YES       Study Documentation    Sleep Study Indications: The patient experienced hypopneas, obstructive, central and mixed apnea, UAR, and moderate to loud snoring with arousals  All stages of sleep were achieved  EKG was unremarkable  Sleep Study: Diagnostic   Snore:Severe  Supplemental O2: no    Minimum SaO2 85  Baseline SaO2 93        EKG abnormalities: no     EEG abnormalities: no    Sleep Study Recorded < 2 hours: N/A    Sleep Study Recorded > 2 hours but incomplete study: N/A    Sleep Study Recorded 6 hours but no sleep obtained: NO    Patient classification: disabled       Post-Sleep Study    Medication used at bedtime or during sleep study:NO    Patient reports time it took to fall asleep:less than 20 minutes    Patient reports waking up during study:1 to 2 times  Patient reports returning to sleep without difficulty  Patient reports sleeping 4 to 6 hours without dreaming  Patient reports sleep during study:typical    Patient rated sleepiness: Not sleepy or tired    PAP treatment:no

## 2019-09-12 ENCOUNTER — TRANSCRIBE ORDERS (OUTPATIENT)
Dept: ADMINISTRATIVE | Facility: HOSPITAL | Age: 24
End: 2019-09-12

## 2019-09-12 DIAGNOSIS — E66.9 OBESITY, UNSPECIFIED CLASSIFICATION, UNSPECIFIED OBESITY TYPE, UNSPECIFIED WHETHER SERIOUS COMORBIDITY PRESENT: Primary | ICD-10-CM

## 2019-09-12 DIAGNOSIS — K75.81 NONALCOHOLIC STEATOHEPATITIS: ICD-10-CM

## 2019-09-13 DIAGNOSIS — G47.33 OSA (OBSTRUCTIVE SLEEP APNEA): Primary | ICD-10-CM

## 2019-09-16 ENCOUNTER — TELEPHONE (OUTPATIENT)
Dept: SLEEP CENTER | Facility: CLINIC | Age: 24
End: 2019-09-16

## 2019-09-16 ENCOUNTER — EVALUATION (OUTPATIENT)
Dept: PHYSICAL THERAPY | Facility: MEDICAL CENTER | Age: 24
End: 2019-09-16
Payer: MEDICARE

## 2019-09-16 DIAGNOSIS — R53.81 PHYSICAL DECONDITIONING: Primary | ICD-10-CM

## 2019-09-16 PROCEDURE — 97110 THERAPEUTIC EXERCISES: CPT | Performed by: PHYSICAL THERAPIST

## 2019-09-16 PROCEDURE — 97161 PT EVAL LOW COMPLEX 20 MIN: CPT | Performed by: PHYSICAL THERAPIST

## 2019-09-16 NOTE — LETTER
2019    Richard Curtis [address] [facility]    Patient: Lucas Baptiste   YOB: 1995   Date of Visit: 2019     Encounter Diagnosis     ICD-10-CM    1  Physical deconditioning R53 81        Dear Dr Bao Mijares: Thank you for your recent referral of Lucas Baptiste  Please review the attached evaluation summary from Todd's recent visit  Please verify that you agree with the plan of care by signing the attached order  If you have any questions or concerns, please do not hesitate to call  I sincerely appreciate the opportunity to share in the care of one of your patients and hope to have another opportunity to work with you in the near future  Sincerely,    Hailey Amezcua, PT      Referring Provider:      I certify that I have read the below Plan of Care and certify the need for these services furnished under this plan of treatment while under my care  Richard Curtis [address] [facility]: [phone]          PT Evaluation     Today's date: 2019  Patient name: Lucas Baptiste  : 1995  MRN: [id number]  Referring provider: ZBIGNIEW Youssef  Dx:   Encounter Diagnosis     ICD-10-CM    1  Physical deconditioning R53 81                   Assessment  Assessment details: Lucas Baptiste is a pleasant 22 y o  male who presents with physical deconditioning and chronic R knee pain for the last 4 months of a gradual onset  History and physical exam limited due to difficulty following commands and severe cognitive deficits  No further referral appears necessary at this time based upon examination results  Primary movement impairment diagnosis of generalized LE weakness and a lack of core stability resulting in pathoanatomical symptoms of deconditioning and limiting his ability to walk   Patient also presents with deficits in aerobic fitness, which limits his ability to ascend/descend stairs  Co-morbidity of knee pain as a result of these deficits further limits his functional activity tolerance  Patient would benefit from skilled PT services to improve aerobic fitness, strength, and balance to facilitate a full return to PLOF  Thank you for the referral     Primary Impairments:  1) Generalized LE weakness/lack of core stability--> addressing with progressive exercise  2) Decreased aerobic capacity--> addressing with aerobic exercise  3) Balance dysfunction--> addressing with neuromotor retraining        Impairments: abnormal coordination, abnormal gait, abnormal muscle firing, abnormal muscle tone, abnormal or restricted ROM, abnormal movement, activity intolerance, difficulty understanding, impaired balance, impaired physical strength, lacks appropriate home exercise program, pain with function, safety issue, poor posture  and poor body mechanics  Functional limitations: walking, stair climbing  Symptom irritability: lowBarriers to therapy: Cognitive deficits, requires transportation  Understanding of Dx/Px/POC: good   Prognosis: good  Prognosis details: Positive prognostic indicators include positive attitude toward recovery  Negative prognostic indicators include significant cognitive deficits and requiring transportation  Goals  Patient will be able to perform HEP with assistance as needed by staff at group home  Patient will improve 5x sit to stand time to 25s to demonstrate an improvement in strength  Patient will be able to perform SLS for 5-10s bilaterally to demonstrate an improvement in strength and proprioception  Patient will be able to perform DL squat with improved bilateral valgus control to alleviate excessive strain on knees during ADL  Plan  Plan details: Prognosis above is given PT services 1x/week over the next 2 months and home program adherence    Patient would benefit from: skilled physical therapy  Referral necessary: No  Planned modality interventions: thermotherapy: hydrocollator packs and cryotherapy  Planned therapy interventions: activity modification, joint mobilization, manual therapy, motor coordination training, neuromuscular re-education, patient education, self care, therapeutic activities, therapeutic exercise, graded activity, home exercise program, behavior modification, massage, Gama taping, strengthening, stretching, functional ROM exercises, flexibility, transfer training, graded exercise and gait training  Frequency: 1x week  Duration in weeks: 6  Treatment plan discussed with: patient and caregiver        Subjective Evaluation    History of Present Illness  Mechanism of injury: This is a 21 yo male presenting with general deconditioning  He also reports having some R knee pain of a gradual onset for the last 4 months  He is having difficulty stair climbing and walking  He also has occasional knee pain when sitting for prolonged periods  He currently lives in a group home  Quality of life: fair    Pain  Pain scale: unable to accurately assess  Pain scale at lowest: unable to accurately assess  Pain scale at highest: unable to accurately assess  Location: anterior knee      Diagnostic Tests  No diagnostic tests performed  Treatments  No previous or current treatments  Patient Goals  Patient goals for therapy: increased strength, decreased pain and increased motion  Patient goal: to be able to walk longer distances        Objective     Static Posture     Head  Forward  Shoulders  Rounded  Hip   Hip (Left): Left hip external rotation  Hip (Right): Externally rotated  Tenderness     Right Knee   Tenderness in the patellar tendon  Ambulation     Quality of Movement During Gait     Pelvis    Pelvis (Left): Positive Trendelenburg  Pelvis (Right): Positive Trendelenburg  Hip    Hip (Left): Positive circumducted  Hip (Right): Positive circumducted       Functional Assessment      Squat    Left valgus, left tibial anterior translation beyond toes, right valgus and right tibial anterior translation beyond toes       Single Leg Stance   Left: 3 seconds  Right: 3 seconds    Comments  Rombers  Tandem: 5s bilaterally      5x sit to stand: 18 9s    General Comments:      Knee Comments  ROM and strength testing deferred due to difficulty following commands             Precautions: intellectual disability, schizoaffective disorder (bipolar type), asthma, HTN      Manual                                                                                   Exercise Diary              Rec bike EVERETT 10'            Treadmill             Bridges 20            [facility] squeezes             Modified planks             Standing hip ABD             Standing hip FLX             Standing hip EXT             Heel raises 20            Mini squats 20            Step ups             Step downs             Romberg             Tandem stance             SLS             Sit to stands                                                                                  Modalities

## 2019-09-16 NOTE — PROGRESS NOTES
PT Evaluation     Today's date: 2019  Patient name: Billy Winter  : 1995  MRN: [id number]  Referring provider: ZBIGNIEW Liang  Dx:   Encounter Diagnosis     ICD-10-CM    1  Physical deconditioning R53 81                   Assessment  Assessment details: Billy Winter is a pleasant 22 y o  male who presents with physical deconditioning and chronic R knee pain for the last 4 months of a gradual onset  History and physical exam limited due to difficulty following commands and severe cognitive deficits  No further referral appears necessary at this time based upon examination results  Primary movement impairment diagnosis of generalized LE weakness and a lack of core stability resulting in pathoanatomical symptoms of deconditioning and limiting his ability to walk  Patient also presents with deficits in aerobic fitness, which limits his ability to ascend/descend stairs  Co-morbidity of knee pain as a result of these deficits further limits his functional activity tolerance  Patient would benefit from skilled PT services to improve aerobic fitness, strength, and balance to facilitate a full return to PLOF   Thank you for the referral     Primary Impairments:  1) Generalized LE weakness/lack of core stability--> addressing with progressive exercise  2) Decreased aerobic capacity--> addressing with aerobic exercise  3) Balance dysfunction--> addressing with neuromotor retraining        Impairments: abnormal coordination, abnormal gait, abnormal muscle firing, abnormal muscle tone, abnormal or restricted ROM, abnormal movement, activity intolerance, difficulty understanding, impaired balance, impaired physical strength, lacks appropriate home exercise program, pain with function, safety issue, poor posture  and poor body mechanics  Functional limitations: walking, stair climbing  Symptom irritability: lowBarriers to therapy: Cognitive deficits, requires transportation  Understanding of Dx/Px/POC: good   Prognosis: good  Prognosis details: Positive prognostic indicators include positive attitude toward recovery  Negative prognostic indicators include significant cognitive deficits and requiring transportation  Goals  Patient will be able to perform HEP with assistance as needed by staff at group home  Patient will improve 5x sit to stand time to 25s to demonstrate an improvement in strength  Patient will be able to perform SLS for 5-10s bilaterally to demonstrate an improvement in strength and proprioception  Patient will be able to perform DL squat with improved bilateral valgus control to alleviate excessive strain on knees during ADL  Plan  Plan details: Prognosis above is given PT services 1x/week over the next 2 months and home program adherence  Patient would benefit from: skilled physical therapy  Referral necessary: No  Planned modality interventions: thermotherapy: hydrocollator packs and cryotherapy  Planned therapy interventions: activity modification, joint mobilization, manual therapy, motor coordination training, neuromuscular re-education, patient education, self care, therapeutic activities, therapeutic exercise, graded activity, home exercise program, behavior modification, massage, Gama taping, strengthening, stretching, functional ROM exercises, flexibility, transfer training, graded exercise and gait training  Frequency: 1x week  Duration in weeks: 6  Treatment plan discussed with: patient and caregiver        Subjective Evaluation    History of Present Illness  Mechanism of injury: This is a 23 yo male presenting with general deconditioning  He also reports having some R knee pain of a gradual onset for the last 4 months  He is having difficulty stair climbing and walking  He also has occasional knee pain when sitting for prolonged periods  He currently lives in a group home  Quality of life: fair    Pain  Pain scale: unable to accurately assess    Pain scale at lowest: unable to accurately assess  Pain scale at highest: unable to accurately assess  Location: anterior knee      Diagnostic Tests  No diagnostic tests performed  Treatments  No previous or current treatments  Patient Goals  Patient goals for therapy: increased strength, decreased pain and increased motion  Patient goal: to be able to walk longer distances        Objective     Static Posture     Head  Forward  Shoulders  Rounded  Hip   Hip (Left): Left hip external rotation  Hip (Right): Externally rotated  Tenderness     Right Knee   Tenderness in the patellar tendon  Ambulation     Quality of Movement During Gait     Pelvis    Pelvis (Left): Positive Trendelenburg  Pelvis (Right): Positive Trendelenburg  Hip    Hip (Left): Positive circumducted  Hip (Right): Positive circumducted  Functional Assessment      Squat    Left valgus, left tibial anterior translation beyond toes, right valgus and right tibial anterior translation beyond toes       Single Leg Stance   Left: 3 seconds  Right: 3 seconds    Comments  Rombers  Tandem: 5s bilaterally      5x sit to stand: 18 9s    General Comments:      Knee Comments  ROM and strength testing deferred due to difficulty following commands             Precautions: intellectual disability, schizoaffective disorder (bipolar type), asthma, HTN      Manual                                                                                   Exercise Diary              Rec bike EVERETT 10'            Treadmill             Bridges 20            [facility] squeezes             Modified planks             Standing hip ABD             Standing hip FLX             Standing hip EXT             Heel raises 20            Mini squats 20            Step ups             Step downs             Romberg             Tandem stance             SLS             Sit to stands Modalities  9/16

## 2019-09-17 ENCOUNTER — TRANSCRIBE ORDERS (OUTPATIENT)
Dept: PHYSICAL THERAPY | Facility: MEDICAL CENTER | Age: 24
End: 2019-09-17

## 2019-09-17 DIAGNOSIS — R29.898 MUSCULAR DECONDITIONING: Primary | ICD-10-CM

## 2019-09-20 ENCOUNTER — OFFICE VISIT (OUTPATIENT)
Dept: PHYSICAL THERAPY | Facility: MEDICAL CENTER | Age: 24
End: 2019-09-20
Payer: MEDICARE

## 2019-09-20 DIAGNOSIS — R53.81 PHYSICAL DECONDITIONING: Primary | ICD-10-CM

## 2019-09-20 PROCEDURE — 97110 THERAPEUTIC EXERCISES: CPT | Performed by: PHYSICAL THERAPIST

## 2019-09-20 NOTE — PROGRESS NOTES
Daily Note     Today's date: 2019  Patient name: Dio Gibson  : 1995  MRN: [id number]  Referring provider: ZBIGNIEW Rodriguez  Dx:   Encounter Diagnosis     ICD-10-CM    1  Physical deconditioning R53 81                   Subjective: Patient reports that he has been feeling good with no pain since last session  Objective: See treatment diary below      Assessment: Patient was limited in exercise capacity due to significant cognitive deficits and difficulty following commands  No pain during session  Patient demonstrated difficulty maintaining balance with step-ups and demonstrated fatigue after step-ups  Patient was provided with an HEP of heel raises, bridges, and mini squats to improve overall LE strength  Patient would benefit from continued PT to improve aerobic fitness and functional strength in order to achieve goals  Plan: Continue per plan of care  Precautions: intellectual disability, schizoaffective disorder (bipolar type), asthma, HTN      Manual                                                                                  Exercise Diary             Rec bike EVERETT 10' 10'           Treadmill             Pulleys             UBE             Bridges 20 30           Clams  20x5: green supine           [place] squeezes  30x5:            Modified planks             Standing hip ABD             Standing hip FLX             Standing hip EXT             Heel raises 20 30           Mini squats 20 30           Step ups  10 L3           Step downs             Romberg             Tandem stance             SLS             Sit to stands  NV                                                                                Modalities   negative...

## 2019-09-27 ENCOUNTER — OFFICE VISIT (OUTPATIENT)
Dept: PHYSICAL THERAPY | Facility: MEDICAL CENTER | Age: 24
End: 2019-09-27
Payer: MEDICARE

## 2019-09-27 DIAGNOSIS — R53.81 PHYSICAL DECONDITIONING: Primary | ICD-10-CM

## 2019-09-27 PROCEDURE — 97110 THERAPEUTIC EXERCISES: CPT | Performed by: PHYSICAL THERAPIST

## 2019-09-27 NOTE — PROGRESS NOTES
Daily Note     Today's date: 2019  Patient name: Billy Winter  : 1995  MRN: [id number]  Referring provider: ZBIGNIEW Liang  Dx:   Encounter Diagnosis     ICD-10-CM    1  Physical deconditioning R53 81                   Subjective: Patient reports that he is starting to feel stronger, and he feels that coming to PT is helping him to feel better  He no longer has any pain in his knee  Objective: See treatment diary below      Assessment: Added UBE today for more aerobic conditioning along with UE strengthening  Patient demonstrated fatigue at end of session  Held step ups due to safety concerns along with fatigue  Patient is limited in exercise capacity due to significant cognitive deficits and difficulty following commands  However, he was able to perform exercises with less cueing than prior session  Patient would benefit from continued PT to improve aerobic capacity and physical strength to maximize functional mobility  Plan: Continue per plan of care  Precautions: intellectual disability, schizoaffective disorder (bipolar type), asthma, HTN      Manual                                                                                 Exercise Diary            Rec bike EVERETT 10' 10' 10'          Treadmill             Pulleys   NV          UBE   10'          Bridges 20 30 30          Clams  20x5: green supine 30x5: green supine          Ball squeezes  30x5: 30x5:           Modified planks             Standing hip ABD             Standing hip FLX             Standing hip EXT             Heel raises 20 30 30          Mini squats 20 30 30          Step ups  10 L3           Step downs             Romberg             Tandem stance             SLS             Sit to stands  NV 20                                                                               Modalities

## 2019-10-02 ENCOUNTER — HOSPITAL ENCOUNTER (OUTPATIENT)
Dept: ULTRASOUND IMAGING | Facility: HOSPITAL | Age: 24
Discharge: HOME | End: 2019-10-02
Payer: MEDICARE

## 2019-10-02 DIAGNOSIS — E66.9 OBESITY, UNSPECIFIED CLASSIFICATION, UNSPECIFIED OBESITY TYPE, UNSPECIFIED WHETHER SERIOUS COMORBIDITY PRESENT: ICD-10-CM

## 2019-10-02 DIAGNOSIS — K75.81 NONALCOHOLIC STEATOHEPATITIS: ICD-10-CM

## 2019-10-02 PROCEDURE — 76705 ECHO EXAM OF ABDOMEN: CPT

## 2019-10-03 ENCOUNTER — CLINICAL SUPPORT (OUTPATIENT)
Dept: NUTRITION | Facility: HOSPITAL | Age: 24
End: 2019-10-03
Payer: MEDICARE

## 2019-10-03 VITALS — WEIGHT: 315 LBS | BODY MASS INDEX: 51.56 KG/M2

## 2019-10-03 DIAGNOSIS — E66.9 OBESITY, UNSPECIFIED CLASSIFICATION, UNSPECIFIED OBESITY TYPE, UNSPECIFIED WHETHER SERIOUS COMORBIDITY PRESENT: ICD-10-CM

## 2019-10-03 NOTE — PROGRESS NOTES
Initial Nutrition Assessment Form    Patient Name: Palomo Valentin    YOB: 1995    Sex: Male     Assessment Date: 10/3/2019  Start Time: 130 Stop Time: 230 Total Minutes: 61     Data:  Present at session: self and caretaker from institution   Parent/Patient Concerns: wt   Medical Dx/Reason for Referral: wt   Past Medical History:   Diagnosis Date    Anxiety     Asthma     Hypertension     Impulse control disorder     Mental retardation     Psychiatric disorder     Psychiatric illness     Schizoaffective disorder (HCC)        Current Outpatient Medications   Medication Sig Dispense Refill    albuterol (PROVENTIL HFA,VENTOLIN HFA) 90 mcg/act inhaler Inhale 2 puffs every 6 (six) hours as needed for wheezing 1 Inhaler 0    benztropine (COGENTIN) 1 mg tablet Take 1 tablet (1 mg total) by mouth 2 (two) times a day At 9am and 6pm 60 tablet 1    divalproex sodium (DEPAKOTE) 250 mg EC tablet Take 3 tablets (750 mg total) by mouth daily after dinner for 28 days 84 tablet 0    divalproex sodium (DEPAKOTE) 500 mg EC tablet Take 1 tablet (500 mg total) by mouth daily for 28 days 28 tablet 0    LORazepam (ATIVAN) 1 mg tablet Take 1 tablet (1 mg total) by mouth every 8 (eight) hours as needed for anxiety 30 tablet 0    propranolol (INDERAL) 20 mg tablet Take 1 tablet (20 mg total) by mouth every 12 (twelve) hours for 28 days 56 tablet 0    risperiDONE (RisperDAL) 1 mg tablet Take 1 tablet (1 mg total) by mouth daily for 28 days 28 tablet 0    risperiDONE (RisperDAL) 2 mg tablet Take 1 tablet (2 mg total) by mouth daily after dinner for 28 days 28 tablet 0     No current facility-administered medications for this visit           Additional Meds/Supplements: n/a   Special Learning Needs: Intellectual disability   Height: HC Readings from Last 3 Encounters:   No data found for [facility], [facility]       Weight: Wt Readings from Last 10 Encounters:   10/03/19 (!) 149 kg (329 lb 3 2 oz)   08/20/19 (!) 149 kg (329 lb 9 4 oz) 05/13/19 (!) 144 kg (317 lb 7 4 oz)   01/31/19 135 kg (297 lb 9 9 oz)   11/08/18 135 kg (297 lb 9 9 oz)   10/18/18 135 kg (298 lb 6 oz)   08/17/18 135 kg (297 lb 9 9 oz)   07/24/18 135 kg (297 lb 9 9 oz)   07/10/18 135 kg (297 lb 9 9 oz)   04/19/18 131 kg (288 lb 12 8 oz)     Estimated body mass index is 51 56 kg/m² as calculated from the following:    Height as of 5/13/19: 5' 7" (1 702 m)  Weight as of this encounter: 149 kg (329 lb 3 2 oz)  Recent Weight Change: [x]Yes     []No  Amount: Increase 30# x 10months      Energy Needs: 149kg [id number]/kg = 2831 cals to allow for 1#/week wt loss   Allergies   Allergen Reactions    Bee Venom Anaphylaxis    Penicillins     NKFA   Social History     Substance and Sexual Activity   Alcohol Use No    n/a   Social History     Tobacco Use   Smoking Status Never Smoker   Smokeless Tobacco Never Used    n/a   Who shops? staff   Who cooks? staff   Exercise: Gym -15-20 mins treadmill 5-10mins everyday x4 days only   Prior Counseling? []Yes     [x]No  When:      Why:         Diet Hx:  Breakfast: 5 sl tabares 6 sl bread 20 oz 1% milk, can of coke   7-8   a m  Lunch: pasta x4c and ground beef 20oz milk ice cream x 3-5c; 2 pkts cookies x10 ; 2 cheese s/w    1-2    p m  Dinner: ramen x3 pkgs 3 eggs 20oz c milk   630-7   p m           Snacks: AM - cookies and soda daily   PM - cookies and soda daily and 2l chocolate milk  HS - yogurt  And banana fruit        Nutrition Diagnosis:   Overweight/obesity  related to Excess energy intake as evidenced by  BMI more than normative standard for age and sex (obesity-grade III 40+)       Medical Nutrition Therapy Intervention:  [x]Individualized Meal Plan []Understanding Lab Values   []Basic Pathophysiology of Disease []Food/Medication Interactions   [x]Food Diary [x]Exercise   [x]Lifestyle/Behavior Modification Techniques []Medication, Mechanism of Action   []Label Reading []Self Blood Glucose Monitoring   [x]Weight/BMI Goals [x]Other -bed at 9 falls asleep right away, wakes up at 8    Other Notes: Started taking naps when started going to gym       Comprehension: []Excellent  []Very Good  [x]Good  []Fair   []Poor    Receptivity: []Excellent  []Very Good  [x]Good  []Fair   []Poor    Expected Compliance: []Excellent  []Very Good  [x]Good  []Fair   []Poor        Goals:  1 8 oz milk with meals; eliminate soda   2 portions per plate method as discussed with RD   3 snack on fruit instead of cookies  4  2000 cals/day       No follow-ups on file    Labs:  CMP  Lab Results   Component Value Date     07/22/2015    K 4 0 05/14/2019     05/14/2019    CO2 28 05/14/2019    ANIONGAP 11 07/22/2015    BUN 11 05/14/2019    CREATININE 0 75 05/14/2019    GLUCOSE 104 07/22/2015    GLUF 93 05/14/2019    CALCIUM 8 7 05/14/2019    AST 80 (H) 05/14/2019     (H) 05/14/2019    ALKPHOS 73 05/14/2019    PROT 7 3 07/22/2015    BILITOT 0 59 07/22/2015    EGFR 129 05/14/2019       BMP  Lab Results   Component Value Date    GLUCOSE 104 07/22/2015    CALCIUM 8 7 05/14/2019     07/22/2015    K 4 0 05/14/2019    CO2 28 05/14/2019     05/14/2019    BUN 11 05/14/2019    CREATININE 0 75 05/14/2019       Lipids  No results found for: CHOL  Lab Results   Component Value Date    HDL 29 (L) 03/02/2019    HDL 33 (L) 02/14/2019    HDL 27 (L) 10/26/2018     Lab Results   Component Value Date    LDLCALC 102 (H) 03/02/2019    LDLCALC 94 02/14/2019    LDLCALC 76 10/26/2018     Lab Results   Component Value Date    TRIG 189 (H) 03/02/2019    TRIG 124 02/14/2019    TRIG 192 (H) 10/26/2018     No results found for: CHOLHDL    Hemoglobin A1C  Lab Results   Component Value Date    HGBA1C 5 2 03/02/2019       Fasting Glucose  Lab Results   Component Value Date    GLUF 93 05/14/2019       Insulin     Thyroid  Lab Results   Component Value Date    D9FCXIV 1 10 04/21/2018    U9OSLTH 7 0 04/21/2018       Hepatic Function Panel  Lab Results   Component Value Date     (H) 05/14/2019    AST 80 (H) 05/14/2019    ALKPHOS 73 05/14/2019    BILITOT 0 59 07/22/2015       Celiac Disease Antibody Panel  No results found for: ENDOMYSIAL IGA, GLIADIN IGA, GLIADIN IGG, IGA, TISSUE TRANSGLUT AB, TTG IGA   Iron  No results found for: IRON, TIBC, FERRITIN    Vitamins  No results found for: VITAMIN B2   No results found for: NICOTINAMIDE, NICOTINIC ACID   No results found for: VITAMINB6  No results found for: DLSMENKU88  No results found for: VITB5  No results found for: T8BBVRYE  No results found for: THYROGLB  No results found for: VITAMIN K   No results found for: 25-HYDROXY VIT D   No components found for: VITAMINE     DSalmon MS RD LDN  [facility] [address]  [address] [facility] [address]

## 2019-10-04 ENCOUNTER — OFFICE VISIT (OUTPATIENT)
Dept: PHYSICAL THERAPY | Facility: MEDICAL CENTER | Age: 24
End: 2019-10-04
Payer: MEDICARE

## 2019-10-04 DIAGNOSIS — R53.81 PHYSICAL DECONDITIONING: Primary | ICD-10-CM

## 2019-10-04 PROCEDURE — 97110 THERAPEUTIC EXERCISES: CPT | Performed by: PHYSICAL THERAPIST

## 2019-10-04 NOTE — PROGRESS NOTES
Daily Note     Today's date: 10/4/2019  Patient name: Dre Mantilla  : 1995  MRN: [id number]  Referring provider: ZBIGNIEW Gonsalves  Dx:   Encounter Diagnosis     ICD-10-CM    1  Physical deconditioning R53 81                   Subjective: Patient reports that he is continuing to feel stronger  Objective: See treatment diary below      Assessment: Continued with aerobic conditioning along with functional strengthening program  Less cueing provided for proper form for exercises  Patient also demonstrated less fatigue today after session, which demonstrates an improvement in cardiovascular fitness along with strength  Patient would benefit from continued PT to address impairments to maximize function  Plan: Continue per plan of care  Precautions: intellectual disability, schizoaffective disorder (bipolar type), asthma, HTN      Manual  9/16 9/20 9/27 10/4                                                                              Exercise Diary  9/16 9/20 9/27 10/4         Rec bike EVERETT 10' 10' 10' 10'         Treadmill             Pulleys   NV NV         UBE   10' 10'         Bridges 20 30 30 30         Clams  20x5: green supine 30x5: green supine 30x5: green supine         Ball squeezes  30x5: 30x5: 30x5:          Modified planks             Standing hip ABD             Standing hip FLX             Standing hip EXT             Heel raises 20 30 30 30         Mini squats 20 30 30 30         Step ups  10 L3           Step downs             Romberg             Tandem stance             SLS             Sit to stands  NV 20 30         Multifidus press    20 blue         Core row    20 blue                                                    Modalities  9/16 9/20 9/27 10/4

## 2019-10-11 ENCOUNTER — OFFICE VISIT (OUTPATIENT)
Dept: PHYSICAL THERAPY | Facility: MEDICAL CENTER | Age: 24
End: 2019-10-11
Payer: MEDICARE

## 2019-10-11 DIAGNOSIS — R53.81 PHYSICAL DECONDITIONING: Primary | ICD-10-CM

## 2019-10-11 PROCEDURE — 97110 THERAPEUTIC EXERCISES: CPT | Performed by: PHYSICAL THERAPIST

## 2019-10-11 NOTE — PROGRESS NOTES
Daily Note     Today's date: 10/11/2019  Patient name: Donna Howard  : 1995  MRN: [id number]  Referring provider: ZBIGNIEW Cowan  Dx:   Encounter Diagnosis     ICD-10-CM    1  Physical deconditioning R53 81                   Subjective: Patient reports that he is feeling pretty good and feels stronger  He reports that he has not been doing his exercises or attending the gym between sessions  Objective: See treatment diary below      Assessment: Patient required extensive cueing for proper form for all exercises  Added bicep curls and LAQ for increased UE/LE strengthening  Patient demonstrated fatigue at end of session but had no pain  He was educated on importance of continuing HEP and gym workouts in between PT sessions  He agreed and verbalized understanding  Patient would benefit from continued PT to progress aerobic fitness and strength to return to PLOF  Plan: Continue per plan of care  Precautions: intellectual disability, schizoaffective disorder (bipolar type), asthma, HTN      Manual  9/16 9/20 9/27 10/4 10/11                                                                             Exercise Diary  9/16 9/20 9/27 10/4 10/11        Rec bike EVERETT 10' 10' 10' 10' 10'        Treadmill             Pulleys   NV NV         UBE   10' 10' 10'        Bridges 20 30 30 30 30        Clams  20x5: green supine 30x5: green supine 30x5: green supine 30x5: blue supine        Ball squeezes  30x5: 30x5: 30x5: 30x5:         Modified planks             Standing hip ABD             Standing hip FLX             Standing hip EXT             Heel raises 20 30 30 30 30        Mini squats 20 30 30 30 30        Step ups  10 L3           Step downs             Romberg             Tandem stance             SLS             Sit to stands  NV 20 30         Multifidus press    20 blue 20 blue        Core row    20 blue 20 blue        Bicep curls     20 ea 5#        LAQ     20 ea 3# Modalities  9/16 9/20 9/27 10/4 10/11

## 2019-10-15 NOTE — PROGRESS NOTES
Clinical Pharmacy Note: Antipsychotic Monitoring Requirements     Justin Bernabe is a 22 y o  male who presents with worsening of depression, suicidal ideation and homicidal ideations and is currently taking  risperidone 1 mg twice daily  Performing metabolic monitoring on patients receiving any antipsychotics is a Centers for [employer] (CMS) requirement  Antipsychotic treatment increases the risk of developing type 2 diabetes mellitus, hypertension, and hyperlipidemia  According to the [facility] (NICE) guidelines, baseline weight, waist circumference, pulse, blood pressure, fasting blood glucose, hemoglobin A1c, lipid profile, and prolactin level (if initiating risperidone or paliperidone) should be collected  These guidelines coincide with Centers and Medicare & Medicaid Services (CMS) requirements including baseline Body Mass Index, blood pressure, fasting glucose, hemoglobin A1c, and fasting lipid panel  CMS states laboratory values collected 12 months from discharge date are acceptable for evaluation  CMS Checklist:     BMI: yes  BP: yes  Fasting glucose: yes  A1c within last 12 months: yes  Lipid panel within last 12 months: yes  Nicotine Replacement Therapy: no, patient does not smoke    The following values have been collected:  Value Status Result    BMI Body mass index is 46 43 kg/m²     Blood pressure /73 (BP Location: Left arm)   Pulse (!) 113   Temp 97 8 °F (36 6 °C) (Tympanic)   Resp 18   Ht 5' 8" (1 727 m)   Wt (!) 139 kg (305 lb 6 oz)   BMI 46 43 kg/m²    Fasting glucose 0   Lab Value Date/Time    GLUC 99 02/05/2019 0545        Hemoglobin A1c 0   Lab Value Date/Time    HGBA1C 4 9 10/09/2018 0535        Lipid panel 0   Lab Value Date/Time    CHOLESTEROL 141 10/26/2018 1134       0   Lab Value Date/Time    HDL 27 (L) 10/26/2018 1134       0   Lab Value Date/Time    LDLCALC 76 10/26/2018 1134        0   Lab Value Date/Time    TRIG 192 (H) 10/26/2018 1134            Recommendations    Based on the results of hemoglobin A1c, lipid panel, and blood pressure monitoring, Sivakumar Cuevas is an potential candidate for no pharmacological interventions and routine monitoring based on  HbA1c<6 5%, ASCVD<7 5% and BP within goal       Based on the above information, pharmacy recommends the following: Continue yearly metabolic monitoring         Pharmacy will continue to follow patient with team   Electronically signed by: Ethan Resendez, Pharmacist BCPP no

## 2019-10-18 ENCOUNTER — OFFICE VISIT (OUTPATIENT)
Dept: PHYSICAL THERAPY | Facility: MEDICAL CENTER | Age: 24
End: 2019-10-18
Payer: MEDICARE

## 2019-10-18 DIAGNOSIS — R53.81 PHYSICAL DECONDITIONING: Primary | ICD-10-CM

## 2019-10-18 PROCEDURE — 97110 THERAPEUTIC EXERCISES: CPT | Performed by: PHYSICAL THERAPIST

## 2019-10-18 NOTE — PROGRESS NOTES
Daily Note     Today's date: 10/18/2019  Patient name: Chuyita Rouse  : 1995  MRN: [id number]  Referring provider: ZBIGNIEW Edmonds  Dx:   Encounter Diagnosis     ICD-10-CM    1  Physical deconditioning R53 81                   Subjective: Patient reports that he is continuing to feel stronger  His knees are hurting because he fell down the steps at home  Patient reports that he went to the gym and did exercises over the week  Objective: See treatment diary below      Assessment: Patient is continuing to demonstrate less fatigue after aerobic conditioning on LE and UE bike  Extensive cueing provided for proper form during all strengthening exercises  Held LAQ due to knee soreness  When reviewing treatment session with aide, aide denied patient's performance of HEP or gym session attendance  Patient and aide were educated on importance of continuing HEP  Patient would benefit from continued PT to improve aerobic capacity and strength to maximize function  Plan: Continue per plan of care  Transition to HEP when appropriate  Precautions: intellectual disability, schizoaffective disorder (bipolar type), asthma, HTN      Manual  9/16 9/20 9/27 10/4 10/11 10/18                                                                            Exercise Diary  9/16 9/20 9/27 10/4 10/11 10/18       Rec bike EVERETT 10' 10' 10' 10' 10' 10'       Treadmill             Pulleys   NV NV         UBE   10' 10' 10' 10'       Bridges 20 30 30 30 30 30       Clams  20x5: green supine 30x5: green supine 30x5: green supine 30x5: blue supine 30x5: blue supine       Ball squeezes  30x5: 30x5: 30x5: 30x5: 30x5:        Modified planks             Standing hip ABD             Standing hip FLX             Standing hip EXT             Heel raises 20 30 30 30 30 30       Mini squats 20 30 30 30 30 30       Step ups  10 L3           Step downs             Romberg             Tandem stance             SLS             Sit to stands  NV 20 30         Multifidus press    20 blue 20 blue 20 blue       Core row    20 blue 20 blue 20 blue       Bicep curls     20 ea 5# 30 ea 5#       LAQ     20 ea 3#                         Modalities  9/16 9/20 9/27 10/4 10/11 10/18

## 2019-10-25 ENCOUNTER — OFFICE VISIT (OUTPATIENT)
Dept: PHYSICAL THERAPY | Facility: MEDICAL CENTER | Age: 24
End: 2019-10-25
Payer: MEDICARE

## 2019-10-25 DIAGNOSIS — R53.81 PHYSICAL DECONDITIONING: Primary | ICD-10-CM

## 2019-10-25 PROCEDURE — 97110 THERAPEUTIC EXERCISES: CPT | Performed by: PHYSICAL THERAPIST

## 2019-10-25 NOTE — PROGRESS NOTES
Daily Note     Today's date: 10/25/2019  Patient name: Catherine Mancilla  : 1995  MRN: [id number]  Referring provider: ZBIGNIEW Bailey  Dx:   Encounter Diagnosis     ICD-10-CM    1  Physical deconditioning R53 81                   Subjective: Patient reports he is feeling stronger, but he reports that he did not go to the gym and did not do his exercises since last session  Objective: See treatment diary below      Assessment: Continued with core, UE, and LE strengthening with less cueing required for proper form for all exercises  Also continued with aerobic conditioning with less fatigue than previous sessions  Patient was provided with illustrated HEP for functional strengthening and aerobic conditioning that could be performed at home and/or in gym environment  Patient's transportation and patient were educated on importance of continuing HEP to further improve aerobic capacity and strength  Plan to discharge to HEP as appropriate  Plan: Continue per plan of care  D/C to HEP as appropriate  Precautions: intellectual disability, schizoaffective disorder (bipolar type), asthma, HTN      Manual  9/16 9/20 9/27 10/4 10/11 10/18 10/25                                                                           Exercise Diary  9/16 9/20 9/27 10/4 10/11 10/18 10/25      Rec bike EVERETT 10' 10' 10' 10' 10' 10' 10'      Treadmill             Pulleys   NV NV         UBE   10' 10' 10' 10' 10'      Bridges 20 30 30 30 30 30 30      Clams  20x5: green supine 30x5: green supine 30x5: green supine 30x5: blue supine 30x5: blue supine 30x5: blue supine      Ball squeezes  30x5: 30x5: 30x5: 30x5: 30x5: 30x5:       Modified planks             Standing hip ABD             Standing hip FLX             Standing hip EXT             Heel raises 20 30 30 30 30 30 30       Mini squats 20 30 30 30 30 30 30      Step ups  10 L3           Step downs             Romberg             Tandem stance             SLS Sit to stands  NV 20 30         Multifidus press    20 blue 20 blue 20 blue 30 blue      Core row    20 blue 20 blue 20 blue 30 blue      Bicep curls     20 ea 5# 30 ea 5# 30 ea 5#      LAQ     20 ea 3#                         Modalities  9/16 9/20 9/27 10/4 10/11 10/18 10/25

## 2019-11-01 ENCOUNTER — OFFICE VISIT (OUTPATIENT)
Dept: PHYSICAL THERAPY | Facility: MEDICAL CENTER | Age: 24
End: 2019-11-01
Payer: MEDICARE

## 2019-11-01 DIAGNOSIS — R53.81 PHYSICAL DECONDITIONING: Primary | ICD-10-CM

## 2019-11-01 PROCEDURE — 97110 THERAPEUTIC EXERCISES: CPT | Performed by: PHYSICAL THERAPIST

## 2019-11-01 NOTE — PROGRESS NOTES
Discharge Summary    Today's date: 2019  Patient name: Isadora Jarrett  : 1995  MRN: [id number]  Referring provider: ZBIGNIEW Gómez  Dx:   Encounter Diagnosis     ICD-10-CM    1  Physical deconditioning R53 81                   Subjective: Patient reports that he is continuing to feel stronger, and he will be starting to go to the gym on Monday  Objective: See treatment diary below        Assessment: Patient has demonstrated consistent progress in aerobic capacity as evidenced by less fatigue during aerobic conditioning exercises  In addition, he has demonstrated improved squat mechanics and improved control when performing 5x sit to stand  He has reached maximal benefit from PT at this time  Patient was provided with an illustrated HEP for continued strengthening and conditioning  Patient and  were educated on importance of continuing HEP and attending gym with assistance as needed  Patient is now discharged to HEP  Goals  Patient will be able to perform HEP with assistance as needed by staff at group home  - met  Patient will improve 5x sit to stand time to 25s to demonstrate an improvement in strength  - met  Patient will be able to perform SLS for 5-10s bilaterally to demonstrate an improvement in strength and proprioception - not met (3s bilaterally- pt is independent in HEP for further strengthening)  Patient will be able to perform DL squat with improved bilateral valgus control to alleviate excessive strain on knees during ADL  - met        Plan: Discharge to HEP and gym program      Precautions: intellectual disability, schizoaffective disorder (bipolar type), asthma, HTN      Manual  9/16 9/20 9/27 10/4 10/11 10/18 10/25 11/1                                                                          Exercise Diary  9/16 9/20 9/27 10/4 10/11 10/18 10/25 11/1     Rec bike EVERETT 10' 10' 10' 10' 10' 10' 10' 10'     Treadmill             Pulleys   NV NV         UBE   10' 10' 10' 10' 10' 5'     Bridges 20 30 30 30 30 30 30 30     Clams  20x5: green supine 30x5: green supine 30x5: green supine 30x5: blue supine 30x5: blue supine 30x5: blue supine 30x5: blue supine     Ball squeezes  30x5: 30x5: 30x5: 30x5: 30x5: 30x5: 30x5:      Modified planks             Standing hip ABD             Standing hip FLX             Standing hip EXT             Heel raises 20 30 30 30 30 30 30  30     Mini squats 20 30 30 30 30 30 30 30     Step ups  10 L3           Step downs             Romberg             Tandem stance             SLS             Sit to stands  NV 20 30    5     Multifidus press    20 blue 20 blue 20 blue 30 blue 30 blue     Core row    20 blue 20 blue 20 blue 30 blue 30 blue     Bicep curls     20 ea 5# 30 ea 5# 30 ea 5# 30 ea 5#     LAQ     20 ea 3#                         Modalities  9/16 9/20 9/27 10/4 10/11 10/18 10/25 11/1

## 2019-11-21 ENCOUNTER — OFFICE VISIT (OUTPATIENT)
Dept: SLEEP CENTER | Facility: CLINIC | Age: 24
End: 2019-11-21
Payer: MEDICARE

## 2019-11-21 VITALS
DIASTOLIC BLOOD PRESSURE: 70 MMHG | WEIGHT: 315 LBS | HEIGHT: 68 IN | BODY MASS INDEX: 47.74 KG/M2 | SYSTOLIC BLOOD PRESSURE: 116 MMHG

## 2019-11-21 DIAGNOSIS — G47.33 OSA (OBSTRUCTIVE SLEEP APNEA): Primary | ICD-10-CM

## 2019-11-21 PROCEDURE — 99214 OFFICE O/P EST MOD 30 MIN: CPT | Performed by: NURSE PRACTITIONER

## 2019-11-21 RX ORDER — LEVOCETIRIZINE DIHYDROCHLORIDE 5 MG/1
5 TABLET, FILM COATED ORAL DAILY PRN
COMMUNITY
End: 2022-07-13

## 2019-11-21 RX ORDER — EPINEPHRINE 0.3 MG/.3ML
0.3 INJECTION SUBCUTANEOUS ONCE
COMMUNITY

## 2019-11-21 RX ORDER — EPINEPHRINE 0.15 MG/.3ML
0.15 INJECTION INTRAMUSCULAR ONCE
COMMUNITY
End: 2019-11-21 | Stop reason: CLARIF

## 2019-11-21 NOTE — PATIENT INSTRUCTIONS
1   Complete CPAP titration study, as scheduled  2   Schedule appointment for set up with CPAP equipment  3   Schedule appointment for compliance visit 2-3 months later  4  Call with any questions or concerns or with any difficulties with the use of CPAP  Nursing Support:  When: Monday through Friday 7A-5PM except holidays  Where: Our direct line is [phone]  If you are having a true emergency please call 911  In the event that the line is busy or it is after hours please leave a voice message and we will return your call  Please speak clearly, leaving your full name, birth date, best number to reach you and the reason for your call  Medication refills: We will need the name of the medication, the dosage, the ordering provider, whether you get a 30 or 90 day refill, and the pharmacy name and address  Medications will be ordered by the provider only  Nurses cannot call in prescriptions  Please allow 7 days for medication refills  Physician requested updates: If your provider requested that you call with an update after starting medication, please be ready to provide us the medication and dosage, what time you take your medication, the time you attempt to fall asleep, time you fall asleep, when you wake up, and what time you get out of bed  Sleep Study Results: We will contact you with sleep study results and/or next steps after the physician has reviewed your testing

## 2019-11-21 NOTE — PROGRESS NOTES
Consultation - [facility], 1995, MRN: [id number]    11/21/2019        Reason for Consult / Principal Problem:    Obstructive Sleep Apnea  Obesity  Intellectual disability       Thank you for the opportunity of participating in the evaluation and care of this patient in the Sleep Clinic at [facility]  Subjective:     HPI: Nighat Neff is a 22y o  year old male  He presents with an attendant for a consultation regarding obstructive sleep apnea  He is intellectually disabled with schizoaffective and impulse control disorders  He currently lives in a group home, where he has been living since May 2019  He completed a diagnostic sleep study for complaints of snoring and daytime sleepiness  He is here to review the results of the study and options for treatment  His comorbid conditions include obesity, hypertension  Review of Systems      Genitourinary none   Cardiology none   Gastrointestinal none   Neurology none   Constitutional weight change   Integumentary none   Psychiatry none   Musculoskeletal none   Pulmonary none   ENT none   Endocrine none   Hematological none     Employment:  He is intellectually disabled  Sleep Schedule:       Bedtime:  9:00-11:00pm      Latency:  He reports that he falls asleep easily      Wakeup time:  8:00am    Awakenings:       Frequency:  He wakes up once per night to use the bathroom for urination      Causes:   For urination      Duration:  He returns to sleep without difficulty    Daytime Sleepiness / Inappropriate Sleep:       Most severe:  Mid morning       Naps :  daily      Time:  10:00am      Duration:  30 minutes       Inappropriate drowsiness / sleep:  He dozes in the evening when watching TV    Snoring:  He snores loudly    Apnea:  LORI noted on diagnostic study    Change in Weight:  He has gained 25lbs over the past year    Restless Leg Syndrome:  He does not describe clinical symptoms consistent with this diagnosis     Other Complaints:  No reports of sleep walking or talking in his sleep  No reports of sleep paralysis or hallucinations surrounding sleep  Social History:      Caffeine:  He drinks soda and iced tea - approximately 3 servings per day       Tobacco:   reports that he has never smoked  He has never used smokeless tobacco        Alcohol:   reports that he does not drink alcohol  Drugs:   reports that he does not use drugs  The review of systems and following portions of the patient's history were reviewed and updated as appropriate: allergies, current medications, past family history, past medical history, past social history, past surgical history and problem list         Objective:       Vitals:    11/21/19 1000   BP: 116/70   Weight: (!) 147 kg (325 lb)   Height: 5' 8" (1 727 m)     Body mass index is 49 42 kg/m²  Neck Circumference: 18 5  [place] Sleepiness Scale:  Total score: 14      Current Outpatient Medications:     albuterol (PROVENTIL HFA,VENTOLIN HFA) 90 mcg/act inhaler, Inhale 2 puffs every 6 (six) hours as needed for wheezing, Disp: 1 Inhaler, Rfl: 0    benztropine (COGENTIN) 1 mg tablet, Take 1 tablet (1 mg total) by mouth 2 (two) times a day At 9am and 6pm, Disp: 60 tablet, Rfl: 1    EPINEPHrine (EPIPEN) 0 3 mg/0 3 mL SOAJ, Inject 0 3 mg into a muscle once, Disp: , Rfl:     levocetirizine (XYZAL) 5 MG tablet, Take 5 mg by mouth every evening, Disp: , Rfl:     divalproex sodium (DEPAKOTE) 250 mg EC tablet, Take 3 tablets (750 mg total) by mouth daily after dinner for 28 days, Disp: 84 tablet, Rfl: 0    divalproex sodium (DEPAKOTE) 500 mg EC tablet, Take 1 tablet (500 mg total) by mouth daily for 28 days, Disp: 28 tablet, Rfl: 0    propranolol (INDERAL) 20 mg tablet, Take 1 tablet (20 mg total) by mouth every 12 (twelve) hours for 28 days, Disp: 56 tablet, Rfl: 0    risperiDONE (RisperDAL) 1 mg tablet, Take 1 tablet (1 mg total) by mouth daily for 28 days, Disp: 28 tablet, Rfl: 0    risperiDONE (RisperDAL) 2 mg tablet, Take 1 tablet (2 mg total) by mouth daily after dinner for 28 days, Disp: 28 tablet, Rfl: 0    Physical Exam  General Appearance:   Alert, cooperative, no distress, morbidly obese     Head:   Normocephalic, without obvious abnormality, atraumatic     Eyes:   PERRL, conjunctiva/corneas clear, EOM's intact          Nose:  Nares normal, septum midline, mucosa normal, no drainage or sinus tenderness           Throat:  Lips normal; gingival hyperplasia noted with poor dentition, tongue normal size and  shape and midline in position; mucosa moist and redundant bilaterally, uvula thick and dipping below the tongue with small orophayrngeal opening, tonsils +2/4 bilaterally, Mallampati class 3       Neck:  Supple, symmetrical, trachea midline, no adenopathy; Thyroid: No enlargement, tenderness or nodules; no carotid bruit or JVD     Lungs:      Clear to auscultation bilaterally, respirations unlabored     Heart:   Regular rate and rhythm, S1 and S2 normal, no murmur, rub or gallop       Extremities:  Extremities normal, atraumatic, no cyanosis or edema       Skin:  Skin color, texture, turgor normal, no rashes or lesions       Neurologic:  Intellectually disabled  No focal deficits noted  Normal strength, sensation throughout     Sleep Study Results:  Results of diagnostic sleep study, completed on 9/8/19 are as follows:  Sleep latency was 1 hour 1 minutes and 56 seconds  REM latency was 2 hours 49 minutes and 30 seconds  Sleep  efficiency was 80 5%  REM sleep was significantly diminished and Stage N3 sleep was significantly increased in  quantity  A total of 88 abnormal breathing events were identified  The AHI was 13 7 events per hour  During REM  sleep this number increased to 40 5 events per hour  Minimum oxygen saturation was 84%  No periodic limb  movements were seen  The total arousal index was 17 6 events per hour  Sleep architecture was moderately  fragmented throughout  No other significant abnormalities were identified  These findings are consistent with a  diagnosis of mild obstructive sleep apnea which becomes more severe during REM sleep  A repeat tracing for  titration of nasal CPAP is warranted  ASSESSMENT / PLAN     1  LORI (obstructive sleep apnea)     2  BMI 45 0-49 9, adult (HCC)       Plan:  Complete CPAP titration study, as scheduled  Begin use of CPAP equipment after set up  Schedule follow up visit in 2-3 months for compliance and review of effectiveness of treatment  Counseling / Coordination of Care  Total clinic time spent today 60 minutes  Greater than 50% of total time was spent with the patient and / or family counseling and / or coordination of care  A description of the counseling / coordination of care:     diagnostic results, instructions for management, risk factor reductions, prognosis, patient and family education, impressions, risks and benefits of treatment options and importance of compliance with treatment    I reviewed the recent test results with the patient  We talked about the abnormal findings, including those consistent with Obstructive Sleep Apnea  We then discussed how the these are categorized as mild, moderate or severe  We also covered the medical comorbidities associated with untreated Obstructive Sleep Apnea including, hypertension, cardiac arrythmia, myocardial infarction and stroke  I explained how the risk of these conditions increases with the severity of the test results  I also spoke in some detail about the most common treatment options including weight loss, nasal PAP, mandibular advancement devices and uvulopalatopharyngoplasty (UPPP)  I answered all questions posed to me and gave my opinion regarding the best options for treatment based on the patient and their level of disease  In this case he chose to begin treatment using CPAP    A CPAP titration study is scheduled for 12/22/19 with planned CPAP set up on 1/2/19  The patient will return in 4 to 12 weeks after start of therapy for a review of compliance data collected since beginning treatment  We will discuss this data and talk about any problems that may prevent successful treatment of Obstructive Sleep Apnea  Changes will be made in treatment parameters or interface devices in order to improve his ability to continue using PAP to maintain upper airway patency during sleep  The following instructions have been given to the patient today:    Patient Instructions   1  Complete CPAP titration study, as scheduled  2   Schedule appointment for set up with CPAP equipment  3   Schedule appointment for compliance visit 2-3 months later  4  Call with any questions or concerns or with any difficulties with the use of CPAP  Nursing Support:  When: Monday through Friday 7A-5PM except holidays  Where: Our direct line is [phone]  If you are having a true emergency please call 911  In the event that the line is busy or it is after hours please leave a voice message and we will return your call  Please speak clearly, leaving your full name, birth date, best number to reach you and the reason for your call  Medication refills: We will need the name of the medication, the dosage, the ordering provider, whether you get a 30 or 90 day refill, and the pharmacy name and address  Medications will be ordered by the provider only  Nurses cannot call in prescriptions  Please allow 7 days for medication refills  Physician requested updates: If your provider requested that you call with an update after starting medication, please be ready to provide us the medication and dosage, what time you take your medication, the time you attempt to fall asleep, time you fall asleep, when you wake up, and what time you get out of bed  Sleep Study Results:  We will contact you with sleep study results and/or next steps after the physician has reviewed your testing          Benny Miner, [facility]

## 2019-12-05 NOTE — CONSULTS
Consult - [address] 22 y o  male MRN: [id number]  Unit/Bed#: [facility] 251-01 Encounter: [id number]    Assessment/Plan     Assessment/Plan:  Paronychia/Cellulitis left great toe  Onychocryptosis left lateral great toe   -I&D left lateral hallux margin(90528), no anesthesia required secondary to septic onycholysis  -dry sterile dressing with mupirocin applied    -Rx Azithromycin 500 mg daily x5 days   -Rx Mupirocin ointment,  with daily care of wound  History of Present Illness     HPI:  Catracho Knott is a 22 y o  male who presents with painful red swollen left great toe nail margin  Patient relates it having a 1 week duration  Previously completed course of clindamycin but nail margin continues to drained and remain tender  Patient's chart reviewed noting all pertinent clinical laboratory data  Inpatient consult to Podiatry  Consult performed by: Dunia Florian DPM  Consult ordered by: Nicholas Rutledge        Review of Systems   Constitutional: Negative  HENT: Negative  Eyes: Negative  Respiratory: Negative  Cardiovascular:  Negative    Gastrointestinal: Negative      Musculoskeletal:  Negative   Skin:  Infection left great toe   Neurological:  Negative        Historical Information   Past Medical History:   Diagnosis Date    Anxiety     Asthma     Hypertension     Mental retardation     Psychiatric disorder     Psychiatric illness     Schizoaffective disorder ([address] )      Past Surgical History:   Procedure Laterality Date    HAND SURGERY      right hand     Social History   Social History     Substance and Sexual Activity   Alcohol Use No     Social History     Substance and Sexual Activity   Drug Use No     Social History     Tobacco Use   Smoking Status Never Smoker   Smokeless Tobacco Never Used     Family History:   Family History   Problem Relation Age of Onset    No Known Problems Mother     No Known Problems Father     No Known Problems Sister     No Known Problems Brother     No Known Problems Maternal Aunt     No Known Problems Paternal Aunt     No Known Problems Maternal Uncle     No Known Problems Paternal Uncle     No Known Problems Maternal Grandfather     No Known Problems Maternal Grandmother     No Known Problems Paternal Grandfather     No Known Problems Paternal Grandmother     No Known Problems Cousin     ADD / ADHD Neg Hx     Alcohol abuse Neg Hx     Anxiety disorder Neg Hx     Bipolar disorder Neg Hx     Dementia Neg Hx     Depression Neg Hx     Drug abuse Neg Hx     OCD Neg Hx     Paranoid behavior Neg Hx     Schizophrenia Neg Hx     Seizures Neg Hx     Self-Injury Neg Hx     Suicide Attempts Neg Hx        Meds/Allergies   Medications Prior to Admission   Medication    albuterol (PROVENTIL HFA,VENTOLIN HFA) 90 mcg/act inhaler    amLODIPine (NORVASC) 5 mg tablet    divalproex sodium (DEPAKOTE) 250 mg EC tablet    divalproex sodium (DEPAKOTE) 500 mg EC tablet    EPINEPHrine (EPIPEN) 0 3 mg/0 3 mL SOAJ    mupirocin (BACTROBAN) 2 % ointment    risperiDONE (RisperDAL) 2 mg tablet     Allergies   Allergen Reactions    Bee Venom Anaphylaxis    Penicillins        Objective   First Vitals:   Blood Pressure: 154/78 (01/31/19 2249)  Pulse: (!) 119 (01/31/19 2249)  Temperature: (!) 97 2 °F (36 2 °C) (01/31/19 2249)  Temp Source: Tympanic (01/31/19 2249)  Respirations: 18 (01/31/19 2249)  Height: 5' 8" (172 7 cm) (01/31/19 2249)  Weight - Scale: (!) 139 kg (305 lb 6 oz) (01/31/19 2249)    Current Vitals:   Blood Pressure: 128/52 (03/08/19 0733)  Pulse: 101 (03/08/19 0733)  Temperature: (!) 97 3 °F (36 3 °C) (03/08/19 0733)  Temp Source: Tympanic (03/08/19 0733)  Respirations: 16 (03/08/19 0733)  Height: 5' 8" (172 7 cm) (01/31/19 2249)  Weight - Scale: (!) 144 kg (317 lb 6 4 oz) (03/02/19 1300)        /52 (BP Location: Left arm)   Pulse 101   Temp (!) 97 3 °F (36 3 °C) (Tympanic)   Resp 16   Ht 5' 8" (1 727 m)   Wt (!) 144 kg (317 lb 6 4 oz)   BMI 48 26 kg/m²     General Appearance:    Alert, cooperative, no distress   Head:    Normocephalic, without obvious abnormality, atraumatic   Eyes:    PERRL, conjunctiva/corneas clear, EOM's intact            Nose:   Moist mucous membranes, no drainage or sinus tenderness   Throat:   No tenderness, no exudates   Neck:   Supple, symmetrical, trachea midline, no JVD   Back:     Symmetric, no CVA tenderness   Lungs:     Respirations unlabored   Chest wall:    No tenderness or deformity   Heart:    Regular rate and rhythm noted on last vitals  Abdomen:     Soft, non-tender, bowel sounds active all four quadrants,     no masses, no organomegaly       Lower Ext/Ortho:   No gross pedal deformities noted   Pulses:  Right:  Palpable, Left:  Palpable,    Capillary Filling:  Immediate, Edema:  None   Skin: Texture, Tone and Turgor:  WNL, Digital Hair:  Present  Atrophic Changes:  None   Lesions:  None  Ulcers/Wounds:  None  Nail Pathology:  Left lateral hallux nail margin severely incurvated with pyogenic granuloma noted, onycholysis with moderate serosanguineous drainage present, pain with palpation, cellulitis noted to IPJ  Neurologic:  CNII-XII intact  Normal strength  Sensation and reflexes:  Grossly intact            Lab Results:   CBC w/diff      BMP        Invalid input(s): LABGLOM  CMP        Invalid input(s): LABALBU  @Culture@  No results found for: Pryor Midget      Imaging: I have personally reviewed pertinent films in PACS      EKG, Pathology, and Other Studies: I have personally reviewed pertinent reports        Code Status: Level 1 - Full Code    Ashli Coreas DPM no

## 2019-12-22 ENCOUNTER — HOSPITAL ENCOUNTER (OUTPATIENT)
Dept: SLEEP CENTER | Facility: CLINIC | Age: 24
Discharge: HOME | End: 2019-12-22
Payer: MEDICARE

## 2019-12-22 DIAGNOSIS — G47.33 OSA (OBSTRUCTIVE SLEEP APNEA): ICD-10-CM

## 2019-12-22 PROCEDURE — 95811 POLYSOM 6/>YRS CPAP 4/> PARM: CPT

## 2019-12-22 PROCEDURE — 95811 POLYSOM 6/>YRS CPAP 4/> PARM: CPT | Performed by: PSYCHIATRY & NEUROLOGY

## 2019-12-23 NOTE — PROGRESS NOTES
Sleep Study Documentation    Pre-Sleep Study       Sleep testing procedure explained to patient:YES    Patient napped prior to study:YES- more than 30 minutes  Napped after 2PM: yes    Caffeine:Dayshift worker after 12PM   Caffeine use:NO    Alcohol:Dayshift workers after 5PM: Alcohol use:NO    Typical day for patient:YES       Study Documentation    Sleep Study Indications:  LORI-diagnosed in-lab at [address] [facility]  Sleep Study: Treatment   Optimal PAP pressure:  4 cm H2O  Leak:Small  Snore:Eliminated  REM Obtained:no  Supplemental O2: no    Minimum SaO2  93 %  Baseline SaO2  95 6 %  PAP mask tried (list all) [employer] Simplus Medium Full Face Mask and heated humidity  PAP mask choice (final) same  PAP mask type:full face  PAP pressure at which snoring was eliminated  Study ended early  Minimum SaO2 at final PAP pressure  93 %  Mode of Therapy:CPAP  ETCO2:No  CPAP changed to BiPAP:No    Mode of Therapy:CPAP    EKG abnormalities: no     EEG abnormalities: yes:  ECG artifact throughout study  Averaged Ms  Sleep Study Recorded < 2 hours: N/A    Sleep Study Recorded > 2 hours but incomplete study: yes Patient choose to leave, unable to sleep, needs to get used to CPAP mask  Sleep Study Recorded 6 hours but no sleep obtained: NO          Post-Sleep Study    Medication used at bedtime or during sleep study:NO    Patient reports time it took to fall asleep:  N/A    Patient reports waking up during study:  N/A    Patient reports sleeping    N/A    Patient reports sleep during study:  N/A    Patient rated sleepiness: N/A    PAP treatment:yes: Post PAP treatment patient reports feeling:  N/A

## 2019-12-26 DIAGNOSIS — G47.33 OSA (OBSTRUCTIVE SLEEP APNEA): Primary | ICD-10-CM

## 2019-12-27 ENCOUNTER — TELEPHONE (OUTPATIENT)
Dept: SLEEP CENTER | Facility: CLINIC | Age: 24
End: 2019-12-27

## 2019-12-27 NOTE — TELEPHONE ENCOUNTER
Left detailed message for Estefanía Muñoz, care giver, advised DR Neumann recommends CPAP  Advised patient has visit with [facility] scheduled 1/2/20 at 1100 in [facility], address provided  Advised patient to bring mask with to this appointment  [employer] representative aware

## 2020-01-02 ENCOUNTER — TELEPHONE (OUTPATIENT)
Dept: SLEEP CENTER | Facility: CLINIC | Age: 25
End: 2020-01-02

## 2020-02-18 NOTE — ED NOTES
Pt's mother called the [facility] [phone], states will be coming in and will arrive in one hour        Cathi Oreilly RN  07/10/18 6337 · Controlled  · Continue with metoprolol, furosemide and diltiazem  · Continue to monitor BP

## 2020-02-21 ENCOUNTER — OFFICE VISIT (OUTPATIENT)
Dept: SLEEP CENTER | Facility: CLINIC | Age: 25
End: 2020-02-21
Payer: MEDICARE

## 2020-02-21 VITALS
SYSTOLIC BLOOD PRESSURE: 112 MMHG | HEART RATE: 72 BPM | BODY MASS INDEX: 49.42 KG/M2 | DIASTOLIC BLOOD PRESSURE: 70 MMHG | HEIGHT: 68 IN

## 2020-02-21 DIAGNOSIS — G47.33 OSA (OBSTRUCTIVE SLEEP APNEA): Primary | ICD-10-CM

## 2020-02-21 DIAGNOSIS — F79 INTELLECTUAL DISABILITY: Chronic | ICD-10-CM

## 2020-02-21 DIAGNOSIS — Z91.19 NON-COMPLIANCE WITH TREATMENT: ICD-10-CM

## 2020-02-21 PROBLEM — Z91.199 NON-COMPLIANCE WITH TREATMENT: Status: ACTIVE | Noted: 2020-02-21

## 2020-02-21 PROCEDURE — 99214 OFFICE O/P EST MOD 30 MIN: CPT | Performed by: NURSE PRACTITIONER

## 2020-02-21 NOTE — PROGRESS NOTES
Progress Note - [facility] Sleep [address] 26 y o  male   :1995, MRN: [id number]  2020          Follow Up Evaluation / Problem:  Obstructive Sleep Apnea  Obesity      Thank you for the opportunity of participating in the evaluation and care of this patient in the Sleep Clinic at [facility]  HPI: Rosa Carballo is a 26y o  year old male  The patient presents with an attendant for follow up of obstructive sleep apnea  He completed a diagnostic sleep study, due to complaints of loud snoring and excessive daytime sleepiness, which indicated mild obstructive sleep apnea, which becomes severe in REM sleep  He then completed a CPAP titration study and began the use of CPAP equipment on 2020  He is here to review compliance and effectiveness of treatment        Review of Systems      Genitourinary none   Cardiology none   Gastrointestinal none   Neurology forgetfulness, poor concentration or confusion,  and difficulty with memory   Constitutional none   Integumentary none   Psychiatry none   Musculoskeletal back pain   Pulmonary snoring   ENT none   Endocrine none   Hematological none       Current Outpatient Medications:     albuterol (PROVENTIL HFA,VENTOLIN HFA) 90 mcg/act inhaler, Inhale 2 puffs every 6 (six) hours as needed for wheezing, Disp: 1 Inhaler, Rfl: 0    benztropine (COGENTIN) 1 mg tablet, Take 1 tablet (1 mg total) by mouth 2 (two) times a day At 9am and 6pm, Disp: 60 tablet, Rfl: 1    EPINEPHrine (EPIPEN) 0 3 mg/0 3 mL SOAJ, Inject 0 3 mg into a muscle once, Disp: , Rfl:     levocetirizine (XYZAL) 5 MG tablet, Take 5 mg by mouth every evening, Disp: , Rfl:     metFORMIN (GLUCOPHAGE) 500 mg tablet, Take 500 mg by mouth 2 (two) times a day with meals, Disp: , Rfl:     divalproex sodium (DEPAKOTE) 250 mg EC tablet, Take 3 tablets (750 mg total) by mouth daily after dinner for 28 days, Disp: 84 tablet, Rfl: 0    divalproex sodium (DEPAKOTE) 500 mg EC tablet, Take 1 tablet (500 mg total) by mouth daily for 28 days, Disp: 28 tablet, Rfl: 0    propranolol (INDERAL) 20 mg tablet, Take 1 tablet (20 mg total) by mouth every 12 (twelve) hours for 28 days, Disp: 56 tablet, Rfl: 0    risperiDONE (RisperDAL) 1 mg tablet, Take 1 tablet (1 mg total) by mouth daily for 28 days, Disp: 28 tablet, Rfl: 0    risperiDONE (RisperDAL) 2 mg tablet, Take 1 tablet (2 mg total) by mouth daily after dinner for 28 days, Disp: 28 tablet, Rfl: 0    [place] Sleepiness Scale  Sitting and reading: Would never doze  Watching TV: Would never doze  Sitting, inactive in a public place (e g  a theatre or a meeting): Would never doze  As a passenger in a car for an hour without a break: Would never doze  Lying down to rest in the afternoon when circumstances permit: Would never doze  Sitting and talking to someone: Would never doze  Sitting quietly after a lunch without alcohol: Would never doze  In a car, while stopped for a few minutes in traffic: Would never doze  Total score: 0              Vitals:    02/21/20 1100   BP: 112/70   Pulse: 72   Height: 5' 8" (1 727 m)       Body mass index is 49 42 kg/m²  Neck Circumference: 18 5       EPWORTH SLEEPINESS SCORE  Total score: 0      Past History Since Last Sleep Center Visit:   He reports that he finds the use of CPAP comfortable  He feels the mask is comfortable with no air leaks  The pressure feels comfortable when using it  He admits that he awakens feeling better and is not as sleepy during the day when using it  He states that he doesn't know why he doesn't use it on some nights  His caregiver reports that he tells staff he doesn't feel like wearing it and he can become aggressive at times  The patient has assistance with cleaning the equipment      The review of systems and following portions of the patient's history were reviewed and updated as appropriate: allergies, current medications, past family history, past medical history, past social history, past surgical history, and problem list         OBJECTIVE    PAP Pressure: Nasal AutoPAP using a lower limit of 4 cm and an upper limit of 20 cm of water pressure  Type of mask used: full face  DME Provider: [employer]    Physical Exam:     General Appearance:   Alert, cooperative, no distress, appears stated age, morbidly obese     Head:   Normocephalic, without obvious abnormality, atraumatic     Eyes:   PERRL, conjunctiva/corneas clear, EOM's intact          Nose:  Nares normal, septum midline, no drainage or sinus tenderness           Throat:  Lips and teeth normal with hypertrophic gums; tongue midline and large in size with normal shape      Neck:  Supple, symmetrical, trachea midline, no adenopathy; Thyroid: No enlargement, tenderness or nodules; no carotid bruit or JVD     Lungs:      Clear to auscultation bilaterally, respirations unlabored     Heart:   Regular rate and rhythm, S1 and S2 normal, no murmur, rub or gallop       Extremities:  Extremities normal, atraumatic, no cyanosis or edema       Skin:  Skin color, texture, turgor normal, no rashes or lesions       Neurologic:  No focal deficits noted       ASSESSMENT / PLAN    1  LORI (obstructive sleep apnea)  PAP DME Resupply/Reorder   2  Non-compliance with treatment     3  Intellectual disability     4  BMI 45 0-49 9, adult [facility])             Counseling / Coordination of Care  Total clinic time spent today 30 minutes  Greater than 50% of total time was spent with the patient and / or family counseling and / or coordination of care  A description of the counseling / coordination of care:     Impressions, Diagnostic results, Prognosis, Instructions for management, Risks and benefits of treatment, Patient and family education, Risk factor reductions and Importance of compliance with treatment    Today I reviewed the patient's compliance data    he has been able to use the equipment 46 7% of all days recorded  Average usage was 4 or more hours 23 3% of all days recorded  The estimated AHI is 2 7 abnormal breathing events per hour  The patient feels they benefit from the use of PAP equipment and would like to continue PAP therapy  The use of CPAP is medically necessary  Response to treatment has been good, when he is using it  The patient's intellectual disability needs to be considered regarding compliance and may need more time to adjust to the use of CPAP therapy  We discussed the importance of increasing use to every night for more than 5 hours per night  We discussed that insurance will not cover the cost of the needed equipment if compliance is not met in the first 90 days  He states that he promises he will use it every night  Attendant is aware of the importance of compliance and will pass it on to other staff members as well  Supplies have been ordered for the next year  He will continue using this equipment at the settings noted above for the next 6 months  At that timehe will then return for a routine follow-up evaluation  I have asked the patient to contact the [address] if he encounters any difficulties prior to that time  The following instructions have been given to the patient today:    Patient Instructions   1  Continue use of CPAP equipment nightly  2  Continue to clean your equipment, as discussed  3  Contact the [address] with any questions or concerns prior to your next visit, as needed  4  Schedule visit for follow-up in 6 months      Nursing Support:  When: Monday through Friday 7A-5PM except holidays  Where: Our direct line is [phone]  If you are having a true emergency please call 911  In the event that the line is busy or it is after hours please leave a voice message and we will return your call    Please speak clearly, leaving your full name, birth date, best number to reach you and the reason for your call  Medication refills: We will need the name of the medication, the dosage, the ordering provider, whether you get a 30 or 90 day refill, and the pharmacy name and address  Medications will be ordered by the provider only  Nurses cannot call in prescriptions  Please allow 7 days for medication refills  Physician requested updates: If your provider requested that you call with an update after starting medication, please be ready to provide us the medication and dosage, what time you take your medication, the time you attempt to fall asleep, time you fall asleep, when you wake up, and what time you get out of bed  Sleep Study Results: We will contact you with sleep study results and/or next steps after the physician has reviewed your testing        Frederick Delvalle, [address] [facility]

## 2020-02-21 NOTE — PATIENT INSTRUCTIONS
1   Continue use of CPAP equipment nightly  2  Continue to clean your equipment, as discussed  3  Contact the Sleep [address] [place] [address] with any questions or concerns prior to your next visit, as needed  4  Schedule visit for follow-up in 6 months      Nursing Support:  When: Monday through Friday 7A-5PM except holidays  Where: Our direct line is [phone]  If you are having a true emergency please call 911  In the event that the line is busy or it is after hours please leave a voice message and we will return your call  Please speak clearly, leaving your full name, birth date, best number to reach you and the reason for your call  Medication refills: We will need the name of the medication, the dosage, the ordering provider, whether you get a 30 or 90 day refill, and the pharmacy name and address  Medications will be ordered by the provider only  Nurses cannot call in prescriptions  Please allow 7 days for medication refills  Physician requested updates: If your provider requested that you call with an update after starting medication, please be ready to provide us the medication and dosage, what time you take your medication, the time you attempt to fall asleep, time you fall asleep, when you wake up, and what time you get out of bed  Sleep Study Results: We will contact you with sleep study results and/or next steps after the physician has reviewed your testing

## 2020-02-24 ENCOUNTER — TRANSCRIBE ORDERS (OUTPATIENT)
Dept: ADMINISTRATIVE | Facility: HOSPITAL | Age: 25
End: 2020-02-24

## 2020-02-24 ENCOUNTER — TELEPHONE (OUTPATIENT)
Dept: SLEEP CENTER | Facility: CLINIC | Age: 25
End: 2020-02-24

## 2020-02-24 DIAGNOSIS — K75.81 NONALCOHOLIC STEATOHEPATITIS: Primary | ICD-10-CM

## 2020-02-27 ENCOUNTER — HOSPITAL ENCOUNTER (OUTPATIENT)
Dept: ULTRASOUND IMAGING | Facility: HOSPITAL | Age: 25
Discharge: HOME | End: 2020-02-27
Payer: MEDICARE

## 2020-02-27 DIAGNOSIS — K75.81 NONALCOHOLIC STEATOHEPATITIS: ICD-10-CM

## 2020-02-27 PROCEDURE — 76705 ECHO EXAM OF ABDOMEN: CPT

## 2020-03-02 ENCOUNTER — EVALUATION (OUTPATIENT)
Dept: PHYSICAL THERAPY | Facility: MEDICAL CENTER | Age: 25
End: 2020-03-02
Payer: MEDICARE

## 2020-03-02 DIAGNOSIS — M25.561 ACUTE PAIN OF RIGHT KNEE: Primary | ICD-10-CM

## 2020-03-02 DIAGNOSIS — R29.898 MUSCULAR DECONDITIONING: ICD-10-CM

## 2020-03-02 PROCEDURE — 97161 PT EVAL LOW COMPLEX 20 MIN: CPT | Performed by: PHYSICAL THERAPIST

## 2020-03-02 NOTE — LETTER
March 3, 2020    Richard Summers [address] [place] [address]    Patient: Ulanda Simmonds   YOB: 1995   Date of Visit: 3/2/2020     Encounter Diagnosis     ICD-10-CM    1  Acute pain of right knee M25 561    2  Muscular deconditioning R29 898        Dear Dr Rg Search: Thank you for your recent referral of Ulanda Simmonds  Please review the attached evaluation summary from Todd's recent visit  Please verify that you agree with the plan of care by signing the attached order  If you have any questions or concerns, please do not hesitate to call  I sincerely appreciate the opportunity to share in the care of one of your patients and hope to have another opportunity to work with you in the near future  Sincerely,    Heide Melendez, PT      Referring Provider:      I certify that I have read the below Plan of Care and certify the need for these services furnished under this plan of treatment while under my care  Richard Summers [address]: [phone]          PT Evaluation     Today's date: 3/2/2020  Patient name: Ulanda Simmonds  : 1995  MRN: [id number]  Referring provider: Silvia PERALTA,*  Dx:   Encounter Diagnosis     ICD-10-CM    1  Acute pain of right knee M25 561    2  Muscular deconditioning R29 898                   Assessment  Assessment details: Ulanda Simmonds is a 26 y o  male was evaluated on 3/3/2020  for Acute pain of right knee  (primary encounter diagnosis)  Muscular deconditioning  Ulanda Simmonds has the above listed impairments resulting in functional deficits and negative impact to quality of life  Patient is appropriate for skilled PT intervention to promote maximal return to function and patient specific goals  Patient agrees with outlined treatment plan and all questions were answered to their satisfaction        Impairments: abnormal muscle firing, abnormal muscle tone, abnormal or restricted ROM, impaired physical strength, lacks appropriate home exercise program and pain with function  Understanding of Dx/Px/POC: good   Prognosis: good    Goals  Patient will successfully transition to home exercise program   Patient will be able to manage symptoms independently  Patient will report no limitation in walking  Patient will report no limitation in stairs     Plan  Patient would benefit from: skilled PT  Referral necessary: No  Planned modality interventions: thermotherapy: hydrocollator packs  Planned therapy interventions: home exercise program, manual therapy, neuromuscular re-education, patient education, functional ROM exercises, strengthening, stretching, joint mobilization, graded activity, graded exercise, therapeutic exercise, body mechanics training, motor coordination training and activity modification  Frequency: 2x week  Duration in weeks: 12  Treatment plan discussed with: patient        Subjective Evaluation    History of Present Illness  Mechanism of injury: Shaggy Sharma is a 26 y o  Male presenting to therapy with complaints of right knee pain and general deconditioning  He reports history of right knee pain, recent flare up that causes pain during standing ,walking, stairs  He feels better with movement     Denies any popping/clicking/locking   Pain  Current pain ratin  At best pain rating: 3  At worst pain ratin  Quality: dull ache    Patient Goals  Patient goals for therapy: decreased pain, return to sport/leisure activities and increased strength          Objective     Active Range of Motion   Left Ankle/Foot   Normal active range of motion    Right Ankle/Foot   Normal active range of motion    Passive Range of Motion   Left Hip   Normal passive range of motion    Right Hip   Normal passive range of motion  Left Knee   Normal passive range of motion    Right Knee   Normal passive range of motion    Patellar Static Positioning   Left Knee: WFL  Right Knee: [facility]    Strength/Myotome Testing     Left Hip   Planes of Motion   Flexion: 4  Extension: 4  Abduction: 4  Adduction: 4  External rotation: 4  Internal rotation: 4    Right Hip   Planes of Motion   Flexion: 3+  Extension: 3+  Abduction: 3+  Adduction: 3+  External rotation: 3+  Internal rotation: 3+    Left Knee   Flexion: 4-  Prone flexion: 4-  Extension: 4-    Right Knee   Flexion: 3+  Prone flexion: 3+  Extension: 3+             Precautions: None      Manual  3/2                                                                                 Exercise Diary              Bike             Supine [facility]              [employer]

## 2020-03-02 NOTE — PROGRESS NOTES
PT Evaluation     Today's date: 3/2/2020  Patient name: Palomo Valentin  : 1995  MRN: [id number]  Referring provider: Amanda PERALTA,*  Dx:   Encounter Diagnosis     ICD-10-CM    1  Acute pain of right knee M25 561    2  Muscular deconditioning R29 898                   Assessment  Assessment details: Palomo Valentin is a 26 y o  male was evaluated on 3/3/2020  for Acute pain of right knee  (primary encounter diagnosis)  Muscular deconditioning  Palomo Valentin has the above listed impairments resulting in functional deficits and negative impact to quality of life  Patient is appropriate for skilled PT intervention to promote maximal return to function and patient specific goals  Patient agrees with outlined treatment plan and all questions were answered to their satisfaction  Impairments: abnormal muscle firing, abnormal muscle tone, abnormal or restricted ROM, impaired physical strength, lacks appropriate home exercise program and pain with function  Understanding of Dx/Px/POC: good   Prognosis: good    Goals  Patient will successfully transition to home exercise program   Patient will be able to manage symptoms independently  Patient will report no limitation in walking  Patient will report no limitation in stairs     Plan  Patient would benefit from: skilled PT  Referral necessary: No  Planned modality interventions: thermotherapy: hydrocollator packs  Planned therapy interventions: home exercise program, manual therapy, neuromuscular re-education, patient education, functional ROM exercises, strengthening, stretching, joint mobilization, graded activity, graded exercise, therapeutic exercise, body mechanics training, motor coordination training and activity modification  Frequency: 2x week  Duration in weeks: 12  Treatment plan discussed with: patient        Subjective Evaluation    History of Present Illness  Mechanism of injury: Palomo Valentin is a 26 y o   Male presenting to therapy with complaints of right knee pain and general deconditioning  He reports history of right knee pain, recent flare up that causes pain during standing ,walking, stairs  He feels better with movement     Denies any popping/clicking/locking   Pain  Current pain ratin  At best pain rating: 3  At worst pain ratin  Quality: dull ache    Patient Goals  Patient goals for therapy: decreased pain, return to sport/leisure activities and increased strength          Objective     Active Range of Motion   Left Ankle/Foot   Normal active range of motion    Right Ankle/Foot   Normal active range of motion    Passive Range of Motion   Left Hip   Normal passive range of motion    Right Hip   Normal passive range of motion  Left Knee   Normal passive range of motion    Right Knee   Normal passive range of motion    Patellar Static Positioning   Left Knee: WFL  Right Knee: [facility]    Strength/Myotome Testing     Left Hip   Planes of Motion   Flexion: 4  Extension: 4  Abduction: 4  Adduction: 4  External rotation: 4  Internal rotation: 4    Right Hip   Planes of Motion   Flexion: 3+  Extension: 3+  Abduction: 3+  Adduction: 3+  External rotation: 3+  Internal rotation: 3+    Left Knee   Flexion: 4-  Prone flexion: 4-  Extension: 4-    Right Knee   Flexion: 3+  Prone flexion: 3+  Extension: 3+             Precautions: None      Manual  3/2                                                                                 Exercise Diary              Bike             Supine marching              [employer]

## 2020-03-03 ENCOUNTER — TRANSCRIBE ORDERS (OUTPATIENT)
Dept: PHYSICAL THERAPY | Facility: MEDICAL CENTER | Age: 25
End: 2020-03-03

## 2020-03-03 DIAGNOSIS — M25.561 ACUTE PAIN OF RIGHT KNEE: Primary | ICD-10-CM

## 2020-03-16 ENCOUNTER — OFFICE VISIT (OUTPATIENT)
Dept: PHYSICAL THERAPY | Facility: MEDICAL CENTER | Age: 25
End: 2020-03-16
Payer: MEDICARE

## 2020-03-16 DIAGNOSIS — M25.561 ACUTE PAIN OF RIGHT KNEE: Primary | ICD-10-CM

## 2020-03-16 DIAGNOSIS — R29.898 MUSCULAR DECONDITIONING: ICD-10-CM

## 2020-03-16 PROCEDURE — 97110 THERAPEUTIC EXERCISES: CPT | Performed by: PHYSICAL THERAPIST

## 2020-03-16 NOTE — PROGRESS NOTES
Daily Note     Today's date: 3/16/2020  Patient name: Dorothea Alex  : 1995  MRN: [id number]  Referring provider: Woody PERALTA,*  Dx:   Encounter Diagnosis     ICD-10-CM    1  Acute pain of right knee M25 561    2  Muscular deconditioning R29 898                   Subjective: Chay Nino reports that he is doing well and had days of pain relief after last session      Objective: See treatment diary below      Assessment: Tolerated treatment well  Patient exhibited good technique with therapeutic exercises and would benefit from continued PT      Plan: Continue per plan of care        Precautions: None      Manual  3/2 3/16                                                                                Exercise Diary              Bike  10 min            Supine marching   20           Bridges  30           Quad set   30S           SLR flexion  30           Standing hip ABD and EXT  30           Heel raises  30           LAQ  30                                                                                                                                                                           Modalities

## 2020-06-02 ENCOUNTER — TRANSCRIBE ORDERS (OUTPATIENT)
Dept: ADMINISTRATIVE | Facility: HOSPITAL | Age: 25
End: 2020-06-02

## 2020-06-02 DIAGNOSIS — K75.81 NONALCOHOLIC STEATOHEPATITIS: Primary | ICD-10-CM

## 2020-06-10 ENCOUNTER — HOSPITAL ENCOUNTER (OUTPATIENT)
Dept: ULTRASOUND IMAGING | Facility: HOSPITAL | Age: 25
Discharge: HOME | End: 2020-06-10

## 2020-06-10 DIAGNOSIS — K75.81 NONALCOHOLIC STEATOHEPATITIS: ICD-10-CM

## 2021-06-21 ENCOUNTER — HOSPITAL ENCOUNTER (EMERGENCY)
Facility: HOSPITAL | Age: 26
End: 2021-06-22
Attending: EMERGENCY MEDICINE | Admitting: EMERGENCY MEDICINE
Payer: MEDICARE

## 2021-06-21 DIAGNOSIS — F25.0 SCHIZOAFFECTIVE DISORDER, BIPOLAR TYPE (HCC): Primary | ICD-10-CM

## 2021-06-21 LAB
AMPHETAMINES SERPL QL SCN: NEGATIVE
BARBITURATES UR QL: NEGATIVE
BENZODIAZ UR QL: NEGATIVE
COCAINE UR QL: NEGATIVE
ETHANOL EXG-MCNC: 0 MG/DL
METHADONE UR QL: NEGATIVE
OPIATES UR QL SCN: NEGATIVE
OXYCODONE+OXYMORPHONE UR QL SCN: NEGATIVE
PCP UR QL: NEGATIVE
SARS-COV-2 RNA RESP QL NAA+PROBE: NEGATIVE
THC UR QL: NEGATIVE

## 2021-06-21 PROCEDURE — 99284 EMERGENCY DEPT VISIT MOD MDM: CPT | Performed by: EMERGENCY MEDICINE

## 2021-06-21 PROCEDURE — U0003 INFECTIOUS AGENT DETECTION BY NUCLEIC ACID (DNA OR RNA); SEVERE ACUTE RESPIRATORY SYNDROME CORONAVIRUS 2 (SARS-COV-2) (CORONAVIRUS DISEASE [COVID-19]), AMPLIFIED PROBE TECHNIQUE, MAKING USE OF HIGH THROUGHPUT TECHNOLOGIES AS DESCRIBED BY CMS-2020-01-R: HCPCS | Performed by: EMERGENCY MEDICINE

## 2021-06-21 PROCEDURE — U0005 INFEC AGEN DETEC AMPLI PROBE: HCPCS | Performed by: EMERGENCY MEDICINE

## 2021-06-21 PROCEDURE — NC001 PR NO CHARGE: Performed by: EMERGENCY MEDICINE

## 2021-06-21 PROCEDURE — 99213 OFFICE O/P EST LOW 20 MIN: CPT | Performed by: PSYCHIATRY & NEUROLOGY

## 2021-06-21 PROCEDURE — 82075 ASSAY OF BREATH ETHANOL: CPT | Performed by: EMERGENCY MEDICINE

## 2021-06-21 PROCEDURE — 99285 EMERGENCY DEPT VISIT HI MDM: CPT

## 2021-06-21 PROCEDURE — 80307 DRUG TEST PRSMV CHEM ANLYZR: CPT | Performed by: EMERGENCY MEDICINE

## 2021-06-21 RX ORDER — PROPRANOLOL HYDROCHLORIDE 20 MG/1
20 TABLET ORAL EVERY 12 HOURS SCHEDULED
Status: CANCELLED | OUTPATIENT
Start: 2021-06-21 | End: 2021-06-23

## 2021-06-21 RX ORDER — ACETAMINOPHEN 325 MG/1
650 TABLET ORAL EVERY 8 HOURS PRN
Status: CANCELLED | OUTPATIENT
Start: 2021-06-21 | End: 2021-06-23

## 2021-06-21 RX ORDER — BENZTROPINE MESYLATE 0.5 MG/1
1 TABLET ORAL DAILY
Status: CANCELLED | OUTPATIENT
Start: 2021-06-22 | End: 2021-06-24

## 2021-06-21 RX ORDER — RISPERIDONE 1 MG/1
1 TABLET, ORALLY DISINTEGRATING ORAL DAILY
Status: CANCELLED | OUTPATIENT
Start: 2021-06-22 | End: 2021-06-24

## 2021-06-21 RX ORDER — HYDROXYZINE HYDROCHLORIDE 25 MG/1
100 TABLET, FILM COATED ORAL
Status: CANCELLED | OUTPATIENT
Start: 2021-06-21

## 2021-06-21 RX ORDER — DIVALPROEX SODIUM 500 MG/1
500 TABLET, EXTENDED RELEASE ORAL DAILY
Status: DISCONTINUED | OUTPATIENT
Start: 2021-06-22 | End: 2021-06-22 | Stop reason: HOSPADM

## 2021-06-21 RX ORDER — PROPRANOLOL HYDROCHLORIDE 20 MG/1
20 TABLET ORAL EVERY 12 HOURS SCHEDULED
Status: DISCONTINUED | OUTPATIENT
Start: 2021-06-21 | End: 2021-06-22 | Stop reason: HOSPADM

## 2021-06-21 RX ORDER — IBUPROFEN 400 MG/1
400 TABLET ORAL EVERY 8 HOURS PRN
Status: CANCELLED | OUTPATIENT
Start: 2021-06-21

## 2021-06-21 RX ORDER — LANOLIN ALCOHOL/MO/W.PET/CERES
3 CREAM (GRAM) TOPICAL
Status: CANCELLED | OUTPATIENT
Start: 2021-06-21

## 2021-06-21 RX ORDER — LORAZEPAM 2 MG/ML
2 INJECTION INTRAMUSCULAR EVERY 6 HOURS PRN
Status: CANCELLED | OUTPATIENT
Start: 2021-06-21

## 2021-06-21 RX ORDER — BENZTROPINE MESYLATE 0.5 MG/1
1 TABLET ORAL DAILY
Status: DISCONTINUED | OUTPATIENT
Start: 2021-06-22 | End: 2021-06-22 | Stop reason: HOSPADM

## 2021-06-21 RX ORDER — DIPHENHYDRAMINE HYDROCHLORIDE 50 MG/ML
50 INJECTION INTRAMUSCULAR; INTRAVENOUS EVERY 6 HOURS PRN
Status: CANCELLED | OUTPATIENT
Start: 2021-06-21

## 2021-06-21 RX ORDER — IBUPROFEN 400 MG/1
400 TABLET ORAL EVERY 8 HOURS PRN
Status: CANCELLED | OUTPATIENT
Start: 2021-06-21 | End: 2021-06-23

## 2021-06-21 RX ORDER — RISPERIDONE 1 MG/1
1 TABLET, ORALLY DISINTEGRATING ORAL DAILY
Status: DISCONTINUED | OUTPATIENT
Start: 2021-06-22 | End: 2021-06-22 | Stop reason: HOSPADM

## 2021-06-21 RX ORDER — ACETAMINOPHEN 325 MG/1
650 TABLET ORAL EVERY 6 HOURS PRN
Status: CANCELLED | OUTPATIENT
Start: 2021-06-21

## 2021-06-21 RX ORDER — HYDROXYZINE HYDROCHLORIDE 25 MG/1
25 TABLET, FILM COATED ORAL
Status: CANCELLED | OUTPATIENT
Start: 2021-06-21

## 2021-06-21 RX ORDER — ACETAMINOPHEN 325 MG/1
650 TABLET ORAL EVERY 8 HOURS PRN
Status: DISCONTINUED | OUTPATIENT
Start: 2021-06-21 | End: 2021-06-22 | Stop reason: HOSPADM

## 2021-06-21 RX ORDER — HYDROXYZINE HYDROCHLORIDE 25 MG/1
50 TABLET, FILM COATED ORAL
Status: CANCELLED | OUTPATIENT
Start: 2021-06-21

## 2021-06-21 RX ORDER — DIVALPROEX SODIUM 500 MG/1
500 TABLET, EXTENDED RELEASE ORAL DAILY
Status: CANCELLED | OUTPATIENT
Start: 2021-06-22 | End: 2021-06-24

## 2021-06-21 RX ORDER — IBUPROFEN 400 MG/1
400 TABLET ORAL EVERY 8 HOURS PRN
Status: DISCONTINUED | OUTPATIENT
Start: 2021-06-21 | End: 2021-06-22 | Stop reason: HOSPADM

## 2021-06-21 RX ADMIN — PROPRANOLOL HYDROCHLORIDE 20 MG: 20 TABLET ORAL at 21:45

## 2021-06-21 NOTE — ED PROVIDER NOTES
Patient signed out to me by prior physician  20-year-old male with schizoaffective disorder  Patient without any suicidal ideation or homicidal ideation  Patient did sign a 201 was medically cleared by prior physician  Pending placement    8:07 PM  Patient resting in bed in no distress  9:35 PM  Patient resting in the hallway  No distress  SLQ accepted   after 2 AM        Portions of the record may have been created with voice recognition software  Occasional wrong word or "sound a like" substitutions may have occurred due to the inherent limitations of voice recognition software  Read the chart carefully and recognize, using context, where substitutions have occurred         Deondre Madrigal DO  06/21/21 0951
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
## 2021-06-22 ENCOUNTER — HOSPITAL ENCOUNTER (INPATIENT)
Facility: HOSPITAL | Age: 26
LOS: 7 days | Discharge: HOME | DRG: 885 | End: 2021-06-29
Attending: STUDENT IN AN ORGANIZED HEALTH CARE EDUCATION/TRAINING PROGRAM | Admitting: PSYCHIATRY & NEUROLOGY
Payer: MEDICARE

## 2021-06-22 VITALS
BODY MASS INDEX: 50.28 KG/M2 | TEMPERATURE: 97.2 F | OXYGEN SATURATION: 99 % | DIASTOLIC BLOOD PRESSURE: 83 MMHG | SYSTOLIC BLOOD PRESSURE: 141 MMHG | HEART RATE: 86 BPM | RESPIRATION RATE: 16 BRPM | WEIGHT: 315 LBS

## 2021-06-22 DIAGNOSIS — F25.0 SCHIZOAFFECTIVE DISORDER, BIPOLAR TYPE (HCC): Primary | ICD-10-CM

## 2021-06-22 DIAGNOSIS — R73.9 HYPERGLYCEMIA: ICD-10-CM

## 2021-06-22 DIAGNOSIS — F63.9 IMPULSE CONTROL DISORDER: ICD-10-CM

## 2021-06-22 DIAGNOSIS — G47.00 INSOMNIA: ICD-10-CM

## 2021-06-22 PROBLEM — Z00.8 MEDICAL CLEARANCE FOR PSYCHIATRIC ADMISSION: Status: ACTIVE | Noted: 2021-06-22

## 2021-06-22 LAB
ALBUMIN SERPL BCP-MCNC: 3.9 G/DL (ref 3.5–5)
ALP SERPL-CCNC: 59 U/L (ref 46–116)
ALT SERPL W P-5'-P-CCNC: 118 U/L (ref 12–78)
ANION GAP SERPL CALCULATED.3IONS-SCNC: 8 MMOL/L (ref 4–13)
AST SERPL W P-5'-P-CCNC: 52 U/L (ref 5–45)
ATRIAL RATE: 0 BPM
BASOPHILS # BLD AUTO: 0.05 THOUSANDS/ΜL (ref 0–0.1)
BASOPHILS NFR BLD AUTO: 1 % (ref 0–1)
BILIRUB SERPL-MCNC: 0.57 MG/DL (ref 0.2–1)
BILIRUB UR QL STRIP: NEGATIVE
BUN SERPL-MCNC: 15 MG/DL (ref 5–25)
CALCIUM SERPL-MCNC: 9.3 MG/DL (ref 8.3–10.1)
CHLORIDE SERPL-SCNC: 109 MMOL/L (ref 100–108)
CLARITY UR: CLEAR
CO2 SERPL-SCNC: 24 MMOL/L (ref 21–32)
COLOR UR: YELLOW
CREAT SERPL-MCNC: 0.54 MG/DL (ref 0.6–1.3)
EOSINOPHIL # BLD AUTO: 0.26 THOUSAND/ΜL (ref 0–0.61)
EOSINOPHIL NFR BLD AUTO: 3 % (ref 0–6)
ERYTHROCYTE [DISTWIDTH] IN BLOOD BY AUTOMATED COUNT: 12.4 % (ref 11.6–15.1)
GFR SERPL CREATININE-BSD FRML MDRD: 146 ML/MIN/1.73SQ M
GLUCOSE SERPL-MCNC: 70 MG/DL (ref 65–140)
GLUCOSE UR STRIP-MCNC: NEGATIVE MG/DL
HCG SERPL QL: NEGATIVE
HCT VFR BLD AUTO: 43 % (ref 36.5–49.3)
HGB BLD-MCNC: 14.1 G/DL (ref 12–17)
HGB UR QL STRIP.AUTO: NEGATIVE
IMM GRANULOCYTES # BLD AUTO: 0.05 THOUSAND/UL (ref 0–0.2)
IMM GRANULOCYTES NFR BLD AUTO: 1 % (ref 0–2)
KETONES UR STRIP-MCNC: NEGATIVE MG/DL
LEUKOCYTE ESTERASE UR QL STRIP: NEGATIVE
LYMPHOCYTES # BLD AUTO: 3.23 THOUSANDS/ΜL (ref 0.6–4.47)
LYMPHOCYTES NFR BLD AUTO: 39 % (ref 14–44)
MCH RBC QN AUTO: 30.9 PG (ref 26.8–34.3)
MCHC RBC AUTO-ENTMCNC: 32.8 G/DL (ref 31.4–37.4)
MCV RBC AUTO: 94 FL (ref 82–98)
MONOCYTES # BLD AUTO: 0.83 THOUSAND/ΜL (ref 0.17–1.22)
MONOCYTES NFR BLD AUTO: 10 % (ref 4–12)
NEUTROPHILS # BLD AUTO: 3.81 THOUSANDS/ΜL (ref 1.85–7.62)
NEUTS SEG NFR BLD AUTO: 46 % (ref 43–75)
NITRITE UR QL STRIP: NEGATIVE
NRBC BLD AUTO-RTO: 0 /100 WBCS
PH UR STRIP.AUTO: 7 [PH]
PLATELET # BLD AUTO: 207 THOUSANDS/UL (ref 149–390)
PMV BLD AUTO: 9.7 FL (ref 8.9–12.7)
POTASSIUM SERPL-SCNC: 4 MMOL/L (ref 3.5–5.3)
PROT SERPL-MCNC: 7.8 G/DL (ref 6.4–8.2)
PROT UR STRIP-MCNC: NEGATIVE MG/DL
QRS AXIS: 0 DEGREES
QRSD INTERVAL: 0 MS
QT INTERVAL: 0 MS
QTC INTERVAL: 0 MS
RBC # BLD AUTO: 4.56 MILLION/UL (ref 3.88–5.62)
SODIUM SERPL-SCNC: 141 MMOL/L (ref 136–145)
SP GR UR STRIP.AUTO: 1.01 (ref 1–1.04)
T WAVE AXIS: 0 DEGREES
TSH SERPL DL<=0.05 MIU/L-ACNC: 4.32 UIU/ML (ref 0.36–3.74)
UROBILINOGEN UA: NEGATIVE MG/DL
VENTRICULAR RATE: 0 BPM
WBC # BLD AUTO: 8.23 THOUSAND/UL (ref 4.31–10.16)

## 2021-06-22 PROCEDURE — 99223 1ST HOSP IP/OBS HIGH 75: CPT | Performed by: STUDENT IN AN ORGANIZED HEALTH CARE EDUCATION/TRAINING PROGRAM

## 2021-06-22 PROCEDURE — 85025 COMPLETE CBC W/AUTO DIFF WBC: CPT | Performed by: PSYCHIATRY & NEUROLOGY

## 2021-06-22 PROCEDURE — 84703 CHORIONIC GONADOTROPIN ASSAY: CPT | Performed by: PSYCHIATRY & NEUROLOGY

## 2021-06-22 PROCEDURE — 80053 COMPREHEN METABOLIC PANEL: CPT | Performed by: PSYCHIATRY & NEUROLOGY

## 2021-06-22 PROCEDURE — 86592 SYPHILIS TEST NON-TREP QUAL: CPT | Performed by: PSYCHIATRY & NEUROLOGY

## 2021-06-22 PROCEDURE — 99222 1ST HOSP IP/OBS MODERATE 55: CPT | Performed by: PHYSICIAN ASSISTANT

## 2021-06-22 PROCEDURE — 93005 ELECTROCARDIOGRAM TRACING: CPT

## 2021-06-22 PROCEDURE — 84443 ASSAY THYROID STIM HORMONE: CPT | Performed by: PSYCHIATRY & NEUROLOGY

## 2021-06-22 RX ORDER — KETOTIFEN FUMARATE 0.35 MG/ML
1 SOLUTION/ DROPS OPHTHALMIC 2 TIMES DAILY PRN
COMMUNITY
End: 2022-07-13

## 2021-06-22 RX ORDER — RISPERIDONE 1 MG/1
1 TABLET, ORALLY DISINTEGRATING ORAL DAILY
Status: DISCONTINUED | OUTPATIENT
Start: 2021-06-22 | End: 2021-06-22

## 2021-06-22 RX ORDER — METFORMIN HYDROCHLORIDE 750 MG/1
750 TABLET, EXTENDED RELEASE ORAL 2 TIMES DAILY WITH MEALS
Status: DISCONTINUED | OUTPATIENT
Start: 2021-06-22 | End: 2021-06-29 | Stop reason: HOSPADM

## 2021-06-22 RX ORDER — IBUPROFEN 400 MG/1
400 TABLET ORAL EVERY 8 HOURS PRN
Status: DISCONTINUED | OUTPATIENT
Start: 2021-06-22 | End: 2021-06-29 | Stop reason: HOSPADM

## 2021-06-22 RX ORDER — LORAZEPAM 2 MG/ML
2 INJECTION INTRAMUSCULAR EVERY 6 HOURS PRN
Status: DISCONTINUED | OUTPATIENT
Start: 2021-06-22 | End: 2021-06-29 | Stop reason: HOSPADM

## 2021-06-22 RX ORDER — METFORMIN HYDROCHLORIDE 750 MG/1
750 TABLET, EXTENDED RELEASE ORAL 2 TIMES DAILY WITH MEALS
Status: ON HOLD | COMMUNITY
End: 2021-06-29 | Stop reason: SDUPTHER

## 2021-06-22 RX ORDER — DIVALPROEX SODIUM 250 MG/1
750 TABLET, EXTENDED RELEASE ORAL DAILY
COMMUNITY
End: 2021-06-29 | Stop reason: HOSPADM

## 2021-06-22 RX ORDER — PROPRANOLOL HYDROCHLORIDE 20 MG/1
20 TABLET ORAL EVERY 12 HOURS SCHEDULED
Status: DISCONTINUED | OUTPATIENT
Start: 2021-06-22 | End: 2021-06-22

## 2021-06-22 RX ORDER — HYDROXYZINE HYDROCHLORIDE 25 MG/1
25 TABLET, FILM COATED ORAL
Status: DISCONTINUED | OUTPATIENT
Start: 2021-06-22 | End: 2021-06-29 | Stop reason: HOSPADM

## 2021-06-22 RX ORDER — DIPHENHYDRAMINE HYDROCHLORIDE 50 MG/ML
50 INJECTION INTRAMUSCULAR; INTRAVENOUS EVERY 6 HOURS PRN
Status: DISCONTINUED | OUTPATIENT
Start: 2021-06-22 | End: 2021-06-29 | Stop reason: HOSPADM

## 2021-06-22 RX ORDER — BENZTROPINE MESYLATE 1 MG/1
1 TABLET ORAL DAILY
Status: DISCONTINUED | OUTPATIENT
Start: 2021-06-22 | End: 2021-06-22

## 2021-06-22 RX ORDER — BENZTROPINE MESYLATE 1 MG/1
1 TABLET ORAL
Status: DISCONTINUED | OUTPATIENT
Start: 2021-06-22 | End: 2021-06-29 | Stop reason: HOSPADM

## 2021-06-22 RX ORDER — HYDROXYZINE 50 MG/1
100 TABLET, FILM COATED ORAL
Status: DISCONTINUED | OUTPATIENT
Start: 2021-06-22 | End: 2021-06-29 | Stop reason: HOSPADM

## 2021-06-22 RX ORDER — HYDROXYZINE 50 MG/1
50 TABLET, FILM COATED ORAL
Status: DISCONTINUED | OUTPATIENT
Start: 2021-06-22 | End: 2021-06-29 | Stop reason: HOSPADM

## 2021-06-22 RX ORDER — LANOLIN ALCOHOL/MO/W.PET/CERES
3 CREAM (GRAM) TOPICAL
Status: DISCONTINUED | OUTPATIENT
Start: 2021-06-22 | End: 2021-06-29 | Stop reason: HOSPADM

## 2021-06-22 RX ORDER — RISPERIDONE 1 MG/1
2 TABLET, FILM COATED ORAL DAILY
COMMUNITY
End: 2021-06-29 | Stop reason: HOSPADM

## 2021-06-22 RX ORDER — RISPERIDONE 2 MG/1
2 TABLET, FILM COATED ORAL
Status: DISCONTINUED | OUTPATIENT
Start: 2021-06-22 | End: 2021-06-29 | Stop reason: HOSPADM

## 2021-06-22 RX ORDER — RISPERIDONE 2 MG/1
4 TABLET, FILM COATED ORAL DAILY
Status: DISCONTINUED | OUTPATIENT
Start: 2021-06-22 | End: 2021-06-29 | Stop reason: HOSPADM

## 2021-06-22 RX ORDER — IBUPROFEN 400 MG/1
400 TABLET ORAL EVERY 8 HOURS PRN
Status: ACTIVE | OUTPATIENT
Start: 2021-06-22 | End: 2021-06-24

## 2021-06-22 RX ORDER — ACETAMINOPHEN 325 MG/1
650 TABLET ORAL EVERY 6 HOURS PRN
Status: DISCONTINUED | OUTPATIENT
Start: 2021-06-22 | End: 2021-06-29 | Stop reason: HOSPADM

## 2021-06-22 RX ORDER — ACETAMINOPHEN 325 MG/1
650 TABLET ORAL EVERY 8 HOURS PRN
Status: DISCONTINUED | OUTPATIENT
Start: 2021-06-22 | End: 2021-06-22 | Stop reason: SDUPTHER

## 2021-06-22 RX ORDER — ACETAMINOPHEN 325 MG/1
650 TABLET ORAL EVERY 6 HOURS PRN
COMMUNITY
End: 2022-07-13

## 2021-06-22 RX ORDER — MULTIVIT-MIN/IRON/FOLIC ACID/K 18-600-40
CAPSULE ORAL DAILY
COMMUNITY
End: 2022-07-13

## 2021-06-22 RX ORDER — DULAGLUTIDE 1.5 MG/.5ML
1.5 INJECTION, SOLUTION SUBCUTANEOUS WEEKLY
COMMUNITY
End: 2021-06-29 | Stop reason: HOSPADM

## 2021-06-22 RX ORDER — DIVALPROEX SODIUM 500 MG/1
500 TABLET, EXTENDED RELEASE ORAL DAILY
Status: DISCONTINUED | OUTPATIENT
Start: 2021-06-22 | End: 2021-06-22

## 2021-06-22 RX ORDER — PROPRANOLOL HYDROCHLORIDE 20 MG/1
20 TABLET ORAL DAILY
Status: DISCONTINUED | OUTPATIENT
Start: 2021-06-23 | End: 2021-06-29 | Stop reason: HOSPADM

## 2021-06-22 RX ADMIN — RISPERIDONE 1 MG: 1 TABLET, ORALLY DISINTEGRATING ORAL at 08:50

## 2021-06-22 RX ADMIN — RISPERIDONE 2 MG: 2 TABLET ORAL at 21:34

## 2021-06-22 RX ADMIN — BENZTROPINE MESYLATE 1 MG: 1 TABLET ORAL at 21:34

## 2021-06-22 RX ADMIN — DIVALPROEX SODIUM 500 MG: 500 TABLET, EXTENDED RELEASE ORAL at 08:51

## 2021-06-22 RX ADMIN — RISPERIDONE 4 MG: 2 TABLET ORAL at 12:12

## 2021-06-22 RX ADMIN — METFORMIN HYDROCHLORIDE 750 MG: 750 TABLET, EXTENDED RELEASE ORAL at 16:46

## 2021-06-22 RX ADMIN — DIVALPROEX SODIUM 750 MG: 250 TABLET, FILM COATED, EXTENDED RELEASE ORAL at 12:11

## 2021-06-22 RX ADMIN — PROPRANOLOL HYDROCHLORIDE 20 MG: 20 TABLET ORAL at 08:50

## 2021-06-22 RX ADMIN — BENZTROPINE MESYLATE 1 MG: 1 TABLET ORAL at 08:50

## 2021-06-22 RX ADMIN — MELATONIN 3 MG: at 21:34

## 2021-06-22 NOTE — ASSESSMENT & PLAN NOTE
 Admission labs reviewed, CBC, UA acceptable   TSH, RPR, CMP HCG(?) labs pending   UDS negative   COVID-19 negative   EKG 3/12/19 reveals NSR with sinus arrhythmia, 79 bpm,    Medically stable for continued inpatient psychiatric treatment

## 2021-06-22 NOTE — TREATMENT TEAM
Pt informed RN will meet with pt at least twice daily to educate on medication, diagnosis and assess for symptoms

## 2021-06-22 NOTE — ED NOTES
Insurance Authorization for admission:   Phone call placed to [facility]  Phone number: [phone]  Spoke to Roldan Reyalice Cope  ** days approved  Level of care: AIP  Review on **  Authorization # not provided - [employer]  EVS (Eligibility Verification System) called - [phone]  Automated system indicates: Pt eligible

## 2021-06-22 NOTE — ED NOTES
Gerald Perkins a representative from the group home where patient resides called for an update on patient's status and this nurse made her aware of what is happening       Hamzah Edouard, RN  06/21/21 2214
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
## 2021-06-22 NOTE — ED NOTES
Patient resting on litter he is cooperative and pleasant  Observation of chest rising and falling noted while resting on litter  [id number] checks in progress       Jgajit Landaverde RN  06/22/21 [id number]

## 2021-06-22 NOTE — ED NOTES
Patient resting on litter he is cooperative and pleasant  Observation of chest rising and falling noted while resting on litter  [id number] checks in progress       Fred Jesus RN  06/22/21 0025

## 2021-06-22 NOTE — ED NOTES
Patient is accepted at [address]  Patient is accepted by Dr Merced Serna per Ghada Mercado  Transportation is arranged with [employer]  Transportation is scheduled for 02:30 on 6/22/21  Patient may go to the floor at 03:00 on 6/22/21  Nurse report is to be called to [phone] prior to patient transfer

## 2021-06-22 NOTE — CONSULTS
[address] 1995, 23 y o  male MRN: [id number]  Unit/Bed#: Kenna [facility] 255-02 Encounter: [id number]  Primary Care Provider: Tori Rawls MD   Date and time admitted to hospital: 6/22/2021  3:04 AM    Inpatient consult for Medical Clearance for [facility] patient  Consult performed by: Carri Lopez PA-C  Consult ordered by: Raquel Borges clearance for psychiatric admission  Assessment & Plan   Admission labs reviewed, CBC, UA acceptable   TSH, RPR, CMP HCG(?) labs pending   UDS negative   COVID-19 negative   EKG 3/12/19 reveals NSR with sinus arrhythmia, 79 bpm,    Medically stable for continued inpatient psychiatric treatment      BMI 45 0-49 9, adult ([facility] [address] )  Assessment & Plan  · Dietary changes and lifestyle modifications    Intellectual disability  Assessment & Plan  · Management per psych  · Supportive Care    * Schizoaffective disorder, bipolar type [facility])  Assessment & Plan  · Per psychiatry      VTE Prophylaxis:   Low Risk (Score 0-2) - Encourage Ambulation  Recommendations for Discharge:  · Discharge timeline per primary team   Routine follow-up with primary care provider  Counseling / Coordination of Care Time: 30 minutes Greater than 50% of total time spent on patient counseling and coordination of care  Collaboration of Care: Were Recommendations Directly Discussed with Primary Treatment Team? No    History of Present Illness:  Ren Zepeda is a 23 y o  male who is originally admitted to the behavioral health service due to hallucinations and agitated behavior  We are consulted for management of chronic medical conditions  Patient denies any chronic medical conditions for which he takes daily medications  He should continue all prior to admission medications prescribed by his PCP  Patient denies recent travel, illness or sick contacts  Patient denies smoking, drinking or drug use    Available admission lab work and vitals are acceptable  Patient feels a baseline physical health  Patient appears medically stable for inpatient psychiatric treatment at this time  Review of Systems:  Review of Systems   Psychiatric/Behavioral: Positive for hallucinations  All other systems reviewed and are negative  Past Medical and Surgical History:   Past Medical History:   Diagnosis Date    Anxiety     Asthma     Hypertension     Impulse control disorder     Mental retardation     Psychiatric disorder     Psychiatric illness     Schizoaffective disorder ([facility] [address] )        Past Surgical History:   Procedure Laterality Date    HAND SURGERY      right hand       Meds/Allergies:  PTA meds:   Prior to Admission Medications   Prescriptions Last Dose Informant Patient Reported? Taking?    Cholecalciferol (Vitamin D) 50 MCG ( UT) CAPS   Yes Yes   Sig: Take by mouth daily   Dulaglutide (Trulicity) 1 5 OK/1 1VK SOPN   Yes Yes   Sig: Inject 1 5 mg under the skin once a week Sub-Q every seven days on Friday at 8:00am   EPINEPHrine (EPIPEN) 0 3 mg/0 3 mL SOAJ   Yes No   Sig: Inject 0 3 mg into a muscle once   acetaminophen (TYLENOL) 325 mg tablet   Yes Yes   Sig: Take 650 mg by mouth every 6 (six) hours as needed for mild pain   albuterol (PROVENTIL HFA,VENTOLIN HFA) 90 mcg/act inhaler   No No   Sig: Inhale 2 puffs every 6 (six) hours as needed for wheezing   benztropine (COGENTIN) 1 mg tablet   No No   Sig: Take 1 tablet (1 mg total) by mouth 2 (two) times a day At 9am and 6pm   Patient taking differently: Take 1 mg by mouth daily 8:00pm   divalproex sodium (DEPAKOTE ER) 250 mg 24 hr tablet   Yes Yes   Sig: Take 750 mg by mouth daily 8:00pm   divalproex sodium (DEPAKOTE) 500 mg EC tablet   No No   Sig: Take 1 tablet (500 mg total) by mouth daily for 28 days   Patient taking differently: Take 500 mg by mouth daily 8:00am   ketotifen (ZADITOR) 0 025 % ophthalmic solution   Yes Yes   Si drop 2 (two) times a day as needed levocetirizine (XYZAL) 5 MG tablet   Yes No   Sig: Take 5 mg by mouth daily as needed    metFORMIN (GLUCOPHAGE-XR) 750 mg 24 hr tablet   Yes Yes   Sig: Take 750 mg by mouth 2 (two) times a day with meals   propranolol (INDERAL) 20 mg tablet   No No   Sig: Take 1 tablet (20 mg total) by mouth every 12 (twelve) hours for 28 days   Patient taking differently: Take 20 mg by mouth daily 8:00am   risperiDONE (RisperDAL) 1 mg tablet   No No   Sig: Take 1 tablet (1 mg total) by mouth daily for 28 days   Patient taking differently: Take 4 mg by mouth daily 8:00pm   risperiDONE (RisperDAL) 1 mg tablet   Yes Yes   Sig: Take 2 mg by mouth daily 8:00pm      Facility-Administered Medications: None       Allergies:    Allergies   Allergen Reactions    Bee Venom Anaphylaxis    Penicillins        Social History:  Marital Status: Single  Substance Use History:   Social History     Substance and Sexual Activity   Alcohol Use No     Social History     Tobacco Use   Smoking Status Never Smoker   Smokeless Tobacco Never Used     Social History     Substance and Sexual Activity   Drug Use No       Family History:  Family History   Problem Relation Age of Onset    No Known Problems Mother     No Known Problems Father     No Known Problems Sister     No Known Problems Brother     No Known Problems Maternal Aunt     No Known Problems Paternal Aunt     No Known Problems Maternal Uncle     No Known Problems Paternal Uncle     No Known Problems Maternal Grandfather     No Known Problems Maternal Grandmother     No Known Problems Paternal Grandfather     No Known Problems Paternal Grandmother     No Known Problems Cousin     ADD / ADHD Neg Hx     Alcohol abuse Neg Hx     Anxiety disorder Neg Hx     Bipolar disorder Neg Hx     Dementia Neg Hx     Depression Neg Hx     Drug abuse Neg Hx     OCD Neg Hx     Paranoid behavior Neg Hx     Schizophrenia Neg Hx     Seizures Neg Hx     Self-Injury Neg Hx     Suicide Attempts Neg Hx        Physical Exam:   Vitals:   Blood Pressure: 135/73 (06/22/21 1054)  Pulse: 94 (06/22/21 1054)  Temperature: 98 °F (36 7 °C) (06/22/21 1054)  Temp Source: Tympanic (06/22/21 1054)  Respirations: 18 (06/22/21 1054)  Height: 5' 6" (167 6 cm) (06/22/21 0329)  Weight - Scale: (!) 144 kg (316 lb 12 8 oz) (06/22/21 0329)  SpO2: 98 % (06/22/21 0329)    Physical Exam  Vitals and nursing note reviewed  Constitutional:       General: He is awake  He is not in acute distress  HENT:      Head: Normocephalic and atraumatic  Cardiovascular:      Rate and Rhythm: Normal rate and regular rhythm  Heart sounds: No murmur heard  Pulmonary:      Effort: Pulmonary effort is normal       Breath sounds: Normal breath sounds  Abdominal:      General: There is no distension  Palpations: Abdomen is soft  Musculoskeletal:      Right lower leg: No edema  Left lower leg: No edema  Skin:     General: Skin is warm and dry  Neurological:      Mental Status: He is alert  Comments: CN II-XII grossly intact         Additional Data:   Lab Results:    Results from last 7 days   Lab Units 06/22/21  0535   WBC Thousand/uL 8 23   HEMOGLOBIN g/dL 14 1   HEMATOCRIT % 43 0   PLATELETS Thousands/uL 207   NEUTROS PCT % 46   LYMPHS PCT % 39   MONOS PCT % 10   EOS PCT % 3                 Lab Results   Component Value Date/Time    HGBA1C 4 8 05/04/2021 11:02 AM    HGBA1C 5 5 06/08/2019 11:15 AM    HGBA1C 5 2 03/02/2019 05:25 AM    HGBA1C 5 2 02/14/2019 05:37 AM    HGBA1C 4 9 10/09/2018 05:35 AM               Imaging: No pertinent imaging reviewed  No orders to display       EKG, Pathology, and Other Studies Reviewed on Admission:   · EKG: NSR  HR 79bpm,  (3/12/19)  ** Please Note: This note may have been constructed using a voice recognition system   **

## 2021-06-22 NOTE — ED NOTES
Patient resting on litter he is cooperative and pleasant  Observation of chest rising and falling noted while resting on litter  [id number] checks in progress       Marc Lr RN  06/22/21 0024

## 2021-06-22 NOTE — EMTALA/ACUTE CARE TRANSFER
[facility] EMERGENCY DEPARTMENT  [address] [facility] [address]  [phone]  Dept: [facility] TRANSFER CONSENT    NAME Ashli Arriaga                                         1995                              MRN [id number]    I have been informed of my rights regarding examination, treatment, and transfer   by Dr Leela Abdalla DO    Benefits: Specialized equipment and/or services available at the receiving facility (Include comment)________________________ (psychiatric treatment)    Risks: Potential for delay in receiving treatment, Increased discomfort during transfer      Consent for Transfer:  I acknowledge that my medical condition has been evaluated and explained to me by the emergency department physician or other qualified medical person and/or my attending physician, who has recommended that I be transferred to the service of  Accepting Physician: Pau Villalta at [address], [address] : [address]  The above potential benefits of such transfer, the potential risks associated with such transfer, and the probable risks of not being transferred have been explained to me, and I fully understand them  The doctor has explained that, in my case, the benefits of transfer outweigh the risks  I agree to be transferred  I authorize the performance of emergency medical procedures and treatments upon me in both transit and upon arrival at the receiving facility  Additionally, I authorize the release of any and all medical records to the receiving facility and request they be transported with me, if possible  I understand that the safest mode of transportation during a medical emergency is an ambulance and that the Hospital advocates the use of this mode of transport   Risks of traveling to the receiving facility by car, including absence of medical control, life sustaining equipment, such as oxygen, and medical personnel has been explained to me and I fully understand them  (WIL CORRECT BOX BELOW)  [  ]  I consent to the stated transfer and to be transported by ambulance/helicopter  [  ]  I consent to the stated transfer, but refuse transportation by ambulance and accept full responsibility for my transportation by car  I understand the risks of non-ambulance transfers and I exonerate the Hospital and its staff from any deterioration in my condition that results from this refusal     X___________________________________________    DATE  21  TIME________  Signature of patient or legally responsible individual signing on patient behalf           RELATIONSHIP TO PATIENT_________________________          Provider Certification    NAME Antoinette Chong                                         1995                              MRN [id number]    A medical screening exam was performed on the above named patient  Based on the examination:    Condition Necessitating Transfer The encounter diagnosis was Schizoaffective disorder, bipolar type ([facility] [address] )      Patient Condition: The patient has been stabilized such that within reasonable medical probability, no material deterioration of the patient condition or the condition of the unborn child(gianna) is likely to result from the transfer    Reason for Transfer: No bed available at level of patient's needs    Transfer Requirements: [facility]   · Space available and qualified personnel available for treatment as acknowledged by Dennis Green, [phone]  · Agreed to accept transfer and to provide appropriate medical treatment as acknowledged by       [place]  · Appropriate medical records of the examination and treatment of the patient are provided at the time of transfer   [address] _______  · Transfer will be performed by qualified personnel from [facility] [place]  and appropriate transfer equipment as required, including the use of necessary and appropriate life support measures  Provider Certification: I have examined the patient and explained the following risks and benefits of being transferred/refusing transfer to the patient/family:  The patient is stable for psychiatric transfer because they are medically stable, and is protected from harming him/herself or others during transport      Based on these reasonable risks and benefits to the patient and/or the unborn child(gianna), and based upon the information available at the time of the patients examination, I certify that the medical benefits reasonably to be expected from the provision of appropriate medical treatments at another medical facility outweigh the increasing risks, if any, to the individuals medical condition, and in the case of labor to the unborn child, from effecting the transfer      X____________________________________________ DATE 06/21/21        TIME_______      ORIGINAL - SEND TO MEDICAL RECORDS   COPY - SEND WITH PATIENT DURING TRANSFER

## 2021-06-22 NOTE — ED NOTES
Patient resting on litter he is cooperative and pleasant  Observation of chest rising and falling noted while resting on litter  [id number] checks in progress       Miguel Babb RN  06/22/21 1080

## 2021-06-22 NOTE — MALNUTRITION/BMI
This medical record reflects one or more clinical indicators suggestive of malnutrition and/or morbid obesity  BMI Findings:  Adult BMI Classifications: Morbid Obesity 50-59 9     Body mass index is 51 13 kg/m²  Treat with CCD3 diet  See Nutrition note dated 6/22/21  for additional details  Completed nutrition assessment is viewable in the nutrition documentation
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
## 2021-06-22 NOTE — ED NOTES
Patient resting on litter he is cooperative and pleasant  Observation of chest rising and falling noted while resting on litter  [id number] checks in progress       Israel Guardian, RN  06/22/21 6248

## 2021-06-22 NOTE — CMS CERTIFICATION NOTE
Recertification: Based upon physical, mental and social evaluations, I certify that inpatient psychiatric services continue to be medically necessary for this patient for a duration of 7 midnights for the treatment of  Schizoaffective disorder, bipolar type [facility])   Available alternative community resources still do not meet the patient's mental health care needs  I further attest that an established written individualized plan of care has been updated and is outlined in the patient's medical records

## 2021-06-22 NOTE — NUTRITION
06/22/21 1411   Recommendations/Interventions   Summary Per encounter reviews, pt with pre-DM dx as of 2/2021, on metformin and trulicity daily  Follows with [facility] as outpt  Consider addition of CCD3 diet currently and adjust meds as needed pending Glucose levels     Malnutrition/BMI Present Yes   Adult BMI Classifications Morbid Obesity 50-59 9   Interventions Diet: order adjustment   Nutrition Recommendations Other (Specify)

## 2021-06-22 NOTE — TREATMENT PLAN
TREATMENT PLAN REVIEW - [address] 26 y o  1995 male MRN: [id number]    [address] [place] [address] Room / Bed: A[place] [address]/[facility] 814-02 Encounter: [id number]          Admit Date/Time:  6/22/2021  3:04 AM    Treatment Team: Attending Provider: Krysta Yeager MD; Patient Care Assistant: Jenise Limon; Patient Care Technician: Kayla Servin; Registered Nurse: Renard Preciado LPN; Occupational Therapy Assistant: SERA Irizarry; : Margo Zafar; Security: Sara Roman;  Physician Assistant: Ute Escamilla; Charge Nurse: Masha Galeana RN; Licensed Practical Nurse: Bunny Pollock LPN    Diagnosis: Principal Problem:    Schizoaffective disorder, bipolar type [facility])  Active Problems:    Intellectual disability      Patient Strengths/Assets: cooperative, good past treatment response, patient is on a voluntary commitment    Patient Barriers/Limitations: limited support system    Short Term Goals: decrease in depressive symptoms, decrease in anxiety symptoms, decrease in paranoid thoughts, decrease in psychotic symptoms, decrease in suicidal thoughts, decrease in self abusive behaviors, decrease in homicidal thoughts, improvement in insight    Long Term Goals: improvement in depression, stabilization of mood, free of suicidal thoughts, free of homicidal thoughts, no self abusive behavior, adequate self care    Progress Towards Goals: starting psychiatric medications as prescribed    Recommended Treatment: medication management, patient medication education, group therapy, milieu therapy, continued Behavioral Health psychiatric evaluation/assessment process    Treatment Frequency: daily medication monitoring, group and milieu therapy daily, monitoring through interdisciplinary rounds, monitoring through weekly patient care conferences    Expected Discharge Date:  5-7 days    Discharge Plan: referral for outpatient medication management with a psychiatrist, referral for outpatient psychotherapy    Treatment Plan Created/Updated By: Chris Rubalcava MD

## 2021-06-22 NOTE — ED NOTES
Patient resting on litter he is cooperative and pleasant  Observation of chest rising and falling noted while resting on litter  [id number] checks in progress       Waleska Ortiz RN  06/22/21 6803
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
## 2021-06-22 NOTE — NURSING NOTE
Pt calm and cooperative  Needs frequent redirection from nurses station  Denies SI/HI and reports voices are just "a mumble"  Pt OOB for meals, walking the halls frequently, and socials with peers  Denies any questions or concerns at this time

## 2021-06-22 NOTE — CASE MANAGEMENT
CM contacted Delia Kehr of [facility], Pt's residence, @ [phone] & she confirmed she is the supervisor for Pt's house  She reported that Pt has been doing well in their program, since he started with them in May 2019  She said that he had the house to himself, however, in March he got a roommate, & it has not gone well  She said that the roommate has a lot of behaviors & Pt looks up to him in some ways, & then mimics what his roommate does  CM asked if there is a plan to relocate Pt & she said that they had given notice to the roommate, & it has since , so they are just waiting for placement  CM reviewed that Pt would most likely not be inpatient for an extended period of time & CM agreed to provide on-going updates

## 2021-06-23 LAB — RPR SER QL: NORMAL

## 2021-06-23 PROCEDURE — 99232 SBSQ HOSP IP/OBS MODERATE 35: CPT | Performed by: STUDENT IN AN ORGANIZED HEALTH CARE EDUCATION/TRAINING PROGRAM

## 2021-06-23 RX ADMIN — MELATONIN 3 MG: at 21:32

## 2021-06-23 RX ADMIN — PROPRANOLOL HYDROCHLORIDE 20 MG: 20 TABLET ORAL at 09:26

## 2021-06-23 RX ADMIN — METFORMIN HYDROCHLORIDE 750 MG: 750 TABLET, EXTENDED RELEASE ORAL at 09:25

## 2021-06-23 RX ADMIN — METFORMIN HYDROCHLORIDE 750 MG: 750 TABLET, EXTENDED RELEASE ORAL at 16:16

## 2021-06-23 RX ADMIN — BENZTROPINE MESYLATE 1 MG: 1 TABLET ORAL at 21:32

## 2021-06-23 RX ADMIN — RISPERIDONE 4 MG: 2 TABLET ORAL at 09:26

## 2021-06-23 RX ADMIN — DIVALPROEX SODIUM 750 MG: 250 TABLET, FILM COATED, EXTENDED RELEASE ORAL at 09:26

## 2021-06-23 RX ADMIN — RISPERIDONE 2 MG: 2 TABLET ORAL at 21:32

## 2021-06-23 NOTE — PROGRESS NOTES
06/23/21 1030   Activity/Group Checklist   Group Other (Comment)  (Group Art Therapy; Open-Choice, Process Discussion)   Attendance Attended   Attendance Duration (min) 46-60   Interactions Interacted appropriately   Affect/Mood Appropriate   Goals Achieved Identified resources and support systems; Able to listen to others; Able to engage in interactions; Able to recieve feedback; Able to give feedback to another  (Engaged with materials;  Full participation)
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
## 2021-06-23 NOTE — PROGRESS NOTES
Patient had a drop off that included the followin tank top   2 pairs of shorts-1 with strings  2 magda shirts  1 pair of pants

## 2021-06-23 NOTE — PROGRESS NOTES
Patient up and visible at beginning of shift, social at dinner time, compliant with medications and treatment  Informed he would be getting a roommate later, pt smiled and denied concerns, as well as SI, HI, AVH and reports feeling hopeful  Will continue to monitor

## 2021-06-23 NOTE — CASE MANAGEMENT
Pt stopping CM frequently in the wagner to talk & ask questions  CM & Pt discussed him remaining inpatient through the weekend & Pt agreeable  CM asked him about getting a shower & putting on clothing, however, he said he only has one outfit & it is dirty  CM said that staff could assist him with doing his laundry  CM asked if he would want staff to bring in a few changes of clothing & some underwear, & he said yes  CM contacted Candace Polanco, the supervisor for [facility] to ask if she could bring Pt some clothing & to review that he would most likely remain inpatient through the weekend  CM contacted [facility] @ [phone], however, after waiting on hold for an extended a period of time, CM reached voicemail; CM did not leave a message at this time  CM contacted Pt's supports coordinator, Abdirahman Warner, of [facility] @ [phone] but reached voicemail  CM left a message seeking a returned call

## 2021-06-23 NOTE — NURSING NOTE
Pt smiling and laughing with writer  Pt reported "I slept like a baby " Pt denies SI/HI/AVH  Pt denies anxiety and depression  Pt reported "today is a good day " Pt denies any questions or concerns

## 2021-06-23 NOTE — PROGRESS NOTES
06/23/21 1400   Activity/Group Checklist   Group Other (Comment)  (Group Art Therapy; Self-Reflection, Process Discussion)   Attendance Attended   Attendance Duration (min) Greater than 60   Interactions Interacted appropriately   Affect/Mood Appropriate   Goals Achieved Able to listen to others; Able to engage in interactions  (Engaged with materials;  Full participation)

## 2021-06-23 NOTE — PROGRESS NOTES
Status: Pt pleasant & cooperative  He reported A/H that were mumbles yesterday    Pt's LFTs & TSH elevated (  Medication: medications started / no PRNs  D/C: end of week vs beginning of next week      06/23/21 0750   Team Meeting   Meeting Type Daily Rounds   Team Members Present   Team Members Present Physician;Nurse;Occupational Therapist;   Physician Team Member Dr Henry Never / Ayan Burr / Domingo Moralez Team Member Carlos Coles / James Cavanaugh Management Team Member Amira Bland / Soha Dash   OT Team Member Arvind Andrews / Art Therapy Student   Patient/Family Present   Patient Present No   Patient's Family Present No

## 2021-06-23 NOTE — PROGRESS NOTES
Treatment Plan Meeting:  Pt resting in bed & declined to meet with Tx Team, but said he would review plan with CM after breakfast   Diagnosis of schizoaffective disorder, bipolar type & intellectual disability reviewed  Discussed short term goals for decrease in depression, anxiety, paranoid thoughts, psychotic symptoms, suicidal thoughts, decrease in self abusive behaviors, decrease in homicidal thoughts, & improvement in insight  Pt resides in a [facility] & the home as issues his roommate a 30 day notice, they are just waiting for placement for him  They do not plan to bring in a new roommate, sighting that Pt does best without a roommate  They reported no issues/concerning behaviors with Pt, prior to him having this roommate  They said that Pt has been mimicking behaviors of his roommate  At this time, discharge is questionable for the end of the week versus beginning of next week  All parties in agreement & treatment plan was signed       06/23/21 0815   Team Meeting   Meeting Type Tx Team Meeting   Initial Conference Date 06/23/21   Next Conference Date 07/20/21   Team Members Present   Team Members Present Physician;Nurse;   Physician Team Member Dr Christopher Hughes Team Member RANDY [facility] Management Team Member Merly   Patient/Family Present   Patient Present No   Patient's Family Present No

## 2021-06-23 NOTE — PLAN OF CARE
Problem: SELF HARM/SUICIDALITY  Goal: Will have no self-injury during hospital stay  Description: INTERVENTIONS:  - Q 15 MINUTES: Routine safety checks  - Q WAKING SHIFT & PRN: Assess risk to determine if routine checks are adequate to maintain patient safety  - Encourage patient to participate actively in care by formulating a plan to combat response to suicidal ideation, identify supports and resources  Outcome: Progressing     Problem: PSYCHOSIS  Goal: Will report no hallucinations or delusions  Description: Interventions:  - Administer medication as  ordered  - Every waking shifts and PRN assess for the presence of hallucinations and or delusions  - Assist with reality testing to support increasing orientation  - Assess if patient's hallucinations or delusions are encouraging self-harm or harm to others and intervene as appropriate  Outcome: Progressing     Problem: Nutrition/Hydration-ADULT  Goal: Nutrient/Hydration intake appropriate for improving, restoring or maintaining nutritional needs  Description: Monitor and assess patient's nutrition/hydration status for malnutrition  Collaborate with interdisciplinary team and initiate plan and interventions as ordered  Monitor patient's weight and dietary intake as ordered or per policy  Utilize nutrition screening tool and intervene as necessary  Determine patient's food preferences and provide high-protein, high-caloric foods as appropriate       INTERVENTIONS:  - Monitor oral intake, urinary output, labs, and treatment plans  - Assess nutrition and hydration status and recommend course of action  - Evaluate amount of meals eaten  - Assist patient with eating if necessary   - Allow adequate time for meals  - Recommend/ encourage appropriate diets, oral nutritional supplements, and vitamin/mineral supplements  - Order, calculate, and assess calorie counts as needed  - Recommend, monitor, and adjust tube feedings and TPN/PPN based on assessed needs  - Assess need for intravenous fluids  - Provide specific nutrition/hydration education as appropriate  - Include patient/family/caregiver in decisions related to nutrition  Outcome: Progressing
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
## 2021-06-24 PROCEDURE — 99232 SBSQ HOSP IP/OBS MODERATE 35: CPT | Performed by: STUDENT IN AN ORGANIZED HEALTH CARE EDUCATION/TRAINING PROGRAM

## 2021-06-24 RX ADMIN — IBUPROFEN 400 MG: 400 TABLET, FILM COATED ORAL at 10:07

## 2021-06-24 RX ADMIN — RISPERIDONE 4 MG: 2 TABLET ORAL at 08:59

## 2021-06-24 RX ADMIN — PROPRANOLOL HYDROCHLORIDE 20 MG: 20 TABLET ORAL at 08:59

## 2021-06-24 RX ADMIN — DIVALPROEX SODIUM 750 MG: 250 TABLET, FILM COATED, EXTENDED RELEASE ORAL at 08:58

## 2021-06-24 RX ADMIN — RISPERIDONE 2 MG: 2 TABLET ORAL at 21:42

## 2021-06-24 RX ADMIN — BENZTROPINE MESYLATE 1 MG: 1 TABLET ORAL at 21:42

## 2021-06-24 RX ADMIN — MELATONIN 3 MG: at 21:42

## 2021-06-24 RX ADMIN — METFORMIN HYDROCHLORIDE 750 MG: 750 TABLET, EXTENDED RELEASE ORAL at 08:58

## 2021-06-24 RX ADMIN — METFORMIN HYDROCHLORIDE 750 MG: 750 TABLET, EXTENDED RELEASE ORAL at 15:59

## 2021-06-24 NOTE — PROGRESS NOTES
06/24/21 1400   Activity/Group Checklist   Group Other (Comment)  (Group Art Therapy; Open-Studio)   Attendance Attended   Attendance Duration (min) Greater than 60   Interactions Interacted appropriately   Affect/Mood Appropriate   Goals Achieved Able to listen to others; Able to engage in interactions; Able to recieve feedback  (Engaged with materials;  Full participation)

## 2021-06-24 NOTE — NURSING NOTE
Flat affect, brightens with approach  Reports sleeping well over night  Denies SI/HI/AVH  Visible in the halls and dayroom  Compliant with medications

## 2021-06-24 NOTE — PLAN OF CARE
Problem: Ineffective Coping  Goal: Participates in unit activities  Description: Interventions:  - Provide therapeutic environment   - Provide required programming   - Redirect inappropriate behaviors   Outcome: Progressing     Problem: PSYCHOSIS  Goal: Will report no hallucinations or delusions  Description: Interventions:  - Administer medication as  ordered  - Every waking shifts and PRN assess for the presence of hallucinations and or delusions  - Assist with reality testing to support increasing orientation  - Assess if patient's hallucinations or delusions are encouraging self-harm or harm to others and intervene as appropriate  Outcome: Progressing

## 2021-06-24 NOTE — PROGRESS NOTES
06/24/21 1030   Activity/Group Checklist   Group Other (Comment)  (Group Art Therapy; Constructive/Destructive, Process Disc )   Attendance Attended   Attendance Duration (min) 46-60   Interactions Interacted appropriately   Affect/Mood Appropriate   Goals Achieved Able to listen to others; Able to engage in interactions; Able to recieve feedback  (Engaged with materials;  Full participation)

## 2021-06-24 NOTE — PROGRESS NOTES
Progress Note - [id number] [facility] 23 y o  male MRN: [id number]   Unit/Bed#: [facility] 255-02 Encounter: [id number]    Behavior over the last 24 hours: some improvement  Mic De La Torre   Is a 76-year-old male with history of schizoaffective disorder bipolar type who presents for psychiatric follow-up  Patient is pleasant, calm and cooperative upon approach  He is casually dressed today and is no longer wearing hospital attire  His affect is brighter and his responses appear more organized and linear today  He states his auditory hallucinations have resolved and he is quite relieved about this  He is medication and meal compliant  He has been bright and social in the milieu and visible with good attendance at groups  He denies any acute concerns      Sleep: normal  Appetite: normal  Medication side effects: No   ROS: all other systems are negative    Mental Status Evaluation:    Appearance:  age appropriate, casually dressed, adequate grooming, overweight   Behavior:  pleasant, cooperative, calm   Speech:  normal rate and volume   Mood:  euthymic   Affect:  slightly brighter   Thought Process:  organized, linear   Associations: concrete associations   Thought Content:  no overt delusions   Perceptual Disturbances: no visual hallucinations, denies auditory hallucinations when asked   Risk Potential: Suicidal ideation - None at present  Homicidal ideation - None at present  Potential for aggression - No   Sensorium:  oriented to person, place and time/date   Memory:  recent and remote memory grossly intact   Consciousness:  alert and awake   Attention/Concentration: attention span and concentration appear shorter than expected for age   Insight:  improving   Judgment: improving   Gait/Station: normal gait/station, normal balance   Motor Activity: no abnormal movements     Vital signs in last 24 hours:    Temp:  [96 8 °F (36 °C)-98 1 °F (36 7 °C)] 98 1 °F (36 7 °C)  HR:  [82-91] 82  Resp:  [18-19] 18  BP: (116-139)/(68-91) 116/68    Laboratory results: I have personally reviewed all pertinent laboratory/tests results    Most Recent Labs:   Lab Results   Component Value Date    WBC 8 23 06/22/2021    RBC 4 56 06/22/2021    HGB 14 1 06/22/2021    HCT 43 0 06/22/2021     06/22/2021    RDW 12 4 06/22/2021    NEUTROABS 3 81 06/22/2021    SODIUM 141 06/22/2021    K 4 0 06/22/2021     (H) 06/22/2021    CO2 24 06/22/2021    BUN 15 06/22/2021    CREATININE 0 54 (L) 06/22/2021    GLUC 70 06/22/2021    CALCIUM 9 3 06/22/2021    AST 52 (H) 06/22/2021     (H) 06/22/2021    ALKPHOS 59 06/22/2021    TP 7 8 06/22/2021    ALB 3 9 06/22/2021    TBILI 0 57 06/22/2021    CHOLESTEROL 169 03/02/2019    HDL 29 (L) 03/02/2019    TRIG 189 (H) 03/02/2019    LDLCALC 102 (H) 03/02/2019    NONHDLC 140 03/02/2019    VALPROICTOT 81 05/14/2019    AMMONIA 27 01/13/2018    [id number] 4 320 (H) 06/22/2021    FREET4 0 99 02/14/2019    T3FREE 3 03 03/24/2017    PREGSERUM Negative 06/22/2021    RPR Non-Reactive 06/22/2021    HGBA1C 4 8 05/04/2021    EAG 91 05/04/2021       Progress Toward Goals: improving, attends groups, participates in milieu therapy, mood is stabilizing, depression is improving, reality testing is improving    Assessment/Plan   Principal Problem:    Schizoaffective disorder, bipolar type ([facility] [address] )  Active Problems:    Intellectual disability    BMI 45 0-49 9, adult [facility])    Medical clearance for psychiatric admission      Recommended Treatment:     Planned medication and treatment changes: All current active medications have been reviewed  Encourage group therapy, milieu therapy and occupational therapy  Behavioral Health checks every 7 minutes      Check a VPA level, CBC with diff and CMP tomorrow morning  Patient is progressing well and denies any psychotic symptoms at this time  On track for discharge early next week, likely Tuesday      Current Facility-Administered Medications   Medication Dose Route Frequency Provider Last Rate    acetaminophen  650 mg Oral Q6H PRN Flonnie Arias      benztropine  1 mg Oral HS Tristin Silva MD      hydrOXYzine HCL  50 mg Oral Q6H PRN Max 4/day Flonnie Arias      Or    diphenhydrAMINE  50 mg Intramuscular Q6H PRN Flonnie Arias      divalproex sodium  750 mg Oral Daily Tristin Silva MD      hydrOXYzine HCL  100 mg Oral Q6H PRN Max 4/day Flonnie Arias      Or    LORazepam  2 mg Intramuscular Q6H PRN Flonnie Arias      hydrOXYzine HCL  25 mg Oral Q6H PRN Max 4/day Flonnie Arias      ibuprofen  400 mg Oral Q8H PRN Flonnie Arias      ibuprofen  400 mg Oral Q8H PRN Flonnie Arias      melatonin  3 mg Oral HS Flonnie Arias      metFORMIN  750 mg Oral BID With Meals Tristin Silva MD      propranolol  20 mg Oral Daily Tristin Silva MD      risperiDONE  2 mg Oral HS Tristin Silva MD      risperiDONE  4 mg Oral Daily Tristin Silva MD       Risks / Benefits of Treatment:    Risks, benefits, and possible side effects of medications explained to patient and patient verbalizes understanding and agreement for treatment  Counseling / Coordination of Care:    Patient's progress discussed with staff in treatment team meeting  Medications, treatment progress and treatment plan reviewed with patient      Lenore Sosa PA-C 06/24/21

## 2021-06-24 NOTE — NURSING NOTE
Pt is visible in the milieu and is social with peers  Pt is pleasant and cooperative in conversation with a bright affect  Pt denies both anxiety and depression as well as SI/HI/AVH  Pt denies having any questions or concerns at the present

## 2021-06-24 NOTE — PLAN OF CARE
Problem: SELF HARM/SUICIDALITY  Goal: Will have no self-injury during hospital stay  Description: INTERVENTIONS:  - Q 15 MINUTES: Routine safety checks  - Q WAKING SHIFT & PRN: Assess risk to determine if routine checks are adequate to maintain patient safety  - Encourage patient to participate actively in care by formulating a plan to combat response to suicidal ideation, identify supports and resources  Outcome: Progressing     Problem: PSYCHOSIS  Goal: Will report no hallucinations or delusions  Description: Interventions:  - Administer medication as  ordered  - Every waking shifts and PRN assess for the presence of hallucinations and or delusions  - Assist with reality testing to support increasing orientation  - Assess if patient's hallucinations or delusions are encouraging self-harm or harm to others and intervene as appropriate  Outcome: Progressing     Problem: Nutrition/Hydration-ADULT  Goal: Nutrient/Hydration intake appropriate for improving, restoring or maintaining nutritional needs  Description: Monitor and assess patient's nutrition/hydration status for malnutrition  Collaborate with interdisciplinary team and initiate plan and interventions as ordered  Monitor patient's weight and dietary intake as ordered or per policy  Utilize nutrition screening tool and intervene as necessary  Determine patient's food preferences and provide high-protein, high-caloric foods as appropriate       INTERVENTIONS:  - Monitor oral intake, urinary output, labs, and treatment plans  - Assess nutrition and hydration status and recommend course of action  - Evaluate amount of meals eaten  - Assist patient with eating if necessary   - Allow adequate time for meals  Outcome: Progressing

## 2021-06-25 LAB
ALBUMIN SERPL BCP-MCNC: 3.3 G/DL (ref 3.5–5)
ALP SERPL-CCNC: 59 U/L (ref 46–116)
ALT SERPL W P-5'-P-CCNC: 101 U/L (ref 12–78)
ANION GAP SERPL CALCULATED.3IONS-SCNC: 9 MMOL/L (ref 4–13)
AST SERPL W P-5'-P-CCNC: 39 U/L (ref 5–45)
BASOPHILS # BLD AUTO: 0.05 THOUSANDS/ΜL (ref 0–0.1)
BASOPHILS NFR BLD AUTO: 1 % (ref 0–1)
BILIRUB SERPL-MCNC: 0.3 MG/DL (ref 0.2–1)
BUN SERPL-MCNC: 11 MG/DL (ref 5–25)
CALCIUM ALBUM COR SERPL-MCNC: 9.5 MG/DL (ref 8.3–10.1)
CALCIUM SERPL-MCNC: 8.9 MG/DL (ref 8.3–10.1)
CHLORIDE SERPL-SCNC: 105 MMOL/L (ref 100–108)
CO2 SERPL-SCNC: 25 MMOL/L (ref 21–32)
CREAT SERPL-MCNC: 0.6 MG/DL (ref 0.6–1.3)
EOSINOPHIL # BLD AUTO: 0.3 THOUSAND/ΜL (ref 0–0.61)
EOSINOPHIL NFR BLD AUTO: 4 % (ref 0–6)
ERYTHROCYTE [DISTWIDTH] IN BLOOD BY AUTOMATED COUNT: 12 % (ref 11.6–15.1)
GFR SERPL CREATININE-BSD FRML MDRD: 140 ML/MIN/1.73SQ M
GLUCOSE P FAST SERPL-MCNC: 107 MG/DL (ref 65–99)
GLUCOSE SERPL-MCNC: 107 MG/DL (ref 65–140)
HCT VFR BLD AUTO: 43.4 % (ref 36.5–49.3)
HGB BLD-MCNC: 14.2 G/DL (ref 12–17)
IMM GRANULOCYTES # BLD AUTO: 0.06 THOUSAND/UL (ref 0–0.2)
IMM GRANULOCYTES NFR BLD AUTO: 1 % (ref 0–2)
LYMPHOCYTES # BLD AUTO: 2.88 THOUSANDS/ΜL (ref 0.6–4.47)
LYMPHOCYTES NFR BLD AUTO: 36 % (ref 14–44)
MCH RBC QN AUTO: 30.2 PG (ref 26.8–34.3)
MCHC RBC AUTO-ENTMCNC: 32.7 G/DL (ref 31.4–37.4)
MCV RBC AUTO: 92 FL (ref 82–98)
MONOCYTES # BLD AUTO: 0.94 THOUSAND/ΜL (ref 0.17–1.22)
MONOCYTES NFR BLD AUTO: 12 % (ref 4–12)
NEUTROPHILS # BLD AUTO: 3.71 THOUSANDS/ΜL (ref 1.85–7.62)
NEUTS SEG NFR BLD AUTO: 46 % (ref 43–75)
NRBC BLD AUTO-RTO: 0 /100 WBCS
PLATELET # BLD AUTO: 214 THOUSANDS/UL (ref 149–390)
PMV BLD AUTO: 9.8 FL (ref 8.9–12.7)
POTASSIUM SERPL-SCNC: 4 MMOL/L (ref 3.5–5.3)
PROT SERPL-MCNC: 7.2 G/DL (ref 6.4–8.2)
RBC # BLD AUTO: 4.7 MILLION/UL (ref 3.88–5.62)
SODIUM SERPL-SCNC: 139 MMOL/L (ref 136–145)
VALPROATE SERPL-MCNC: 26 UG/ML (ref 50–100)
WBC # BLD AUTO: 7.94 THOUSAND/UL (ref 4.31–10.16)

## 2021-06-25 PROCEDURE — 99232 SBSQ HOSP IP/OBS MODERATE 35: CPT | Performed by: PHYSICIAN ASSISTANT

## 2021-06-25 PROCEDURE — 80164 ASSAY DIPROPYLACETIC ACD TOT: CPT | Performed by: PHYSICIAN ASSISTANT

## 2021-06-25 PROCEDURE — 85025 COMPLETE CBC W/AUTO DIFF WBC: CPT | Performed by: PHYSICIAN ASSISTANT

## 2021-06-25 PROCEDURE — 80053 COMPREHEN METABOLIC PANEL: CPT | Performed by: PHYSICIAN ASSISTANT

## 2021-06-25 RX ORDER — DIVALPROEX SODIUM 500 MG/1
1500 TABLET, EXTENDED RELEASE ORAL DAILY
Status: DISCONTINUED | OUTPATIENT
Start: 2021-06-26 | End: 2021-06-29 | Stop reason: HOSPADM

## 2021-06-25 RX ADMIN — MELATONIN 3 MG: at 21:29

## 2021-06-25 RX ADMIN — ACETAMINOPHEN 650 MG: 325 TABLET, FILM COATED ORAL at 09:53

## 2021-06-25 RX ADMIN — RISPERIDONE 4 MG: 2 TABLET ORAL at 08:46

## 2021-06-25 RX ADMIN — RISPERIDONE 2 MG: 2 TABLET ORAL at 21:29

## 2021-06-25 RX ADMIN — METFORMIN HYDROCHLORIDE 750 MG: 750 TABLET, EXTENDED RELEASE ORAL at 08:47

## 2021-06-25 RX ADMIN — PROPRANOLOL HYDROCHLORIDE 20 MG: 20 TABLET ORAL at 08:47

## 2021-06-25 RX ADMIN — IBUPROFEN 400 MG: 400 TABLET, FILM COATED ORAL at 17:43

## 2021-06-25 RX ADMIN — DIVALPROEX SODIUM 750 MG: 250 TABLET, FILM COATED, EXTENDED RELEASE ORAL at 08:47

## 2021-06-25 RX ADMIN — BENZTROPINE MESYLATE 1 MG: 1 TABLET ORAL at 21:29

## 2021-06-25 RX ADMIN — METFORMIN HYDROCHLORIDE 750 MG: 750 TABLET, EXTENDED RELEASE ORAL at 16:23

## 2021-06-25 NOTE — PROGRESS NOTES
Progress Note - [address] [facility] 23 y o  male MRN: [id number]   Unit/Bed#: [facility] 255-02 Encounter: [id number]    Behavior over the last 24 hours: improving  Skeet Reason   Is a 62-year-old male with a history of schizoaffective disorder bipolar type who presents for psychiatric follow-up  Patient is pleasant, calm and cooperative upon approach  Superficial with responses but appears to have a bright affect and describes his mood as "much better," relative to prior to admission  States auditory hallucinations have resolved  He is visible and social in the milieu with good group attendance  Medication and meal compliant  Had blood work this morning, CBC normal, VPA level and CMP are pending  No SI or HI      Sleep: normal  Appetite: normal  Medication side effects: No   ROS: all other systems are negative    Mental Status Evaluation:    Appearance:  age appropriate, casually dressed, adequate grooming   Behavior:  pleasant, cooperative, calm   Speech:  normal rate and volume   Mood:  euthymic "much better"   Affect:  brighter   Thought Process:  organized, linear   Associations: concrete associations   Thought Content:  no overt delusions   Perceptual Disturbances: no visual hallucinations, AH have resolved   Risk Potential: Suicidal ideation - None at present  Homicidal ideation - None at present  Potential for aggression - No   Sensorium:  oriented to person, place and time/date   Memory:  recent and remote memory grossly intact   Consciousness:  alert and awake   Attention/Concentration: attention span and concentration appear shorter than expected for age   Insight:  improving   Judgment: improving   Gait/Station: normal gait/station, normal balance   Motor Activity: no abnormal movements     Vital signs in last 24 hours:    Temp:  [97 5 °F (36 4 °C)-97 7 °F (36 5 °C)] 97 7 °F (36 5 °C)  HR:  [101-105] 101  Resp:  [16-17] 16  BP: (126)/(60-70) 126/60    Laboratory results: I have personally reviewed all pertinent laboratory/tests results    Most Recent Labs:   Lab Results   Component Value Date    WBC 7 94 06/25/2021    RBC 4 70 06/25/2021    HGB 14 2 06/25/2021    HCT 43 4 06/25/2021     06/25/2021    RDW 12 0 06/25/2021    NEUTROABS 3 71 06/25/2021    SODIUM 141 06/22/2021    K 4 0 06/22/2021     (H) 06/22/2021    CO2 24 06/22/2021    BUN 15 06/22/2021    CREATININE 0 54 (L) 06/22/2021    GLUC 70 06/22/2021    CALCIUM 9 3 06/22/2021    AST 52 (H) 06/22/2021     (H) 06/22/2021    ALKPHOS 59 06/22/2021    TP 7 8 06/22/2021    ALB 3 9 06/22/2021    TBILI 0 57 06/22/2021    CHOLESTEROL 169 03/02/2019    HDL 29 (L) 03/02/2019    TRIG 189 (H) 03/02/2019    LDLCALC 102 (H) 03/02/2019    [place] 140 03/02/2019    VALPROICTOT 81 05/14/2019    AMMONIA 27 01/13/2018    [id number] 4 320 (H) 06/22/2021    FREET4 0 99 02/14/2019    T3FREE 3 03 03/24/2017    PREGSERUM Negative 06/22/2021    RPR Non-Reactive 06/22/2021    HGBA1C 4 8 05/04/2021    EAG 91 05/04/2021       Progress Toward Goals: improving, attends groups, participates in milieu therapy, mood is stabilizing, working on coping skills, discharge planning    Assessment/Plan   Principal Problem:    Schizoaffective disorder, bipolar type ([facility] [address] )  Active Problems:    Intellectual disability    BMI 45 0-49 9, adult ([facility] [address] )    Medical clearance for psychiatric admission      Recommended Treatment:     Planned medication and treatment changes: All current active medications have been reviewed  Encourage group therapy, milieu therapy and occupational therapy  Behavioral Health checks every 7 minutes    Patient appears to be doing well and is improving from a psychiatric standpoint  Continue current medications  CBC normal, VPA level and CMP are pending  Addendum: VPA level resulted low at 26  Will increase Depakote ER from 750mg daily to 1,500mg daily and recheck VPA level, CBC and CMP in 3 days   LFTs trending down which is encouraging, has hx of TOMPKINS  Current Facility-Administered Medications   Medication Dose Route Frequency Provider Last Rate    acetaminophen  650 mg Oral Q6H PRN Arun Pond      benztropine  1 mg Oral HS Marta Milan MD      hydrOXYzine HCL  50 mg Oral Q6H PRN Max 4/day Arun Pond      Or    diphenhydrAMINE  50 mg Intramuscular Q6H PRN Arun Pond      divalproex sodium  750 mg Oral Daily Marta Milan MD      hydrOXYzine HCL  100 mg Oral Q6H PRN Max 4/day Arun Pond      Or    LORazepam  2 mg Intramuscular Q6H PRN Arun Pond      hydrOXYzine HCL  25 mg Oral Q6H PRN Max 4/day Arun Pond      ibuprofen  400 mg Oral Q8H PRN Arun Pond      ibuprofen  400 mg Oral Q8H PRN Arun Pond      melatonin  3 mg Oral HS Arun Pond      metFORMIN  750 mg Oral BID With Meals Marta Milan MD      propranolol  20 mg Oral Daily Marta Milan MD      risperiDONE  2 mg Oral HS Marta Milan MD      risperiDONE  4 mg Oral Daily Marta Milan MD       Risks / Benefits of Treatment:    Risks, benefits, and possible side effects of medications explained to patient  Patient has limited understanding of risks and benefits of treatment at this time, but agrees to take medications as prescribed  Counseling / Coordination of Care:    Patient's progress discussed with staff in treatment team meeting  Medications, treatment progress and treatment plan reviewed with patient      Tay Moraels PA-C 06/25/21

## 2021-06-25 NOTE — NURSING NOTE
Pt is visible in the milieu and is social with peers and staff  Pt is pleasant and cooperative in conversation with a bright affect  Pt denies both anxiety and depression as well as SI/HI/AVH  Pt denies having any questions or concerns at the present

## 2021-06-25 NOTE — NURSING NOTE
Pt remains pleasant in conversation, denies SI/HI, anxiety and depression  Denies AVH  Reports feeling bored on the unit however denies any concerns regarding treatment  Encouraged pt to attend groups throughout the day, reports he slept well overnight  Denies any concerns or questions at this time

## 2021-06-25 NOTE — PLAN OF CARE
Problem: DISCHARGE PLANNING  Goal: Discharge to home or other facility with appropriate resources  Description: INTERVENTIONS:  - Identify barriers to discharge w/patient and caregiver  - Arrange for needed discharge resources and transportation as appropriate  - Identify discharge learning needs (meds, wound care, etc )  - Arrange for interpretive services to assist at discharge as needed  - Refer to Case Management Department for coordinating discharge planning if the patient needs post-hospital services based on physician/advanced practitioner order or complex needs related to functional status, cognitive ability, or social support system  Outcome: Progressing     Problem: Ineffective Coping  Goal: Participates in unit activities  Description: Interventions:  - Provide therapeutic environment   - Provide required programming   - Redirect inappropriate behaviors   Outcome: Progressing     Problem: SELF HARM/SUICIDALITY  Goal: Will have no self-injury during hospital stay  Description: INTERVENTIONS:  - Q 15 MINUTES: Routine safety checks  - Q WAKING SHIFT & PRN: Assess risk to determine if routine checks are adequate to maintain patient safety  - Encourage patient to participate actively in care by formulating a plan to combat response to suicidal ideation, identify supports and resources  Outcome: Progressing     Problem: PSYCHOSIS  Goal: Will report no hallucinations or delusions  Description: Interventions:  - Administer medication as  ordered  - Every waking shifts and PRN assess for the presence of hallucinations and or delusions  - Assist with reality testing to support increasing orientation  - Assess if patient's hallucinations or delusions are encouraging self-harm or harm to others and intervene as appropriate  Outcome: Progressing     Problem: Nutrition/Hydration-ADULT  Goal: Nutrient/Hydration intake appropriate for improving, restoring or maintaining nutritional needs  Description: Monitor and assess patient's nutrition/hydration status for malnutrition  Collaborate with interdisciplinary team and initiate plan and interventions as ordered  Monitor patient's weight and dietary intake as ordered or per policy  Utilize nutrition screening tool and intervene as necessary  Determine patient's food preferences and provide high-protein, high-caloric foods as appropriate       INTERVENTIONS:  - Monitor oral intake, urinary output, labs, and treatment plans  - Assess nutrition and hydration status and recommend course of action  - Evaluate amount of meals eaten  - Assist patient with eating if necessary   - Allow adequate time for meals  Outcome: Progressing

## 2021-06-26 PROCEDURE — 99232 SBSQ HOSP IP/OBS MODERATE 35: CPT | Performed by: PSYCHIATRY & NEUROLOGY

## 2021-06-26 RX ADMIN — DIVALPROEX SODIUM 1500 MG: 500 TABLET, EXTENDED RELEASE ORAL at 08:45

## 2021-06-26 RX ADMIN — RISPERIDONE 4 MG: 2 TABLET ORAL at 08:45

## 2021-06-26 RX ADMIN — METFORMIN HYDROCHLORIDE 750 MG: 750 TABLET, EXTENDED RELEASE ORAL at 16:18

## 2021-06-26 RX ADMIN — PROPRANOLOL HYDROCHLORIDE 20 MG: 20 TABLET ORAL at 08:45

## 2021-06-26 RX ADMIN — MELATONIN 3 MG: at 21:11

## 2021-06-26 RX ADMIN — BENZTROPINE MESYLATE 1 MG: 1 TABLET ORAL at 21:11

## 2021-06-26 RX ADMIN — METFORMIN HYDROCHLORIDE 750 MG: 750 TABLET, EXTENDED RELEASE ORAL at 08:46

## 2021-06-26 RX ADMIN — RISPERIDONE 2 MG: 2 TABLET ORAL at 21:11

## 2021-06-26 NOTE — PROGRESS NOTES
Progress Note - [address] 23 y o  male MRN: @MRN   Unit/Bed#: [facility] 992-19 Encounter: [id number]    Per nursing report and sign out, patient was in group home, conflict with peer  No SI, HI, AVH  Pleasant, social  Sleeping well  Wesley Mckinney reports today that he is doing ok, denies all symptoms or issues  No SI, HI, AVH  Pleasant but paucity of speech  Energy: good  Sleep: normal  Appetite: normal  Medication side effects: No   ROS: all other systems are negative    Mental Status Evaluation:    Appearance:  dressed casually   Behavior:  pleasant, cooperative, with good eye contact   Speech:  Regular rate and rhythm, paucity of speech   Mood:  euthymic   Affect:  mood congruent, constricted       Thought Process:  concrete   Associations: intact associations   Thought Content:  no overt delusions   Perceptual Disturbances: no auditory or visual hallcunations   Risk Potential: Suicidal ideation - None  Homicidal ideation - None at present  Potential for aggression - No   Sensorium:  A&Ox3   Cognition:  grossly intact   Consciousness:  Alert & Awake   Memory:  recent and remote memory grossly intact   Attention: attention span and concentration appear shorter than expected for age           Insight:  improving   Judgment: improving   Muscle Strength  Muscle Tone normal  normal   Gait/Station: normal gait/station with good balance   Motor Activity: no abnormal movements       Vital signs in last 24 hours:    Temp:  [98 4 °F (36 9 °C)] 98 4 °F (36 9 °C)  HR:  [] 82  Resp:  [18-19] 19  BP: (120-130)/(72-77) 120/72    Laboratory results:  I have personally reviewed all pertinent laboratory/tests results      Assessment/Plan   Principal Problem:    [facility]  Active Problems:    Intellectual disability    BMI 45 0-49 9, adult [facility])    Medical clearance for psychiatric admission      Wesley Mckinney is making progress    Recommended Treatment: Planned medication and treatment changes: All current active medications have been reviewed  Encourage group therapy, milieu therapy and occupational therapy  [facility] checks as unit standard unless ordered or noted otherwise    Current Facility-Administered Medications   Medication Dose Route Frequency Provider Last Rate    acetaminophen  650 mg Oral Q6H PRN Laray Eisenmenger      benztropine  1 mg Oral HS Mercedes Arias MD      hydrOXYzine HCL  50 mg Oral Q6H PRN Max 4/day Laray Eisenmenger      Or    diphenhydrAMINE  50 mg Intramuscular Q6H PRN Laray Eisenmenger      divalproex sodium  1,500 mg Oral Daily Buck Rushing PA-C      hydrOXYzine HCL  100 mg Oral Q6H PRN Max 4/day Laray Eisfidelmenger      Or    LORazepam  2 mg Intramuscular Q6H PRN Laray Eisenmenger      hydrOXYzine HCL  25 mg Oral Q6H PRN Max 4/day Laray Eisenmenger      ibuprofen  400 mg Oral Q8H PRN Laray Eisenmenger      ibuprofen  400 mg Oral Q8H PRN Laray Eisenmenger      melatonin  3 mg Oral HS Laray Eisenmenger      metFORMIN  750 mg Oral BID With Meals Mercedes Arias MD      propranolol  20 mg Oral Daily Mercedes Arias MD      risperiDONE  2 mg Oral HS Mercedes Arias MD      risperiDONE  4 mg Oral Daily Mercedes Arias MD         1) continue current treatment  2) Continue to support patient and engage them in the programs available as feasible and appropriate  Continue case management support and therapy  Continue discharge planning  Risks / Benefits of Treatment:    Risks, benefits, and possible side effects of medications explained to patient and patient verbalizes understanding and agreement for treatment  Counseling / Coordination of Care:    Medication changes reviewed with nursing staff  Medications, treatment progress and treatment plan reviewed with patient        Piper Brar DO LURDES  6/26/2021  7:43 AM

## 2021-06-26 NOTE — TREATMENT TEAM
AM rounds: Remains childlike, bright affect, visible/social with peers/staff    Deies SI/HI/AVH, slept

## 2021-06-27 PROCEDURE — 99232 SBSQ HOSP IP/OBS MODERATE 35: CPT | Performed by: PSYCHIATRY & NEUROLOGY

## 2021-06-27 RX ADMIN — BENZTROPINE MESYLATE 1 MG: 1 TABLET ORAL at 21:28

## 2021-06-27 RX ADMIN — METFORMIN HYDROCHLORIDE 750 MG: 750 TABLET, EXTENDED RELEASE ORAL at 16:44

## 2021-06-27 RX ADMIN — RISPERIDONE 4 MG: 2 TABLET ORAL at 08:48

## 2021-06-27 RX ADMIN — DIVALPROEX SODIUM 1500 MG: 500 TABLET, EXTENDED RELEASE ORAL at 08:48

## 2021-06-27 RX ADMIN — ACETAMINOPHEN 650 MG: 325 TABLET, FILM COATED ORAL at 14:19

## 2021-06-27 RX ADMIN — RISPERIDONE 2 MG: 2 TABLET ORAL at 21:28

## 2021-06-27 RX ADMIN — MELATONIN 3 MG: at 21:28

## 2021-06-27 RX ADMIN — PROPRANOLOL HYDROCHLORIDE 20 MG: 20 TABLET ORAL at 08:48

## 2021-06-27 RX ADMIN — METFORMIN HYDROCHLORIDE 750 MG: 750 TABLET, EXTENDED RELEASE ORAL at 08:49

## 2021-06-27 NOTE — NURSING NOTE
Pt calm and cooperative, pleasant during interaction  Denies SI/HI/AVH  Reports sleeping and eating well  Napping in bed after breakfast   Denies any questions or concerns

## 2021-06-27 NOTE — TREATMENT TEAM
AM rounds: Childlike, bright affect, wanders halls, social with peers/staff  Denies SI/HI/AVH    Slept

## 2021-06-27 NOTE — NURSING NOTE
Pt visible at times in the milieu then resting in his room  Attended evening group  Calm, cooperative during interaction  States "nope" when asked about feelings of harming himself or harming others  Pt not showing as bright affect this evening as observed on previous occasions  States he "will be going home on Tuesday"  Reports feeling ready to go home  Compliant taking medications  Has no questions or concerns at this time

## 2021-06-27 NOTE — NURSING NOTE
Pt denies SI/HI and AVH  Reports feeling "good today"  Pt social with peers, complaint with medication, and OOB for meals  Pt needs frequent redirection away from nurses station  Denies any question or concerns at this time

## 2021-06-27 NOTE — PROGRESS NOTES
Progress Note - Behavioral Health     Joseph Barriga 23 y o  male MRN: @MRN   Unit/Bed#: Elisabeth Bernard 224-22 Encounter: [id number]    Per nursing report and sign out, patient remains the same  Joseph Barriga reports today that he is doing very well  "Nope" to all concerns of depression, anxiety, AVH, SI, HI  Energy: good  Sleep: normal  Appetite: normal  Medication side effects: No   ROS: all other systems are negative    Mental Status Evaluation:    Appearance:  dressed casually   Behavior:  Pleasant & cooperative   Speech:  paucity of speech   Mood:  euthymic   Affect:  blunted, pleasant and bright affect       Thought Process:  concrete   Associations: intact associations   Thought Content:  normal and appropriate   Perceptual Disturbances: no auditory or visual hallcunations   Risk Potential: Suicidal ideation - None  Homicidal ideation - None  Potential for aggression - No   Sensorium:  A&Ox3   Cognition:  grossly intact   Consciousness:  Alert & Awake   Memory:  recent and remote memory grossly intact   Attention: attention span and concentration appear shorter than expected for age           Insight:  improving   Judgment: improving   Muscle Strength  Muscle Tone normal  normal   Gait/Station: normal gait/station with good balance   Motor Activity: no abnormal movements       Vital signs in last 24 hours:    Temp:  [98 °F (36 7 °C)-98 4 °F (36 9 °C)] 98 °F (36 7 °C)  HR:  [82-90] 90  Resp:  [18-19] 18  BP: (120-136)/(72-82) 136/82    Laboratory results:  I have personally reviewed all pertinent laboratory/tests results  Assessment/Plan   Principal Problem:    Schizoaffective disorder, bipolar type ([facility] )  Active Problems:    Intellectual disability    BMI 45 0-49 9, adult [facility])    Medical clearance for psychiatric admission      Joseph Barriga is doing ok presently    Recommended Treatment:     Planned medication and treatment changes: All current active medications have been reviewed    Encourage group therapy, milieu therapy and occupational therapy  [address] checks as unit standard unless ordered or noted otherwise    Current Facility-Administered Medications   Medication Dose Route Frequency Provider Last Rate    acetaminophen  650 mg Oral Q6H PRN Blas Rhein      benztropine  1 mg Oral HS Brooks Pearson MD      hydrOXYzine HCL  50 mg Oral Q6H PRN Max 4/day Blas Rhein      Or    diphenhydrAMINE  50 mg Intramuscular Q6H PRN Blas Rhein      divalproex sodium  1,500 mg Oral Daily Kari Lamas PA-C      hydrOXYzine HCL  100 mg Oral Q6H PRN Max 4/day Brokaw Rhein      Or    LORazepam  2 mg Intramuscular Q6H PRN Brokaw Rhein      hydrOXYzine HCL  25 mg Oral Q6H PRN Max 4/day Brokaw Rhein      ibuprofen  400 mg Oral Q8H PRN Blas Rhein      ibuprofen  400 mg Oral Q8H PRN Blas Rhein      melatonin  3 mg Oral HS Brokaw Rhein      metFORMIN  750 mg Oral BID With Meals Brooks Pearson MD      propranolol  20 mg Oral Daily Brooks Pearson MD      risperiDONE  2 mg Oral HS Brooks Pearson MD      risperiDONE  4 mg Oral Daily Brooks Pearson MD         1) continue current treatment  2) Continue to support patient and engage them in the programs available as feasible and appropriate  Continue case management support and therapy  Continue discharge planning  Risks / Benefits of Treatment:    Risks, benefits, and possible side effects of medications explained to patient and patient verbalizes understanding and agreement for treatment  Counseling / Coordination of Care:    Medication changes reviewed with nursing staff  Medications, treatment progress and treatment plan reviewed with patient        Alyce Streetvilma CHATMAN DO  6/27/2021  7:12 AM

## 2021-06-28 PROBLEM — Z00.8 MEDICAL CLEARANCE FOR PSYCHIATRIC ADMISSION: Status: RESOLVED | Noted: 2021-06-22 | Resolved: 2021-06-28

## 2021-06-28 PROCEDURE — 99232 SBSQ HOSP IP/OBS MODERATE 35: CPT | Performed by: PHYSICIAN ASSISTANT

## 2021-06-28 RX ADMIN — METFORMIN HYDROCHLORIDE 750 MG: 750 TABLET, EXTENDED RELEASE ORAL at 17:13

## 2021-06-28 RX ADMIN — RISPERIDONE 2 MG: 2 TABLET ORAL at 21:27

## 2021-06-28 RX ADMIN — ACETAMINOPHEN 650 MG: 325 TABLET, FILM COATED ORAL at 12:41

## 2021-06-28 RX ADMIN — MELATONIN 3 MG: at 21:27

## 2021-06-28 RX ADMIN — RISPERIDONE 4 MG: 2 TABLET ORAL at 09:29

## 2021-06-28 RX ADMIN — METFORMIN HYDROCHLORIDE 750 MG: 750 TABLET, EXTENDED RELEASE ORAL at 09:29

## 2021-06-28 RX ADMIN — DIVALPROEX SODIUM 1500 MG: 500 TABLET, EXTENDED RELEASE ORAL at 09:29

## 2021-06-28 RX ADMIN — BENZTROPINE MESYLATE 1 MG: 1 TABLET ORAL at 21:27

## 2021-06-28 RX ADMIN — PROPRANOLOL HYDROCHLORIDE 20 MG: 20 TABLET ORAL at 09:29

## 2021-06-28 NOTE — NURSING NOTE
Pt remains pleasant and social with peers and staff  Frequently at nurses station and requiring some redirection  Pt states feeling bored on the unit and hopeful to discharge tomorrow  Pt denies SI/HI, AVH  Denies any questions at this time

## 2021-06-28 NOTE — PROGRESS NOTES
06/28/21 1030   Activity/Group Checklist   Group Other (Comment)  (Group Art Therapy; Open Choice/Intro, Process Discussion)   Attendance Attended   Attendance Duration (min) 46-60   Interactions Interacted appropriately  (Receptive to redirection as needed)   Affect/Mood Appropriate   Goals Achieved Able to listen to others; Able to engage in interactions; Able to recieve feedback  (Engaged with materials;  Full participation)

## 2021-06-28 NOTE — PROGRESS NOTES
Progress Note - [address] 23 y o  male MRN: [id number]   Unit/Bed#: Margie Dawn 255-02 Encounter: [id number]    Behavior over the last 24 hours: slowly improving  Bibiana Taavres Is a 64-year-old male with a history of schizoaffective disorder bipolar type and mild intellectual disability who presents for psychiatric follow-up  Patient is pleasant, calm cooperative upon approach  Remains focused on discharge  Scheduled for repeat labs tomorrow to assess his VPA level, along with a CBC and CMP  He has tolerated his medications well without any side effects  He denies any auditory hallucinations since late last week  Does not appear to be internally preoccupied  His affect remains bright and his responses are organized, though he does mumble his speech frequently  No acute concerns      Sleep: normal  Appetite: normal  Medication side effects: No   ROS: all other systems are negative    Mental Status Evaluation:    Appearance:  age appropriate, adequate grooming, dressed in hospital attire, overweight   Behavior:  pleasant, cooperative, calm   Speech:  normal rate and volume, fluent, clear   Mood:  euthymic   Affect:  brighter   Thought Process:  organized, linear   Associations: concrete associations   Thought Content:  no overt delusions   Perceptual Disturbances: no auditory hallucinations, no visual hallucinations   Risk Potential: Suicidal ideation - None at present  Homicidal ideation - None at present  Potential for aggression - No   Sensorium:  oriented to person, place and time/date   Memory:  recent and remote memory grossly intact   Consciousness:  alert and awake   Attention/Concentration: attention span and concentration appear shorter than expected for age   Insight:  improving   Judgment: improving   Gait/Station: normal gait/station   Motor Activity: no abnormal movements     Vital signs in last 24 hours:    Temp:  [97 3 °F (36 3 °C)-99 °F (37 2 °C)] 97 3 °F (36 3 °C)  HR: [] 92  Resp:  [18-19] 18  BP: (129-170)/() 129/77    Laboratory results: I have personally reviewed all pertinent laboratory/tests results    Most Recent Labs:   Lab Results   Component Value Date    WBC 7 94 06/25/2021    RBC 4 70 06/25/2021    HGB 14 2 06/25/2021    HCT 43 4 06/25/2021     06/25/2021    RDW 12 0 06/25/2021    NEUTROABS 3 71 06/25/2021    SODIUM 139 06/25/2021    K 4 0 06/25/2021     06/25/2021    CO2 25 06/25/2021    BUN 11 06/25/2021    CREATININE 0 60 06/25/2021    GLUC 107 06/25/2021    CALCIUM 8 9 06/25/2021    AST 39 06/25/2021     (H) 06/25/2021    ALKPHOS 59 06/25/2021    TP 7 2 06/25/2021    ALB 3 3 (L) 06/25/2021    TBILI 0 30 06/25/2021    CHOLESTEROL 169 03/02/2019    HDL 29 (L) 03/02/2019    TRIG 189 (H) 03/02/2019    LDLCALC 102 (H) 03/02/2019    [place] 140 03/02/2019    VALPROICTOT 26 (L) 06/25/2021    AMMONIA 27 01/13/2018    [id number] 4 320 (H) 06/22/2021    FREET4 0 99 02/14/2019    T3FREE 3 03 03/24/2017    PREGSERUM Negative 06/22/2021    RPR Non-Reactive 06/22/2021    HGBA1C 4 8 05/04/2021    EAG 91 05/04/2021       Progress Toward Goals: improving, attends groups, participates in milieu therapy, mood is stabilizing, working on coping skills, discharge planning    Assessment/Plan   Principal Problem:    Schizoaffective disorder, bipolar type ([facility] )  Active Problems:    Intellectual disability    BMI 45 0-49 9, adult ([facility] )    Medical clearance for psychiatric admission      Recommended Treatment:     Planned medication and treatment changes: All current active medications have been reviewed  Encourage group therapy, milieu therapy and occupational therapy  Behavioral Health checks every 7 minutes     Labs tomorrow  D/C planning for tmrw afternoon, pending no abnormalities with his blood work     Continue current medications:    Current Facility-Administered Medications   Medication Dose Route Frequency Provider Last Rate    acetaminophen 650 mg Oral Q6H PRN Laray Eisenmenger      benztropine  1 mg Oral HS Mercedes Arias MD      hydrOXYzine HCL  50 mg Oral Q6H PRN Max 4/day Laray Eisenmenger      Or    diphenhydrAMINE  50 mg Intramuscular Q6H PRN Laray Eisenmenger      divalproex sodium  1,500 mg Oral Daily Buck Rushing PA-C      hydrOXYzine HCL  100 mg Oral Q6H PRN Max 4/day Laray Eisenmenger      Or    LORazepam  2 mg Intramuscular Q6H PRN Laray Eisenmenger      hydrOXYzine HCL  25 mg Oral Q6H PRN Max 4/day Laray Eisenmenger      ibuprofen  400 mg Oral Q8H PRN Laray Eisenmenger      ibuprofen  400 mg Oral Q8H PRN Laray Eisenmenger      melatonin  3 mg Oral HS Laray Marcosger      metFORMIN  750 mg Oral BID With Meals Mercedes Arias MD      propranolol  20 mg Oral Daily Mercedes Arias MD      risperiDONE  2 mg Oral HS Mercedes Arias MD      risperiDONE  4 mg Oral Daily Mercedes Arias MD       Risks / Benefits of Treatment:    Risks, benefits, and possible side effects of medications explained to patient and patient verbalizes understanding and agreement for treatment  Counseling / Coordination of Care:    Patient's progress discussed with staff in treatment team meeting  Medications, treatment progress and treatment plan reviewed with patient      Buck Rushing PA-C 06/28/21

## 2021-06-28 NOTE — NURSING NOTE
Pt remains pleasant and social on the unit, continues to state feeling bored on the unit  Encouraged to attend group however pt disinterested with this  States he hopes he can discharge tomorrow  Denies any questions at this time

## 2021-06-28 NOTE — PROGRESS NOTES
06/28/21 1400   Activity/Group Checklist   Group Other (Comment)  (Group Art Therapy; Expectations, Process Discussion )   Attendance Attended   Attendance Duration (min) 46-60   Interactions Interacted appropriately   Affect/Mood Appropriate   Goals Achieved Able to listen to others; Able to engage in interactions; Able to recieve feedback  (Engaged with materials;  Full participation)

## 2021-06-29 VITALS
RESPIRATION RATE: 18 BRPM | SYSTOLIC BLOOD PRESSURE: 134 MMHG | DIASTOLIC BLOOD PRESSURE: 91 MMHG | TEMPERATURE: 98.9 F | WEIGHT: 315 LBS | BODY MASS INDEX: 50.62 KG/M2 | OXYGEN SATURATION: 98 % | HEART RATE: 106 BPM | HEIGHT: 66 IN

## 2021-06-29 LAB
ALBUMIN SERPL BCP-MCNC: 3.4 G/DL (ref 3.5–5)
ALP SERPL-CCNC: 57 U/L (ref 46–116)
ALT SERPL W P-5'-P-CCNC: 85 U/L (ref 12–78)
ANION GAP SERPL CALCULATED.3IONS-SCNC: 11 MMOL/L (ref 4–13)
AST SERPL W P-5'-P-CCNC: 28 U/L (ref 5–45)
BASOPHILS # BLD AUTO: 0.06 THOUSANDS/ΜL (ref 0–0.1)
BASOPHILS NFR BLD AUTO: 1 % (ref 0–1)
BILIRUB SERPL-MCNC: 0.3 MG/DL (ref 0.2–1)
BUN SERPL-MCNC: 14 MG/DL (ref 5–25)
CALCIUM ALBUM COR SERPL-MCNC: 9.3 MG/DL (ref 8.3–10.1)
CALCIUM SERPL-MCNC: 8.8 MG/DL (ref 8.3–10.1)
CHLORIDE SERPL-SCNC: 106 MMOL/L (ref 100–108)
CO2 SERPL-SCNC: 26 MMOL/L (ref 21–32)
CREAT SERPL-MCNC: 0.54 MG/DL (ref 0.6–1.3)
EOSINOPHIL # BLD AUTO: 0.3 THOUSAND/ΜL (ref 0–0.61)
EOSINOPHIL NFR BLD AUTO: 3 % (ref 0–6)
ERYTHROCYTE [DISTWIDTH] IN BLOOD BY AUTOMATED COUNT: 12.5 % (ref 11.6–15.1)
GFR SERPL CREATININE-BSD FRML MDRD: 146 ML/MIN/1.73SQ M
GLUCOSE P FAST SERPL-MCNC: 78 MG/DL (ref 65–99)
GLUCOSE SERPL-MCNC: 78 MG/DL (ref 65–140)
HCT VFR BLD AUTO: 42.2 % (ref 36.5–49.3)
HGB BLD-MCNC: 13.6 G/DL (ref 12–17)
IMM GRANULOCYTES # BLD AUTO: 0.11 THOUSAND/UL (ref 0–0.2)
IMM GRANULOCYTES NFR BLD AUTO: 1 % (ref 0–2)
LYMPHOCYTES # BLD AUTO: 3.83 THOUSANDS/ΜL (ref 0.6–4.47)
LYMPHOCYTES NFR BLD AUTO: 40 % (ref 14–44)
MCH RBC QN AUTO: 30 PG (ref 26.8–34.3)
MCHC RBC AUTO-ENTMCNC: 32.2 G/DL (ref 31.4–37.4)
MCV RBC AUTO: 93 FL (ref 82–98)
MONOCYTES # BLD AUTO: 1.12 THOUSAND/ΜL (ref 0.17–1.22)
MONOCYTES NFR BLD AUTO: 12 % (ref 4–12)
NEUTROPHILS # BLD AUTO: 4.16 THOUSANDS/ΜL (ref 1.85–7.62)
NEUTS SEG NFR BLD AUTO: 43 % (ref 43–75)
NRBC BLD AUTO-RTO: 0 /100 WBCS
PLATELET # BLD AUTO: 234 THOUSANDS/UL (ref 149–390)
PMV BLD AUTO: 9.8 FL (ref 8.9–12.7)
POTASSIUM SERPL-SCNC: 4 MMOL/L (ref 3.5–5.3)
PROT SERPL-MCNC: 6.9 G/DL (ref 6.4–8.2)
RBC # BLD AUTO: 4.54 MILLION/UL (ref 3.88–5.62)
SODIUM SERPL-SCNC: 143 MMOL/L (ref 136–145)
VALPROATE SERPL-MCNC: 61 UG/ML (ref 50–100)
WBC # BLD AUTO: 9.58 THOUSAND/UL (ref 4.31–10.16)

## 2021-06-29 PROCEDURE — 80053 COMPREHEN METABOLIC PANEL: CPT | Performed by: PHYSICIAN ASSISTANT

## 2021-06-29 PROCEDURE — 99239 HOSP IP/OBS DSCHRG MGMT >30: CPT | Performed by: STUDENT IN AN ORGANIZED HEALTH CARE EDUCATION/TRAINING PROGRAM

## 2021-06-29 PROCEDURE — 85025 COMPLETE CBC W/AUTO DIFF WBC: CPT | Performed by: PHYSICIAN ASSISTANT

## 2021-06-29 PROCEDURE — 80164 ASSAY DIPROPYLACETIC ACD TOT: CPT | Performed by: PHYSICIAN ASSISTANT

## 2021-06-29 RX ORDER — RISPERIDONE 4 MG/1
4 TABLET, FILM COATED ORAL DAILY
Qty: 30 TABLET | Refills: 1 | Status: ON HOLD | OUTPATIENT
Start: 2021-06-29 | End: 2022-07-07 | Stop reason: SDUPTHER

## 2021-06-29 RX ORDER — PROPRANOLOL HYDROCHLORIDE 20 MG/1
20 TABLET ORAL DAILY
Qty: 30 TABLET | Refills: 1 | Status: SHIPPED | OUTPATIENT
Start: 2021-06-29 | End: 2022-07-04

## 2021-06-29 RX ORDER — METFORMIN HYDROCHLORIDE 750 MG/1
750 TABLET, EXTENDED RELEASE ORAL 2 TIMES DAILY WITH MEALS
Qty: 60 TABLET | Refills: 1 | Status: ON HOLD | OUTPATIENT
Start: 2021-06-29 | End: 2022-07-07

## 2021-06-29 RX ORDER — DIVALPROEX SODIUM 500 MG/1
1500 TABLET, EXTENDED RELEASE ORAL DAILY
Qty: 90 TABLET | Refills: 1 | Status: SHIPPED | OUTPATIENT
Start: 2021-06-30 | End: 2022-07-13

## 2021-06-29 RX ORDER — BENZTROPINE MESYLATE 1 MG/1
1 TABLET ORAL
Qty: 30 TABLET | Refills: 1 | Status: SHIPPED | OUTPATIENT
Start: 2021-06-29 | End: 2022-07-13

## 2021-06-29 RX ORDER — LANOLIN ALCOHOL/MO/W.PET/CERES
3 CREAM (GRAM) TOPICAL
Qty: 30 TABLET | Refills: 1 | Status: SHIPPED | OUTPATIENT
Start: 2021-06-29 | End: 2021-08-28

## 2021-06-29 RX ORDER — RISPERIDONE 2 MG/1
2 TABLET, FILM COATED ORAL
Qty: 30 TABLET | Refills: 1 | Status: ON HOLD | OUTPATIENT
Start: 2021-06-29 | End: 2022-07-07 | Stop reason: SDUPTHER

## 2021-06-29 RX ADMIN — METFORMIN HYDROCHLORIDE 750 MG: 750 TABLET, EXTENDED RELEASE ORAL at 16:03

## 2021-06-29 RX ADMIN — RISPERIDONE 4 MG: 2 TABLET ORAL at 08:21

## 2021-06-29 RX ADMIN — METFORMIN HYDROCHLORIDE 750 MG: 750 TABLET, EXTENDED RELEASE ORAL at 08:22

## 2021-06-29 RX ADMIN — PROPRANOLOL HYDROCHLORIDE 20 MG: 20 TABLET ORAL at 08:21

## 2021-06-29 RX ADMIN — DIVALPROEX SODIUM 1500 MG: 500 TABLET, EXTENDED RELEASE ORAL at 08:21
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
## 2021-06-29 NOTE — DISCHARGE INSTR - APPOINTMENTS
Chinedu Riojas RN, our [employer], will be calling you after your discharge, on the phone number that you provided  She will be available as an additional support, if needed  If you wish to speak with her, you may contact Samy Garcia at [phone]

## 2021-06-29 NOTE — DISCHARGE INSTR - OTHER ORDERS
Rosa Haile is a confidential 7 days/week telephone support service manned by trained mental health consumers  [facility] operates daily but is not able to accept calls between 2AM-6AM    [facility] provides support, a listening ear and can provide information about available services  [facility] specializes in the concerns of mental health consumers, their families and friends  However, we are also here for anyone who has a mental health concern, is confused about or just doesn't know anything about mental health or where to get information  To reach Rosa Haile, call [phone] accepts calls between 6:00 AM to 10:00 AM and from 4:00 PM to 12:00 AM      Text CONNECT to 132339 from anywhere in the [place], anytime, about any type of crisis  A live, trained Crisis Counselor receives the text and lets you know that they are here to listen  The volunteer Crisis Counselor will help you move from a hot moment to a cool moment  [facility] ([facility]) AT [facility] - licensed telephone and mobile crisis services that provide mental health assessments to all age groups regardless of income or insurance  [facility] operates 24-hour/7 days a week  [address] [place] [address] assists consumer who are experiencing a mental health emergency and lack the resources to assist themselves  Immediate intervention for suicidal and depressed individuals with home visits/outreach being top priority  Crisis can be contacted at [phone]  The [facility] Illness [facility]) offers various education & support groups for you & your family  For more information visit their website at http://www [employer]/     Dial 2-1-1 to get connected/get help  Free, confidential information & referral available 24/7: Aging Services, Child & Youth Services, Counseling, Education/Training, Food/Shelter/Clothing, Health Services, Parenting, Substance Abuse, Support Groups, Volunteer Opportunities, & much more    Phone: 2-1-1 or [phone], Web: SES [email or web address] JINA, Email: [email or web address]    [facility]  The [facility] ([place] [address], [address]) offers persons with a mental illness a safe, healing environment to explore their personal and vocational potential and receive support in achieving their goals  Instead of traditional therapy, the work of the house is the rehabilitation  As members contribute meaningful work to the house, they build confidence and a sense of purpose  Be a Member - Its Free  Membership in the [facility] is open to any [place] resident who is 25 or older and has a history of mental illness  Membership is free for life  [place] [address], [address]  Hours: Monday - Friday: 8 a m  - 4 p m  With varying hours on holidays   ([address] ([address]) provides a stress-free atmosphere for persons 18 and older who have experienced mental health issues  What The [address]  · Games  · Outings  · Holiday gatherings  Recovery  · Employment  · Education  · [employer]  Empowerment  · Helps participants achieve their goals  · Enhances quality of life  · Encourages leadership    The [address]  Hours: Monday through Friday: 4 p m  - 9 p m  Saturday: 2 p m  - 8 p m    Closed Sundays  Varying hours on holidays            Contact [phone]

## 2021-06-29 NOTE — PROGRESS NOTES
Status: Pt pleasant & cooperative  No issues to report  Medication: no changes / no PRNs  D/C: Today / Pt's [facility], will provide transportation later this afternoon    They have to have medications filled by [address] [facility], prior to pick-up        06/29/21 0750   Team Meeting   Meeting Type Daily Rounds   Team Members Present   Team Members Present Physician;Nurse;;Occupational Therapist   Physician Team Member Dr Carl Castañeda / Ayan Burr / Carlos Odell Team Member Carlos Coles / James Cavanaugh Management Team Member Amira Bland / Soha Dash / Blas Mckeon   OT Team Member Amparo   Patient/Family Present   Patient Present No   Patient's Family Present No

## 2021-06-29 NOTE — PROGRESS NOTES
Status: Pt is visible on the unit, no issues to report     Medication:Inderal decreased to 20mg daily & Risperdal 4mg daily & 2 mg HS started / no PRNs  D/C: Tues 06/24/21 0750   Team Meeting   Meeting Type Daily Rounds   Team Members Present   Team Members Present Physician;Nurse;;Occupational Therapist   Physician Team Member Dr Odalys Shaffer / Clint Sever / Mariusz Booth Team Member RANDY [facility] Management Team Member Ankur Nye / Mena Doe   OT Team Member Amparo   Patient/Family Present   Patient Present No   Patient's Family Present No

## 2021-06-29 NOTE — CASE MANAGEMENT
CM contacted Pt's supports coordinator, Reina Danielle, from [address], @ [phone] & left a message to review d/c plans for today & make her aware of Pt's appointment at [facility] tomorrow  CM also asked that she follow-up to ensure Pt attends this, as ZACHARY said he has missed his last two appointments  CM contacted Edil Morales, from Pt's residence, [facility] @ [phone] & she confirmed that they will provide transportation today between 3/4 PM   CM reviewed [facility] appointment at [facility] the importance of him attending, as he has missed his last 2 appointments & cannot see the doctor until he meets with the therapist, Ubaldo Morales said that they will take him tomorrow, & it should be an issue  She had no questions about Pt's discharge plans  CM faxed Pt's AVS to [address] [place] [address] @ [phone]  CM met with Pt who verbalized readiness for discharge  CM & Pt reviewed his IMM letter  Pt happy to be going home & seeing his staff

## 2021-06-29 NOTE — NURSING NOTE
Patient expressed readiness for discharge, denied questions or concerns with AVS upon review  Pt escorted from Rodena Koyanagi to his staff member from the group home he would return to

## 2021-06-29 NOTE — PROGRESS NOTES
Status: Pt is child-like & requires re-direction  Pt is scheduled to have labs tomorrow morning  No issues to report, he is stating he is ready to go home     Medication: no changes / PRN - Tylenol  D/C: Tues / Pt's prescriptions will need to be sent directly to [facility], before [facility] will [facility] up       06/28/21 0750   Team Meeting   Meeting Type Daily Rounds   Team Members Present   Team Members Present Physician;Nurse;;Occupational Therapist   Physician Team Member Dr Jorge Delgado / Virginia Altamirano / Sun Bran Team Member Martin Field / Adam Webster Management Team Member Theo Lopez / Conner Meza / Cooper Rodríguez   OT Team Member Amparo   Patient/Family Present   Patient Present No   Patient's Family Present No

## 2021-06-29 NOTE — PROGRESS NOTES
Status: Pt talks with staff & walks the halls  Valproic acid level to be drawn today    Medication: no changes / PRN - Motrin  D/C: Tues 06/25/21 0750   Team Meeting   Meeting Type Daily Rounds   Team Members Present   Team Members Present Physician;Nurse;;Occupational Therapist   Physician Team Member Dr Sofie Haynes / Margaux Sepulveda Team Member [facility] Management Team Member Jennifer Judd / Denzel Little   OT Team Member Rashaad Sewell / Art Therapy Student   Patient/Family Present   Patient Present No   Patient's Family Present No

## 2021-06-29 NOTE — BH TRANSITION RECORD
Contact Information: If you have any questions, concerns, pended studies, tests and/or procedures, or emergencies regarding your inpatient behavioral health visit  Please contact [place] behavioral [facility] unit [phone] and ask to speak to a , nurse or physician  A contact is available 24 hours/ 7 days a week at this number  Summary of Procedures Performed During your Stay:  Below is a list of major procedures performed during your hospital stay and a summary of results:  - Cardiac Procedures/Studies: EKG  Pending Studies (From admission, onward)    None        If studies are pending at discharge, follow up with your PCP and/or referring provider

## 2021-08-18 ENCOUNTER — APPOINTMENT (OUTPATIENT)
Dept: LAB | Facility: HOSPITAL | Age: 26
End: 2021-08-18
Payer: MEDICARE

## 2021-08-18 DIAGNOSIS — R73.03 PREDIABETES: ICD-10-CM

## 2021-08-18 DIAGNOSIS — E66.01 CLASS 3 SEVERE OBESITY DUE TO EXCESS CALORIES WITH SERIOUS COMORBIDITY AND BODY MASS INDEX (BMI) OF 50.0 TO 59.9 IN ADULT (HCC): ICD-10-CM

## 2021-08-18 LAB
ALBUMIN SERPL BCP-MCNC: 3.9 G/DL (ref 3.5–5)
ALP SERPL-CCNC: 67 U/L (ref 46–116)
ALT SERPL W P-5'-P-CCNC: 94 U/L (ref 12–78)
ANION GAP SERPL CALCULATED.3IONS-SCNC: 11 MMOL/L (ref 4–13)
AST SERPL W P-5'-P-CCNC: 33 U/L (ref 5–45)
BASOPHILS # BLD AUTO: 0.05 THOUSANDS/ΜL (ref 0–0.1)
BASOPHILS NFR BLD AUTO: 1 % (ref 0–1)
BILIRUB SERPL-MCNC: 0.42 MG/DL (ref 0.2–1)
BUN SERPL-MCNC: 9 MG/DL (ref 5–25)
CALCIUM SERPL-MCNC: 9.4 MG/DL (ref 8.3–10.1)
CHLORIDE SERPL-SCNC: 104 MMOL/L (ref 100–108)
CHOLEST SERPL-MCNC: 193 MG/DL (ref 50–200)
CO2 SERPL-SCNC: 26 MMOL/L (ref 21–32)
CREAT SERPL-MCNC: 0.75 MG/DL (ref 0.6–1.3)
EOSINOPHIL # BLD AUTO: 0.17 THOUSAND/ΜL (ref 0–0.61)
EOSINOPHIL NFR BLD AUTO: 3 % (ref 0–6)
ERYTHROCYTE [DISTWIDTH] IN BLOOD BY AUTOMATED COUNT: 12.2 % (ref 11.6–15.1)
EST. AVERAGE GLUCOSE BLD GHB EST-MCNC: 94 MG/DL
GFR SERPL CREATININE-BSD FRML MDRD: 127 ML/MIN/1.73SQ M
GLUCOSE P FAST SERPL-MCNC: 97 MG/DL (ref 65–99)
HBA1C MFR BLD: 4.9 %
HCT VFR BLD AUTO: 42.8 % (ref 36.5–49.3)
HDLC SERPL-MCNC: 40 MG/DL
HGB BLD-MCNC: 14.7 G/DL (ref 12–17)
IMM GRANULOCYTES # BLD AUTO: 0.05 THOUSAND/UL (ref 0–0.2)
IMM GRANULOCYTES NFR BLD AUTO: 1 % (ref 0–2)
LDLC SERPL CALC-MCNC: 129 MG/DL (ref 0–100)
LYMPHOCYTES # BLD AUTO: 2.61 THOUSANDS/ΜL (ref 0.6–4.47)
LYMPHOCYTES NFR BLD AUTO: 39 % (ref 14–44)
MCH RBC QN AUTO: 30.8 PG (ref 26.8–34.3)
MCHC RBC AUTO-ENTMCNC: 34.3 G/DL (ref 31.4–37.4)
MCV RBC AUTO: 90 FL (ref 82–98)
MONOCYTES # BLD AUTO: 0.58 THOUSAND/ΜL (ref 0.17–1.22)
MONOCYTES NFR BLD AUTO: 9 % (ref 4–12)
NEUTROPHILS # BLD AUTO: 3.32 THOUSANDS/ΜL (ref 1.85–7.62)
NEUTS SEG NFR BLD AUTO: 47 % (ref 43–75)
NONHDLC SERPL-MCNC: 153 MG/DL
NRBC BLD AUTO-RTO: 0 /100 WBCS
PLATELET # BLD AUTO: 216 THOUSANDS/UL (ref 149–390)
PMV BLD AUTO: 9 FL (ref 8.9–12.7)
POTASSIUM SERPL-SCNC: 4.4 MMOL/L (ref 3.5–5.3)
PROT SERPL-MCNC: 8.2 G/DL (ref 6.4–8.2)
RBC # BLD AUTO: 4.78 MILLION/UL (ref 3.88–5.62)
SODIUM SERPL-SCNC: 141 MMOL/L (ref 136–145)
TRIGL SERPL-MCNC: 120 MG/DL
WBC # BLD AUTO: 6.78 THOUSAND/UL (ref 4.31–10.16)

## 2021-08-18 PROCEDURE — 85025 COMPLETE CBC W/AUTO DIFF WBC: CPT

## 2021-08-18 PROCEDURE — 80061 LIPID PANEL: CPT

## 2021-08-18 PROCEDURE — 80053 COMPREHEN METABOLIC PANEL: CPT

## 2021-08-18 PROCEDURE — 83036 HEMOGLOBIN GLYCOSYLATED A1C: CPT

## 2021-08-18 PROCEDURE — 36415 COLL VENOUS BLD VENIPUNCTURE: CPT

## 2022-01-19 ENCOUNTER — HOSPITAL ENCOUNTER (EMERGENCY)
Facility: HOSPITAL | Age: 27
Discharge: HOME | End: 2022-01-20
Attending: EMERGENCY MEDICINE
Payer: MEDICARE

## 2022-01-19 DIAGNOSIS — R45.1 AGITATION: Primary | ICD-10-CM

## 2022-01-19 LAB
AMPHETAMINES SERPL QL SCN: NEGATIVE
BARBITURATES UR QL: NEGATIVE
BENZODIAZ UR QL: NEGATIVE
COCAINE UR QL: NEGATIVE
ETHANOL EXG-MCNC: 0 MG/DL
METHADONE UR QL: NEGATIVE
OPIATES UR QL SCN: NEGATIVE
OXYCODONE+OXYMORPHONE UR QL SCN: NEGATIVE
PCP UR QL: NEGATIVE
SARS-COV-2 AG UPPER RESP QL IA: NORMAL
THC UR QL: NEGATIVE

## 2022-01-19 PROCEDURE — 87636 SARSCOV2 & INF A&B AMP PRB: CPT | Performed by: EMERGENCY MEDICINE

## 2022-01-19 PROCEDURE — 80307 DRUG TEST PRSMV CHEM ANLYZR: CPT | Performed by: EMERGENCY MEDICINE

## 2022-01-19 PROCEDURE — 99285 EMERGENCY DEPT VISIT HI MDM: CPT

## 2022-01-19 PROCEDURE — 99284 EMERGENCY DEPT VISIT MOD MDM: CPT | Performed by: EMERGENCY MEDICINE

## 2022-01-19 PROCEDURE — 82075 ASSAY OF BREATH ETHANOL: CPT | Performed by: EMERGENCY MEDICINE

## 2022-01-20 VITALS
TEMPERATURE: 99.1 F | DIASTOLIC BLOOD PRESSURE: 73 MMHG | OXYGEN SATURATION: 99 % | SYSTOLIC BLOOD PRESSURE: 142 MMHG | HEART RATE: 90 BPM | RESPIRATION RATE: 18 BRPM

## 2022-01-20 LAB
FLUAV RNA RESP QL NAA+PROBE: NEGATIVE
FLUBV RNA RESP QL NAA+PROBE: NEGATIVE
SARS-COV-2 RNA RESP QL NAA+PROBE: POSITIVE

## 2022-01-20 NOTE — ED NOTES
Patient sleeping and resting comfortably at this time  Breathing equal and unlabored  Appears in no acute distress       Jeanmarie Dakins, RN  01/19/22 7767

## 2022-01-20 NOTE — ED NOTES
Spoke with Sveta Brar from [facility] home  States patient will need to be sent home with wheelchair transport at discharge  Awaiting transport time from APOLLO Singh RN  01/19/22 2408

## 2022-01-20 NOTE — ED NOTES
Still awaiting  time from [facility]  Patient in no acute distress at this time  Resting comfortably       Bhavesh Colon RN  01/20/22 91

## 2022-01-20 NOTE — ED NOTES
Called [facility] to follow up about transport  States that they don't know a time for transport yet       Yuriy Diaz, JAYDEN  01/20/22 5491

## 2022-01-20 NOTE — ED NOTES
Spoke to Roc Cole at [facility] the [facility] the patient resides in- Group home ready to take patient back, relayed to Hayley Cabrera that this is a pattern of behavior that they are used to seeing      Ju Wilkes  Crisis Worker, [place]  01/19/22

## 2022-01-20 NOTE — ED PROVIDER NOTES
History  Chief Complaint   Patient presents with    Suicidal     Patient arrives to ED via EMS from a group home  Patient hx of schizophrenia and MR; reporting suicidal and homicidal ideations (towards his roommate) along with auditory hallucinations  Patient reports he always has auditory hallucinations but today they were telling him to cut himself  Patient broke glass picture frame at facility  No external injuries at this time  Patient calm and cooperative at this time stating that he "wants to check himself in"  Patient placed on 1:1     Homicidal     32 YO male with schizoaffective and development disorder presents from group home for agitation, aggressive behavior  Pt states he was upset today due to money issues at the group home  Pt broke a picture today while agitated  Pt states he has been having auditory hallucinations telling him to cut himself, states he has had hallucinations in the past but these have been increasing in frequency  He states HI towards his roommate as they do not get along  Pt states he does not like the group home  When he becomes agitated, he typically comes to the ED to be admitted for psychiatric issues  Pt does not wish to go back to the group home  Pt denies CP/SOB/F/C/N/V/D/C, no dysuria, burning on urination or blood in urine  History provided by:  Patient   used: No    Suicidal  Presenting symptoms: aggressive behavior, agitation, hallucinations, homicidal ideas and suicidal thoughts    Degree of incapacity (severity): Moderate  Onset quality:  Gradual  Duration: Chronic  Timing:  Intermittent  Progression:  Waxing and waning  Chronicity:  Chronic  Treatment compliance:  Most of the time  Relieved by:  Nothing  Exacerbated by: Interactions at group home  Associated symptoms: no abdominal pain and no chest pain        Prior to Admission Medications   Prescriptions Last Dose Informant Patient Reported? Taking?    Cholecalciferol (Vitamin D) 50 MCG ( UT) CAPS   Yes No   Sig: Take by mouth daily   EPINEPHrine (EPIPEN) 0 3 mg/0 3 mL SOAJ   Yes No   Sig: Inject 0 3 mg into a muscle once   acetaminophen (TYLENOL) 325 mg tablet   Yes No   Sig: Take 650 mg by mouth every 6 (six) hours as needed for mild pain   albuterol (PROVENTIL HFA,VENTOLIN HFA) 90 mcg/act inhaler   No No   Sig: Inhale 2 puffs every 6 (six) hours as needed for wheezing   benztropine (COGENTIN) 1 mg tablet   No No   Sig: Take 1 tablet (1 mg total) by mouth daily at bedtime   divalproex sodium (DEPAKOTE ER) 500 mg 24 hr tablet   No No   Sig: Take 3 tablets (1,500 mg total) by mouth daily   ketotifen (ZADITOR) 0 025 % ophthalmic solution   Yes No   Si drop 2 (two) times a day as needed   levocetirizine (XYZAL) 5 MG tablet   Yes No   Sig: Take 5 mg by mouth daily as needed    metFORMIN (GLUCOPHAGE-XR) 750 mg 24 hr tablet   No No   Sig: Take 1 tablet (750 mg total) by mouth 2 (two) times a day with meals   propranolol (INDERAL) 20 mg tablet   No No   Sig: Take 1 tablet (20 mg total) by mouth daily   risperiDONE (RisperDAL) 2 mg tablet   No No   Sig: Take 1 tablet (2 mg total) by mouth daily at bedtime   risperiDONE (RisperDAL) 4 mg tablet   No No   Sig: Take 1 tablet (4 mg total) by mouth daily      Facility-Administered Medications: None       Past Medical History:   Diagnosis Date    Anxiety     Asthma     Hypertension     Impulse control disorder     Mental retardation     Psychiatric disorder     Psychiatric illness     Schizoaffective disorder ([facility] [address] )        Past Surgical History:   Procedure Laterality Date    HAND SURGERY      right hand       Family History   Problem Relation Age of Onset    No Known Problems Mother     No Known Problems Father     No Known Problems Sister     No Known Problems Brother     No Known Problems Maternal Aunt     No Known Problems Paternal Aunt     No Known Problems Maternal Uncle     No Known Problems Paternal Uncle     No Known Problems Maternal Grandfather     No Known Problems Maternal Grandmother     No Known Problems Paternal Grandfather     No Known Problems Paternal Grandmother     No Known Problems Cousin     ADD / ADHD Neg Hx     Alcohol abuse Neg Hx     Anxiety disorder Neg Hx     Bipolar disorder Neg Hx     Dementia Neg Hx     Depression Neg Hx     Drug abuse Neg Hx     OCD Neg Hx     Paranoid behavior Neg Hx     Schizophrenia Neg Hx     Seizures Neg Hx     Self-Injury Neg Hx     Suicide Attempts Neg Hx      I have reviewed and agree with the history as documented  E-Cigarette/Vaping    E-Cigarette Use Never User      E-Cigarette/Vaping Substances     Social History     Tobacco Use    Smoking status: Never Smoker    Smokeless tobacco: Never Used   Vaping Use    Vaping Use: Never used   Substance Use Topics    Alcohol use: No    Drug use: No       Review of Systems   Constitutional: Negative for fever  HENT: Negative for dental problem  Eyes: Negative for visual disturbance  Respiratory: Negative for shortness of breath  Cardiovascular: Negative for chest pain  Gastrointestinal: Negative for abdominal pain, nausea and vomiting  Genitourinary: Negative for dysuria and frequency  Musculoskeletal: Negative for neck pain and neck stiffness  Skin: Negative for rash  Neurological: Negative for dizziness, weakness and light-headedness  Psychiatric/Behavioral: Positive for agitation, hallucinations, homicidal ideas and suicidal ideas  Negative for behavioral problems and confusion  All other systems reviewed and are negative  Physical Exam  Physical Exam  Vitals and nursing note reviewed  Constitutional:       Appearance: He is well-developed  HENT:      Head: Normocephalic and atraumatic  Eyes:      Extraocular Movements: Extraocular movements intact  Cardiovascular:      Rate and Rhythm: Normal rate     Pulmonary:      Effort: Pulmonary effort is normal    Abdominal:      General: There is no distension  Musculoskeletal:         General: Normal range of motion  Cervical back: Normal range of motion  Skin:     Findings: No rash  Neurological:      Mental Status: He is alert and oriented to person, place, and time  Mental status is at baseline  Psychiatric:         Attention and Perception: He perceives auditory hallucinations  Behavior: Behavior normal  Behavior is cooperative  Thought Content: Thought content includes homicidal and suicidal ideation  Vital Signs  ED Triage Vitals [01/19/22 1923]   Temperature Pulse Respirations Blood Pressure SpO2   99 1 °F (37 3 °C) 95 20 156/97 95 %      Temp Source Heart Rate Source Patient Position - Orthostatic VS BP Location FiO2 (%)   Oral Monitor Sitting Right arm --      Pain Score       No Pain           Vitals:    01/19/22 1923 01/20/22 0343 01/20/22 0749   BP: 156/97 143/75 142/73   Pulse: 95 85 90   Patient Position - Orthostatic VS: Sitting  Sitting         Visual Acuity      ED Medications  Medications - No data to display    Diagnostic Studies  Results Reviewed     Procedure Component Value Units Date/Time    Covid19 and INFLUENZA A/B PCR [[id number]]  (Abnormal) Collected: 01/19/22 1951    Lab Status: Final result Specimen: Nares from Nose Updated: 01/20/22 1124     SARS-CoV-2 Positive     INFLUENZA A PCR Negative     INFLUENZA B PCR Negative    Narrative:      FOR PEDIATRIC PATIENTS - copy/paste COVID Guidelines URL to browser: [email or web address] org/  [employer]x    SARS-CoV-2 assay is a Nucleic Acid Amplification assay intended for the  qualitative detection of nucleic acid from SARS-CoV-2 in nasopharyngeal  swabs  Results are for the presumptive identification of SARS-CoV-2 RNA  Positive results are indicative of infection with SARS-CoV-2, the virus  causing COVID-19, but do not rule out bacterial infection or co-infection  with other viruses   Laboratories within the [facility] and its  territories are required to report all positive results to the appropriate  public health authorities  Negative results do not preclude SARS-CoV-2  infection and should not be used as the sole basis for treatment or other  patient management decisions  Negative results must be combined with  clinical observations, patient history, and epidemiological information  This test has not been FDA cleared or approved  This test has been authorized by FDA under an Emergency Use Authorization  (EUA)  This test is only authorized for the duration of time the  declaration that circumstances exist justifying the authorization of the  emergency use of an in vitro diagnostic tests for detection of SARS-CoV-2  virus and/or diagnosis of COVID-19 infection under section 564(b)(1) of  the Act, 21 U  S C  162NRV-0(K)(7), unless the authorization is terminated  or revoked sooner  The test has been validated but independent review by FDA  and CLIA is pending  Test performed using the Roche montrell 6800 System: This RT-PCR assay  targets ORF1, a region unique to SARS-CoV-2  A conserved region in the  E-gene was chosen for pan-Sarbecovirus detection which includes  SARS-CoV-2  Rapid drug screen, urine [[id number]]  (Normal) Collected: 01/19/22 1950    Lab Status: Final result Specimen: Urine, Clean Catch Updated: 01/19/22 2039     Amph/Meth UR Negative     Barbiturate Ur Negative     Benzodiazepine Urine Negative     Cocaine Urine Negative     Methadone Urine Negative     Opiate Urine Negative     PCP Ur Negative     THC Urine Negative     Oxycodone Urine Negative    Narrative:      FOR MEDICAL PURPOSES ONLY  IF CONFIRMATION NEEDED PLEASE CONTACT THE LAB WITHIN 5 DAYS      Drug Screen Cutoff Levels:  AMPHETAMINE/METHAMPHETAMINES  1000 ng/mL  BARBITURATES     200 ng/mL  BENZODIAZEPINES     200 ng/mL  COCAINE      300 ng/mL  METHADONE      300 ng/mL  OPIATES      300 ng/mL  PHENCYCLIDINE     25 ng/mL  THC       50 ng/mL  OXYCODONE      100 ng/mL    COVID Rapid Antigen [[id number]]  (Normal) Collected: 01/19/22 1951    Lab Status: Final result Specimen: Nares from Nose Updated: 01/19/22 2017     SARS-CoV-2 Ag Negative Presumptive    POCT alcohol breath test [[id number]]  (Normal) Resulted: 01/19/22 1945    Lab Status: Final result Updated: 01/19/22 1945     EXTBreath Alcohol 0 000                 No orders to display              Procedures  Procedures         ED Course  ED Course as of 01/20/22 1233   Wed Jan 19, 2022 2106 Crisis has discussed with group home staff and , both state pt is ok to return to group home, will discharge  Patient medically cleared for behavioral health evaluation  MDM  Number of Diagnoses or Management Options  Agitation: new and requires workup  Diagnosis management comments: 1  Agitation - Pt from group home, does not like this setting  Admitting to SI/HI and hallucinations which have become more frequent  Will speak with crisis for evaluation  Pt does have observation at his home setting  Amount and/or Complexity of Data Reviewed  Clinical lab tests: ordered and reviewed  Obtain history from someone other than the patient: yes  Review and summarize past medical records: yes  Discuss the patient with other providers: yes    Patient Progress  Patient progress: stable      Disposition  Final diagnoses:   Agitation     Time reflects when diagnosis was documented in both MDM as applicable and the Disposition within this note     Time User Action Codes Description Comment    1/19/2022  9:10 PM Magdalene ANAND Add [R45 1] Agitation       ED Disposition     ED Disposition Condition Date/Time Comment    Discharge Stable Wed Jan 19, 2022  9:10 PM Heather Cruz discharge to home/self care              Follow-up Information    None         Discharge Medication List as of 1/19/2022  9:12 PM      CONTINUE these medications which have NOT CHANGED    Details   acetaminophen (TYLENOL) 325 mg tablet Take 650 mg by mouth every 6 (six) hours as needed for mild pain, Historical Med      albuterol (PROVENTIL HFA,VENTOLIN HFA) 90 mcg/act inhaler Inhale 2 puffs every 6 (six) hours as needed for wheezing, Starting Mon 3/18/2019, Print      benztropine (COGENTIN) 1 mg tablet Take 1 tablet (1 mg total) by mouth daily at bedtime, Starting Tue 6/29/2021, Until Sat 8/28/2021, Normal      Cholecalciferol (Vitamin D) 50 MCG (2000 UT) CAPS Take by mouth daily, Historical Med      divalproex sodium (DEPAKOTE ER) 500 mg 24 hr tablet Take 3 tablets (1,500 mg total) by mouth daily, Starting Wed 6/30/2021, Until Sun 8/29/2021, Normal      EPINEPHrine (EPIPEN) 0 3 mg/0 3 mL SOAJ Inject 0 3 mg into a muscle once, Historical Med      ketotifen (ZADITOR) 0 025 % ophthalmic solution 1 drop 2 (two) times a day as needed, Historical Med      levocetirizine (XYZAL) 5 MG tablet Take 5 mg by mouth daily as needed , Historical Med      metFORMIN (GLUCOPHAGE-XR) 750 mg 24 hr tablet Take 1 tablet (750 mg total) by mouth 2 (two) times a day with meals, Starting Tue 6/29/2021, Until Sat 8/28/2021, Normal      propranolol (INDERAL) 20 mg tablet Take 1 tablet (20 mg total) by mouth daily, Starting Tue 6/29/2021, Until Sat 8/28/2021, Normal      risperiDONE (RisperDAL) 2 mg tablet Take 1 tablet (2 mg total) by mouth daily at bedtime, Starting Tue 6/29/2021, Until Sat 8/28/2021, Normal      risperiDONE (RisperDAL) 4 mg tablet Take 1 tablet (4 mg total) by mouth daily, Starting Tue 6/29/2021, Until Sat 8/28/2021, Normal             No discharge procedures on file      PDMP Review     None          ED Provider  Electronically Signed by           Estefanía Sousa MD  01/20/22 9706
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
## 2022-01-20 NOTE — ED NOTES
Spoke to [address] [facility] as if the patient has been acting out in response to his roommate who's birthday is coming up  Ms Cynthia Gonzalez feels as if he is acting out of jealousy  Ms Cynthia Gonzalez reports the patient resides at [facility] to [address] [place] [address], and is welcome to go back to the home      Rod Hansen  Crisis Worker, [facility]  01/19/22

## 2022-01-20 NOTE — ED NOTES
Spoke with Mario Carrera  Made aware of patient  time of 0800  No further questions at this time       Pedro Oconnell RN  01/20/22 9266

## 2022-01-25 ENCOUNTER — HOSPITAL ENCOUNTER (EMERGENCY)
Facility: HOSPITAL | Age: 27
Discharge: HOME | End: 2022-01-25
Attending: EMERGENCY MEDICINE | Admitting: EMERGENCY MEDICINE
Payer: MEDICARE

## 2022-01-25 VITALS
HEART RATE: 101 BPM | DIASTOLIC BLOOD PRESSURE: 99 MMHG | OXYGEN SATURATION: 97 % | TEMPERATURE: 98.2 F | SYSTOLIC BLOOD PRESSURE: 131 MMHG | RESPIRATION RATE: 18 BRPM

## 2022-01-25 DIAGNOSIS — R45.851 SUICIDAL IDEATION: Primary | ICD-10-CM

## 2022-01-25 PROCEDURE — 99285 EMERGENCY DEPT VISIT HI MDM: CPT | Performed by: EMERGENCY MEDICINE

## 2022-01-25 PROCEDURE — 99285 EMERGENCY DEPT VISIT HI MDM: CPT

## 2022-01-26 NOTE — ED PROVIDER NOTES
History  Chief Complaint   Patient presents with    Suicidal     states he wants to kill himself by stabbing himself in the heart with a knife   Homicidal     threatens to kill staff members and other residents in his household  This is a 52-year-old male with a history of hypertension, schizoaffective disorder, intellectual disability who presents from the group home with suicidal thoughts  Patient states that he is hearing voices which are telling him to kill himself  Patient does have a history of auditory hallucinations  Also states that he wants to kill his roommate because I am mad at him  Patient was seen in our emergency department on 22 for the same complaint  At that time, the patient did have a violent outburst   Patient's stressor seems to be that his roommate has a birthday coming up  Denies any alcohol or drug use  Patient states that he would like to sign himself in for help  Prior to Admission Medications   Prescriptions Last Dose Informant Patient Reported? Taking?    Cholecalciferol (Vitamin D) 50 MCG (2000 UT) CAPS   Yes No   Sig: Take by mouth daily   EPINEPHrine (EPIPEN) 0 3 mg/0 3 mL SOAJ   Yes No   Sig: Inject 0 3 mg into a muscle once   acetaminophen (TYLENOL) 325 mg tablet   Yes No   Sig: Take 650 mg by mouth every 6 (six) hours as needed for mild pain   albuterol (PROVENTIL HFA,VENTOLIN HFA) 90 mcg/act inhaler   No No   Sig: Inhale 2 puffs every 6 (six) hours as needed for wheezing   benztropine (COGENTIN) 1 mg tablet   No No   Sig: Take 1 tablet (1 mg total) by mouth daily at bedtime   divalproex sodium (DEPAKOTE ER) 500 mg 24 hr tablet   No No   Sig: Take 3 tablets (1,500 mg total) by mouth daily   ketotifen (ZADITOR) 0 025 % ophthalmic solution   Yes No   Si drop 2 (two) times a day as needed   levocetirizine (XYZAL) 5 MG tablet   Yes No   Sig: Take 5 mg by mouth daily as needed    metFORMIN (GLUCOPHAGE-XR) 750 mg 24 hr tablet   No No   Sig: Take 1 tablet (750 mg total) by mouth 2 (two) times a day with meals   propranolol (INDERAL) 20 mg tablet   No No   Sig: Take 1 tablet (20 mg total) by mouth daily   risperiDONE (RisperDAL) 2 mg tablet   No No   Sig: Take 1 tablet (2 mg total) by mouth daily at bedtime   risperiDONE (RisperDAL) 4 mg tablet   No No   Sig: Take 1 tablet (4 mg total) by mouth daily      Facility-Administered Medications: None       Past Medical History:   Diagnosis Date    Anxiety     Asthma     Hypertension     Impulse control disorder     Mental retardation     Psychiatric disorder     Psychiatric illness     [facility]oaffective disorder ([facility] [address] )        Past Surgical History:   Procedure Laterality Date    HAND SURGERY      right hand       Family History   Problem Relation Age of Onset    No Known Problems Mother     No Known Problems Father     No Known Problems Sister     No Known Problems Brother     No Known Problems Maternal Aunt     No Known Problems Paternal Aunt     No Known Problems Maternal Uncle     No Known Problems Paternal Uncle     No Known Problems Maternal Grandfather     No Known Problems Maternal Grandmother     No Known Problems Paternal Grandfather     No Known Problems Paternal Grandmother     No Known Problems Cousin     ADD / ADHD Neg Hx     Alcohol abuse Neg Hx     Anxiety disorder Neg Hx     Bipolar disorder Neg Hx     Dementia Neg Hx     Depression Neg Hx     Drug abuse Neg Hx     OCD Neg Hx     Paranoid behavior Neg Hx     Schizophrenia Neg Hx     Seizures Neg Hx     Self-Injury Neg Hx     Suicide Attempts Neg Hx      I have reviewed and agree with the history as documented      E-Cigarette/Vaping    E-Cigarette Use Never User      E-Cigarette/Vaping Substances     Social History     Tobacco Use    Smoking status: Never Smoker    Smokeless tobacco: Never Used   Vaping Use    Vaping Use: Never used   Substance Use Topics    Alcohol use: No    Drug use: No       Review of Systems Constitutional: Negative for chills, fatigue and fever  HENT: Negative for rhinorrhea, sore throat and trouble swallowing  Eyes: Negative for photophobia and visual disturbance  Respiratory: Negative for cough, chest tightness and shortness of breath  Cardiovascular: Negative for chest pain, palpitations and leg swelling  Gastrointestinal: Negative for abdominal pain, blood in stool, diarrhea, nausea and vomiting  Endocrine: Negative for polyuria  Genitourinary: Negative for dysuria, flank pain and hematuria  Musculoskeletal: Negative for back pain and neck pain  Skin: Negative for color change and rash  Allergic/Immunologic: Negative for immunocompromised state  Neurological: Negative for dizziness, weakness, light-headedness, numbness and headaches  Psychiatric/Behavioral: Positive for hallucinations and suicidal ideas  All other systems reviewed and are negative  Physical Exam  Physical Exam  Constitutional:       General: He is not in acute distress  Appearance: Normal appearance  He is well-developed  HENT:      Mouth/Throat:      Pharynx: Uvula midline  Eyes:      General: Lids are normal       Conjunctiva/sclera: Conjunctivae normal       Pupils: Pupils are equal, round, and reactive to light  Neck:      Thyroid: No thyroid mass or thyromegaly  Trachea: Trachea normal    Cardiovascular:      Rate and Rhythm: Normal rate and regular rhythm  Heart sounds: Normal heart sounds  No murmur heard  Pulmonary:      Effort: Pulmonary effort is normal       Breath sounds: Normal breath sounds  Abdominal:      General: Bowel sounds are normal       Palpations: Abdomen is soft  Tenderness: There is no abdominal tenderness  There is no guarding or rebound  Negative signs include Jones's sign  Skin:     General: Skin is warm and dry  Neurological:      Mental Status: He is alert     Psychiatric:         Speech: Speech normal          Behavior: Behavior normal  Behavior is cooperative  Thought Content: Thought content includes suicidal ideation  Thought content does not include suicidal plan  Comments: Currently calm and cooperative  Vital Signs  ED Triage Vitals [01/25/22 2131]   Temperature Pulse Respirations Blood Pressure SpO2   98 2 °F (36 8 °C) 101 18 131/99 97 %      Temp Source Heart Rate Source Patient Position - Orthostatic VS BP Location FiO2 (%)   Oral -- Lying Right arm --      Pain Score       --           Vitals:    01/25/22 2131   BP: 131/99   Pulse: 101   Patient Position - Orthostatic VS: Lying         Visual Acuity      ED Medications  Medications - No data to display    Diagnostic Studies  Results Reviewed     None                 No orders to display              Procedures  Procedures         ED Course  ED Course as of 01/25/22 2148 Tue Jan 25, 2022 2143 Patient has a psychiatry appointment tomorrow  Patient's group home is willing to accept him back  Crisis is going to touch base with his   Will discharge at this time  Patient medically cleared for behavioral health evaluation  MDM  Number of Diagnoses or Management Options  Diagnosis management comments: Crisis worker will reach out to the patient's group home  Likely discharge if they are willing to accept him back  Disposition  Final diagnoses:   Suicidal ideation     Time reflects when diagnosis was documented in both MDM as applicable and the Disposition within this note     Time User Action Codes Description Comment    1/25/2022  9:44 PM Juan F Finnegan Add [O15 223] Suicidal ideation       ED Disposition     ED Disposition Condition Date/Time Comment    Discharge Stable Tue Jan 25, 2022  9:44 PM Aron Hernadez discharge to home/self care              Follow-up Information     Follow up With Specialties Details Why Contact Info Additional Information    Bret Rojas MD Family Medicine Schedule an appointment as soon as possible for a visit   [facility] [phone]       [address] [facility] Emergency Medicine Go to  If symptoms worsen [facility] [address]  [address] [facility], [address] [facility] [address] [place], [place], [address]          Patient's Medications   Discharge Prescriptions    No medications on file       No discharge procedures on file      PDMP Review     None          ED Provider  Electronically Signed by           Akhil Yang MD  01/25/22 3907

## 2022-04-11 ENCOUNTER — HOSPITAL ENCOUNTER (EMERGENCY)
Facility: HOSPITAL | Age: 27
Discharge: HOME | End: 2022-04-11
Attending: EMERGENCY MEDICINE | Admitting: EMERGENCY MEDICINE
Payer: MEDICARE

## 2022-04-11 VITALS
TEMPERATURE: 98.6 F | SYSTOLIC BLOOD PRESSURE: 139 MMHG | RESPIRATION RATE: 16 BRPM | HEART RATE: 110 BPM | DIASTOLIC BLOOD PRESSURE: 88 MMHG | OXYGEN SATURATION: 100 %

## 2022-04-11 DIAGNOSIS — S09.92XA INJURY OF NOSE, INITIAL ENCOUNTER: Primary | ICD-10-CM

## 2022-04-11 PROCEDURE — 99283 EMERGENCY DEPT VISIT LOW MDM: CPT

## 2022-04-11 PROCEDURE — 99282 EMERGENCY DEPT VISIT SF MDM: CPT | Performed by: EMERGENCY MEDICINE
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
## 2022-04-26 ENCOUNTER — HOSPITAL ENCOUNTER (EMERGENCY)
Facility: HOSPITAL | Age: 27
Discharge: HOME | End: 2022-04-26
Attending: EMERGENCY MEDICINE | Admitting: EMERGENCY MEDICINE
Payer: MEDICARE

## 2022-04-26 VITALS
BODY MASS INDEX: 47.97 KG/M2 | TEMPERATURE: 98.6 F | DIASTOLIC BLOOD PRESSURE: 91 MMHG | RESPIRATION RATE: 18 BRPM | OXYGEN SATURATION: 97 % | WEIGHT: 297.18 LBS | HEART RATE: 97 BPM | SYSTOLIC BLOOD PRESSURE: 159 MMHG

## 2022-04-26 DIAGNOSIS — R46.89 AGGRESSIVE BEHAVIOR: Primary | ICD-10-CM

## 2022-04-26 PROCEDURE — 99285 EMERGENCY DEPT VISIT HI MDM: CPT

## 2022-04-26 PROCEDURE — 99282 EMERGENCY DEPT VISIT SF MDM: CPT
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
## 2022-07-04 ENCOUNTER — APPOINTMENT (EMERGENCY)
Dept: RADIOLOGY | Facility: HOSPITAL | Age: 27
End: 2022-07-04
Payer: MEDICARE

## 2022-07-04 ENCOUNTER — HOSPITAL ENCOUNTER (EMERGENCY)
Facility: HOSPITAL | Age: 27
End: 2022-07-06
Attending: EMERGENCY MEDICINE | Admitting: EMERGENCY MEDICINE
Payer: MEDICARE

## 2022-07-04 DIAGNOSIS — R45.851 SUICIDAL IDEATIONS: Primary | ICD-10-CM

## 2022-07-04 DIAGNOSIS — Z00.8 MEDICAL CLEARANCE FOR PSYCHIATRIC ADMISSION: ICD-10-CM

## 2022-07-04 DIAGNOSIS — R45.850 HOMICIDAL IDEATION: ICD-10-CM

## 2022-07-04 DIAGNOSIS — G47.33 OSA (OBSTRUCTIVE SLEEP APNEA): ICD-10-CM

## 2022-07-04 LAB
AMPHETAMINES SERPL QL SCN: NEGATIVE
BARBITURATES UR QL: NEGATIVE
BENZODIAZ UR QL: NEGATIVE
COCAINE UR QL: NEGATIVE
ETHANOL EXG-MCNC: 0 MG/DL
FLUAV RNA RESP QL NAA+PROBE: NEGATIVE
FLUBV RNA RESP QL NAA+PROBE: NEGATIVE
METHADONE UR QL: NEGATIVE
OPIATES UR QL SCN: NEGATIVE
OXYCODONE+OXYMORPHONE UR QL SCN: NEGATIVE
PCP UR QL: NEGATIVE
RSV RNA RESP QL NAA+PROBE: NEGATIVE
SARS-COV-2 RNA RESP QL NAA+PROBE: NEGATIVE
THC UR QL: NEGATIVE

## 2022-07-04 PROCEDURE — 80307 DRUG TEST PRSMV CHEM ANLYZR: CPT

## 2022-07-04 PROCEDURE — 82075 ASSAY OF BREATH ETHANOL: CPT

## 2022-07-04 PROCEDURE — 73130 X-RAY EXAM OF HAND: CPT

## 2022-07-04 PROCEDURE — 99285 EMERGENCY DEPT VISIT HI MDM: CPT

## 2022-07-04 PROCEDURE — 73060 X-RAY EXAM OF HUMERUS: CPT

## 2022-07-04 PROCEDURE — 0241U HB NFCT DS VIR RESP RNA 4 TRGT: CPT

## 2022-07-04 PROCEDURE — 73110 X-RAY EXAM OF WRIST: CPT

## 2022-07-04 RX ORDER — DULAGLUTIDE 1.5 MG/.5ML
INJECTION, SOLUTION SUBCUTANEOUS
COMMUNITY
Start: 2022-05-16

## 2022-07-04 RX ORDER — DIVALPROEX SODIUM 500 MG/1
500 TABLET, DELAYED RELEASE ORAL DAILY
COMMUNITY
End: 2022-07-13

## 2022-07-04 RX ORDER — RISPERIDONE 2 MG/1
2 TABLET, FILM COATED ORAL 2 TIMES DAILY
Status: ON HOLD | COMMUNITY
End: 2022-07-07 | Stop reason: SDUPTHER

## 2022-07-04 RX ORDER — SAW/PYGEUM/BETA/HERB/D3/B6/ZN 30 MG-25MG
CAPSULE ORAL
Status: ON HOLD | COMMUNITY
Start: 2022-04-14 | End: 2022-07-07 | Stop reason: SDUPTHER

## 2022-07-04 RX ORDER — PROPRANOLOL HYDROCHLORIDE 20 MG/1
20 TABLET ORAL DAILY
COMMUNITY
End: 2022-07-13
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
## 2022-07-05 PROCEDURE — 99283 EMERGENCY DEPT VISIT LOW MDM: CPT | Performed by: PSYCHIATRY & NEUROLOGY

## 2022-07-05 PROCEDURE — NC001 PR NO CHARGE: Performed by: PHYSICIAN ASSISTANT

## 2022-07-05 RX ORDER — RISPERIDONE 1 MG/1
2 TABLET, FILM COATED ORAL 2 TIMES DAILY
Status: DISCONTINUED | OUTPATIENT
Start: 2022-07-05 | End: 2022-07-06 | Stop reason: HOSPADM

## 2022-07-05 RX ORDER — HALOPERIDOL 5 MG/ML
5 INJECTION INTRAMUSCULAR EVERY 6 HOURS PRN
Status: DISCONTINUED | OUTPATIENT
Start: 2022-07-05 | End: 2022-07-06 | Stop reason: HOSPADM

## 2022-07-05 RX ORDER — LORAZEPAM 2 MG/ML
2 INJECTION INTRAMUSCULAR EVERY 6 HOURS PRN
Status: DISCONTINUED | OUTPATIENT
Start: 2022-07-05 | End: 2022-07-06 | Stop reason: HOSPADM

## 2022-07-05 RX ORDER — LANOLIN ALCOHOL/MO/W.PET/CERES
3 CREAM (GRAM) TOPICAL
Status: DISCONTINUED | OUTPATIENT
Start: 2022-07-05 | End: 2022-07-06 | Stop reason: HOSPADM

## 2022-07-05 RX ORDER — BENZTROPINE MESYLATE 0.5 MG/1
0.5 TABLET ORAL 2 TIMES DAILY
Status: DISCONTINUED | OUTPATIENT
Start: 2022-07-05 | End: 2022-07-06 | Stop reason: HOSPADM

## 2022-07-05 RX ORDER — LORAZEPAM 1 MG/1
2 TABLET ORAL EVERY 6 HOURS PRN
Status: DISCONTINUED | OUTPATIENT
Start: 2022-07-05 | End: 2022-07-06 | Stop reason: HOSPADM

## 2022-07-05 RX ORDER — HALOPERIDOL 5 MG/1
5 TABLET ORAL EVERY 6 HOURS PRN
Status: DISCONTINUED | OUTPATIENT
Start: 2022-07-05 | End: 2022-07-06 | Stop reason: HOSPADM

## 2022-07-05 RX ADMIN — RISPERIDONE 2 MG: 1 TABLET ORAL at 17:46

## 2022-07-05 RX ADMIN — MELATONIN 3 MG: at 21:15

## 2022-07-05 RX ADMIN — DIVALPROEX SODIUM 750 MG: 500 TABLET, FILM COATED, EXTENDED RELEASE ORAL at 16:01

## 2022-07-05 RX ADMIN — BENZTROPINE MESYLATE 0.5 MG: 0.5 TABLET ORAL at 17:46

## 2022-07-05 NOTE — ED CARE HANDOFF
Emergency Department Sign Out Note        Sign out and transfer of care from [facility]  See Separate Emergency Department note  The patient, Kalin Foster, was evaluated by the previous provider for SI/HI  Workup Completed:  Labs Reviewed   COVID19, INFLUENZA A/B, RSV PCR, [facility] - Normal       Result Value Ref Range Status    SARS-CoV-2 Negative  Negative Final    Comment:      INFLUENZA A PCR Negative  Negative Final    Comment:      INFLUENZA B PCR Negative  Negative Final    Comment:      RSV PCR Negative  Negative Final    Comment:      Narrative:     FOR PEDIATRIC PATIENTS - copy/paste COVID Guidelines URL to browser: [email or web address]  Crelowx    SARS-CoV-2 assay is a Nucleic Acid Amplification assay intended for the  qualitative detection of nucleic acid from SARS-CoV-2 in nasopharyngeal  swabs  Results are for the presumptive identification of SARS-CoV-2 RNA  Positive results are indicative of infection with SARS-CoV-2, the virus  causing COVID-19, but do not rule out bacterial infection or co-infection  with other viruses  Laboratories within the [place] and its  territories are required to report all positive results to the appropriate  public health authorities  Negative results do not preclude SARS-CoV-2  infection and should not be used as the sole basis for treatment or other  patient management decisions  Negative results must be combined with  clinical observations, patient history, and epidemiological information  This test has not been FDA cleared or approved  This test has been authorized by FDA under an Emergency Use Authorization  (EUA)   This test is only authorized for the duration of time the  declaration that circumstances exist justifying the authorization of the  emergency use of an in vitro diagnostic tests for detection of SARS-CoV-2  virus and/or diagnosis of COVID-19 infection under section 564(b)(1) of  the Act, 21 U  S C  836ETQ-1(C)(7), unless the authorization is terminated  or revoked sooner  The test has been validated but independent review by FDA  and CLIA is pending  Test performed using [employer] Gene[employer]pert: This RT-PCR assay targets N2,  a region unique to SARS-CoV-2  A conserved region in the E-gene was chosen  for pan-Sarbecovirus detection which includes SARS-CoV-2  RAPID DRUG SCREEN, URINE - Normal    Amph/Meth UR Negative  Negative Final    Barbiturate Ur Negative  Negative Final    Benzodiazepine Urine Negative  Negative Final    Cocaine Urine Negative  Negative Final    Methadone Urine Negative  Negative Final    Opiate Urine Negative  Negative Final    PCP Ur Negative  Negative Final    THC Urine Negative  Negative Final    Oxycodone Urine Negative  Negative Final    Narrative:     FOR MEDICAL PURPOSES ONLY  IF CONFIRMATION NEEDED PLEASE CONTACT THE LAB WITHIN 5 DAYS  Drug Screen Cutoff Levels:  AMPHETAMINE/METHAMPHETAMINES  1000 ng/mL  BARBITURATES     200 ng/mL  BENZODIAZEPINES     200 ng/mL  COCAINE      300 ng/mL  METHADONE      300 ng/mL  OPIATES      300 ng/mL  PHENCYCLIDINE     25 ng/mL  THC       50 ng/mL  OXYCODONE      100 ng/mL   POCT ALCOHOL BREATH TEST - Normal    EXTBreath Alcohol 0 000   Final         ED Course / Workup Pending (followup):  -Bed search in process at time of my sign on                                     Procedures  MDM  Number of Diagnoses or Management Options  Homicidal ideation  Suicidal ideations  Diagnosis management comments: Stable throughout time of my care         Amount and/or Complexity of Data Reviewed  Clinical lab tests: reviewed    Patient Progress  Patient progress: stable          Disposition  Final diagnoses:   Suicidal ideations   Homicidal ideation     Time reflects when diagnosis was documented in both MDM as applicable and the Disposition within this note     Time User Action Codes Description Comment    7/5/2022  7:09 AM Odessa Nash Add [R45 851] Suicidal ideations     7/5/2022  7:09 AM Brandy Cates Add [R45 850] Homicidal ideation     7/6/2022 10:56 AM Gregg End Add [Z00 8] Medical clearance for psychiatric admission     7/6/2022 10:56 AM [place] End Add [G47 33] LORI (obstructive sleep apnea)       ED Disposition     ED Disposition   Transfer to [address] [place] [address]   --    Date/Time   Wed Jul 6, 2022 11:53 AM    Comment   Adolfo Stanton should be transferred out to [facility] and has been medically cleared              MD Documentation    Ron Sheikh Most Recent Value   Patient Condition The patient has been stabilized such that within reasonable medical probability, no material deterioration of the patient condition or the condition of the unborn child(gianna) is likely to result from the transfer   Reason for Transfer Level of Care needed not available at this facility   Benefits of Transfer Specialized equipment and/or services available at the receiving facility (Include comment)________________________   Risks of Transfer Potential for delay in receiving treatment, Potential deterioration of medical condition, Loss of IV, Increased discomfort during transfer, Possible worsening of condition or death during transfer   Accepting Physician [address] Name, [address]    (Name & Tel number) Devyn Dennis [phone]   Transported by (Company and Unit #) cts   Sending MD Maria D Davis PA-C   Provider Certification General risk, such as traffic hazards, adverse weather conditions, rough terrain or turbulence, possible failure of equipment (including vehicle or aircraft), or consequences of actions of persons outside the control of the transport personnel, The patient is stable for psychiatric transfer because they are medically stable, and is protected from harming him/herself or others during transport, Risk of worsening condition, The possibility of a transport vehicle being unavailable, Unanticipated needs of medical equipment and personnel during transport      RN Documentation    [address] Name, [address] [address]    (Name & Tel number) [address] [phone]   Transported by [employer] and Unit #) cts      Follow-up Information    None       Patient's Medications   Discharge Prescriptions    No medications on file     No discharge procedures on file         ED Provider  Electronically Signed by     Diana Mills PA-C  07/06/22 0764
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
## 2022-07-05 NOTE — ED NOTES
Pt unsure what medications he takes   called and unsure about dosage   states will send staff member in morning to sit with pt and verify medications       Albaro Rendon RN  07/04/22 2299

## 2022-07-05 NOTE — ED NOTES
Bed search:    Nicole Carter - per [place], no appropriate beds [place] - per Peyton Soliz, no beds [place] - per Felipe Common, no appropriate beds First - per Guerrero Awan, they have no beds [facility] - per Idalia Estradael, no beds [place] - per Son, no beds   Daniel Murillo - per Phu Raphael, no beds LVM-no beds per Mane Mcnamara [facility]-voice mail message left, requesting a return call [place] -no beds per [facility] - Per Lisette Soulier, no beds [facility] - no beds

## 2022-07-05 NOTE — ED NOTES
Bed Search:    Nishant Hahn- No beds  [facility]- Will review- FAXED  Colton Frausto- No beds  [facility]- Mitch Smyth- No beds  [facility]- No beds  [address]- No beds  [facility]- No beds  [place]- [place]- No beds  [place]- Kymberlymignon Jose- Will review for tomorrow- FAXED    Collin De La Torre  Crisis Intervention Specialist II  07/05/22

## 2022-07-05 NOTE — ED NOTES
Pt sleeping in stretcher  No distress, unlabored breathing  Will defer vitals and suicide risk assessment until awake       Rashaad Lantigua RN  07/05/22 8469
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
## 2022-07-05 NOTE — ED NOTES
Patient transported to [facility] JAYDEN Hurtado  07/04/22 2005

## 2022-07-05 NOTE — ED NOTES
Pt has primary Medicare A & B insurance with Ella 88 secondary needing COB pre-cert when accepting facility is found  Bed search to begin

## 2022-07-05 NOTE — ED NOTES
Rn spoke with  Aidan Amos to request medication list to be faxed to this department and that a employee sits here with pt   Marcia Gramajo states " I will try to send someone"     Mirna Muniz, JAYDEN  07/05/22 3798

## 2022-07-05 NOTE — ED NOTES
Faxed Clinical to [facility] for admissions review for tomorrow      Ludmila Bernard - only adol  Beds  [facility] - no answer, left message  [place] - No beds - Yajaira Trejo  [facility] - no beds - Roderick Ortega, only 1 very low acuit bed  [facility] - No beds  [address]  Beds only - [facility] [place] - No beds till [facility] - left message with Kayla Holbrook the , no answer  [place] - Full, adol   Beds only - Ed     Bed search continues

## 2022-07-05 NOTE — ED NOTES
Assumed care of pt at this time, Pt quiet and awake in stretcher at this time  Respirations relaxed and non labored    Virtual 1:1 continued at this time     Ksenia Mijares RN  07/05/22 0796

## 2022-07-05 NOTE — ED NOTES
Faxed Clinical to [facility]  Faxed 201 to intake, currently no beds in network

## 2022-07-06 ENCOUNTER — HOSPITAL ENCOUNTER (INPATIENT)
Facility: HOSPITAL | Age: 27
LOS: 7 days | Discharge: HOME | DRG: 885 | End: 2022-07-13
Attending: STUDENT IN AN ORGANIZED HEALTH CARE EDUCATION/TRAINING PROGRAM | Admitting: STUDENT IN AN ORGANIZED HEALTH CARE EDUCATION/TRAINING PROGRAM
Payer: MEDICARE

## 2022-07-06 VITALS
DIASTOLIC BLOOD PRESSURE: 76 MMHG | OXYGEN SATURATION: 95 % | TEMPERATURE: 97.6 F | SYSTOLIC BLOOD PRESSURE: 135 MMHG | HEART RATE: 88 BPM | RESPIRATION RATE: 16 BRPM

## 2022-07-06 DIAGNOSIS — G47.33 OSA (OBSTRUCTIVE SLEEP APNEA): ICD-10-CM

## 2022-07-06 DIAGNOSIS — R73.03 PREDIABETES: ICD-10-CM

## 2022-07-06 DIAGNOSIS — Z00.8 MEDICAL CLEARANCE FOR PSYCHIATRIC ADMISSION: ICD-10-CM

## 2022-07-06 DIAGNOSIS — F25.0 SCHIZOAFFECTIVE DISORDER, BIPOLAR TYPE (HCC): Primary | Chronic | ICD-10-CM

## 2022-07-06 RX ORDER — TRAZODONE HYDROCHLORIDE 50 MG/1
50 TABLET ORAL
Status: DISCONTINUED | OUTPATIENT
Start: 2022-07-06 | End: 2022-07-13 | Stop reason: HOSPADM

## 2022-07-06 RX ORDER — BENZTROPINE MESYLATE 1 MG/ML
0.5 INJECTION INTRAMUSCULAR; INTRAVENOUS
Status: CANCELLED | OUTPATIENT
Start: 2022-07-06

## 2022-07-06 RX ORDER — BENZTROPINE MESYLATE 0.5 MG/1
0.5 TABLET ORAL 2 TIMES DAILY
Status: CANCELLED | OUTPATIENT
Start: 2022-07-06

## 2022-07-06 RX ORDER — LORAZEPAM 2 MG/ML
2 INJECTION INTRAMUSCULAR
Status: DISCONTINUED | OUTPATIENT
Start: 2022-07-06 | End: 2022-07-13 | Stop reason: HOSPADM

## 2022-07-06 RX ORDER — TRAZODONE HYDROCHLORIDE 50 MG/1
50 TABLET ORAL
Status: CANCELLED | OUTPATIENT
Start: 2022-07-06

## 2022-07-06 RX ORDER — BENZTROPINE MESYLATE 1 MG/ML
1 INJECTION INTRAMUSCULAR; INTRAVENOUS
Status: CANCELLED | OUTPATIENT
Start: 2022-07-06

## 2022-07-06 RX ORDER — MAGNESIUM HYDROXIDE/ALUMINUM HYDROXICE/SIMETHICONE 120; 1200; 1200 MG/30ML; MG/30ML; MG/30ML
30 SUSPENSION ORAL EVERY 4 HOURS PRN
Status: DISCONTINUED | OUTPATIENT
Start: 2022-07-06 | End: 2022-07-13 | Stop reason: HOSPADM

## 2022-07-06 RX ORDER — MINERAL OIL AND PETROLATUM 150; 830 MG/G; MG/G
1 OINTMENT OPHTHALMIC
Status: CANCELLED | OUTPATIENT
Start: 2022-07-06

## 2022-07-06 RX ORDER — AMOXICILLIN 250 MG
1 CAPSULE ORAL DAILY PRN
Status: CANCELLED | OUTPATIENT
Start: 2022-07-06

## 2022-07-06 RX ORDER — RISPERIDONE 1 MG/1
2 TABLET, FILM COATED ORAL 2 TIMES DAILY
Status: CANCELLED | OUTPATIENT
Start: 2022-07-06

## 2022-07-06 RX ORDER — HALOPERIDOL 5 MG/1
5 TABLET ORAL
Status: DISCONTINUED | OUTPATIENT
Start: 2022-07-06 | End: 2022-07-13 | Stop reason: HOSPADM

## 2022-07-06 RX ORDER — BENZTROPINE MESYLATE 1 MG/ML
1 INJECTION INTRAMUSCULAR; INTRAVENOUS
Status: DISCONTINUED | OUTPATIENT
Start: 2022-07-06 | End: 2022-07-13 | Stop reason: HOSPADM

## 2022-07-06 RX ORDER — LORAZEPAM 2 MG/ML
1 INJECTION INTRAMUSCULAR EVERY 4 HOURS PRN
Status: CANCELLED | OUTPATIENT
Start: 2022-07-06

## 2022-07-06 RX ORDER — LORAZEPAM 1 MG/1
1 TABLET ORAL
Status: CANCELLED | OUTPATIENT
Start: 2022-07-06

## 2022-07-06 RX ORDER — HALOPERIDOL 5 MG/1
5 TABLET ORAL
Status: CANCELLED | OUTPATIENT
Start: 2022-07-06

## 2022-07-06 RX ORDER — BISACODYL 10 MG
10 SUPPOSITORY, RECTAL RECTAL DAILY PRN
Status: DISCONTINUED | OUTPATIENT
Start: 2022-07-06 | End: 2022-07-13 | Stop reason: HOSPADM

## 2022-07-06 RX ORDER — BISACODYL 10 MG
10 SUPPOSITORY, RECTAL RECTAL DAILY PRN
Status: CANCELLED | OUTPATIENT
Start: 2022-07-06

## 2022-07-06 RX ORDER — ACETAMINOPHEN 325 MG/1
650 TABLET ORAL EVERY 6 HOURS PRN
Status: DISCONTINUED | OUTPATIENT
Start: 2022-07-06 | End: 2022-07-13 | Stop reason: HOSPADM

## 2022-07-06 RX ORDER — LORAZEPAM 2 MG/ML
2 INJECTION INTRAMUSCULAR
Status: CANCELLED | OUTPATIENT
Start: 2022-07-06

## 2022-07-06 RX ORDER — ACETAMINOPHEN 325 MG/1
975 TABLET ORAL EVERY 6 HOURS PRN
Status: CANCELLED | OUTPATIENT
Start: 2022-07-06

## 2022-07-06 RX ORDER — BENZTROPINE MESYLATE 1 MG/1
1 TABLET ORAL
Status: DISCONTINUED | OUTPATIENT
Start: 2022-07-06 | End: 2022-07-13 | Stop reason: HOSPADM

## 2022-07-06 RX ORDER — POLYETHYLENE GLYCOL 3350 17 G/17G
17 POWDER, FOR SOLUTION ORAL DAILY PRN
Status: DISCONTINUED | OUTPATIENT
Start: 2022-07-06 | End: 2022-07-13 | Stop reason: HOSPADM

## 2022-07-06 RX ORDER — LANOLIN ALCOHOL/MO/W.PET/CERES
3 CREAM (GRAM) TOPICAL
Status: CANCELLED | OUTPATIENT
Start: 2022-07-06

## 2022-07-06 RX ORDER — ACETAMINOPHEN 325 MG/1
650 TABLET ORAL EVERY 4 HOURS PRN
Status: DISCONTINUED | OUTPATIENT
Start: 2022-07-06 | End: 2022-07-13 | Stop reason: HOSPADM

## 2022-07-06 RX ORDER — POLYETHYLENE GLYCOL 3350 17 G/17G
17 POWDER, FOR SOLUTION ORAL DAILY PRN
Status: CANCELLED | OUTPATIENT
Start: 2022-07-06

## 2022-07-06 RX ORDER — HALOPERIDOL 2 MG/1
2 TABLET ORAL
Status: DISCONTINUED | OUTPATIENT
Start: 2022-07-06 | End: 2022-07-13 | Stop reason: HOSPADM

## 2022-07-06 RX ORDER — HALOPERIDOL 1 MG/1
2 TABLET ORAL
Status: CANCELLED | OUTPATIENT
Start: 2022-07-06

## 2022-07-06 RX ORDER — HYDROXYZINE HYDROCHLORIDE 25 MG/1
50 TABLET, FILM COATED ORAL
Status: CANCELLED | OUTPATIENT
Start: 2022-07-06

## 2022-07-06 RX ORDER — MAGNESIUM HYDROXIDE/ALUMINUM HYDROXICE/SIMETHICONE 120; 1200; 1200 MG/30ML; MG/30ML; MG/30ML
30 SUSPENSION ORAL EVERY 4 HOURS PRN
Status: CANCELLED | OUTPATIENT
Start: 2022-07-06

## 2022-07-06 RX ORDER — LANOLIN ALCOHOL/MO/W.PET/CERES
3 CREAM (GRAM) TOPICAL
Status: DISCONTINUED | OUTPATIENT
Start: 2022-07-06 | End: 2022-07-07

## 2022-07-06 RX ORDER — AMOXICILLIN 250 MG
1 CAPSULE ORAL DAILY PRN
Status: DISCONTINUED | OUTPATIENT
Start: 2022-07-06 | End: 2022-07-13 | Stop reason: HOSPADM

## 2022-07-06 RX ORDER — BENZTROPINE MESYLATE 1 MG/ML
0.5 INJECTION INTRAMUSCULAR; INTRAVENOUS
Status: DISCONTINUED | OUTPATIENT
Start: 2022-07-06 | End: 2022-07-13 | Stop reason: HOSPADM

## 2022-07-06 RX ORDER — HALOPERIDOL 5 MG/ML
2.5 INJECTION INTRAMUSCULAR
Status: DISCONTINUED | OUTPATIENT
Start: 2022-07-06 | End: 2022-07-13 | Stop reason: HOSPADM

## 2022-07-06 RX ORDER — BENZTROPINE MESYLATE 0.5 MG/1
0.5 TABLET ORAL 2 TIMES DAILY
Status: DISCONTINUED | OUTPATIENT
Start: 2022-07-07 | End: 2022-07-07

## 2022-07-06 RX ORDER — LORAZEPAM 2 MG/ML
1 INJECTION INTRAMUSCULAR
Status: DISCONTINUED | OUTPATIENT
Start: 2022-07-06 | End: 2022-07-13 | Stop reason: HOSPADM

## 2022-07-06 RX ORDER — LORAZEPAM 2 MG/ML
1 INJECTION INTRAMUSCULAR
Status: CANCELLED | OUTPATIENT
Start: 2022-07-06

## 2022-07-06 RX ORDER — MINERAL OIL AND PETROLATUM 150; 830 MG/G; MG/G
1 OINTMENT OPHTHALMIC
Status: DISCONTINUED | OUTPATIENT
Start: 2022-07-06 | End: 2022-07-13 | Stop reason: HOSPADM

## 2022-07-06 RX ORDER — ACETAMINOPHEN 325 MG/1
975 TABLET ORAL EVERY 6 HOURS PRN
Status: DISCONTINUED | OUTPATIENT
Start: 2022-07-06 | End: 2022-07-13 | Stop reason: HOSPADM

## 2022-07-06 RX ORDER — HYDROXYZINE HYDROCHLORIDE 25 MG/1
25 TABLET, FILM COATED ORAL
Status: DISCONTINUED | OUTPATIENT
Start: 2022-07-06 | End: 2022-07-13 | Stop reason: HOSPADM

## 2022-07-06 RX ORDER — HALOPERIDOL 5 MG/ML
2.5 INJECTION INTRAMUSCULAR
Status: CANCELLED | OUTPATIENT
Start: 2022-07-06

## 2022-07-06 RX ORDER — HALOPERIDOL 5 MG/ML
5 INJECTION INTRAMUSCULAR
Status: CANCELLED | OUTPATIENT
Start: 2022-07-06

## 2022-07-06 RX ORDER — LORAZEPAM 2 MG/ML
1 INJECTION INTRAMUSCULAR EVERY 4 HOURS PRN
Status: DISCONTINUED | OUTPATIENT
Start: 2022-07-06 | End: 2022-07-13 | Stop reason: HOSPADM

## 2022-07-06 RX ORDER — HYDROXYZINE 50 MG/1
50 TABLET, FILM COATED ORAL
Status: DISCONTINUED | OUTPATIENT
Start: 2022-07-06 | End: 2022-07-13 | Stop reason: HOSPADM

## 2022-07-06 RX ORDER — LORAZEPAM 1 MG/1
1 TABLET ORAL
Status: DISCONTINUED | OUTPATIENT
Start: 2022-07-06 | End: 2022-07-13 | Stop reason: HOSPADM

## 2022-07-06 RX ORDER — BENZTROPINE MESYLATE 1 MG/1
1 TABLET ORAL
Status: CANCELLED | OUTPATIENT
Start: 2022-07-06

## 2022-07-06 RX ORDER — RISPERIDONE 2 MG/1
2 TABLET, FILM COATED ORAL 2 TIMES DAILY
Status: DISCONTINUED | OUTPATIENT
Start: 2022-07-07 | End: 2022-07-13 | Stop reason: HOSPADM

## 2022-07-06 RX ORDER — ACETAMINOPHEN 325 MG/1
650 TABLET ORAL EVERY 6 HOURS PRN
Status: CANCELLED | OUTPATIENT
Start: 2022-07-06

## 2022-07-06 RX ORDER — ACETAMINOPHEN 325 MG/1
650 TABLET ORAL EVERY 4 HOURS PRN
Status: CANCELLED | OUTPATIENT
Start: 2022-07-06

## 2022-07-06 RX ORDER — HYDROXYZINE HYDROCHLORIDE 25 MG/1
25 TABLET, FILM COATED ORAL
Status: CANCELLED | OUTPATIENT
Start: 2022-07-06

## 2022-07-06 RX ORDER — HALOPERIDOL 5 MG/ML
5 INJECTION INTRAMUSCULAR
Status: DISCONTINUED | OUTPATIENT
Start: 2022-07-06 | End: 2022-07-13 | Stop reason: HOSPADM

## 2022-07-06 RX ADMIN — DIVALPROEX SODIUM 750 MG: 250 TABLET, FILM COATED, EXTENDED RELEASE ORAL at 21:15

## 2022-07-06 RX ADMIN — BENZTROPINE MESYLATE 0.5 MG: 0.5 TABLET ORAL at 09:07

## 2022-07-06 RX ADMIN — Medication 3 MG: at 21:15

## 2022-07-06 RX ADMIN — RISPERIDONE 2 MG: 1 TABLET ORAL at 09:07

## 2022-07-06 RX ADMIN — DIVALPROEX SODIUM 750 MG: 500 TABLET, FILM COATED, EXTENDED RELEASE ORAL at 09:07

## 2022-07-06 RX ADMIN — BENZTROPINE MESYLATE 0.5 MG: 0.5 TABLET ORAL at 17:39

## 2022-07-06 RX ADMIN — RISPERIDONE 2 MG: 1 TABLET ORAL at 17:39
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
## 2022-07-06 NOTE — ED NOTES
Brisa from [employer] has approved 15 days and will give the authorization # when patient arrives at the facility

## 2022-07-06 NOTE — ED NOTES
Bed Search  [facility] ([facility]) No beds  Buster Nguyen ([place]) No beds  [facility] (no answer)  Juanito (Agustín) No beds  First ([facility]) Fax  [facility] (no answer)  Victoria ([place]) no beds  [place] (no answer)  Dylon Cox ([place]) No beds  [facility] (no answer)  [place] (no answer)  [place] ([facility] beds

## 2022-07-06 NOTE — NURSING NOTE
Black pair of underwear  Grey pair of athletic shorts  Blue t-shirt  Black pair of slides/sandals    (all in locker waiting for washer to be fixed)

## 2022-07-06 NOTE — PLAN OF CARE
Problem: Ineffective Coping  Goal: Identifies ineffective coping skills  Outcome: Progressing  Goal: Identifies healthy coping skills  Outcome: Progressing     Problem: Depression  Goal: Complete daily ADLs, including personal hygiene independently, as able  Description: Interventions:  - Observe, teach, and assist patient with ADLS  -  Monitor and promote a balance of rest/activity, with adequate nutrition and elimination   Outcome: Progressing     Problem: Anxiety  Goal: Anxiety is at manageable level  Description: Interventions:  - Assess and monitor patient's anxiety level  - Monitor for signs and symptoms (heart palpitations, chest pain, shortness of breath, headaches, nausea, feeling jumpy, restlessness, irritable, apprehensive)  - Collaborate with interdisciplinary team and initiate plan and interventions as ordered    - Elma patient to unit/surroundings  - Explain treatment plan  - Encourage participation in care  - Encourage verbalization of concerns/fears  - Identify coping mechanisms  - Assist in developing anxiety-reducing skills  - Administer/offer alternative therapies  - Limit or eliminate stimulants  Outcome: Progressing     Problem: Risk for Violence/Aggression Toward Others  Goal: Treatment Goal: Refrain from acts of violence/aggression during length of stay, and demonstrate improved impulse control at the time of discharge  Outcome: Progressing  Goal: Refrain from harming others  Outcome: Progressing

## 2022-07-06 NOTE — ED NOTES
Pt sleeping at this time and in no distress, unlabored breathing  RN will defer vitals and SI assessment for when pt is awake  Virtual 1:1 continued at this time              Lucas Cui RN  07/06/22 8206

## 2022-07-06 NOTE — ED NOTES
Patient is accepted at [facility]  Patient is accepted by Leanna Henderson  per 200 [facility] is arranged with [facility]  Transportation is scheduled for TBD    Nurse report is to be called to [phone]    prior to patient transfer

## 2022-07-06 NOTE — ED NOTES
Insurance Authorization:   Phone call placed to [employer] [facility]  Phone number: [phone]     Spoke to: Brisa Morton approved-will match medicare days  Level of care: Inpatient treatment

## 2022-07-06 NOTE — EMTALA/ACUTE CARE TRANSFER
[facility]  [address] [address] [facility] [address]  Dept: [phone]      EMTALA TRANSFER CONSENT    NAME Roldan Hampton                                         1995                              MRN [id number]    I have been informed of my rights regarding examination, treatment, and transfer   by Dr Valles att  providers found    Benefits: Specialized equipment and/or services available at the receiving facility (Include comment)________________________    Risks: Potential for delay in receiving treatment, Potential deterioration of medical condition, Loss of IV, Increased discomfort during transfer, Possible worsening of condition or death during transfer      Consent for Transfer:  I acknowledge that my medical condition has been evaluated and explained to me by the emergency department physician or other qualified medical person and/or my attending physician, who has recommended that I be transferred to the service of  Accepting Physician: Leanna Henderson at [address] Name, [facility] [place] : [place]  The above potential benefits of such transfer, the potential risks associated with such transfer, and the probable risks of not being transferred have been explained to me, and I fully understand them  The doctor has explained that, in my case, the benefits of transfer outweigh the risks  I agree to be transferred  I authorize the performance of emergency medical procedures and treatments upon me in both transit and upon arrival at the receiving facility  Additionally, I authorize the release of any and all medical records to the receiving facility and request they be transported with me, if possible  I understand that the safest mode of transportation during a medical emergency is an ambulance and that the Hospital advocates the use of this mode of transport   Risks of traveling to the receiving facility by car, including absence of medical control, life sustaining equipment, such as oxygen, and medical personnel has been explained to me and I fully understand them  (WIL CORRECT BOX BELOW)  [  ]  I consent to the stated transfer and to be transported by ambulance/helicopter  [  ]  I consent to the stated transfer, but refuse transportation by ambulance and accept full responsibility for my transportation by car  I understand the risks of non-ambulance transfers and I exonerate the Hospital and its staff from any deterioration in my condition that results from this refusal     X___________________________________________    DATE  22  TIME________  Signature of patient or legally responsible individual signing on patient behalf           RELATIONSHIP TO PATIENT_________________________          Provider Certification    NAME Karen Hobson                                         1995                              MRN [id number]    A medical screening exam was performed on the above named patient  Based on the examination:    Condition Necessitating Transfer The primary encounter diagnosis was Suicidal ideations  Diagnoses of Homicidal ideation, Medical clearance for psychiatric admission, and LORI (obstructive sleep apnea) were also pertinent to this visit      Patient Condition: The patient has been stabilized such that within reasonable medical probability, no material deterioration of the patient condition or the condition of the unborn child(gianna) is likely to result from the transfer    Reason for Transfer: Level of Care needed not available at this facility    Transfer Requirements: [facility] 22   · Space available and qualified personnel available for treatment as acknowledged by Radha Person  · Agreed to accept transfer and to provide appropriate medical treatment as acknowledged by       Jose Whaley  · Appropriate medical records of the examination and treatment of the patient are provided at the time of transfer   STAFF INITIAL WHEN COMPLETED _______  · Transfer will be performed by qualified personnel from [facility]  and appropriate transfer equipment as required, including the use of necessary and appropriate life support measures  Provider Certification: I have examined the patient and explained the following risks and benefits of being transferred/refusing transfer to the patient/family:  General risk, such as traffic hazards, adverse weather conditions, rough terrain or turbulence, possible failure of equipment (including vehicle or aircraft), or consequences of actions of persons outside the control of the transport personnel, The patient is stable for psychiatric transfer because they are medically stable, and is protected from harming him/herself or others during transport, Risk of worsening condition, The possibility of a transport vehicle being unavailable, Unanticipated needs of medical equipment and personnel during transport      Based on these reasonable risks and benefits to the patient and/or the unborn child(gianna), and based upon the information available at the time of the patients examination, I certify that the medical benefits reasonably to be expected from the provision of appropriate medical treatments at another medical facility outweigh the increasing risks, if any, to the individuals medical condition, and in the case of labor to the unborn child, from effecting the transfer      X____________________________________________ DATE 07/06/22        TIME_______      ORIGINAL - SEND TO MEDICAL RECORDS   COPY - SEND WITH PATIENT DURING TRANSFER

## 2022-07-07 PROBLEM — R73.03 PREDIABETES: Status: ACTIVE | Noted: 2022-07-07

## 2022-07-07 PROBLEM — E78.5 HYPERLIPEMIA: Status: ACTIVE | Noted: 2022-07-07

## 2022-07-07 LAB
ALBUMIN SERPL BCP-MCNC: 3.3 G/DL (ref 3.5–5)
ALP SERPL-CCNC: 51 U/L (ref 46–116)
ALT SERPL W P-5'-P-CCNC: 49 U/L (ref 12–78)
ANION GAP SERPL CALCULATED.3IONS-SCNC: 7 MMOL/L (ref 4–13)
AST SERPL W P-5'-P-CCNC: 22 U/L (ref 5–45)
BASOPHILS # BLD AUTO: 0.05 THOUSANDS/ΜL (ref 0–0.1)
BASOPHILS NFR BLD AUTO: 1 % (ref 0–1)
BILIRUB SERPL-MCNC: 0.4 MG/DL (ref 0.2–1)
BUN SERPL-MCNC: 14 MG/DL (ref 5–25)
CALCIUM ALBUM COR SERPL-MCNC: 9.5 MG/DL (ref 8.3–10.1)
CALCIUM SERPL-MCNC: 8.9 MG/DL (ref 8.3–10.1)
CHLORIDE SERPL-SCNC: 104 MMOL/L (ref 100–108)
CHOLEST SERPL-MCNC: 188 MG/DL
CO2 SERPL-SCNC: 27 MMOL/L (ref 21–32)
CREAT SERPL-MCNC: 0.65 MG/DL (ref 0.6–1.3)
EOSINOPHIL # BLD AUTO: 0.29 THOUSAND/ΜL (ref 0–0.61)
EOSINOPHIL NFR BLD AUTO: 4 % (ref 0–6)
ERYTHROCYTE [DISTWIDTH] IN BLOOD BY AUTOMATED COUNT: 13.4 % (ref 11.6–15.1)
EST. AVERAGE GLUCOSE BLD GHB EST-MCNC: 91 MG/DL
GFR SERPL CREATININE-BSD FRML MDRD: 134 ML/MIN/1.73SQ M
GLUCOSE P FAST SERPL-MCNC: 90 MG/DL (ref 65–99)
GLUCOSE SERPL-MCNC: 83 MG/DL (ref 65–140)
GLUCOSE SERPL-MCNC: 87 MG/DL (ref 65–140)
GLUCOSE SERPL-MCNC: 90 MG/DL (ref 65–140)
HBA1C MFR BLD: 4.8 %
HCT VFR BLD AUTO: 43.6 % (ref 36.5–49.3)
HDLC SERPL-MCNC: 32 MG/DL
HGB BLD-MCNC: 14.7 G/DL (ref 12–17)
IMM GRANULOCYTES # BLD AUTO: 0.04 THOUSAND/UL (ref 0–0.2)
IMM GRANULOCYTES NFR BLD AUTO: 1 % (ref 0–2)
LDLC SERPL CALC-MCNC: 106 MG/DL (ref 0–100)
LYMPHOCYTES # BLD AUTO: 3.27 THOUSANDS/ΜL (ref 0.6–4.47)
LYMPHOCYTES NFR BLD AUTO: 41 % (ref 14–44)
MCH RBC QN AUTO: 30.4 PG (ref 26.8–34.3)
MCHC RBC AUTO-ENTMCNC: 33.7 G/DL (ref 31.4–37.4)
MCV RBC AUTO: 90 FL (ref 82–98)
MONOCYTES # BLD AUTO: 0.83 THOUSAND/ΜL (ref 0.17–1.22)
MONOCYTES NFR BLD AUTO: 10 % (ref 4–12)
NEUTROPHILS # BLD AUTO: 3.47 THOUSANDS/ΜL (ref 1.85–7.62)
NEUTS SEG NFR BLD AUTO: 43 % (ref 43–75)
NONHDLC SERPL-MCNC: 156 MG/DL
NRBC BLD AUTO-RTO: 0 /100 WBCS
PLATELET # BLD AUTO: 216 THOUSANDS/UL (ref 149–390)
PMV BLD AUTO: 9.3 FL (ref 8.9–12.7)
POTASSIUM SERPL-SCNC: 3.6 MMOL/L (ref 3.5–5.3)
PROT SERPL-MCNC: 7 G/DL (ref 6.4–8.2)
RBC # BLD AUTO: 4.84 MILLION/UL (ref 3.88–5.62)
RPR SER QL: NORMAL
SODIUM SERPL-SCNC: 138 MMOL/L (ref 136–145)
TRIGL SERPL-MCNC: 249 MG/DL
TSH SERPL DL<=0.05 MIU/L-ACNC: 2.19 UIU/ML (ref 0.45–4.5)
WBC # BLD AUTO: 7.95 THOUSAND/UL (ref 4.31–10.16)

## 2022-07-07 PROCEDURE — 99221 1ST HOSP IP/OBS SF/LOW 40: CPT | Performed by: PHYSICIAN ASSISTANT

## 2022-07-07 PROCEDURE — 85025 COMPLETE CBC W/AUTO DIFF WBC: CPT | Performed by: PHYSICIAN ASSISTANT

## 2022-07-07 PROCEDURE — 80053 COMPREHEN METABOLIC PANEL: CPT | Performed by: PHYSICIAN ASSISTANT

## 2022-07-07 PROCEDURE — 80061 LIPID PANEL: CPT | Performed by: PHYSICIAN ASSISTANT

## 2022-07-07 PROCEDURE — 99223 1ST HOSP IP/OBS HIGH 75: CPT | Performed by: STUDENT IN AN ORGANIZED HEALTH CARE EDUCATION/TRAINING PROGRAM

## 2022-07-07 PROCEDURE — 83036 HEMOGLOBIN GLYCOSYLATED A1C: CPT | Performed by: PHYSICIAN ASSISTANT

## 2022-07-07 PROCEDURE — 93005 ELECTROCARDIOGRAM TRACING: CPT

## 2022-07-07 PROCEDURE — 84443 ASSAY THYROID STIM HORMONE: CPT | Performed by: PHYSICIAN ASSISTANT

## 2022-07-07 PROCEDURE — 82948 REAGENT STRIP/BLOOD GLUCOSE: CPT

## 2022-07-07 PROCEDURE — 86592 SYPHILIS TEST NON-TREP QUAL: CPT | Performed by: PHYSICIAN ASSISTANT

## 2022-07-07 RX ORDER — INSULIN LISPRO 100 [IU]/ML
1-5 INJECTION, SOLUTION INTRAVENOUS; SUBCUTANEOUS
Status: DISCONTINUED | OUTPATIENT
Start: 2022-07-07 | End: 2022-07-10

## 2022-07-07 RX ORDER — CLINDAMYCIN PHOSPHATE 10 UG/ML
1 LOTION TOPICAL 2 TIMES DAILY
COMMUNITY
End: 2022-07-13

## 2022-07-07 RX ORDER — INSULIN LISPRO 100 [IU]/ML
1-5 INJECTION, SOLUTION INTRAVENOUS; SUBCUTANEOUS
Status: DISCONTINUED | OUTPATIENT
Start: 2022-07-07 | End: 2022-07-08

## 2022-07-07 RX ORDER — PHENOL 1.4 %
10 AEROSOL, SPRAY (ML) MUCOUS MEMBRANE
COMMUNITY
End: 2022-07-13

## 2022-07-07 RX ORDER — RISPERIDONE 1 MG/1
1 TABLET, FILM COATED ORAL DAILY
COMMUNITY
End: 2022-07-13

## 2022-07-07 RX ORDER — LANOLIN ALCOHOL/MO/W.PET/CERES
9 CREAM (GRAM) TOPICAL
Status: DISCONTINUED | OUTPATIENT
Start: 2022-07-07 | End: 2022-07-13 | Stop reason: HOSPADM

## 2022-07-07 RX ORDER — RISPERIDONE 2 MG/1
2 TABLET, FILM COATED ORAL
COMMUNITY
End: 2022-07-13

## 2022-07-07 RX ORDER — BENZTROPINE MESYLATE 1 MG/1
1 TABLET ORAL 2 TIMES DAILY
Status: DISCONTINUED | OUTPATIENT
Start: 2022-07-07 | End: 2022-07-13 | Stop reason: HOSPADM

## 2022-07-07 RX ADMIN — BENZTROPINE MESYLATE 1 MG: 1 TABLET ORAL at 16:55

## 2022-07-07 RX ADMIN — DIVALPROEX SODIUM 750 MG: 250 TABLET, FILM COATED, EXTENDED RELEASE ORAL at 08:07

## 2022-07-07 RX ADMIN — RISPERIDONE 2 MG: 2 TABLET ORAL at 08:07

## 2022-07-07 RX ADMIN — RISPERIDONE 2 MG: 2 TABLET ORAL at 16:53

## 2022-07-07 RX ADMIN — Medication 9 MG: at 21:20

## 2022-07-07 RX ADMIN — ACETAMINOPHEN 975 MG: 325 TABLET, FILM COATED ORAL at 10:14

## 2022-07-07 RX ADMIN — METFORMIN HYDROCHLORIDE 750 MG: 500 TABLET ORAL at 16:53

## 2022-07-07 RX ADMIN — DIVALPROEX SODIUM 750 MG: 250 TABLET, FILM COATED, EXTENDED RELEASE ORAL at 21:20

## 2022-07-07 RX ADMIN — BENZTROPINE MESYLATE 0.5 MG: 0.5 TABLET ORAL at 08:07

## 2022-07-07 NOTE — ASSESSMENT & PLAN NOTE
Pt medically cleared for psychiatric treatment including ect   · EKG:   · Labs reviewed stable   · Triglycerides elevated - diet control/dm control rechk in 6 months   · - LFTs stable   · A1c optimized   · Vitals stable   · Negative COVID/FLU/RSV   · Negative urine drug screen

## 2022-07-07 NOTE — PLAN OF CARE
Problem: Ineffective Coping  Goal: Identifies ineffective coping skills  Outcome: Progressing     Problem: Risk for Self Injury/Neglect  Goal: Treatment Goal: Remain safe during length of stay, learn and adopt new coping skills, and be free of self-injurious ideation, impulses and acts at the time of discharge  Outcome: Progressing
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
## 2022-07-07 NOTE — ASSESSMENT & PLAN NOTE
Pt under endocrinology management outpt   · Diagnosed in january 2020  · Treated with metformin NB008LC BID and Trulicity 3 56GH sc weekly   · Recent A1c 4 8% w / pt having lost 20 pounds prior to last endo visit over three months  · Will continue metformin  milligrams b i d  for now  · In blood work bg stable will continue to manage with diet and metformin  · Placed on low dose SSI , will likely not require much coverage if any

## 2022-07-07 NOTE — TREATMENT TEAM
07/07/22 1330   Activity/Group Checklist   Group Other (Comment)  (art therapy)   Attendance Attended   Attendance Duration (min) 31-45   Interactions Interacted appropriately   Affect/Mood Appropriate   Goals Achieved Able to listen to others; Able to engage in interactions     Goals for group include promotes authentic, spontaneous self- expression, connection, and insight  Patient utilized the provided materials to engage in the given directive  He spent most of his creative process focused but engaging in conversations with surrounding peers and staff, to which he was appropriate throughout

## 2022-07-07 NOTE — TREATMENT PLAN
TREATMENT PLAN REVIEW - [address] 33 y o  1995 male MRN: [id number]    [address] [place] [address] Room / Bed: [facility] 258/[facility] 300-97 Encounter: [id number]          Admit Date/Time:  7/6/2022  6:47 PM    Treatment Team: Attending Provider: Perri Mac MD; Patient Care Technician: Lindy Razo; Security: [employer]; Security: [employer];  Registered Nurse: Natan Olivo RN; Registered Nurse: Betina Harmon RN; Charge Nurse: Ghada Sumner RN; Patient Care Technician: Biju Radford; Nurse Practitioner: ZBIGNIEW Corral    Diagnosis: Principal Problem:    Schizoaffective disorder, bipolar type [facility])  Active Problems:    Intellectual disability    Impulse control disorder    BMI 45 0-49 9, adult [facility])    Medical clearance for psychiatric admission      Patient Strengths/Assets: cooperative, motivation for treatment/growth, patient is on a voluntary commitment    Patient Barriers/Limitations: limited support system, poor insight    Short Term Goals: decrease in depressive symptoms, decrease in anxiety symptoms, decrease in paranoid thoughts, decrease in psychotic symptoms, decrease in suicidal thoughts, decrease in homicidal thoughts, improvement in self care    Long Term Goals: improvement in depression, improvement in anxiety, resolution of depressive symptoms, stabilization of mood, free of suicidal thoughts, free of homicidal thoughts, no self abusive behavior, adequate self care, adequate sleep, adequate appetite    Progress Towards Goals: starting psychiatric medications as prescribed    Recommended Treatment: medication management, patient medication education, group therapy, milieu therapy, continued Behavioral Health psychiatric evaluation/assessment process    Treatment Frequency: daily medication monitoring, group and milieu therapy daily, monitoring through interdisciplinary rounds, monitoring through weekly patient care conferences    Expected Discharge Date:  5-7 days    Discharge Plan: referral for outpatient medication management with a psychiatrist, referral for outpatient psychotherapy    Treatment Plan Created/Updated By: Loretta Gould MD
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
## 2022-07-07 NOTE — TREATMENT TEAM
07/07/22 1400   Activity/Group Checklist   Group Admission/Discharge   Attendance Attended   Attendance Duration (min) 0-15   Interactions Interacted appropriately   Affect/Mood Appropriate   Goals Achieved Able to listen to others; Able to engage in interactions     Pt filled out the admission self assessment with the assistance of this therapist  He answered all questions positively and appropriately  He had no questions for this therapist once finished
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
## 2022-07-07 NOTE — ASSESSMENT & PLAN NOTE
Noted on lipid panel   · Cholesterol 188, triglycerides 249 , HDL direct 32, LDL calculated 106  · Good diabetic control  · ADA diet  · Could consider oral agent would monitor diet and rechk in 6 months
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
## 2022-07-07 NOTE — TREATMENT TEAM
07/07/22 1000   Activity/Group Checklist   Group Community meeting   Attendance Attended   Attendance Duration (min) 0-15   Interactions Interacted appropriately   Affect/Mood Appropriate   Goals Achieved Able to engage in interactions; Able to listen to others     Check ins and schedule review was completed at the beginning of the session  Patient fully participated in community meeting  He worked on his goal card and participated in the ice breaker activity  He shared his goals during group discussion  He left the session for a period of time, due to him losing part of a tooth  He got checked by nursing  He came back and re-engaged himself  Goal cards collected to be distributed to appropriate staff to review during wrap-up group

## 2022-07-08 LAB
ATRIAL RATE: 78 BPM
GLUCOSE SERPL-MCNC: 102 MG/DL (ref 65–140)
GLUCOSE SERPL-MCNC: 81 MG/DL (ref 65–140)
GLUCOSE SERPL-MCNC: 83 MG/DL (ref 65–140)
GLUCOSE SERPL-MCNC: 97 MG/DL (ref 65–140)
P AXIS: 7 DEGREES
PR INTERVAL: 142 MS
QRS AXIS: 1 DEGREES
QRSD INTERVAL: 92 MS
QT INTERVAL: 410 MS
QTC INTERVAL: 467 MS
T WAVE AXIS: 26 DEGREES
VENTRICULAR RATE: 78 BPM

## 2022-07-08 PROCEDURE — 93010 ELECTROCARDIOGRAM REPORT: CPT | Performed by: INTERNAL MEDICINE

## 2022-07-08 PROCEDURE — 82948 REAGENT STRIP/BLOOD GLUCOSE: CPT

## 2022-07-08 PROCEDURE — 99232 SBSQ HOSP IP/OBS MODERATE 35: CPT | Performed by: STUDENT IN AN ORGANIZED HEALTH CARE EDUCATION/TRAINING PROGRAM

## 2022-07-08 RX ORDER — INSULIN LISPRO 100 [IU]/ML
1-5 INJECTION, SOLUTION INTRAVENOUS; SUBCUTANEOUS
Status: DISCONTINUED | OUTPATIENT
Start: 2022-07-08 | End: 2022-07-10

## 2022-07-08 RX ADMIN — RISPERIDONE 2 MG: 2 TABLET ORAL at 17:07

## 2022-07-08 RX ADMIN — RISPERIDONE 2 MG: 2 TABLET ORAL at 09:05

## 2022-07-08 RX ADMIN — METFORMIN HYDROCHLORIDE 750 MG: 500 TABLET ORAL at 17:07

## 2022-07-08 RX ADMIN — METFORMIN HYDROCHLORIDE 750 MG: 500 TABLET ORAL at 09:05

## 2022-07-08 RX ADMIN — BENZTROPINE MESYLATE 1 MG: 1 TABLET ORAL at 09:05

## 2022-07-08 RX ADMIN — Medication 9 MG: at 21:24

## 2022-07-08 RX ADMIN — DIVALPROEX SODIUM 750 MG: 250 TABLET, FILM COATED, EXTENDED RELEASE ORAL at 09:05

## 2022-07-08 RX ADMIN — DIVALPROEX SODIUM 750 MG: 250 TABLET, FILM COATED, EXTENDED RELEASE ORAL at 21:24

## 2022-07-08 RX ADMIN — BENZTROPINE MESYLATE 1 MG: 1 TABLET ORAL at 17:07

## 2022-07-08 NOTE — DISCHARGE INSTR - APPOINTMENTS
Reta Diamond or Irina, our Carlos and Carly, will be calling you after your discharge, on the phone number that you provided  They will be available as an additional support, if needed  If you wish to speak with one of them, you may contact Kaylee Johnson at [phone] or José Miguel Paniagua at [phone]  You are being discharged home to your [facility] through [facility], located at [address]  [facility], [address]  The best way to contact you is through the , Christianna Alpers @ [phone]
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
## 2022-07-08 NOTE — PLAN OF CARE
Pt participated in 4/4 groups for the day    Problem: Ineffective Coping  Goal: Participates in unit activities  Description: Interventions:  - Provide therapeutic environment   - Provide required programming   - Redirect inappropriate behaviors   Outcome: Progressing

## 2022-07-08 NOTE — PROGRESS NOTES
Treatment Plan Meeting:  Diagnosis of schizoaffective disorder, bipolar type, intellectual disability, & impulse control disorder reviewed  Discussed short term goals for decrease in depressive symptoms, decrease in anxiety symptoms, decrease in paranoid thoughts, decrease in psychotic symptoms, decrease in suicidal thoughts, decrease in homicidal thoughts, & improvement in self care  Pt will return to his CLA upon discharge & continue outpatient [facility]  At this time, discharge is tentative for next week  All parties in agreement & treatment plan was signed       07/08/22 0820   Team Meeting   Meeting Type Tx Team Meeting   Initial Conference Date 07/08/22   Next Conference Date 08/03/22   Team Members Present   Team Members Present Physician;Nurse;   Physician Team Member Dr Maureen Castro Team Member RANDY [facility] Management Team Member Merly   Patient/Family Present   Patient Present No  (Pt declined to meet with Treatment Team)   Patient's Family Present No

## 2022-07-08 NOTE — PROGRESS NOTES
Status: Pt is pleasant & cooperative  During group part of Pt's left molar fell out; he was given Tylenol  Pt is TID blood/sugar, however, has night time insulin, so orders need to be adjusted  Pt attends groups & slept overnight  Medication: Cogentin increased to 1mg BID / PRN - Tylenol   D/C: next week      07/08/22 0750   Team Meeting   Meeting Type Daily Rounds   Team Members Present   Team Members Present Physician;Nurse;; Other (Discipline and Name)   Physician Team Member Dr Akhil Dobson / Brisa Beckwith / Tony Hillman Team Member Travis Bruno / Ardie Runner Management Team Member Beckie Tipton / Ramiro Kirby   Other (Discipline and Name) Jarvis Estrella (art therapy)   Patient/Family Present   Patient Present No   Patient's Family Present No

## 2022-07-08 NOTE — TREATMENT TEAM
07/08/22 1330   Activity/Group Checklist   Group Other (Comment)  (art therapy)   Attendance Attended   Attendance Duration (min) 46-60   Interactions Interacted appropriately   Affect/Mood Appropriate;Bright   Goals Achieved Able to engage in interactions; Able to listen to others     Goals for group include promotes authentic, spontaneous self- expression, connection, and insight  Patient utilized the provided materials to engage in the given directive  He spent most of his creative process focused but engaging in conversations with surrounding peers and staff, to which he was appropriate throughout

## 2022-07-08 NOTE — NURSING NOTE
Pt denies SI/HI, AVH  Denies anxiety and depression and reports he has been sleeping well overnight  Pt remains social with select peers and attends groups throughout the day  Denies any concerns or questions

## 2022-07-08 NOTE — DISCHARGE INSTR - OTHER ORDERS
Jame Toney is a confidential 7 days/week telephone support service manned by trained mental health consumers  [facility] operates daily but is not able to accept calls between 2AM-6AM    [facility] provides support, a listening ear and can provide information about available services  [facility] specializes in the concerns of mental health consumers, their families and friends  However, we are also here for anyone who has a mental health concern, is confused about or just doesn't know anything about mental health or where to get information  To reach Jame Toney, call [phone] accepts calls between 6:00 AM to 10:00 AM and from 4:00 PM to 12:00 AM      Text CONNECT to [id number] from anywhere in the [place], anytime, about any type of crisis  A live, trained Crisis Counselor receives the text and lets you know that they are here to listen  The volunteer Crisis Counselor will help you move from a hot moment to a cool moment  [facility] AT [facility] - licensed telephone and mobile crisis services that provide mental health assessments to all age groups regardless of income or insurance  [facility] operates 24-hour/7 days a week  [address] [place] [address] assists consumer who are experiencing a mental health emergency and lack the resources to assist themselves  Immediate intervention for suicidal and depressed individuals with home visits/outreach being top priority  Crisis can be contacted at [phone]  The [facility] Mental Illness [facility]) offers various education & support groups for you & your family  For more information visit their website at http://Estimote/     Dial 2-1-1 to get connected/get help  Free, confidential information & referral available 24/7: Aging Services, Child & Youth Services, Counseling, Education/Training, Food/Shelter/Clothing, Health Services, Parenting, Substance Abuse, Support Groups, Volunteer Opportunities, & much more    Phone: 2-1-1 or [phone], Web: [email or web address] [email or web address] [facility], Email: Fauzia@Kobojo    [facility]  The [facility] ([place] [address], [facility], [address] [place] [address]) offers persons with a mental illness a safe, healing environment to explore their personal and vocational potential and receive support in achieving their goals  Instead of traditional therapy, the work of the house is the rehabilitation  As members contribute meaningful work to the house, they build confidence and a sense of purpose  Be a Member - It's Free  Membership in the [facility] is open to any [place] resident who is 25 or older and has a history of mental illness  Membership is free for life  [place] [address], [address]  Hours: Monday - Friday: 8 a m  - 4 p m  With varying hours on holidays   ([address] ([address]) provides a stress-free atmosphere for persons 18 and older who have experienced mental health issues  What The [facility] RSVP Law  Holiday gatherings  Recovery  Employment  Education  Community Resources  Empowerment  Helps participants achieve their goals  Enhances quality of life  Encourages leadership    The [address]  Hours: Monday through Friday: 4 p m  - 9 p m  Saturday: 2 p m  - 8 p m  Closed Sundays  Varying hours on holidays            Contact [phone]    Support & Referral Helpline  Support and Referral Helpline is a 24-hour, 7 days-a-week, listening and referral service provided to all of [place]  Please call [phone] or tty [phone] to speak with one of our specialists  The helpline is possible through partnerships with the [facility]

## 2022-07-08 NOTE — CASE MANAGEMENT
KAPIL met with Pt who reported that he was feeling good & had slept in this morning  Pt read & signed his Treatment Plan with KAPIL  Pt had no questions for CM at this time  CM contacted ZACHARY @ [phone] & notified the practice of Pt's admission  Pt has an appointment with his therapist, Terese Fuentes on 7/22 at 11 AM & with Dr Yolanda Treviño on 8/3 @ 1:30 PM   KAPIL confirmed fax number [phone] to send discharge information  KAPIL contacted Pt's supports coordinator, Checo Salvador, @ [phone] & left a message to notify her of Pt's admission & request a return call  KAPIL contacted Pt's PCP, [address] @ [phone] & spoke with [facility]  KAPIL notified the practice of Pt's admission & was told his next appointment in 8/1 @ 1020  KAPIL confirmed fax number [phone] to send discharge information

## 2022-07-08 NOTE — TREATMENT TEAM
07/08/22 1000   Activity/Group Checklist   Group Community meeting   Attendance Attended   Attendance Duration (min) 16-30   Interactions Interacted appropriately   Affect/Mood Appropriate;Bright   Goals Achieved Able to engage in interactions; Able to listen to others     Check ins and schedule review was completed at the beginning of the session  Patient fully participated in community meeting  He worked on his goal card and participated in the ice breaker activity  He shared his goals during group discussion  Goal cards collected to be distributed to appropriate staff to review during wrap-up group
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
## 2022-07-08 NOTE — PLAN OF CARE
Problem: Ineffective Coping  Goal: Identifies ineffective coping skills  Outcome: Progressing  Goal: Identifies healthy coping skills  Outcome: Progressing     Problem: Risk for Self Injury/Neglect  Goal: Treatment Goal: Remain safe during length of stay, learn and adopt new coping skills, and be free of self-injurious ideation, impulses and acts at the time of discharge  Outcome: Progressing  Goal: Verbalize thoughts and feelings  Description: Interventions:  - Assess and re-assess patient's lethality and potential for self-injury  - Engage patient in 1:1 interactions, daily, for a minimum of 15 minutes  - Encourage patient to express feelings, fears, frustrations, hopes  - Establish rapport/trust with patient   Outcome: Progressing  Goal: Refrain from harming self  Description: Interventions:  - Monitor patient closely, per order  - Develop a trusting relationship  - Supervise medication ingestion, monitor effects and side effects   Outcome: Progressing     Problem: Depression  Goal: Treatment Goal: Demonstrate behavioral control of depressive symptoms, verbalize feelings of improved mood/affect, and adopt new coping skills prior to discharge  Outcome: Progressing  Goal: Complete daily ADLs, including personal hygiene independently, as able  Description: Interventions:  - Observe, teach, and assist patient with ADLS  -  Monitor and promote a balance of rest/activity, with adequate nutrition and elimination   Outcome: Progressing     Problem: Anxiety  Goal: Anxiety is at manageable level  Description: Interventions:  - Assess and monitor patient's anxiety level  - Monitor for signs and symptoms (heart palpitations, chest pain, shortness of breath, headaches, nausea, feeling jumpy, restlessness, irritable, apprehensive)  - Collaborate with interdisciplinary team and initiate plan and interventions as ordered    - [place] patient to unit/surroundings  - Explain treatment plan  - Encourage participation in care  - Encourage verbalization of concerns/fears  - Identify coping mechanisms  - Assist in developing anxiety-reducing skills  - Administer/offer alternative therapies  - Limit or eliminate stimulants  Outcome: Progressing     Problem: Risk for Violence/Aggression Toward Others  Goal: Treatment Goal: Refrain from acts of violence/aggression during length of stay, and demonstrate improved impulse control at the time of discharge  Outcome: Progressing  Goal: Refrain from harming others  Outcome: Progressing

## 2022-07-08 NOTE — PLAN OF CARE
Pt is scheduled for d/c next week  He has therapy on 7/22 & med management on 8/3 @ [facility]  He also has a PCP appointment on 8/1

## 2022-07-08 NOTE — NURSING NOTE
Bala Villa is pleasant upon approach and visible in the community  Patient is social with both peers and staff on the unit and exhibits a bright affect  Patient attends all scheduled groups on the unit, with appropriate participation  Bala Villa denies current SI/HI/AH/VH, reports feeling good today, states, "I feel a lot better now  I don't want to hurt myself " Bala Villa remains compliant with scheduled medications, and ate dinner amongst his peers in the dining room  Patient encouraged to seek staff with any issues and/or concerns, verbalized understanding

## 2022-07-09 LAB
GLUCOSE SERPL-MCNC: 117 MG/DL (ref 65–140)
GLUCOSE SERPL-MCNC: 82 MG/DL (ref 65–140)
GLUCOSE SERPL-MCNC: 92 MG/DL (ref 65–140)
GLUCOSE SERPL-MCNC: 97 MG/DL (ref 65–140)

## 2022-07-09 PROCEDURE — 99232 SBSQ HOSP IP/OBS MODERATE 35: CPT | Performed by: STUDENT IN AN ORGANIZED HEALTH CARE EDUCATION/TRAINING PROGRAM

## 2022-07-09 PROCEDURE — 82948 REAGENT STRIP/BLOOD GLUCOSE: CPT

## 2022-07-09 RX ADMIN — METFORMIN HYDROCHLORIDE 750 MG: 500 TABLET ORAL at 08:25

## 2022-07-09 RX ADMIN — RISPERIDONE 2 MG: 2 TABLET ORAL at 08:26

## 2022-07-09 RX ADMIN — METFORMIN HYDROCHLORIDE 750 MG: 500 TABLET ORAL at 17:06

## 2022-07-09 RX ADMIN — BENZTROPINE MESYLATE 1 MG: 1 TABLET ORAL at 08:26

## 2022-07-09 RX ADMIN — DIVALPROEX SODIUM 750 MG: 250 TABLET, FILM COATED, EXTENDED RELEASE ORAL at 21:13

## 2022-07-09 RX ADMIN — Medication 9 MG: at 21:13

## 2022-07-09 RX ADMIN — RISPERIDONE 2 MG: 2 TABLET ORAL at 17:06

## 2022-07-09 RX ADMIN — DIVALPROEX SODIUM 750 MG: 250 TABLET, FILM COATED, EXTENDED RELEASE ORAL at 08:25

## 2022-07-09 RX ADMIN — BENZTROPINE MESYLATE 1 MG: 1 TABLET ORAL at 17:06

## 2022-07-09 NOTE — NURSING NOTE
Patient visible within milieu, smiling most of the day, overheard giggling with peers, denying any psych symptoms, reporting, "my meds are doing great, will I take these same medicines when I go home?"  RN emphasized that the medicines being prescribed here should be taken as directed at home as well, unless otherwise specified  Pt agreed, but reported he wanted to be certain    Jhonpearl Tim was encouraged to shower, as he skipped his AM shower, he is agreeing to do so at 230pm

## 2022-07-09 NOTE — PLAN OF CARE
Problem: Risk for Self Injury/Neglect  Goal: Treatment Goal: Remain safe during length of stay, learn and adopt new coping skills, and be free of self-injurious ideation, impulses and acts at the time of discharge  Outcome: Progressing  Goal: Verbalize thoughts and feelings  Description: Interventions:  - Assess and re-assess patient's lethality and potential for self-injury  - Engage patient in 1:1 interactions, daily, for a minimum of 15 minutes  - Encourage patient to express feelings, fears, frustrations, hopes  - Establish rapport/trust with patient   Outcome: Progressing  Goal: Refrain from harming self  Description: Interventions:  - Monitor patient closely, per order  - Develop a trusting relationship  - Supervise medication ingestion, monitor effects and side effects   Outcome: Progressing     Problem: Depression  Goal: Treatment Goal: Demonstrate behavioral control of depressive symptoms, verbalize feelings of improved mood/affect, and adopt new coping skills prior to discharge  Outcome: Progressing  Goal: Complete daily ADLs, including personal hygiene independently, as able  Description: Interventions:  - Observe, teach, and assist patient with ADLS  -  Monitor and promote a balance of rest/activity, with adequate nutrition and elimination   Outcome: Progressing     Problem: Anxiety  Goal: Anxiety is at manageable level  Description: Interventions:  - Assess and monitor patient's anxiety level  - Monitor for signs and symptoms (heart palpitations, chest pain, shortness of breath, headaches, nausea, feeling jumpy, restlessness, irritable, apprehensive)  - Collaborate with interdisciplinary team and initiate plan and interventions as ordered    - [place] patient to unit/surroundings  - Explain treatment plan  - Encourage participation in care  - Encourage verbalization of concerns/fears  - Identify coping mechanisms  - Assist in developing anxiety-reducing skills  - Administer/offer alternative therapies  - Limit or eliminate stimulants  Outcome: Progressing     Problem: Risk for Violence/Aggression Toward Others  Goal: Treatment Goal: Refrain from acts of violence/aggression during length of stay, and demonstrate improved impulse control at the time of discharge  Outcome: Progressing  Goal: Refrain from harming others  Outcome: Progressing

## 2022-07-09 NOTE — TREATMENT TEAM
07/09/22 0800   Team Meeting   Meeting Type Daily Rounds   Team Members Present   Team Members Present Physician;Nurse   Physician Team Member Dr Lori Bowling Team Member Shalini Mccurdy   Patient/Family Present   Patient Present No   Patient's Family Present No     Daily Rounds: Pt cooerative and pleasant  Attending groups, social   Denies SIHI/AVH

## 2022-07-09 NOTE — NURSING NOTE
Patient approached desk to inform RN, "I had a good dinner!"  Writer validated and acknowledged pt  He continues to walk around smiling, socializing with peers and staff, denying any SI, HI, AVH

## 2022-07-09 NOTE — PROGRESS NOTES
Progress Note - Behavioral Health   Dennis Murphy 33 y o  male MRN: [id number]  Unit/Bed#: [facility] 463-32 Encounter: [id number]    Assessment/Plan   Principal Problem:    Schizoaffective disorder, bipolar type ([facility] )  Active Problems:    Intellectual disability    Impulse control disorder    BMI 45 0-49 9, adult ([facility] )    Medical clearance for psychiatric admission    Prediabetes    Hyperlipemia      Subjective: Patient was seen, chart reviewed and case discussed with team  Patient appears calm, pleasant and cooperative  Reports improvement in mood  Voices have diminished  Denies any thoughts to hurt self or others  He is compliant with medications  Denies any sideeffects     Psychiatric Review of Systems:  Behavior over the last 24 hours:  improved  Sleep: normal  Appetite: normal  Medication side effects: No  ROS: no complaints, all others negative    Current Medications:  Current Facility-Administered Medications   Medication Dose Route Frequency    acetaminophen (TYLENOL) tablet 650 mg  650 mg Oral Q6H PRN    acetaminophen (TYLENOL) tablet 650 mg  650 mg Oral Q4H PRN    acetaminophen (TYLENOL) tablet 975 mg  975 mg Oral Q6H PRN    aluminum-magnesium hydroxide-simethicone (MYLANTA) oral suspension 30 mL  30 mL Oral Q4H PRN    artificial tear (LUBRIFRESH P M ) ophthalmic ointment 1 application  1 application Both Eyes M5I PRN    haloperidol lactate (HALDOL) injection 2 5 mg  2 5 mg Intramuscular Q6H PRN Max 4/day    And    LORazepam (ATIVAN) injection 1 mg  1 mg Intramuscular Q6H PRN Max 4/day    And    benztropine (COGENTIN) injection 0 5 mg  0 5 mg Intramuscular Q6H PRN Max 4/day    haloperidol lactate (HALDOL) injection 5 mg  5 mg Intramuscular Q4H PRN Max 4/day    And    LORazepam (ATIVAN) injection 2 mg  2 mg Intramuscular Q4H PRN Max 4/day    And    benztropine (COGENTIN) injection 1 mg  1 mg Intramuscular Q4H PRN Max 4/day    benztropine (COGENTIN) injection 1 mg  1 mg Intramuscular Q4H PRN Max 6/day  benztropine (COGENTIN) tablet 1 mg  1 mg Oral Q4H PRN Max 6/day    benztropine (COGENTIN) tablet 1 mg  1 mg Oral BID    bisacodyl (DULCOLAX) rectal suppository 10 mg  10 mg Rectal Daily PRN    divalproex sodium (DEPAKOTE ER) 24 hr tablet 750 mg  750 mg Oral BID    haloperidol (HALDOL) tablet 2 mg  2 mg Oral Q4H PRN Max 6/day    haloperidol (HALDOL) tablet 5 mg  5 mg Oral Q6H PRN Max 4/day    haloperidol (HALDOL) tablet 5 mg  5 mg Oral Q4H PRN Max 4/day    hydrOXYzine HCL (ATARAX) tablet 25 mg  25 mg Oral Q6H PRN Max 4/day    hydrOXYzine HCL (ATARAX) tablet 50 mg  50 mg Oral Q4H PRN Max 4/day    Or    LORazepam (ATIVAN) injection 1 mg  1 mg Intramuscular Q4H PRN    insulin lispro (HumaLOG) 100 units/mL subcutaneous injection 1-5 Units  1-5 Units Subcutaneous HS    insulin lispro (HumaLOG) 100 units/mL subcutaneous injection 1-5 Units  1-5 Units Subcutaneous TID AC    LORazepam (ATIVAN) tablet 1 mg  1 mg Oral Q4H PRN Max 6/day    Or    LORazepam (ATIVAN) injection 2 mg  2 mg Intramuscular Q6H PRN Max 3/day    melatonin tablet 9 mg  9 mg Oral HS    metFORMIN (GLUCOPHAGE) tablet 750 mg  750 mg Oral BID With Meals    polyethylene glycol (MIRALAX) packet 17 g  17 g Oral Daily PRN    risperiDONE (RisperDAL) tablet 2 mg  2 mg Oral BID    senna-docusate sodium (SENOKOT S) 8 6-50 mg per tablet 1 tablet  1 tablet Oral Daily PRN    traZODone (DESYREL) tablet 50 mg  50 mg Oral HS PRN       Behavioral Health Medications: all current active meds have been reviewed  Vitals:  Vitals:    07/09/22 0716   BP: 100/59   Pulse: 72   Resp: 16   Temp: 97 9 °F (36 6 °C)   SpO2: 99%       Laboratory results:  I have personally reviewed all pertinent laboratory/tests results      Mental Status Evaluation:  Appearance:  age appropriate   Behavior:  restless and fidgety   Speech:  loud   Mood:  "i am good"   Affect:  mood-congruent   Thought Process:  concrete   Thought Content:  normal   Perceptual Disturbances: None Risk Potential: Suicidal Ideations none, Homicidal Ideations none and Potential for Aggression No   Sensorium:  person, place and time/date   Cognition:  recent and remote memory grossly intact   Consciousness:  alert and awake    Attention: attention span appeared shorter than expected for age   Insight:  limited   Judgment: limited   Gait/Station: normal gait/station   Motor Activity: no abnormal movements     Progress Toward Goals: Progressing    Recommended Treatment: Continue with pharmacotherapy, group therapy, milieu therapy and occupational therapy

## 2022-07-10 LAB
GLUCOSE SERPL-MCNC: 82 MG/DL (ref 65–140)
GLUCOSE SERPL-MCNC: 95 MG/DL (ref 65–140)

## 2022-07-10 PROCEDURE — 82948 REAGENT STRIP/BLOOD GLUCOSE: CPT

## 2022-07-10 PROCEDURE — 99232 SBSQ HOSP IP/OBS MODERATE 35: CPT | Performed by: STUDENT IN AN ORGANIZED HEALTH CARE EDUCATION/TRAINING PROGRAM

## 2022-07-10 RX ADMIN — BENZTROPINE MESYLATE 1 MG: 1 TABLET ORAL at 08:45

## 2022-07-10 RX ADMIN — METFORMIN HYDROCHLORIDE 750 MG: 500 TABLET ORAL at 08:44

## 2022-07-10 RX ADMIN — RISPERIDONE 2 MG: 2 TABLET ORAL at 08:45

## 2022-07-10 RX ADMIN — DIVALPROEX SODIUM 750 MG: 250 TABLET, FILM COATED, EXTENDED RELEASE ORAL at 08:45

## 2022-07-10 RX ADMIN — RISPERIDONE 2 MG: 2 TABLET ORAL at 17:04

## 2022-07-10 RX ADMIN — DIVALPROEX SODIUM 750 MG: 250 TABLET, FILM COATED, EXTENDED RELEASE ORAL at 21:34

## 2022-07-10 RX ADMIN — BENZTROPINE MESYLATE 1 MG: 1 TABLET ORAL at 17:05

## 2022-07-10 RX ADMIN — Medication 9 MG: at 21:34

## 2022-07-10 RX ADMIN — METFORMIN HYDROCHLORIDE 750 MG: 500 TABLET ORAL at 16:54

## 2022-07-10 NOTE — PROGRESS NOTES
Progress Note - Behavioral Health   Juan F Burgess 33 y o  male MRN: [id number]  Unit/Bed#: [facility] 159-73 Encounter: [id number]    Assessment/Plan   Principal Problem:    Schizoaffective disorder, bipolar type ([facility] 75 )  Active Problems:    Intellectual disability    Impulse control disorder    BMI 45 0-49 9, adult ([facility] )    Medical clearance for psychiatric admission    Prediabetes    Hyperlipemia      Subjective: Patient was seen, chart reviewed and case discussed with team  As per report no behavioral issues on the unit  Patient appears pleasant, loud and cooperative  Reports improvement in mood  Denies any voices  He is compliant with medications  Denies any side effects     Psychiatric Review of Systems:  Behavior over the last 24 hours:  improved  Sleep: normal  Appetite: normal  Medication side effects: No  ROS: no complaints, all others negative    Current Medications:  Current Facility-Administered Medications   Medication Dose Route Frequency    acetaminophen (TYLENOL) tablet 650 mg  650 mg Oral Q6H PRN    acetaminophen (TYLENOL) tablet 650 mg  650 mg Oral Q4H PRN    acetaminophen (TYLENOL) tablet 975 mg  975 mg Oral Q6H PRN    aluminum-magnesium hydroxide-simethicone (MYLANTA) oral suspension 30 mL  30 mL Oral Q4H PRN    artificial tear (LUBRIFRESH P M ) ophthalmic ointment 1 application  1 application Both Eyes R5F PRN    haloperidol lactate (HALDOL) injection 2 5 mg  2 5 mg Intramuscular Q6H PRN Max 4/day    And    LORazepam (ATIVAN) injection 1 mg  1 mg Intramuscular Q6H PRN Max 4/day    And    benztropine (COGENTIN) injection 0 5 mg  0 5 mg Intramuscular Q6H PRN Max 4/day    haloperidol lactate (HALDOL) injection 5 mg  5 mg Intramuscular Q4H PRN Max 4/day    And    LORazepam (ATIVAN) injection 2 mg  2 mg Intramuscular Q4H PRN Max 4/day    And    benztropine (COGENTIN) injection 1 mg  1 mg Intramuscular Q4H PRN Max 4/day    benztropine (COGENTIN) injection 1 mg  1 mg Intramuscular Q4H PRN Max 6/day  benztropine (COGENTIN) tablet 1 mg  1 mg Oral Q4H PRN Max 6/day    benztropine (COGENTIN) tablet 1 mg  1 mg Oral BID    bisacodyl (DULCOLAX) rectal suppository 10 mg  10 mg Rectal Daily PRN    divalproex sodium (DEPAKOTE ER) 24 hr tablet 750 mg  750 mg Oral BID    haloperidol (HALDOL) tablet 2 mg  2 mg Oral Q4H PRN Max 6/day    haloperidol (HALDOL) tablet 5 mg  5 mg Oral Q6H PRN Max 4/day    haloperidol (HALDOL) tablet 5 mg  5 mg Oral Q4H PRN Max 4/day    hydrOXYzine HCL (ATARAX) tablet 25 mg  25 mg Oral Q6H PRN Max 4/day    hydrOXYzine HCL (ATARAX) tablet 50 mg  50 mg Oral Q4H PRN Max 4/day    Or    LORazepam (ATIVAN) injection 1 mg  1 mg Intramuscular Q4H PRN    LORazepam (ATIVAN) tablet 1 mg  1 mg Oral Q4H PRN Max 6/day    Or    LORazepam (ATIVAN) injection 2 mg  2 mg Intramuscular Q6H PRN Max 3/day    melatonin tablet 9 mg  9 mg Oral HS    metFORMIN (GLUCOPHAGE) tablet 750 mg  750 mg Oral BID With Meals    polyethylene glycol (MIRALAX) packet 17 g  17 g Oral Daily PRN    risperiDONE (RisperDAL) tablet 2 mg  2 mg Oral BID    senna-docusate sodium (SENOKOT S) 8 6-50 mg per tablet 1 tablet  1 tablet Oral Daily PRN    traZODone (DESYREL) tablet 50 mg  50 mg Oral HS PRN       Behavioral Health Medications: all current active meds have been reviewed  Vitals:  Vitals:    07/10/22 0732   BP: 129/75   Pulse: 66   Resp: 17   Temp: (!) 97 4 °F (36 3 °C)   SpO2:        Laboratory results:  I have personally reviewed all pertinent laboratory/tests results      Mental Status Evaluation:  Appearance:  age appropriate   Behavior:  restless and fidgety   Speech:  normal pitch and normal volume   Mood:  "i am good"   Affect:  labile and mood-congruent   Thought Process:  concrete   Thought Content:  normal   Perceptual Disturbances: None   Risk Potential: Suicidal Ideations none, Homicidal Ideations none and Potential for Aggression No   Sensorium:  person, place and time/date   Cognition:  recent and remote memory grossly intact   Consciousness:  alert and awake    Attention: attention span appeared shorter than expected for age   Insight:  limited   Judgment: limited   Gait/Station: normal gait/station   Motor Activity: no abnormal movements     Progress Toward Goals: Progressing    Recommended Treatment: Continue with pharmacotherapy, group therapy, milieu therapy and occupational therapy  1 Valproic acid level    Risks, benefits and possible side effects of Medications:     Risks, benefits, and possible side effects of medications explained to patient and patient verbalizes understanding and agreement for treatment

## 2022-07-10 NOTE — NURSING NOTE
Patient appeared to sleep throughout the night, without noted periods of wakefulness  Patient appears to still be asleep at present time  Staff to continue to maintain Q7 minute observational checks to promote patient safety

## 2022-07-10 NOTE — PLAN OF CARE
Problem: DISCHARGE PLANNING  Goal: Discharge to home or other facility with appropriate resources  Description: INTERVENTIONS:  - Identify barriers to discharge w/patient and caregiver  - Arrange for needed discharge resources and transportation as appropriate  - Identify discharge learning needs (meds, wound care, etc )  - Arrange for interpretive services to assist at discharge as needed  - Refer to Case Management Department for coordinating discharge planning if the patient needs post-hospital services based on physician/advanced practitioner order or complex needs related to functional status, cognitive ability, or social support system  Outcome: Progressing     Problem: Ineffective Coping  Goal: Identifies ineffective coping skills  Outcome: Progressing  Goal: Identifies healthy coping skills  Outcome: Progressing     Problem: Risk for Self Injury/Neglect  Goal: Treatment Goal: Remain safe during length of stay, learn and adopt new coping skills, and be free of self-injurious ideation, impulses and acts at the time of discharge  Outcome: Progressing  Goal: Verbalize thoughts and feelings  Description: Interventions:  - Assess and re-assess patient's lethality and potential for self-injury  - Engage patient in 1:1 interactions, daily, for a minimum of 15 minutes  - Encourage patient to express feelings, fears, frustrations, hopes  - Establish rapport/trust with patient   Outcome: Progressing  Goal: Refrain from harming self  Description: Interventions:  - Monitor patient closely, per order  - Develop a trusting relationship  - Supervise medication ingestion, monitor effects and side effects   Outcome: Progressing     Problem: Depression  Goal: Treatment Goal: Demonstrate behavioral control of depressive symptoms, verbalize feelings of improved mood/affect, and adopt new coping skills prior to discharge  Outcome: Progressing  Goal: Complete daily ADLs, including personal hygiene independently, as able  Description: Interventions:  - Observe, teach, and assist patient with ADLS  -  Monitor and promote a balance of rest/activity, with adequate nutrition and elimination   Outcome: Progressing     Problem: Anxiety  Goal: Anxiety is at manageable level  Description: Interventions:  - Assess and monitor patient's anxiety level  - Monitor for signs and symptoms (heart palpitations, chest pain, shortness of breath, headaches, nausea, feeling jumpy, restlessness, irritable, apprehensive)  - Collaborate with interdisciplinary team and initiate plan and interventions as ordered    - [place] patient to unit/surroundings  - Explain treatment plan  - Encourage participation in care  - Encourage verbalization of concerns/fears  - Identify coping mechanisms  - Assist in developing anxiety-reducing skills  - Administer/offer alternative therapies  - Limit or eliminate stimulants  Outcome: Progressing     Problem: Risk for Violence/Aggression Toward Others  Goal: Treatment Goal: Refrain from acts of violence/aggression during length of stay, and demonstrate improved impulse control at the time of discharge  Outcome: Progressing  Goal: Refrain from harming others  Outcome: Progressing     Problem: Ineffective Coping  Goal: Participates in unit activities  Description: Interventions:  - Provide therapeutic environment   - Provide required programming   - Redirect inappropriate behaviors   Outcome: Progressing

## 2022-07-10 NOTE — NURSING NOTE
Pt napped this afternoon, awake and walking halls, social with peers and staff  Denies SI, HI, AVH  Reports speaking with mom and dad today, good conversations  No questions at this time

## 2022-07-10 NOTE — TREATMENT TEAM
07/10/22 0800   Team Meeting   Meeting Type Daily Rounds   Team Members Present   Team Members Present Physician;Nurse   Physician Team Member Dr Sheila Hunter Team Member Ivett Cotton   Patient/Family Present   Patient Present No   Patient's Family Present No     Daily Rounds: Pt bright, social   Denies SI/HI/AVH

## 2022-07-11 LAB — VALPROATE SERPL-MCNC: 64 UG/ML (ref 50–100)

## 2022-07-11 PROCEDURE — 80164 ASSAY DIPROPYLACETIC ACD TOT: CPT | Performed by: STUDENT IN AN ORGANIZED HEALTH CARE EDUCATION/TRAINING PROGRAM

## 2022-07-11 PROCEDURE — 99232 SBSQ HOSP IP/OBS MODERATE 35: CPT | Performed by: STUDENT IN AN ORGANIZED HEALTH CARE EDUCATION/TRAINING PROGRAM

## 2022-07-11 RX ADMIN — RISPERIDONE 2 MG: 2 TABLET ORAL at 08:43

## 2022-07-11 RX ADMIN — DIVALPROEX SODIUM 750 MG: 250 TABLET, FILM COATED, EXTENDED RELEASE ORAL at 08:43

## 2022-07-11 RX ADMIN — METFORMIN HYDROCHLORIDE 750 MG: 500 TABLET ORAL at 16:50

## 2022-07-11 RX ADMIN — METFORMIN HYDROCHLORIDE 750 MG: 500 TABLET ORAL at 08:43

## 2022-07-11 RX ADMIN — BENZTROPINE MESYLATE 1 MG: 1 TABLET ORAL at 17:59

## 2022-07-11 RX ADMIN — RISPERIDONE 2 MG: 2 TABLET ORAL at 17:59

## 2022-07-11 RX ADMIN — DIVALPROEX SODIUM 750 MG: 250 TABLET, FILM COATED, EXTENDED RELEASE ORAL at 21:22

## 2022-07-11 RX ADMIN — Medication 9 MG: at 21:22

## 2022-07-11 RX ADMIN — BENZTROPINE MESYLATE 1 MG: 1 TABLET ORAL at 08:43

## 2022-07-11 NOTE — PROGRESS NOTES
Progress Note - Behavioral Health   Heydi Espana 33 y o  male MRN: [id number]  Unit/Bed#: [place] 329679-56 Encounter: [id number]    Assessment/Plan   Principal Problem:    Schizoaffective disorder, bipolar type ([facility] 75 )  Active Problems:    Intellectual disability    Impulse control disorder    BMI 45 0-49 9, adult ([facility] 75 )    Medical clearance for psychiatric admission    Prediabetes    Hyperlipemia      Subjective: Patient was seen, chart reviewed and case discussed with team  As per report patient was wobbly and reported numbness in leg, vitals were WNL  Patient appears pleasant, talkative and wandering halls  Denies any voices  Denies SI/HI  He is compliant with medications  Denies any side effects     Psychiatric Review of Systems:  Behavior over the last 24 hours:  improved  Sleep: normal  Appetite: normal  Medication side effects: No  ROS: no complaints, all others negative    Current Medications:  Current Facility-Administered Medications   Medication Dose Route Frequency    acetaminophen (TYLENOL) tablet 650 mg  650 mg Oral Q6H PRN    acetaminophen (TYLENOL) tablet 650 mg  650 mg Oral Q4H PRN    acetaminophen (TYLENOL) tablet 975 mg  975 mg Oral Q6H PRN    aluminum-magnesium hydroxide-simethicone (MYLANTA) oral suspension 30 mL  30 mL Oral Q4H PRN    artificial tear (LUBRIFRESH P M ) ophthalmic ointment 1 application  1 application Both Eyes R5E PRN    haloperidol lactate (HALDOL) injection 2 5 mg  2 5 mg Intramuscular Q6H PRN Max 4/day    And    LORazepam (ATIVAN) injection 1 mg  1 mg Intramuscular Q6H PRN Max 4/day    And    benztropine (COGENTIN) injection 0 5 mg  0 5 mg Intramuscular Q6H PRN Max 4/day    haloperidol lactate (HALDOL) injection 5 mg  5 mg Intramuscular Q4H PRN Max 4/day    And    LORazepam (ATIVAN) injection 2 mg  2 mg Intramuscular Q4H PRN Max 4/day    And    benztropine (COGENTIN) injection 1 mg  1 mg Intramuscular Q4H PRN Max 4/day    benztropine (COGENTIN) injection 1 mg  1 mg Intramuscular Q4H PRN Max 6/day    benztropine (COGENTIN) tablet 1 mg  1 mg Oral Q4H PRN Max 6/day    benztropine (COGENTIN) tablet 1 mg  1 mg Oral BID    bisacodyl (DULCOLAX) rectal suppository 10 mg  10 mg Rectal Daily PRN    divalproex sodium (DEPAKOTE ER) 24 hr tablet 750 mg  750 mg Oral BID    haloperidol (HALDOL) tablet 2 mg  2 mg Oral Q4H PRN Max 6/day    haloperidol (HALDOL) tablet 5 mg  5 mg Oral Q6H PRN Max 4/day    haloperidol (HALDOL) tablet 5 mg  5 mg Oral Q4H PRN Max 4/day    hydrOXYzine HCL (ATARAX) tablet 25 mg  25 mg Oral Q6H PRN Max 4/day    hydrOXYzine HCL (ATARAX) tablet 50 mg  50 mg Oral Q4H PRN Max 4/day    Or    LORazepam (ATIVAN) injection 1 mg  1 mg Intramuscular Q4H PRN    LORazepam (ATIVAN) tablet 1 mg  1 mg Oral Q4H PRN Max 6/day    Or    LORazepam (ATIVAN) injection 2 mg  2 mg Intramuscular Q6H PRN Max 3/day    melatonin tablet 9 mg  9 mg Oral HS    metFORMIN (GLUCOPHAGE) tablet 750 mg  750 mg Oral BID With Meals    polyethylene glycol (MIRALAX) packet 17 g  17 g Oral Daily PRN    risperiDONE (RisperDAL) tablet 2 mg  2 mg Oral BID    senna-docusate sodium (SENOKOT S) 8 6-50 mg per tablet 1 tablet  1 tablet Oral Daily PRN    traZODone (DESYREL) tablet 50 mg  50 mg Oral HS PRN       Behavioral Health Medications: all current active meds have been reviewed  Vitals:  Vitals:    07/11/22 0742   BP: 133/73   Pulse: 66   Resp: 16   Temp: 98 8 °F (37 1 °C)   SpO2:        Laboratory results:  I have personally reviewed all pertinent laboratory/tests results      Mental Status Evaluation:  Appearance:  age appropriate   Behavior:  restless and fidgety   Speech:  loud   Mood:  "i am good"   Affect:  labile and mood-congruent   Thought Process:  concrete and goal directed   Thought Content:  normal   Perceptual Disturbances: None   Risk Potential: Suicidal Ideations none, Homicidal Ideations none and Potential for Aggression No   Sensorium:  person, place, time/date, situation, day of week, month of year and year   Cognition:  recent and remote memory grossly intact   Consciousness:  alert and awake    Attention: attention span appeared shorter than expected for age   Insight:  limited   Judgment: limited   Gait/Station: normal gait/station   Motor Activity: no abnormal movements     Progress Toward Goals: Progressing    Recommended Treatment: Continue with pharmacotherapy, group therapy, milieu therapy and occupational therapy  1 Valproic acid level 64    Risks, benefits and possible side effects of Medications:     Risks, benefits, and possible side effects of medications explained to patient and patient verbalizes understanding and agreement for treatment

## 2022-07-11 NOTE — NURSING NOTE
Pleasant and friendly  Wandering the halls  Compliant with medications and breakfast   Denies Si/HI and hallucinations

## 2022-07-11 NOTE — NURSING NOTE
Pt sitting in lounge during snack, stood up became wobbly  Pt was seated  Pt stated " my foot went numb"  /67(93)- HR 94  BGM 95  Pt recovered once foot started to have feeling

## 2022-07-11 NOTE — TREATMENT TEAM
07/11/22 1000   Activity/Group Checklist   Group Community meeting   Attendance Attended   Attendance Duration (min) 16-30   Interactions Interacted appropriately   Affect/Mood Appropriate   Goals Achieved Able to engage in interactions; Able to listen to others     Check ins and schedule review was completed at the beginning of the session  Patient fully participated in community meeting  He worked on his goal card and participated in the ice breaker activity  He shared his goals during group discussion  Goal cards collected to be distributed to appropriate staff to review during wrap-up group

## 2022-07-11 NOTE — PLAN OF CARE
Problem: Ineffective Coping  Goal: Identifies ineffective coping skills  Outcome: Progressing  Goal: Identifies healthy coping skills  Outcome: Progressing     Problem: Risk for Self Injury/Neglect  Goal: Treatment Goal: Remain safe during length of stay, learn and adopt new coping skills, and be free of self-injurious ideation, impulses and acts at the time of discharge  Outcome: Progressing  Goal: Verbalize thoughts and feelings  Description: Interventions:  - Assess and re-assess patient's lethality and potential for self-injury  - Engage patient in 1:1 interactions, daily, for a minimum of 15 minutes  - Encourage patient to express feelings, fears, frustrations, hopes  - Establish rapport/trust with patient   Outcome: Progressing  Goal: Refrain from harming self  Description: Interventions:  - Monitor patient closely, per order  - Develop a trusting relationship  - Supervise medication ingestion, monitor effects and side effects   Outcome: Progressing     Problem: Anxiety  Goal: Anxiety is at manageable level  Description: Interventions:  - Assess and monitor patient's anxiety level  - Monitor for signs and symptoms (heart palpitations, chest pain, shortness of breath, headaches, nausea, feeling jumpy, restlessness, irritable, apprehensive)  - Collaborate with interdisciplinary team and initiate plan and interventions as ordered    - [place] patient to unit/surroundings  - Explain treatment plan  - Encourage participation in care  - Encourage verbalization of concerns/fears  - Identify coping mechanisms  - Assist in developing anxiety-reducing skills  - Administer/offer alternative therapies  - Limit or eliminate stimulants  Outcome: Progressing     Problem: Risk for Violence/Aggression Toward Others  Goal: Treatment Goal: Refrain from acts of violence/aggression during length of stay, and demonstrate improved impulse control at the time of discharge  Outcome: Progressing  Goal: Refrain from harming others  Outcome: Progressing

## 2022-07-11 NOTE — PROGRESS NOTES
07/11/22 7719  [address] [facility]   Team Meeting   Meeting Type Daily Rounds   Team Members Present   Team Members Present Physician;Nurse;   Physician Team Member Dr Kaylynn Mckay Team Member [facility] Management Team Member Tuan   Patient/Family Present   Patient Present No   Patient's Family Present No   Pt is medication compliant  Denying SI/HI  No behavioral issues  Discharge pending for this week

## 2022-07-11 NOTE — NURSING NOTE
Pt remains pleasant and cooperative  Denies SI/HI, AVH, anxiety and depression  Pt states he has been enjoying attending groups  States he has been sleeping well  Denies any questions

## 2022-07-11 NOTE — PLAN OF CARE
Worker spoke with Pt's group home to advise of tentative d/c for Wednesday  Group home confirmed they are okay with this and requested medication be sent to [facility]  Group home will pick pt up at 3pm on Wednesday once d/c is confirmed     Problem: DISCHARGE PLANNING  Goal: Discharge to home or other facility with appropriate resources  Description: INTERVENTIONS:  - Identify barriers to discharge w/patient and caregiver  - Arrange for needed discharge resources and transportation as appropriate  - Identify discharge learning needs (meds, wound care, etc )  - Arrange for interpretive services to assist at discharge as needed  - Refer to Case Management Department for coordinating discharge planning if the patient needs post-hospital services based on physician/advanced practitioner order or complex needs related to functional status, cognitive ability, or social support system  Outcome: Progressing

## 2022-07-12 PROBLEM — Z00.8 MEDICAL CLEARANCE FOR PSYCHIATRIC ADMISSION: Status: RESOLVED | Noted: 2021-06-22 | Resolved: 2022-07-12

## 2022-07-12 PROCEDURE — 99232 SBSQ HOSP IP/OBS MODERATE 35: CPT | Performed by: STUDENT IN AN ORGANIZED HEALTH CARE EDUCATION/TRAINING PROGRAM

## 2022-07-12 RX ORDER — LANOLIN ALCOHOL/MO/W.PET/CERES
9 CREAM (GRAM) TOPICAL
Qty: 90 TABLET | Refills: 1 | Status: SHIPPED | OUTPATIENT
Start: 2022-07-12 | End: 2022-09-10

## 2022-07-12 RX ORDER — BENZTROPINE MESYLATE 1 MG/1
1 TABLET ORAL 2 TIMES DAILY
Qty: 60 TABLET | Refills: 1 | Status: SHIPPED | OUTPATIENT
Start: 2022-07-12 | End: 2022-09-10

## 2022-07-12 RX ORDER — RISPERIDONE 2 MG/1
2 TABLET, FILM COATED ORAL 2 TIMES DAILY
Qty: 60 TABLET | Refills: 1 | Status: SHIPPED | OUTPATIENT
Start: 2022-07-12 | End: 2022-09-10

## 2022-07-12 RX ORDER — DIVALPROEX SODIUM 250 MG/1
750 TABLET, EXTENDED RELEASE ORAL 2 TIMES DAILY
Qty: 180 TABLET | Refills: 1 | Status: SHIPPED | OUTPATIENT
Start: 2022-07-12 | End: 2022-09-10

## 2022-07-12 RX ADMIN — METFORMIN HYDROCHLORIDE 750 MG: 500 TABLET ORAL at 16:13

## 2022-07-12 RX ADMIN — DIVALPROEX SODIUM 750 MG: 250 TABLET, FILM COATED, EXTENDED RELEASE ORAL at 21:09

## 2022-07-12 RX ADMIN — METFORMIN HYDROCHLORIDE 750 MG: 500 TABLET ORAL at 08:20

## 2022-07-12 RX ADMIN — RISPERIDONE 2 MG: 2 TABLET ORAL at 17:37

## 2022-07-12 RX ADMIN — RISPERIDONE 2 MG: 2 TABLET ORAL at 08:21

## 2022-07-12 RX ADMIN — BENZTROPINE MESYLATE 1 MG: 1 TABLET ORAL at 08:21

## 2022-07-12 RX ADMIN — Medication 9 MG: at 21:09

## 2022-07-12 RX ADMIN — DIVALPROEX SODIUM 750 MG: 250 TABLET, FILM COATED, EXTENDED RELEASE ORAL at 08:21

## 2022-07-12 RX ADMIN — BENZTROPINE MESYLATE 1 MG: 1 TABLET ORAL at 17:37

## 2022-07-12 NOTE — NURSING NOTE
Restless throughout the morning, wandering the halls  Compliant with breakfast and medications  ADLs completed  Denies hallucinations  Inquired about discharge  Discussed coping skills to manage his behaviors at home and verbalized plan to walk away from roommate when the situation becomes difficult  Friendly and happy on the unit

## 2022-07-12 NOTE — PLAN OF CARE
Worker spoke with Pt's , Angeli Wiggins, regarding d/c for tomorrow  Angeli Wiggins in agreement and will pick Pt up at 3pm  Group home requested Pt's medications be sent to [address] [place] [address] today     Problem: DISCHARGE PLANNING  Goal: Discharge to home or other facility with appropriate resources  Description: INTERVENTIONS:  - Identify barriers to discharge w/patient and caregiver  - Arrange for needed discharge resources and transportation as appropriate  - Identify discharge learning needs (meds, wound care, etc )  - Arrange for interpretive services to assist at discharge as needed  - Refer to Case Management Department for coordinating discharge planning if the patient needs post-hospital services based on physician/advanced practitioner order or complex needs related to functional status, cognitive ability, or social support system  Outcome: Progressing

## 2022-07-12 NOTE — TREATMENT TEAM
07/11/22 1420   Activity/Group Checklist   Group Admission/Discharge   Attendance Attended   Attendance Duration (min) 0-15   Interactions Interacted appropriately   Affect/Mood Appropriate   Goals Achieved Able to engage in interactions     Pt filled out the relapse prevention plan form with the assistance of this therapist  Once completed he had no questions and he expressed readiness for discharge

## 2022-07-12 NOTE — BH TRANSITION RECORD
Contact Information: If you have any questions, concerns, pended studies, tests and/or procedures, or emergencies regarding your inpatient behavioral health visit  Please contact [address] [place] [address] behavioral health unit [phone] and ask to speak to a , nurse or physician  A contact is available 24 hours/ 7 days a week at this number  Summary of Procedures Performed During your Stay:  Below is a list of major procedures performed during your hospital stay and a summary of results:  - Cardiac Procedures/Studies: ekg  - Major Imaging Studies: xr R wrist, xr R hand, xr L humerus  Pending Studies (From admission, onward)    None        If studies are pending at discharge, follow up with your PCP and/or referring provider

## 2022-07-12 NOTE — PROGRESS NOTES
07/12/22 0841   Team Meeting   Meeting Type Daily Rounds   Team Members Present   Team Members Present Physician;Nurse;   Physician Team Member Dr Carolyn Gilman Team Member [facility] Management Team Member Tuan   Patient/Family Present   Patient Present No   Patient's Family Present No   Pt is medication compliant, pleasant and cooperative  Pt denying all symptoms  D/C scheduled for tomorrow back to [facility]

## 2022-07-12 NOTE — PROGRESS NOTES
Progress Note - [id number] [facility] 33 y o  male MRN: [id number]   Unit/Bed#: [facility] 966-15 Encounter: [id number]    Behavior over the last 24 hours: chris Ott is a 60-year-old male with a history of schizoaffective disorder bipolar type who presents for psychiatric follow-up  Staff reports no issues overnight  He is pleasant, calm and cooperative upon approach  Affect is brighter and mood is improved  He has been bright, visible and social in the milieu  Reports the medications have improved his auditory hallucinations, has not heard any voices since last week  He does not appear preoccupied  Tolerating well, denying any side effects  No SI or HI  No AH or VH  He is looking forward to discharge tomorrow      Sleep: normal  Appetite: normal  Medication side effects: No   ROS: all other systems are negative    Mental Status Evaluation:    Appearance:  age appropriate, adequate grooming, wearing hospital clothes   Behavior:  pleasant, cooperative, calm   Speech:  garbled   Mood:  euthymic   Affect:  brighter   Thought Process:  organized   Associations: concrete associations   Thought Content:  no overt delusions   Perceptual Disturbances: no auditory hallucinations, no visual hallucinations, does not appear responding to internal stimuli   Risk Potential: Suicidal ideation - None at present  Homicidal ideation - None at present  Potential for aggression - No   Sensorium:  oriented to person, place and time/date   Memory:  recent and remote memory grossly intact   Consciousness:  alert and awake   Attention/Concentration: attention span and concentration appear shorter than expected for age   Insight:  fair   Judgment: fair   Gait/Station: normal gait/station   Motor Activity: no abnormal movements     Vital signs in last 24 hours:    Temp:  [98 °F (36 7 °C)-98 3 °F (36 8 °C)] 98 3 °F (36 8 °C)  HR:  [75-84] 75  Resp:  [18-19] 18  BP: (128-138)/(68-84) 138/84    Laboratory results: I have personally reviewed all pertinent laboratory/tests results    Results from the past 24 hours: No results found for this or any previous visit (from the past 24 hour(s))  Most Recent Labs:   Lab Results   Component Value Date    WBC 7 95 07/07/2022    RBC 4 84 07/07/2022    HGB 14 7 07/07/2022    HCT 43 6 07/07/2022     07/07/2022    RDW 13 4 07/07/2022    NEUTROABS 3 47 07/07/2022    SODIUM 138 07/07/2022    K 3 6 07/07/2022     07/07/2022    CO2 27 07/07/2022    BUN 14 07/07/2022    CREATININE 0 65 07/07/2022    GLUC 90 07/07/2022    CALCIUM 8 9 07/07/2022    AST 22 07/07/2022    ALT 49 07/07/2022    ALKPHOS 51 07/07/2022    TP 7 0 07/07/2022    ALB 3 3 (L) 07/07/2022    TBILI 0 40 07/07/2022    CHOLESTEROL 188 07/07/2022    HDL 32 (L) 07/07/2022    TRIG 249 (H) 07/07/2022    LDLCALC 106 (H) 07/07/2022    [place] 156 07/07/2022    VALPROICTOT 64 07/11/2022    AMMONIA 27 01/13/2018    ERS8RLEGSATV 2 190 07/07/2022    FREET4 0 99 02/14/2019    T3FREE 3 03 03/24/2017    PREGSERUM Negative 06/22/2021    RPR Non-Reactive 07/07/2022    HGBA1C 4 8 07/07/2022    EAG 91 07/07/2022       Progress Toward Goals: progressing, working on coping skills, discharge planning    Assessment/Plan   Principal Problem:    Schizoaffective disorder, bipolar type ([facility] 75 )  Active Problems:    Intellectual disability    Impulse control disorder    BMI 45 0-49 9, adult ([facility] 75 )    Medical clearance for psychiatric admission    Prediabetes    Hyperlipemia      Recommended Treatment:     Planned medication and treatment changes:     All current active medications have been reviewed  Encourage group therapy, milieu therapy and occupational therapy  Behavioral Health checks every 7 minutes  Continue current medications:    Current Facility-Administered Medications   Medication Dose Route Frequency Provider Last Rate    acetaminophen  650 mg Oral Q6H PRN OLIVIER PerezC      acetaminophen  650 mg Oral Q4H PRN Britta Odell, PA-C      acetaminophen  975 mg Oral Q6H PRN Theresa Nelsy, PA-C      aluminum-magnesium hydroxide-simethicone  30 mL Oral Q4H PRN Theresa Nelsy, PA-C      artificial tear  1 application Both Eyes W7T PRN Theresa Nelsy, PA-C      haloperidol lactate  2 5 mg Intramuscular Q6H PRN Max 4/day Theresa Nelsy, PA-C      And    LORazepam  1 mg Intramuscular Q6H PRN Max 4/day Theresa Nelsy, PA-C      And    benztropine  0 5 mg Intramuscular Q6H PRN Max 4/day Theresa Nelsy, PA-C      haloperidol lactate  5 mg Intramuscular Q4H PRN Max 4/day Theresa Nelsy, PA-C      And    LORazepam  2 mg Intramuscular Q4H PRN Max 4/day Theresa Nelsy, PA-C      And    benztropine  1 mg Intramuscular Q4H PRN Max 4/day Theresa Nelsy, PA-C      benztropine  1 mg Intramuscular Q4H PRN Max 6/day Theresa Nelsy, PA-C      benztropine  1 mg Oral Q4H PRN Max 6/day Theresa Nelsy, PA-C      benztropine  1 mg Oral BID Abril Hahn MD      bisacodyl  10 mg Rectal Daily PRN Theresa Nelsy, PA-C      divalproex sodium  750 mg Oral BID Theresa Nelsy, PA-C      haloperidol  2 mg Oral Q4H PRN Max 6/day Theresa Nelsy, PA-C      haloperidol  5 mg Oral Q6H PRN Max 4/day Theresa Nelsy, PA-C      haloperidol  5 mg Oral Q4H PRN Max 4/day Theresa Nelsy, PA-C      hydrOXYzine HCL  25 mg Oral Q6H PRN Max 4/day Theresa Nelsy, PA-C      hydrOXYzine HCL  50 mg Oral Q4H PRN Max 4/day Theresa Nelsy, PA-C      Or    LORazepam  1 mg Intramuscular Q4H PRN Theresa Nelsy, PA-C      LORazepam  1 mg Oral Q4H PRN Max 6/day Theresa Nelsy, PA-C      Or    LORazepam  2 mg Intramuscular Q6H PRN Max 3/day Theresa Nelsy, PA-C      melatonin  9 mg Oral HS Abril Hahn MD      metFORMIN  750 mg Oral BID With Meals Abril Hahn MD      polyethylene glycol  17 g Oral Daily PRN Theresa Whittington PA-C      risperiDONE  2 mg Oral BID Theresa Whittington PA-C      senna-docusate sodium  1 tablet Oral Daily PRN Rajiv Johnson PA-C      traZODone  50 mg Oral HS PRN Rajiv Johnson PA-C       Risks / Benefits of Treatment:    Risks, benefits, and possible side effects of medications explained to patient and patient verbalizes understanding and agreement for treatment  Counseling / Coordination of Care:    Patient's progress discussed with staff in treatment team meeting  Medications, treatment progress and treatment plan reviewed with patient      Rajiv Johnson PA-C 07/12/22

## 2022-07-12 NOTE — NURSING NOTE
Marcia Epperson is pleasant upon approach and frequently ambulates in the hallway  Patient exhibits a bright affect, reports, "I get to leave tomorrow!" Marcia Epperson is social with select peers and staff members, makes jokes and reports feeling, "   just great!" Patient attends scheduled groups on the unit, with appropriate participation  Marcia Epperson denies current SI/HI/AH/VH, reviewed coping skills with patient  Patient encouraged to seek staff with any issues and/or concerns, verbalized understanding  Patient made aware of staff availability for support
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
## 2022-07-12 NOTE — PLAN OF CARE
Problem: DISCHARGE PLANNING  Goal: Discharge to home or other facility with appropriate resources  Description: INTERVENTIONS:  - Identify barriers to discharge w/patient and caregiver  - Arrange for needed discharge resources and transportation as appropriate  - Identify discharge learning needs (meds, wound care, etc )  - Arrange for interpretive services to assist at discharge as needed  - Refer to Case Management Department for coordinating discharge planning if the patient needs post-hospital services based on physician/advanced practitioner order or complex needs related to functional status, cognitive ability, or social support system  Outcome: Progressing     Problem: Ineffective Coping  Goal: Identifies ineffective coping skills  Outcome: Progressing  Goal: Identifies healthy coping skills  Outcome: Progressing     Problem: Risk for Self Injury/Neglect  Goal: Treatment Goal: Remain safe during length of stay, learn and adopt new coping skills, and be free of self-injurious ideation, impulses and acts at the time of discharge  Outcome: Progressing  Goal: Verbalize thoughts and feelings  Description: Interventions:  - Assess and re-assess patient's lethality and potential for self-injury  - Engage patient in 1:1 interactions, daily, for a minimum of 15 minutes  - Encourage patient to express feelings, fears, frustrations, hopes  - Establish rapport/trust with patient   Outcome: Progressing  Goal: Refrain from harming self  Description: Interventions:  - Monitor patient closely, per order  - Develop a trusting relationship  - Supervise medication ingestion, monitor effects and side effects   Outcome: Progressing     Problem: Depression  Goal: Treatment Goal: Demonstrate behavioral control of depressive symptoms, verbalize feelings of improved mood/affect, and adopt new coping skills prior to discharge  Outcome: Progressing  Goal: Complete daily ADLs, including personal hygiene independently, as able  Description: Interventions:  - Observe, teach, and assist patient with ADLS  -  Monitor and promote a balance of rest/activity, with adequate nutrition and elimination   Outcome: Progressing     Problem: Anxiety  Goal: Anxiety is at manageable level  Description: Interventions:  - Assess and monitor patient's anxiety level  - Monitor for signs and symptoms (heart palpitations, chest pain, shortness of breath, headaches, nausea, feeling jumpy, restlessness, irritable, apprehensive)  - Collaborate with interdisciplinary team and initiate plan and interventions as ordered    - [place] patient to unit/surroundings  - Explain treatment plan  - Encourage participation in care  - Encourage verbalization of concerns/fears  - Identify coping mechanisms  - Assist in developing anxiety-reducing skills  - Administer/offer alternative therapies  - Limit or eliminate stimulants  Outcome: Progressing     Problem: Risk for Violence/Aggression Toward Others  Goal: Treatment Goal: Refrain from acts of violence/aggression during length of stay, and demonstrate improved impulse control at the time of discharge  Outcome: Progressing  Goal: Refrain from harming others  Outcome: Progressing

## 2022-07-13 VITALS
WEIGHT: 287.48 LBS | HEART RATE: 113 BPM | OXYGEN SATURATION: 98 % | BODY MASS INDEX: 46.2 KG/M2 | HEIGHT: 66 IN | SYSTOLIC BLOOD PRESSURE: 153 MMHG | DIASTOLIC BLOOD PRESSURE: 89 MMHG | TEMPERATURE: 98 F | RESPIRATION RATE: 18 BRPM

## 2022-07-13 PROCEDURE — 99238 HOSP IP/OBS DSCHRG MGMT 30/<: CPT | Performed by: STUDENT IN AN ORGANIZED HEALTH CARE EDUCATION/TRAINING PROGRAM

## 2022-07-13 RX ADMIN — BENZTROPINE MESYLATE 1 MG: 1 TABLET ORAL at 08:18

## 2022-07-13 RX ADMIN — METFORMIN HYDROCHLORIDE 750 MG: 500 TABLET ORAL at 16:38

## 2022-07-13 RX ADMIN — DIVALPROEX SODIUM 750 MG: 250 TABLET, FILM COATED, EXTENDED RELEASE ORAL at 08:18

## 2022-07-13 RX ADMIN — RISPERIDONE 2 MG: 2 TABLET ORAL at 08:18

## 2022-07-13 RX ADMIN — METFORMIN HYDROCHLORIDE 750 MG: 500 TABLET ORAL at 08:18

## 2022-07-13 NOTE — CASE MANAGEMENT
Case Management Discharge Planning Note    Patient name Sandee Cooks  Location [facility] 261/-86 MRN [id number]  : 1995 Date 2022       Current Admission Date: 2022  Current Admission Diagnosis:Schizoaffective disorder, bipolar type [facility])   Patient Active Problem List    Diagnosis Date Noted    Prediabetes 2022    Hyperlipemia 2022    Non-compliance with treatment 2020    LORI (obstructive sleep apnea)     Ingrown left big toenail 2019    Encounter for examination and observation for other specified reasons     Fever 2018    Tachycardia 2018    Hypokalemia 2018    Hypertension 2018    [facility] 45 0-49 9, [facility] 2017    Impulse control disorder 2016    Schizoaffective disorder, bipolar type ([facility] ) 2016    Intellectual disability 2016      LOS (days): 7  Geometric Mean LOS (GMLOS) (days):   Days to GMLOS:     OBJECTIVE:  Risk of Unplanned Readmission Score: 20 49         Current admission status: Inpatient Psych   Preferred Pharmacy:   [id number]  [address] [place] [address]  Phone: [phone] Fax: [facility], [address] [facility]  [address] [place] [address]  Phone: [phone] Fax: [address], [address] [place] [address]  Phone: [phone] Fax: [phone]    Primary Care Provider: Juan Villarreal MD    Primary Insurance: MEDICARE  Secondary Insurance: [address] [place] [address]    DISCHARGE DETAILS: Pt is being discharged today, escorted home via group home  Discharge address [address]  Pt to follow-up with [facility] on  at 11:00am with therapist Arun Roque and on 8/3 at 1:30pm with Dr Sari Golden  Pt has a PCP appointment for  at 10:20am   Pt to follow-up with [facility] through [facility]    Pt's medications were sent to [facility]  Pt denies all symptoms and is in agreement with discharge

## 2022-07-13 NOTE — PROGRESS NOTES
07/13/22 0811   Team Meeting   Meeting Type Daily Rounds   Team Members Present   Team Members Present Physician;Nurse;   Physician Team Member Dr Deena Martin Team Member [facility] Management Team Member Chani   Patient/Family Present   Patient Present No   Patient's Family Present No   Pt to be discharged today at 57 with group home

## 2022-07-13 NOTE — NURSING NOTE
Awake, walking the halls, friendly with staff and peers  Excited for discharge  Denies SI/HI and hallucinations  ADLs completed  Compliant with meals and medications  Encouraged compliance with medications and OP appointments upon discharge  No questions or concerns

## 2022-07-13 NOTE — PLAN OF CARE
Problem: DISCHARGE PLANNING  Goal: Discharge to home or other facility with appropriate resources  Description: INTERVENTIONS:  - Identify barriers to discharge w/patient and caregiver  - Arrange for needed discharge resources and transportation as appropriate  - Identify discharge learning needs (meds, wound care, etc )  - Arrange for interpretive services to assist at discharge as needed  - Refer to Case Management Department for coordinating discharge planning if the patient needs post-hospital services based on physician/advanced practitioner order or complex needs related to functional status, cognitive ability, or social support system  Outcome: Adequate for Discharge     Problem: Ineffective Coping  Goal: Identifies ineffective coping skills  Outcome: Adequate for Discharge  Goal: Identifies healthy coping skills  Outcome: Adequate for Discharge     Problem: Risk for Self Injury/Neglect  Goal: Treatment Goal: Remain safe during length of stay, learn and adopt new coping skills, and be free of self-injurious ideation, impulses and acts at the time of discharge  Outcome: Adequate for Discharge  Goal: Verbalize thoughts and feelings  Description: Interventions:  - Assess and re-assess patient's lethality and potential for self-injury  - Engage patient in 1:1 interactions, daily, for a minimum of 15 minutes  - Encourage patient to express feelings, fears, frustrations, hopes  - Establish rapport/trust with patient   Outcome: Adequate for Discharge  Goal: Refrain from harming self  Description: Interventions:  - Monitor patient closely, per order  - Develop a trusting relationship  - Supervise medication ingestion, monitor effects and side effects   Outcome: Adequate for Discharge     Problem: Depression  Goal: Treatment Goal: Demonstrate behavioral control of depressive symptoms, verbalize feelings of improved mood/affect, and adopt new coping skills prior to discharge  Outcome: Adequate for Discharge  Goal: Complete daily ADLs, including personal hygiene independently, as able  Description: Interventions:  - Observe, teach, and assist patient with ADLS  -  Monitor and promote a balance of rest/activity, with adequate nutrition and elimination   Outcome: Adequate for Discharge     Problem: Anxiety  Goal: Anxiety is at manageable level  Description: Interventions:  - Assess and monitor patient's anxiety level  - Monitor for signs and symptoms (heart palpitations, chest pain, shortness of breath, headaches, nausea, feeling jumpy, restlessness, irritable, apprehensive)  - Collaborate with interdisciplinary team and initiate plan and interventions as ordered  - [place] patient to unit/surroundings  - Explain treatment plan  - Encourage participation in care  - Encourage verbalization of concerns/fears  - Identify coping mechanisms  - Assist in developing anxiety-reducing skills  - Administer/offer alternative therapies  - Limit or eliminate stimulants  Outcome: Adequate for Discharge     Problem: Risk for Violence/Aggression Toward Others  Goal: Treatment Goal: Refrain from acts of violence/aggression during length of stay, and demonstrate improved impulse control at the time of discharge  Outcome: Adequate for Discharge  Goal: Refrain from harming others  Outcome: Adequate for Discharge     Problem: Ineffective Coping  Goal: Participates in unit activities  Description: Interventions:  - Provide therapeutic environment   - Provide required programming   - Redirect inappropriate behaviors   Outcome: Adequate for Discharge     EXpressed readiness for discharge

## 2023-01-09 ENCOUNTER — HOSPITAL ENCOUNTER (EMERGENCY)
Facility: HOSPITAL | Age: 28
Discharge: HOME | End: 2023-01-09
Attending: EMERGENCY MEDICINE

## 2023-01-09 DIAGNOSIS — R46.89 AGGRESSIVE BEHAVIOR: Primary | ICD-10-CM

## 2023-01-09 RX ORDER — MELATONIN
2000 DAILY
COMMUNITY

## 2023-01-09 RX ORDER — ACETAMINOPHEN 325 MG/1
650 TABLET ORAL EVERY 6 HOURS PRN
COMMUNITY

## 2023-01-09 RX ORDER — LEVOCETIRIZINE DIHYDROCHLORIDE 5 MG/1
5 TABLET, FILM COATED ORAL EVERY EVENING
COMMUNITY
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
## 2023-01-10 NOTE — ED NOTES
Pt changed into paper scrubs and belongings removed from room  Pts belongings include 1 pair of pants, 1 blue shirt, 1 black sweater jacket, and 1 pair of shoes   Pt belongings placed in [facility]  01/09/23 [id number] Jimmie Culp
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
## 2023-01-13 VITALS
WEIGHT: 286.6 LBS | RESPIRATION RATE: 18 BRPM | OXYGEN SATURATION: 100 % | TEMPERATURE: 98.3 F | SYSTOLIC BLOOD PRESSURE: 139 MMHG | DIASTOLIC BLOOD PRESSURE: 86 MMHG | HEART RATE: 94 BPM | BODY MASS INDEX: 46.26 KG/M2

## 2023-02-04 ENCOUNTER — APPOINTMENT (EMERGENCY)
Dept: RADIOLOGY | Facility: HOSPITAL | Age: 28
End: 2023-02-04

## 2023-02-04 ENCOUNTER — HOSPITAL ENCOUNTER (EMERGENCY)
Facility: HOSPITAL | Age: 28
Discharge: HOME | End: 2023-02-04
Attending: EMERGENCY MEDICINE

## 2023-02-04 VITALS
HEART RATE: 121 BPM | SYSTOLIC BLOOD PRESSURE: 140 MMHG | DIASTOLIC BLOOD PRESSURE: 85 MMHG | WEIGHT: 294.98 LBS | BODY MASS INDEX: 47.61 KG/M2 | TEMPERATURE: 98.3 F | OXYGEN SATURATION: 100 % | RESPIRATION RATE: 18 BRPM

## 2023-02-04 DIAGNOSIS — S80.01XA CONTUSION OF RIGHT KNEE, INITIAL ENCOUNTER: Primary | ICD-10-CM

## 2023-02-04 RX ORDER — IBUPROFEN 600 MG/1
600 TABLET ORAL ONCE
Status: DISCONTINUED | OUTPATIENT
Start: 2023-02-04 | End: 2023-02-04 | Stop reason: HOSPADM

## 2023-02-04 NOTE — DISCHARGE INSTRUCTIONS
Knee Sprain   WHAT YOU NEED TO KNOW:   A knee sprain occurs when one or more ligaments in your knee are suddenly stretched or torn  Ligaments are tissues that hold bones together  Ligaments support the knee and keep the joint and bones in the correct position  DISCHARGE INSTRUCTIONS:   Return to the emergency department if:   Any part of your leg feels cold, numb, or looks pale     Contact your healthcare provider if:   You have new or increased swelling, bruising, or pain in your knee  Your symptoms do not improve within 6 weeks, even with treatment  You have questions or concerns about your condition or care  Medicines:   NSAIDs , such as ibuprofen, help decrease swelling and pain  Self-care:   Rest  your knee and do not exercise  If you were told to keep weight off your knee, you should not walk on your injured leg  Rest helps decrease swelling and allows the injury to heal  You can do gentle range of motion (ROM) exercises as directed  This will prevent stiffness  Apply ice  on your knee for 15 to 20 minutes every hour or as directed  Use an ice pack, or put crushed ice in a plastic bag  Cover it with a towel  Ice helps prevent tissue damage and decreases swelling and pain  Apply compression   You can wear an elastic bandage  This helps keep your injured knee from moving too much while it heals  You can loosen or tighten the elastic bandage to make it comfortable  It should be tight enough for you to feel support  It should not be so tight that it causes your toes to feel numb or tingly  If you are wearing an elastic bandage, take it off and rewrap it once a day  Elevate your knee  above the level of your heart as often as you can  This will help decrease swelling and pain  Prop your leg on pillows or blankets to keep it elevated comfortably  Do not put pillows directly behind your knee  Use support devices as directed:  Support devices such as a splint or brace may be needed  These devices limit movement and protect your joint while it heals  You may be given crutches to use until you can stand on your injured leg without pain  Use devices as directed

## 2023-02-04 NOTE — ED PROVIDER NOTES
History  Chief Complaint   Patient presents with   • Knee Pain     Pt reports mechanical fall last week and pain in R knee  Pt reports able to ambulate appropriately but pain with ambulation  Pt denies any other pain or injury at this time     34 yo M c/o right knee pain for 1 week, since falling on it, localizing to mid and lower knee cap area  He has been able to walk on it  He has not taken anything for pain  History provided by:  Patient      Prior to Admission Medications   Prescriptions Last Dose Informant Patient Reported? Taking?    Dulaglutide (Trulicity) 1 5 RB/7 2TO SOPN   Yes No   Sig: INJECT 1 5MG SC WEEKLY   EPINEPHrine (EPIPEN) 0 3 mg/0 3 mL SOAJ   Yes No   Sig: Inject 0 3 mg into a muscle once   acetaminophen (TYLENOL) 325 mg tablet   Yes No   Sig: Take 650 mg by mouth every 6 (six) hours as needed for mild pain   albuterol (PROVENTIL HFA,VENTOLIN HFA) 90 mcg/act inhaler   No No   Sig: Inhale 2 puffs every 6 (six) hours as needed for wheezing   benztropine (COGENTIN) 1 mg tablet   No No   Sig: Take 1 tablet (1 mg total) by mouth 2 (two) times a day   Patient taking differently: Take 2 mg by mouth 2 (two) times a day   cholecalciferol (VITAMIN D3) 1,000 units tablet   Yes No   Sig: Take 2,000 Units by mouth daily   divalproex sodium (DEPAKOTE ER) 250 mg 24 hr tablet   No No   Sig: Take 3 tablets (750 mg total) by mouth 2 (two) times a day   levocetirizine (XYZAL) 5 MG tablet   Yes No   Sig: Take 5 mg by mouth every evening   metFORMIN (GLUCOPHAGE) 500 mg tablet   No No   Sig: Take 1 5 tablets (750 mg total) by mouth 2 (two) times a day with meals   risperiDONE (RisperDAL) 2 mg tablet   No No   Sig: Take 1 tablet (2 mg total) by mouth 2 (two) times a day      Facility-Administered Medications: None       Past Medical History:   Diagnosis Date   • Anxiety    • Asthma    • Bipolar disorder (HCC)    • Depression    • Hypertension    • Impulse control disorder    • Mental retardation    • Psychiatric disorder    • Psychiatric illness    • Schizoaffective disorder [facility])        Past Surgical History:   Procedure Laterality Date   • HAND SURGERY      right hand       Family History   Problem Relation Age of Onset   • No Known Problems Mother    • No Known Problems Father    • No Known Problems Sister    • No Known Problems Brother    • No Known Problems Maternal Aunt    • No Known Problems Paternal Aunt    • No Known Problems Maternal Uncle    • No Known Problems Paternal Uncle    • No Known Problems Maternal Grandfather    • No Known Problems Maternal Grandmother    • No Known Problems Paternal Grandfather    • No Known Problems Paternal Grandmother    • No Known Problems Cousin    • ADD / ADHD Neg Hx    • Alcohol abuse Neg Hx    • Anxiety disorder Neg Hx    • Bipolar disorder Neg Hx    • Dementia Neg Hx    • Depression Neg Hx    • Drug abuse Neg Hx    • OCD Neg Hx    • Paranoid behavior Neg Hx    • Schizophrenia Neg Hx    • Seizures Neg Hx    • Self-Injury Neg Hx    • Suicide Attempts Neg Hx      I have reviewed and agree with the history as documented  E-Cigarette/Vaping   • E-Cigarette Use Never User      E-Cigarette/Vaping Substances     Social History     Tobacco Use   • Smoking status: Never   • Smokeless tobacco: Never   Vaping Use   • Vaping Use: Never used   Substance Use Topics   • Alcohol use: No   • Drug use: No       Review of Systems    Physical Exam  Physical Exam  Vitals and nursing note reviewed  Constitutional:       Appearance: He is well-developed  HENT:      Head: Normocephalic and atraumatic  Right Ear: External ear normal       Left Ear: External ear normal       Nose: Nose normal    Eyes:      Conjunctiva/sclera: Conjunctivae normal       Pupils: Pupils are equal, round, and reactive to light  Cardiovascular:      Rate and Rhythm: Normal rate  Pulmonary:      Effort: Pulmonary effort is normal    Abdominal:      Tenderness: There is no abdominal tenderness  Musculoskeletal:         General: Normal range of motion  Cervical back: Normal range of motion and neck supple  Right knee: No deformity or effusion  Normal range of motion  Tenderness present over the patellar tendon  No medial joint line or lateral joint line tenderness  Left knee: Normal range of motion  Skin:     General: Skin is warm and dry  Neurological:      Mental Status: He is alert and oriented to person, place, and time  Cranial Nerves: No cranial nerve deficit  Coordination: Coordination normal    Psychiatric:         Behavior: Behavior normal          Thought Content: Thought content normal          Judgment: Judgment normal          Vital Signs  ED Triage Vitals [02/04/23 1047]   Temperature Pulse Respirations Blood Pressure SpO2   98 3 °F (36 8 °C) (!) 121 18 140/85 100 %      Temp Source Heart Rate Source Patient Position - Orthostatic VS BP Location FiO2 (%)   Oral Monitor Sitting Right arm --      Pain Score       10 - Worst Possible Pain           Vitals:    02/04/23 1047   BP: 140/85   Pulse: (!) 121   Patient Position - Orthostatic VS: Sitting         Visual Acuity      ED Medications  Medications   ibuprofen (MOTRIN) tablet 600 mg (600 mg Oral Not Given 2/4/23 1205)       Diagnostic Studies  Results Reviewed     None                 XR knee 4+ views Right injury   Final Result by Laurie Coy MD (02/04 1244)      No acute osseous abnormality  Workstation performed: [id number]                    Procedures  Procedures         ED Course  ED Course as of 02/04/23 1346   Sat Feb 04, 2023   1136 XR knee 4+ views Right injury  No acute findings                                  SBIRT 22yo+    Flowsheet Row Most Recent Value   SBIRT (23 yo +)    In order to provide better care to our patients, we are screening all of our patients for alcohol and drug use  Would it be okay to ask you these screening questions?  No Filed at: 02/04/2023 1048 MDM    Disposition  Final diagnoses:   Contusion of right knee, initial encounter     Time reflects when diagnosis was documented in both MDM as applicable and the Disposition within this note     Time User Action Codes Description Comment    2/4/2023 11:39 AM Payton Aguero Add [S80 [id number]] Contusion of right knee, initial encounter       ED Disposition     ED Disposition   Discharge    Condition   Good    Date/Time   Sat Feb 4, 2023 11:39 AM    Comment   Swati Pry discharge to home/self care                 Follow-up Information     Follow up With Specialties Details Why Contact Info Additional [address] [facility] [place] [facility] Schedule an appointment as soon as possible for a visit  If symptoms worsen [address]  [place] [address] [facility] [address]  [address] [facility] [place], [address] [facility] [address], [address] [facility], [place], [place], [id number] [phone]          Discharge Medication List as of 2/4/2023 12:01 PM      CONTINUE these medications which have NOT CHANGED    Details   acetaminophen (TYLENOL) 325 mg tablet Take 650 mg by mouth every 6 (six) hours as needed for mild pain, Historical Med      albuterol (PROVENTIL HFA,VENTOLIN HFA) 90 mcg/act inhaler Inhale 2 puffs every 6 (six) hours as needed for wheezing, Starting Mon 3/18/2019, Print      benztropine (COGENTIN) 1 mg tablet Take 1 tablet (1 mg total) by mouth 2 (two) times a day, Starting Tue 7/12/2022, Until Mon 1/9/2023, Normal      cholecalciferol (VITAMIN D3) 1,000 units tablet Take 2,000 Units by mouth daily, Historical Med      divalproex sodium (DEPAKOTE ER) 250 mg 24 hr tablet Take 3 tablets (750 mg total) by mouth 2 (two) times a day, Starting Tue 7/12/2022, Until Mon 1/9/2023, Normal      Dulaglutide (Trulicity) 1 5 NI/8 8DP SOPN INJECT 1 5MG SC WEEKLY, Historical Med      EPINEPHrine (EPIPEN) 0 3 mg/0 3 mL SOAJ Inject 0 3 mg into a muscle once, Historical Med      levocetirizine (XYZAL) 5 MG tablet Take 5 mg by mouth every evening, Historical Med      metFORMIN (GLUCOPHAGE) 500 mg tablet Take 1 5 tablets (750 mg total) by mouth 2 (two) times a day with meals, Starting Tue 7/12/2022, Until Mon 1/9/2023, Normal      risperiDONE (RisperDAL) 2 mg tablet Take 1 tablet (2 mg total) by mouth 2 (two) times a day, Starting Tue 7/12/2022, Until Mon 1/9/2023, Normal             No discharge procedures on file      PDMP Review     None          ED Provider  Electronically Signed by           Jamia Ko MD  02/04/23 0125

## 2023-08-15 ENCOUNTER — HOSPITAL ENCOUNTER (EMERGENCY)
Facility: HOSPITAL | Age: 28
Discharge: HOME | End: 2023-08-15
Attending: EMERGENCY MEDICINE
Payer: MEDICARE

## 2023-08-15 VITALS
TEMPERATURE: 98.2 F | RESPIRATION RATE: 16 BRPM | SYSTOLIC BLOOD PRESSURE: 130 MMHG | WEIGHT: 246.69 LBS | HEART RATE: 98 BPM | DIASTOLIC BLOOD PRESSURE: 82 MMHG | OXYGEN SATURATION: 99 % | BODY MASS INDEX: 39.82 KG/M2

## 2023-08-15 DIAGNOSIS — R11.0 NAUSEA: Primary | ICD-10-CM

## 2023-08-15 DIAGNOSIS — R79.89 INCREASED AMMONIA LEVEL: ICD-10-CM

## 2023-08-15 LAB
ALBUMIN SERPL BCP-MCNC: 4.4 G/DL (ref 3.5–5)
ALP SERPL-CCNC: 44 U/L (ref 34–104)
ALT SERPL W P-5'-P-CCNC: 13 U/L (ref 7–52)
AMMONIA PLAS-SCNC: 81 UMOL/L (ref 18–72)
ANION GAP SERPL CALCULATED.3IONS-SCNC: 6 MMOL/L
AST SERPL W P-5'-P-CCNC: 12 U/L (ref 13–39)
ATRIAL RATE: 94 BPM
BASOPHILS # BLD AUTO: 0.04 THOUSANDS/ÂΜL (ref 0–0.1)
BASOPHILS NFR BLD AUTO: 0 % (ref 0–1)
BILIRUB DIRECT SERPL-MCNC: 0.16 MG/DL (ref 0–0.2)
BILIRUB SERPL-MCNC: 0.66 MG/DL (ref 0.2–1)
BUN SERPL-MCNC: 5 MG/DL (ref 5–25)
CALCIUM SERPL-MCNC: 9.5 MG/DL (ref 8.4–10.2)
CHLORIDE SERPL-SCNC: 105 MMOL/L (ref 96–108)
CO2 SERPL-SCNC: 27 MMOL/L (ref 21–32)
CREAT SERPL-MCNC: 0.55 MG/DL (ref 0.6–1.3)
EOSINOPHIL # BLD AUTO: 0.1 THOUSAND/ÂΜL (ref 0–0.61)
EOSINOPHIL NFR BLD AUTO: 1 % (ref 0–6)
ERYTHROCYTE [DISTWIDTH] IN BLOOD BY AUTOMATED COUNT: 12.8 % (ref 11.6–15.1)
GFR SERPL CREATININE-BSD FRML MDRD: 142 ML/MIN/1.73SQ M
GLUCOSE SERPL-MCNC: 79 MG/DL (ref 65–140)
HCT VFR BLD AUTO: 41.3 % (ref 36.5–49.3)
HGB BLD-MCNC: 13.9 G/DL (ref 12–17)
IMM GRANULOCYTES # BLD AUTO: 0.05 THOUSAND/UL (ref 0–0.2)
IMM GRANULOCYTES NFR BLD AUTO: 1 % (ref 0–2)
LYMPHOCYTES # BLD AUTO: 1.65 THOUSANDS/ÂΜL (ref 0.6–4.47)
LYMPHOCYTES NFR BLD AUTO: 17 % (ref 14–44)
MCH RBC QN AUTO: 29.8 PG (ref 26.8–34.3)
MCHC RBC AUTO-ENTMCNC: 33.7 G/DL (ref 31.4–37.4)
MCV RBC AUTO: 89 FL (ref 82–98)
MONOCYTES # BLD AUTO: 1.02 THOUSAND/ÂΜL (ref 0.17–1.22)
MONOCYTES NFR BLD AUTO: 11 % (ref 4–12)
NEUTROPHILS # BLD AUTO: 6.77 THOUSANDS/ÂΜL (ref 1.85–7.62)
NEUTS SEG NFR BLD AUTO: 70 % (ref 43–75)
NRBC BLD AUTO-RTO: 0 /100 WBCS
P AXIS: 36 DEGREES
PLATELET # BLD AUTO: 174 THOUSANDS/UL (ref 149–390)
PMV BLD AUTO: 9 FL (ref 8.9–12.7)
POTASSIUM SERPL-SCNC: 3.9 MMOL/L (ref 3.5–5.3)
PR INTERVAL: 146 MS
PROT SERPL-MCNC: 7.3 G/DL (ref 6.4–8.4)
QRS AXIS: 57 DEGREES
QRSD INTERVAL: 90 MS
QT INTERVAL: 322 MS
QTC INTERVAL: 402 MS
RBC # BLD AUTO: 4.66 MILLION/UL (ref 3.88–5.62)
SODIUM SERPL-SCNC: 138 MMOL/L (ref 135–147)
T WAVE AXIS: 16 DEGREES
VALPROATE SERPL-MCNC: 81 UG/ML (ref 50–100)
VENTRICULAR RATE: 94 BPM
WBC # BLD AUTO: 9.63 THOUSAND/UL (ref 4.31–10.16)

## 2023-08-15 PROCEDURE — 96361 HYDRATE IV INFUSION ADD-ON: CPT

## 2023-08-15 PROCEDURE — 99284 EMERGENCY DEPT VISIT MOD MDM: CPT

## 2023-08-15 PROCEDURE — 80076 HEPATIC FUNCTION PANEL: CPT | Performed by: EMERGENCY MEDICINE

## 2023-08-15 PROCEDURE — 93010 ELECTROCARDIOGRAM REPORT: CPT | Performed by: INTERNAL MEDICINE

## 2023-08-15 PROCEDURE — 36415 COLL VENOUS BLD VENIPUNCTURE: CPT | Performed by: EMERGENCY MEDICINE

## 2023-08-15 PROCEDURE — 93005 ELECTROCARDIOGRAM TRACING: CPT

## 2023-08-15 PROCEDURE — 82140 ASSAY OF AMMONIA: CPT | Performed by: EMERGENCY MEDICINE

## 2023-08-15 PROCEDURE — 96374 THER/PROPH/DIAG INJ IV PUSH: CPT

## 2023-08-15 PROCEDURE — 80048 BASIC METABOLIC PNL TOTAL CA: CPT | Performed by: EMERGENCY MEDICINE

## 2023-08-15 PROCEDURE — 80164 ASSAY DIPROPYLACETIC ACD TOT: CPT | Performed by: EMERGENCY MEDICINE

## 2023-08-15 PROCEDURE — 85025 COMPLETE CBC W/AUTO DIFF WBC: CPT | Performed by: EMERGENCY MEDICINE

## 2023-08-15 RX ORDER — ONDANSETRON 2 MG/ML
4 INJECTION INTRAMUSCULAR; INTRAVENOUS ONCE
Status: COMPLETED | OUTPATIENT
Start: 2023-08-15 | End: 2023-08-15

## 2023-08-15 RX ORDER — LEVOCARNITINE 330 MG/1
990 TABLET ORAL 3 TIMES DAILY
Qty: 135 TABLET | Refills: 0 | Status: SHIPPED | OUTPATIENT
Start: 2023-08-15 | End: 2023-08-30

## 2023-08-15 RX ORDER — LEVOCARNITINE
POWDER (GRAM) MISCELLANEOUS
Refills: 0 | Status: CANCELLED | OUTPATIENT
Start: 2023-08-15

## 2023-08-15 RX ADMIN — ONDANSETRON 4 MG: 2 INJECTION INTRAMUSCULAR; INTRAVENOUS at 12:30

## 2023-08-15 RX ADMIN — SODIUM CHLORIDE 1000 ML: 0.9 INJECTION, SOLUTION INTRAVENOUS at 12:40

## 2023-08-15 NOTE — ED PROVIDER NOTES
History  Chief Complaint   Patient presents with   • Dizziness     Pt reports dizziness and a headache starting last night with nausea. Pt was given ibuprofen at 845 with no relief unsure of the mg given      A 71-year-old male with past history of MR, schizoaffective disorder, hypertension, bipolar disorder, anxiety, asthma and diabetes; presents with his group home staff member for nausea. Currently patient offers no complaints however is limited in his history. Staff member reports that when patient woke up this morning he appeared off balance, and stated he did not feel well. Patient complained of nausea with a decreased appetite, however has not had vomiting. Staff member denies a history of similar symptoms. He has not had fever, cough, chest pain, shortness of breath, abdominal pain, vomiting, diarrhea, peripheral edema and rashes. No recent falls. No recent medication changes. Assessment and plan: Nausea, with appearing off balance this morning per staff. He is currently asymptomatic, neurologically intact. Will check labs including depakote level. Will treat symptomatically. History provided by:  Patient, medical records and caregiver  Dizziness  Associated symptoms: nausea        Prior to Admission Medications   Prescriptions Last Dose Informant Patient Reported? Taking?    Dulaglutide (Trulicity) 1.5 WD/6.1JK SOPN   Yes No   Sig: INJECT 1.5MG SC WEEKLY   EPINEPHrine (EPIPEN) 0.3 mg/0.3 mL SOAJ   Yes No   Sig: Inject 0.3 mg into a muscle once   acetaminophen (TYLENOL) 325 mg tablet   Yes No   Sig: Take 650 mg by mouth every 6 (six) hours as needed for mild pain   albuterol (PROVENTIL HFA,VENTOLIN HFA) 90 mcg/act inhaler   No No   Sig: Inhale 2 puffs every 6 (six) hours as needed for wheezing   benztropine (COGENTIN) 1 mg tablet   No No   Sig: Take 1 tablet (1 mg total) by mouth 2 (two) times a day   Patient taking differently: Take 2 mg by mouth 2 (two) times a day   cholecalciferol (VITAMIN D3) 1,000 units tablet   Yes No   Sig: Take 2,000 Units by mouth daily   divalproex sodium (DEPAKOTE ER) 250 mg 24 hr tablet   No No   Sig: Take 3 tablets (750 mg total) by mouth 2 (two) times a day   levocetirizine (XYZAL) 5 MG tablet   Yes No   Sig: Take 5 mg by mouth every evening   metFORMIN (GLUCOPHAGE) 500 mg tablet   No No   Sig: Take 1.5 tablets (750 mg total) by mouth 2 (two) times a day with meals   risperiDONE (RisperDAL) 2 mg tablet   No No   Sig: Take 1 tablet (2 mg total) by mouth 2 (two) times a day      Facility-Administered Medications: None       Past Medical History:   Diagnosis Date   • Anxiety    • Asthma    • Bipolar disorder ([address])    • Depression    • Hypertension    • Impulse control disorder    • Mental retardation    • Psychiatric disorder    • Psychiatric illness    • Schizoaffective disorder ([address])        Past Surgical History:   Procedure Laterality Date   • HAND SURGERY      right hand       Family History   Problem Relation Age of Onset   • No Known Problems Mother    • No Known Problems Father    • No Known Problems Sister    • No Known Problems Brother    • No Known Problems Maternal Aunt    • No Known Problems Paternal Aunt    • No Known Problems Maternal Uncle    • No Known Problems Paternal Uncle    • No Known Problems Maternal Grandfather    • No Known Problems Maternal Grandmother    • No Known Problems Paternal Grandfather    • No Known Problems Paternal Grandmother    • No Known Problems Cousin    • ADD / ADHD Neg Hx    • Alcohol abuse Neg Hx    • Anxiety disorder Neg Hx    • Bipolar disorder Neg Hx    • Dementia Neg Hx    • Depression Neg Hx    • Drug abuse Neg Hx    • OCD Neg Hx    • Paranoid behavior Neg Hx    • Schizophrenia Neg Hx    • Seizures Neg Hx    • Self-Injury Neg Hx    • Suicide Attempts Neg Hx      I have reviewed and agree with the history as documented.     E-Cigarette/Vaping   • E-Cigarette Use Never User      E-Cigarette/Vaping Substances     Social History Tobacco Use   • Smoking status: Never   • Smokeless tobacco: Never   Vaping Use   • Vaping Use: Never used   Substance Use Topics   • Alcohol use: No   • Drug use: No       Review of Systems   Gastrointestinal: Positive for nausea. All other systems reviewed and are negative. Physical Exam  Physical Exam  General Appearance: alert and oriented, nad, non toxic appearing  Skin:  Warm, dry, intact. No cyanosis  HEENT: Atraumatic, normocephalic. No eye drainage. Normal hearing. Moist mucous membranes. Neck: Supple, trachea midline  Cardiac: RRR; no murmurs, rub, gallops. No pedal edema, 2+ pulses  Pulmonary: lungs CTAB; no wheezes, rales, rhonchi  Gastrointestinal: abdomen soft, nontender, nondistended; no guarding or rebound tenderness; good bowel sounds, no mass or bruits  Extremities:  No deformities. No calf tenderness, no clubbing  Neuro:  CN 2-12 grossly intact. 5/5 motor strength to the bilateral upper and lower extremities. Sensation intact throughout. No pronator drift. Normal finger-to-nose to the bilateral upper extremities, normal heel-to-shin to the bilateral lower extremities.    Psych:  Normal mood and affect, normal judgement and insight    Vital Signs  ED Triage Vitals   Temperature Pulse Respirations Blood Pressure SpO2   08/15/23 1148 08/15/23 1148 08/15/23 1148 08/15/23 1148 08/15/23 1148   98.2 °F (36.8 °C) (!) 106 18 134/81 97 %      Temp src Heart Rate Source Patient Position - Orthostatic VS BP Location FiO2 (%)   -- 08/15/23 1410 08/15/23 1148 08/15/23 1148 --    Monitor Sitting Right arm       Pain Score       08/15/23 1240       No Pain           Vitals:    08/15/23 1148 08/15/23 1410   BP: 134/81 130/82   Pulse: (!) 106 98   Patient Position - Orthostatic VS: Sitting Lying         Visual Acuity      ED Medications  Medications   sodium chloride 0.9 % bolus 1,000 mL (0 mL Intravenous Stopped 8/15/23 1439)   ondansetron (ZOFRAN) injection 4 mg (4 mg Intravenous Given 8/15/23 1230)       Diagnostic Studies  Results Reviewed     Procedure Component Value Units Date/Time    Hepatic function panel [[id number]]  (Abnormal) Collected: 08/15/23 1233    Lab Status: Final result Specimen: Blood from Arm, Right Updated: 08/15/23 1345     Total Bilirubin 0.66 mg/dL      Bilirubin, Direct 0.16 mg/dL      Alkaline Phosphatase 44 U/L      AST 12 U/L      ALT 13 U/L      Total Protein 7.3 g/dL      Albumin 4.4 g/dL     Basic metabolic panel [[id number]]  (Abnormal) Collected: 08/15/23 1233    Lab Status: Final result Specimen: Blood from Arm, Right Updated: 08/15/23 1256     Sodium 138 mmol/L      Potassium 3.9 mmol/L      Chloride 105 mmol/L      CO2 27 mmol/L      ANION GAP 6 mmol/L      BUN 5 mg/dL      Creatinine 0.55 mg/dL      Glucose 79 mg/dL      Calcium 9.5 mg/dL      eGFR 142 ml/min/1.73sq m     Narrative:      [place] guidelines for Chronic Kidney Disease (CKD):   •  Stage 1 with normal or high GFR (GFR > 90 mL/min/1.73 square meters)  •  Stage 2 Mild CKD (GFR = 60-89 mL/min/1.73 square meters)  •  Stage 3A Moderate CKD (GFR = 45-59 mL/min/1.73 square meters)  •  Stage 3B Moderate CKD (GFR = 30-44 mL/min/1.73 square meters)  •  Stage 4 Severe CKD (GFR = 15-29 mL/min/1.73 square meters)  •  Stage 5 End Stage CKD (GFR <15 mL/min/1.73 square meters)  Note: GFR calculation is accurate only with a steady state creatinine    Valproic acid level, total [[id number]]  (Normal) Collected: 08/15/23 1233    Lab Status: Final result Specimen: Blood from Arm, Right Updated: 08/15/23 1256     Valproic Acid, Total 81 ug/mL     Ammonia [[id number]]  (Abnormal) Collected: 08/15/23 1233    Lab Status: Final result Specimen: Blood from Arm, Right Updated: 08/15/23 1255     Ammonia 81 umol/L     CBC and differential [[id number]] Collected: 08/15/23 1233    Lab Status: Final result Specimen: Blood from Arm, Right Updated: 08/15/23 1238     WBC 9.63 Thousand/uL      RBC 4.66 Million/uL      Hemoglobin 13.9 g/dL      Hematocrit 41.3 %      MCV 89 fL      MCH 29.8 pg      MCHC 33.7 g/dL      RDW 12.8 %      MPV 9.0 fL      Platelets 016 Thousands/uL      nRBC 0 /100 WBCs      Neutrophils Relative 70 %      Immat GRANS % 1 %      Lymphocytes Relative 17 %      Monocytes Relative 11 %      Eosinophils Relative 1 %      Basophils Relative 0 %      Neutrophils Absolute 6.77 Thousands/µL      Immature Grans Absolute 0.05 Thousand/uL      Lymphocytes Absolute 1.65 Thousands/µL      Monocytes Absolute 1.02 Thousand/µL      Eosinophils Absolute 0.10 Thousand/µL      Basophils Absolute 0.04 Thousands/µL                  No orders to display              Procedures  Procedures  ECG 12 Lead Documentation  Date/Time: today/date: 8/15/2023  Performed by: Danette Riojas    ECG reviewed by me, the ED Provider: yes    Patient location:  ED   Previous ECG:  Compared to current, no change   Rate:  94  ECG rate assessment: normal    Rhythm: sinus rhythm    Ectopy:  none    QRS axis:  Normal  Intervals: normal   Q waves: None   ST segments:  Normal  T waves: nonspecific T wave changes      Impression: NSR with nonspecific T wave changes          ED Course  ED Course as of 08/15/23 1756   Tue Aug 15, 2023   1409 Ammonia(!): 81  Suspect due to depakote. Depakote level within therapeutic range. Pt without findings to suggest hepatic encephalopathy. [facility] [address] with Dr. Bharat Mcdermott, toxicology, reviewing pt's history, presentation and work up. Elevated ammonia due to pt's depakote use, if he remains asymptomatic then pt can be discharged home. Recommends initiation of carnitine TID.    1459 Pt resting comfortably, he reports feeling better at this time. Able to ambulate without difficulty. Group home staff updated on lab results and recommendations to initiate carnitine, they are in agreement.                                SBIRT 20yo+    Flowsheet Row Most Recent Value   Initial Alcohol Screen: US AUDIT-C 1. How often do you have a drink containing alcohol? 0 Filed at: 08/15/2023 1150   2. How many drinks containing alcohol do you have on a typical day you are drinking? 0 Filed at: 08/15/2023 1150   3a. Male UNDER 65: How often do you have five or more drinks on one occasion? 0 Filed at: 08/15/2023 1150   3b. FEMALE Any Age, or MALE 65+: How often do you have 4 or more drinks on one occassion? 0 Filed at: 08/15/2023 1150   Audit-C Score 0 Filed at: 08/15/2023 1150   ELICEO: How many times in the past year have you. .. Used an illegal drug or used a prescription medication for non-medical reasons? Never Filed at: 08/15/2023 1150                    Medical Decision Making  Amount and/or Complexity of Data Reviewed  Labs: ordered. Decision-making details documented in ED Course. Risk  Prescription drug management. Disposition  Final diagnoses:   Nausea   Increased ammonia level - secondary to depakote use     Time reflects when diagnosis was documented in both MDM as applicable and the Disposition within this note     Time User Action Codes Description Comment    8/15/2023  3:02 PM Robina, [address] [place] [R11.0] Nausea     8/15/2023  3:02 PM Cornell, [address] [place] [R79.89] Increased ammonia level     8/15/2023  3:02 PM Robina, [address] [facility] [R79.89] Increased ammonia level secondary to depakote use      ED Disposition     ED Disposition   Discharge    Condition   Stable    Date/Time   Tue Aug 15, 2023  3:02 PM    [address] [facility] discharge to home/self care.                Follow-up Information     Follow up With Specialties Details Why Contact Info Additional Information    Helder Sequeira MD Family Medicine Schedule an appointment as soon as possible for a visit in 2 days For re-evaluation [id number] [facility] [phone]       [address] [facility] Emergency Department Emergency Medicine Go to  If symptoms worsen [facility]  [phone] [facility] Emergency Department, [address] [place], [place], [address]          Discharge Medication List as of 8/15/2023  3:17 PM      START taking these medications    Details   levOCARNitine (CARNITOR) 330 MG tablet Take 3 tablets (990 mg total) by mouth 3 (three) times a day for 15 days, Starting Tue 8/15/2023, Until Wed 8/30/2023, Print         CONTINUE these medications which have NOT CHANGED    Details   acetaminophen (TYLENOL) 325 mg tablet Take 650 mg by mouth every 6 (six) hours as needed for mild pain, Historical Med      albuterol (PROVENTIL HFA,VENTOLIN HFA) 90 mcg/act inhaler Inhale 2 puffs every 6 (six) hours as needed for wheezing, Starting Mon 3/18/2019, Print      benztropine (COGENTIN) 1 mg tablet Take 1 tablet (1 mg total) by mouth 2 (two) times a day, Starting Tue 7/12/2022, Until Mon 1/9/2023, Normal      cholecalciferol (VITAMIN D3) 1,000 units tablet Take 2,000 Units by mouth daily, Historical Med      divalproex sodium (DEPAKOTE ER) 250 mg 24 hr tablet Take 3 tablets (750 mg total) by mouth 2 (two) times a day, Starting Tue 7/12/2022, Until Mon 1/9/2023, Normal      Dulaglutide (Trulicity) 1.5 YS/3.6MR SOPN INJECT 1.5MG SC WEEKLY, Historical Med      EPINEPHrine (EPIPEN) 0.3 mg/0.3 mL SOAJ Inject 0.3 mg into a muscle once, Historical Med      levocetirizine (XYZAL) 5 MG tablet Take 5 mg by mouth every evening, Historical Med      metFORMIN (GLUCOPHAGE) 500 mg tablet Take 1.5 tablets (750 mg total) by mouth 2 (two) times a day with meals, Starting Tue 7/12/2022, Until Mon 1/9/2023, Normal      risperiDONE (RisperDAL) 2 mg tablet Take 1 tablet (2 mg total) by mouth 2 (two) times a day, Starting Tue 7/12/2022, Until Mon 1/9/2023, Normal             No discharge procedures on file.     PDMP Review     None          ED Provider  Electronically Signed by           Giselle Meléndez DO  08/15/23 [id number]

## 2023-08-15 NOTE — DISCHARGE INSTRUCTIONS
Start taking Carnitine, three tablets three times per day - you were provided a 15 day supply, follow up with your family physician to have this refilled

## 2023-09-29 ENCOUNTER — TELEPHONE (OUTPATIENT)
Age: 28
End: 2023-09-29

## 2023-09-29 NOTE — TELEPHONE ENCOUNTER
Caller: n/a    Doctor: n/a    Reason for call: Looking for diff dept but got rheum     Call back#: n/a

## 2023-12-05 ENCOUNTER — OFFICE VISIT (OUTPATIENT)
Dept: DENTISTRY | Facility: CLINIC | Age: 28
End: 2023-12-05

## 2023-12-05 VITALS — SYSTOLIC BLOOD PRESSURE: 116 MMHG | HEART RATE: 123 BPM | DIASTOLIC BLOOD PRESSURE: 88 MMHG

## 2023-12-05 DIAGNOSIS — Z01.20 ENCOUNTER FOR DENTAL EXAMINATION: ICD-10-CM

## 2023-12-05 PROCEDURE — D0191 ASSESSMENT OF A PATIENT: HCPCS

## 2024-02-21 PROBLEM — R50.9 FEVER: Status: RESOLVED | Noted: 2018-01-12 | Resolved: 2024-02-21

## 2024-03-21 NOTE — ED PROVIDER NOTES
History  Chief Complaint   Patient presents with    Psychiatric Evaluation     pt brought self in for eval and admit to [facility]     This is a 26-year-old male with a history of intellectual disability and bipolar disorder who presents with auditory hallucinations  He is accompanied by his brother and his brother's girlfriend  Over the past week, he has been experiencing auditory hallucinations which are telling him to kill his mother  This has been an ongoing issue for the patient with multiple hospitalizations for the same complaint  Denies any suicidal ideations or visual hallucinations  Denies history of suicide attempts  Denies drug or alcohol use  Denies any other complaints  Denies fever/chills, nausea/vomiting, lightheadedness/dizziness, numbness/weakness, headache, change in vision, URI symptoms, neck pain, chest pain, palpitations, shortness of breath, cough, back pain, flank pain, abdominal pain, diarrhea, hematochezia, melena, dysuria, hematuria  The patient states that he would like to sign in for help  Family requesting that they are called when he is placed  Prior to Admission Medications   Prescriptions Last Dose Informant Patient Reported? Taking?    amLODIPine (NORVASC) 5 mg tablet   No Yes   Sig: Take 1 tablet by mouth daily At 9AM   divalproex sodium (DEPAKOTE ER) 500 mg 24 hr tablet   No Yes   Sig: Take 1 tab (500mg) in morning (9AM), then take 2 tabs (1000mg) in evening (6PM) by mouth daily   risperiDONE (RisperDAL) 2 mg tablet   No Yes   Sig: Take 1 tablet by mouth 2 (two) times a day At 9AM and 6PM   risperiDONE microspheres (RisperDAL CONSTA) 37 5 MG injection   No Yes   Sig: Inject 2 mL into the shoulder, thigh, or buttocks every 14 (fourteen) days Next injection due 10/17/17      Facility-Administered Medications: None       Past Medical History:   Diagnosis Date    Anxiety     Asthma     Hypertension     Mental retardation     Schizoaffective disorder ([facility] [address] )        Past PHYSICAL / OCCUPATIONAL THERAPY - DAILY TREATMENT NOTE     Patient Name: Eryn Howell    Date: 3/21/2024    : 1954  Insurance: Payor: [employer] MEDICARE / Plan: [employer] CHOICE-PPO MEDICARE / Product Type: *No Product type* /      Patient  verified Yes     Visit #   Current / Total 4 24   Time   In / Out 3:10 3:52   Pain   In / Out 5/10 left hip and left shoulder 5/10 left hip and left shoulder   Subjective Functional Status/Changes: \"I fell right before I got here.  I was sitting on my rollator and I moved forward in it and I fell and landed directly on my (left) hip and (left) shoulder.  But I'm okay.\"   Changes to:  Allergies, Med Hx, Sx Hx?   no       TREATMENT AREA =  Other abnormalities of gait and mobility [R26.89]    OBJECTIVE    Therapeutic Procedures:  Tx Min Billable or 1:1 Min (if diff from Tx Min) Procedure, Rationale, Specifics   25  54325 Therapeutic Exercise (timed):  increase ROM, strength, coordination, balance, and proprioception to improve patient's ability to progress to PLOF and address remaining functional goals. (see flow sheet as applicable)    Details if applicable:       9  64440 Therapeutic Activity (timed):  use of dynamic activities replicating functional movements to increase ROM, strength, coordination, balance, and proprioception in order to improve patient's ability to progress to PLOF and address remaining functional goals.  (see flow sheet as applicable)    Details if applicable:     8  74526 Neuromuscular Re-Education (timed):  improve balance, coordination, kinesthetic sense, posture, core stability and proprioception to improve patient's ability to develop conscious control of individual muscles and awareness of position of extremities in order to progress to PLOF and address remaining functional goals. (see flow sheet as applicable)     Details if applicable:     42  [facility] BC Totals Reminder: bill using total billable min of TIMED therapeutic procedures (example: do not  Surgical History:   Procedure Laterality Date    HAND SURGERY      right hand       Family History   Problem Relation Age of Onset    No Known Problems Mother     No Known Problems Father     No Known Problems Sister     No Known Problems Brother     No Known Problems Maternal Aunt     No Known Problems Paternal Aunt     No Known Problems Maternal Uncle     No Known Problems Paternal Uncle     No Known Problems Maternal Grandfather     No Known Problems Maternal Grandmother     No Known Problems Paternal Grandfather     No Known Problems Paternal Grandmother     No Known Problems Cousin     ADD / ADHD Neg Hx     Alcohol abuse Neg Hx     Anxiety disorder Neg Hx     Bipolar disorder Neg Hx     Dementia Neg Hx     Depression Neg Hx     Drug abuse Neg Hx     OCD Neg Hx     Paranoid behavior Neg Hx     Schizophrenia Neg Hx     Seizures Neg Hx     Self-Injurious Behavior  Neg Hx     Suicide Attempts Neg Hx      I have reviewed and agree with the history as documented  Social History   Substance Use Topics    Smoking status: Never Smoker    Smokeless tobacco: Never Used      Comment: non-smoker    Alcohol use No        Review of Systems   Constitutional: Negative for chills, fatigue and fever  HENT: Negative for rhinorrhea, sore throat and trouble swallowing  Eyes: Negative for photophobia and visual disturbance  Respiratory: Negative for cough, chest tightness and shortness of breath  Cardiovascular: Negative for chest pain, palpitations and leg swelling  Gastrointestinal: Negative for abdominal pain, blood in stool, diarrhea, nausea and vomiting  Endocrine: Negative for polyuria  Genitourinary: Negative for dysuria, flank pain and hematuria  Musculoskeletal: Negative for back pain and neck pain  Skin: Negative for color change and rash  Allergic/Immunologic: Negative for immunocompromised state     Neurological: Negative for dizziness, weakness, light-headedness, numbness and headaches  Psychiatric/Behavioral: Positive for hallucinations  Negative for suicidal ideas  All other systems reviewed and are negative  Physical Exam  ED Triage Vitals   Temperature Pulse Respirations Blood Pressure SpO2   11/30/17 1746 11/30/17 1746 11/30/17 1746 11/30/17 1746 11/30/17 1746   97 8 °F (36 6 °C) (!) 110 18 (!) 178/77 97 %      Temp src Heart Rate Source Patient Position - Orthostatic VS BP Location FiO2 (%)   -- 11/30/17 2220 11/30/17 2220 11/30/17 2220 --    Monitor Lying Right arm       Pain Score       11/30/17 1746       No Pain           Orthostatic Vital Signs  Vitals:    11/30/17 1746 11/30/17 2220   BP: (!) 178/77 138/60   Pulse: (!) 110 94   Patient Position - Orthostatic VS:  Lying       Physical Exam   Constitutional: Vital signs are normal  He appears well-developed and well-nourished  He is cooperative  No distress  HENT:   Mouth/Throat: Uvula is midline and oropharynx is clear and moist    Eyes: Conjunctivae, EOM and lids are normal  Pupils are equal, round, and reactive to light  Neck: Trachea normal  No thyroid mass and no thyromegaly present  Cardiovascular: Normal rate, regular rhythm, normal heart sounds, intact distal pulses and normal pulses  No murmur heard  Pulmonary/Chest: Effort normal and breath sounds normal    Abdominal: Soft  Normal appearance and bowel sounds are normal  There is no tenderness  There is no rebound, no guarding, no CVA tenderness and negative Jones's sign  Neurological: He is alert  Skin: Skin is warm, dry and intact  Psychiatric: He has a normal mood and affect  His speech is normal and behavior is normal  He expresses homicidal ideation  He expresses no suicidal ideation  He expresses no suicidal plans and no homicidal plans         ED Medications  Medications - No data to display    Diagnostic Studies  Results Reviewed     Procedure Component Value Units Date/Time    Rapid drug screen, urine [[id number]]  (Normal) Collected:  11/30/17 1925    Lab Status:  Final result Specimen:  Urine from Urine, Clean Catch Updated:  11/30/17 1947     Amph/Meth UR Negative     Barbiturate Ur Negative     Benzodiazepine Urine Negative     Cocaine Urine Negative     Methadone Urine Negative     Opiate Urine Negative     PCP Ur Negative     THC Urine Negative    Narrative:         FOR MEDICAL PURPOSES ONLY  IF CONFIRMATION NEEDED PLEASE CONTACT THE LAB WITHIN 5 DAYS  Drug Screen Cutoff Levels:  AMPHETAMINE/METHAMPHETAMINES  1000 ng/mL  BARBITURATES     200 ng/mL  BENZODIAZEPINES     200 ng/mL  COCAINE      300 ng/mL  METHADONE      300 ng/mL  OPIATES      300 ng/mL  PHENCYCLIDINE     25 ng/mL  THC       50 ng/mL    POCT alcohol breath test [[id number]]  (Normal) Resulted:  11/30/17 1902    Lab Status:  Final result Updated:  11/30/17 1902     EXTBreath Alcohol 0 000    POCT alcohol breath test [[id number]]  (Normal) Resulted:  11/30/17 1856    Lab Status:  Final result Updated:  11/30/17 1856     EXTBreath Alcohol 0 000                 No orders to display         Procedures  Procedures      Phone Consults  ED Phone Contact    ED Course  ED Course as of Dec 01 1227   Thu Nov 30, 2017 1926 EXTBreath Alcohol: 0 000                               MDM  Number of Diagnoses or Management Options  Diagnosis management comments: I will check a Breathalyzer and urine drug screen  I will consult crisis  Investigate whether not the patient is competent to make his own decisions  Based on previous notes, it appears the patient is capable of making his own decisions and has signed 201s in the past        Amount and/or Complexity of Data Reviewed  Clinical lab tests: reviewed      CritCare Time    Disposition  Final diagnoses:    Auditory hallucination     Time reflects when diagnosis was documented in both MDM as applicable and the Disposition within this note     Time User Action Codes Description Comment    11/30/2017  8:32 PM Dominique BOLAÑOS Add [R44 0] Auditory hallucination       ED Disposition     ED Disposition Condition Comment    Discharge  Joseph Barriga discharge to home/self care  Condition at discharge: Stable        MD Documentation    Flowsheet Row Most Recent Value   Sending MD  [address]       Follow-up Information     Follow up With Specialties Details Why Contact Info Additional [address] Emergency Department Emergency Medicine  As needed, If symptoms worsen [address]  [phone]  ED, [address] [place] [address], [phone]        Discharge Medication List as of 11/30/2017 10:47 PM      CONTINUE these medications which have NOT CHANGED    Details   amLODIPine (NORVASC) 5 mg tablet Take 1 tablet by mouth daily At 9AM, Starting Wed 10/11/2017, Print      divalproex sodium (DEPAKOTE ER) 500 mg 24 hr tablet Take 1 tab (500mg) in morning (9AM), then take 2 tabs (1000mg) in evening (6PM) by mouth daily, Print      risperiDONE (RisperDAL) 2 mg tablet Take 1 tablet by mouth 2 (two) times a day At [facility] and 6PM, Starting Tue 10/10/2017, Print      risperiDONE microspheres (RisperDAL CONSTA) 37 5 MG injection Inject 2 mL into the shoulder, thigh, or buttocks every 14 (fourteen) days Next injection due 10/17/17, Starting Tue 10/17/2017, Print           No discharge procedures on file  ED Provider  Attending physically available and evaluated Joseph Acevedoina FRANCO managed the patient along with the ED Attending      Electronically Signed by         Baldemar Noyola MD  Resident  12/01/17 2960

## 2024-12-17 ENCOUNTER — HOSPITAL ENCOUNTER (EMERGENCY)
Facility: HOSPITAL | Age: 29
Discharge: HOME | End: 2024-12-17
Attending: EMERGENCY MEDICINE
Payer: MEDICARE

## 2024-12-17 VITALS
WEIGHT: 235.89 LBS | BODY MASS INDEX: 38.07 KG/M2 | TEMPERATURE: 97.5 F | DIASTOLIC BLOOD PRESSURE: 86 MMHG | HEART RATE: 108 BPM | SYSTOLIC BLOOD PRESSURE: 136 MMHG | OXYGEN SATURATION: 95 % | RESPIRATION RATE: 18 BRPM

## 2024-12-17 DIAGNOSIS — F25.0 SCHIZOAFFECTIVE DISORDER, BIPOLAR TYPE (HCC): Primary | ICD-10-CM

## 2024-12-17 PROCEDURE — 99283 EMERGENCY DEPT VISIT LOW MDM: CPT

## 2024-12-17 PROCEDURE — 99284 EMERGENCY DEPT VISIT MOD MDM: CPT | Performed by: EMERGENCY MEDICINE

## 2024-12-17 RX ORDER — RISPERIDONE 1 MG/1
2 TABLET, ORALLY DISINTEGRATING ORAL ONCE
Status: COMPLETED | OUTPATIENT
Start: 2024-12-17 | End: 2024-12-17

## 2024-12-17 RX ADMIN — RISPERIDONE 2 MG: 1 TABLET, ORALLY DISINTEGRATING ORAL at 16:59

## 2024-12-17 NOTE — ED NOTES
Patient is a 29 yr old male, who resides in a specialized ID group home. He states that he called 911 because he was hearing voices. He is unable to identify anything specific that may have upset him or triggered the voices. Patient does have a roommate. He also have 24/hr support services as well as medication supervision and community supports. He is not able to tell me the name of the group home or how long he lived there. Patient is presenting as calm and cooperative. There is no report from the group home that he has been acting out or refusing the medications. [facility] is willing to take him back tonight     Patient 's most recent admission was at [place] in 7/6/22.  The group home staff I spoke to is not sure who his outpt provider is.

## 2024-12-17 NOTE — ED PROVIDER NOTES
Time reflects when diagnosis was documented in both MDM as applicable and the Disposition within this note       Time User Action Codes Description Comment    12/17/2024  4:50 PM Danyel Lentz Add [F25.0] Schizoaffective disorder, bipolar type (HCC)           ED Disposition       ED Disposition   Discharge    Condition   Stable    Date/Time   Tue Dec 17, 2024  5:30 PM    Comment   Todd MARIELOS Richards discharge to home/self care.                   Assessment & Plan       Medical Decision Making  Problems Addressed:  Schizoaffective disorder, bipolar type (HCC): chronic illness or injury     Details: Chromic MH with longstanding auditory hallucinations.  On meds.  No SI/Hi.  Does not appear to be a threat to self or others.  No criteria for inpt admission.  Has resources and meds at group home.     Amount and/or Complexity of Data Reviewed  Independent Historian: EMS    Risk  Prescription drug management.        ED Course as of 12/17/24 1742   Tue Dec 17, 2024   1729 No criteria per PES for admission.  Agree.  Last admission patient was much more disruptive. Aggitated.  Has staffing at group home.    1738 Staffer here to assume care of patient, transport back to group home.        Medications   risperiDONE (RisperDAL M-TAB) disintegrating tablet 2 mg (2 mg Oral Given 12/17/24 1659)       ED Risk Strat Scores                          SBIRT 22yo+      Flowsheet Row Most Recent Value   Initial Alcohol Screen: US AUDIT-C     1. How often do you have a drink containing alcohol? 0 Filed at: 12/17/2024 1650   2. How many drinks containing alcohol do you have on a typical day you are drinking?  0 Filed at: 12/17/2024 1650   3a. Male UNDER 65: How often do you have five or more drinks on one occasion? 0 Filed at: 12/17/2024 1650   3b. FEMALE Any Age, or MALE 65+: How often do you have 4 or more drinks on one occassion? 0 Filed at: 12/17/2024 1650   Audit-C Score 0 Filed at: 12/17/2024 1650   ELICEO: How many times in the past year  have you...    Used an illegal drug or used a prescription medication for non-medical reasons? Never Filed at: 12/17/2024 6383                            History of Present Illness       Chief Complaint   Patient presents with    Psychiatric Evaluation     Patient says he has chronic AH that are command in nature today; patient denies SI during triage       Past Medical History:   Diagnosis Date    Anxiety     Asthma     Bipolar disorder (HCC)     Depression     Hypertension     Impulse control disorder     Mental retardation     Psychiatric disorder     Psychiatric illness     Schizoaffective disorder (HCC)       Past Surgical History:   Procedure Laterality Date    HAND SURGERY      right hand      Family History   Problem Relation Age of Onset    No Known Problems Mother     No Known Problems Father     No Known Problems Sister     No Known Problems Brother     No Known Problems Maternal Aunt     No Known Problems Paternal Aunt     No Known Problems Maternal Uncle     No Known Problems Paternal Uncle     No Known Problems Maternal Grandfather     No Known Problems Maternal Grandmother     No Known Problems Paternal Grandfather     No Known Problems Paternal Grandmother     No Known Problems Cousin     ADD / ADHD Neg Hx     Alcohol abuse Neg Hx     Anxiety disorder Neg Hx     Bipolar disorder Neg Hx     Dementia Neg Hx     Depression Neg Hx     Drug abuse Neg Hx     OCD Neg Hx     Paranoid behavior Neg Hx     Schizophrenia Neg Hx     Seizures Neg Hx     Self-Injury Neg Hx     Suicide Attempts Neg Hx       Social History     Tobacco Use    Smoking status: Never    Smokeless tobacco: Never   Vaping Use    Vaping status: Never Used   Substance Use Topics    Alcohol use: No    Drug use: No      E-Cigarette/Vaping    E-Cigarette Use Never User       E-Cigarette/Vaping Substances      I have reviewed and agree with the history as documented.     Patient is a 29-year-old male with a signficiant h/o MH presents via EMS  from his group home for continued auditory hallucinations.  Long standing auditory hallucinations.  Today however voices telling him to hurt himself.  Does not want to hurt himself.  No history of any SI attempt.  No illicit drug use.  Compliant with meds.  No issues at the group home.  Cannot state any new trigger.         Review of Systems   Constitutional: Negative.    HENT: Negative.     Eyes: Negative.    Respiratory: Negative.     Cardiovascular: Negative.    Gastrointestinal: Negative.    Endocrine: Negative.    Genitourinary: Negative.    Musculoskeletal: Negative.    Skin: Negative.    Allergic/Immunologic: Negative.    Neurological: Negative.    Hematological: Negative.    Psychiatric/Behavioral:  Positive for hallucinations.    All other systems reviewed and are negative.          Objective       ED Triage Vitals [12/17/24 1648]   Temperature Pulse Blood Pressure Respirations SpO2 Patient Position - Orthostatic VS   97.5 °F (36.4 °C) (!) 108 136/86 18 95 % Sitting      Temp Source Heart Rate Source BP Location FiO2 (%) Pain Score    Tympanic Monitor Right arm -- --      Vitals      Date and Time Temp Pulse SpO2 Resp BP Pain Score FACES Pain Rating User   12/17/24 1648 97.5 °F (36.4 °C) 108 95 % 18 136/86 -- -- TW            Physical Exam  Vitals and nursing note reviewed.   Constitutional:       Appearance: Normal appearance. He is obese.   HENT:      Head: Normocephalic and atraumatic.   Cardiovascular:      Rate and Rhythm: Normal rate and regular rhythm.      Pulses: Normal pulses.      Heart sounds: Normal heart sounds.   Pulmonary:      Effort: Pulmonary effort is normal.      Breath sounds: Normal breath sounds.   Abdominal:      General: Bowel sounds are normal.      Palpations: Abdomen is soft.   Musculoskeletal:         General: Normal range of motion.      Cervical back: Normal range of motion and neck supple.   Skin:     General: Skin is warm.      Capillary Refill: Capillary refill takes less  than 2 seconds.   Neurological:      General: No focal deficit present.      Mental Status: He is alert and oriented to person, place, and time.   Psychiatric:         Attention and Perception: He perceives auditory hallucinations.         Mood and Affect: Mood normal. Affect is flat.         Speech: Speech normal.         Behavior: Behavior is cooperative.         Thought Content: Thought content normal. Thought content does not include homicidal or suicidal ideation. Thought content does not include homicidal or suicidal plan.         Cognition and Memory: Cognition normal.         Results Reviewed       None            No orders to display       Procedures    ED Medication and Procedure Management   Prior to Admission Medications   Prescriptions Last Dose Informant Patient Reported? Taking?   Dulaglutide (Trulicity) 1.5 MG/0.5ML SOPN   Yes No   Sig: INJECT 1.5MG SC WEEKLY   EPINEPHrine (EPIPEN) 0.3 mg/0.3 mL SOAJ   Yes No   Sig: Inject 0.3 mg into a muscle once   acetaminophen (TYLENOL) 325 mg tablet   Yes No   Sig: Take 650 mg by mouth every 6 (six) hours as needed for mild pain   albuterol (PROVENTIL HFA,VENTOLIN HFA) 90 mcg/act inhaler   No No   Sig: Inhale 2 puffs every 6 (six) hours as needed for wheezing   benztropine (COGENTIN) 1 mg tablet   No No   Sig: Take 1 tablet (1 mg total) by mouth 2 (two) times a day   Patient taking differently: Take 2 mg by mouth 2 (two) times a day   cholecalciferol (VITAMIN D3) 1,000 units tablet   Yes No   Sig: Take 2,000 Units by mouth daily   divalproex sodium (DEPAKOTE ER) 250 mg 24 hr tablet   No No   Sig: Take 3 tablets (750 mg total) by mouth 2 (two) times a day   levOCARNitine (CARNITOR) 330 MG tablet   No No   Sig: Take 3 tablets (990 mg total) by mouth 3 (three) times a day for 15 days   levocetirizine (XYZAL) 5 MG tablet   Yes No   Sig: Take 5 mg by mouth every evening   metFORMIN (GLUCOPHAGE) 500 mg tablet   No No   Sig: Take 1.5 tablets (750 mg total) by mouth 2  (two) times a day with meals   risperiDONE (RisperDAL) 2 mg tablet   No No   Sig: Take 1 tablet (2 mg total) by mouth 2 (two) times a day      Facility-Administered Medications: None     Patient's Medications   Discharge Prescriptions    No medications on file     No discharge procedures on file.  ED SEPSIS DOCUMENTATION   Time reflects when diagnosis was documented in both MDM as applicable and the Disposition within this note       Time User Action Codes Description Comment    12/17/2024  4:50 PM Danyel Lentz Add [F25.0] Schizoaffective disorder, bipolar type (HCC)                  Danyel Lentz MD  12/17/24 2794

## 2025-05-19 ENCOUNTER — HOSPITAL ENCOUNTER (EMERGENCY)
Facility: HOSPITAL | Age: 30
Discharge: HOME | End: 2025-05-19
Attending: EMERGENCY MEDICINE | Admitting: EMERGENCY MEDICINE
Payer: MEDICARE

## 2025-05-19 VITALS
WEIGHT: 257.94 LBS | SYSTOLIC BLOOD PRESSURE: 138 MMHG | OXYGEN SATURATION: 97 % | DIASTOLIC BLOOD PRESSURE: 80 MMHG | HEART RATE: 120 BPM | TEMPERATURE: 98.3 F | RESPIRATION RATE: 18 BRPM | BODY MASS INDEX: 41.63 KG/M2

## 2025-05-19 DIAGNOSIS — F25.0 SCHIZOAFFECTIVE DISORDER, BIPOLAR TYPE (HCC): Chronic | ICD-10-CM

## 2025-05-19 DIAGNOSIS — F79 INTELLECTUAL DISABILITY: Chronic | ICD-10-CM

## 2025-05-19 DIAGNOSIS — R45.851 SUICIDAL IDEATION: Primary | ICD-10-CM

## 2025-05-19 PROCEDURE — 99284 EMERGENCY DEPT VISIT MOD MDM: CPT | Performed by: EMERGENCY MEDICINE

## 2025-05-19 PROCEDURE — 99283 EMERGENCY DEPT VISIT LOW MDM: CPT

## 2025-05-20 NOTE — ED PROVIDER NOTES
Time reflects when diagnosis was documented in both MDM as applicable and the Disposition within this note       Time User Action Codes Description Comment    5/19/2025  9:24 PM Markie Velez Add [R45.851] Suicidal ideation     5/19/2025  9:24 PM Markie Velez Add [F25.0] Schizoaffective disorder, bipolar type (HCC)     5/19/2025  9:24 PM Markie Velez Add [F79] Intellectual disability           ED Disposition       ED Disposition   Discharge    Condition   Stable    Date/Time   Mon May 19, 2025  9:24 PM    Comment   Todd ARCEO Maurice discharge to home/self care.                   Assessment & Plan       Medical Decision Making  Suicidal threats and schizoaffective patient-will consult crisis for mental evaluation/possible treatment.             Medications - No data to display    ED Risk Strat Scores                    No data recorded        SBIRT 20yo+      Flowsheet Row Most Recent Value   Initial Alcohol Screen: US AUDIT-C     1. How often do you have a drink containing alcohol? 0 Filed at: 05/19/2025 1937   2. How many drinks containing alcohol do you have on a typical day you are drinking?  0 Filed at: 05/19/2025 1937   3a. Male UNDER 65: How often do you have five or more drinks on one occasion? 0 Filed at: 05/19/2025 1937   Audit-C Score 0 Filed at: 05/19/2025 1937   ELICEO: How many times in the past year have you...    Used an illegal drug or used a prescription medication for non-medical reasons? Never Filed at: 05/19/2025 1937                            History of Present Illness       Chief Complaint   Patient presents with    Psychiatric Evaluation     Pt coming from group home. Reports SI with plan to kill self with knife. Pt states that voices are telling him to do this. Pt med compliant       Past Medical History:   Diagnosis Date    Anxiety     Asthma     Bipolar disorder (HCC)     Depression     Hypertension     Impulse control disorder     Mental retardation     Psychiatric disorder     Psychiatric  illness     Schizoaffective disorder (HCC)       Past Surgical History:   Procedure Laterality Date    HAND SURGERY      right hand      Family History   Problem Relation Age of Onset    No Known Problems Mother     No Known Problems Father     No Known Problems Sister     No Known Problems Brother     No Known Problems Maternal Aunt     No Known Problems Paternal Aunt     No Known Problems Maternal Uncle     No Known Problems Paternal Uncle     No Known Problems Maternal Grandfather     No Known Problems Maternal Grandmother     No Known Problems Paternal Grandfather     No Known Problems Paternal Grandmother     No Known Problems Cousin     ADD / ADHD Neg Hx     Alcohol abuse Neg Hx     Anxiety disorder Neg Hx     Bipolar disorder Neg Hx     Dementia Neg Hx     Depression Neg Hx     Drug abuse Neg Hx     OCD Neg Hx     Paranoid behavior Neg Hx     Schizophrenia Neg Hx     Seizures Neg Hx     Self-Injury Neg Hx     Suicide Attempts Neg Hx       Social History[1]   E-Cigarette/Vaping    E-Cigarette Use Never User       E-Cigarette/Vaping Substances      I have reviewed and agree with the history as documented.     Plan 10-year-old male presents for evaluation of suicidal threats.  Patient was having a normal day when his roommate called the EMS for having an upset stomach when EMS arrived patient states that he was suicidal to kill himself with a knife.  Group home staff states that the patient does not have access to knives and has been seen in the ED for comments like this in the past.  They have stated they feel comfortable with him being in the group home.      History provided by:  Patient (group home staff)  Psychiatric Evaluation  Presenting symptoms: suicidal thoughts    Presenting symptoms: no agitation and no hallucinations    Associated symptoms: no abdominal pain, no appetite change, no chest pain and no fatigue        Review of Systems   Constitutional:  Negative for activity change, appetite change,  fatigue and fever.   HENT:  Negative for congestion, dental problem, ear pain, rhinorrhea and sore throat.    Eyes:  Negative for pain and redness.   Respiratory:  Negative for chest tightness, shortness of breath and wheezing.    Cardiovascular:  Negative for chest pain and palpitations.   Gastrointestinal:  Negative for abdominal pain, blood in stool, constipation, diarrhea, nausea and vomiting.   Endocrine: Negative for cold intolerance and heat intolerance.   Genitourinary:  Negative for dysuria, frequency and hematuria.   Musculoskeletal:  Negative for arthralgias and myalgias.   Skin:  Negative for color change, pallor and rash.   Neurological:  Negative for weakness and numbness.   Hematological:  Does not bruise/bleed easily.   Psychiatric/Behavioral:  Positive for suicidal ideas. Negative for agitation and hallucinations.            Objective       ED Triage Vitals   Temperature Pulse Blood Pressure Respirations SpO2 Patient Position - Orthostatic VS   05/19/25 1938 05/19/25 1936 05/19/25 1936 05/19/25 1936 05/19/25 1936 05/19/25 1936   98.3 °F (36.8 °C) (!) 120 138/80 18 97 % Sitting      Temp Source Heart Rate Source BP Location FiO2 (%) Pain Score    05/19/25 1938 05/19/25 1936 05/19/25 1936 -- 05/19/25 1936    Oral Monitor Right arm  No Pain      Vitals      Date and Time Temp Pulse SpO2 Resp BP Pain Score FACES Pain Rating User   05/19/25 1938 98.3 °F (36.8 °C) -- -- -- -- -- -- SL   05/19/25 1936 -- 120 97 % 18 138/80 No Pain -- SL            Physical Exam  Constitutional:       Appearance: He is well-developed.   HENT:      Head: Normocephalic and atraumatic.     Eyes:      General: No scleral icterus.     Conjunctiva/sclera: Conjunctivae normal.     Neck:      Vascular: No JVD.      Trachea: No tracheal deviation.   Pulmonary:      Effort: Pulmonary effort is normal. No respiratory distress.   Abdominal:      General: There is no distension.     Musculoskeletal:         General: No deformity. Normal  range of motion.      Cervical back: Normal range of motion.     Skin:     Coloration: Skin is not pale.      Findings: No erythema or rash.     Neurological:      Mental Status: He is alert and oriented to person, place, and time.     Psychiatric:         Mood and Affect: Mood normal.         Behavior: Behavior normal.         Thought Content: Thought content normal.         Judgment: Judgment normal.         Results Reviewed       None            No orders to display       Procedures    ED Medication and Procedure Management   Prior to Admission Medications   Prescriptions Last Dose Informant Patient Reported? Taking?   Dulaglutide (Trulicity) 1.5 MG/0.5ML SOPN   Yes No   Sig: INJECT 1.5MG SC WEEKLY   EPINEPHrine (EPIPEN) 0.3 mg/0.3 mL SOAJ   Yes No   Sig: Inject 0.3 mg into a muscle once   acetaminophen (TYLENOL) 325 mg tablet   Yes No   Sig: Take 650 mg by mouth every 6 (six) hours as needed for mild pain   albuterol (PROVENTIL HFA,VENTOLIN HFA) 90 mcg/act inhaler   No No   Sig: Inhale 2 puffs every 6 (six) hours as needed for wheezing   benztropine (COGENTIN) 1 mg tablet   No No   Sig: Take 1 tablet (1 mg total) by mouth 2 (two) times a day   Patient taking differently: Take 2 mg by mouth 2 (two) times a day   cholecalciferol (VITAMIN D3) 1,000 units tablet   Yes No   Sig: Take 2,000 Units by mouth daily   divalproex sodium (DEPAKOTE ER) 250 mg 24 hr tablet   No No   Sig: Take 3 tablets (750 mg total) by mouth 2 (two) times a day   levOCARNitine (CARNITOR) 330 MG tablet   No No   Sig: Take 3 tablets (990 mg total) by mouth 3 (three) times a day for 15 days   levocetirizine (XYZAL) 5 MG tablet   Yes No   Sig: Take 5 mg by mouth every evening   metFORMIN (GLUCOPHAGE) 500 mg tablet   No Yes   Sig: Take 1.5 tablets (750 mg total) by mouth 2 (two) times a day with meals   Patient taking differently: Take 750 mg by mouth in the morning and 750 mg in the evening. Take with meals. Once in AM and once at 5PM.    risperiDONE (RisperDAL) 2 mg tablet   No No   Sig: Take 1 tablet (2 mg total) by mouth 2 (two) times a day      Facility-Administered Medications: None     Discharge Medication List as of 5/19/2025  9:24 PM        CONTINUE these medications which have NOT CHANGED    Details   metFORMIN (GLUCOPHAGE) 500 mg tablet Take 1.5 tablets (750 mg total) by mouth 2 (two) times a day with meals, Starting Tue 7/12/2022, Until Mon 5/19/2025, Normal      acetaminophen (TYLENOL) 325 mg tablet Take 650 mg by mouth every 6 (six) hours as needed for mild pain, Historical Med      albuterol (PROVENTIL HFA,VENTOLIN HFA) 90 mcg/act inhaler Inhale 2 puffs every 6 (six) hours as needed for wheezing, Starting Mon 3/18/2019, Print      benztropine (COGENTIN) 1 mg tablet Take 1 tablet (1 mg total) by mouth 2 (two) times a day, Starting Tue 7/12/2022, Until Mon 1/9/2023, Normal      cholecalciferol (VITAMIN D3) 1,000 units tablet Take 2,000 Units by mouth daily, Historical Med      divalproex sodium (DEPAKOTE ER) 250 mg 24 hr tablet Take 3 tablets (750 mg total) by mouth 2 (two) times a day, Starting Tue 7/12/2022, Until Mon 1/9/2023, Normal      Dulaglutide (Trulicity) 1.5 MG/0.5ML SOPN INJECT 1.5MG SC WEEKLY, Historical Med      EPINEPHrine (EPIPEN) 0.3 mg/0.3 mL SOAJ Inject 0.3 mg into a muscle once, Historical Med      levOCARNitine (CARNITOR) 330 MG tablet Take 3 tablets (990 mg total) by mouth 3 (three) times a day for 15 days, Starting Tue 8/15/2023, Until Wed 8/30/2023, Print      levocetirizine (XYZAL) 5 MG tablet Take 5 mg by mouth every evening, Historical Med      risperiDONE (RisperDAL) 2 mg tablet Take 1 tablet (2 mg total) by mouth 2 (two) times a day, Starting Tue 7/12/2022, Until Mon 1/9/2023, Normal           No discharge procedures on file.  ED SEPSIS DOCUMENTATION   Time reflects when diagnosis was documented in both MDM as applicable and the Disposition within this note       Time User Action Codes Description Comment     5/19/2025  9:24 PM Markie Velez Add [R45.851] Suicidal ideation     5/19/2025  9:24 PM Markie Velez Add [F25.0] Schizoaffective disorder, bipolar type (HCC)     5/19/2025  9:24 PM Markie Velez Add [F79] Intellectual disability                    [1]   Social History  Tobacco Use    Smoking status: Never    Smokeless tobacco: Never   Vaping Use    Vaping status: Never Used   Substance Use Topics    Alcohol use: No    Drug use: No        Markie Velez MD  05/19/25 220

## 2025-05-20 NOTE — ED NOTES
"Patient presents to the Emergency Department in the care of his group home staff initially reporting suicidal ideation. Patient is calm and cooperative upon approach and agrees to speak with this writer. Patient is alert and oriented to his baseline. Patient has an even affect, speaks in short, blunted phrasing, is in a pleasant mood. Upon approach patient states \"I want to go home\". Patient denies current suicidal ideation, intent or plan. Patient states he does not wish to harm himself or anyone else. Patient states he takes his medications as prescribed. Patient states he gets along with his roommate, even bought him pizza today, and gets along with his staff. Patient reports he feels safe at the group home. Patient denies homicidal ideation and auditory/visual/tactile hallucinations. Patient denies drug, alcohol and tobacco consumption. Patient denies major disruptions to sleep patterns or appetite. Patient's staff is at bed side and states patient is safe with them. Staff reports patient does not have access to sharps, medications or chemicals that the patinet could use to harm themselves. Staff is in agreement with patient being discharged home at this time.    Akhil Norris  Crisis Intervention Specialist II  05/19/25   "

## 2025-05-20 NOTE — DISCHARGE INSTRUCTIONS
Discharge and Safety Plan    This writer discussed the patients current presentation and recommended discharge plan with Dr. Velez.  They agree with the patient being discharged at this time with the plan in place for the patient to follow up with established outpatient treatment services.     The patient was Instructed to follow up with their established outpatient treatment services.     This writer and the patient completed a safety plan.  The patient was provided with a copy of their safety plan with encouragement to utilize the plan following discharge.     In addition, the patient was instructed to call local [facility] crisis, other crisis services, [facility] or to go to the nearest ER immediately if their situation changes at any time.     This writer discussed discharge plans with the patient and staff, who agrees with and understands the discharge plans.         SAFETY PLAN  Warning Signs (thoughts, images, mood, behavior, situations) of a potential crisis: Thoughts to harm self or others      Coping Skills (what can I do to take my mind off the problem, or to keep myself safe): Talk to staff, do things you enjoy, play a game      Outside Support (who can I reach out to for support and help): Staff, OP treatment providers        National Suicide Prevention Hotline:  [address] [place] [address] [phone] - Crisis   [facility] [phone] - [facility] Crisis/Mobile Crisis   [phone] - [facility] Crisis   [facility]: [phone]  [facility]: [phone]   [facility] [phone] - Crisis   [facility] [phone] - Crisis     [facility] [phone] - Crisis   [facility] [phone] - Crisis   [phone] - Peer Support Talk Line (1-9pm daily)  [phone] - Teen Support Talk Line (1-9pm daily)  [phone] - [facility]   [facility] [phone]- Crisis    [facility] [phone] - Crisis   [facility] [phone] - Crisis    [facility]) [phone] - [facility] Crisis

## 2025-08-01 ENCOUNTER — OFFICE VISIT (OUTPATIENT)
Dept: URGENT CARE | Age: 30
End: 2025-08-01
Payer: MEDICARE

## 2025-08-01 VITALS
RESPIRATION RATE: 18 BRPM | DIASTOLIC BLOOD PRESSURE: 79 MMHG | OXYGEN SATURATION: 98 % | HEART RATE: 111 BPM | BODY MASS INDEX: 42.06 KG/M2 | SYSTOLIC BLOOD PRESSURE: 141 MMHG | TEMPERATURE: 98.6 F | WEIGHT: 260.6 LBS

## 2025-08-01 DIAGNOSIS — L30.9 PERIORBITAL DERMATITIS: Primary | ICD-10-CM

## 2025-08-01 PROCEDURE — 99213 OFFICE O/P EST LOW 20 MIN: CPT | Performed by: STUDENT IN AN ORGANIZED HEALTH CARE EDUCATION/TRAINING PROGRAM

## 2025-08-01 PROCEDURE — G0463 HOSPITAL OUTPT CLINIC VISIT: HCPCS | Performed by: STUDENT IN AN ORGANIZED HEALTH CARE EDUCATION/TRAINING PROGRAM

## 2025-08-01 RX ORDER — PREDNISONE 50 MG/1
50 TABLET ORAL DAILY
Qty: 5 TABLET | Refills: 0 | Status: SHIPPED | OUTPATIENT
Start: 2025-08-01 | End: 2025-08-06